# Patient Record
Sex: FEMALE | Race: WHITE | NOT HISPANIC OR LATINO | Employment: UNEMPLOYED | URBAN - METROPOLITAN AREA
[De-identification: names, ages, dates, MRNs, and addresses within clinical notes are randomized per-mention and may not be internally consistent; named-entity substitution may affect disease eponyms.]

---

## 2017-05-16 ENCOUNTER — HOSPITAL ENCOUNTER (EMERGENCY)
Facility: HOSPITAL | Age: 27
Discharge: HOME | End: 2017-05-16
Attending: EMERGENCY MEDICINE
Payer: MEDICAID

## 2017-05-16 ENCOUNTER — TELEPHONE (OUTPATIENT)
Dept: ORTHOPEDICS | Facility: CLINIC | Age: 27
End: 2017-05-16

## 2017-05-16 VITALS
HEIGHT: 67 IN | HEART RATE: 102 BPM | OXYGEN SATURATION: 98 % | DIASTOLIC BLOOD PRESSURE: 89 MMHG | WEIGHT: 282.19 LBS | SYSTOLIC BLOOD PRESSURE: 141 MMHG | RESPIRATION RATE: 20 BRPM | TEMPERATURE: 99 F | BODY MASS INDEX: 44.29 KG/M2

## 2017-05-16 DIAGNOSIS — S43.402A SPRAIN OF LEFT SHOULDER, UNSPECIFIED SHOULDER SPRAIN TYPE, INITIAL ENCOUNTER: Primary | ICD-10-CM

## 2017-05-16 PROCEDURE — 99284 EMERGENCY DEPT VISIT MOD MDM: CPT

## 2017-05-16 PROCEDURE — 25000003 PHARM REV CODE 250: Performed by: EMERGENCY MEDICINE

## 2017-05-16 RX ORDER — HYDROCODONE BITARTRATE AND ACETAMINOPHEN 5; 325 MG/1; MG/1
1 TABLET ORAL EVERY 6 HOURS PRN
COMMUNITY
End: 2020-09-25

## 2017-05-16 RX ORDER — DICLOFENAC SODIUM 50 MG/1
50 TABLET, DELAYED RELEASE ORAL 3 TIMES DAILY
Qty: 15 TABLET | Refills: 0 | Status: SHIPPED | OUTPATIENT
Start: 2017-05-16 | End: 2018-05-16

## 2017-05-16 RX ORDER — OXYCODONE HYDROCHLORIDE 5 MG/1
10 TABLET ORAL
Status: COMPLETED | OUTPATIENT
Start: 2017-05-16 | End: 2017-05-16

## 2017-05-16 RX ADMIN — OXYCODONE HYDROCHLORIDE 10 MG: 5 TABLET ORAL at 09:05

## 2017-05-16 NOTE — TELEPHONE ENCOUNTER
----- Message from Swati Shaw sent at 5/16/2017 10:30 AM CDT -----  Contact: Patient  The patient was discharged from the ER yesterday May 15, 2017.  And she needed to see either Dr. Oneal or Dr. Linn as soon as possible.  The patient can be contacted at [phone].

## 2017-05-16 NOTE — TELEPHONE ENCOUNTER
----- Message from Becki Shaw sent at 5/16/2017  3:05 PM CDT -----  Patient requesting new patient appointment for left shoulder injury before Memorial Day.  Best contact phone # [phone].

## 2017-05-16 NOTE — ED AVS SNAPSHOT
[facility] [address] [place] [address]               Loretta Lea   2017  8:48 PM   ED    Description:  Female : 1990   Department:  [facility]           Your Care was Coordinated By:     Provider Role From To    Diony Pryor III, MD Attending Provider 17 --      Reason for Visit     Shoulder Pain           Diagnoses this Visit        Comments    Sprain of left shoulder, unspecified shoulder sprain type, initial encounter    -  Primary       ED Disposition     None           To Do List           Follow-up Information     Follow up with López Ortega MD In 3 days.    Specialty:  Orthopedic Surgery    Contact information:    [address]  [place] [address]  [phone]         These Medications        Disp Refills Start End    diclofenac (VOLTAREN) 50 MG EC tablet 15 tablet 0 2017    Take 1 tablet (50 mg total) by mouth 3 (three) times daily. - Oral      [facility]     [facility] Care Line -  Assistance  Unless otherwise directed by your provider, please contact [facility], our nurse care line that is available for  assistance.     Registered nurses in the [facility] provide: appointment scheduling, clinical advisement, health education, and other advisory services.  Call: [phone] (toll free)               Medications           Message regarding Medications     Verify the changes and/or additions to your medication regime listed below are the same as discussed with your clinician today.  If any of these changes or additions are incorrect, please notify your healthcare provider.        START taking these NEW medications        Refills    diclofenac (VOLTAREN) 50 MG EC tablet 0    Sig: Take 1 tablet (50 mg total) by mouth 3 (three) times daily.    Class: Print    Route: Oral      These medications were administered today        Dose  "Freq    oxycodone immediate release tablet 10 mg 10 mg ED 1 Time    Sig: Take 2 tablets (10 mg total) by mouth ED 1 Time.    Class: Normal    Route: Oral      STOP taking these medications     naproxen (NAPROSYN) 500 MG tablet Take 500 mg by mouth 2 (two) times daily.    norgestimate-ethinyl estradiol (ORTHO-CYCLEN) 0.25-35 mg-mcg per tablet Take 1 tablet by mouth once daily.           Verify that the below list of medications is an accurate representation of the medications you are currently taking.  If none reported, the list may be blank. If incorrect, please contact your healthcare provider. Carry this list with you in case of emergency.           Current Medications     hydrocodone-acetaminophen 5-325mg (NORCO) 5-325 mg per tablet Take 1 tablet by mouth every 6 (six) hours as needed for Pain.    PREDNISONE ORAL Take by mouth.    diclofenac (VOLTAREN) 50 MG EC tablet Take 1 tablet (50 mg total) by mouth 3 (three) times daily.    guaifenesin (MUCINEX) 600 mg 12 hr tablet Take 1,200 mg by mouth 2 (two) times daily.           Clinical Reference Information           Your Vitals Were     BP Pulse Temp Resp Height Weight    141/89 (BP Location: Right arm, Patient Position: Sitting) 102 98.5 °F (36.9 °C) (Oral) 20 5' 7" (1.702 m) 128 kg (282 lb 3 oz)    Last Period SpO2 BMI          04/15/2017 (Approximate) 98% 44.2 kg/m2        Allergies as of 5/16/2017     No Known Allergies      Immunizations Administered on Date of Encounter - 5/16/2017     None      ED Micro, Lab, POCT     None      ED Imaging Orders     Start Ordered       Status Ordering Provider    05/16/17 2101 05/16/17 2100  US Upper Extremity Veins Left  1 time imaging      In process         Discharge Instructions         Shoulder Sprain  A sprain is a stretching or tearing of the ligaments that hold a joint together. A sprain may take up to 8 weeks to fully heal, depending on how severe it is. Moderate to severe shoulder sprains are treated with a sling " or shoulder immobilizer. Minor sprains can be treated without any special support.  Home care  The following guidelines will help you care for your injury at home:  · If a sling was given to you, leave it in place for the time advised by your healthcare provider. If you arent sure how long to wear it, ask for advice. If the sling becomes loose, adjust it so that your forearm is level with the ground. Your shoulder should feel well supported.  · Put an ice pack on the injured area for 20 minutes every 1 to 2 hours the first day. You can make your own ice pack by putting ice cubes in a plastic bag. A bag of frozen peas or something similar works well too. Wrap the bag in a thin towel. Continue with ice packs 3 to 4 times a day for the next 2 to 3 days. Then use the pack as needed to ease pain and swelling.  · You may use acetaminophen or ibuprofen to control pain, unless another pain medicine was prescribed. If you have chronic liver or kidney disease, talk with your healthcare provider before using these medicines. Also talk with your provider if youve had a stomach ulcer or gastrointestinal bleeding.  · Shoulder joints become stiff if left in a sling for too long. You should start range of motion exercises about 7 to 10 days after the injury. Talk with your provider to find out what type of exercises to do and how soon to start.  Follow-up care  Follow up with your healthcare provider, or as advised.  Any X-rays you had today dont show any broken bones, breaks, or fractures. Sometimes fractures dont show up on the first X-ray. Bruises and sprains can sometimes hurt as much as a fracture. These injuries can take time to heal completely. If your symptoms dont improve or they get worse, talk with your provider. You may need a repeat X-ray or other treatments.  When to seek medical advice  Call your healthcare provider right away if any of these occur:  · Shoulder pain or swelling in your arm that gets  worse  · Fingers become cold, blue, numb, or tingly  · Large amount of bruising of the shoulder or upper arm  · Fever or chills  Date Last Reviewed: 8/1/2016  © 9087-4156 [employer]. [address] [place] [address], [place] [address]. All rights reserved. This information is not intended as a substitute for professional medical care. Always follow your healthcare professional's instructions.          [facility] Sign-Up     Activating your [facility] account is as easy as 1-2-3!     1) Visit [email or web address], select Sign Up Now, enter this activation code and your date of birth, then select Next.  [email or web address]  Expires: 6/30/2017  8:57 PM      2) Create a username and password to use when you visit [facility] in the future and select a security question in case you lose your password and select Next.    3) Enter your e-mail address and click Sign Up!    Additional Information  If you have questions, please e-mail [email or web address] or call [phone] to talk to our [facility] staff. Remember, [facility] is NOT to be used for urgent needs. For medical emergencies, dial 911.         Smoking Cessation     If you would like to quit smoking:   You may be eligible for free services if you are a [place] resident and started smoking cigarettes before September 1, 1988.  Call the Smoking Cessation Trust (SCT) toll free at (017) 063-7220 or (905) 220-6229.   Call 0-996-QUIT-NOW if you do not meet the above criteria.   Contact us via email: [email or web address]   View our website for more information: [email or web address]         [facility] complies with applicable Federal civil rights laws and does not discriminate on the basis of race, color, national origin, age, disability, or sex.        Language Assistance Services     ATTENTION: Language assistance services are available, free of charge. Please call [phone].      ATENCIÓN: Si savanna espoh, tiene a baeza disposición  servicios gratuitos de asistencia lingüística. Nay al [phone].     JAMES Ý: N?u b?n nói Ti?ng Vi?t, có các d?ch v? h? tr? ngôn ng? mi?n phí dành cho b?n. G?i s? [phone].

## 2017-05-16 NOTE — TELEPHONE ENCOUNTER
----- Message from Becki Shaw sent at 5/16/2017  3:05 PM CDT -----  Patient requesting new patient appointment for left shoulder injury before Memorial Day.  Best contact phone # [phone].

## 2017-05-17 NOTE — ED NOTES
Sling applied by Loretta and teaching done Given written and verbal DC instructions questions answered per MD aware to follow up with PCP encouraged to return if needed. Given RX with teaching.

## 2017-05-17 NOTE — DISCHARGE INSTRUCTIONS
Shoulder Sprain  A sprain is a stretching or tearing of the ligaments that hold a joint together. A sprain may take up to 8 weeks to fully heal, depending on how severe it is. Moderate to severe shoulder sprains are treated with a sling or shoulder immobilizer. Minor sprains can be treated without any special support.  Home care  The following guidelines will help you care for your injury at home:  · If a sling was given to you, leave it in place for the time advised by your healthcare provider. If you arent sure how long to wear it, ask for advice. If the sling becomes loose, adjust it so that your forearm is level with the ground. Your shoulder should feel well supported.  · Put an ice pack on the injured area for 20 minutes every 1 to 2 hours the first day. You can make your own ice pack by putting ice cubes in a plastic bag. A bag of frozen peas or something similar works well too. Wrap the bag in a thin towel. Continue with ice packs 3 to 4 times a day for the next 2 to 3 days. Then use the pack as needed to ease pain and swelling.  · You may use acetaminophen or ibuprofen to control pain, unless another pain medicine was prescribed. If you have chronic liver or kidney disease, talk with your healthcare provider before using these medicines. Also talk with your provider if youve had a stomach ulcer or gastrointestinal bleeding.  · Shoulder joints become stiff if left in a sling for too long. You should start range of motion exercises about 7 to 10 days after the injury. Talk with your provider to find out what type of exercises to do and how soon to start.  Follow-up care  Follow up with your healthcare provider, or as advised.  Any X-rays you had today dont show any broken bones, breaks, or fractures. Sometimes fractures dont show up on the first X-ray. Bruises and sprains can sometimes hurt as much as a fracture. These injuries can take time to heal completely. If your symptoms dont improve or they get  worse, talk with your provider. You may need a repeat X-ray or other treatments.  When to seek medical advice  Call your healthcare provider right away if any of these occur:  · Shoulder pain or swelling in your arm that gets worse  · Fingers become cold, blue, numb, or tingly  · Large amount of bruising of the shoulder or upper arm  · Fever or chills  Date Last Reviewed: 8/1/2016  © 9526-8979 [employer]. [address] [place] [address], [place] [address]. All rights reserved. This information is not intended as a substitute for professional medical care. Always follow your healthcare professional's instructions.

## 2017-05-17 NOTE — ED PROVIDER NOTES
"Encounter Date: 5/16/2017    SCRIBE #1 NOTE: I, Thi Medeiros , am scribing for, and in the presence of,  Dr. Pryor. I have scribed the entire note.       History     Chief Complaint   Patient presents with    Shoulder Pain     Seen at [facility] yesterday. Left hand now swollen.      Review of patient's allergies indicates:  No Known Allergies  HPI Comments:     05/16/2017  9:01 PM     Chief Complaint:  L shoulder pain      The patient is a 26 y.o. female with no pertinent PMHx who is presenting with the acute onset of L shoulder pain that began x 1 day ago after "sleeping on it funny". The pt reports that the pain is constant, "cramping", and moderate. She notes that the pain is exacerbated with ROM and palpation and denies any relief with steroids, NORCO 5mg, or OTC NSAIDs. She also endorses mild ULE edema that has worsened since this morning. No weakness or numbness. Pt reports that she was seen last evening at [facility] and had a normal w/u. Pt was d/c on NORCO 5mgs. No known trauma or injury. No pertinent past surgical hx. Pt confirms tobacco abuse.             The history is provided by the patient and the spouse.     Past Medical History:   Diagnosis Date    Back pain     HPV (human papilloma virus) infection     Ovarian cyst      Past Surgical History:   Procedure Laterality Date    CHOLECYSTECTOMY      DILATION AND CURETTAGE OF UTERUS      OOPHORECTOMY       History reviewed. No pertinent family history.  Social History   Substance Use Topics    Smoking status: Current Every Day Smoker     Packs/day: 1.00     Types: Cigarettes    Smokeless tobacco: None    Alcohol use Yes     Review of Systems   Constitutional: Negative for fever.   HENT: Negative for sore throat.    Eyes: Negative for visual disturbance.   Respiratory: Negative for shortness of breath.    Cardiovascular: Negative for chest pain.   Gastrointestinal: Negative for nausea.   Genitourinary: Negative for dysuria.   Musculoskeletal: Positive for " arthralgias and joint swelling. Negative for back pain.   Skin: Negative for rash.   Neurological: Negative for weakness.   Hematological: Does not bruise/bleed easily.   Psychiatric/Behavioral: Negative for confusion.       Physical Exam   Initial Vitals   BP Pulse Resp Temp SpO2   05/16/17 2030 05/16/17 2030 05/16/17 2030 05/16/17 2030 05/16/17 2030   141/89 102 20 98.5 °F (36.9 °C) 98 %     Physical Exam    Nursing note and vitals reviewed.  Constitutional: She appears well-developed and well-nourished.   Cardiovascular: Intact distal pulses.   2+ radial pulse on L   Musculoskeletal: Normal range of motion. She exhibits edema and tenderness.   Mild swelling noted to the LUE. There is decreased ROM and TTP of the L shoulder.    Skin: Skin is warm and dry. No erythema.         ED Course   Procedures  Labs Reviewed - No data to display          Medical Decision Making:   ED Management:  Loretta Lea is a 26 y.o. female who presents with  left shoulder pain with associated left upper extremity swelling.  Venous ultrasound is obtained to exclude DVT and is normal.  Symptoms appear to be a primary shoulder problem.  She has had no fever night sweats or chills to raise concern for septic arthritis.  She denies any trauma making rotator cuff injury less likely although possible.  She is placed in a sling and referred to orthopedic surgery.            Scribe Attestation:   Scribe #1: I performed the above scribed service and the documentation accurately describes the services I performed. I attest to the accuracy of the note.    Attending Attestation:           Physician Attestation for Scribe:  Physician Attestation Statement for Scribe #1: I, Dr. Pryor, reviewed documentation, as scribed by Thi Medeiros in my presence, and it is both accurate and complete.                 ED Course     Clinical Impression:   There were no encounter diagnoses.          Diony Pryor III, MD  05/17/17 0001

## 2017-05-17 NOTE — ED NOTES
"C/o let arm and shoulder pain that started 2 days ago after she "slept on it funny:" was seen at [facility] last night and given norco 5mg, states that pain has gotten worse even with use of prescribed pain medication, steroids, and OTC NSAIDS also state that she feels the left arm started to swell today. ROM and touch increase pain pulses intact. Alert calm spouse remains with pt aware to notify nurse of needs or concerns.   "

## 2017-07-13 ENCOUNTER — TELEPHONE (OUTPATIENT)
Dept: ORTHOPEDICS | Facility: CLINIC | Age: 27
End: 2017-07-13

## 2017-07-13 NOTE — TELEPHONE ENCOUNTER
Called patient to see about scheduling an appointment, she stated she injured her ankle a month ago, and it was not getting any better so she went to [facility] ER 7/12/17.  Patient then stated she called all over [place] and no one would take her insurance so she scheduled with a doctor in [facility] today.  Advised patient I was calling to schedule, however, patient is aware, if she needs any treatment or assistance in the future to please call our office.

## 2017-07-13 NOTE — TELEPHONE ENCOUNTER
----- Message from Marybeth Angulo sent at 7/13/2017  8:53 AM CDT -----  Contact: patient  New Patient appointment:  Fracture in rt ankle/ [employer] (medicaid)  Was seen in ER 7/12. ER ref to Dr Oneal....  Patient Ph# [phone]    Let me know if you would like me to make her appointment. Thanks

## 2019-08-28 ENCOUNTER — HOSPITAL ENCOUNTER (EMERGENCY)
Facility: HOSPITAL | Age: 29
Discharge: HOME | End: 2019-08-28
Attending: EMERGENCY MEDICINE
Payer: MEDICAID

## 2019-08-28 VITALS
TEMPERATURE: 98 F | HEART RATE: 84 BPM | BODY MASS INDEX: 43.16 KG/M2 | HEIGHT: 67 IN | WEIGHT: 275 LBS | RESPIRATION RATE: 16 BRPM | SYSTOLIC BLOOD PRESSURE: 120 MMHG | OXYGEN SATURATION: 98 % | DIASTOLIC BLOOD PRESSURE: 78 MMHG

## 2019-08-28 DIAGNOSIS — M94.0 ACUTE COSTOCHONDRITIS: Primary | ICD-10-CM

## 2019-08-28 DIAGNOSIS — R07.9 CHEST PAIN: ICD-10-CM

## 2019-08-28 PROCEDURE — 99284 EMERGENCY DEPT VISIT MOD MDM: CPT | Mod: 25

## 2019-08-28 PROCEDURE — 93005 ELECTROCARDIOGRAM TRACING: CPT

## 2019-08-28 PROCEDURE — 63600175 PHARM REV CODE 636 W HCPCS: Performed by: EMERGENCY MEDICINE

## 2019-08-28 PROCEDURE — 96374 THER/PROPH/DIAG INJ IV PUSH: CPT

## 2019-08-28 RX ORDER — SERTRALINE HYDROCHLORIDE 100 MG/1
100 TABLET, FILM COATED ORAL DAILY
COMMUNITY
End: 2020-10-20 | Stop reason: SDUPTHER

## 2019-08-28 RX ORDER — KETOROLAC TROMETHAMINE 30 MG/ML
30 INJECTION, SOLUTION INTRAMUSCULAR; INTRAVENOUS
Status: DISCONTINUED | OUTPATIENT
Start: 2019-08-28 | End: 2019-08-28

## 2019-08-28 RX ORDER — KETOROLAC TROMETHAMINE 30 MG/ML
30 INJECTION, SOLUTION INTRAMUSCULAR; INTRAVENOUS
Status: COMPLETED | OUTPATIENT
Start: 2019-08-28 | End: 2019-08-28

## 2019-08-28 RX ORDER — TRAZODONE HYDROCHLORIDE 150 MG/1
150 TABLET ORAL NIGHTLY
COMMUNITY
End: 2021-06-22 | Stop reason: CLARIF

## 2019-08-28 RX ADMIN — KETOROLAC TROMETHAMINE 30 MG: 30 INJECTION, SOLUTION INTRAMUSCULAR at 02:08

## 2019-08-28 NOTE — ED PROVIDER NOTES
Encounter Date: 8/28/2019       History   No chief complaint on file.    Patient reports sternal chest pain.  Patient reports improvement of pain when she pushes in our sternum.  There is no trauma. No pleurisy hemoptysis.  No recent illness.  No similar symptoms in the past.  No treatment prior to arrival.  Pain is described as a sharp sensation.        Review of patient's allergies indicates:  No Known Allergies  Past Medical History:   Diagnosis Date    Back pain     HPV (human papilloma virus) infection     Ovarian cyst      Past Surgical History:   Procedure Laterality Date    CHOLECYSTECTOMY      DILATION AND CURETTAGE OF UTERUS      OOPHORECTOMY       No family history on file.  Social History     Tobacco Use    Smoking status: Current Every Day Smoker     Packs/day: 1.00     Types: Cigarettes   Substance Use Topics    Alcohol use: Yes    Drug use: Not on file     Review of Systems   Constitutional: Negative for chills and fever.   HENT: Negative for congestion.    Eyes: Negative for visual disturbance.   Respiratory: Negative for shortness of breath.    Cardiovascular: Positive for chest pain. Negative for palpitations.   Gastrointestinal: Negative for abdominal pain and vomiting.   Genitourinary: Negative for dysuria.   Musculoskeletal: Negative for joint swelling.   Neurological: Negative for headaches.   Psychiatric/Behavioral: Negative for confusion.       Physical Exam     Initial Vitals   BP Pulse Resp Temp SpO2   -- -- -- -- --      MAP       --         Physical Exam    Nursing note and vitals reviewed.  Constitutional: She is not diaphoretic. No distress.   HENT:   Head: Normocephalic and atraumatic.   Eyes: Conjunctivae are normal.   Neck: Normal range of motion.   Cardiovascular: Normal rate.   Pulmonary/Chest: Breath sounds normal.   Patient with very reproducible sternal tenderness.  Palpation reproduces tenderness exactly.   Abdominal: Soft. There is no tenderness.   Musculoskeletal:  Normal range of motion.   Neurological: She is alert.   No gross deficits   Skin: No rash noted.   Psychiatric: She has a normal mood and affect.         ED Course   Procedures  Labs Reviewed - No data to display       Imaging Results    None          Medical Decision Making:   History:   Old Medical Records: I decided to obtain old medical records.  Clinical Tests:   Radiological Study: Reviewed  Medical Tests: Reviewed  ED Management:  Patient presents with history and physical exam consistent with costochondritis.  Patient is completely symptom free after Toradol.  EKG normal sinus rhythm with no acute ST segment changes.  Chest x-ray unremarkable.                      Clinical Impression:       ICD-10-CM ICD-9-CM   1. Acute costochondritis M94.0 733.6   2. Chest pain R07.9 786.50                                Kalyan Peterson MD  09/04/19 0944

## 2019-08-28 NOTE — ED NOTES
"Pt states started with chest pain while driving in car, went home because thought it could be her anxiety, when started with "tingling " to left arm, it scared her, so came here. States hurt to move or take a deep breath.  "

## 2019-10-16 ENCOUNTER — HOSPITAL ENCOUNTER (EMERGENCY)
Facility: HOSPITAL | Age: 29
Discharge: HOME | End: 2019-10-16
Attending: EMERGENCY MEDICINE
Payer: MEDICAID

## 2019-10-16 VITALS
WEIGHT: 285 LBS | SYSTOLIC BLOOD PRESSURE: 148 MMHG | RESPIRATION RATE: 16 BRPM | TEMPERATURE: 98 F | HEIGHT: 68 IN | DIASTOLIC BLOOD PRESSURE: 93 MMHG | HEART RATE: 90 BPM | BODY MASS INDEX: 43.19 KG/M2 | OXYGEN SATURATION: 98 %

## 2019-10-16 DIAGNOSIS — L25.9 CONTACT DERMATITIS, UNSPECIFIED CONTACT DERMATITIS TYPE, UNSPECIFIED TRIGGER: Primary | ICD-10-CM

## 2019-10-16 PROCEDURE — 99284 EMERGENCY DEPT VISIT MOD MDM: CPT | Mod: 25

## 2019-10-16 PROCEDURE — 96372 THER/PROPH/DIAG INJ SC/IM: CPT | Mod: 59

## 2019-10-16 PROCEDURE — 63600175 PHARM REV CODE 636 W HCPCS: Performed by: NURSE PRACTITIONER

## 2019-10-16 RX ORDER — HYDROXYZINE HYDROCHLORIDE 25 MG/1
25 TABLET, FILM COATED ORAL EVERY 6 HOURS
Qty: 20 TABLET | Refills: 0 | Status: SHIPPED | OUTPATIENT
Start: 2019-10-16 | End: 2021-06-22 | Stop reason: CLARIF

## 2019-10-16 RX ORDER — PREDNISONE 20 MG/1
40 TABLET ORAL DAILY
Qty: 10 TABLET | Refills: 0 | Status: SHIPPED | OUTPATIENT
Start: 2019-10-16 | End: 2019-10-21

## 2019-10-16 RX ORDER — DEXAMETHASONE SODIUM PHOSPHATE 4 MG/ML
8 INJECTION, SOLUTION INTRA-ARTICULAR; INTRALESIONAL; INTRAMUSCULAR; INTRAVENOUS; SOFT TISSUE
Status: COMPLETED | OUTPATIENT
Start: 2019-10-16 | End: 2019-10-16

## 2019-10-16 RX ADMIN — DEXAMETHASONE SODIUM PHOSPHATE 8 MG: 4 INJECTION, SOLUTION INTRAMUSCULAR; INTRAVENOUS at 10:10

## 2019-10-17 NOTE — ED PROVIDER NOTES
Encounter Date: 10/16/2019       History     Chief Complaint   Patient presents with    Rash     FOR PAST WEEK     The history is provided by the patient. No  was used.   Rash    This is a new problem. Illness onset: 1 week ago. The problem has been unchanged. 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 The rash is present on the right arm and left arm. The pain is at a severity of 0/10. Associated symptoms include itching. Pertinent negatives include no blisters, no pain and no weeping. Treatments tried: Benadryl. The treatment provided no relief.     Review of patient's allergies indicates:  No Known Allergies  Past Medical History:   Diagnosis Date    Back pain     HPV (human papilloma virus) infection     Ovarian cyst      Past Surgical History:   Procedure Laterality Date    CHOLECYSTECTOMY      DILATION AND CURETTAGE OF UTERUS      OOPHORECTOMY       No family history on file.  Social History     Tobacco Use    Smoking status: Current Every Day Smoker     Packs/day: 1.00     Types: Cigarettes   Substance Use Topics    Alcohol use: Yes    Drug use: Not on file     Review of Systems   Constitutional: Negative.    HENT: Negative.    Eyes: Negative.    Respiratory: Negative.    Cardiovascular: Negative.    Gastrointestinal: Negative.    Genitourinary: Negative.    Musculoskeletal: Negative.    Skin: Positive for itching and rash.   Neurological: Negative.    Psychiatric/Behavioral: Negative.        Physical Exam     Initial Vitals [10/16/19 1945]   BP Pulse Resp Temp SpO2   134/82 99 16 98.4 °F (36.9 °C) 98 %      MAP       --         Physical Exam    Nursing note and vitals reviewed.  Constitutional: She appears well-developed and well-nourished. She is not diaphoretic. No distress.   HENT:   Head: Normocephalic.   Right Ear: External ear normal.   Left Ear: External ear normal.   Mouth/Throat: Oropharynx is clear and moist.   Eyes: Conjunctivae and EOM are  normal. Pupils are equal, round, and reactive to light.   Neck: Normal range of motion. Neck supple.   Cardiovascular: Normal rate, regular rhythm, normal heart sounds and intact distal pulses. Exam reveals no gallop and no friction rub.    No murmur heard.  Pulmonary/Chest: Breath sounds normal. No respiratory distress. She has no wheezes. She has no rhonchi. She has no rales. She exhibits no tenderness.   Abdominal: Soft. Bowel sounds are normal. She exhibits no distension and no mass. There is no tenderness. There is no rebound and no guarding.   Musculoskeletal: Normal range of motion.   Lymphadenopathy:     She has no cervical adenopathy.   Neurological: She is alert and oriented to person, place, and time. She has normal strength. GCS score is 15. GCS eye subscore is 4. GCS verbal subscore is 5. GCS motor subscore is 6.   Skin: Skin is warm and dry. Capillary refill takes less than 2 seconds. Rash noted. Rash is maculopapular. There is erythema.        Psychiatric: She has a normal mood and affect. Her behavior is normal. Judgment and thought content normal.         ED Course   Procedures  Labs Reviewed - No data to display       Imaging Results    None          Medical Decision Making:   Differential Diagnosis:   Contact dermatitis, allergic reaction, parasitic rash  ED Management:  Vital signs are stable. Patient has erythematous macular papular rash to both upper extremities. She states that she was possibly exposed to chemicals while washing dishes at work last week.  The the rash appears consistent with contact dermatitis.  The rash is not painful.  She does have some itching.  She has attempted using Benadryl without significant improvement.  Decadron injection given.  Will prescribe prednisone.  Other:   I have discussed this case with another health care provider.       <> Summary of the Discussion: Dr. Harper              Attending Attestation:     Physician Attestation Statement for NP/PA:        Other NP/PA Attestation Additions:    History of Present Illness: I agree with the assessment, treatment plan, and disposition of the patient as recorded by the nurse practitioner/physician assistant.            Attending ED Notes:   I agree with the assessment, treatment plan, and disposition of the patient as recorded by the nurse practitioner/physician assistant.              Clinical Impression:   Acute Contact Dermatitis                             Sanjeev Fairbanks NP  10/16/19 2247       Jesús Harper MD  10/17/19 0724       Jesús Harper MD  10/17/19 0826       Jesús Harper MD  10/17/19 0829

## 2019-10-17 NOTE — ED PROVIDER NOTES
Encounter Date: 10/16/2019       History     Chief Complaint   Patient presents with    Rash     FOR PAST WEEK     HPI  Review of patient's allergies indicates:  No Known Allergies  Past Medical History:   Diagnosis Date    Back pain     HPV (human papilloma virus) infection     Ovarian cyst      Past Surgical History:   Procedure Laterality Date    CHOLECYSTECTOMY      DILATION AND CURETTAGE OF UTERUS      OOPHORECTOMY       No family history on file.  Social History     Tobacco Use    Smoking status: Current Every Day Smoker     Packs/day: 1.00     Types: Cigarettes   Substance Use Topics    Alcohol use: Yes    Drug use: Not on file     Review of Systems    Physical Exam     Initial Vitals [10/16/19 1945]   BP Pulse Resp Temp SpO2   134/82 99 16 98.4 °F (36.9 °C) 98 %      MAP       --         Physical Exam    ED Course   Procedures  Labs Reviewed - No data to display       Imaging Results    None                               Clinical Impression:   {Add your Clinical Impression here. If you haven't documented one yet, please pend the note, finalize a Clinical Impression, and refresh your note before signing.:[id number]}

## 2019-12-04 ENCOUNTER — HOSPITAL ENCOUNTER (EMERGENCY)
Facility: HOSPITAL | Age: 29
Discharge: HOME | End: 2019-12-04
Attending: EMERGENCY MEDICINE
Payer: MEDICAID

## 2019-12-04 VITALS
HEART RATE: 90 BPM | OXYGEN SATURATION: 98 % | TEMPERATURE: 98 F | SYSTOLIC BLOOD PRESSURE: 145 MMHG | DIASTOLIC BLOOD PRESSURE: 95 MMHG | BODY MASS INDEX: 43.95 KG/M2 | WEIGHT: 280 LBS | HEIGHT: 67 IN | RESPIRATION RATE: 16 BRPM

## 2019-12-04 DIAGNOSIS — R10.9 LEFT FLANK PAIN: Primary | ICD-10-CM

## 2019-12-04 LAB
ALBUMIN SERPL BCP-MCNC: 3.8 G/DL (ref 3.5–5.2)
ALP SERPL-CCNC: 101 U/L (ref 55–135)
ALT SERPL W/O P-5'-P-CCNC: 53 U/L (ref 10–44)
ANION GAP SERPL CALC-SCNC: 7 MMOL/L (ref 8–16)
AST SERPL-CCNC: 27 U/L (ref 10–40)
BACTERIA #/AREA URNS HPF: ABNORMAL /HPF
BASOPHILS # BLD AUTO: 0.07 K/UL (ref 0–0.2)
BASOPHILS NFR BLD: 0.5 % (ref 0–1.9)
BILIRUB SERPL-MCNC: 0.5 MG/DL (ref 0.1–1)
BILIRUB UR QL STRIP: NEGATIVE
BILIRUB UR QL STRIP: NEGATIVE
BUN SERPL-MCNC: 9 MG/DL (ref 6–20)
CALCIUM SERPL-MCNC: 8.7 MG/DL (ref 8.7–10.5)
CHLORIDE SERPL-SCNC: 105 MMOL/L (ref 95–110)
CLARITY UR: CLEAR
CLARITY UR: CLEAR
CO2 SERPL-SCNC: 26 MMOL/L (ref 23–29)
COLOR UR: YELLOW
COLOR UR: YELLOW
CREAT SERPL-MCNC: 0.6 MG/DL (ref 0.5–1.4)
DIFFERENTIAL METHOD: ABNORMAL
EOSINOPHIL # BLD AUTO: 0.1 K/UL (ref 0–0.5)
EOSINOPHIL NFR BLD: 0.6 % (ref 0–8)
ERYTHROCYTE [DISTWIDTH] IN BLOOD BY AUTOMATED COUNT: 14.9 % (ref 11.5–14.5)
EST. GFR  (AFRICAN AMERICAN): >60 ML/MIN/1.73 M^2
EST. GFR  (NON AFRICAN AMERICAN): >60 ML/MIN/1.73 M^2
GLUCOSE SERPL-MCNC: 168 MG/DL (ref 70–110)
GLUCOSE UR QL STRIP: NEGATIVE
GLUCOSE UR QL STRIP: NEGATIVE
HCT VFR BLD AUTO: 42.4 % (ref 37–48.5)
HGB BLD-MCNC: 13.5 G/DL (ref 12–16)
HGB UR QL STRIP: NEGATIVE
HGB UR QL STRIP: NEGATIVE
HYALINE CASTS #/AREA URNS LPF: 6 /LPF
IMM GRANULOCYTES # BLD AUTO: 0.04 K/UL (ref 0–0.04)
IMM GRANULOCYTES NFR BLD AUTO: 0.3 % (ref 0–0.5)
KETONES UR QL STRIP: NEGATIVE
KETONES UR QL STRIP: NEGATIVE
LEUKOCYTE ESTERASE UR QL STRIP: ABNORMAL
LEUKOCYTE ESTERASE UR QL STRIP: ABNORMAL
LIPASE SERPL-CCNC: 54 U/L (ref 4–60)
LYMPHOCYTES # BLD AUTO: 3.8 K/UL (ref 1–4.8)
LYMPHOCYTES NFR BLD: 27.4 % (ref 18–48)
MCH RBC QN AUTO: 26.2 PG (ref 27–31)
MCHC RBC AUTO-ENTMCNC: 31.8 G/DL (ref 32–36)
MCV RBC AUTO: 82 FL (ref 82–98)
MICROSCOPIC COMMENT: ABNORMAL
MONOCYTES # BLD AUTO: 0.9 K/UL (ref 0.3–1)
MONOCYTES NFR BLD: 6.3 % (ref 4–15)
NEUTROPHILS # BLD AUTO: 9 K/UL (ref 1.8–7.7)
NEUTROPHILS NFR BLD: 64.9 % (ref 38–73)
NITRITE UR QL STRIP: NEGATIVE
NITRITE UR QL STRIP: NEGATIVE
NRBC BLD-RTO: 0 /100 WBC
PH UR STRIP: 7 [PH] (ref 5–8)
PH UR STRIP: 7 [PH] (ref 5–8)
PLATELET # BLD AUTO: 415 K/UL (ref 150–350)
PMV BLD AUTO: 9.1 FL (ref 9.2–12.9)
POTASSIUM SERPL-SCNC: 3.5 MMOL/L (ref 3.5–5.1)
PROT SERPL-MCNC: 7.6 G/DL (ref 6–8.4)
PROT UR QL STRIP: NEGATIVE
PROT UR QL STRIP: NEGATIVE
RBC # BLD AUTO: 5.16 M/UL (ref 4–5.4)
RBC #/AREA URNS HPF: 2 /HPF (ref 0–4)
SODIUM SERPL-SCNC: 138 MMOL/L (ref 136–145)
SP GR UR STRIP: 1.01 (ref 1–1.03)
SP GR UR STRIP: 1.01 (ref 1–1.03)
SQUAMOUS #/AREA URNS HPF: 2 /HPF
URN SPEC COLLECT METH UR: ABNORMAL
URN SPEC COLLECT METH UR: ABNORMAL
UROBILINOGEN UR STRIP-ACNC: NEGATIVE EU/DL
UROBILINOGEN UR STRIP-ACNC: NEGATIVE EU/DL
WBC # BLD AUTO: 13.92 K/UL (ref 3.9–12.7)
WBC #/AREA URNS HPF: 6 /HPF (ref 0–5)

## 2019-12-04 PROCEDURE — 81001 URINALYSIS AUTO W/SCOPE: CPT

## 2019-12-04 PROCEDURE — 96375 TX/PRO/DX INJ NEW DRUG ADDON: CPT

## 2019-12-04 PROCEDURE — 25000003 PHARM REV CODE 250: Performed by: EMERGENCY MEDICINE

## 2019-12-04 PROCEDURE — 96361 HYDRATE IV INFUSION ADD-ON: CPT

## 2019-12-04 PROCEDURE — 83690 ASSAY OF LIPASE: CPT

## 2019-12-04 PROCEDURE — 99285 EMERGENCY DEPT VISIT HI MDM: CPT | Mod: 25

## 2019-12-04 PROCEDURE — 80053 COMPREHEN METABOLIC PANEL: CPT

## 2019-12-04 PROCEDURE — 87086 URINE CULTURE/COLONY COUNT: CPT

## 2019-12-04 PROCEDURE — 85025 COMPLETE CBC W/AUTO DIFF WBC: CPT

## 2019-12-04 PROCEDURE — 96374 THER/PROPH/DIAG INJ IV PUSH: CPT

## 2019-12-04 PROCEDURE — 63600175 PHARM REV CODE 636 W HCPCS: Performed by: EMERGENCY MEDICINE

## 2019-12-04 RX ORDER — METHOCARBAMOL 500 MG/1
1000 TABLET, FILM COATED ORAL
Status: COMPLETED | OUTPATIENT
Start: 2019-12-04 | End: 2019-12-04

## 2019-12-04 RX ORDER — METHOCARBAMOL 500 MG/1
1000 TABLET, FILM COATED ORAL 3 TIMES DAILY
Qty: 30 TABLET | Refills: 0 | Status: SHIPPED | OUTPATIENT
Start: 2019-12-04 | End: 2019-12-09

## 2019-12-04 RX ORDER — KETOROLAC TROMETHAMINE 30 MG/ML
10 INJECTION, SOLUTION INTRAMUSCULAR; INTRAVENOUS
Status: COMPLETED | OUTPATIENT
Start: 2019-12-04 | End: 2019-12-04

## 2019-12-04 RX ORDER — ONDANSETRON 2 MG/ML
4 INJECTION INTRAMUSCULAR; INTRAVENOUS
Status: COMPLETED | OUTPATIENT
Start: 2019-12-04 | End: 2019-12-04

## 2019-12-04 RX ADMIN — ONDANSETRON 4 MG: 2 INJECTION INTRAMUSCULAR; INTRAVENOUS at 03:12

## 2019-12-04 RX ADMIN — SODIUM CHLORIDE 1000 ML: 0.9 INJECTION, SOLUTION INTRAVENOUS at 03:12

## 2019-12-04 RX ADMIN — KETOROLAC TROMETHAMINE 10 MG: 30 INJECTION, SOLUTION INTRAMUSCULAR at 04:12

## 2019-12-04 RX ADMIN — METHOCARBAMOL 1000 MG: 500 TABLET, FILM COATED ORAL at 05:12

## 2019-12-04 NOTE — ED NOTES
Patient identifiers for Loretta Lea checked and correct.  LOC:  Loretta Lea is awake, alert, and aware of environment with an appropriate affect. She is oriented x 3 and speaking appropriately.  APPEARANCE:  She is resting comfortably and in no acute distress. She is clean and well groomed, patient's clothing is properly fastened.  SKIN:  The skin is warm and dry. She has normal skin turgor and moist mucus membranes. Skin is intact; no bruising or breakdown noted.  MUSCULOSKELETAL:  She is moving all extremities well, no obvious deformities noted. Pulses intact.   RESPIRATORY:  Airway is open and patent. Respirations are spontaneous and non-labored with normal effort and rate.  CARDIAC:  She has a normal rate and rhythm. No peripheral edema noted. Capillary refill < 3 seconds.  ABDOMEN:  No distention noted.  Soft and non-tender upon palpation. Normal bowel sounds in all 4 quads.  NEUROLOGICAL:  PERRL. Facial expression is symmetrical. Hand grasps are equal bilaterally. Normal sensation in all extremities when touched with finger.  Allergies reported:  Review of patient's allergies indicates:  No Known Allergies  OTHER NOTES:  Patient presents with left sided flank pain that radiates to her groin. The pain is constant.

## 2019-12-04 NOTE — ED PROVIDER NOTES
Encounter Date: 12/4/2019       History     Chief Complaint   Patient presents with    Flank Pain     left (no injury noted)     29-year-old female with a history total hysterectomy secondary to ovarian cysts, cholecystectomy presents to the ER with flank pain.  Patient states that for the past couple of days, she has had pain in her left back.  It has been intermittent until tonight when it became constant.  Pain radiates around to her abdomen and groin.  Describes it as sharp and shooting.  Denies trauma or inciting event.  Associated nausea but no vomiting.  Denies fever, chest pain, shortness of breath, or changes in bowel or bladder function.  Denies pain or bleeding with urination.  Denies vaginal pain, bleeding, discharge. Denies sick contacts or suspicious foods.  Denies history of kidney stones or infection.        Review of patient's allergies indicates:  No Known Allergies  Past Medical History:   Diagnosis Date    Back pain     HPV (human papilloma virus) infection     Ovarian cyst      Past Surgical History:   Procedure Laterality Date    CHOLECYSTECTOMY      DILATION AND CURETTAGE OF UTERUS      OOPHORECTOMY       No family history on file.  Social History     Tobacco Use    Smoking status: Current Every Day Smoker     Packs/day: 1.00     Types: Cigarettes   Substance Use Topics    Alcohol use: Yes    Drug use: Not on file     Review of Systems   Constitutional: Negative for fever.   HENT: Negative for sore throat.    Respiratory: Negative for shortness of breath.    Cardiovascular: Negative for chest pain.   Gastrointestinal: Positive for nausea. Negative for abdominal pain and vomiting.   Genitourinary: Positive for flank pain. Negative for dysuria, hematuria, vaginal bleeding, vaginal discharge and vaginal pain.   Musculoskeletal: Negative for back pain.   Skin: Negative for rash.   Neurological: Negative for weakness.   Hematological: Does not bruise/bleed easily.   All other systems  reviewed and are negative.      Physical Exam     Initial Vitals [12/04/19 0230]   BP Pulse Resp Temp SpO2   (!) 148/84 (!) 113 (!) 22 98.2 °F (36.8 °C) 97 %      MAP       --         Physical Exam    Constitutional: She appears well-developed and well-nourished. No distress.   HENT:   Head: Normocephalic and atraumatic.   Mouth/Throat: Oropharynx is clear and moist.   Eyes: Conjunctivae and EOM are normal. Pupils are equal, round, and reactive to light.   Neck: Normal range of motion.   Cardiovascular: Normal rate, regular rhythm, normal heart sounds and intact distal pulses.   No murmur heard.  Pulmonary/Chest: Breath sounds normal. No respiratory distress. She has no wheezes. She has no rhonchi. She has no rales.   Abdominal: Soft. Bowel sounds are normal. She exhibits no distension. There is no hepatosplenomegaly. There is tenderness in the left lower quadrant. There is CVA tenderness (Left). There is no rigidity, no rebound, no guarding, no tenderness at McBurney's point and negative Ambriz's sign.   Musculoskeletal: Normal range of motion. She exhibits no edema or tenderness.   No midline spinal tenderness.   Neurological: She is alert.   Skin: Skin is warm and dry.   Psychiatric: She has a normal mood and affect. Thought content normal.         ED Course   Procedures  Labs Reviewed   URINALYSIS   CBC W/ AUTO DIFFERENTIAL   COMPREHENSIVE METABOLIC PANEL   URINALYSIS, REFLEX TO URINE CULTURE   LIPASE          Imaging Results    None          Medical Decision Making:   Initial Assessment:   29-year-old female with a history total hysterectomy secondary to ovarian cysts, cholecystectomy presents to the ER with flank pain.   ED Management:  Plan:  Afebrile, , vital signs otherwise stable. Labs, UA.  CT AP for possible stone versus pyelo.    Reassessed.  Patient lying in bed in no acute distress.  States pain is improved with Toradol.  Lab showed WBC 13.9.  BUN 9, creatinine 0.6.  Lipase 54.  UA shows RBC 2,  WBC 6, bacteria rare.  CT AP without contrast shows diverticulosis without diverticulitis.  No evidence of kidney stone or hydronephrosis.  No evidence of pyelo.  Suspect patient's pain is musculoskeletal at this time.  Low suspicion for pyelo with patient having very few WBCs or bacteria in her urine.  Recommend NSAIDs and Robaxin.  Follow up with PCP for repeat evaluation.  Given strict return precautions.  Patient understands the plan.                                 Clinical Impression:       ICD-10-CM ICD-9-CM   1. Left flank pain R10.9 789.09                             Osiel Zapata MD  12/04/19 0528

## 2019-12-06 LAB
BACTERIA UR CULT: NORMAL
BACTERIA UR CULT: NORMAL

## 2019-12-20 ENCOUNTER — HOSPITAL ENCOUNTER (EMERGENCY)
Facility: HOSPITAL | Age: 29
Discharge: HOME | End: 2019-12-21
Attending: EMERGENCY MEDICINE
Payer: MEDICAID

## 2019-12-20 VITALS
BODY MASS INDEX: 43.95 KG/M2 | OXYGEN SATURATION: 98 % | HEIGHT: 67 IN | DIASTOLIC BLOOD PRESSURE: 104 MMHG | SYSTOLIC BLOOD PRESSURE: 150 MMHG | HEART RATE: 94 BPM | TEMPERATURE: 99 F | RESPIRATION RATE: 16 BRPM | WEIGHT: 280 LBS

## 2019-12-20 DIAGNOSIS — L03.116 CELLULITIS OF LEFT LOWER EXTREMITY: ICD-10-CM

## 2019-12-20 DIAGNOSIS — L02.416 ABSCESS OF LEFT THIGH: Primary | ICD-10-CM

## 2019-12-20 PROCEDURE — 10060 I&D ABSCESS SIMPLE/SINGLE: CPT | Mod: LT

## 2019-12-20 PROCEDURE — 99283 EMERGENCY DEPT VISIT LOW MDM: CPT

## 2019-12-20 RX ORDER — LIDOCAINE HYDROCHLORIDE AND EPINEPHRINE 10; 10 MG/ML; UG/ML
5 INJECTION, SOLUTION INFILTRATION; PERINEURAL ONCE
Status: DISCONTINUED | OUTPATIENT
Start: 2019-12-21 | End: 2019-12-21 | Stop reason: HOSPADM

## 2019-12-21 RX ORDER — SULFAMETHOXAZOLE AND TRIMETHOPRIM 800; 160 MG/1; MG/1
1 TABLET ORAL 2 TIMES DAILY
Qty: 14 TABLET | Refills: 0 | Status: SHIPPED | OUTPATIENT
Start: 2019-12-21 | End: 2019-12-28

## 2019-12-21 NOTE — ED PROVIDER NOTES
Encounter Date: 12/20/2019       History     Chief Complaint   Patient presents with    Abscess     HPI   29-year-old female with history of HPV, eczema or a abscesses, who presents with left genital abscess.  Patient has not had an abscess in this region before.  States that this current abscess started about 2 days ago.  It is tender to touch.  Just prior to arrival here, she noted that the abscess opened up and had pus drainage. There is surrounding erythema and indurance. No n/v/d, no fevers.    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     Past Medical History:   Diagnosis Date    Back pain     HPV (human papilloma virus) infection     Ovarian cyst      Past Surgical History:   Procedure Laterality Date    CHOLECYSTECTOMY      DILATION AND CURETTAGE OF UTERUS      OOPHORECTOMY       No family history on file.  Social History     Tobacco Use    Smoking status: Current Every Day Smoker     Packs/day: 1.00     Types: Cigarettes   Substance Use Topics    Alcohol use: Yes    Drug use: Not on file     Review of Systems   Constitutional: Negative for fever.   HENT: Negative for sore throat.    Respiratory: Negative for shortness of breath.    Cardiovascular: Negative for chest pain.   Gastrointestinal: Negative for nausea.   Genitourinary: Negative for dysuria.   Musculoskeletal: Negative for back pain.   Skin: Negative for rash.        + abscess   Neurological: Negative for weakness.   Hematological: Does not bruise/bleed easily.       Physical Exam     Initial Vitals [12/20/19 2301]   BP Pulse Resp Temp SpO2   (!) 150/104 94 16 98.6 °F (37 °C) 98 %      MAP       --         Physical Exam    Constitutional: She appears well-developed and well-nourished. She is not diaphoretic. No distress.   HENT:   Head: Normocephalic and atraumatic.   Eyes: EOM are normal. Pupils are equal, round, and reactive to light. Right eye  exhibits no discharge. Left eye exhibits no discharge.   Neck: Normal range of motion. Neck supple.   Cardiovascular: Normal rate, regular rhythm and normal heart sounds. Exam reveals no gallop and no friction rub.    No murmur heard.  Pulmonary/Chest: Breath sounds normal. No respiratory distress. She has no wheezes. She has no rhonchi. She has no rales.   Abdominal: Soft. She exhibits no distension. There is no tenderness. There is no rebound and no guarding.   Genitourinary:   Genitourinary Comments: There is approximately 1x1cm fluctuant region 3 cm left lateral to the labia. Indurance surrounding the abscess with erythema. The erythema does not extend towards the vagina.   Musculoskeletal: She exhibits no edema or tenderness.   Neurological: She is alert and oriented to person, place, and time.   Skin: Skin is warm and dry.   Psychiatric: She has a normal mood and affect. Thought content normal.         ED Course   Procedures  Labs Reviewed - No data to display       Imaging Results    None          Medical Decision Making:   Initial Assessment:   29-year-old female with abscess  VS notable for HTN, afebrile. Bedside US shows superficial fluid present 0.5cm deep with some cobblestoning surrounding to suggest cellulitis. There is no extension toward the vagina, no suspicion for fistula clinically. I+D performed with chaperone using stab incision. There was minimal output. Packing not inserted given the small size of the pocket. Discharged with Bactrim 7d BID. She will f/u with her PCP this week for wound check. Will return to the ER with worsening erythema, pain, fevers.    Morris Alejandre MD  Resident, PGY-3  12/21/2019 1:52 AM                                   Clinical Impression:       ICD-10-CM ICD-9-CM   1. Abscess of left thigh L02.416 682.6   2. Cellulitis of left lower extremity L03.116 682.6                             Morris Alejandre MD  Resident  12/21/19 0153

## 2020-02-25 ENCOUNTER — HOSPITAL ENCOUNTER (EMERGENCY)
Facility: HOSPITAL | Age: 30
Discharge: HOME | End: 2020-02-25
Attending: EMERGENCY MEDICINE
Payer: MEDICAID

## 2020-02-25 VITALS
SYSTOLIC BLOOD PRESSURE: 132 MMHG | RESPIRATION RATE: 18 BRPM | HEART RATE: 72 BPM | OXYGEN SATURATION: 99 % | TEMPERATURE: 99 F | DIASTOLIC BLOOD PRESSURE: 80 MMHG

## 2020-02-25 DIAGNOSIS — S99.911A INJURY OF RIGHT ANKLE, INITIAL ENCOUNTER: Primary | ICD-10-CM

## 2020-02-25 DIAGNOSIS — R52 PAIN: ICD-10-CM

## 2020-02-25 DIAGNOSIS — M25.571 ANKLE PAIN, RIGHT: ICD-10-CM

## 2020-02-25 PROCEDURE — 99284 EMERGENCY DEPT VISIT MOD MDM: CPT | Mod: 25

## 2020-02-25 PROCEDURE — 25000003 PHARM REV CODE 250: Performed by: EMERGENCY MEDICINE

## 2020-02-25 RX ORDER — TRAMADOL HYDROCHLORIDE 50 MG/1
50 TABLET ORAL
Status: COMPLETED | OUTPATIENT
Start: 2020-02-25 | End: 2020-02-25

## 2020-02-25 RX ORDER — ONDANSETRON 4 MG/1
4 TABLET, ORALLY DISINTEGRATING ORAL
Status: COMPLETED | OUTPATIENT
Start: 2020-02-25 | End: 2020-02-25

## 2020-02-25 RX ADMIN — ONDANSETRON 4 MG: 4 TABLET, ORALLY DISINTEGRATING ORAL at 03:02

## 2020-02-25 RX ADMIN — TRAMADOL HYDROCHLORIDE 50 MG: 50 TABLET, FILM COATED ORAL at 03:02

## 2020-02-25 NOTE — DISCHARGE INSTRUCTIONS
Please read and follow discharge instructions and return precautions.  Elevate your ankle as much as possible.  Tylenol or ibuprofen over-the-counter as directed if needed for pain. Ace wrap as directed if needed for pain. Please follow up with your primary care physician in the next 1-2 days and have your blood pressure reassessed.  Please follow up with your primary care physician regarding this or with Dr. Cassidy or an orthopedic physician of your choice.

## 2020-02-25 NOTE — ED PROVIDER NOTES
Encounter Date: 2/25/2020       History     Chief Complaint   Patient presents with    Ankle Pain     R ankle pain and swelling     This is a 29-year-old female who presents complaining of right lateral ankle pain and swelling over the past 2-3 days.  The patient states she feels that she slightly tweaked the ankle when walking and has been having to walk excessively on it since she is a  she states she has had multiple sprains to this ankle in the past.  She denies any lower extremity weakness numbness tingling or paresthesias.  She denies any other myalgias or arthralgias.  Pain is worse with ambulation and better with elevation.  Pain has been mild to moderate in intensity when it occurs with ambulation.  She denies any other problems or complaints.        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     Past Medical History:   Diagnosis Date    Back pain     HPV (human papilloma virus) infection     Ovarian cyst      Past Surgical History:   Procedure Laterality Date    CHOLECYSTECTOMY      DILATION AND CURETTAGE OF UTERUS      OOPHORECTOMY       No family history on file.  Social History     Tobacco Use    Smoking status: Current Every Day Smoker     Packs/day: 1.00     Types: Cigarettes   Substance Use Topics    Alcohol use: Yes    Drug use: Not on file     Review of Systems   Constitutional: Negative.  Negative for activity change, appetite change, fatigue and fever.   HENT: Negative.    Eyes: Negative.    Respiratory: Negative.  Negative for cough and shortness of breath.    Cardiovascular: Negative.  Negative for chest pain and palpitations.   Gastrointestinal: Negative.    Genitourinary: Negative.    Musculoskeletal: Positive for arthralgias, joint swelling and myalgias.        Pain to right lateral ankle only.   Skin: Negative.  Negative for color change, pallor and rash.   Neurological: Negative for  weakness, light-headedness and numbness.   Hematological: Negative.    Psychiatric/Behavioral: Negative.    All other systems reviewed and are negative.      Physical Exam     Initial Vitals [02/25/20 0013]   BP Pulse Resp Temp SpO2   (!) 161/87 90 16 98.6 °F (37 °C) 99 %      MAP       --         Physical Exam    Nursing note and vitals reviewed.  Constitutional: She is active and cooperative.  Non-toxic appearance. She does not have a sickly appearance. She does not appear ill. No distress.   HENT:   Head: Normocephalic and atraumatic.   Right Ear: Tympanic membrane normal.   Left Ear: Tympanic membrane normal.   Nose: Nose normal.   Mouth/Throat: Uvula is midline, oropharynx is clear and moist and mucous membranes are normal. No oral lesions. No uvula swelling. No oropharyngeal exudate, posterior oropharyngeal edema or posterior oropharyngeal erythema.   Eyes: Conjunctivae, EOM and lids are normal. Pupils are equal, round, and reactive to light. No scleral icterus.   Neck: Trachea normal, normal range of motion, full passive range of motion without pain and phonation normal. Neck supple. No thyroid mass present. No stridor present. No spinous process tenderness and no muscular tenderness present. No edema, no erythema and normal range of motion present. No neck rigidity. No JVD present.   Cardiovascular: Normal rate, regular rhythm, normal heart sounds, intact distal pulses and normal pulses. Exam reveals no gallop and no friction rub.    No murmur heard.  Pulmonary/Chest: Effort normal and breath sounds normal. No accessory muscle usage. No tachypnea. No respiratory distress. She has no wheezes. She has no rhonchi. She has no rales.   Abdominal: Soft. Normal appearance and bowel sounds are normal. She exhibits no distension, no pulsatile midline mass and no mass. There is no tenderness. There is no rigidity, no guarding and no CVA tenderness.   Musculoskeletal: Normal range of motion. She exhibits edema and  tenderness.        Lumbar back: Normal. She exhibits normal range of motion, no tenderness and no bony tenderness.   Pulses are 2+ throughout, cap refill is less than 2 sec throughout, there is no spinal tenderness throughout.  There is mild right lateral malleolar tenderness to palpation and mild edema.  There is no erythema or increased warmth.  She also has mild right lower calf tenderness to palpation with no edema, erythema or increased warmth, there are no palpable cords.  Right lower extremity is neurovascularly intact throughout--motor and sensation are normal, cap refills less than 2 sec throughout.  Dorsalis pedis pulses 2+.  All extremities are neurovascularly intact throughout   Neurological: She is alert and oriented to person, place, and time. She has normal strength. She displays normal reflexes. No cranial nerve deficit or sensory deficit.   No focal deficits.   Skin: Skin is warm, dry and intact. Capillary refill takes less than 2 seconds. No ecchymosis, no petechiae and no rash noted. No erythema. No pallor.   Psychiatric: She has a normal mood and affect. Her speech is normal and behavior is normal. Judgment and thought content normal. Cognition and memory are normal.         ED Course   Procedures  Labs Reviewed - No data to display       Imaging Results          US Lower Extremity Veins Right (In process)                X-Ray Ankle Complete Right (In process)                  Medical Decision Making:   Clinical Tests:   Radiological Study: Reviewed  ED Management:  The patient has evidence of a right ankle sprain.  There is no ligamentous laxity.  Right lower extremity is neurovascularly intact. Ultrasound was performed secondary to mild calf pain on palpation although this could be secondary to have the patient is ambulating.  There is no DVT.  Patient will be discharged with supportive care, ice elevation over-the-counter medication if needed for pain or discomfort as well as a supportive Ace  wrap and have recommended close follow-up outpatient with her primary care physician or Orthopedics.  Blood pressure has been noted to be slightly elevated in the emergency room.  I have explained the need for reassessment of her blood pressure with her primary care physician in the next 1-2 days.  Patient denies headache chest pain or shortness of breath.  She denies feeling dizzy or lightheaded.                                 Clinical Impression:   {Add your Clinical Impression here. If you haven't documented one yet, please pend the note, finalize a Clinical Impression, and refresh your note before signing.:[id number]}

## 2020-02-27 NOTE — ED PROVIDER NOTES
Encounter Date: 2/25/2020       History     Chief Complaint   Patient presents with    Ankle Pain     R ankle pain and swelling     HPI  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     Past Medical History:   Diagnosis Date    Back pain     HPV (human papilloma virus) infection     Ovarian cyst      Past Surgical History:   Procedure Laterality Date    CHOLECYSTECTOMY      DILATION AND CURETTAGE OF UTERUS      OOPHORECTOMY       No family history on file.  Social History     Tobacco Use    Smoking status: Current Every Day Smoker     Packs/day: 1.00     Types: Cigarettes   Substance Use Topics    Alcohol use: Yes    Drug use: Not on file     Review of Systems    Physical Exam     Initial Vitals [02/25/20 0013]   BP Pulse Resp Temp SpO2   (!) 161/87 90 16 98.6 °F (37 °C) 99 %      MAP       --         Physical Exam    ED Course   Procedures  Labs Reviewed - No data to display       Imaging Results          US Lower Extremity Veins Right (Final result)  Result time 02/25/20 03:33:39    Final result by Maddie Rivera MD (02/25/20 03:33:39)                 Narrative:        Exam: UNILATERAL RIGHT LOWER EXTREMITY VENOUS DUPLEX DOPPLER    Clinical data: Right ankle swelling.    Technique: Real time ultrasound and color Doppler imaging of the veins of the  right lower extremity were performed using grayscale, color flow and duplex  Doppler. Vessels imaged: common femoral vein, superficial femoral vein,  popliteal vein.    Prior studies: No prior studies submitted.    Findings: The right lower extremity veins demonstrate no evidence of  intraluminal echogenicity. There is normal compressibility, spontaneous blood  flow, augmentation and respiratory phasicity. No dilated veins or wall  thickening seen. The superficial veins visualized and soft tissues are  unremarkable.      IMPRESSION: No evidence for  DVT.    Recommendation:  Follow up as clinically indicated.      Electronically Signed by EPIFANIO BERGER MD at 2/25/2020 4:41:12 AM EST                             X-Ray Ankle Complete Right (Final result)  Result time 02/26/20 08:26:53    Final result by Valeria Yeboah IV, MD (02/26/20 08:26:53)                 Impression:      No definite acute osseous abnormality.    Probable small ununited ossicles or remote fracture fragments adjacent to the tip of the lateral malleolus      Electronically signed by: Valeria Yeboah IV., MD  Date:    02/26/2020  Time:    08:26             Narrative:    EXAMINATION:  XR ANKLE COMPLETE 3 VIEW RIGHT    CLINICAL HISTORY:  Pain in right ankle and joints of right foot    COMPARISON:  07/13/2019.    FINDINGS:  The bones are appropriately mineralized.  No acute fracture, dislocation or osseous destruction is observed.  Corticated ossific densities adjacent to the tip of the lateral malleolus likely reflect small ossicles are ununited fracture fragments.  The joint spaces are well maintained.  No focal soft tissue abnormality is demonstrated.                                                            Chief Complaint   Patient presents with    Ankle Pain     R ankle pain and swelling     This is a 29-year-old female who presents complaining of right lateral ankle pain and swelling over the past 2-3 days. The patient states she feels that she slightly tweaked the ankle when walking and has been having to walk excessively on it since she is a  she states she has had multiple sprains to this ankle in the past. She denies any lower extremity weakness numbness tingling or paresthesias. She denies any other myalgias or arthralgias. Pain is worse with ambulation and better with elevation. Pain has been mild to moderate in intensity when it occurs with ambulation. She denies any other problems or complaints.         Review of patient's allergies indicates:   Allergen Reactions     Vancomycin analogues Other (See Comments)     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          Past Medical History:   Diagnosis Date    Back pain     HPV (human papilloma virus) infection     Ovarian cyst            Past Surgical History:   Procedure Laterality Date    CHOLECYSTECTOMY      DILATION AND CURETTAGE OF UTERUS      OOPHORECTOMY       No family history on file.   Social History           Tobacco Use    Smoking status: Current Every Day Smoker     Packs/day: 1.00     Types: Cigarettes   Substance Use Topics    Alcohol use: Yes    Drug use: Not on file     Review of Systems   Constitutional: Negative. Negative for activity change, appetite change, fatigue and fever.   HENT: Negative.   Eyes: Negative.   Respiratory: Negative. Negative for cough and shortness of breath.   Cardiovascular: Negative. Negative for chest pain and palpitations.   Gastrointestinal: Negative.   Genitourinary: Negative.   Musculoskeletal: Positive for arthralgias, joint swelling and myalgias.   Pain to right lateral ankle only.   Skin: Negative. Negative for color change, pallor and rash.   Neurological: Negative for weakness, light-headedness and numbness.   Hematological: Negative.   Psychiatric/Behavioral: Negative.   All other systems reviewed and are negative.   Physical Exam            Initial Vitals [02/25/20 0013]   BP Pulse Resp Temp SpO2   (!) 161/87 90 16 98.6 °F (37 °C) 99 %      MAP       --         Physical Exam   Nursing note and vitals reviewed.   Constitutional: She is active and cooperative. Non-toxic appearance. She does not have a sickly appearance. She does not appear ill. No distress.   HENT:   Head: Normocephalic and atraumatic.   Right Ear: Tympanic membrane normal.   Left Ear: Tympanic membrane normal.   Nose: Nose normal.   Mouth/Throat: Uvula is midline, oropharynx is clear and moist and mucous membranes are normal. No oral lesions. No uvula swelling. No oropharyngeal exudate, posterior oropharyngeal edema or  posterior oropharyngeal erythema.   Eyes: Conjunctivae, EOM and lids are normal. Pupils are equal, round, and reactive to light. No scleral icterus.   Neck: Trachea normal, normal range of motion, full passive range of motion without pain and phonation normal. Neck supple. No thyroid mass present. No stridor present. No spinous process tenderness and no muscular tenderness present. No edema, no erythema and normal range of motion present. No neck rigidity. No JVD present.   Cardiovascular: Normal rate, regular rhythm, normal heart sounds, intact distal pulses and normal pulses. Exam reveals no gallop and no friction rub.   No murmur heard.   Pulmonary/Chest: Effort normal and breath sounds normal. No accessory muscle usage. No tachypnea. No respiratory distress. She has no wheezes. She has no rhonchi. She has no rales.   Abdominal: Soft. Normal appearance and bowel sounds are normal. She exhibits no distension, no pulsatile midline mass and no mass. There is no tenderness. There is no rigidity, no guarding and no CVA tenderness.   Musculoskeletal: Normal range of motion. She exhibits edema and tenderness.   Lumbar back: Normal. She exhibits normal range of motion, no tenderness and no bony tenderness.   Pulses are 2+ throughout, cap refill is less than 2 sec throughout, there is no spinal tenderness throughout.  There is mild right lateral malleolar tenderness to palpation and mild edema. There is no erythema or increased warmth. She also has mild right lower calf tenderness to palpation with no edema, erythema or increased warmth, there are no palpable cords. Right lower extremity is neurovascularly intact throughout--motor and sensation are normal, cap refills less than 2 sec throughout. Dorsalis pedis pulses 2+. All extremities are neurovascularly intact throughout   Neurological: She is alert and oriented to person, place, and time. She has normal strength. She displays normal reflexes. No cranial nerve deficit  or sensory deficit.   No focal deficits.   Skin: Skin is warm, dry and intact. Capillary refill takes less than 2 seconds. No ecchymosis, no petechiae and no rash noted. No erythema. No pallor.   Psychiatric: She has a normal mood and affect. Her speech is normal and behavior is normal. Judgment and thought content normal. Cognition and memory are normal.   ED Course   Procedures   Labs Reviewed - No data to display         Imaging Results             US Lower Extremity Veins Right (In process)                 X-Ray Ankle Complete Right (In process)               Medical Decision Making:   Clinical Tests:   Radiological Study: Reviewed   ED Management:   The patient has evidence of a right ankle sprain. There is no ligamentous laxity. Right lower extremity is neurovascularly intact. Ultrasound was performed secondary to mild calf pain on palpation although this could be secondary to have the patient is ambulating. There is no DVT. Patient will be discharged with supportive care, ice elevation over-the-counter medication if needed for pain or discomfort as well as a supportive Ace wrap and have recommended close follow-up outpatient with her primary care physician or Orthopedics. Blood pressure has been noted to be slightly elevated in the emergency room. I have explained the need for reassessment of her blood pressure with her primary care physician in the next 1-2 days. Patient denies headache chest pain or shortness of breath. She denies feeling dizzy or lightheaded.             Clinical Impression:     Chief Complaint   Patient presents with    Ankle Pain     R ankle pain and swelling     This is a 29-year-old female who presents complaining of right lateral ankle pain and swelling over the past 2-3 days. The patient states she feels that she slightly tweaked the ankle when walking and has been having to walk excessively on it since she is a  she states she has had multiple sprains to this ankle in the  past. She denies any lower extremity weakness numbness tingling or paresthesias. She denies any other myalgias or arthralgias. Pain is worse with ambulation and better with elevation. Pain has been mild to moderate in intensity when it occurs with ambulation. She denies any other problems or complaints.         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          Past Medical History:   Diagnosis Date    Back pain     HPV (human papilloma virus) infection     Ovarian cyst            Past Surgical History:   Procedure Laterality Date    CHOLECYSTECTOMY      DILATION AND CURETTAGE OF UTERUS      OOPHORECTOMY       No family history on file.   Social History           Tobacco Use    Smoking status: Current Every Day Smoker     Packs/day: 1.00     Types: Cigarettes   Substance Use Topics    Alcohol use: Yes    Drug use: Not on file     Review of Systems   Constitutional: Negative. Negative for activity change, appetite change, fatigue and fever.   HENT: Negative.   Eyes: Negative.   Respiratory: Negative. Negative for cough and shortness of breath.   Cardiovascular: Negative. Negative for chest pain and palpitations.   Gastrointestinal: Negative.   Genitourinary: Negative.   Musculoskeletal: Positive for arthralgias, joint swelling and myalgias.   Pain to right lateral ankle only.   Skin: Negative. Negative for color change, pallor and rash.   Neurological: Negative for weakness, light-headedness and numbness.   Hematological: Negative.   Psychiatric/Behavioral: Negative.   All other systems reviewed and are negative.   Physical Exam            Initial Vitals [02/25/20 0013]   BP Pulse Resp Temp SpO2   (!) 161/87 90 16 98.6 °F (37 °C) 99 %      MAP       --         Physical Exam   Nursing note and vitals reviewed.   Constitutional: She is active and cooperative. Non-toxic appearance. She does not have a sickly appearance. She  does not appear ill. No distress.   HENT:   Head: Normocephalic and atraumatic.   Right Ear: Tympanic membrane normal.   Left Ear: Tympanic membrane normal.   Nose: Nose normal.   Mouth/Throat: Uvula is midline, oropharynx is clear and moist and mucous membranes are normal. No oral lesions. No uvula swelling. No oropharyngeal exudate, posterior oropharyngeal edema or posterior oropharyngeal erythema.   Eyes: Conjunctivae, EOM and lids are normal. Pupils are equal, round, and reactive to light. No scleral icterus.   Neck: Trachea normal, normal range of motion, full passive range of motion without pain and phonation normal. Neck supple. No thyroid mass present. No stridor present. No spinous process tenderness and no muscular tenderness present. No edema, no erythema and normal range of motion present. No neck rigidity. No JVD present.   Cardiovascular: Normal rate, regular rhythm, normal heart sounds, intact distal pulses and normal pulses. Exam reveals no gallop and no friction rub.   No murmur heard.   Pulmonary/Chest: Effort normal and breath sounds normal. No accessory muscle usage. No tachypnea. No respiratory distress. She has no wheezes. She has no rhonchi. She has no rales.   Abdominal: Soft. Normal appearance and bowel sounds are normal. She exhibits no distension, no pulsatile midline mass and no mass. There is no tenderness. There is no rigidity, no guarding and no CVA tenderness.   Musculoskeletal: Normal range of motion. She exhibits edema and tenderness.   Lumbar back: Normal. She exhibits normal range of motion, no tenderness and no bony tenderness.   Pulses are 2+ throughout, cap refill is less than 2 sec throughout, there is no spinal tenderness throughout.  There is mild right lateral malleolar tenderness to palpation and mild edema. There is no erythema or increased warmth. She also has mild right lower calf tenderness to palpation with no edema, erythema or increased warmth, there are no palpable  cords. Right lower extremity is neurovascularly intact throughout--motor and sensation are normal, cap refills less than 2 sec throughout. Dorsalis pedis pulses 2+. All extremities are neurovascularly intact throughout   Neurological: She is alert and oriented to person, place, and time. She has normal strength. She displays normal reflexes. No cranial nerve deficit or sensory deficit.   No focal deficits.   Skin: Skin is warm, dry and intact. Capillary refill takes less than 2 seconds. No ecchymosis, no petechiae and no rash noted. No erythema. No pallor.   Psychiatric: She has a normal mood and affect. Her speech is normal and behavior is normal. Judgment and thought content normal. Cognition and memory are normal.   ED Course   Procedures   Labs Reviewed - No data to display         Imaging Results             US Lower Extremity Veins Right (In process)                 X-Ray Ankle Complete Right (In process)               Medical Decision Making:   Clinical Tests:   Radiological Study: Reviewed   ED Management:   The patient has evidence of a right ankle sprain. There is no ligamentous laxity. Right lower extremity is neurovascularly intact. Ultrasound was performed secondary to mild calf pain on palpation although this could be secondary to have the patient is ambulating. There is no DVT. Patient will be discharged with supportive care, ice elevation over-the-counter medication if needed for pain or discomfort as well as a supportive Ace wrap and have recommended close follow-up outpatient with her primary care physician or Orthopedics. Blood pressure has been noted to be slightly elevated in the emergency room. I have explained the need for reassessment of her blood pressure with her primary care physician in the next 1-2 days. Patient denies headache chest pain or shortness of breath. She denies feeling dizzy or lightheaded.                    Chief Complaint   Patient presents with    Ankle Pain     R ankle  pain and swelling     This is a 29-year-old female who presents complaining of right lateral ankle pain and swelling over the past 2-3 days. The patient states she feels that she slightly tweaked the ankle when walking and has been having to walk excessively on it since she is a  she states she has had multiple sprains to this ankle in the past. She denies any lower extremity weakness numbness tingling or paresthesias. She denies any other myalgias or arthralgias. Pain is worse with ambulation and better with elevation. Pain has been mild to moderate in intensity when it occurs with ambulation. She denies any other problems or complaints.         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          Past Medical History:   Diagnosis Date    Back pain     HPV (human papilloma virus) infection     Ovarian cyst            Past Surgical History:   Procedure Laterality Date    CHOLECYSTECTOMY      DILATION AND CURETTAGE OF UTERUS      OOPHORECTOMY       No family history on file.   Social History           Tobacco Use    Smoking status: Current Every Day Smoker     Packs/day: 1.00     Types: Cigarettes   Substance Use Topics    Alcohol use: Yes    Drug use: Not on file     Review of Systems   Constitutional: Negative. Negative for activity change, appetite change, fatigue and fever.   HENT: Negative.   Eyes: Negative.   Respiratory: Negative. Negative for cough and shortness of breath.   Cardiovascular: Negative. Negative for chest pain and palpitations.   Gastrointestinal: Negative.   Genitourinary: Negative.   Musculoskeletal: Positive for arthralgias, joint swelling and myalgias.   Pain to right lateral ankle only.   Skin: Negative. Negative for color change, pallor and rash.   Neurological: Negative for weakness, light-headedness and numbness.   Hematological: Negative.   Psychiatric/Behavioral: Negative.   All  other systems reviewed and are negative.   Physical Exam            Initial Vitals [02/25/20 0013]   BP Pulse Resp Temp SpO2   (!) 161/87 90 16 98.6 °F (37 °C) 99 %      MAP       --         Physical Exam   Nursing note and vitals reviewed.   Constitutional: She is active and cooperative. Non-toxic appearance. She does not have a sickly appearance. She does not appear ill. No distress.   HENT:   Head: Normocephalic and atraumatic.   Right Ear: Tympanic membrane normal.   Left Ear: Tympanic membrane normal.   Nose: Nose normal.   Mouth/Throat: Uvula is midline, oropharynx is clear and moist and mucous membranes are normal. No oral lesions. No uvula swelling. No oropharyngeal exudate, posterior oropharyngeal edema or posterior oropharyngeal erythema.   Eyes: Conjunctivae, EOM and lids are normal. Pupils are equal, round, and reactive to light. No scleral icterus.   Neck: Trachea normal, normal range of motion, full passive range of motion without pain and phonation normal. Neck supple. No thyroid mass present. No stridor present. No spinous process tenderness and no muscular tenderness present. No edema, no erythema and normal range of motion present. No neck rigidity. No JVD present.   Cardiovascular: Normal rate, regular rhythm, normal heart sounds, intact distal pulses and normal pulses. Exam reveals no gallop and no friction rub.   No murmur heard.   Pulmonary/Chest: Effort normal and breath sounds normal. No accessory muscle usage. No tachypnea. No respiratory distress. She has no wheezes. She has no rhonchi. She has no rales.   Abdominal: Soft. Normal appearance and bowel sounds are normal. She exhibits no distension, no pulsatile midline mass and no mass. There is no tenderness. There is no rigidity, no guarding and no CVA tenderness.   Musculoskeletal: Normal range of motion. She exhibits edema and tenderness.   Lumbar back: Normal. She exhibits normal range of motion, no tenderness and no bony tenderness.    Pulses are 2+ throughout, cap refill is less than 2 sec throughout, there is no spinal tenderness throughout.  There is mild right lateral malleolar tenderness to palpation and mild edema. There is no erythema or increased warmth. She also has mild right lower calf tenderness to palpation with no edema, erythema or increased warmth, there are no palpable cords. Right lower extremity is neurovascularly intact throughout--motor and sensation are normal, cap refills less than 2 sec throughout. Dorsalis pedis pulses 2+. All extremities are neurovascularly intact throughout   Neurological: She is alert and oriented to person, place, and time. She has normal strength. She displays normal reflexes. No cranial nerve deficit or sensory deficit.   No focal deficits.   Skin: Skin is warm, dry and intact. Capillary refill takes less than 2 seconds. No ecchymosis, no petechiae and no rash noted. No erythema. No pallor.   Psychiatric: She has a normal mood and affect. Her speech is normal and behavior is normal. Judgment and thought content normal. Cognition and memory are normal.   ED Course   Procedures   Labs Reviewed - No data to display         Imaging Results             US Lower Extremity Veins Right (In process)                 X-Ray Ankle Complete Right (In process)               Medical Decision Making:   Clinical Tests:   Radiological Study: Reviewed   ED Management:   The patient has evidence of a right ankle sprain. There is no ligamentous laxity. Right lower extremity is neurovascularly intact. Ultrasound was performed secondary to mild calf pain on palpation although this could be secondary to have the patient is ambulating. There is no DVT. Patient will be discharged with supportive care, ice elevation over-the-counter medication if needed for pain or discomfort as well as a supportive Ace wrap and have recommended close follow-up outpatient with her primary care physician or Orthopedics. Blood pressure has been  noted to be slightly elevated in the emergency room. I have explained the need for reassessment of her blood pressure with her primary care physician in the next 1-2 days. Patient denies headache chest pain or shortness of breath. She denies feeling dizzy or lightheaded.                      Clinical Impression:       ICD-10-CM ICD-9-CM   1. Injury of right ankle, initial encounter S99.911A 959.7   2. Ankle pain, right M25.571 719.47   3. Pain R52 780.96             ED Disposition Condition    Discharge Stable        ED Prescriptions     None        Follow-up Information     Follow up With Specialties Details Why Contact Info    Jones Cortez MD Family Medicine In 2 days  [address] [facility]  [address]  [facility]  [place] [id number]  [phone]      Kaden Cassidy II, MD Orthopedic Surgery In 3 days  [address] [place] [address] [address] [place] [id number]  [phone]                                       Jennifer Coles MD  02/27/20 0412

## 2020-07-01 ENCOUNTER — HOSPITAL ENCOUNTER (EMERGENCY)
Facility: HOSPITAL | Age: 30
Discharge: HOME | End: 2020-07-01
Attending: EMERGENCY MEDICINE
Payer: MEDICAID

## 2020-07-01 VITALS
WEIGHT: 280 LBS | RESPIRATION RATE: 19 BRPM | HEART RATE: 86 BPM | OXYGEN SATURATION: 99 % | SYSTOLIC BLOOD PRESSURE: 125 MMHG | BODY MASS INDEX: 42.44 KG/M2 | DIASTOLIC BLOOD PRESSURE: 74 MMHG | TEMPERATURE: 99 F | HEIGHT: 68 IN

## 2020-07-01 DIAGNOSIS — L25.9 CONTACT DERMATITIS, UNSPECIFIED CONTACT DERMATITIS TYPE, UNSPECIFIED TRIGGER: Primary | ICD-10-CM

## 2020-07-01 PROCEDURE — 63600175 PHARM REV CODE 636 W HCPCS: Performed by: EMERGENCY MEDICINE

## 2020-07-01 PROCEDURE — 96372 THER/PROPH/DIAG INJ SC/IM: CPT | Mod: 59

## 2020-07-01 PROCEDURE — 99284 EMERGENCY DEPT VISIT MOD MDM: CPT | Mod: 25

## 2020-07-01 PROCEDURE — 25000003 PHARM REV CODE 250: Performed by: EMERGENCY MEDICINE

## 2020-07-01 RX ORDER — FAMOTIDINE 20 MG/1
20 TABLET, FILM COATED ORAL 2 TIMES DAILY
Qty: 10 TABLET | Refills: 0 | Status: SHIPPED | OUTPATIENT
Start: 2020-07-01 | End: 2021-06-22 | Stop reason: CLARIF

## 2020-07-01 RX ORDER — FAMOTIDINE 20 MG/1
20 TABLET, FILM COATED ORAL
Status: COMPLETED | OUTPATIENT
Start: 2020-07-01 | End: 2020-07-01

## 2020-07-01 RX ORDER — DEXAMETHASONE SODIUM PHOSPHATE 4 MG/ML
8 INJECTION, SOLUTION INTRA-ARTICULAR; INTRALESIONAL; INTRAMUSCULAR; INTRAVENOUS; SOFT TISSUE
Status: COMPLETED | OUTPATIENT
Start: 2020-07-01 | End: 2020-07-01

## 2020-07-01 RX ORDER — PREDNISONE 20 MG/1
40 TABLET ORAL DAILY
Qty: 10 TABLET | Refills: 0 | Status: SHIPPED | OUTPATIENT
Start: 2020-07-01 | End: 2020-07-06

## 2020-07-01 RX ORDER — DIPHENHYDRAMINE HCL 25 MG
25 CAPSULE ORAL
Status: COMPLETED | OUTPATIENT
Start: 2020-07-01 | End: 2020-07-01

## 2020-07-01 RX ADMIN — DIPHENHYDRAMINE HYDROCHLORIDE 25 MG: 25 CAPSULE ORAL at 03:07

## 2020-07-01 RX ADMIN — DEXAMETHASONE SODIUM PHOSPHATE 8 MG: 4 INJECTION, SOLUTION INTRA-ARTICULAR; INTRALESIONAL; INTRAMUSCULAR; INTRAVENOUS; SOFT TISSUE at 03:07

## 2020-07-01 RX ADMIN — FAMOTIDINE 20 MG: 20 TABLET, FILM COATED ORAL at 03:07

## 2020-07-01 NOTE — ED PROVIDER NOTES
Encounter Date: 7/1/2020       History   No chief complaint on file.    29-year-old female with a past medical history presents emergency patient is that she developed a rash to her bilateral arms and legs 3 days ago.  She has been taking over-the-counter Benadryl over-the-counter presents cream with no relief.  She states that the rash is intensely pruritic.  It initially began as 2 small lines on her left wrist spread since that time.  She denies food, medication or environmental exposures.  She denies any shortness of breath, sensation that her throat is closing, nausea, vomiting or diarrhea.        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     Past Medical History:   Diagnosis Date    Back pain     HPV (human papilloma virus) infection     Ovarian cyst      Past Surgical History:   Procedure Laterality Date    CHOLECYSTECTOMY      DILATION AND CURETTAGE OF UTERUS      OOPHORECTOMY       No family history on file.  Social History     Tobacco Use    Smoking status: Current Every Day Smoker     Packs/day: 1.00     Types: Cigarettes   Substance Use Topics    Alcohol use: Yes    Drug use: Not on file     Review of Systems   HENT: Negative for trouble swallowing and voice change.    Respiratory: Negative for shortness of breath.    Gastrointestinal: Negative for diarrhea, nausea and vomiting.   Skin: Positive for rash.   All other systems reviewed and are negative.      Physical Exam     Initial Vitals   BP Pulse Resp Temp SpO2   -- -- -- -- --      MAP       --         Physical Exam    Nursing note and vitals reviewed.  Constitutional: She appears well-developed and well-nourished. No distress.   HENT:   Head: Normocephalic and atraumatic.   Mouth/Throat: Oropharynx is clear and moist.   Eyes: EOM are normal.   Neck: Normal range of motion. Neck supple.   Cardiovascular: Normal rate, regular rhythm, normal heart sounds  and intact distal pulses.   No murmur heard.  Pulmonary/Chest: Breath sounds normal. No respiratory distress. She has no wheezes.   Abdominal: Soft. There is no abdominal tenderness.   Musculoskeletal: Normal range of motion. No edema.   Neurological: She is alert and oriented to person, place, and time. GCS score is 15. GCS eye subscore is 4. GCS verbal subscore is 5. GCS motor subscore is 6.   Skin: Skin is warm and dry. Capillary refill takes less than 2 seconds. Rash (Raised, erythematous, 0.5-1 cm circular lesions diffusely to posterior bilateral forearms and anterior bilateral thighs) noted.   Psychiatric: She has a normal mood and affect.         ED Course   Procedures  Labs Reviewed - No data to display       Imaging Results    None          Medical Decision Making:   ED Management:  29-year-old female presents with a rash to her arms and legs that seems more consistent with contact dermatitis and not true urticaria.  It does appear to be allergic in nature.  No other systems are involved.  She does have a history of similar rash in 2019.  Unknown etiology of either rash.  She will be treated with Benadryl, antihistamines and H2 blocker.  Given the recurrence of similar reaction she has been encouraged to follow up with Allergy and immunology or the [facility] clinic for possible further outpatient evaluation.  Detailed return precautions were discussed.    Ladonna Reinoso MD  Emergency Medicine  07/01/2020 3:12 PM                                   Clinical Impression:       ICD-10-CM ICD-9-CM   1. Contact dermatitis, unspecified contact dermatitis type, unspecified trigger  L25.9 692.9                                Ladonna Reinoso MD  07/01/20 1512

## 2020-07-01 NOTE — DISCHARGE INSTRUCTIONS
Take prednisone and pepcid as prescribed. Take benadryl every 6 hours per package instructions. Continue using OTC hydrocortisone cream three times daily on arms. Follow up with Dr Marquez or [facility]. Return for worsening conditions as discussed.

## 2020-08-08 ENCOUNTER — HOSPITAL ENCOUNTER (EMERGENCY)
Facility: HOSPITAL | Age: 30
Discharge: HOME | End: 2020-08-08
Attending: EMERGENCY MEDICINE
Payer: MEDICAID

## 2020-08-08 VITALS
RESPIRATION RATE: 20 BRPM | BODY MASS INDEX: 43.95 KG/M2 | HEART RATE: 95 BPM | DIASTOLIC BLOOD PRESSURE: 85 MMHG | HEIGHT: 67 IN | TEMPERATURE: 99 F | SYSTOLIC BLOOD PRESSURE: 138 MMHG | WEIGHT: 280 LBS | OXYGEN SATURATION: 100 %

## 2020-08-08 DIAGNOSIS — R21 RASH AND NONSPECIFIC SKIN ERUPTION: Primary | ICD-10-CM

## 2020-08-08 PROCEDURE — 99284 EMERGENCY DEPT VISIT MOD MDM: CPT | Mod: 25

## 2020-08-08 PROCEDURE — 96372 THER/PROPH/DIAG INJ SC/IM: CPT | Mod: 59

## 2020-08-08 PROCEDURE — 63600175 PHARM REV CODE 636 W HCPCS: Performed by: EMERGENCY MEDICINE

## 2020-08-08 RX ORDER — DEXAMETHASONE SODIUM PHOSPHATE 4 MG/ML
8 INJECTION, SOLUTION INTRA-ARTICULAR; INTRALESIONAL; INTRAMUSCULAR; INTRAVENOUS; SOFT TISSUE
Status: COMPLETED | OUTPATIENT
Start: 2020-08-08 | End: 2020-08-08

## 2020-08-08 RX ORDER — PERMETHRIN 50 MG/G
CREAM TOPICAL
Qty: 60 G | Refills: 0 | Status: SHIPPED | OUTPATIENT
Start: 2020-08-08 | End: 2021-06-22 | Stop reason: CLARIF

## 2020-08-08 RX ADMIN — DEXAMETHASONE SODIUM PHOSPHATE 8 MG: 4 INJECTION, SOLUTION INTRA-ARTICULAR; INTRALESIONAL; INTRAMUSCULAR; INTRAVENOUS; SOFT TISSUE at 07:08

## 2020-08-08 NOTE — ED PROVIDER NOTES
Encounter Date: 8/8/2020       History     Chief Complaint   Patient presents with    Rash     X 2 WEEKS     Patient with approximately 5 week history of itchy red bumps to arms now involving legs.  No fever or chills.  Lesions are itchy.  Patient taking Benadryl over-the-counter with little relief.  No household contacts with similar symptoms.  Patient does sleep in the bed with her dog.        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     Past Medical History:   Diagnosis Date    Back pain     HPV (human papilloma virus) infection     Ovarian cyst      Past Surgical History:   Procedure Laterality Date    CHOLECYSTECTOMY      DILATION AND CURETTAGE OF UTERUS      OOPHORECTOMY       No family history on file.  Social History     Tobacco Use    Smoking status: Current Every Day Smoker     Packs/day: 1.00     Types: Cigarettes   Substance Use Topics    Alcohol use: Yes    Drug use: Not on file     Review of Systems   Constitutional: Negative for chills and fever.   HENT: Negative for congestion.    Eyes: Negative for visual disturbance.   Respiratory: Negative for shortness of breath.    Cardiovascular: Negative for chest pain and palpitations.   Gastrointestinal: Negative for abdominal pain and vomiting.   Genitourinary: Negative for dysuria.   Musculoskeletal: Negative for joint swelling.   Skin: Positive for rash.   Neurological: Negative for headaches.   Psychiatric/Behavioral: Negative for confusion.       Physical Exam     Initial Vitals   BP Pulse Resp Temp SpO2   -- -- -- -- --      MAP       --         Physical Exam    Nursing note and vitals reviewed.  Constitutional: She is not diaphoretic. No distress.   HENT:   Head: Normocephalic and atraumatic.   Eyes: Conjunctivae are normal.   Neck: Normal range of motion.   Cardiovascular: Normal rate.   Pulmonary/Chest: Breath sounds normal.   Abdominal: Soft. There  is no abdominal tenderness.   Musculoskeletal: Normal range of motion.   Neurological: She is alert.   No gross deficits   Skin:   Macular palpable discrete lesions to bilateral arms and ankle area.  More lesions to right arm.  There is no petechiae or purpura.  There are few lesions to right inter digit is area.   Psychiatric: She has a normal mood and affect.         ED Course   Procedures  Labs Reviewed - No data to display       Imaging Results    None          Medical Decision Making:   History:   Old Medical Records: I decided to obtain old medical records.  ED Management:  Patient presents with macular palpable rash.  Possible insect bite.  Given there lesions between fingers on the right will treat for scabies.  Given severe symptoms will given Decadron shot and refer to Dermatology.                                 Clinical Impression:       ICD-10-CM ICD-9-CM   1. Rash and nonspecific skin eruption  R21 782.1                                Kalyan Peterson MD  08/08/20 0149

## 2020-09-25 ENCOUNTER — HOSPITAL ENCOUNTER (EMERGENCY)
Facility: HOSPITAL | Age: 30
Discharge: HOME | End: 2020-09-25
Attending: EMERGENCY MEDICINE
Payer: MEDICAID

## 2020-09-25 VITALS
HEIGHT: 67 IN | OXYGEN SATURATION: 98 % | HEART RATE: 77 BPM | TEMPERATURE: 99 F | DIASTOLIC BLOOD PRESSURE: 75 MMHG | WEIGHT: 280 LBS | RESPIRATION RATE: 15 BRPM | SYSTOLIC BLOOD PRESSURE: 138 MMHG | BODY MASS INDEX: 43.95 KG/M2

## 2020-09-25 DIAGNOSIS — M54.12 CERVICAL RADICULOPATHY: Primary | ICD-10-CM

## 2020-09-25 PROCEDURE — 96375 TX/PRO/DX INJ NEW DRUG ADDON: CPT

## 2020-09-25 PROCEDURE — 96374 THER/PROPH/DIAG INJ IV PUSH: CPT

## 2020-09-25 PROCEDURE — 63600175 PHARM REV CODE 636 W HCPCS: Performed by: EMERGENCY MEDICINE

## 2020-09-25 PROCEDURE — 99284 EMERGENCY DEPT VISIT MOD MDM: CPT | Mod: 25

## 2020-09-25 RX ORDER — HYDROCODONE BITARTRATE AND ACETAMINOPHEN 5; 325 MG/1; MG/1
1 TABLET ORAL EVERY 6 HOURS PRN
Qty: 12 TABLET | Refills: 0 | Status: SHIPPED | OUTPATIENT
Start: 2020-09-25 | End: 2021-06-22 | Stop reason: CLARIF

## 2020-09-25 RX ORDER — KETOROLAC TROMETHAMINE 30 MG/ML
15 INJECTION, SOLUTION INTRAMUSCULAR; INTRAVENOUS
Status: COMPLETED | OUTPATIENT
Start: 2020-09-25 | End: 2020-09-25

## 2020-09-25 RX ORDER — PREDNISONE 20 MG/1
40 TABLET ORAL DAILY
Qty: 10 TABLET | Refills: 0 | Status: SHIPPED | OUTPATIENT
Start: 2020-09-25 | End: 2020-09-30

## 2020-09-25 RX ORDER — MORPHINE SULFATE 4 MG/ML
4 INJECTION, SOLUTION INTRAMUSCULAR; INTRAVENOUS
Status: COMPLETED | OUTPATIENT
Start: 2020-09-25 | End: 2020-09-25

## 2020-09-25 RX ORDER — ONDANSETRON 2 MG/ML
4 INJECTION INTRAMUSCULAR; INTRAVENOUS
Status: COMPLETED | OUTPATIENT
Start: 2020-09-25 | End: 2020-09-25

## 2020-09-25 RX ORDER — METHYLPREDNISOLONE SOD SUCC 125 MG
125 VIAL (EA) INJECTION
Status: COMPLETED | OUTPATIENT
Start: 2020-09-25 | End: 2020-09-25

## 2020-09-25 RX ORDER — DIAZEPAM 10 MG/2ML
5 INJECTION INTRAMUSCULAR
Status: COMPLETED | OUTPATIENT
Start: 2020-09-25 | End: 2020-09-25

## 2020-09-25 RX ORDER — METHOCARBAMOL 500 MG/1
1000 TABLET, FILM COATED ORAL 3 TIMES DAILY
Qty: 30 TABLET | Refills: 0 | Status: SHIPPED | OUTPATIENT
Start: 2020-09-25 | End: 2020-09-30

## 2020-09-25 RX ADMIN — METHYLPREDNISOLONE SODIUM SUCCINATE 125 MG: 125 INJECTION, POWDER, FOR SOLUTION INTRAMUSCULAR; INTRAVENOUS at 08:09

## 2020-09-25 RX ADMIN — DIAZEPAM 5 MG: 5 INJECTION, SOLUTION INTRAMUSCULAR; INTRAVENOUS at 08:09

## 2020-09-25 RX ADMIN — ONDANSETRON HYDROCHLORIDE 4 MG: 2 SOLUTION INTRAMUSCULAR; INTRAVENOUS at 08:09

## 2020-09-25 RX ADMIN — MORPHINE SULFATE 4 MG: 4 INJECTION INTRAVENOUS at 08:09

## 2020-09-25 RX ADMIN — KETOROLAC TROMETHAMINE 15 MG: 30 INJECTION, SOLUTION INTRAMUSCULAR at 09:09

## 2020-09-25 NOTE — FIRST PROVIDER EVALUATION
"Medical screening exam completed.  I have conducted a focused provider triage encounter, findings are as follows:    Brief history of present illness: presents with left neck pain  Onset 2 days  Pt does not know if she woke with this but does deny injury      Vitals:    09/25/20 1700   BP: (!) 143/89   BP Location: Right arm   Patient Position: Sitting   Pulse: 98   Resp: 18   Temp: 99 °F (37.2 °C)   TempSrc: Oral   SpO2: 99%   Weight: 127 kg (280 lb)   Height: 5' 7" (1.702 m)       Pertinent physical exam:  Pain to left side posterior neck.  Pt cannot raise left arm due to pain     Brief workup plan:  CT C spine     Preliminary workup initiated; this workup will be continued and followed by the physician or advanced practice provider that is assigned to the patient when roomed.  "

## 2020-09-25 NOTE — ED TRIAGE NOTES
Pt began with neck pain last night. Reports that today neck pain became severe with radiation into left arm

## 2020-09-26 NOTE — ED PROVIDER NOTES
Encounter Date: 9/25/2020       History     Chief Complaint   Patient presents with    Neck Pain    Arm Pain     left arm     Patient presents with neck and left arm pain that started this morning.  Patient has no history of trauma to the neck or injury.  Patient complained of radiating pain from the neck into the shoulder.  No weakness.        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     Past Medical History:   Diagnosis Date    Back pain     HPV (human papilloma virus) infection     Ovarian cyst      Past Surgical History:   Procedure Laterality Date    CHOLECYSTECTOMY      DILATION AND CURETTAGE OF UTERUS      OOPHORECTOMY       No family history on file.  Social History     Tobacco Use    Smoking status: Current Every Day Smoker     Packs/day: 1.00     Types: Cigarettes   Substance Use Topics    Alcohol use: Yes    Drug use: Not on file     Review of Systems   Musculoskeletal: Positive for neck pain.   All other systems reviewed and are negative.      Physical Exam     Initial Vitals [09/25/20 1700]   BP Pulse Resp Temp SpO2   (!) 143/89 98 18 99 °F (37.2 °C) 99 %      MAP       --         Physical Exam    Nursing note and vitals reviewed.  Constitutional: She appears well-developed and well-nourished.   Patient holds her head tilted to the left, in torticollis   HENT:   Head: Normocephalic and atraumatic.   Eyes: EOM are normal.   Neck: Neck supple.   Patient does not want to range her neck secondary to pain   Cardiovascular: Normal rate, regular rhythm, normal heart sounds and intact distal pulses.   Pulmonary/Chest: Breath sounds normal. No respiratory distress.   Musculoskeletal: Normal range of motion.   Neurological: She is alert and oriented to person, place, and time. She has normal strength.   Skin: Skin is warm and dry. Capillary refill takes less than 2 seconds.   Psychiatric: She has a normal mood and  affect. Her behavior is normal. Judgment and thought content normal.         ED Course   Procedures  Labs Reviewed - No data to display       Imaging Results    None          Medical Decision Making:   Initial Assessment:   Patient is obviously in pain  Differential Diagnosis:   Considerations included muscle spasm, cervical radiculopathy  ED Management:  After IV analgesics patient is now ranging her neck and her pain is markedly improved.  She is now able to move her left arm up over her head.  I advised patient she will need outpatient follow-up she may benefit from outpatient MRI.  Will discharge patient with prednisone and muscle relaxers.  Patient be discharged stable condition.  Detailed return precautions discussed.                             Clinical Impression:     ICD-10-CM ICD-9-CM   1. Cervical radiculopathy  M54.12 723.4                          ED Disposition Condition    Discharge Stable        ED Prescriptions     Medication Sig Dispense Start Date End Date Auth. Provider    predniSONE (DELTASONE) 20 MG tablet Take 2 tablets (40 mg total) by mouth once daily. for 5 days 10 tablet 9/25/2020 9/30/2020 Selvin Downs MD    methocarbamoL (ROBAXIN) 500 MG Tab Take 2 tablets (1,000 mg total) by mouth 3 (three) times daily. for 5 days 30 tablet 9/25/2020 9/30/2020 Selvin Downs MD    HYDROcodone-acetaminophen (NORCO) 5-325 mg per tablet Take 1 tablet by mouth every 6 (six) hours as needed. 12 tablet 9/25/2020  Selvin Downs MD        Follow-up Information     Follow up With Specialties Details Why Contact Info    [facility]  In 1 week  [address] [address]  [phone]                                         Selvin Downs MD  09/25/20 5185

## 2020-10-01 ENCOUNTER — PATIENT OUTREACH (OUTPATIENT)
Dept: EMERGENCY MEDICINE | Facility: HOSPITAL | Age: 30
End: 2020-10-01

## 2020-10-02 NOTE — PROGRESS NOTES
[facility] [place]  ED Navigator  Emergency Department    Project: [facility] ED Navigator  Role: Community Health Worker    Date: 10/02/2020  Patient Name: Loretta Lea  MRN: [id number]  PCP: Primary Doctor No    Assessment:     Loretta Lea is a 30 y.o. female who has presented to ED for neck/arm pain. Patient has visited the ED 2 times in the past 3 months. Patient did not contact PCP.     ED Navigator Patient Assessment    Consent to Services  Does patient consent to completing the assessment?: Yes  Transportation  Does the patient have issues with Transportation?: No  Insurance Coverage  Do you have coverage/adequate coverage?: Yes  Type/kind of coverage: [place] [employer]  Is patient able to afford co-pays/deductibles?: Yes  Is patient able to afford HME or supplies?: Yes  Does the patient have a lack of adequate coverage?: No  Specialist Appointment  Does the patient have a pending specialist referral?: No (Comment: Pt states she was told she should see a PCP, then may need to see ortho)  PCP Follow Up Appointment  Does the patient have a follow up appontment with their PCP?: No  Why does the patient not have a follow up scheduled?: No established [facility]/outside PCP  Medications  Is patient able to afford medication?: Yes  Is patient unable to get medication due to lack of transportation?: No  Psychological  Does the patient have psycho-social concerns?: Yes  What concerns does the patient have?: Anxiety and/or Depression  Food  Does the patient have concerns about food?: No  Communication/Education  Does the patient have limited English proficiency/English not primary language?: No  Does patient have concerns with care?: Yes  What concerns with care does the patient have?: Other (see comments) (Comment: Pt does not wish to see [facility] providers)  Does patient have dissatisfaction with care?: No  Other Financial Concers  Does the patient have immediate financial distress?: No  Other  Social Barriers/Concerns  Does the patient have any additional barriers or concerns?: None         Social History     Socioeconomic History    Marital status: Single     Spouse name: Not on file    Number of children: Not on file    Years of education: Not on file    Highest education level: Not on file   Occupational History    Not on file   Social Needs    Financial resource strain: Not on file    Food insecurity     Worry: Not on file     Inability: Not on file    Transportation needs     Medical: Not on file     Non-medical: Not on file   Tobacco Use    Smoking status: Current Every Day Smoker     Packs/day: 1.00     Types: Cigarettes   Substance and Sexual Activity    Alcohol use: Yes    Drug use: Not on file    Sexual activity: Yes     Birth control/protection: OCP   Lifestyle    Physical activity     Days per week: Not on file     Minutes per session: Not on file    Stress: Not on file   Relationships    Social connections     Talks on phone: Not on file     Gets together: Not on file     Attends [employer] service: Not on file     Active member of club or organization: Not on file     Attends meetings of clubs or organizations: Not on file     Relationship status: Not on file   Other Topics Concern    Not on file   Social History Narrative    Not on file     Pt is a 31 y/o female who reports a hx of bipolar depression and anxiety. Pt works for [employer] and lives with her SO Isauro Funk, who she states is out of work d/t his health and COVID-19, and has been receiving unemployment. Pt states SO's unemployment will be ending soon, and they will try to request an extension. Pt reports they are able to afford bills OK, but SO could benefit from SNAP which would help their household finances. Pt states she herself does not qualify and is already aware of food [place]. Pt states she and her SO would both like to get established with a PCP. However, pt does not wish to go to [facility] and is requesting a  [facility] physician. Pt states she has been managing her mental health since age 12 and has tried but is not interested in counseling at this time. However, pt is open to medication management through PCP. Pt denies transportation concerns and states she has a vehicle.     Plan:     At pt's request, SW to mail food stamps application for her SO. DAKOTA will assist pt and SO in scheduling PCP appointments next week, as [facility] practices are closed at this time. DAKOTA will continue to follow once appt is scheduled.    DAKOTA also provided pt with information on how to address an outstanding medical bill.

## 2020-10-09 ENCOUNTER — PATIENT OUTREACH (OUTPATIENT)
Dept: EMERGENCY MEDICINE | Facility: HOSPITAL | Age: 30
End: 2020-10-09

## 2020-10-20 ENCOUNTER — OFFICE VISIT (OUTPATIENT)
Dept: FAMILY MEDICINE | Facility: CLINIC | Age: 30
End: 2020-10-20
Payer: MEDICAID

## 2020-10-20 VITALS
TEMPERATURE: 97 F | BODY MASS INDEX: 45.99 KG/M2 | HEIGHT: 67 IN | DIASTOLIC BLOOD PRESSURE: 90 MMHG | WEIGHT: 293 LBS | SYSTOLIC BLOOD PRESSURE: 122 MMHG | OXYGEN SATURATION: 96 % | HEART RATE: 89 BPM

## 2020-10-20 DIAGNOSIS — E66.01 CLASS 3 SEVERE OBESITY WITH BODY MASS INDEX (BMI) OF 45.0 TO 49.9 IN ADULT, UNSPECIFIED OBESITY TYPE, UNSPECIFIED WHETHER SERIOUS COMORBIDITY PRESENT: ICD-10-CM

## 2020-10-20 DIAGNOSIS — Z11.59 ENCOUNTER FOR HEPATITIS C SCREENING TEST FOR LOW RISK PATIENT: ICD-10-CM

## 2020-10-20 DIAGNOSIS — R53.83 FATIGUE, UNSPECIFIED TYPE: ICD-10-CM

## 2020-10-20 DIAGNOSIS — M54.2 NECK PAIN: ICD-10-CM

## 2020-10-20 DIAGNOSIS — R73.9 HYPERGLYCEMIA: ICD-10-CM

## 2020-10-20 DIAGNOSIS — Z11.4 SCREENING FOR HIV (HUMAN IMMUNODEFICIENCY VIRUS): ICD-10-CM

## 2020-10-20 DIAGNOSIS — F31.60 BIPOLAR AFFECTIVE DISORDER, CURRENT EPISODE MIXED, CURRENT EPISODE SEVERITY UNSPECIFIED: Primary | ICD-10-CM

## 2020-10-20 DIAGNOSIS — Z83.438 FAMILY HISTORY OF ELEVATED BLOOD LIPIDS: ICD-10-CM

## 2020-10-20 PROCEDURE — 99204 PR OFFICE/OUTPT VISIT, NEW, LEVL IV, 45-59 MIN: ICD-10-PCS | Mod: S$PBB,,, | Performed by: NURSE PRACTITIONER

## 2020-10-20 PROCEDURE — 99204 OFFICE O/P NEW MOD 45 MIN: CPT | Mod: S$PBB,,, | Performed by: NURSE PRACTITIONER

## 2020-10-20 PROCEDURE — 99215 OFFICE O/P EST HI 40 MIN: CPT | Performed by: NURSE PRACTITIONER

## 2020-10-20 RX ORDER — SERTRALINE HYDROCHLORIDE 100 MG/1
100 TABLET, FILM COATED ORAL DAILY
Qty: 30 TABLET | Refills: 3 | Status: SHIPPED | OUTPATIENT
Start: 2020-10-20 | End: 2021-03-23 | Stop reason: SDUPTHER

## 2020-10-20 RX ORDER — MELOXICAM 15 MG/1
15 TABLET ORAL DAILY
Qty: 30 TABLET | Refills: 1 | Status: SHIPPED | OUTPATIENT
Start: 2020-10-20 | End: 2020-11-19

## 2020-10-20 RX ORDER — QUETIAPINE FUMARATE 50 MG/1
50 TABLET, FILM COATED ORAL NIGHTLY
Qty: 30 TABLET | Refills: 3 | Status: SHIPPED | OUTPATIENT
Start: 2020-10-20 | End: 2021-04-09 | Stop reason: SDUPTHER

## 2020-10-20 NOTE — PATIENT INSTRUCTIONS
Potassium-Rich Foods  The normal adult diet usually contains 2,000 mg to 4,000 mg of potassium per day. More potassium is needed when you lose too much potassium from your body. This can happen if you have diarrhea or vomiting. It can also happen if you take a medicine to make you urinate more (diuretic). To increase the amount of potassium in your diet, include these high-potassium foods.     [The (*) indicates foods highest in potassium.]  Vegetables  Artichokes. Cooked 1/2 cup, 200 mg to 300 mg*  Asparagus. Cooked 1/2 cup, 200 mg to 300 mg  Beans. White, red, norwood cooked 1/2 cup, 300 mg to 500 mg*  Beets. Cooked 1/2 cup, 200 mg to 300 mg  Broccoli. Cooked or raw 1 cup, 200 mg to 500 mg*  [place] sprouts. Cooked 1/2 cup, 200 mg to 300 mg  Cabbage. Raw 1 cup, 100 mg to 200 mg  Carrots. Raw or cooked 1/2 cup, 100 mg to 200 mg  Celery. Raw 1 cup, 200 mg to 300 mg  Lima beans. Fresh or frozen 1/2 cup, 300 mg to 500 mg*   Mushrooms. Raw or cooked 1/2 cup, 100 mg to 300 mg  Peas. Cooked 1/2 cup, 150 mg to 250 mg   Potatoes. Baked 1 medium, 500 mg to 900 mg*   Spinach. Cooked 1 cup, 800 mg to 900 mg*   Spinach. Raw 2 cups, 300 mg to 400 mg *  Squash, winter. Fresh, frozen, or cooked 1/2 cup, 200 mg to 400 mg   Tomato. Fresh 1 medium, 200 mg to 300 mg   Tomato juice. Canned 1/2 cup, 200 mg to 300 mg   Fruits  Apple juice. Unsweetened 1 cup, 200 mg to 300 mg   Apricots. Canned 1/2 cup, 200 mg to 300 mg   Apricots. Dried 4 pieces, 100 mg to 200 mg   Avocado. Raw 1/2 cup, 300 mg to 400 mg*  Banana. Fresh 1 small, 300 mg to 400 mg*   Cantaloupe. Fresh 1 cup diced, 300 mg to 400 mg*   Grape juice. Unsweetened 1 cup, 200 mg to 300 mg   Honeydew melon. Fresh 1 cup diced, 300 mg to 400 mg*   Orange. Fresh 1 medium, 200 mg to 300 mg    Orange juice. Unsweetened, fresh or frozen 1/2 cup, 200 mg to 300 mg  Pineapple juice. Unsweetened 1 cup, 300 mg to 400 mg   Prune juice. Unsweetened 1/2 cup, 300 mg to 400 mg*   Prunes. Dried 5  pieces, 300 mg to 400 mg*   Strawberries. Fresh or frozen 1 cup, 200 mg to 300 mg  Meat  Red meat. Cooked 3 ounces, 100 mg to 300 mg   Seafood  Cod, flounder, halibut. Cooked 3 ounces, 100 mg to 300 mg*  [place]. Cooked, 3 ounces 300 mg to 400 mg*   Scallops. Cooked 3 ounces, 200 mg to 300 mg*  Shrimp. Cooked 3/4 cup, 100 mg to 200 mg   Tuna. Fresh or canned 3/4 cup, 200 mg to 500 mg   Date Last Reviewed: 10/1/2016  © 6928-7460 [employer]. [address] [place] [address], [place] [address]. All rights reserved. This information is not intended as a substitute for professional medical care. Always follow your healthcare professional's instructions.        Low-Salt Diet  This diet removes foods that are high in salt. It also limits the amount of salt you use when cooking. It is most often used for people with high blood pressure, edema (fluid retention), and kidney, liver, or heart disease.  Table salt contains the mineral sodium. Your body needs sodium to work normally. But too much sodium can make your health problems worse. Your healthcare provider is recommending a low-salt (also called low-sodium) diet for you. Your total daily allowance of salt is 1,500 to 2,300 milligrams (mg). It is less than 1 teaspoon of table salt. This means you can have only about 500 to 700 mg of sodium at each meal. People with certain health problems should limit salt intake to the lower end of the recommended range.    When you cook, dont add much salt. If you can cook without using salt, even better. Dont add salt to your food at the table.  When shopping, read food labels. Salt is often called sodium on the label. Choose foods that are salt-free, low salt, or very low salt. Note that foods with reduced salt may not lower your salt intake enough.    Beans, potatoes, and pasta  Ok: Dry beans, split peas, lentils, potatoes, rice, macaroni, pasta, spaghetti without added salt  Avoid: Potato chips, tortilla chips, and similar  products  Breads and cereals  Ok: Low-sodium breads, rolls, cereals, and cakes; low-salt crackers, matzo crackers  Avoid: Salted crackers, pretzels, popcorn, Lithuanian toast, pancakes, muffins  Dairy  Ok: Milk, chocolate milk, hot chocolate mix, low-salt cheeses, and yogurt  Avoid: Processed cheese and cheese spreads; Roquefort, Camembert, and cottage cheese; buttermilk, instant breakfast drink  Desserts  Ok: Ice cream, frozen yogurt, juice bars, gelatin, cookies and pies, sugar, honey, jelly, hard candy  Avoid: Most pies, cakes and cookies prepared or processed with salt; instant pudding  Drinks  Ok: Tea, coffee, fizzy (carbonated) drinks, juices  Avoid: Flavored coffees, electrolyte replacement drinks, sports drinks  Meats  Ok: All fresh meat, fish, poultry, low-salt tuna, eggs, egg substitute  Avoid: Smoked, pickled, brine-cured, or salted meats and fish. This includes hewitt, chipped beef, corned beef, hot dogs, deli meats, ham, kosher meats, salt pork, sausage, canned tuna, salted codfish, smoked salmon, herring, sardines, or anchovies.  Seasonings and spices  Ok: Most seasonings are okay. Good substitutes for salt include: fresh herb blends, hot sauce, lemon, garlic, zafar, vinegar, dry mustard, parsley, cilantro, horseradish, tomato paste, regular margarine, mayonnaise, unsalted butter, cream cheese, vegetable oil, cream, low-salt salad dressing and gravy.  Avoid: Regular ketchup, relishes, pickles, soy sauce, teriyaki sauce, Worcestershire sauce, BBQ sauce, tartar sauce, meat tenderizer, chili sauce, regular gravy, regular salad dressing, salted butter  Soups  Ok: Low-salt soups and broths made with allowed foods  Avoid: Bouillon cubes, soups with smoked or salted meats, regular soup and broth  Vegetables  Ok: Most vegetables are okay; also low-salt tomato and vegetable juices  Avoid: Sauerkraut and other brine-soaked vegetables; pickles and other pickled vegetables; tomato juice, olives  Date Last Reviewed:  8/1/2016  © 8562-8056 [employer]. [address] [place] [address], [place] [address]. All rights reserved. This information is not intended as a substitute for professional medical care. Always follow your healthcare professional's instructions.        Eating Heart-Healthy Foods  Eating has a big impact on your heart health. In fact, eating healthier can improve several of your heart risks at once. For instance, it helps you manage weight, cholesterol, and blood pressure. Here are ideas to help you make heart-healthy changes without giving up all the foods and flavors you love.  Getting started  · Talk with your health care provider about eating plans, such as the DASH or Mediterranean diet. You may also be referred to a dietitian.  · Change a few things at a time. Give yourself time to get used to a few eating changes before adding more.  · Work to create a tasty, healthy eating plan that you can stick to for the rest of your life.    Goals for healthy eating  Below are some tips to improve your eating habits:  · Limit saturated fats and trans fats. Saturated fats raise your levels of cholesterol, so keep these fats to a minimum. They are found in foods such as fatty meats, whole milk, cheese, and palm and coconut oils. Avoid trans fats because they lower good cholesterol as well as raise bad cholesterol. Trans fats are most often found in processed foods.  · Reduce sodium (salt) intake. Eating too much salt may increase your blood pressure. Limit your sodium intake to 2,300 milligrams (mg) per day, or less if your health care provider recommends it. Dining out less often and eating fewer processed foods are two great ways to decrease the amount of salt you consume.  · Managing calories. A calorie is a unit of energy. Your body burns calories for fuel, but if you eat more calories than your body burns, the extras are stored as fat. Your health care provider can help you create a diet plan to manage your  calories. This will likely include eating healthier foods as well as exercising regularly. To help you track your progress, keep a diary to record what you eat and how often you exercise.  Choose the right foods  Aim to make these foods staples of your diet. If you have diabetes, you may have different recommendations than what is listed here:  · Fruits and vegetable provide plenty of nutrients without a lot of calories. At meals, fill half your plate with these foods. Split the other half of your plate between whole grains and lean protein.  · Whole grains are high in fiber and rich in vitamins and nutrients. Good choices include whole-wheat bread, pasta, and brown rice.  · Lean proteins give you nutrition with less fat. Good choices include fish, skinless chicken, and beans.  · Low-fat or nonfat dairy provides nutrients without a lot of fat. Try low-fat or nonfat milk, cheese, or yogurt.  · Healthy fats can be good for you in small amounts. These are unsaturated fats, such as olive oil, nuts, and fish. Try to have at least 2 servings per week of fatty fish such as salmon, sardines, mackerel, rainbow trout, and albacore tuna. These contain omega-3 fatty acids, which are good for your heart. Flaxseed is another source of a heart-healthy fat.  More on heart healthy eating    Read food labels  Healthy eating starts at the grocery store. Be sure to pay attention to food labels on packaged foods. Look for products that are high in fiber and protein, and low in saturated fat, cholesterol, and sodium. Avoid products that contain trans fat. And pay close attention to serving size. For instance, if you plan to eat two servings, double all the numbers on the label.  Prepare food right  A key part of healthy cooking is cutting down on added fat and salt. Look on the internet for lower-fat, lower-sodium recipes. Also, try these tips:  · Remove fat from meat and skin from poultry before cooking.  · Skim fat from the surface of  soups and sauces.  · Broil, boil, bake, steam, grill, and microwave food without added fats.  · Choose ingredients that spice up your food without adding calories, fat, or sodium. Try these items: horseradish, hot sauce, lemon, mustard, nonfat salad dressings, and vinegar. For salt-free herbs and spices, try basil, cilantro, cinnamon, pepper, and rosemary.  Date Last Reviewed: 6/25/2015  © 9760-2058 [employer]. [address] [place] [address]. All rights reserved. This information is not intended as a substitute for professional medical care. Always follow your healthcare professional's instructions.

## 2020-10-20 NOTE — PROGRESS NOTES
SUBJECTIVE:    Patient ID: Loretta Lea is a 30 y.o. female.    Chief Complaint: Establish Care, Pain (left arm), Neck Pain, Anxiety, and Depression    Ms Lea is a 31yo lady here today to establish as a NP. History signficant for depression and anxiety in the setting of reported prior Bipolar 2 diagnosis. History also of GERD and recent episode of torticollis for which she was seen in the ED. Neck improved significantly.     Has been off of previous regimen of zoloft 100mg q day and trazodone 100mg q hs. States she is 'tired of seeing psychiatrists' and rehashing her childhood.  States a lot of this was related to her being adopted.  Reports depression and anxiety stem from the drowning death of her younger brother when she was 3yo, and the recent suicide of her biological brother only 2 months after they met.     Neck Pain   This is a new problem. The current episode started 1 to 4 weeks ago. The problem occurs rarely. The problem has been rapidly improving. The pain is associated with nothing. The pain is present in the midline and left side. The quality of the pain is described as cramping. The pain is mild. The symptoms are aggravated by twisting. Associated symptoms include tingling (the tips of left fingers). She has tried muscle relaxants (prednisone) for the symptoms. The treatment provided significant relief.   Anxiety  Presents for initial visit. Onset was more than 5 years ago. The problem has been waxing and waning. Symptoms include depressed mood, excessive worry, insomnia, irritability and nervous/anxious behavior. Patient reports no dizziness, shortness of breath or suicidal ideas. The severity of symptoms is moderate. The symptoms are aggravated by family issues. The quality of sleep is fair.     Risk factors include change in medication and family history (stopped her medications about one year ago). Her past medical history is significant for bipolar disorder and depression. Past  "treatments include counseling (CBT), SSRIs and non-SSRI antidepressants. The treatment provided mild relief. Compliance with prior treatments has been poor. Prior compliance problems include medication issues.   Depression  Visit Type: initial  Onset of symptoms: more than 5 years ago  Progression since onset: gradually worsening  Patient presents with the following symptoms: depressed mood, excessive worry, insomnia, irritability and nervousness/anxiety.  Patient is not experiencing: shortness of breath and suicidal ideas.  Frequency of symptoms: most days   Aggravated by: family issues  Risk factors: family history, major life event and previous episode of depression  Treatment tried: SSRI and non-SSRI antidepressants  Past compliance problems: medication issues          Past Medical History:   Diagnosis Date    Anxiety     Back pain     Bipolar disorder 2004    bipolar 2    Depression     GERD (gastroesophageal reflux disease)     HPV (human papilloma virus) infection     Insomnia     Ovarian cyst      Past Surgical History:   Procedure Laterality Date    CHOLECYSTECTOMY      DILATION AND CURETTAGE OF UTERUS      HYSTERECTOMY  2017    total, ovarian cysts, torsion, [place]; hyst per Dr JESUS Manriquez    OOPHORECTOMY      opherectom Left 2015    salpingo-opherectomy     Family History   Adopted: Yes   Problem Relation Age of Onset    No Known Problems Sister     No Known Problems Sister     No Known Problems Sister        Marital Status: Single  Alcohol History:  reports current alcohol use.  Tobacco History:  reports that she has been smoking cigarettes. She has a 15.00 pack-year smoking history. She has never used smokeless tobacco.  Drug History:  reports previous drug use. Drug: "Crack" cocaine.    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 for 5 days, Disp: 10 tablet, Rfl: 0    guaifenesin (MUCINEX) 600 mg 12 hr tablet, Take 1,200 mg by mouth 2 (two) times daily., Disp: , Rfl:     HYDROcodone-acetaminophen (NORCO) 5-325 mg per tablet, Take 1 tablet by mouth every 6 (six) hours as needed. (Patient not taking: Reported on 10/20/2020), Disp: 12 tablet, Rfl: 0    hydrOXYzine HCl (ATARAX) 25 MG tablet, Take 1 tablet (25 mg total) by mouth every 6 (six) hours. (Patient not taking: Reported on 10/20/2020), Disp: 20 tablet, Rfl: 0    meloxicam (MOBIC) 15 MG tablet, Take 1 tablet (15 mg total) by mouth once daily., Disp: 30 tablet, Rfl: 1    permethrin (ELIMITE) 5 % cream, Apply to affected area once (Patient not taking: Reported on 10/20/2020), Disp: 60 g, Rfl: 0    QUEtiapine (SEROQUEL) 50 MG tablet, Take 1 tablet (50 mg total) by mouth every evening., Disp: 30 tablet, Rfl: 3    sertraline (ZOLOFT) 100 MG tablet, Take 1 tablet (100 mg total) by mouth once daily., Disp: 30 tablet, Rfl: 3    traZODone (DESYREL) 150 MG tablet, Take 150 mg by mouth every evening., Disp: , Rfl:     Review of Systems   Constitutional: Positive for irritability. Negative for activity change and appetite change.   HENT: Negative for ear pain and rhinorrhea.    Eyes: Negative for redness and itching.   Respiratory: Negative for shortness of breath.    Gastrointestinal: Negative for constipation and diarrhea.   Genitourinary: Negative for difficulty urinating and frequency.   Musculoskeletal: Positive for neck pain.   Neurological: Positive for tingling (the tips of left fingers). Negative for dizziness.   Psychiatric/Behavioral: Positive for depression and dysphoric mood. Negative for agitation, behavioral problems, sleep disturbance and suicidal ideas. The patient is nervous/anxious and has insomnia.    All other systems reviewed and are negative.         Objective:      Vitals:    10/20/20 1308   BP: (!)  "122/90   Pulse: 89   Temp: 97 °F (36.1 °C)   SpO2: 96%   Weight: (!) 137.9 kg (304 lb)   Height: 5' 7" (1.702 m)     Body mass index is 47.61 kg/m².  Physical Exam  Vitals signs and nursing note reviewed.   Constitutional:       Appearance: Normal appearance. She is well-developed. She is morbidly obese.   HENT:      Head: Normocephalic.   Eyes:      Extraocular Movements: Extraocular movements intact.      Conjunctiva/sclera: Conjunctivae normal.      Pupils: Pupils are equal, round, and reactive to light.   Neck:      Musculoskeletal: Normal range of motion and neck supple.      Thyroid: No thyromegaly.      Vascular: No carotid bruit.   Cardiovascular:      Rate and Rhythm: Normal rate and regular rhythm.      Pulses: Normal pulses.      Heart sounds: Normal heart sounds.   Pulmonary:      Effort: Pulmonary effort is normal.      Breath sounds: Normal breath sounds.   Abdominal:      General: Bowel sounds are normal.      Palpations: Abdomen is soft.      Tenderness: There is no abdominal tenderness.      Comments: Protuberant   Musculoskeletal: Normal range of motion.      Right lower leg: No edema.      Left lower leg: No edema.   Skin:     General: Skin is warm and dry.      Capillary Refill: Capillary refill takes less than 2 seconds.   Neurological:      Mental Status: She is alert and oriented to person, place, and time.   Psychiatric:         Mood and Affect: Affect is labile and tearful.         Speech: Speech is tangential.         Thought Content: Thought content normal.           Assessment:       1. Bipolar affective disorder, current episode mixed, current episode severity unspecified    2. Neck pain    3. Hyperglycemia    4. Fatigue, unspecified type    5. Class 3 severe obesity with body mass index (BMI) of 45.0 to 49.9 in adult, unspecified obesity type, unspecified whether serious comorbidity present    6. Screening for HIV (human immunodeficiency virus)    7. Encounter for hepatitis C screening " test for low risk patient    8. Family history of elevated blood lipids         Plan:       Bipolar affective disorder, current episode mixed, current episode severity unspecified  -     Ambulatory referral/consult to Psychiatry; Future; Expected date: 10/27/2020  -     sertraline (ZOLOFT) 100 MG tablet; Take 1 tablet (100 mg total) by mouth once daily.  Dispense: 30 tablet; Refill: 3  -     QUEtiapine (SEROQUEL) 50 MG tablet; Take 1 tablet (50 mg total) by mouth every evening.  Dispense: 30 tablet; Refill: 3    Neck pain  -     meloxicam (MOBIC) 15 MG tablet; Take 1 tablet (15 mg total) by mouth once daily.  Dispense: 30 tablet; Refill: 1    Hyperglycemia  -     Comprehensive Metabolic Panel; Future; Expected date: 10/20/2020  -     Hemoglobin A1C; Future; Expected date: 10/20/2020    Fatigue, unspecified type  -     CBC auto differential; Future; Expected date: 10/20/2020  -     TSH; Future; Expected date: 10/20/2020  -     T4, Free; Future; Expected date: 10/20/2020    Class 3 severe obesity with body mass index (BMI) of 45.0 to 49.9 in adult, unspecified obesity type, unspecified whether serious comorbidity present  Discussed diet and exercise modification.    Screening for HIV (human immunodeficiency virus)  -     HIV 1/2 Ag/Ab (4th Gen); Future; Expected date: 10/20/2020    Encounter for hepatitis C screening test for low risk patient  -     Hepatitis C Antibody; Future; Expected date: 10/20/2020    Family history of elevated blood lipids  -     Lipid Panel; Future; Expected date: 10/20/2020      Follow up in about 6 weeks (around 12/1/2020), or if symptoms worsen or fail to improve.

## 2020-10-22 ENCOUNTER — PATIENT OUTREACH (OUTPATIENT)
Dept: EMERGENCY MEDICINE | Facility: HOSPITAL | Age: 30
End: 2020-10-22

## 2020-10-22 NOTE — PROGRESS NOTES
"ED Navigator / SW following up with patient to provide ongoing support and services. Pt and her SO recently attended appt to establish PCP with [facility] on 10/20/20. Pt reports appt went "OK," and states she is not accustomed to going to the doctor and "needs to get used to it."  Pt states all of her medical needs were addressed. Pt confirmed she received SNAP application sent by this SW but states she has not yet had time to complete it. Pt denies any additional needs at this time but appreciates continued follow up, as she states "sometimes things come up and I need help." Pt encouraged to call back if any further needs arise.    "

## 2020-11-19 ENCOUNTER — HOSPITAL ENCOUNTER (EMERGENCY)
Facility: HOSPITAL | Age: 30
Discharge: HOME | End: 2020-11-19
Attending: EMERGENCY MEDICINE
Payer: MEDICAID

## 2020-11-19 VITALS
TEMPERATURE: 98 F | WEIGHT: 280 LBS | SYSTOLIC BLOOD PRESSURE: 140 MMHG | HEIGHT: 67 IN | HEART RATE: 86 BPM | BODY MASS INDEX: 43.95 KG/M2 | OXYGEN SATURATION: 98 % | DIASTOLIC BLOOD PRESSURE: 84 MMHG | RESPIRATION RATE: 18 BRPM

## 2020-11-19 DIAGNOSIS — M25.571 ACUTE RIGHT ANKLE PAIN: Primary | ICD-10-CM

## 2020-11-19 DIAGNOSIS — R52 PAIN: ICD-10-CM

## 2020-11-19 PROCEDURE — 99284 EMERGENCY DEPT VISIT MOD MDM: CPT | Mod: 25

## 2020-11-19 PROCEDURE — 96372 THER/PROPH/DIAG INJ SC/IM: CPT | Mod: 59

## 2020-11-19 PROCEDURE — 63600175 PHARM REV CODE 636 W HCPCS: Performed by: NURSE PRACTITIONER

## 2020-11-19 RX ORDER — KETOROLAC TROMETHAMINE 30 MG/ML
15 INJECTION, SOLUTION INTRAMUSCULAR; INTRAVENOUS
Status: COMPLETED | OUTPATIENT
Start: 2020-11-19 | End: 2020-11-19

## 2020-11-19 RX ORDER — METHOCARBAMOL 500 MG/1
1000 TABLET, FILM COATED ORAL 3 TIMES DAILY
Qty: 30 TABLET | Refills: 0 | Status: SHIPPED | OUTPATIENT
Start: 2020-11-19 | End: 2020-11-24

## 2020-11-19 RX ORDER — IBUPROFEN 600 MG/1
600 TABLET ORAL EVERY 6 HOURS PRN
Qty: 20 TABLET | Refills: 0 | Status: SHIPPED | OUTPATIENT
Start: 2020-11-19 | End: 2021-06-22 | Stop reason: CLARIF

## 2020-11-19 RX ADMIN — KETOROLAC TROMETHAMINE 15 MG: 30 INJECTION, SOLUTION INTRAMUSCULAR at 07:11

## 2020-11-20 NOTE — ED PROVIDER NOTES
"Encounter Date: 11/19/2020       History     Chief Complaint   Patient presents with    Ankle Injury     R      30-year-old female presents to the ER with complaints of right lateral ankle pain that began yesterday afternoon after going down stairs.  She states since going down this flight of stairs she has been having ongoing right lateral ankle pain.  She reports no significant fall or traumatic injury however.  She denies any significant swelling.  She denies posterior calf pain bruising redness or other symptoms.  She states no history of DVTs.  She states pain worsens when she bears weight.  She has an antalgic gait.        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     Past Medical History:   Diagnosis Date    Anxiety     Back pain     Bipolar disorder 2004    bipolar 2    Depression     GERD (gastroesophageal reflux disease)     HPV (human papilloma virus) infection     Insomnia     Ovarian cyst      Past Surgical History:   Procedure Laterality Date    CHOLECYSTECTOMY      DILATION AND CURETTAGE OF UTERUS      HYSTERECTOMY  2017    total, ovarian cysts, torsion, [place]; hyst per Dr JESUS Manriquez    OOPHORECTOMY      opherectom Left 2015    salpingo-opherectomy     Family History   Adopted: Yes   Problem Relation Age of Onset    No Known Problems Sister     No Known Problems Sister     No Known Problems Sister      Social History     Tobacco Use    Smoking status: Current Every Day Smoker     Packs/day: 1.00     Years: 15.00     Pack years: 15.00     Types: Cigarettes    Smokeless tobacco: Never Used   Substance Use Topics    Alcohol use: Yes     Alcohol/week: 0.0 - 2.0 standard drinks     Comment: once a month    Drug use: Not Currently     Types: "Crack" cocaine     Comment: twice     Review of Systems   Constitutional: Negative for chills, diaphoresis, fatigue and fever.   HENT: Negative for congestion, " postnasal drip, rhinorrhea, sinus pressure, sinus pain, sneezing, sore throat and trouble swallowing.    Eyes: Negative for photophobia and visual disturbance.   Respiratory: Negative for cough, choking, chest tightness, shortness of breath and wheezing.    Cardiovascular: Negative for chest pain, palpitations and leg swelling.   Gastrointestinal: Negative for abdominal pain, constipation, diarrhea, nausea and vomiting.   Endocrine: Negative for polydipsia, polyphagia and polyuria.   Genitourinary: Negative for difficulty urinating, dysuria, flank pain and frequency.   Musculoskeletal: Positive for arthralgias, gait problem and myalgias. Negative for back pain, joint swelling, neck pain and neck stiffness.   Skin: Negative for color change, pallor, rash and wound.   Allergic/Immunologic: Negative for immunocompromised state.   Neurological: Negative for dizziness, weakness, light-headedness, numbness and headaches.   Hematological: Does not bruise/bleed easily.   Psychiatric/Behavioral: Negative for confusion.   All other systems reviewed and are negative.      Physical Exam     Initial Vitals [11/19/20 1742]   BP Pulse Resp Temp SpO2   (!) 149/99 84 20 98.1 °F (36.7 °C) 97 %      MAP       --         Physical Exam    Nursing note and vitals reviewed.  Constitutional: She appears well-developed and well-nourished. She is not diaphoretic. No distress.   HENT:   Head: Normocephalic and atraumatic.   Right Ear: External ear normal.   Left Ear: External ear normal.   Nose: Nose normal.   Mouth/Throat: Oropharynx is clear and moist. No oropharyngeal exudate.   Eyes: Conjunctivae are normal. Pupils are equal, round, and reactive to light.   Neck: Normal range of motion. Neck supple.   Cardiovascular: Normal rate.   No murmur heard.  Pulmonary/Chest: Breath sounds normal. She has no wheezes. She has no rhonchi. She has no rales.   Abdominal: Soft. Bowel sounds are normal. There is no abdominal tenderness.    Musculoskeletal: Normal range of motion. No edema.      Right knee: Normal.        Right ankle: She exhibits normal range of motion, no swelling, no ecchymosis, no deformity, no laceration and normal pulse. Tenderness. Lateral malleolus tenderness found. No medial malleolus, no AITFL, no CF ligament, no posterior TFL, no head of 5th metatarsal and no proximal fibula tenderness found. Achilles tendon normal.      Right upper leg: Normal.      Right lower leg: Normal.        Right foot: No tenderness, bony tenderness, swelling, crepitus, deformity or laceration.   Neurological: She is alert and oriented to person, place, and time. She has normal strength. GCS score is 15. GCS eye subscore is 4. GCS verbal subscore is 5. GCS motor subscore is 6.   Skin: Skin is warm and dry. Capillary refill takes less than 2 seconds. No rash noted. No erythema.   Psychiatric: Thought content normal. Her mood appears anxious.         ED Course   Procedures  Labs Reviewed - No data to display       Imaging Results          X-Ray Foot Complete Right (Final result)  Result time 11/19/20 18:16:05    Final result by Minerva Lott MD (11/19/20 18:16:05)                 Narrative:    PROCEDURE:    XR ANKLE COMPLETE 3 VIEW RIGHT, XR FOOT COMPLETE 3 VIEW  RIGHT  dated  11/19/2020 5:53 PM    CLINICAL HISTORY:   Female 30 years of age.   Ankle and foot pain  since yesterday    TECHNIQUE:  3 views right ankle  3 views right foot    PREVIOUS STUDIES:  February 25, 2020    FINDINGS:    Corticated ossific densities adjacent to the tip of the lateral  malleolus are unchanged. There is no acute fracture. Alignment is  normal.    The bones of the foot are intact. Joint spaces are maintained.  Alignment is normal. There is no focal soft tissue abnormality.    IMPRESSION:    No acute findings of the right ankle or foot.  Corticated ossific densities adjacent to the tip of the lateral  malleolus, possibly from remote trauma or due to ununited  ossicles.  Unchanged compared with the prior study.    Electronically Signed by Minerva Lott on 11/19/2020 7:12 PM                             X-Ray Ankle Complete Right (Final result)  Result time 11/19/20 18:15:05    Final result by Minerva Lott MD (11/19/20 18:15:05)                 Narrative:    PROCEDURE:    XR ANKLE COMPLETE 3 VIEW RIGHT, XR FOOT COMPLETE 3 VIEW  RIGHT  dated  11/19/2020 5:53 PM    CLINICAL HISTORY:   Female 30 years of age.   Ankle and foot pain  since yesterday    TECHNIQUE:  3 views right ankle  3 views right foot    PREVIOUS STUDIES:  February 25, 2020    FINDINGS:    Corticated ossific densities adjacent to the tip of the lateral  malleolus are unchanged. There is no acute fracture. Alignment is  normal.    The bones of the foot are intact. Joint spaces are maintained.  Alignment is normal. There is no focal soft tissue abnormality.    IMPRESSION:    No acute findings of the right ankle or foot.  Corticated ossific densities adjacent to the tip of the lateral  malleolus, possibly from remote trauma or due to ununited ossicles.  Unchanged compared with the prior study.    Electronically Signed by Minerva Lott on 11/19/2020 7:12 PM                              Imaging Results          X-Ray Foot Complete Right (Final result)  Result time 11/19/20 18:16:05    Final result by Minerav Lott MD (11/19/20 18:16:05)                 Narrative:    PROCEDURE:    XR ANKLE COMPLETE 3 VIEW RIGHT, XR FOOT COMPLETE 3 VIEW  RIGHT  dated  11/19/2020 5:53 PM    CLINICAL HISTORY:   Female 30 years of age.   Ankle and foot pain  since yesterday    TECHNIQUE:  3 views right ankle  3 views right foot    PREVIOUS STUDIES:  February 25, 2020    FINDINGS:    Corticated ossific densities adjacent to the tip of the lateral  malleolus are unchanged. There is no acute fracture. Alignment is  normal.    The bones of the foot are intact. Joint spaces are maintained.  Alignment is normal. There is no focal  soft tissue abnormality.    IMPRESSION:    No acute findings of the right ankle or foot.  Corticated ossific densities adjacent to the tip of the lateral  malleolus, possibly from remote trauma or due to ununited ossicles.  Unchanged compared with the prior study.    Electronically Signed by Minerva Lott on 11/19/2020 7:12 PM                             X-Ray Ankle Complete Right (Final result)  Result time 11/19/20 18:15:05    Final result by Minerva Lott MD (11/19/20 18:15:05)                 Narrative:    PROCEDURE:    XR ANKLE COMPLETE 3 VIEW RIGHT, XR FOOT COMPLETE 3 VIEW  RIGHT  dated  11/19/2020 5:53 PM    CLINICAL HISTORY:   Female 30 years of age.   Ankle and foot pain  since yesterday    TECHNIQUE:  3 views right ankle  3 views right foot    PREVIOUS STUDIES:  February 25, 2020    FINDINGS:    Corticated ossific densities adjacent to the tip of the lateral  malleolus are unchanged. There is no acute fracture. Alignment is  normal.    The bones of the foot are intact. Joint spaces are maintained.  Alignment is normal. There is no focal soft tissue abnormality.    IMPRESSION:    No acute findings of the right ankle or foot.  Corticated ossific densities adjacent to the tip of the lateral  malleolus, possibly from remote trauma or due to ununited ossicles.  Unchanged compared with the prior study.    Electronically Signed by Minerva Lott on 11/19/2020 7:12 PM                                 Medical Decision Making:   X-ray of the right ankle and right foot both reveal no acute fractures or dislocations.  There is corticated ossific densities adjacent to the tip of the lateral malleolus noted. Likely from ununited ossicles or remote trauma.  No changes when compared to prior study on file.  X-ray results of test with patient at the bedside.  We will place an Ace wrap and provide crutches and referred orthopedist for ER visit follow-up re-evaluation.  Prescribed Robaxin and Motrin for pain management at  home.  Rice instructions discussed with patient.              Attending Attestation:     Physician Attestation Statement for NP/PA:   I discussed this assessment and plan of this patient with the NP/PA, but I did not personally examine the patient. The face to face encounter was performed by the NP/PA.                            Clinical Impression:       ICD-10-CM ICD-9-CM   1. Acute right ankle pain  M25.571 719.47     338.19   2. Pain  R52 780.96                              ED Disposition Condition    Discharge Stable        ED Prescriptions     Medication Sig Dispense Start Date End Date Auth. Provider    ibuprofen (ADVIL,MOTRIN) 600 MG tablet Take 1 tablet (600 mg total) by mouth every 6 (six) hours as needed for Pain. 20 tablet 11/19/2020  Denise Vu NP    methocarbamoL (ROBAXIN) 500 MG Tab Take 2 tablets (1,000 mg total) by mouth 3 (three) times daily. for 5 days 30 tablet 11/19/2020 11/24/2020 Denise Vu NP        Follow-up Information     Follow up With Specialties Details Why Contact Info Additional Information    Patricia Vallejo NP Family Medicine Schedule an appointment as soon as possible for a visit in 3 days for ER visit follow up and re-evaluation [address] [facility]  [address]  [facility] [address]  [phone]       Rich Cash MD Sports Medicine, Orthopedic Surgery Schedule an appointment as soon as possible for a visit in 3 days for ER visit follow up and re-evaluation, orthopedic follow up [address] [place] [address]  [address]  [facility] [address]  [phone]       [facility] Emergency Medicine Go to  As needed, If symptoms worsen [address] [facility] [address]  [phone] 1st floor                                       Denise Vu NP  11/19/20 1924       Juarez Sigala MD  11/19/20 2228

## 2021-03-23 DIAGNOSIS — F31.60 BIPOLAR AFFECTIVE DISORDER, CURRENT EPISODE MIXED, CURRENT EPISODE SEVERITY UNSPECIFIED: ICD-10-CM

## 2021-03-23 RX ORDER — SERTRALINE HYDROCHLORIDE 100 MG/1
100 TABLET, FILM COATED ORAL DAILY
Qty: 30 TABLET | Refills: 1 | Status: SHIPPED | OUTPATIENT
Start: 2021-03-23 | End: 2021-04-09 | Stop reason: SDUPTHER

## 2021-04-09 ENCOUNTER — OFFICE VISIT (OUTPATIENT)
Dept: FAMILY MEDICINE | Facility: CLINIC | Age: 31
End: 2021-04-09
Payer: MEDICAID

## 2021-04-09 VITALS
OXYGEN SATURATION: 97 % | DIASTOLIC BLOOD PRESSURE: 82 MMHG | HEIGHT: 67 IN | HEART RATE: 97 BPM | BODY MASS INDEX: 45.99 KG/M2 | WEIGHT: 293 LBS | SYSTOLIC BLOOD PRESSURE: 118 MMHG

## 2021-04-09 DIAGNOSIS — M25.571 CHRONIC PAIN OF RIGHT ANKLE: ICD-10-CM

## 2021-04-09 DIAGNOSIS — Z23 NEED FOR PNEUMOCOCCAL VACCINE: ICD-10-CM

## 2021-04-09 DIAGNOSIS — F17.210 CIGARETTE SMOKER: ICD-10-CM

## 2021-04-09 DIAGNOSIS — F31.60 BIPOLAR AFFECTIVE DISORDER, CURRENT EPISODE MIXED, CURRENT EPISODE SEVERITY UNSPECIFIED: ICD-10-CM

## 2021-04-09 DIAGNOSIS — Z23 NEED FOR DIPHTHERIA-TETANUS-PERTUSSIS (TDAP) VACCINE: ICD-10-CM

## 2021-04-09 DIAGNOSIS — G89.29 CHRONIC PAIN OF RIGHT ANKLE: ICD-10-CM

## 2021-04-09 DIAGNOSIS — R53.83 FATIGUE, UNSPECIFIED TYPE: Primary | ICD-10-CM

## 2021-04-09 PROCEDURE — 99214 OFFICE O/P EST MOD 30 MIN: CPT | Mod: S$PBB,,, | Performed by: NURSE PRACTITIONER

## 2021-04-09 PROCEDURE — 99215 OFFICE O/P EST HI 40 MIN: CPT | Performed by: NURSE PRACTITIONER

## 2021-04-09 PROCEDURE — 90472 IMMUNIZATION ADMIN EACH ADD: CPT | Mod: PBBFAC | Performed by: NURSE PRACTITIONER

## 2021-04-09 PROCEDURE — 90715 TDAP VACCINE 7 YRS/> IM: CPT | Mod: PBBFAC | Performed by: NURSE PRACTITIONER

## 2021-04-09 PROCEDURE — 90471 IMMUNIZATION ADMIN: CPT | Mod: PBBFAC | Performed by: NURSE PRACTITIONER

## 2021-04-09 PROCEDURE — 99214 PR OFFICE/OUTPT VISIT, EST, LEVL IV, 30-39 MIN: ICD-10-PCS | Mod: S$PBB,,, | Performed by: NURSE PRACTITIONER

## 2021-04-09 RX ORDER — SERTRALINE HYDROCHLORIDE 100 MG/1
100 TABLET, FILM COATED ORAL DAILY
Qty: 30 TABLET | Refills: 3 | Status: SHIPPED | OUTPATIENT
Start: 2021-04-09 | End: 2021-07-12 | Stop reason: SDUPTHER

## 2021-04-09 RX ORDER — QUETIAPINE FUMARATE 100 MG/1
100 TABLET, FILM COATED ORAL NIGHTLY
Qty: 30 TABLET | Refills: 3 | Status: SHIPPED | OUTPATIENT
Start: 2021-04-09 | End: 2021-07-12 | Stop reason: SDUPTHER

## 2021-04-09 RX ORDER — TRAZODONE HYDROCHLORIDE 150 MG/1
150 TABLET ORAL NIGHTLY
Status: CANCELLED | OUTPATIENT
Start: 2021-04-09

## 2021-05-04 ENCOUNTER — OFFICE VISIT (OUTPATIENT)
Dept: ORTHOPEDICS | Facility: CLINIC | Age: 31
End: 2021-05-04
Payer: MEDICAID

## 2021-05-04 VITALS
WEIGHT: 293 LBS | OXYGEN SATURATION: 97 % | DIASTOLIC BLOOD PRESSURE: 86 MMHG | BODY MASS INDEX: 45.99 KG/M2 | HEIGHT: 67 IN | SYSTOLIC BLOOD PRESSURE: 126 MMHG | HEART RATE: 80 BPM

## 2021-05-04 DIAGNOSIS — M25.571 CHRONIC PAIN OF RIGHT ANKLE: ICD-10-CM

## 2021-05-04 DIAGNOSIS — G89.29 CHRONIC PAIN OF RIGHT ANKLE: ICD-10-CM

## 2021-05-04 DIAGNOSIS — M25.371 ANKLE INSTABILITY, RIGHT: Primary | ICD-10-CM

## 2021-05-04 PROCEDURE — 99204 PR OFFICE/OUTPT VISIT, NEW, LEVL IV, 45-59 MIN: ICD-10-PCS | Mod: S$GLB,,, | Performed by: ORTHOPAEDIC SURGERY

## 2021-05-04 PROCEDURE — 99204 OFFICE O/P NEW MOD 45 MIN: CPT | Mod: S$GLB,,, | Performed by: ORTHOPAEDIC SURGERY

## 2021-05-14 ENCOUNTER — HOSPITAL ENCOUNTER (EMERGENCY)
Facility: HOSPITAL | Age: 31
Discharge: HOME | End: 2021-05-14
Attending: EMERGENCY MEDICINE
Payer: MEDICAID

## 2021-05-14 VITALS
DIASTOLIC BLOOD PRESSURE: 84 MMHG | SYSTOLIC BLOOD PRESSURE: 128 MMHG | RESPIRATION RATE: 16 BRPM | BODY MASS INDEX: 43.95 KG/M2 | HEART RATE: 74 BPM | WEIGHT: 290 LBS | HEIGHT: 68 IN | TEMPERATURE: 98 F | OXYGEN SATURATION: 98 %

## 2021-05-14 DIAGNOSIS — M54.12 CERVICAL RADICULOPATHY: Primary | ICD-10-CM

## 2021-05-14 PROCEDURE — 25000003 PHARM REV CODE 250: Performed by: EMERGENCY MEDICINE

## 2021-05-14 PROCEDURE — 96372 THER/PROPH/DIAG INJ SC/IM: CPT

## 2021-05-14 PROCEDURE — 63600175 PHARM REV CODE 636 W HCPCS: Performed by: EMERGENCY MEDICINE

## 2021-05-14 PROCEDURE — 99284 EMERGENCY DEPT VISIT MOD MDM: CPT | Mod: 25

## 2021-05-14 RX ORDER — METHOCARBAMOL 500 MG/1
1000 TABLET, FILM COATED ORAL 3 TIMES DAILY PRN
Qty: 30 TABLET | Refills: 0 | Status: SHIPPED | OUTPATIENT
Start: 2021-05-14 | End: 2021-05-19

## 2021-05-14 RX ORDER — NAPROXEN 500 MG/1
500 TABLET ORAL 2 TIMES DAILY PRN
Qty: 60 TABLET | Refills: 0 | Status: SHIPPED | OUTPATIENT
Start: 2021-05-14 | End: 2021-07-19

## 2021-05-14 RX ORDER — DIAZEPAM 5 MG/1
5 TABLET ORAL
Status: COMPLETED | OUTPATIENT
Start: 2021-05-14 | End: 2021-05-14

## 2021-05-14 RX ORDER — KETOROLAC TROMETHAMINE 30 MG/ML
30 INJECTION, SOLUTION INTRAMUSCULAR; INTRAVENOUS
Status: COMPLETED | OUTPATIENT
Start: 2021-05-14 | End: 2021-05-14

## 2021-05-14 RX ADMIN — KETOROLAC TROMETHAMINE 30 MG: 30 INJECTION, SOLUTION INTRAMUSCULAR; INTRAVENOUS at 04:05

## 2021-05-14 RX ADMIN — DIAZEPAM 5 MG: 5 TABLET ORAL at 04:05

## 2021-05-17 ENCOUNTER — CLINICAL SUPPORT (OUTPATIENT)
Dept: REHABILITATION | Facility: HOSPITAL | Age: 31
End: 2021-05-17
Attending: PHYSICIAN ASSISTANT
Payer: MEDICAID

## 2021-05-17 DIAGNOSIS — M25.371 ANKLE INSTABILITY, RIGHT: ICD-10-CM

## 2021-05-17 DIAGNOSIS — M25.571 CHRONIC PAIN OF RIGHT ANKLE: ICD-10-CM

## 2021-05-17 DIAGNOSIS — G89.29 CHRONIC PAIN OF RIGHT ANKLE: ICD-10-CM

## 2021-05-17 PROCEDURE — 97161 PT EVAL LOW COMPLEX 20 MIN: CPT

## 2021-05-17 PROCEDURE — 97110 THERAPEUTIC EXERCISES: CPT

## 2021-05-26 ENCOUNTER — CLINICAL SUPPORT (OUTPATIENT)
Dept: REHABILITATION | Facility: HOSPITAL | Age: 31
End: 2021-05-26
Attending: PHYSICIAN ASSISTANT
Payer: MEDICAID

## 2021-05-26 DIAGNOSIS — G89.29 CHRONIC PAIN OF RIGHT ANKLE: ICD-10-CM

## 2021-05-26 DIAGNOSIS — M25.571 CHRONIC PAIN OF RIGHT ANKLE: ICD-10-CM

## 2021-05-26 PROCEDURE — 97110 THERAPEUTIC EXERCISES: CPT

## 2021-05-28 ENCOUNTER — CLINICAL SUPPORT (OUTPATIENT)
Dept: REHABILITATION | Facility: HOSPITAL | Age: 31
End: 2021-05-28
Attending: PHYSICIAN ASSISTANT
Payer: MEDICAID

## 2021-05-28 DIAGNOSIS — G89.29 CHRONIC PAIN OF RIGHT ANKLE: ICD-10-CM

## 2021-05-28 DIAGNOSIS — M25.571 CHRONIC PAIN OF RIGHT ANKLE: ICD-10-CM

## 2021-05-28 PROCEDURE — 97110 THERAPEUTIC EXERCISES: CPT | Mod: CQ

## 2021-05-31 ENCOUNTER — CLINICAL SUPPORT (OUTPATIENT)
Dept: REHABILITATION | Facility: HOSPITAL | Age: 31
End: 2021-05-31
Attending: PHYSICIAN ASSISTANT
Payer: MEDICAID

## 2021-05-31 DIAGNOSIS — M25.571 CHRONIC PAIN OF RIGHT ANKLE: ICD-10-CM

## 2021-05-31 DIAGNOSIS — G89.29 CHRONIC PAIN OF RIGHT ANKLE: ICD-10-CM

## 2021-05-31 PROCEDURE — 97110 THERAPEUTIC EXERCISES: CPT | Mod: CQ

## 2021-06-01 ENCOUNTER — DOCUMENTATION ONLY (OUTPATIENT)
Dept: REHABILITATION | Facility: HOSPITAL | Age: 31
End: 2021-06-01

## 2021-06-02 ENCOUNTER — CLINICAL SUPPORT (OUTPATIENT)
Dept: REHABILITATION | Facility: HOSPITAL | Age: 31
End: 2021-06-02
Attending: PHYSICIAN ASSISTANT
Payer: MEDICAID

## 2021-06-02 DIAGNOSIS — M25.571 CHRONIC PAIN OF RIGHT ANKLE: ICD-10-CM

## 2021-06-02 DIAGNOSIS — G89.29 CHRONIC PAIN OF RIGHT ANKLE: ICD-10-CM

## 2021-06-02 PROCEDURE — 97110 THERAPEUTIC EXERCISES: CPT | Mod: CQ

## 2021-06-07 ENCOUNTER — CLINICAL SUPPORT (OUTPATIENT)
Dept: REHABILITATION | Facility: HOSPITAL | Age: 31
End: 2021-06-07
Attending: PHYSICIAN ASSISTANT
Payer: MEDICAID

## 2021-06-07 DIAGNOSIS — M25.571 CHRONIC PAIN OF RIGHT ANKLE: ICD-10-CM

## 2021-06-07 DIAGNOSIS — G89.29 CHRONIC PAIN OF RIGHT ANKLE: ICD-10-CM

## 2021-06-07 PROCEDURE — 97110 THERAPEUTIC EXERCISES: CPT

## 2021-06-10 ENCOUNTER — OFFICE VISIT (OUTPATIENT)
Dept: PODIATRY | Facility: CLINIC | Age: 31
End: 2021-06-10
Payer: MEDICAID

## 2021-06-10 VITALS — BODY MASS INDEX: 43.95 KG/M2 | HEIGHT: 68 IN | WEIGHT: 290 LBS

## 2021-06-10 DIAGNOSIS — G89.29 CHRONIC PAIN OF RIGHT ANKLE: ICD-10-CM

## 2021-06-10 DIAGNOSIS — M25.571 CHRONIC PAIN OF RIGHT ANKLE: ICD-10-CM

## 2021-06-10 DIAGNOSIS — M25.371 ANKLE INSTABILITY, RIGHT: Primary | ICD-10-CM

## 2021-06-10 PROCEDURE — 99204 PR OFFICE/OUTPT VISIT, NEW, LEVL IV, 45-59 MIN: ICD-10-PCS | Mod: S$GLB,,, | Performed by: PODIATRIST

## 2021-06-10 PROCEDURE — 99204 OFFICE O/P NEW MOD 45 MIN: CPT | Mod: S$GLB,,, | Performed by: PODIATRIST

## 2021-06-16 ENCOUNTER — TELEPHONE (OUTPATIENT)
Dept: PODIATRY | Facility: CLINIC | Age: 31
End: 2021-06-16

## 2021-06-16 ENCOUNTER — HOSPITAL ENCOUNTER (OUTPATIENT)
Dept: RADIOLOGY | Facility: HOSPITAL | Age: 31
Discharge: HOME | End: 2021-06-16
Attending: PODIATRIST
Payer: MEDICAID

## 2021-06-16 DIAGNOSIS — M25.371 ANKLE INSTABILITY, RIGHT: ICD-10-CM

## 2021-06-16 DIAGNOSIS — G89.29 CHRONIC PAIN OF RIGHT ANKLE: ICD-10-CM

## 2021-06-16 DIAGNOSIS — M25.571 CHRONIC PAIN OF RIGHT ANKLE: ICD-10-CM

## 2021-06-16 PROCEDURE — 73721 MRI JNT OF LWR EXTRE W/O DYE: CPT | Mod: TC,PO,RT

## 2021-06-18 ENCOUNTER — OFFICE VISIT (OUTPATIENT)
Dept: PODIATRY | Facility: CLINIC | Age: 31
End: 2021-06-18
Payer: MEDICAID

## 2021-06-18 VITALS — HEART RATE: 85 BPM | HEIGHT: 68 IN | BODY MASS INDEX: 43.95 KG/M2 | WEIGHT: 290 LBS | OXYGEN SATURATION: 98 %

## 2021-06-18 DIAGNOSIS — M94.9 OSTEOCHONDRAL LESION: ICD-10-CM

## 2021-06-18 DIAGNOSIS — M89.9 OSTEOCHONDRAL LESION: ICD-10-CM

## 2021-06-18 DIAGNOSIS — M25.371 ANKLE INSTABILITY, RIGHT: Primary | ICD-10-CM

## 2021-06-18 DIAGNOSIS — S82.831K OTHER CLOSED FRACTURE OF DISTAL END OF RIGHT FIBULA WITH NONUNION, SUBSEQUENT ENCOUNTER: ICD-10-CM

## 2021-06-18 DIAGNOSIS — M25.571 CHRONIC PAIN OF RIGHT ANKLE: ICD-10-CM

## 2021-06-18 DIAGNOSIS — G89.29 CHRONIC PAIN OF RIGHT ANKLE: ICD-10-CM

## 2021-06-18 DIAGNOSIS — S92.134K: ICD-10-CM

## 2021-06-18 PROCEDURE — 99214 OFFICE O/P EST MOD 30 MIN: CPT | Mod: S$GLB,,, | Performed by: PODIATRIST

## 2021-06-18 PROCEDURE — 99214 PR OFFICE/OUTPT VISIT, EST, LEVL IV, 30-39 MIN: ICD-10-PCS | Mod: S$GLB,,, | Performed by: PODIATRIST

## 2021-06-21 ENCOUNTER — TELEPHONE (OUTPATIENT)
Dept: PODIATRY | Facility: CLINIC | Age: 31
End: 2021-06-21

## 2021-06-21 ENCOUNTER — PATIENT MESSAGE (OUTPATIENT)
Dept: ADMINISTRATIVE | Facility: OTHER | Age: 31
End: 2021-06-21

## 2021-06-21 DIAGNOSIS — G89.29 CHRONIC PAIN OF RIGHT ANKLE: ICD-10-CM

## 2021-06-21 DIAGNOSIS — M25.571 CHRONIC PAIN OF RIGHT ANKLE: ICD-10-CM

## 2021-06-21 DIAGNOSIS — S92.134K: ICD-10-CM

## 2021-06-21 DIAGNOSIS — M94.9 OSTEOCHONDRAL LESION: ICD-10-CM

## 2021-06-21 DIAGNOSIS — M89.9 OSTEOCHONDRAL LESION: ICD-10-CM

## 2021-06-21 DIAGNOSIS — M25.371 ANKLE INSTABILITY, RIGHT: Primary | ICD-10-CM

## 2021-06-22 ENCOUNTER — HOSPITAL ENCOUNTER (OUTPATIENT)
Dept: PREADMISSION TESTING | Facility: HOSPITAL | Age: 31
Discharge: HOME | End: 2021-06-22
Attending: PODIATRIST
Payer: MEDICAID

## 2021-06-22 VITALS
HEIGHT: 68 IN | SYSTOLIC BLOOD PRESSURE: 142 MMHG | WEIGHT: 293 LBS | TEMPERATURE: 98 F | RESPIRATION RATE: 17 BRPM | OXYGEN SATURATION: 96 % | BODY MASS INDEX: 44.41 KG/M2 | HEART RATE: 77 BPM | DIASTOLIC BLOOD PRESSURE: 96 MMHG

## 2021-06-22 DIAGNOSIS — M94.9 OSTEOCHONDRAL LESION: ICD-10-CM

## 2021-06-22 DIAGNOSIS — M25.571 CHRONIC PAIN OF RIGHT ANKLE: ICD-10-CM

## 2021-06-22 DIAGNOSIS — S92.134K: ICD-10-CM

## 2021-06-22 DIAGNOSIS — G89.29 CHRONIC PAIN OF RIGHT ANKLE: ICD-10-CM

## 2021-06-22 DIAGNOSIS — M25.371 ANKLE INSTABILITY, RIGHT: ICD-10-CM

## 2021-06-22 DIAGNOSIS — M89.9 OSTEOCHONDRAL LESION: ICD-10-CM

## 2021-06-22 LAB
ALBUMIN SERPL BCP-MCNC: 4 G/DL (ref 3.5–5.2)
ALP SERPL-CCNC: 131 U/L (ref 55–135)
ALT SERPL W/O P-5'-P-CCNC: 51 U/L (ref 10–44)
ANION GAP SERPL CALC-SCNC: 12 MMOL/L (ref 8–16)
AST SERPL-CCNC: 29 U/L (ref 10–40)
BASOPHILS # BLD AUTO: 0.09 K/UL (ref 0–0.2)
BASOPHILS NFR BLD: 0.6 % (ref 0–1.9)
BILIRUB SERPL-MCNC: 0.7 MG/DL (ref 0.1–1)
BUN SERPL-MCNC: 8 MG/DL (ref 6–20)
CALCIUM SERPL-MCNC: 9.3 MG/DL (ref 8.7–10.5)
CHLORIDE SERPL-SCNC: 99 MMOL/L (ref 95–110)
CO2 SERPL-SCNC: 25 MMOL/L (ref 23–29)
CREAT SERPL-MCNC: 0.5 MG/DL (ref 0.5–1.4)
DIFFERENTIAL METHOD: ABNORMAL
EOSINOPHIL # BLD AUTO: 0.1 K/UL (ref 0–0.5)
EOSINOPHIL NFR BLD: 0.7 % (ref 0–8)
ERYTHROCYTE [DISTWIDTH] IN BLOOD BY AUTOMATED COUNT: 15 % (ref 11.5–14.5)
EST. GFR  (AFRICAN AMERICAN): >60 ML/MIN/1.73 M^2
EST. GFR  (NON AFRICAN AMERICAN): >60 ML/MIN/1.73 M^2
GLUCOSE SERPL-MCNC: 113 MG/DL (ref 70–110)
HCT VFR BLD AUTO: 47.9 % (ref 37–48.5)
HGB BLD-MCNC: 14.9 G/DL (ref 12–16)
IMM GRANULOCYTES # BLD AUTO: 0.06 K/UL (ref 0–0.04)
IMM GRANULOCYTES NFR BLD AUTO: 0.4 % (ref 0–0.5)
LYMPHOCYTES # BLD AUTO: 4.2 K/UL (ref 1–4.8)
LYMPHOCYTES NFR BLD: 25.9 % (ref 18–48)
MCH RBC QN AUTO: 25.6 PG (ref 27–31)
MCHC RBC AUTO-ENTMCNC: 31.1 G/DL (ref 32–36)
MCV RBC AUTO: 82 FL (ref 82–98)
MONOCYTES # BLD AUTO: 0.9 K/UL (ref 0.3–1)
MONOCYTES NFR BLD: 5.7 % (ref 4–15)
NEUTROPHILS # BLD AUTO: 10.7 K/UL (ref 1.8–7.7)
NEUTROPHILS NFR BLD: 66.7 % (ref 38–73)
NRBC BLD-RTO: 0 /100 WBC
PLATELET # BLD AUTO: 480 K/UL (ref 150–450)
PMV BLD AUTO: 8.8 FL (ref 9.2–12.9)
POTASSIUM SERPL-SCNC: 4.3 MMOL/L (ref 3.5–5.1)
PROT SERPL-MCNC: 8.5 G/DL (ref 6–8.4)
RBC # BLD AUTO: 5.83 M/UL (ref 4–5.4)
SODIUM SERPL-SCNC: 136 MMOL/L (ref 136–145)
WBC # BLD AUTO: 16.02 K/UL (ref 3.9–12.7)

## 2021-06-22 PROCEDURE — 85025 COMPLETE CBC W/AUTO DIFF WBC: CPT | Performed by: PODIATRIST

## 2021-06-22 PROCEDURE — 36415 COLL VENOUS BLD VENIPUNCTURE: CPT | Performed by: PODIATRIST

## 2021-06-22 PROCEDURE — 80053 COMPREHEN METABOLIC PANEL: CPT | Performed by: PODIATRIST

## 2021-06-23 ENCOUNTER — ANESTHESIA EVENT (OUTPATIENT)
Dept: SURGERY | Facility: HOSPITAL | Age: 31
End: 2021-06-23
Payer: MEDICAID

## 2021-06-23 ENCOUNTER — HOSPITAL ENCOUNTER (OUTPATIENT)
Facility: HOSPITAL | Age: 31
Discharge: HOME | End: 2021-06-23
Attending: PODIATRIST | Admitting: PODIATRIST
Payer: MEDICAID

## 2021-06-23 ENCOUNTER — ANESTHESIA (OUTPATIENT)
Dept: SURGERY | Facility: HOSPITAL | Age: 31
End: 2021-06-23
Payer: MEDICAID

## 2021-06-23 VITALS
BODY MASS INDEX: 45.76 KG/M2 | RESPIRATION RATE: 16 BRPM | DIASTOLIC BLOOD PRESSURE: 91 MMHG | SYSTOLIC BLOOD PRESSURE: 128 MMHG | OXYGEN SATURATION: 97 % | WEIGHT: 293 LBS | HEART RATE: 92 BPM | TEMPERATURE: 98 F

## 2021-06-23 DIAGNOSIS — M25.571 CHRONIC PAIN OF RIGHT ANKLE: ICD-10-CM

## 2021-06-23 DIAGNOSIS — M79.671 RIGHT FOOT PAIN: ICD-10-CM

## 2021-06-23 DIAGNOSIS — G89.29 CHRONIC PAIN OF RIGHT ANKLE: ICD-10-CM

## 2021-06-23 DIAGNOSIS — M25.371 ANKLE INSTABILITY, RIGHT: Primary | ICD-10-CM

## 2021-06-23 DIAGNOSIS — M89.9 OSTEOCHONDRAL LESION: ICD-10-CM

## 2021-06-23 DIAGNOSIS — S92.134K: ICD-10-CM

## 2021-06-23 DIAGNOSIS — M94.9 OSTEOCHONDRAL LESION: ICD-10-CM

## 2021-06-23 PROCEDURE — 25000003 PHARM REV CODE 250: Performed by: ANESTHESIOLOGY

## 2021-06-23 PROCEDURE — 27201107 HC STYLET, STANDARD: Performed by: ANESTHESIOLOGY

## 2021-06-23 PROCEDURE — 27201423 OPTIME MED/SURG SUP & DEVICES STERILE SUPPLY: Performed by: PODIATRIST

## 2021-06-23 PROCEDURE — 71000015 HC POSTOP RECOV 1ST HR: Performed by: PODIATRIST

## 2021-06-23 PROCEDURE — 36000706: Performed by: PODIATRIST

## 2021-06-23 PROCEDURE — 20605 PR DRAIN/INJECT INTERMEDIATE JOINT/BURSA: ICD-10-PCS | Mod: 51,RT,, | Performed by: PODIATRIST

## 2021-06-23 PROCEDURE — C9290 INJ, BUPIVACAINE LIPOSOME: HCPCS | Performed by: PODIATRIST

## 2021-06-23 PROCEDURE — C1713 ANCHOR/SCREW BN/BN,TIS/BN: HCPCS | Performed by: PODIATRIST

## 2021-06-23 PROCEDURE — 27000080 OPTIME MED/SURG SUP & DEVICES GENERAL CLASSIFICATION: Performed by: PODIATRIST

## 2021-06-23 PROCEDURE — 99900035 HC TECH TIME PER 15 MIN (STAT)

## 2021-06-23 PROCEDURE — 94799 UNLISTED PULMONARY SVC/PX: CPT

## 2021-06-23 PROCEDURE — 63600175 PHARM REV CODE 636 W HCPCS: Performed by: STUDENT IN AN ORGANIZED HEALTH CARE EDUCATION/TRAINING PROGRAM

## 2021-06-23 PROCEDURE — 27000671 HC TUBING MICROBORE EXT: Performed by: ANESTHESIOLOGY

## 2021-06-23 PROCEDURE — 01480 ANES OPEN PX LOWER L/A/F NOS: CPT | Performed by: PODIATRIST

## 2021-06-23 PROCEDURE — 27202107 HC XP QUATRO SENSOR: Performed by: ANESTHESIOLOGY

## 2021-06-23 PROCEDURE — 20605 DRAIN/INJ JOINT/BURSA W/O US: CPT | Mod: 51,RT,, | Performed by: PODIATRIST

## 2021-06-23 PROCEDURE — 27698 PR REPAIR COLLAT ANKLE LIGMNT,SECONDARY: ICD-10-PCS | Mod: 51,RT,, | Performed by: PODIATRIST

## 2021-06-23 PROCEDURE — 27641 PR PARTIAL REMOVAL OF FIBULA: ICD-10-PCS | Mod: RT,,, | Performed by: PODIATRIST

## 2021-06-23 PROCEDURE — 63600175 PHARM REV CODE 636 W HCPCS: Performed by: ANESTHESIOLOGY

## 2021-06-23 PROCEDURE — 28120 PR PART REMV TALUS OR CALCANEUS: ICD-10-PCS | Mod: 51,RT,, | Performed by: PODIATRIST

## 2021-06-23 PROCEDURE — 28120 PART REMOVAL OF ANKLE/HEEL: CPT | Mod: 51,RT,, | Performed by: PODIATRIST

## 2021-06-23 PROCEDURE — 27698 REPAIR OF ANKLE LIGAMENT: CPT | Mod: 51,RT,, | Performed by: PODIATRIST

## 2021-06-23 PROCEDURE — 37000009 HC ANESTHESIA EA ADD 15 MINS: Performed by: PODIATRIST

## 2021-06-23 PROCEDURE — 36000707: Performed by: PODIATRIST

## 2021-06-23 PROCEDURE — 63600175 PHARM REV CODE 636 W HCPCS: Performed by: PODIATRIST

## 2021-06-23 PROCEDURE — 25000003 PHARM REV CODE 250: Performed by: PODIATRIST

## 2021-06-23 PROCEDURE — 27641 PARTIAL REMOVAL OF FIBULA: CPT | Mod: RT,,, | Performed by: PODIATRIST

## 2021-06-23 PROCEDURE — 63600175 PHARM REV CODE 636 W HCPCS: Performed by: NURSE ANESTHETIST, CERTIFIED REGISTERED

## 2021-06-23 PROCEDURE — 71000039 HC RECOVERY, EACH ADD'L HOUR: Performed by: PODIATRIST

## 2021-06-23 PROCEDURE — 27000673 HC TUBING BLOOD Y: Performed by: ANESTHESIOLOGY

## 2021-06-23 PROCEDURE — 25000003 PHARM REV CODE 250: Performed by: NURSE ANESTHETIST, CERTIFIED REGISTERED

## 2021-06-23 PROCEDURE — 37000008 HC ANESTHESIA 1ST 15 MINUTES: Performed by: PODIATRIST

## 2021-06-23 PROCEDURE — 71000033 HC RECOVERY, INTIAL HOUR: Performed by: PODIATRIST

## 2021-06-23 PROCEDURE — 25000003 PHARM REV CODE 250: Performed by: STUDENT IN AN ORGANIZED HEALTH CARE EDUCATION/TRAINING PROGRAM

## 2021-06-23 PROCEDURE — 27000221 HC OXYGEN, UP TO 24 HOURS

## 2021-06-23 DEVICE — IMPLANTABLE DEVICE: Type: IMPLANTABLE DEVICE | Site: FOOT | Status: FUNCTIONAL

## 2021-06-23 RX ORDER — FAMOTIDINE 20 MG/50ML
20 INJECTION, SOLUTION INTRAVENOUS ONCE
Status: DISCONTINUED | OUTPATIENT
Start: 2021-06-23 | End: 2021-06-23

## 2021-06-23 RX ORDER — ACETAMINOPHEN 10 MG/ML
INJECTION, SOLUTION INTRAVENOUS
Status: DISCONTINUED | OUTPATIENT
Start: 2021-06-23 | End: 2021-06-23

## 2021-06-23 RX ORDER — SODIUM CHLORIDE 0.9 % (FLUSH) 0.9 %
10 SYRINGE (ML) INJECTION
Status: DISCONTINUED | OUTPATIENT
Start: 2021-06-23 | End: 2021-06-23 | Stop reason: HOSPADM

## 2021-06-23 RX ORDER — FAMOTIDINE 10 MG/ML
20 INJECTION INTRAVENOUS ONCE
Status: COMPLETED | OUTPATIENT
Start: 2021-06-23 | End: 2021-06-23

## 2021-06-23 RX ORDER — DIPHENHYDRAMINE HYDROCHLORIDE 50 MG/ML
12.5 INJECTION INTRAMUSCULAR; INTRAVENOUS
Status: DISCONTINUED | OUTPATIENT
Start: 2021-06-23 | End: 2021-06-23 | Stop reason: HOSPADM

## 2021-06-23 RX ORDER — CARISOPRODOL 350 MG/1
350 TABLET ORAL 3 TIMES DAILY PRN
Qty: 12 TABLET | Refills: 0
Start: 2021-06-23 | End: 2021-06-28 | Stop reason: SDUPTHER

## 2021-06-23 RX ORDER — KETOROLAC TROMETHAMINE 30 MG/ML
30 INJECTION, SOLUTION INTRAMUSCULAR; INTRAVENOUS ONCE
Status: COMPLETED | OUTPATIENT
Start: 2021-06-23 | End: 2021-06-23

## 2021-06-23 RX ORDER — FAMOTIDINE 10 MG/ML
20 INJECTION INTRAVENOUS ONCE
Status: CANCELLED | OUTPATIENT
Start: 2021-06-23

## 2021-06-23 RX ORDER — LIDOCAINE HYDROCHLORIDE 20 MG/ML
JELLY TOPICAL
Status: DISCONTINUED | OUTPATIENT
Start: 2021-06-23 | End: 2021-06-23

## 2021-06-23 RX ORDER — ONDANSETRON 2 MG/ML
4 INJECTION INTRAMUSCULAR; INTRAVENOUS DAILY PRN
Status: DISCONTINUED | OUTPATIENT
Start: 2021-06-23 | End: 2021-06-23 | Stop reason: HOSPADM

## 2021-06-23 RX ORDER — CEFAZOLIN SODIUM 1 G/3ML
INJECTION, POWDER, FOR SOLUTION INTRAMUSCULAR; INTRAVENOUS
Status: DISCONTINUED | OUTPATIENT
Start: 2021-06-23 | End: 2021-06-23

## 2021-06-23 RX ORDER — CEPHALEXIN 500 MG/1
500 CAPSULE ORAL 4 TIMES DAILY
Qty: 28 CAPSULE | Refills: 0
Start: 2021-06-23 | End: 2021-08-11

## 2021-06-23 RX ORDER — BUPIVACAINE HYDROCHLORIDE 5 MG/ML
INJECTION, SOLUTION EPIDURAL; INTRACAUDAL
Status: DISCONTINUED | OUTPATIENT
Start: 2021-06-23 | End: 2021-06-23 | Stop reason: HOSPADM

## 2021-06-23 RX ORDER — KETAMINE HYDROCHLORIDE 50 MG/ML
INJECTION, SOLUTION INTRAMUSCULAR; INTRAVENOUS
Status: DISCONTINUED | OUTPATIENT
Start: 2021-06-23 | End: 2021-06-23

## 2021-06-23 RX ORDER — DEXAMETHASONE SODIUM PHOSPHATE 4 MG/ML
INJECTION, SOLUTION INTRA-ARTICULAR; INTRALESIONAL; INTRAMUSCULAR; INTRAVENOUS; SOFT TISSUE
Status: DISCONTINUED | OUTPATIENT
Start: 2021-06-23 | End: 2021-06-23

## 2021-06-23 RX ORDER — ROCURONIUM BROMIDE 10 MG/ML
INJECTION, SOLUTION INTRAVENOUS
Status: DISCONTINUED | OUTPATIENT
Start: 2021-06-23 | End: 2021-06-23

## 2021-06-23 RX ORDER — PROMETHAZINE HYDROCHLORIDE 25 MG/1
25 TABLET ORAL EVERY 8 HOURS PRN
Qty: 15 TABLET | Refills: 0
Start: 2021-06-23 | End: 2021-06-28 | Stop reason: SDUPTHER

## 2021-06-23 RX ORDER — MIDAZOLAM HYDROCHLORIDE 1 MG/ML
INJECTION INTRAMUSCULAR; INTRAVENOUS
Status: DISCONTINUED | OUTPATIENT
Start: 2021-06-23 | End: 2021-06-23

## 2021-06-23 RX ORDER — HYDROMORPHONE HYDROCHLORIDE 1 MG/ML
0.2 INJECTION, SOLUTION INTRAMUSCULAR; INTRAVENOUS; SUBCUTANEOUS
Status: DISCONTINUED | OUTPATIENT
Start: 2021-06-23 | End: 2021-06-23 | Stop reason: HOSPADM

## 2021-06-23 RX ORDER — HYDROCODONE BITARTRATE AND ACETAMINOPHEN 10; 325 MG/1; MG/1
1 TABLET ORAL EVERY 4 HOURS PRN
Qty: 28 TABLET | Refills: 0
Start: 2021-06-23 | End: 2021-06-28 | Stop reason: SDUPTHER

## 2021-06-23 RX ORDER — LIDOCAINE HCL/PF 100 MG/5ML
SYRINGE (ML) INTRAVENOUS
Status: DISCONTINUED | OUTPATIENT
Start: 2021-06-23 | End: 2021-06-23

## 2021-06-23 RX ORDER — PROPOFOL 10 MG/ML
VIAL (ML) INTRAVENOUS
Status: DISCONTINUED | OUTPATIENT
Start: 2021-06-23 | End: 2021-06-23

## 2021-06-23 RX ORDER — DEXAMETHASONE SODIUM PHOSPHATE 4 MG/ML
INJECTION, SOLUTION INTRA-ARTICULAR; INTRALESIONAL; INTRAMUSCULAR; INTRAVENOUS; SOFT TISSUE
Status: DISCONTINUED | OUTPATIENT
Start: 2021-06-23 | End: 2021-06-23 | Stop reason: HOSPADM

## 2021-06-23 RX ORDER — SUCCINYLCHOLINE CHLORIDE 20 MG/ML
INJECTION INTRAMUSCULAR; INTRAVENOUS
Status: DISCONTINUED | OUTPATIENT
Start: 2021-06-23 | End: 2021-06-23

## 2021-06-23 RX ORDER — OXYCODONE HYDROCHLORIDE 5 MG/1
5 TABLET ORAL
Status: DISCONTINUED | OUTPATIENT
Start: 2021-06-23 | End: 2021-06-23 | Stop reason: HOSPADM

## 2021-06-23 RX ORDER — MEPERIDINE HYDROCHLORIDE 50 MG/ML
12.5 INJECTION INTRAMUSCULAR; INTRAVENOUS; SUBCUTANEOUS EVERY 10 MIN PRN
Status: DISCONTINUED | OUTPATIENT
Start: 2021-06-23 | End: 2021-06-23 | Stop reason: HOSPADM

## 2021-06-23 RX ORDER — ONDANSETRON 2 MG/ML
INJECTION INTRAMUSCULAR; INTRAVENOUS
Status: DISCONTINUED | OUTPATIENT
Start: 2021-06-23 | End: 2021-06-23

## 2021-06-23 RX ORDER — FENTANYL CITRATE 50 UG/ML
INJECTION, SOLUTION INTRAMUSCULAR; INTRAVENOUS
Status: DISCONTINUED | OUTPATIENT
Start: 2021-06-23 | End: 2021-06-23

## 2021-06-23 RX ORDER — FAMOTIDINE 10 MG/ML
20 INJECTION INTRAVENOUS ONCE
Status: DISCONTINUED | OUTPATIENT
Start: 2021-06-23 | End: 2021-06-23

## 2021-06-23 RX ORDER — BUPIVACAINE HYDROCHLORIDE AND EPINEPHRINE 5; 5 MG/ML; UG/ML
INJECTION, SOLUTION EPIDURAL; INTRACAUDAL; PERINEURAL
Status: DISCONTINUED | OUTPATIENT
Start: 2021-06-23 | End: 2021-06-23 | Stop reason: HOSPADM

## 2021-06-23 RX ADMIN — ONDANSETRON 4 MG: 2 INJECTION INTRAMUSCULAR; INTRAVENOUS at 03:06

## 2021-06-23 RX ADMIN — ACETAMINOPHEN 1000 MG: 10 INJECTION, SOLUTION INTRAVENOUS at 11:06

## 2021-06-23 RX ADMIN — HYDROMORPHONE HYDROCHLORIDE 0.2 MG: 1 INJECTION, SOLUTION INTRAMUSCULAR; INTRAVENOUS; SUBCUTANEOUS at 02:06

## 2021-06-23 RX ADMIN — HYDROMORPHONE HYDROCHLORIDE 0.2 MG: 1 INJECTION, SOLUTION INTRAMUSCULAR; INTRAVENOUS; SUBCUTANEOUS at 03:06

## 2021-06-23 RX ADMIN — SUCCINYLCHOLINE CHLORIDE 140 MG: 20 INJECTION, SOLUTION INTRAMUSCULAR; INTRAVENOUS at 11:06

## 2021-06-23 RX ADMIN — HYDROMORPHONE HYDROCHLORIDE 0.2 MG: 1 INJECTION, SOLUTION INTRAMUSCULAR; INTRAVENOUS; SUBCUTANEOUS at 04:06

## 2021-06-23 RX ADMIN — LIDOCAINE HYDROCHLORIDE 75 MG: 20 INJECTION, SOLUTION INTRAVENOUS at 11:06

## 2021-06-23 RX ADMIN — SODIUM CHLORIDE, SODIUM LACTATE, POTASSIUM CHLORIDE, AND CALCIUM CHLORIDE: .6; .31; .03; .02 INJECTION, SOLUTION INTRAVENOUS at 11:06

## 2021-06-23 RX ADMIN — KETAMINE HYDROCHLORIDE 40 MG: 50 INJECTION INTRAMUSCULAR; INTRAVENOUS at 11:06

## 2021-06-23 RX ADMIN — ONDANSETRON 4 MG: 2 INJECTION INTRAMUSCULAR; INTRAVENOUS at 11:06

## 2021-06-23 RX ADMIN — FENTANYL CITRATE 100 MCG: 50 INJECTION INTRAMUSCULAR; INTRAVENOUS at 01:06

## 2021-06-23 RX ADMIN — CEFAZOLIN 3 G: 330 INJECTION, POWDER, FOR SOLUTION INTRAMUSCULAR; INTRAVENOUS at 11:06

## 2021-06-23 RX ADMIN — OXYCODONE HYDROCHLORIDE 5 MG: 5 TABLET ORAL at 03:06

## 2021-06-23 RX ADMIN — SUGAMMADEX 200 MG: 100 INJECTION, SOLUTION INTRAVENOUS at 01:06

## 2021-06-23 RX ADMIN — ROCURONIUM BROMIDE 30 MG: 10 INJECTION, SOLUTION INTRAVENOUS at 11:06

## 2021-06-23 RX ADMIN — PROPOFOL 150 MG: 10 INJECTION, EMULSION INTRAVENOUS at 11:06

## 2021-06-23 RX ADMIN — KETAMINE HYDROCHLORIDE 10 MG: 50 INJECTION INTRAMUSCULAR; INTRAVENOUS at 12:06

## 2021-06-23 RX ADMIN — DEXAMETHASONE SODIUM PHOSPHATE 8 MG: 4 INJECTION, SOLUTION INTRAMUSCULAR; INTRAVENOUS at 11:06

## 2021-06-23 RX ADMIN — SODIUM CHLORIDE, SODIUM LACTATE, POTASSIUM CHLORIDE, AND CALCIUM CHLORIDE: .6; .31; .03; .02 INJECTION, SOLUTION INTRAVENOUS at 02:06

## 2021-06-23 RX ADMIN — FAMOTIDINE 20 MG: 10 INJECTION INTRAVENOUS at 04:06

## 2021-06-23 RX ADMIN — LIDOCAINE HYDROCHLORIDE 3 ML: 20 JELLY TOPICAL at 11:06

## 2021-06-23 RX ADMIN — FENTANYL CITRATE 100 MCG: 50 INJECTION INTRAMUSCULAR; INTRAVENOUS at 11:06

## 2021-06-23 RX ADMIN — KETOROLAC TROMETHAMINE 30 MG: 30 INJECTION, SOLUTION INTRAMUSCULAR at 03:06

## 2021-06-23 RX ADMIN — ROCURONIUM BROMIDE 10 MG: 10 INJECTION, SOLUTION INTRAVENOUS at 11:06

## 2021-06-23 RX ADMIN — MIDAZOLAM HYDROCHLORIDE 2 MG: 1 INJECTION, SOLUTION INTRAMUSCULAR; INTRAVENOUS at 11:06

## 2021-06-28 ENCOUNTER — OFFICE VISIT (OUTPATIENT)
Dept: PODIATRY | Facility: CLINIC | Age: 31
End: 2021-06-28
Payer: MEDICAID

## 2021-06-28 VITALS — HEIGHT: 68 IN | OXYGEN SATURATION: 96 % | WEIGHT: 293 LBS | BODY MASS INDEX: 44.41 KG/M2 | HEART RATE: 92 BPM

## 2021-06-28 DIAGNOSIS — M89.9 OSTEOCHONDRAL LESION: Primary | ICD-10-CM

## 2021-06-28 DIAGNOSIS — M25.371 ANKLE INSTABILITY, RIGHT: ICD-10-CM

## 2021-06-28 DIAGNOSIS — S92.134K: ICD-10-CM

## 2021-06-28 DIAGNOSIS — M94.9 OSTEOCHONDRAL LESION: Primary | ICD-10-CM

## 2021-06-28 DIAGNOSIS — G89.29 CHRONIC PAIN OF RIGHT ANKLE: ICD-10-CM

## 2021-06-28 DIAGNOSIS — M25.571 CHRONIC PAIN OF RIGHT ANKLE: ICD-10-CM

## 2021-06-28 PROCEDURE — 99024 POSTOP FOLLOW-UP VISIT: CPT | Mod: S$GLB,,, | Performed by: PODIATRIST

## 2021-06-28 PROCEDURE — 99024 PR POST-OP FOLLOW-UP VISIT: ICD-10-PCS | Mod: S$GLB,,, | Performed by: PODIATRIST

## 2021-06-28 RX ORDER — CARISOPRODOL 350 MG/1
350 TABLET ORAL 3 TIMES DAILY PRN
Qty: 12 TABLET | Refills: 0 | Status: SHIPPED | OUTPATIENT
Start: 2021-06-28 | End: 2021-07-06 | Stop reason: SDUPTHER

## 2021-06-28 RX ORDER — PROMETHAZINE HYDROCHLORIDE 25 MG/1
25 TABLET ORAL EVERY 6 HOURS PRN
Qty: 15 TABLET | Refills: 0 | Status: SHIPPED | OUTPATIENT
Start: 2021-06-28 | End: 2021-07-08

## 2021-06-28 RX ORDER — HYDROCODONE BITARTRATE AND ACETAMINOPHEN 10; 325 MG/1; MG/1
1 TABLET ORAL EVERY 4 HOURS PRN
Qty: 28 TABLET | Refills: 0 | Status: SHIPPED | OUTPATIENT
Start: 2021-06-28 | End: 2021-07-19

## 2021-07-06 ENCOUNTER — OFFICE VISIT (OUTPATIENT)
Dept: PODIATRY | Facility: CLINIC | Age: 31
End: 2021-07-06
Payer: MEDICAID

## 2021-07-06 VITALS — OXYGEN SATURATION: 98 % | RESPIRATION RATE: 18 BRPM | WEIGHT: 293 LBS | BODY MASS INDEX: 44.41 KG/M2 | HEIGHT: 68 IN

## 2021-07-06 DIAGNOSIS — M89.9 OSTEOCHONDRAL LESION: ICD-10-CM

## 2021-07-06 DIAGNOSIS — M25.571 CHRONIC PAIN OF RIGHT ANKLE: ICD-10-CM

## 2021-07-06 DIAGNOSIS — G89.29 CHRONIC PAIN OF RIGHT ANKLE: ICD-10-CM

## 2021-07-06 DIAGNOSIS — M94.9 OSTEOCHONDRAL LESION: ICD-10-CM

## 2021-07-06 DIAGNOSIS — M25.371 ANKLE INSTABILITY, RIGHT: Primary | ICD-10-CM

## 2021-07-06 DIAGNOSIS — S92.134K: ICD-10-CM

## 2021-07-06 PROCEDURE — 99024 POSTOP FOLLOW-UP VISIT: CPT | Mod: S$GLB,,, | Performed by: PODIATRIST

## 2021-07-06 PROCEDURE — 99024 PR POST-OP FOLLOW-UP VISIT: ICD-10-PCS | Mod: S$GLB,,, | Performed by: PODIATRIST

## 2021-07-06 RX ORDER — METHYLPREDNISOLONE 4 MG/1
TABLET ORAL
Qty: 1 PACKAGE | Refills: 0 | Status: SHIPPED | OUTPATIENT
Start: 2021-07-06 | End: 2021-08-11

## 2021-07-06 RX ORDER — HYDROCORTISONE 25 MG/G
CREAM TOPICAL 2 TIMES DAILY
Qty: 20 G | Refills: 1 | Status: SHIPPED | OUTPATIENT
Start: 2021-07-06 | End: 2022-03-25

## 2021-07-06 RX ORDER — CARISOPRODOL 350 MG/1
350 TABLET ORAL 3 TIMES DAILY PRN
Qty: 20 TABLET | Refills: 0 | Status: SHIPPED | OUTPATIENT
Start: 2021-07-06 | End: 2021-07-12 | Stop reason: SDUPTHER

## 2021-07-06 RX ORDER — HYDROCODONE BITARTRATE AND ACETAMINOPHEN 7.5; 325 MG/1; MG/1
1 TABLET ORAL EVERY 6 HOURS PRN
Qty: 25 TABLET | Refills: 0 | Status: SHIPPED | OUTPATIENT
Start: 2021-07-06 | End: 2021-07-12 | Stop reason: SDUPTHER

## 2021-07-12 ENCOUNTER — PATIENT MESSAGE (OUTPATIENT)
Dept: ADMINISTRATIVE | Facility: OTHER | Age: 31
End: 2021-07-12

## 2021-07-12 ENCOUNTER — PATIENT MESSAGE (OUTPATIENT)
Dept: PODIATRY | Facility: CLINIC | Age: 31
End: 2021-07-12

## 2021-07-12 DIAGNOSIS — R53.83 FATIGUE, UNSPECIFIED TYPE: ICD-10-CM

## 2021-07-12 DIAGNOSIS — F31.60 BIPOLAR AFFECTIVE DISORDER, CURRENT EPISODE MIXED, CURRENT EPISODE SEVERITY UNSPECIFIED: ICD-10-CM

## 2021-07-12 RX ORDER — HYDROCODONE BITARTRATE AND ACETAMINOPHEN 7.5; 325 MG/1; MG/1
1 TABLET ORAL EVERY 6 HOURS PRN
Qty: 25 TABLET | Refills: 0 | Status: SHIPPED | OUTPATIENT
Start: 2021-07-12 | End: 2021-07-19 | Stop reason: SDUPTHER

## 2021-07-12 RX ORDER — CARISOPRODOL 350 MG/1
350 TABLET ORAL 3 TIMES DAILY PRN
Qty: 20 TABLET | Refills: 0 | Status: SHIPPED | OUTPATIENT
Start: 2021-07-12 | End: 2021-07-22

## 2021-07-13 ENCOUNTER — PATIENT MESSAGE (OUTPATIENT)
Dept: FAMILY MEDICINE | Facility: CLINIC | Age: 31
End: 2021-07-13

## 2021-07-13 RX ORDER — SERTRALINE HYDROCHLORIDE 100 MG/1
100 TABLET, FILM COATED ORAL DAILY
Qty: 30 TABLET | Refills: 0 | Status: SHIPPED | OUTPATIENT
Start: 2021-07-13 | End: 2021-08-23 | Stop reason: SDUPTHER

## 2021-07-13 RX ORDER — QUETIAPINE FUMARATE 100 MG/1
100 TABLET, FILM COATED ORAL NIGHTLY
Qty: 30 TABLET | Refills: 0 | Status: SHIPPED | OUTPATIENT
Start: 2021-07-13 | End: 2021-08-23 | Stop reason: SDUPTHER

## 2021-07-19 ENCOUNTER — PATIENT MESSAGE (OUTPATIENT)
Dept: PODIATRY | Facility: CLINIC | Age: 31
End: 2021-07-19

## 2021-07-19 ENCOUNTER — OFFICE VISIT (OUTPATIENT)
Dept: PODIATRY | Facility: CLINIC | Age: 31
End: 2021-07-19
Payer: MEDICAID

## 2021-07-19 VITALS — BODY MASS INDEX: 44.41 KG/M2 | WEIGHT: 293 LBS | HEIGHT: 68 IN

## 2021-07-19 DIAGNOSIS — S92.134K: Primary | ICD-10-CM

## 2021-07-19 DIAGNOSIS — M25.371 ANKLE INSTABILITY, RIGHT: ICD-10-CM

## 2021-07-19 DIAGNOSIS — M25.571 CHRONIC PAIN OF RIGHT ANKLE: ICD-10-CM

## 2021-07-19 DIAGNOSIS — G89.29 CHRONIC PAIN OF RIGHT ANKLE: ICD-10-CM

## 2021-07-19 DIAGNOSIS — M89.9 OSTEOCHONDRAL LESION: ICD-10-CM

## 2021-07-19 DIAGNOSIS — M94.9 OSTEOCHONDRAL LESION: ICD-10-CM

## 2021-07-19 PROCEDURE — 99024 PR POST-OP FOLLOW-UP VISIT: ICD-10-PCS | Mod: S$GLB,,, | Performed by: PODIATRIST

## 2021-07-19 PROCEDURE — 99024 POSTOP FOLLOW-UP VISIT: CPT | Mod: S$GLB,,, | Performed by: PODIATRIST

## 2021-07-19 RX ORDER — PROMETHAZINE HYDROCHLORIDE 25 MG/1
25 TABLET ORAL EVERY 4 HOURS
Qty: 28 TABLET | Refills: 0 | Status: SHIPPED | OUTPATIENT
Start: 2021-07-19 | End: 2022-03-25

## 2021-07-19 RX ORDER — HYDROCODONE BITARTRATE AND ACETAMINOPHEN 7.5; 325 MG/1; MG/1
1 TABLET ORAL EVERY 6 HOURS PRN
Qty: 25 TABLET | Refills: 0 | Status: SHIPPED | OUTPATIENT
Start: 2021-07-19 | End: 2021-07-26

## 2021-07-22 ENCOUNTER — PATIENT MESSAGE (OUTPATIENT)
Dept: PODIATRY | Facility: CLINIC | Age: 31
End: 2021-07-22

## 2021-07-22 RX ORDER — CARISOPRODOL 350 MG/1
350 TABLET ORAL 4 TIMES DAILY PRN
Qty: 28 TABLET | Refills: 0 | Status: SHIPPED | OUTPATIENT
Start: 2021-07-22 | End: 2021-07-29

## 2021-07-26 ENCOUNTER — PATIENT MESSAGE (OUTPATIENT)
Dept: PODIATRY | Facility: CLINIC | Age: 31
End: 2021-07-26

## 2021-07-26 DIAGNOSIS — M25.571 CHRONIC PAIN OF RIGHT ANKLE: ICD-10-CM

## 2021-07-26 DIAGNOSIS — G89.29 CHRONIC PAIN OF RIGHT ANKLE: ICD-10-CM

## 2021-07-26 DIAGNOSIS — S92.134K: Primary | ICD-10-CM

## 2021-07-26 DIAGNOSIS — M25.371 ANKLE INSTABILITY, RIGHT: ICD-10-CM

## 2021-07-27 RX ORDER — HYDROCODONE BITARTRATE AND ACETAMINOPHEN 7.5; 325 MG/1; MG/1
1 TABLET ORAL EVERY 6 HOURS PRN
Qty: 28 TABLET | Refills: 0 | Status: SHIPPED | OUTPATIENT
Start: 2021-07-27 | End: 2021-08-03

## 2021-08-06 ENCOUNTER — HOSPITAL ENCOUNTER (EMERGENCY)
Facility: HOSPITAL | Age: 31
Discharge: HOME | End: 2021-08-07
Attending: EMERGENCY MEDICINE
Payer: MEDICAID

## 2021-08-06 ENCOUNTER — HOSPITAL ENCOUNTER (EMERGENCY)
Facility: HOSPITAL | Age: 31
Discharge: HOME | End: 2021-08-06
Attending: EMERGENCY MEDICINE
Payer: MEDICAID

## 2021-08-06 VITALS
HEIGHT: 68 IN | WEIGHT: 293 LBS | BODY MASS INDEX: 44.41 KG/M2 | TEMPERATURE: 98 F | SYSTOLIC BLOOD PRESSURE: 134 MMHG | RESPIRATION RATE: 20 BRPM | HEART RATE: 95 BPM | DIASTOLIC BLOOD PRESSURE: 92 MMHG | OXYGEN SATURATION: 96 %

## 2021-08-06 DIAGNOSIS — R51.9 NONINTRACTABLE HEADACHE, UNSPECIFIED CHRONICITY PATTERN, UNSPECIFIED HEADACHE TYPE: Primary | ICD-10-CM

## 2021-08-06 DIAGNOSIS — M62.838 NECK MUSCLE SPASM: Primary | ICD-10-CM

## 2021-08-06 LAB
ALBUMIN SERPL BCP-MCNC: 4 G/DL (ref 3.5–5.2)
ALP SERPL-CCNC: 124 U/L (ref 55–135)
ALT SERPL W/O P-5'-P-CCNC: 58 U/L (ref 10–44)
ANION GAP SERPL CALC-SCNC: 12 MMOL/L (ref 8–16)
AST SERPL-CCNC: 35 U/L (ref 10–40)
B-HCG UR QL: NEGATIVE
BASOPHILS # BLD AUTO: 0.08 K/UL (ref 0–0.2)
BASOPHILS NFR BLD: 0.5 % (ref 0–1.9)
BILIRUB SERPL-MCNC: 0.6 MG/DL (ref 0.1–1)
BUN SERPL-MCNC: 10 MG/DL (ref 6–20)
CALCIUM SERPL-MCNC: 8.9 MG/DL (ref 8.7–10.5)
CHLORIDE SERPL-SCNC: 101 MMOL/L (ref 95–110)
CO2 SERPL-SCNC: 24 MMOL/L (ref 23–29)
CREAT SERPL-MCNC: 0.7 MG/DL (ref 0.5–1.4)
CTP QC/QA: YES
DIFFERENTIAL METHOD: ABNORMAL
EOSINOPHIL # BLD AUTO: 0.1 K/UL (ref 0–0.5)
EOSINOPHIL NFR BLD: 0.4 % (ref 0–8)
ERYTHROCYTE [DISTWIDTH] IN BLOOD BY AUTOMATED COUNT: 14.7 % (ref 11.5–14.5)
EST. GFR  (AFRICAN AMERICAN): >60 ML/MIN/1.73 M^2
EST. GFR  (NON AFRICAN AMERICAN): >60 ML/MIN/1.73 M^2
GLUCOSE SERPL-MCNC: 126 MG/DL (ref 70–110)
HCT VFR BLD AUTO: 45 % (ref 37–48.5)
HGB BLD-MCNC: 14.4 G/DL (ref 12–16)
IMM GRANULOCYTES # BLD AUTO: 0.07 K/UL (ref 0–0.04)
IMM GRANULOCYTES NFR BLD AUTO: 0.4 % (ref 0–0.5)
LYMPHOCYTES # BLD AUTO: 3.4 K/UL (ref 1–4.8)
LYMPHOCYTES NFR BLD: 19.4 % (ref 18–48)
MCH RBC QN AUTO: 26.7 PG (ref 27–31)
MCHC RBC AUTO-ENTMCNC: 32 G/DL (ref 32–36)
MCV RBC AUTO: 84 FL (ref 82–98)
MONOCYTES # BLD AUTO: 0.9 K/UL (ref 0.3–1)
MONOCYTES NFR BLD: 4.9 % (ref 4–15)
NEUTROPHILS # BLD AUTO: 13 K/UL (ref 1.8–7.7)
NEUTROPHILS NFR BLD: 74.4 % (ref 38–73)
NRBC BLD-RTO: 0 /100 WBC
PLATELET # BLD AUTO: 472 K/UL (ref 150–450)
PMV BLD AUTO: 8.7 FL (ref 9.2–12.9)
POTASSIUM SERPL-SCNC: 4.2 MMOL/L (ref 3.5–5.1)
PROT SERPL-MCNC: 8.4 G/DL (ref 6–8.4)
RBC # BLD AUTO: 5.39 M/UL (ref 4–5.4)
SODIUM SERPL-SCNC: 137 MMOL/L (ref 136–145)
WBC # BLD AUTO: 17.48 K/UL (ref 3.9–12.7)

## 2021-08-06 PROCEDURE — 99284 EMERGENCY DEPT VISIT MOD MDM: CPT | Mod: 25

## 2021-08-06 PROCEDURE — 63600175 PHARM REV CODE 636 W HCPCS: Performed by: EMERGENCY MEDICINE

## 2021-08-06 PROCEDURE — 80053 COMPREHEN METABOLIC PANEL: CPT | Performed by: EMERGENCY MEDICINE

## 2021-08-06 PROCEDURE — 96372 THER/PROPH/DIAG INJ SC/IM: CPT

## 2021-08-06 PROCEDURE — 96375 TX/PRO/DX INJ NEW DRUG ADDON: CPT

## 2021-08-06 PROCEDURE — 81025 URINE PREGNANCY TEST: CPT | Performed by: EMERGENCY MEDICINE

## 2021-08-06 PROCEDURE — 25000003 PHARM REV CODE 250: Performed by: EMERGENCY MEDICINE

## 2021-08-06 PROCEDURE — 85025 COMPLETE CBC W/AUTO DIFF WBC: CPT | Performed by: EMERGENCY MEDICINE

## 2021-08-06 PROCEDURE — 96374 THER/PROPH/DIAG INJ IV PUSH: CPT

## 2021-08-06 RX ORDER — DIAZEPAM 5 MG/1
5 TABLET ORAL
Status: COMPLETED | OUTPATIENT
Start: 2021-08-06 | End: 2021-08-06

## 2021-08-06 RX ORDER — DIPHENHYDRAMINE HYDROCHLORIDE 50 MG/ML
12.5 INJECTION INTRAMUSCULAR; INTRAVENOUS
Status: COMPLETED | OUTPATIENT
Start: 2021-08-06 | End: 2021-08-06

## 2021-08-06 RX ORDER — METHOCARBAMOL 500 MG/1
1000 TABLET, FILM COATED ORAL 3 TIMES DAILY PRN
Qty: 30 TABLET | Refills: 0 | Status: SHIPPED | OUTPATIENT
Start: 2021-08-06 | End: 2021-08-11

## 2021-08-06 RX ORDER — PROCHLORPERAZINE EDISYLATE 5 MG/ML
10 INJECTION INTRAMUSCULAR; INTRAVENOUS
Status: COMPLETED | OUTPATIENT
Start: 2021-08-06 | End: 2021-08-06

## 2021-08-06 RX ORDER — HYDRALAZINE HYDROCHLORIDE 20 MG/ML
10 INJECTION INTRAMUSCULAR; INTRAVENOUS
Status: COMPLETED | OUTPATIENT
Start: 2021-08-06 | End: 2021-08-06

## 2021-08-06 RX ORDER — IBUPROFEN 600 MG/1
600 TABLET ORAL EVERY 6 HOURS PRN
Qty: 20 TABLET | Refills: 0 | Status: SHIPPED | OUTPATIENT
Start: 2021-08-06 | End: 2022-03-25

## 2021-08-06 RX ORDER — KETOROLAC TROMETHAMINE 30 MG/ML
30 INJECTION, SOLUTION INTRAMUSCULAR; INTRAVENOUS
Status: COMPLETED | OUTPATIENT
Start: 2021-08-06 | End: 2021-08-06

## 2021-08-06 RX ADMIN — KETOROLAC TROMETHAMINE 30 MG: 30 INJECTION, SOLUTION INTRAMUSCULAR; INTRAVENOUS at 03:08

## 2021-08-06 RX ADMIN — DIAZEPAM 5 MG: 5 TABLET ORAL at 03:08

## 2021-08-06 RX ADMIN — HYDRALAZINE HYDROCHLORIDE 10 MG: 20 INJECTION, SOLUTION INTRAMUSCULAR; INTRAVENOUS at 11:08

## 2021-08-06 RX ADMIN — PROCHLORPERAZINE EDISYLATE 10 MG: 5 INJECTION INTRAMUSCULAR; INTRAVENOUS at 11:08

## 2021-08-06 RX ADMIN — DIPHENHYDRAMINE HYDROCHLORIDE 12.5 MG: 50 INJECTION INTRAMUSCULAR; INTRAVENOUS at 11:08

## 2021-08-07 VITALS
HEART RATE: 72 BPM | HEIGHT: 67 IN | BODY MASS INDEX: 45.99 KG/M2 | WEIGHT: 293 LBS | TEMPERATURE: 98 F | DIASTOLIC BLOOD PRESSURE: 87 MMHG | SYSTOLIC BLOOD PRESSURE: 147 MMHG | OXYGEN SATURATION: 97 % | RESPIRATION RATE: 16 BRPM

## 2021-08-07 PROCEDURE — 63600175 PHARM REV CODE 636 W HCPCS: Performed by: EMERGENCY MEDICINE

## 2021-08-07 PROCEDURE — 96375 TX/PRO/DX INJ NEW DRUG ADDON: CPT

## 2021-08-07 RX ORDER — KETOROLAC TROMETHAMINE 30 MG/ML
15 INJECTION, SOLUTION INTRAMUSCULAR; INTRAVENOUS
Status: COMPLETED | OUTPATIENT
Start: 2021-08-07 | End: 2021-08-07

## 2021-08-07 RX ORDER — BUTALBITAL, ACETAMINOPHEN AND CAFFEINE 50; 325; 40 MG/1; MG/1; MG/1
1 TABLET ORAL EVERY 4 HOURS PRN
Qty: 12 TABLET | Refills: 0 | Status: SHIPPED | OUTPATIENT
Start: 2021-08-07 | End: 2021-09-06

## 2021-08-07 RX ORDER — LORAZEPAM 2 MG/ML
0.5 INJECTION INTRAMUSCULAR
Status: COMPLETED | OUTPATIENT
Start: 2021-08-07 | End: 2021-08-07

## 2021-08-07 RX ADMIN — KETOROLAC TROMETHAMINE 15 MG: 30 INJECTION, SOLUTION INTRAMUSCULAR; INTRAVENOUS at 12:08

## 2021-08-07 RX ADMIN — LORAZEPAM 0.5 MG: 2 INJECTION INTRAMUSCULAR; INTRAVENOUS at 01:08

## 2021-08-11 ENCOUNTER — OFFICE VISIT (OUTPATIENT)
Dept: FAMILY MEDICINE | Facility: CLINIC | Age: 31
End: 2021-08-11
Payer: MEDICAID

## 2021-08-11 ENCOUNTER — PATIENT MESSAGE (OUTPATIENT)
Dept: FAMILY MEDICINE | Facility: CLINIC | Age: 31
End: 2021-08-11

## 2021-08-11 VITALS
BODY MASS INDEX: 45.99 KG/M2 | HEART RATE: 84 BPM | DIASTOLIC BLOOD PRESSURE: 100 MMHG | WEIGHT: 293 LBS | SYSTOLIC BLOOD PRESSURE: 172 MMHG | RESPIRATION RATE: 17 BRPM | HEIGHT: 67 IN | OXYGEN SATURATION: 98 %

## 2021-08-11 DIAGNOSIS — I10 ESSENTIAL HYPERTENSION: Primary | ICD-10-CM

## 2021-08-11 PROCEDURE — 99214 PR OFFICE/OUTPT VISIT, EST, LEVL IV, 30-39 MIN: ICD-10-PCS | Mod: S$PBB,,, | Performed by: FAMILY MEDICINE

## 2021-08-11 PROCEDURE — 99214 OFFICE O/P EST MOD 30 MIN: CPT | Mod: S$PBB,,, | Performed by: FAMILY MEDICINE

## 2021-08-11 PROCEDURE — 99215 OFFICE O/P EST HI 40 MIN: CPT | Performed by: FAMILY MEDICINE

## 2021-08-11 RX ORDER — METOPROLOL SUCCINATE 50 MG/1
50 TABLET, EXTENDED RELEASE ORAL DAILY
Qty: 30 TABLET | Refills: 11 | Status: SHIPPED | OUTPATIENT
Start: 2021-08-11 | End: 2022-03-25

## 2021-08-16 ENCOUNTER — OFFICE VISIT (OUTPATIENT)
Dept: PODIATRY | Facility: CLINIC | Age: 31
End: 2021-08-16
Payer: MEDICAID

## 2021-08-16 VITALS
DIASTOLIC BLOOD PRESSURE: 89 MMHG | HEIGHT: 67 IN | OXYGEN SATURATION: 98 % | SYSTOLIC BLOOD PRESSURE: 126 MMHG | HEART RATE: 96 BPM | BODY MASS INDEX: 45.99 KG/M2 | RESPIRATION RATE: 20 BRPM | WEIGHT: 293 LBS

## 2021-08-16 DIAGNOSIS — S92.134K: Primary | ICD-10-CM

## 2021-08-16 DIAGNOSIS — M25.571 CHRONIC PAIN OF RIGHT ANKLE: ICD-10-CM

## 2021-08-16 DIAGNOSIS — M94.9 OSTEOCHONDRAL LESION: ICD-10-CM

## 2021-08-16 DIAGNOSIS — M89.9 OSTEOCHONDRAL LESION: ICD-10-CM

## 2021-08-16 DIAGNOSIS — M25.371 ANKLE INSTABILITY, RIGHT: ICD-10-CM

## 2021-08-16 DIAGNOSIS — G89.29 CHRONIC PAIN OF RIGHT ANKLE: ICD-10-CM

## 2021-08-16 PROCEDURE — 99024 PR POST-OP FOLLOW-UP VISIT: ICD-10-PCS | Mod: S$GLB,,, | Performed by: PODIATRIST

## 2021-08-16 PROCEDURE — 99024 POSTOP FOLLOW-UP VISIT: CPT | Mod: S$GLB,,, | Performed by: PODIATRIST

## 2021-08-18 ENCOUNTER — ANESTHESIA (OUTPATIENT)
Dept: SURGERY | Facility: HOSPITAL | Age: 31
End: 2021-08-18
Payer: MEDICAID

## 2021-08-18 ENCOUNTER — ANESTHESIA EVENT (OUTPATIENT)
Dept: SURGERY | Facility: HOSPITAL | Age: 31
End: 2021-08-18
Payer: MEDICAID

## 2021-08-18 ENCOUNTER — HOSPITAL ENCOUNTER (OUTPATIENT)
Facility: HOSPITAL | Age: 31
Discharge: HOME | End: 2021-08-19
Attending: EMERGENCY MEDICINE | Admitting: INTERNAL MEDICINE
Payer: MEDICAID

## 2021-08-18 DIAGNOSIS — R10.31 RIGHT LOWER QUADRANT ABDOMINAL PAIN: ICD-10-CM

## 2021-08-18 DIAGNOSIS — Z01.818 PREOP EXAMINATION: ICD-10-CM

## 2021-08-18 DIAGNOSIS — K37 APPENDICITIS: ICD-10-CM

## 2021-08-18 DIAGNOSIS — K35.80 ACUTE APPENDICITIS, UNSPECIFIED ACUTE APPENDICITIS TYPE: Primary | ICD-10-CM

## 2021-08-18 DIAGNOSIS — Z01.818 PRE-OP EXAM: ICD-10-CM

## 2021-08-18 PROBLEM — E66.01 SEVERE OBESITY (BMI >= 40): Status: ACTIVE | Noted: 2021-08-18

## 2021-08-18 LAB
ALBUMIN SERPL BCP-MCNC: 4 G/DL (ref 3.5–5.2)
ALP SERPL-CCNC: 132 U/L (ref 55–135)
ALT SERPL W/O P-5'-P-CCNC: 64 U/L (ref 10–44)
ANION GAP SERPL CALC-SCNC: 15 MMOL/L (ref 8–16)
AST SERPL-CCNC: 31 U/L (ref 10–40)
BACTERIA #/AREA URNS HPF: ABNORMAL /HPF
BASOPHILS # BLD AUTO: 0.09 K/UL (ref 0–0.2)
BASOPHILS NFR BLD: 0.5 % (ref 0–1.9)
BILIRUB SERPL-MCNC: 0.8 MG/DL (ref 0.1–1)
BILIRUB UR QL STRIP: NEGATIVE
BUN SERPL-MCNC: 10 MG/DL (ref 6–20)
CALCIUM SERPL-MCNC: 9.2 MG/DL (ref 8.7–10.5)
CHLORIDE SERPL-SCNC: 99 MMOL/L (ref 95–110)
CLARITY UR: ABNORMAL
CO2 SERPL-SCNC: 22 MMOL/L (ref 23–29)
COLOR UR: YELLOW
CREAT SERPL-MCNC: 0.7 MG/DL (ref 0.5–1.4)
CREAT SERPL-MCNC: 0.8 MG/DL (ref 0.5–1.4)
DIFFERENTIAL METHOD: ABNORMAL
EOSINOPHIL # BLD AUTO: 0.1 K/UL (ref 0–0.5)
EOSINOPHIL NFR BLD: 0.5 % (ref 0–8)
ERYTHROCYTE [DISTWIDTH] IN BLOOD BY AUTOMATED COUNT: 14.8 % (ref 11.5–14.5)
EST. GFR  (AFRICAN AMERICAN): >60 ML/MIN/1.73 M^2
EST. GFR  (NON AFRICAN AMERICAN): >60 ML/MIN/1.73 M^2
GLUCOSE SERPL-MCNC: 155 MG/DL (ref 70–110)
GLUCOSE UR QL STRIP: NEGATIVE
HCT VFR BLD AUTO: 46.8 % (ref 37–48.5)
HGB BLD-MCNC: 15 G/DL (ref 12–16)
HGB UR QL STRIP: NEGATIVE
HYALINE CASTS #/AREA URNS LPF: 29 /LPF
IMM GRANULOCYTES # BLD AUTO: 0.06 K/UL (ref 0–0.04)
IMM GRANULOCYTES NFR BLD AUTO: 0.3 % (ref 0–0.5)
KETONES UR QL STRIP: ABNORMAL
LEUKOCYTE ESTERASE UR QL STRIP: ABNORMAL
LIPASE SERPL-CCNC: 35 U/L (ref 4–60)
LYMPHOCYTES # BLD AUTO: 4.2 K/UL (ref 1–4.8)
LYMPHOCYTES NFR BLD: 23.5 % (ref 18–48)
MCH RBC QN AUTO: 26.1 PG (ref 27–31)
MCHC RBC AUTO-ENTMCNC: 32.1 G/DL (ref 32–36)
MCV RBC AUTO: 82 FL (ref 82–98)
MICROSCOPIC COMMENT: ABNORMAL
MONOCYTES # BLD AUTO: 0.8 K/UL (ref 0.3–1)
MONOCYTES NFR BLD: 4.6 % (ref 4–15)
NEUTROPHILS # BLD AUTO: 12.5 K/UL (ref 1.8–7.7)
NEUTROPHILS NFR BLD: 70.6 % (ref 38–73)
NITRITE UR QL STRIP: NEGATIVE
NRBC BLD-RTO: 0 /100 WBC
PH UR STRIP: 6 [PH] (ref 5–8)
PLATELET # BLD AUTO: 480 K/UL (ref 150–450)
PMV BLD AUTO: 8.8 FL (ref 9.2–12.9)
POTASSIUM SERPL-SCNC: 4.4 MMOL/L (ref 3.5–5.1)
PROT SERPL-MCNC: 8.4 G/DL (ref 6–8.4)
PROT UR QL STRIP: ABNORMAL
RBC # BLD AUTO: 5.74 M/UL (ref 4–5.4)
RBC #/AREA URNS HPF: 12 /HPF (ref 0–4)
SAMPLE: NORMAL
SARS-COV-2 RDRP RESP QL NAA+PROBE: NEGATIVE
SODIUM SERPL-SCNC: 136 MMOL/L (ref 136–145)
SP GR UR STRIP: 1.03 (ref 1–1.03)
SQUAMOUS #/AREA URNS HPF: 8 /HPF
URN SPEC COLLECT METH UR: ABNORMAL
UROBILINOGEN UR STRIP-ACNC: ABNORMAL EU/DL
WBC # BLD AUTO: 17.73 K/UL (ref 3.9–12.7)
WBC #/AREA URNS HPF: 13 /HPF (ref 0–5)

## 2021-08-18 PROCEDURE — 44970 PR LAP,APPENDECTOMY: ICD-10-PCS | Mod: ,,, | Performed by: SURGERY

## 2021-08-18 PROCEDURE — 63600175 PHARM REV CODE 636 W HCPCS: Performed by: NURSE PRACTITIONER

## 2021-08-18 PROCEDURE — 96376 TX/PRO/DX INJ SAME DRUG ADON: CPT | Mod: 59

## 2021-08-18 PROCEDURE — 96375 TX/PRO/DX INJ NEW DRUG ADDON: CPT

## 2021-08-18 PROCEDURE — 99285 EMERGENCY DEPT VISIT HI MDM: CPT | Mod: 25

## 2021-08-18 PROCEDURE — G0378 HOSPITAL OBSERVATION PER HR: HCPCS

## 2021-08-18 PROCEDURE — 93010 ELECTROCARDIOGRAM REPORT: CPT | Mod: ,,, | Performed by: SPECIALIST

## 2021-08-18 PROCEDURE — 93005 ELECTROCARDIOGRAM TRACING: CPT | Performed by: SPECIALIST

## 2021-08-18 PROCEDURE — 44970 LAPAROSCOPY APPENDECTOMY: CPT | Mod: ,,, | Performed by: SURGERY

## 2021-08-18 PROCEDURE — 96365 THER/PROPH/DIAG IV INF INIT: CPT

## 2021-08-18 PROCEDURE — 93010 EKG 12-LEAD: ICD-10-PCS | Mod: ,,, | Performed by: SPECIALIST

## 2021-08-18 PROCEDURE — 87086 URINE CULTURE/COLONY COUNT: CPT | Performed by: EMERGENCY MEDICINE

## 2021-08-18 PROCEDURE — 81001 URINALYSIS AUTO W/SCOPE: CPT | Performed by: EMERGENCY MEDICINE

## 2021-08-18 PROCEDURE — 80053 COMPREHEN METABOLIC PANEL: CPT | Performed by: EMERGENCY MEDICINE

## 2021-08-18 PROCEDURE — 85025 COMPLETE CBC W/AUTO DIFF WBC: CPT | Performed by: EMERGENCY MEDICINE

## 2021-08-18 PROCEDURE — 25000003 PHARM REV CODE 250: Performed by: NURSE PRACTITIONER

## 2021-08-18 PROCEDURE — 83690 ASSAY OF LIPASE: CPT | Performed by: EMERGENCY MEDICINE

## 2021-08-18 PROCEDURE — U0002 COVID-19 LAB TEST NON-CDC: HCPCS | Performed by: EMERGENCY MEDICINE

## 2021-08-18 RX ORDER — FAMOTIDINE 10 MG/ML
20 INJECTION INTRAVENOUS EVERY 12 HOURS
Status: DISCONTINUED | OUTPATIENT
Start: 2021-08-18 | End: 2021-08-19

## 2021-08-18 RX ORDER — SERTRALINE HYDROCHLORIDE 50 MG/1
100 TABLET, FILM COATED ORAL DAILY
Status: DISCONTINUED | OUTPATIENT
Start: 2021-08-19 | End: 2021-08-19 | Stop reason: HOSPADM

## 2021-08-18 RX ORDER — ONDANSETRON 2 MG/ML
4 INJECTION INTRAMUSCULAR; INTRAVENOUS EVERY 8 HOURS PRN
Status: DISCONTINUED | OUTPATIENT
Start: 2021-08-18 | End: 2021-08-19

## 2021-08-18 RX ORDER — SODIUM CHLORIDE 0.9 % (FLUSH) 0.9 %
10 SYRINGE (ML) INJECTION
Status: DISCONTINUED | OUTPATIENT
Start: 2021-08-18 | End: 2021-08-19 | Stop reason: HOSPADM

## 2021-08-18 RX ORDER — SODIUM CHLORIDE, SODIUM LACTATE, POTASSIUM CHLORIDE, CALCIUM CHLORIDE 600; 310; 30; 20 MG/100ML; MG/100ML; MG/100ML; MG/100ML
INJECTION, SOLUTION INTRAVENOUS CONTINUOUS
Status: DISCONTINUED | OUTPATIENT
Start: 2021-08-18 | End: 2021-08-19

## 2021-08-18 RX ORDER — MORPHINE SULFATE 4 MG/ML
8 INJECTION, SOLUTION INTRAMUSCULAR; INTRAVENOUS
Status: COMPLETED | OUTPATIENT
Start: 2021-08-18 | End: 2021-08-18

## 2021-08-18 RX ORDER — QUETIAPINE FUMARATE 100 MG/1
100 TABLET, FILM COATED ORAL NIGHTLY
Status: DISCONTINUED | OUTPATIENT
Start: 2021-08-19 | End: 2021-08-19 | Stop reason: HOSPADM

## 2021-08-18 RX ORDER — ACETAMINOPHEN 325 MG/1
650 TABLET ORAL EVERY 8 HOURS PRN
Status: DISCONTINUED | OUTPATIENT
Start: 2021-08-18 | End: 2021-08-19 | Stop reason: HOSPADM

## 2021-08-18 RX ORDER — HYDROMORPHONE HYDROCHLORIDE 1 MG/ML
1 INJECTION, SOLUTION INTRAMUSCULAR; INTRAVENOUS; SUBCUTANEOUS
Status: COMPLETED | OUTPATIENT
Start: 2021-08-18 | End: 2021-08-18

## 2021-08-18 RX ORDER — HYDROMORPHONE HYDROCHLORIDE 1 MG/ML
1 INJECTION, SOLUTION INTRAMUSCULAR; INTRAVENOUS; SUBCUTANEOUS EVERY 6 HOURS PRN
Status: DISCONTINUED | OUTPATIENT
Start: 2021-08-18 | End: 2021-08-19

## 2021-08-18 RX ORDER — TALC
6 POWDER (GRAM) TOPICAL NIGHTLY PRN
Status: DISCONTINUED | OUTPATIENT
Start: 2021-08-18 | End: 2021-08-19 | Stop reason: HOSPADM

## 2021-08-18 RX ORDER — ONDANSETRON 2 MG/ML
4 INJECTION INTRAMUSCULAR; INTRAVENOUS
Status: COMPLETED | OUTPATIENT
Start: 2021-08-18 | End: 2021-08-18

## 2021-08-18 RX ORDER — METOPROLOL SUCCINATE 25 MG/1
50 TABLET, EXTENDED RELEASE ORAL DAILY
Status: DISCONTINUED | OUTPATIENT
Start: 2021-08-19 | End: 2021-08-19 | Stop reason: HOSPADM

## 2021-08-18 RX ADMIN — HYDROMORPHONE HYDROCHLORIDE 1 MG: 1 INJECTION, SOLUTION INTRAMUSCULAR; INTRAVENOUS; SUBCUTANEOUS at 10:08

## 2021-08-18 RX ADMIN — ONDANSETRON 4 MG: 2 INJECTION INTRAMUSCULAR; INTRAVENOUS at 08:08

## 2021-08-18 RX ADMIN — PIPERACILLIN AND TAZOBACTAM 4.5 G: 4; .5 INJECTION, POWDER, LYOPHILIZED, FOR SOLUTION INTRAVENOUS; PARENTERAL at 10:08

## 2021-08-18 RX ADMIN — MORPHINE SULFATE 8 MG: 4 INJECTION INTRAVENOUS at 08:08

## 2021-08-18 RX ADMIN — SODIUM CHLORIDE, SODIUM LACTATE, POTASSIUM CHLORIDE, AND CALCIUM CHLORIDE: .6; .31; .03; .02 INJECTION, SOLUTION INTRAVENOUS at 11:08

## 2021-08-18 RX ADMIN — ONDANSETRON 4 MG: 2 INJECTION INTRAMUSCULAR; INTRAVENOUS at 11:08

## 2021-08-18 RX ADMIN — SODIUM CHLORIDE 1000 ML: 0.9 INJECTION, SOLUTION INTRAVENOUS at 10:08

## 2021-08-19 VITALS
DIASTOLIC BLOOD PRESSURE: 71 MMHG | BODY MASS INDEX: 45.99 KG/M2 | HEART RATE: 79 BPM | WEIGHT: 293 LBS | RESPIRATION RATE: 17 BRPM | HEIGHT: 67 IN | TEMPERATURE: 98 F | OXYGEN SATURATION: 97 % | SYSTOLIC BLOOD PRESSURE: 120 MMHG

## 2021-08-19 PROBLEM — K35.80 ACUTE APPENDICITIS: Status: RESOLVED | Noted: 2021-08-18 | Resolved: 2021-08-19

## 2021-08-19 LAB
ALBUMIN SERPL BCP-MCNC: 3.5 G/DL (ref 3.5–5.2)
ALP SERPL-CCNC: 108 U/L (ref 55–135)
ALT SERPL W/O P-5'-P-CCNC: 70 U/L (ref 10–44)
ANION GAP SERPL CALC-SCNC: 11 MMOL/L (ref 8–16)
AST SERPL-CCNC: 45 U/L (ref 10–40)
BASOPHILS # BLD AUTO: 0.09 K/UL (ref 0–0.2)
BASOPHILS NFR BLD: 0.5 % (ref 0–1.9)
BILIRUB SERPL-MCNC: 0.6 MG/DL (ref 0.1–1)
BUN SERPL-MCNC: 9 MG/DL (ref 6–20)
CALCIUM SERPL-MCNC: 8.4 MG/DL (ref 8.7–10.5)
CHLORIDE SERPL-SCNC: 102 MMOL/L (ref 95–110)
CO2 SERPL-SCNC: 27 MMOL/L (ref 23–29)
CREAT SERPL-MCNC: 0.6 MG/DL (ref 0.5–1.4)
DIFFERENTIAL METHOD: ABNORMAL
EOSINOPHIL # BLD AUTO: 0.1 K/UL (ref 0–0.5)
EOSINOPHIL NFR BLD: 0.4 % (ref 0–8)
ERYTHROCYTE [DISTWIDTH] IN BLOOD BY AUTOMATED COUNT: 14.8 % (ref 11.5–14.5)
EST. GFR  (AFRICAN AMERICAN): >60 ML/MIN/1.73 M^2
EST. GFR  (NON AFRICAN AMERICAN): >60 ML/MIN/1.73 M^2
GLUCOSE SERPL-MCNC: 108 MG/DL (ref 70–110)
HCT VFR BLD AUTO: 42.7 % (ref 37–48.5)
HGB BLD-MCNC: 13.4 G/DL (ref 12–16)
IMM GRANULOCYTES # BLD AUTO: 0.08 K/UL (ref 0–0.04)
IMM GRANULOCYTES NFR BLD AUTO: 0.4 % (ref 0–0.5)
LYMPHOCYTES # BLD AUTO: 4.1 K/UL (ref 1–4.8)
LYMPHOCYTES NFR BLD: 21.8 % (ref 18–48)
MCH RBC QN AUTO: 26.8 PG (ref 27–31)
MCHC RBC AUTO-ENTMCNC: 31.4 G/DL (ref 32–36)
MCV RBC AUTO: 85 FL (ref 82–98)
MONOCYTES # BLD AUTO: 0.9 K/UL (ref 0.3–1)
MONOCYTES NFR BLD: 4.8 % (ref 4–15)
NEUTROPHILS # BLD AUTO: 13.4 K/UL (ref 1.8–7.7)
NEUTROPHILS NFR BLD: 72.1 % (ref 38–73)
NRBC BLD-RTO: 0 /100 WBC
PLATELET # BLD AUTO: 376 K/UL (ref 150–450)
PMV BLD AUTO: 9 FL (ref 9.2–12.9)
POTASSIUM SERPL-SCNC: 4.1 MMOL/L (ref 3.5–5.1)
PROT SERPL-MCNC: 7.2 G/DL (ref 6–8.4)
RBC # BLD AUTO: 5 M/UL (ref 4–5.4)
SODIUM SERPL-SCNC: 140 MMOL/L (ref 136–145)
WBC # BLD AUTO: 18.57 K/UL (ref 3.9–12.7)

## 2021-08-19 PROCEDURE — 63600175 PHARM REV CODE 636 W HCPCS: Performed by: NURSE ANESTHETIST, CERTIFIED REGISTERED

## 2021-08-19 PROCEDURE — 25000003 PHARM REV CODE 250: Performed by: NURSE ANESTHETIST, CERTIFIED REGISTERED

## 2021-08-19 PROCEDURE — 25000003 PHARM REV CODE 250: Performed by: SURGERY

## 2021-08-19 PROCEDURE — 00840 ANES IPER PX LOWER ABD NOS: CPT | Performed by: SURGERY

## 2021-08-19 PROCEDURE — 36415 COLL VENOUS BLD VENIPUNCTURE: CPT | Performed by: NURSE PRACTITIONER

## 2021-08-19 PROCEDURE — 27000673 HC TUBING BLOOD Y: Performed by: ANESTHESIOLOGY

## 2021-08-19 PROCEDURE — 27000284 HC CANNULA NASAL: Performed by: ANESTHESIOLOGY

## 2021-08-19 PROCEDURE — 25000003 PHARM REV CODE 250: Performed by: NURSE PRACTITIONER

## 2021-08-19 PROCEDURE — 36000709 HC OR TIME LEV III EA ADD 15 MIN: Performed by: SURGERY

## 2021-08-19 PROCEDURE — 63600175 PHARM REV CODE 636 W HCPCS: Performed by: INTERNAL MEDICINE

## 2021-08-19 PROCEDURE — 99220 PR INITIAL OBSERVATION CARE,LEVL III: ICD-10-PCS | Mod: ,,, | Performed by: SURGERY

## 2021-08-19 PROCEDURE — 94761 N-INVAS EAR/PLS OXIMETRY MLT: CPT | Mod: 59

## 2021-08-19 PROCEDURE — 63600175 PHARM REV CODE 636 W HCPCS: Performed by: NURSE PRACTITIONER

## 2021-08-19 PROCEDURE — 27202107 HC XP QUATRO SENSOR: Performed by: ANESTHESIOLOGY

## 2021-08-19 PROCEDURE — 27000671 HC TUBING MICROBORE EXT: Performed by: ANESTHESIOLOGY

## 2021-08-19 PROCEDURE — G0378 HOSPITAL OBSERVATION PER HR: HCPCS

## 2021-08-19 PROCEDURE — 71000033 HC RECOVERY, INTIAL HOUR: Performed by: SURGERY

## 2021-08-19 PROCEDURE — 99220 PR INITIAL OBSERVATION CARE,LEVL III: CPT | Mod: ,,, | Performed by: SURGERY

## 2021-08-19 PROCEDURE — 99900035 HC TECH TIME PER 15 MIN (STAT)

## 2021-08-19 PROCEDURE — 63600175 PHARM REV CODE 636 W HCPCS: Performed by: ANESTHESIOLOGY

## 2021-08-19 PROCEDURE — 25000003 PHARM REV CODE 250: Performed by: INTERNAL MEDICINE

## 2021-08-19 PROCEDURE — 27201107 HC STYLET, STANDARD: Performed by: ANESTHESIOLOGY

## 2021-08-19 PROCEDURE — 37000008 HC ANESTHESIA 1ST 15 MINUTES: Performed by: SURGERY

## 2021-08-19 PROCEDURE — 37000009 HC ANESTHESIA EA ADD 15 MINS: Performed by: SURGERY

## 2021-08-19 PROCEDURE — 96376 TX/PRO/DX INJ SAME DRUG ADON: CPT

## 2021-08-19 PROCEDURE — 80053 COMPREHEN METABOLIC PANEL: CPT | Performed by: NURSE PRACTITIONER

## 2021-08-19 PROCEDURE — 27201423 OPTIME MED/SURG SUP & DEVICES STERILE SUPPLY: Performed by: SURGERY

## 2021-08-19 PROCEDURE — 36000708 HC OR TIME LEV III 1ST 15 MIN: Performed by: SURGERY

## 2021-08-19 PROCEDURE — 63600175 PHARM REV CODE 636 W HCPCS: Performed by: SURGERY

## 2021-08-19 PROCEDURE — 85025 COMPLETE CBC W/AUTO DIFF WBC: CPT | Performed by: NURSE PRACTITIONER

## 2021-08-19 RX ORDER — IBUPROFEN 200 MG
600 TABLET ORAL EVERY 6 HOURS PRN
Status: CANCELLED | OUTPATIENT
Start: 2021-08-19

## 2021-08-19 RX ORDER — MEPERIDINE HYDROCHLORIDE 50 MG/ML
12.5 INJECTION INTRAMUSCULAR; INTRAVENOUS; SUBCUTANEOUS EVERY 10 MIN PRN
Status: DISCONTINUED | OUTPATIENT
Start: 2021-08-19 | End: 2021-08-19

## 2021-08-19 RX ORDER — HYDROMORPHONE HYDROCHLORIDE 1 MG/ML
1 INJECTION, SOLUTION INTRAMUSCULAR; INTRAVENOUS; SUBCUTANEOUS EVERY 4 HOURS PRN
Status: CANCELLED | OUTPATIENT
Start: 2021-08-19

## 2021-08-19 RX ORDER — HYDROMORPHONE HYDROCHLORIDE 1 MG/ML
0.5 INJECTION, SOLUTION INTRAMUSCULAR; INTRAVENOUS; SUBCUTANEOUS EVERY 6 HOURS PRN
Status: DISCONTINUED | OUTPATIENT
Start: 2021-08-19 | End: 2021-08-19

## 2021-08-19 RX ORDER — DIPHENHYDRAMINE HYDROCHLORIDE 50 MG/ML
INJECTION INTRAMUSCULAR; INTRAVENOUS
Status: DISCONTINUED | OUTPATIENT
Start: 2021-08-19 | End: 2021-08-19

## 2021-08-19 RX ORDER — ONDANSETRON 2 MG/ML
4 INJECTION INTRAMUSCULAR; INTRAVENOUS EVERY 12 HOURS PRN
Status: CANCELLED | OUTPATIENT
Start: 2021-08-19

## 2021-08-19 RX ORDER — FENTANYL CITRATE 50 UG/ML
INJECTION, SOLUTION INTRAMUSCULAR; INTRAVENOUS
Status: DISCONTINUED | OUTPATIENT
Start: 2021-08-19 | End: 2021-08-19

## 2021-08-19 RX ORDER — HYDROCODONE BITARTRATE AND ACETAMINOPHEN 5; 325 MG/1; MG/1
1 TABLET ORAL EVERY 4 HOURS PRN
Status: CANCELLED | OUTPATIENT
Start: 2021-08-19

## 2021-08-19 RX ORDER — MIDAZOLAM HYDROCHLORIDE 1 MG/ML
INJECTION INTRAMUSCULAR; INTRAVENOUS
Status: DISCONTINUED | OUTPATIENT
Start: 2021-08-19 | End: 2021-08-19

## 2021-08-19 RX ORDER — OXYCODONE AND ACETAMINOPHEN 7.5; 325 MG/1; MG/1
1 TABLET ORAL EVERY 6 HOURS PRN
Qty: 28 TABLET | Refills: 0 | Status: SHIPPED | OUTPATIENT
Start: 2021-08-19 | End: 2022-03-25

## 2021-08-19 RX ORDER — ONDANSETRON 2 MG/ML
INJECTION INTRAMUSCULAR; INTRAVENOUS
Status: DISCONTINUED | OUTPATIENT
Start: 2021-08-19 | End: 2021-08-19

## 2021-08-19 RX ORDER — LIDOCAINE HYDROCHLORIDE 20 MG/ML
JELLY TOPICAL
Status: DISCONTINUED | OUTPATIENT
Start: 2021-08-19 | End: 2021-08-19

## 2021-08-19 RX ORDER — HYDROMORPHONE HYDROCHLORIDE 1 MG/ML
1 INJECTION, SOLUTION INTRAMUSCULAR; INTRAVENOUS; SUBCUTANEOUS EVERY 6 HOURS PRN
Status: DISCONTINUED | OUTPATIENT
Start: 2021-08-19 | End: 2021-08-19

## 2021-08-19 RX ORDER — SODIUM CHLORIDE 9 MG/ML
INJECTION, SOLUTION INTRAVENOUS CONTINUOUS
Status: CANCELLED | OUTPATIENT
Start: 2021-08-19

## 2021-08-19 RX ORDER — ROCURONIUM BROMIDE 10 MG/ML
INJECTION, SOLUTION INTRAVENOUS
Status: DISCONTINUED | OUTPATIENT
Start: 2021-08-19 | End: 2021-08-19

## 2021-08-19 RX ORDER — PROPOFOL 10 MG/ML
VIAL (ML) INTRAVENOUS
Status: DISCONTINUED | OUTPATIENT
Start: 2021-08-19 | End: 2021-08-19

## 2021-08-19 RX ORDER — ONDANSETRON 2 MG/ML
4 INJECTION INTRAMUSCULAR; INTRAVENOUS DAILY PRN
Status: DISCONTINUED | OUTPATIENT
Start: 2021-08-19 | End: 2021-08-19

## 2021-08-19 RX ORDER — OXYCODONE AND ACETAMINOPHEN 5; 325 MG/1; MG/1
1 TABLET ORAL EVERY 4 HOURS PRN
Status: DISCONTINUED | OUTPATIENT
Start: 2021-08-19 | End: 2021-08-19 | Stop reason: HOSPADM

## 2021-08-19 RX ORDER — MUPIROCIN 20 MG/G
1 OINTMENT TOPICAL 2 TIMES DAILY
Status: CANCELLED | OUTPATIENT
Start: 2021-08-19 | End: 2021-08-24

## 2021-08-19 RX ORDER — DIPHENHYDRAMINE HYDROCHLORIDE 50 MG/ML
12.5 INJECTION INTRAMUSCULAR; INTRAVENOUS
Status: DISCONTINUED | OUTPATIENT
Start: 2021-08-19 | End: 2021-08-19

## 2021-08-19 RX ORDER — SODIUM CHLORIDE, SODIUM LACTATE, POTASSIUM CHLORIDE, CALCIUM CHLORIDE 600; 310; 30; 20 MG/100ML; MG/100ML; MG/100ML; MG/100ML
INJECTION, SOLUTION INTRAVENOUS CONTINUOUS PRN
Status: DISCONTINUED | OUTPATIENT
Start: 2021-08-19 | End: 2021-08-19

## 2021-08-19 RX ORDER — ONDANSETRON 4 MG/1
4 TABLET, FILM COATED ORAL EVERY 8 HOURS PRN
Qty: 30 TABLET | Refills: 1 | Status: SHIPPED | OUTPATIENT
Start: 2021-08-19 | End: 2022-03-25

## 2021-08-19 RX ORDER — ONDANSETRON 2 MG/ML
4 INJECTION INTRAMUSCULAR; INTRAVENOUS EVERY 6 HOURS PRN
Status: DISCONTINUED | OUTPATIENT
Start: 2021-08-19 | End: 2021-08-19 | Stop reason: HOSPADM

## 2021-08-19 RX ORDER — HYDROMORPHONE HYDROCHLORIDE 1 MG/ML
0.2 INJECTION, SOLUTION INTRAMUSCULAR; INTRAVENOUS; SUBCUTANEOUS
Status: COMPLETED | OUTPATIENT
Start: 2021-08-19 | End: 2021-08-19

## 2021-08-19 RX ORDER — SODIUM CHLORIDE 0.9 % (FLUSH) 0.9 %
10 SYRINGE (ML) INJECTION
Status: DISCONTINUED | OUTPATIENT
Start: 2021-08-19 | End: 2021-08-19 | Stop reason: HOSPADM

## 2021-08-19 RX ORDER — SUCCINYLCHOLINE CHLORIDE 20 MG/ML
INJECTION INTRAMUSCULAR; INTRAVENOUS
Status: DISCONTINUED | OUTPATIENT
Start: 2021-08-19 | End: 2021-08-19

## 2021-08-19 RX ORDER — LIDOCAINE HYDROCHLORIDE 20 MG/ML
INJECTION, SOLUTION EPIDURAL; INFILTRATION; INTRACAUDAL; PERINEURAL
Status: DISCONTINUED | OUTPATIENT
Start: 2021-08-19 | End: 2021-08-19

## 2021-08-19 RX ORDER — ACETAMINOPHEN 10 MG/ML
INJECTION, SOLUTION INTRAVENOUS
Status: DISCONTINUED | OUTPATIENT
Start: 2021-08-19 | End: 2021-08-19

## 2021-08-19 RX ORDER — OXYCODONE HYDROCHLORIDE 5 MG/1
5 TABLET ORAL
Status: DISCONTINUED | OUTPATIENT
Start: 2021-08-19 | End: 2021-08-19

## 2021-08-19 RX ADMIN — SERTRALINE HYDROCHLORIDE 100 MG: 50 TABLET ORAL at 08:08

## 2021-08-19 RX ADMIN — SODIUM CHLORIDE, SODIUM LACTATE, POTASSIUM CHLORIDE, AND CALCIUM CHLORIDE: .6; .31; .03; .02 INJECTION, SOLUTION INTRAVENOUS at 12:08

## 2021-08-19 RX ADMIN — HYDROMORPHONE HYDROCHLORIDE 0.2 MG: 1 INJECTION, SOLUTION INTRAMUSCULAR; INTRAVENOUS; SUBCUTANEOUS at 01:08

## 2021-08-19 RX ADMIN — ONDANSETRON 4 MG: 2 INJECTION INTRAMUSCULAR; INTRAVENOUS at 12:08

## 2021-08-19 RX ADMIN — HYDROMORPHONE HYDROCHLORIDE 0.5 MG: 1 INJECTION, SOLUTION INTRAMUSCULAR; INTRAVENOUS; SUBCUTANEOUS at 06:08

## 2021-08-19 RX ADMIN — ONDANSETRON 4 MG: 2 INJECTION INTRAMUSCULAR; INTRAVENOUS at 05:08

## 2021-08-19 RX ADMIN — PROPOFOL 150 MG: 10 INJECTION, EMULSION INTRAVENOUS at 12:08

## 2021-08-19 RX ADMIN — FENTANYL CITRATE 100 MCG: 50 INJECTION INTRAMUSCULAR; INTRAVENOUS at 12:08

## 2021-08-19 RX ADMIN — SUGAMMADEX 200 MG: 100 INJECTION, SOLUTION INTRAVENOUS at 12:08

## 2021-08-19 RX ADMIN — ROCURONIUM BROMIDE 20 MG: 10 INJECTION, SOLUTION INTRAVENOUS at 12:08

## 2021-08-19 RX ADMIN — LIDOCAINE HYDROCHLORIDE 4 ML: 20 JELLY TOPICAL at 12:08

## 2021-08-19 RX ADMIN — MIDAZOLAM HYDROCHLORIDE 2 MG: 1 INJECTION, SOLUTION INTRAMUSCULAR; INTRAVENOUS at 12:08

## 2021-08-19 RX ADMIN — OXYCODONE AND ACETAMINOPHEN 1 TABLET: 5; 325 TABLET ORAL at 08:08

## 2021-08-19 RX ADMIN — LIDOCAINE HYDROCHLORIDE 60 MG: 20 INJECTION, SOLUTION EPIDURAL; INFILTRATION; INTRACAUDAL; PERINEURAL at 12:08

## 2021-08-19 RX ADMIN — PIPERACILLIN AND TAZOBACTAM 3.38 G: 3; .375 INJECTION, POWDER, LYOPHILIZED, FOR SOLUTION INTRAVENOUS; PARENTERAL at 08:08

## 2021-08-19 RX ADMIN — SUCCINYLCHOLINE CHLORIDE 120 MG: 20 INJECTION, SOLUTION INTRAMUSCULAR; INTRAVENOUS at 12:08

## 2021-08-19 RX ADMIN — METOPROLOL SUCCINATE 50 MG: 25 TABLET, EXTENDED RELEASE ORAL at 09:08

## 2021-08-19 RX ADMIN — ACETAMINOPHEN 1000 MG: 10 INJECTION, SOLUTION INTRAVENOUS at 12:08

## 2021-08-19 RX ADMIN — HYDROMORPHONE HYDROCHLORIDE 1 MG: 1 INJECTION, SOLUTION INTRAMUSCULAR; INTRAVENOUS; SUBCUTANEOUS at 04:08

## 2021-08-19 RX ADMIN — DIPHENHYDRAMINE HYDROCHLORIDE 12.5 MG: 50 INJECTION, SOLUTION INTRAMUSCULAR; INTRAVENOUS at 12:08

## 2021-08-20 ENCOUNTER — TELEPHONE (OUTPATIENT)
Dept: SURGERY | Facility: CLINIC | Age: 31
End: 2021-08-20

## 2021-08-20 LAB
BACTERIA UR CULT: NORMAL
BACTERIA UR CULT: NORMAL

## 2021-08-26 ENCOUNTER — PATIENT MESSAGE (OUTPATIENT)
Dept: SURGERY | Facility: CLINIC | Age: 31
End: 2021-08-26

## 2021-09-20 ENCOUNTER — OFFICE VISIT (OUTPATIENT)
Dept: PODIATRY | Facility: CLINIC | Age: 31
End: 2021-09-20
Payer: MEDICAID

## 2021-09-20 VITALS — BODY MASS INDEX: 45.99 KG/M2 | HEIGHT: 67 IN | WEIGHT: 293 LBS

## 2021-09-20 DIAGNOSIS — S92.134K: Primary | ICD-10-CM

## 2021-09-20 DIAGNOSIS — M89.9 OSTEOCHONDRAL LESION: ICD-10-CM

## 2021-09-20 DIAGNOSIS — M94.9 OSTEOCHONDRAL LESION: ICD-10-CM

## 2021-09-20 DIAGNOSIS — G89.29 CHRONIC PAIN OF RIGHT ANKLE: ICD-10-CM

## 2021-09-20 DIAGNOSIS — M25.371 ANKLE INSTABILITY, RIGHT: ICD-10-CM

## 2021-09-20 DIAGNOSIS — M25.571 CHRONIC PAIN OF RIGHT ANKLE: ICD-10-CM

## 2021-09-20 PROCEDURE — 99213 PR OFFICE/OUTPT VISIT, EST, LEVL III, 20-29 MIN: ICD-10-PCS | Mod: 24,S$GLB,, | Performed by: PODIATRIST

## 2021-09-20 PROCEDURE — 99213 OFFICE O/P EST LOW 20 MIN: CPT | Mod: 24,S$GLB,, | Performed by: PODIATRIST

## 2021-09-20 RX ORDER — ALBUTEROL SULFATE 90 UG/1
1 AEROSOL, METERED RESPIRATORY (INHALATION) EVERY 6 HOURS PRN
COMMUNITY
Start: 2016-02-13 | End: 2022-03-25

## 2021-09-27 ENCOUNTER — CLINICAL SUPPORT (OUTPATIENT)
Dept: REHABILITATION | Facility: HOSPITAL | Age: 31
End: 2021-09-27
Attending: PODIATRIST
Payer: MEDICAID

## 2021-09-27 DIAGNOSIS — R29.898 IMPAIRED FLEXIBILITY OF LOWER EXTREMITY: ICD-10-CM

## 2021-09-27 DIAGNOSIS — S92.134K: ICD-10-CM

## 2021-09-27 DIAGNOSIS — M25.371 ANKLE INSTABILITY, RIGHT: Primary | ICD-10-CM

## 2021-09-27 DIAGNOSIS — R29.898 WEAKNESS OF FOOT, RIGHT: ICD-10-CM

## 2021-09-27 DIAGNOSIS — R26.9 GAIT ABNORMALITY: ICD-10-CM

## 2021-09-27 PROCEDURE — 97161 PT EVAL LOW COMPLEX 20 MIN: CPT | Performed by: PHYSICAL THERAPIST

## 2021-09-27 PROCEDURE — 97110 THERAPEUTIC EXERCISES: CPT | Performed by: PHYSICAL THERAPIST

## 2021-10-01 ENCOUNTER — HOSPITAL ENCOUNTER (EMERGENCY)
Facility: HOSPITAL | Age: 31
Discharge: HOME | End: 2021-10-01
Attending: EMERGENCY MEDICINE
Payer: MEDICAID

## 2021-10-01 VITALS
RESPIRATION RATE: 20 BRPM | HEART RATE: 94 BPM | DIASTOLIC BLOOD PRESSURE: 103 MMHG | TEMPERATURE: 98 F | WEIGHT: 293 LBS | SYSTOLIC BLOOD PRESSURE: 136 MMHG | HEIGHT: 68 IN | OXYGEN SATURATION: 96 % | BODY MASS INDEX: 44.41 KG/M2

## 2021-10-01 DIAGNOSIS — M25.519 SHOULDER PAIN: ICD-10-CM

## 2021-10-01 DIAGNOSIS — S46.911A STRAIN OF RIGHT SHOULDER, INITIAL ENCOUNTER: Primary | ICD-10-CM

## 2021-10-01 PROCEDURE — 25000003 PHARM REV CODE 250: Performed by: EMERGENCY MEDICINE

## 2021-10-01 PROCEDURE — 99284 EMERGENCY DEPT VISIT MOD MDM: CPT

## 2021-10-01 PROCEDURE — 63600175 PHARM REV CODE 636 W HCPCS: Performed by: EMERGENCY MEDICINE

## 2021-10-01 PROCEDURE — 96372 THER/PROPH/DIAG INJ SC/IM: CPT

## 2021-10-01 RX ORDER — HYDROCODONE BITARTRATE AND ACETAMINOPHEN 5; 325 MG/1; MG/1
1 TABLET ORAL
Status: COMPLETED | OUTPATIENT
Start: 2021-10-01 | End: 2021-10-01

## 2021-10-01 RX ORDER — NAPROXEN 500 MG/1
500 TABLET ORAL 2 TIMES DAILY WITH MEALS
Qty: 30 TABLET | Refills: 0 | Status: SHIPPED | OUTPATIENT
Start: 2021-10-01 | End: 2022-03-25

## 2021-10-01 RX ORDER — DEXAMETHASONE SODIUM PHOSPHATE 4 MG/ML
8 INJECTION, SOLUTION INTRA-ARTICULAR; INTRALESIONAL; INTRAMUSCULAR; INTRAVENOUS; SOFT TISSUE
Status: COMPLETED | OUTPATIENT
Start: 2021-10-01 | End: 2021-10-01

## 2021-10-01 RX ADMIN — HYDROCODONE BITARTRATE AND ACETAMINOPHEN 1 TABLET: 5; 325 TABLET ORAL at 06:10

## 2021-10-01 RX ADMIN — DEXAMETHASONE SODIUM PHOSPHATE 8 MG: 4 INJECTION, SOLUTION INTRA-ARTICULAR; INTRALESIONAL; INTRAMUSCULAR; INTRAVENOUS; SOFT TISSUE at 06:10

## 2021-10-04 ENCOUNTER — CLINICAL SUPPORT (OUTPATIENT)
Dept: REHABILITATION | Facility: HOSPITAL | Age: 31
End: 2021-10-04
Payer: MEDICAID

## 2021-10-04 ENCOUNTER — DOCUMENTATION ONLY (OUTPATIENT)
Dept: REHABILITATION | Facility: HOSPITAL | Age: 31
End: 2021-10-04

## 2021-10-04 DIAGNOSIS — S92.134K: ICD-10-CM

## 2021-10-04 DIAGNOSIS — R29.898 WEAKNESS OF FOOT, RIGHT: ICD-10-CM

## 2021-10-04 DIAGNOSIS — R26.9 GAIT ABNORMALITY: ICD-10-CM

## 2021-10-04 DIAGNOSIS — R29.898 IMPAIRED FLEXIBILITY OF LOWER EXTREMITY: ICD-10-CM

## 2021-10-04 DIAGNOSIS — M25.371 ANKLE INSTABILITY, RIGHT: ICD-10-CM

## 2021-10-04 PROCEDURE — 97110 THERAPEUTIC EXERCISES: CPT | Mod: CQ

## 2021-10-14 ENCOUNTER — PATIENT MESSAGE (OUTPATIENT)
Dept: PODIATRY | Facility: CLINIC | Age: 31
End: 2021-10-14

## 2021-10-14 ENCOUNTER — TELEPHONE (OUTPATIENT)
Dept: PODIATRY | Facility: CLINIC | Age: 31
End: 2021-10-14

## 2021-10-26 ENCOUNTER — PATIENT MESSAGE (OUTPATIENT)
Dept: PODIATRY | Facility: CLINIC | Age: 31
End: 2021-10-26
Payer: MEDICAID

## 2021-10-28 ENCOUNTER — PATIENT MESSAGE (OUTPATIENT)
Dept: PODIATRY | Facility: CLINIC | Age: 31
End: 2021-10-28
Payer: MEDICAID

## 2021-11-22 ENCOUNTER — PATIENT MESSAGE (OUTPATIENT)
Dept: PODIATRY | Facility: CLINIC | Age: 31
End: 2021-11-22
Payer: MEDICAID

## 2021-11-22 ENCOUNTER — TELEPHONE (OUTPATIENT)
Dept: PODIATRY | Facility: CLINIC | Age: 31
End: 2021-11-22
Payer: MEDICAID

## 2021-11-26 ENCOUNTER — HOSPITAL ENCOUNTER (EMERGENCY)
Facility: HOSPITAL | Age: 31
Discharge: HOME | End: 2021-11-26
Attending: EMERGENCY MEDICINE
Payer: MEDICAID

## 2021-11-26 ENCOUNTER — TELEPHONE (OUTPATIENT)
Dept: PODIATRY | Facility: CLINIC | Age: 31
End: 2021-11-26
Payer: MEDICAID

## 2021-11-26 ENCOUNTER — PATIENT OUTREACH (OUTPATIENT)
Dept: EMERGENCY MEDICINE | Facility: HOSPITAL | Age: 31
End: 2021-11-26

## 2021-11-26 VITALS
OXYGEN SATURATION: 94 % | TEMPERATURE: 99 F | RESPIRATION RATE: 17 BRPM | WEIGHT: 293 LBS | DIASTOLIC BLOOD PRESSURE: 80 MMHG | SYSTOLIC BLOOD PRESSURE: 131 MMHG | HEART RATE: 71 BPM | BODY MASS INDEX: 44.41 KG/M2 | HEIGHT: 68 IN

## 2021-11-26 DIAGNOSIS — M79.89 PAIN AND SWELLING OF LOWER EXTREMITY: ICD-10-CM

## 2021-11-26 DIAGNOSIS — M25.571 ACUTE RIGHT ANKLE PAIN: Primary | ICD-10-CM

## 2021-11-26 DIAGNOSIS — M79.606 PAIN AND SWELLING OF LOWER EXTREMITY: ICD-10-CM

## 2021-11-26 LAB
ALBUMIN SERPL BCP-MCNC: 3.4 G/DL (ref 3.5–5.2)
ALP SERPL-CCNC: 108 U/L (ref 55–135)
ALT SERPL W/O P-5'-P-CCNC: 54 U/L (ref 10–44)
ANION GAP SERPL CALC-SCNC: 9 MMOL/L (ref 8–16)
AST SERPL-CCNC: 32 U/L (ref 10–40)
BASOPHILS # BLD AUTO: 0.06 K/UL (ref 0–0.2)
BASOPHILS NFR BLD: 0.4 % (ref 0–1.9)
BILIRUB SERPL-MCNC: 0.7 MG/DL (ref 0.1–1)
BUN SERPL-MCNC: 9 MG/DL (ref 6–20)
CALCIUM SERPL-MCNC: 8.7 MG/DL (ref 8.7–10.5)
CHLORIDE SERPL-SCNC: 105 MMOL/L (ref 95–110)
CO2 SERPL-SCNC: 22 MMOL/L (ref 23–29)
CREAT SERPL-MCNC: 0.7 MG/DL (ref 0.5–1.4)
DIFFERENTIAL METHOD: ABNORMAL
EOSINOPHIL # BLD AUTO: 0.2 K/UL (ref 0–0.5)
EOSINOPHIL NFR BLD: 1.1 % (ref 0–8)
ERYTHROCYTE [DISTWIDTH] IN BLOOD BY AUTOMATED COUNT: 15.1 % (ref 11.5–14.5)
ERYTHROCYTE [SEDIMENTATION RATE] IN BLOOD BY WESTERGREN METHOD: 48 MM/HR (ref 0–20)
EST. GFR  (AFRICAN AMERICAN): >60 ML/MIN/1.73 M^2
EST. GFR  (NON AFRICAN AMERICAN): >60 ML/MIN/1.73 M^2
GLUCOSE SERPL-MCNC: 124 MG/DL (ref 70–110)
HCT VFR BLD AUTO: 43.2 % (ref 37–48.5)
HGB BLD-MCNC: 13.9 G/DL (ref 12–16)
IMM GRANULOCYTES # BLD AUTO: 0.04 K/UL (ref 0–0.04)
IMM GRANULOCYTES NFR BLD AUTO: 0.3 % (ref 0–0.5)
LYMPHOCYTES # BLD AUTO: 4.6 K/UL (ref 1–4.8)
LYMPHOCYTES NFR BLD: 33.5 % (ref 18–48)
MCH RBC QN AUTO: 26 PG (ref 27–31)
MCHC RBC AUTO-ENTMCNC: 32.2 G/DL (ref 32–36)
MCV RBC AUTO: 81 FL (ref 82–98)
MONOCYTES # BLD AUTO: 0.9 K/UL (ref 0.3–1)
MONOCYTES NFR BLD: 6.7 % (ref 4–15)
NEUTROPHILS # BLD AUTO: 7.9 K/UL (ref 1.8–7.7)
NEUTROPHILS NFR BLD: 58 % (ref 38–73)
NRBC BLD-RTO: 0 /100 WBC
PLATELET # BLD AUTO: 389 K/UL (ref 150–450)
PMV BLD AUTO: 9.3 FL (ref 9.2–12.9)
POTASSIUM SERPL-SCNC: 4.2 MMOL/L (ref 3.5–5.1)
PROT SERPL-MCNC: 7.4 G/DL (ref 6–8.4)
RBC # BLD AUTO: 5.34 M/UL (ref 4–5.4)
SODIUM SERPL-SCNC: 136 MMOL/L (ref 136–145)
URATE SERPL-MCNC: 5.5 MG/DL (ref 2.4–5.7)
WBC # BLD AUTO: 13.63 K/UL (ref 3.9–12.7)

## 2021-11-26 PROCEDURE — 80053 COMPREHEN METABOLIC PANEL: CPT | Performed by: EMERGENCY MEDICINE

## 2021-11-26 PROCEDURE — 84550 ASSAY OF BLOOD/URIC ACID: CPT | Performed by: EMERGENCY MEDICINE

## 2021-11-26 PROCEDURE — 25000003 PHARM REV CODE 250: Performed by: EMERGENCY MEDICINE

## 2021-11-26 PROCEDURE — 85025 COMPLETE CBC W/AUTO DIFF WBC: CPT | Performed by: EMERGENCY MEDICINE

## 2021-11-26 PROCEDURE — 99285 EMERGENCY DEPT VISIT HI MDM: CPT | Mod: 25

## 2021-11-26 PROCEDURE — 85651 RBC SED RATE NONAUTOMATED: CPT | Performed by: EMERGENCY MEDICINE

## 2021-11-26 PROCEDURE — 25500020 PHARM REV CODE 255: Performed by: EMERGENCY MEDICINE

## 2021-11-26 RX ORDER — OXYCODONE AND ACETAMINOPHEN 5; 325 MG/1; MG/1
1 TABLET ORAL
Status: COMPLETED | OUTPATIENT
Start: 2021-11-26 | End: 2021-11-26

## 2021-11-26 RX ORDER — ACETAMINOPHEN 500 MG
1000 TABLET ORAL
COMMUNITY
End: 2022-04-26

## 2021-11-26 RX ORDER — DOXYCYCLINE 100 MG/1
100 CAPSULE ORAL 2 TIMES DAILY
Qty: 20 CAPSULE | Refills: 0 | Status: SHIPPED | OUTPATIENT
Start: 2021-11-26 | End: 2021-12-06

## 2021-11-26 RX ORDER — ONDANSETRON 4 MG/1
4 TABLET, ORALLY DISINTEGRATING ORAL
Status: COMPLETED | OUTPATIENT
Start: 2021-11-26 | End: 2021-11-26

## 2021-11-26 RX ORDER — DIPHENHYDRAMINE HCL 25 MG
25 CAPSULE ORAL
Status: COMPLETED | OUTPATIENT
Start: 2021-11-26 | End: 2021-11-26

## 2021-11-26 RX ORDER — DIPHENHYDRAMINE HCL 25 MG
50 CAPSULE ORAL ONCE AS NEEDED
COMMUNITY
End: 2022-03-25

## 2021-11-26 RX ORDER — HYDROCODONE BITARTRATE AND ACETAMINOPHEN 5; 325 MG/1; MG/1
1 TABLET ORAL EVERY 4 HOURS PRN
Qty: 10 TABLET | Refills: 0 | Status: SHIPPED | OUTPATIENT
Start: 2021-11-26 | End: 2022-03-25

## 2021-11-26 RX ADMIN — ONDANSETRON 4 MG: 4 TABLET, ORALLY DISINTEGRATING ORAL at 12:11

## 2021-11-26 RX ADMIN — DIPHENHYDRAMINE HYDROCHLORIDE 25 MG: 25 CAPSULE ORAL at 12:11

## 2021-11-26 RX ADMIN — OXYCODONE AND ACETAMINOPHEN 1 TABLET: 5; 325 TABLET ORAL at 10:11

## 2021-11-26 RX ADMIN — IOHEXOL 100 ML: 350 INJECTION, SOLUTION INTRAVENOUS at 01:11

## 2021-11-30 ENCOUNTER — OFFICE VISIT (OUTPATIENT)
Dept: PODIATRY | Facility: CLINIC | Age: 31
End: 2021-11-30
Payer: MEDICAID

## 2021-11-30 VITALS — WEIGHT: 293 LBS | HEIGHT: 68 IN | BODY MASS INDEX: 44.41 KG/M2

## 2021-11-30 DIAGNOSIS — R22.41 MASS OF RIGHT ANKLE: Primary | ICD-10-CM

## 2021-11-30 DIAGNOSIS — G89.29 CHRONIC PAIN OF RIGHT ANKLE: ICD-10-CM

## 2021-11-30 DIAGNOSIS — M25.571 CHRONIC PAIN OF RIGHT ANKLE: ICD-10-CM

## 2021-11-30 DIAGNOSIS — M25.371 ANKLE INSTABILITY, RIGHT: ICD-10-CM

## 2021-11-30 PROCEDURE — 99214 OFFICE O/P EST MOD 30 MIN: CPT | Mod: S$GLB,,, | Performed by: PODIATRIST

## 2021-11-30 PROCEDURE — 99214 PR OFFICE/OUTPT VISIT, EST, LEVL IV, 30-39 MIN: ICD-10-PCS | Mod: S$GLB,,, | Performed by: PODIATRIST

## 2021-11-30 RX ORDER — HYDROCODONE BITARTRATE AND ACETAMINOPHEN 5; 325 MG/1; MG/1
1 TABLET ORAL EVERY 4 HOURS PRN
Qty: 20 TABLET | Refills: 0 | Status: SHIPPED | OUTPATIENT
Start: 2021-11-30 | End: 2021-12-07

## 2021-12-11 ENCOUNTER — HOSPITAL ENCOUNTER (OUTPATIENT)
Dept: RADIOLOGY | Facility: HOSPITAL | Age: 31
Discharge: HOME | End: 2021-12-11
Attending: PODIATRIST
Payer: MEDICAID

## 2021-12-11 DIAGNOSIS — R22.41 MASS OF RIGHT ANKLE: ICD-10-CM

## 2021-12-11 PROCEDURE — 25500020 PHARM REV CODE 255: Performed by: PODIATRIST

## 2021-12-11 PROCEDURE — 73720 MRI LWR EXTREMITY W/O&W/DYE: CPT | Mod: TC,RT

## 2021-12-11 PROCEDURE — A9585 GADOBUTROL INJECTION: HCPCS | Performed by: PODIATRIST

## 2021-12-11 RX ORDER — GADOBUTROL 604.72 MG/ML
8 INJECTION INTRAVENOUS
Status: COMPLETED | OUTPATIENT
Start: 2021-12-11 | End: 2021-12-11

## 2021-12-11 RX ADMIN — GADOBUTROL 8 ML: 604.72 INJECTION INTRAVENOUS at 11:12

## 2021-12-13 ENCOUNTER — PATIENT MESSAGE (OUTPATIENT)
Dept: PODIATRY | Facility: CLINIC | Age: 31
End: 2021-12-13
Payer: MEDICAID

## 2021-12-14 ENCOUNTER — OFFICE VISIT (OUTPATIENT)
Dept: PODIATRY | Facility: CLINIC | Age: 31
End: 2021-12-14
Payer: MEDICAID

## 2021-12-14 VITALS — HEIGHT: 68 IN | WEIGHT: 293 LBS | BODY MASS INDEX: 44.41 KG/M2

## 2021-12-14 DIAGNOSIS — G89.29 CHRONIC PAIN OF RIGHT ANKLE: ICD-10-CM

## 2021-12-14 DIAGNOSIS — M94.9 OSTEOCHONDRAL LESION: Primary | ICD-10-CM

## 2021-12-14 DIAGNOSIS — M89.9 OSTEOCHONDRAL LESION: Primary | ICD-10-CM

## 2021-12-14 DIAGNOSIS — M25.371 ANKLE INSTABILITY, RIGHT: ICD-10-CM

## 2021-12-14 DIAGNOSIS — M19.071 ARTHRITIS OF RIGHT ANKLE: ICD-10-CM

## 2021-12-14 DIAGNOSIS — M25.571 CHRONIC PAIN OF RIGHT ANKLE: ICD-10-CM

## 2021-12-14 PROCEDURE — 20605 DRAIN/INJ JOINT/BURSA W/O US: CPT | Mod: RT,S$GLB,, | Performed by: PODIATRIST

## 2021-12-14 PROCEDURE — 99213 PR OFFICE/OUTPT VISIT, EST, LEVL III, 20-29 MIN: ICD-10-PCS | Mod: 25,S$GLB,, | Performed by: PODIATRIST

## 2021-12-14 PROCEDURE — 99213 OFFICE O/P EST LOW 20 MIN: CPT | Mod: 25,S$GLB,, | Performed by: PODIATRIST

## 2021-12-14 PROCEDURE — 20605 PR DRAIN/INJECT INTERMEDIATE JOINT/BURSA: ICD-10-PCS | Mod: RT,S$GLB,, | Performed by: PODIATRIST

## 2021-12-14 RX ORDER — BUPIVACAINE HYDROCHLORIDE 5 MG/ML
1 INJECTION, SOLUTION PERINEURAL
Status: COMPLETED | OUTPATIENT
Start: 2021-12-14 | End: 2021-12-14

## 2021-12-14 RX ORDER — DEXAMETHASONE SODIUM PHOSPHATE 4 MG/ML
4 INJECTION, SOLUTION INTRA-ARTICULAR; INTRALESIONAL; INTRAMUSCULAR; INTRAVENOUS; SOFT TISSUE
Status: COMPLETED | OUTPATIENT
Start: 2021-12-14 | End: 2021-12-14

## 2021-12-14 RX ORDER — MELOXICAM 15 MG/1
15 TABLET ORAL DAILY
Qty: 30 TABLET | Refills: 1 | Status: SHIPPED | OUTPATIENT
Start: 2021-12-14 | End: 2022-04-26

## 2021-12-14 RX ADMIN — DEXAMETHASONE SODIUM PHOSPHATE 4 MG: 4 INJECTION, SOLUTION INTRA-ARTICULAR; INTRALESIONAL; INTRAMUSCULAR; INTRAVENOUS; SOFT TISSUE at 04:12

## 2021-12-14 RX ADMIN — BUPIVACAINE HYDROCHLORIDE 5 MG: 5 INJECTION, SOLUTION PERINEURAL at 04:12

## 2022-02-12 ENCOUNTER — HOSPITAL ENCOUNTER (EMERGENCY)
Facility: HOSPITAL | Age: 32
Discharge: HOME | End: 2022-02-12
Attending: EMERGENCY MEDICINE
Payer: MEDICAID

## 2022-02-12 VITALS
DIASTOLIC BLOOD PRESSURE: 76 MMHG | HEART RATE: 86 BPM | TEMPERATURE: 98 F | RESPIRATION RATE: 19 BRPM | BODY MASS INDEX: 44.41 KG/M2 | HEIGHT: 68 IN | SYSTOLIC BLOOD PRESSURE: 137 MMHG | OXYGEN SATURATION: 97 % | WEIGHT: 293 LBS

## 2022-02-12 DIAGNOSIS — R52 PAIN: ICD-10-CM

## 2022-02-12 DIAGNOSIS — M25.511 RIGHT SHOULDER PAIN, UNSPECIFIED CHRONICITY: Primary | ICD-10-CM

## 2022-02-12 DIAGNOSIS — M25.519 SHOULDER PAIN: ICD-10-CM

## 2022-02-12 PROCEDURE — 93005 ELECTROCARDIOGRAM TRACING: CPT | Performed by: SPECIALIST

## 2022-02-12 PROCEDURE — 93010 ELECTROCARDIOGRAM REPORT: CPT | Mod: ,,, | Performed by: SPECIALIST

## 2022-02-12 PROCEDURE — 63600175 PHARM REV CODE 636 W HCPCS: Performed by: NURSE PRACTITIONER

## 2022-02-12 PROCEDURE — 93010 EKG 12-LEAD: ICD-10-PCS | Mod: ,,, | Performed by: SPECIALIST

## 2022-02-12 PROCEDURE — 96372 THER/PROPH/DIAG INJ SC/IM: CPT | Mod: 59

## 2022-02-12 PROCEDURE — 99284 EMERGENCY DEPT VISIT MOD MDM: CPT

## 2022-02-12 RX ORDER — KETOROLAC TROMETHAMINE 30 MG/ML
30 INJECTION, SOLUTION INTRAMUSCULAR; INTRAVENOUS
Status: COMPLETED | OUTPATIENT
Start: 2022-02-12 | End: 2022-02-12

## 2022-02-12 RX ORDER — LIDOCAINE 50 MG/G
1 PATCH TOPICAL DAILY
Qty: 15 PATCH | Refills: 0 | Status: SHIPPED | OUTPATIENT
Start: 2022-02-12 | End: 2022-03-25

## 2022-02-12 RX ORDER — METHOCARBAMOL 500 MG/1
1000 TABLET, FILM COATED ORAL 3 TIMES DAILY
Qty: 30 TABLET | Refills: 0 | Status: SHIPPED | OUTPATIENT
Start: 2022-02-12 | End: 2022-02-17

## 2022-02-12 RX ORDER — ORPHENADRINE CITRATE 30 MG/ML
60 INJECTION INTRAMUSCULAR; INTRAVENOUS
Status: COMPLETED | OUTPATIENT
Start: 2022-02-12 | End: 2022-02-12

## 2022-02-12 RX ADMIN — KETOROLAC TROMETHAMINE 30 MG: 30 INJECTION, SOLUTION INTRAMUSCULAR at 06:02

## 2022-02-12 RX ADMIN — ORPHENADRINE CITRATE 60 MG: 30 INJECTION INTRAMUSCULAR; INTRAVENOUS at 06:02

## 2022-02-12 NOTE — Clinical Note
"Loretta Vasquez" Venu was seen and treated in our emergency department on 2/12/2022.  She may return to work on 02/15/2022.       If you have any questions or concerns, please don't hesitate to call.      Denise Vu NP"

## 2022-02-13 NOTE — ED PROVIDER NOTES
Encounter Date: 2/12/2022       History     Chief Complaint   Patient presents with    Shoulder Pain     R shoulder pain x 1 month, getting worse      31-year-old female presents to the ER today with complaints of intermittent right shoulder pain for the past 1 month.  She states about 1 month ago she noticed symptoms to begin.  She reports pain to her right lateral shoulder.  She states her pain worsens when she tries to ride her arm above her head when lifting certain objects.  She states her pain improves when she rest her arm above the level of her chest.  She reports no precipitating injury prior to onset of her symptoms.  She states she takes Mobic often for ongoing pain to her right ankle and has been taking medicine and reports the Mobic initially was improving her pain and actually completely resolved her pain for several weeks but her pain returned last week it has been ongoing ever since.  She reports same alleviating and exacerbating factors.  Again no injury.  No swelling no redness no fever no pain that radiates into her chest and she also denies shortness of breath or pain in her neck.  She also denies any weakness numbness or tingling to her affected right upper extremity.  No history of prior neck or shoulder surgeries or prior injuries.  She states she is here for further pain management as the Mobic is no longer effective at alleviating her symptoms at home.  She states she has seen Dr. Farias in the past regarding injuries to her right ankle.        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     Past Medical History:   Diagnosis Date    Anxiety     Back pain     Bipolar disorder 2004    bipolar 2    Depression     GERD (gastroesophageal reflux disease)     HPV (human papilloma virus) infection     Insomnia     Ovarian cyst      Past Surgical History:   Procedure Laterality Date    CHOLECYSTECTOMY    "   DILATION AND CURETTAGE OF UTERUS      HYSTERECTOMY  2017    total, ovarian cysts, torsion, [place]; hyst per Dr JESUS Manriquez    LAPAROSCOPIC APPENDECTOMY N/A 8/18/2021    Procedure: APPENDECTOMY, LAPAROSCOPIC;  Surgeon: Osiel Ramirez MD;  Location: [facility];  Service: General;  Laterality: N/A;    OOPHORECTOMY      opherectom Left 2015    salpingo-opherectomy    REPAIR OF LIGAMENT OF ANKLE Right 6/23/2021    Procedure: REPAIR OF ANTERIOR TALOFIBULAR LIGAMENT CALCANEOFIBULAR LIGAMENT;  Surgeon: Bimal Mccormick DPM;  Location: [facility] OR;  Service: Podiatry;  Laterality: Right;  WITH ARTHREX INTERNAL BRACE    SURGICAL REMOVAL OF BONE SPUR Right 6/23/2021    Procedure: EXCISION OS TRIGONUM;  Surgeon: Bimal Mccormick DPM;  Location: [facility];  Service: Podiatry;  Laterality: Right;     Family History   Adopted: Yes   Problem Relation Age of Onset    No Known Problems Sister     No Known Problems Sister     No Known Problems Sister      Social History     Tobacco Use    Smoking status: Current Every Day Smoker     Packs/day: 1.00     Years: 15.00     Pack years: 15.00     Types: Cigarettes    Smokeless tobacco: Never Used   Substance Use Topics    Alcohol use: Yes     Alcohol/week: 0.0 - 2.0 standard drinks     Comment: once a month    Drug use: Not Currently     Types: "Crack" cocaine     Comment: twice     Review of Systems   Constitutional: Negative for chills, diaphoresis, fatigue and fever.   HENT: Negative for congestion, postnasal drip, rhinorrhea, sinus pressure, sinus pain, sneezing, sore throat and trouble swallowing.    Eyes: Negative for photophobia and visual disturbance.   Respiratory: Negative for cough, choking, chest tightness, shortness of breath and wheezing.    Cardiovascular: Negative for chest pain, palpitations and leg swelling.   Gastrointestinal: Negative for abdominal pain, constipation, diarrhea, nausea and vomiting.   Endocrine: Negative for polydipsia, polyphagia and " polyuria.   Genitourinary: Negative for difficulty urinating, dysuria, flank pain, frequency, hematuria, pelvic pain and urgency.   Musculoskeletal: Positive for arthralgias and myalgias. Negative for back pain, gait problem, joint swelling, neck pain and neck stiffness.   Skin: Negative for color change, rash and wound.   Allergic/Immunologic: Negative for immunocompromised state.   Neurological: Negative for dizziness, tremors, seizures, syncope, weakness, light-headedness, numbness and headaches.   Hematological: Does not bruise/bleed easily.   Psychiatric/Behavioral: Negative for confusion.   All other systems reviewed and are negative.      Physical Exam     Initial Vitals [02/12/22 1659]   BP Pulse Resp Temp SpO2   (!) 180/111 99 18 98 °F (36.7 °C) 98 %      MAP       --         Physical Exam    Nursing note and vitals reviewed.  Constitutional: She appears well-developed and well-nourished. She is not diaphoretic. She is Obese . No distress.   HENT:   Head: Normocephalic and atraumatic.   Eyes: Conjunctivae are normal.   Neck: Neck supple.   Normal range of motion.  Cardiovascular: Normal rate.   Pulmonary/Chest: Breath sounds normal. No respiratory distress. She has no wheezes. She has no rhonchi. She has no rales.   Abdominal: She exhibits no distension.   Musculoskeletal:         General: Tenderness present.      Right shoulder: Tenderness and bony tenderness present. No swelling, effusion, laceration or crepitus. Decreased range of motion. Normal strength.      Left shoulder: Normal.      Right upper arm: Tenderness present.      Left upper arm: Normal.      Right elbow: Normal.      Left elbow: Normal.      Right forearm: Normal.      Left forearm: Normal.      Right wrist: Normal.      Left wrist: Normal.      Cervical back: Normal range of motion and neck supple. No rigidity. No spinous process tenderness or muscular tenderness. Normal range of motion.      Thoracic back: Normal.      Lumbar back:  Normal.      Right hip: Normal.      Left hip: Normal.      Right upper leg: Normal.      Left upper leg: Normal.      Right knee: Normal.      Left knee: Normal.      Right lower leg: Normal.      Left lower leg: Normal.      Right ankle: Normal.      Left ankle: Normal.      Right foot: Normal.      Left foot: Normal.      Comments: Pain limits ability to fully abduct arm and raise hand above head.     Reproducible pointing at the labrum insertion point     Lymphadenopathy:     She has no cervical adenopathy.   Neurological: She is alert and oriented to person, place, and time. She has normal strength.   Skin: Skin is warm and dry. Capillary refill takes less than 2 seconds. No rash noted. No erythema.   Psychiatric: Her mood appears anxious.         ED Course   Procedures  Labs Reviewed - No data to display       Imaging Results          X-Ray Shoulder Complete 2 View Right (Final result)  Result time 02/12/22 17:27:48    Final result by Kosta Damian MD (02/12/22 17:27:48)                 Narrative:    HISTORY: Right shoulder pain.    FINDINGS: 3 views of the right shoulder show no acute fracture, dislocation or osseous destructive lesion. The acromioclavicular and coracoclavicular relationships are normal, with normal bony mineralization.    IMPRESSION: Negative right shoulder radiographs.    Electronically signed by:  Kosta Damian MD  2/12/2022 5:27 PM CST Workstation: [id number]                               Medications   orphenadrine injection 60 mg (60 mg Intramuscular Given 2/12/22 1843)   ketorolac injection 30 mg (30 mg Intramuscular Given 2/12/22 1843)     Medical Decision Making:   Exam findings are consistent with a labrum or rotator cuff injury.  We will have patient continue taking her previously prescribed Mobic, also Lidoderm patches and Robaxin for pain management, also will refer patient to orthopedist Dr. Farias who she is a patient of already for further management of her right shoulder pain  possible need for outpatient MRI.  Instructed to use ice rotated with warm heating pad for pain management as well.  Rest and return to the ER for any new worsening symptoms.  She does not have any chest pain shortness of breath associated no symptoms or exam findings consistent with a cervical radiculopathy.  ER return precautions discussed she understands when return back to ER.                      Clinical Impression:   Final diagnoses:  [R52] Pain  [M25.519] Shoulder pain  [M25.511] Right shoulder pain, unspecified chronicity (Primary)          ED Disposition Condition    Discharge Stable        ED Prescriptions     Medication Sig Dispense Start Date End Date Auth. Provider    methocarbamoL (ROBAXIN) 500 MG Tab Take 2 tablets (1,000 mg total) by mouth 3 (three) times daily. for 5 days 30 tablet 2/12/2022 2/17/2022 Denise Vu NP    LIDOcaine (LIDODERM) 5 % Place 1 patch onto the skin once daily. Remove & Discard patch within 12 hours or as directed by MD 15 patch 2/12/2022  Denise Vu NP        Follow-up Information     Follow up With Specialties Details Why Contact Info Additional Information    Karthik Farias MD Orthopedic Surgery, Surgery, Sports Medicine Schedule an appointment as soon as possible for a visit in 3 days for ER visit follow up and re-evaluation [address] [facility]  [address]  [facility] [address]  [phone]       [facility] Emergency Medicine Go to  As needed, If symptoms worsen [address] [facility] [address]  [phone] 1st floor    Patricia Vallejo NP Family Medicine Schedule an appointment as soon as possible for a visit in 3 days for ER visit follow up and re-evaluation [address] [facility]  [address]  [facility] [address]  [phone]              Denise Vu NP  02/12/22 5412

## 2022-02-17 ENCOUNTER — OFFICE VISIT (OUTPATIENT)
Dept: ORTHOPEDICS | Facility: CLINIC | Age: 32
End: 2022-02-17
Payer: MEDICAID

## 2022-02-17 VITALS — BODY MASS INDEX: 44.41 KG/M2 | HEIGHT: 68 IN | WEIGHT: 293 LBS

## 2022-02-17 DIAGNOSIS — M75.41 SUBACROMIAL IMPINGEMENT, RIGHT: Primary | ICD-10-CM

## 2022-02-17 PROCEDURE — 20610 DRAIN/INJ JOINT/BURSA W/O US: CPT | Mod: RT,S$GLB,, | Performed by: ORTHOPAEDIC SURGERY

## 2022-02-17 PROCEDURE — 99213 OFFICE O/P EST LOW 20 MIN: CPT | Mod: 25,S$GLB,, | Performed by: ORTHOPAEDIC SURGERY

## 2022-02-17 PROCEDURE — 99213 PR OFFICE/OUTPT VISIT, EST, LEVL III, 20-29 MIN: ICD-10-PCS | Mod: 25,S$GLB,, | Performed by: ORTHOPAEDIC SURGERY

## 2022-02-17 PROCEDURE — 3008F BODY MASS INDEX DOCD: CPT | Mod: S$GLB,,, | Performed by: ORTHOPAEDIC SURGERY

## 2022-02-17 PROCEDURE — 1160F PR REVIEW ALL MEDS BY PRESCRIBER/CLIN PHARMACIST DOCUMENTED: ICD-10-PCS | Mod: S$GLB,,, | Performed by: ORTHOPAEDIC SURGERY

## 2022-02-17 PROCEDURE — 1160F RVW MEDS BY RX/DR IN RCRD: CPT | Mod: S$GLB,,, | Performed by: ORTHOPAEDIC SURGERY

## 2022-02-17 PROCEDURE — 3008F PR BODY MASS INDEX (BMI) DOCUMENTED: ICD-10-PCS | Mod: S$GLB,,, | Performed by: ORTHOPAEDIC SURGERY

## 2022-02-17 PROCEDURE — 20610 LARGE JOINT ASPIRATION/INJECTION: R SUBACROMIAL BURSA: ICD-10-PCS | Mod: RT,S$GLB,, | Performed by: ORTHOPAEDIC SURGERY

## 2022-02-17 RX ORDER — TRAMADOL HYDROCHLORIDE 50 MG/1
50 TABLET ORAL EVERY 6 HOURS PRN
Qty: 28 TABLET | Refills: 0 | Status: SHIPPED | OUTPATIENT
Start: 2022-02-17 | End: 2022-02-24

## 2022-02-17 RX ORDER — TRIAMCINOLONE ACETONIDE 40 MG/ML
40 INJECTION, SUSPENSION INTRA-ARTICULAR; INTRAMUSCULAR
Status: DISCONTINUED | OUTPATIENT
Start: 2022-02-17 | End: 2022-02-17 | Stop reason: HOSPADM

## 2022-02-17 RX ADMIN — TRIAMCINOLONE ACETONIDE 40 MG: 40 INJECTION, SUSPENSION INTRA-ARTICULAR; INTRAMUSCULAR at 07:02

## 2022-02-17 NOTE — PROGRESS NOTES
[facility]    Subjective:     Chief Complaint:   Chief Complaint   Patient presents with    Right Shoulder - Pain     Pt is here with complaints of Rt.Shoulder pain X 3 months. Has slowly gotten worse over the past 6 months, unable to sleep, ROM is limited.        Past Medical History:   Diagnosis Date    Anxiety     Back pain     Bipolar disorder 2004    bipolar 2    Depression     GERD (gastroesophageal reflux disease)     HPV (human papilloma virus) infection     Insomnia     Ovarian cyst        Past Surgical History:   Procedure Laterality Date    CHOLECYSTECTOMY      DILATION AND CURETTAGE OF UTERUS      HYSTERECTOMY  2017    total, ovarian cysts, torsion, [place]; hyst per Dr JESUS Manriquez    LAPAROSCOPIC APPENDECTOMY N/A 8/18/2021    Procedure: APPENDECTOMY, LAPAROSCOPIC;  Surgeon: Osiel Ramirez MD;  Location: [facility] OR;  Service: General;  Laterality: N/A;    OOPHORECTOMY      opherectom Left 2015    salpingo-opherectomy    REPAIR OF LIGAMENT OF ANKLE Right 6/23/2021    Procedure: REPAIR OF ANTERIOR TALOFIBULAR LIGAMENT CALCANEOFIBULAR LIGAMENT;  Surgeon: Bimal Mccormick DPM;  Location: [facility] OR;  Service: Podiatry;  Laterality: Right;  WITH ARTHREX INTERNAL BRACE    SURGICAL REMOVAL OF BONE SPUR Right 6/23/2021    Procedure: EXCISION OS TRIGONUM;  Surgeon: Bimal Mccormick DPM;  Location: [facility] OR;  Service: Podiatry;  Laterality: Right;       Current Outpatient Medications   Medication Sig    acetaminophen (TYLENOL) 500 MG tablet Take 1,000 mg by mouth as needed for Pain.    diphenhydrAMINE (BENADRYL) 25 mg capsule Take 50 mg by mouth once as needed for Itching.    LIDOcaine (LIDODERM) 5 % Place 1 patch onto the skin once daily. Remove & Discard patch within 12 hours or as directed by MD    meloxicam (MOBIC) 15 MG tablet Take 1 tablet (15 mg total) by mouth once daily.    methocarbamoL (ROBAXIN) 500 MG Tab Take 2 tablets (1,000 mg total) by mouth 3 (three) times daily.  for 5 days    QUEtiapine (SEROQUEL) 100 MG Tab Take 1 tablet (100 mg total) by mouth nightly.    sertraline (ZOLOFT) 100 MG tablet Take 1 tablet (100 mg total) by mouth once daily.    albuterol (PROVENTIL/VENTOLIN HFA) 90 mcg/actuation inhaler Inhale 1 puff into the lungs every 6 (six) hours as needed.    COVID-19 vacc,mRNA,Moderna,/PF (MODERNA COVID-19 VACCINE, EUA, IM)     HYDROcodone-acetaminophen (NORCO) 5-325 mg per tablet Take 1 tablet by mouth every 4 (four) hours as needed for Pain.    hydrocortisone 2.5 % cream Apply topically 2 (two) times daily.    ibuprofen (ADVIL,MOTRIN) 600 MG tablet Take 1 tablet (600 mg total) by mouth every 6 (six) hours as needed for Pain. (Patient not taking: Reported on 2/17/2022)    metoprolol succinate (TOPROL-XL) 50 MG 24 hr tablet Take 1 tablet (50 mg total) by mouth once daily. (Patient not taking: Reported on 2/17/2022)    naproxen (NAPROSYN) 500 MG tablet Take 1 tablet (500 mg total) by mouth 2 (two) times daily with meals.    ondansetron (ZOFRAN) 4 MG tablet Take 1 tablet (4 mg total) by mouth every 8 (eight) hours as needed for Nausea. (Patient not taking: Reported on 2/17/2022)    oxyCODONE-acetaminophen (PERCOCET) 7.5-325 mg per tablet Take 1 tablet by mouth every 6 (six) hours as needed for Pain.    promethazine (PHENERGAN) 25 MG tablet Take 1 tablet (25 mg total) by mouth every 4 (four) hours.     No current facility-administered medications for this visit.       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       Family History   Adopted: Yes   Problem Relation Age of Onset    No Known Problems Sister     No Known Problems Sister     No Known Problems Sister        Social History     Socioeconomic History    Marital status:     Number of children: 0   Tobacco Use    Smoking status: Current Every Day Smoker     Packs/day: 1.00     Years: 15.00     Pack years:  "15.00     Types: Cigarettes    Smokeless tobacco: Never Used   Substance and Sexual Activity    Alcohol use: Yes     Alcohol/week: 0.0 - 2.0 standard drinks     Comment: once a month    Drug use: Not Currently     Types: "Crack" cocaine     Comment: twice    Sexual activity: Yes     Partners: Male     Birth control/protection: OCP, None       History of present illness:  Patient comes in today for the right shoulder.  She is having significant right shoulder pain.  This been going on for at least 6 weeks.  She denies any trauma.  She denies any recent illnesses.  She has recently had foot surgery.      Review of Systems:    Constitution: Negative for chills, fever, and sweats.  Negative for unexplained weight loss.    HENT:  Negative for headaches and blurry vision.    Cardiovascular:Negative for chest pain or irregular heart beat. Negative for hypertension.    Respiratory:  Negative for cough and shortness of breath.    Gastrointestinal: Negative for abdominal pain, heartburn, melena, nausea, and vomitting.    Genitourinary:  Negative bladder incontinence and dysuria.    Musculoskeletal:  See HPI for details.     Neurological: Negative for numbness.    Psychiatric/Behavioral: Negative for depression.  The patient is not nervous/anxious.      Endocrine: Negative for polyuria    Hematologic/Lymphatic: Negative for bleeding problem.  Does not bruise/bleed easily.    Skin: Negative for poor would healing and rash    Objective:      Physical Examination:    Vital Signs:  There were no vitals filed for this visit.    Body mass index is 46.07 kg/m².    This a well-developed, well nourished patient in no acute distress.  They are alert and oriented and cooperative to examination.        Patient appears to be her stated age.  She has full range of motion right shoulder.  Very positive impingement.  The rotator cuff is profoundly tender and moderately weak.  She has intact against gravity and some downward force.  " Positive crossover.  Full range of motion of the left shoulder without difficulty.  Spurling sign is negative.  Neurovascular intact to sensory testing.  Pertinent New Results:    XRAY Report / Interpretation:   Outside films of the right shoulder demonstrate normal bony anatomy    Assessment/Plan:       acute subacromial bursitis.  I injected the subacromial space with Kenalog and lidocaine of started on physical therapy.  She is on Mobic.  I did provide her with some tramadol.  She will follow up in 6 weeks      This note was created using Dragon voice recognition software that occasionally misinterpreted phrases or words.

## 2022-02-17 NOTE — PROCEDURES
Large Joint Aspiration/Injection: R subacromial bursa    Date/Time: 2/17/2022 7:45 AM  Performed by: Karthik Farias MD  Authorized by: Karthik Farias MD     Consent Done?:  Yes (Verbal)  Indications:  Pain  Site marked: the procedure site was marked    Timeout: prior to procedure the correct patient, procedure, and site was verified    Prep: patient was prepped and draped in usual sterile fashion      Local anesthesia used?: Yes    Local anesthetic:  Lidocaine 1% without epinephrine  Ultrasonic Guidance for needle placement?: No    Location:  Shoulder  Site:  R subacromial bursa  Medications:  40 mg triamcinolone acetonide 40 mg/mL  Patient tolerance:  Patient tolerated the procedure well with no immediate complications

## 2022-02-18 ENCOUNTER — PATIENT MESSAGE (OUTPATIENT)
Dept: FAMILY MEDICINE | Facility: CLINIC | Age: 32
End: 2022-02-18
Payer: MEDICAID

## 2022-02-18 DIAGNOSIS — R53.83 FATIGUE, UNSPECIFIED TYPE: ICD-10-CM

## 2022-02-18 DIAGNOSIS — F31.60 BIPOLAR AFFECTIVE DISORDER, CURRENT EPISODE MIXED, CURRENT EPISODE SEVERITY UNSPECIFIED: ICD-10-CM

## 2022-02-18 RX ORDER — QUETIAPINE FUMARATE 100 MG/1
100 TABLET, FILM COATED ORAL NIGHTLY
Qty: 30 TABLET | Refills: 0 | Status: SHIPPED | OUTPATIENT
Start: 2022-02-18 | End: 2022-03-25 | Stop reason: SDUPTHER

## 2022-02-21 ENCOUNTER — CLINICAL SUPPORT (OUTPATIENT)
Dept: REHABILITATION | Facility: HOSPITAL | Age: 32
End: 2022-02-21
Attending: ORTHOPAEDIC SURGERY
Payer: MEDICAID

## 2022-02-21 DIAGNOSIS — R29.3 ABNORMAL POSTURE: ICD-10-CM

## 2022-02-21 DIAGNOSIS — M54.2 NECK PAIN: Primary | ICD-10-CM

## 2022-02-21 DIAGNOSIS — M25.611 DECREASED RIGHT SHOULDER RANGE OF MOTION: ICD-10-CM

## 2022-02-21 DIAGNOSIS — M75.41 SUBACROMIAL IMPINGEMENT, RIGHT: ICD-10-CM

## 2022-02-21 DIAGNOSIS — M89.9 SCAPULAR DYSFUNCTION: ICD-10-CM

## 2022-02-21 PROCEDURE — 97162 PT EVAL MOD COMPLEX 30 MIN: CPT | Mod: PN

## 2022-02-21 NOTE — PLAN OF CARE
[facility]   Physical Therapy Initial Evaluation     Date: 2/21/2022   Name: Loretta Lea  Clinic Number: [id number]    Therapy Diagnosis:   Encounter Diagnoses   Name Primary?    Subacromial impingement, right     Neck pain Yes    Abnormal posture     Scapular dysfunction     Decreased right shoulder range of motion      Physician: Karthik Farias MD    Physician Orders: PT Eval and Treat   Medical Diagnosis from Referral: Subacromial impingement, right [M75.41]  Evaluation Date: 2/21/2022  Authorization Period Expiration: 12/31/2022  Plan of Care Expiration: 4/18/2022  Progress Note Due: 3/21/2022  Visit # / Visits authorized: 1/1   FOTO Follow Up:   FOTO Follow UP:     Precautions: Standard    Time In: 0702  Time Out: 0750  Total Appointment Time (timed & untimed codes): 44 minutes      SUBJECTIVE   Date of onset: 6 months ago    History of current condition - Loretta reports: developing R shoulder pain 6 months ago with no traumatic cause. She states that she had a surgery on her R foot so she changed her sleeping position to on her stomach with her R arm overhead which she thinks may have contributed to her symptoms. She notes anterior and posterior R shoulder pain which can extend to her elbow. She also notes some numbness and tingling down her R arm to her middle 3 fingers at times. No prior history of neck or shoulder pathology. She used to be a competitive swimmer.     Falls: none    Imaging, x-ray: FINDINGS: 3 views of the right shoulder show no acute fracture, dislocation or osseous destructive lesion. The acromioclavicular and coracoclavicular relationships are normal, with normal bony mineralization.     IMPRESSION: Negative right shoulder radiographs.    Prior Therapy: none for her shoulder or neck  Social History:  lives with their spouse  Occupation: not working currently, enjoys crocheting   Prior Level of Function: no limitations in her ability to complete  reaching and lifting Activities of Daily Living   Current Level of Function: severe difficulty in her ability to reach overhead and lift objects     Pain:  Current 3/10, worst 8/10, best 3/10   Location: right shoulder  and arm    Description: Tingling and Sharp  Aggravating Factors: Reaching, lifting  Easing Factors: pain medication and rest    Patients goals: patient would like to get out of this pain so she can return to her normal daily activities.      Medical History:   Past Medical History:   Diagnosis Date    Anxiety     Back pain     Bipolar disorder 2004    bipolar 2    Depression     GERD (gastroesophageal reflux disease)     HPV (human papilloma virus) infection     Insomnia     Ovarian cyst        Surgical History:   Loretta Lea  has a past surgical history that includes Cholecystectomy; Dilation and curettage of uterus; Oophorectomy; opherectom (Left, 2015); Hysterectomy (2017); Repair of ligament of ankle (Right, 6/23/2021); Surgical removal of bone spur (Right, 6/23/2021); and Laparoscopic appendectomy (N/A, 8/18/2021).    Medications:   Loretta has a current medication list which includes the following prescription(s): acetaminophen, albuterol, covid-19 vacc,mrna(moderna)/pf, diphenhydramine, hydrocodone-acetaminophen, hydrocortisone, ibuprofen, lidocaine, meloxicam, metoprolol succinate, naproxen, ondansetron, oxycodone-acetaminophen, promethazine, quetiapine, sertraline, and tramadol.    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 Paired with extension   Left rotation 80      Scapula assistance test: correcting scapula position with slight upward rotation resolves symptoms with cervical mobility    Cervical Cluster:   R  L  Spurling's (+) (--)   Distraction (+) (--)   ULTT A (+) (--)   Ipsilateral Rotation <60 degrees (--) (--)       Dermatomes Right Left Comments   C1 Intact    Intact    C2 Intact Intact    C3 Intact Intact    C4 Intact Intact    C5 Intact Intact    C6 Impaired: light touch decreased  Intact    C7 Impaired: light touch decreased Intact    C8 Impaired: light touch decreased Intact    T1 Intact Intact        Myotomes RUE LUE Comments   C1 5/5    5/5       C2 5/5    5/5       C3 5/5    5/5       C4 5/5    5/5       C5 5/5    5/5       C6 5/5    5/5       C7 4/5    5/5       C8 5/5    5/5       T1 5/5 5/5      Reflexes Positive Right  Positive Left Comments   Triceps 1+ 2+    Biceps 2+ 2+    Brachioradialis 2+ 2+                Passive Range of Motion:   Shoulder Right Left   Flexion 130 180   Scaption 130 180   ER at 0 45 60   ER at 90 70 90   IR 60 80      Active Range of Motion:   Shoulder Right Left   Flexion 90 180   Scaption 95 180   ER at 90 60 90   IR 55 80     Pain noted with all shoulder range of motion assessments on R    Strength:  Shoulder Right Left   Flexion 3-/5 5/5   Scaption 3-/5 5/5   ER 3-/5 4+/5   IR 3-/5 5/5     Scapular muscle strength assessment not completed due to pain     Special Tests:   Right Left   Hawkin's Kenndy (+) (--)       Joint Mobility: Decreased thoracic and cervicothoracic junction mobility with posterior to anterior glides, decreased cervical closing at C4-C6 on R    Palpation: generalized tenderness throughout shoulder girdle and R sided cervical spine         Limitation/Restriction for FOTO Shoulder Survey    Therapist reviewed FOTO scores for Loretta Lea on 2/21/2022.   FOTO documents entered into [employer] - see Media section.    Limitation Score:  57%         TREATMENT     Total Treatment time (time-based codes) separate from Evaluation: 5 minutes      Loretta received the treatments listed below:      therapeutic exercises to develop strength, endurance, ROM and flexibility for 0 minutes including:      manual therapy techniques: Joint mobilizations were applied to the: Thoracic spine and R shoulder for 5 minutes, including:    Prone thoracic manipulation  Posterior Glenohumeral joint glides, grade III        PATIENT EDUCATION AND HOME EXERCISES     Education provided:   -educated on shoulder joint mechanics  -educated on current findings and limitations     Written Home Exercises Provided: yes. Exercises were reviewed and Loretta was able to demonstrate them prior to the end of the session.  Loretta demonstrated good  understanding of the education provided. See EMR under Patient Instructions for exercises provided during therapy sessions.    ASSESSMENT     Loretta is a 31 y.o. female referred to outpatient Physical Therapy with a medical diagnosis of Subacromial impingement, right [M75.41]. Patient presents with impaired posture, downwardly rotated scapula on R, decreased strength, decreased cervical and thoracic mobility, decreased strength and pain which limits her ability to complete her Activities of Daily Living involving reaching and lifting.     Patient prognosis is Good.   Patientt will benefit from skilled outpatient Physical Therapy to address the deficits stated above and in the chart below, provide patient /family education, and to maximize patientt's level of independence.     Plan of care discussed with patient: Yes  Patient's spiritual, cultural and educational needs considered and patient is agreeable to the plan of care and goals as stated below:     Anticipated Barriers for therapy: none     Medical Necessity is demonstrated by the following  History  Co-morbidities and personal factors that may impact the plan of care Co-morbidities:    anxiety and depression    Personal Factors:   no deficits     moderate   Examination  Body Structures and Functions, activity limitations and participation restrictions that may impact the plan of care Body Regions:   neck  upper extremities    Body Systems:    ROM  strength  motor control    Participation Restrictions:   Unable to complete reaching and lifting activities due to pain     Activity limitations:   Learning and applying knowledge  no deficits    General Tasks and Commands  no deficits    Communication  no deficits    Mobility  lifting and carrying objects    Self care  washing oneself (bathing, drying, washing hands)    Domestic Life  cooking  doing house work (cleaning house, washing dishes, laundry)    Interactions/Relationships  no deficits    Life Areas  no deficits    Community and Social Life  community life  recreation and leisure         high   Clinical Presentation evolving clinical presentation with changing clinical characteristics moderate   Decision Making/ Complexity Score: moderate     Goals:  Short term goals: 4 weeks  1. Patient will be independent and compliant with their HEP to improve their function  2. Patient will improve their ROM to at least 150 degrees flexion actively to improve her ability to reach into overhead cabinets.   3. Patient will improve their strength to at least a 3+/5 for all shoulder girdle musculature to improve her ability to carry objects.      Long term goals: 8 weeks  1. Patient will improve their FOTO limitation score to at least 38% as evidence of clinically significant improvements in their function.  2. Patient will improve their ROM to at least 170 degrees flexion actively to improve her ability to change light bulbs overhead with pain no greater than 1/10.  3. Patient will improve their strength to at least a  4-/5 for all shoulder girdle musculature to improve her ability to carry objects.         PLAN   Plan of care Certification: 2/21/2022 to  4/18/2022.    Outpatient Physical Therapy 2 times weekly for 4 weeks, then 1 time weekly for 4 weeks to include the following interventions: Manual Therapy, Neuromuscular Re-ed, Patient Education, Therapeutic Activities and Therapeutic Exercise.     Heraclio Mercado, PT  Board Certified in Orthopedic Physical Therapy  Fellow, [employer] Manual Physical Therapists      I CERTIFY THE NEED FOR THESE SERVICES FURNISHED UNDER THIS PLAN OF TREATMENT AND WHILE UNDER MY CARE   Physician's comments:     Physician's Signature: ___________________________________________________

## 2022-03-25 ENCOUNTER — OFFICE VISIT (OUTPATIENT)
Dept: FAMILY MEDICINE | Facility: CLINIC | Age: 32
End: 2022-03-25
Payer: MEDICAID

## 2022-03-25 DIAGNOSIS — R73.9 HYPERGLYCEMIA: ICD-10-CM

## 2022-03-25 DIAGNOSIS — R53.83 FATIGUE, UNSPECIFIED TYPE: ICD-10-CM

## 2022-03-25 DIAGNOSIS — F17.200 SMOKER: ICD-10-CM

## 2022-03-25 DIAGNOSIS — F31.60 BIPOLAR AFFECTIVE DISORDER, CURRENT EPISODE MIXED, CURRENT EPISODE SEVERITY UNSPECIFIED: Primary | ICD-10-CM

## 2022-03-25 DIAGNOSIS — Z11.4 SCREENING FOR HIV (HUMAN IMMUNODEFICIENCY VIRUS): ICD-10-CM

## 2022-03-25 DIAGNOSIS — R79.89 ABNORMAL CBC: ICD-10-CM

## 2022-03-25 DIAGNOSIS — Z11.59 NEED FOR HEPATITIS C SCREENING TEST: ICD-10-CM

## 2022-03-25 DIAGNOSIS — E66.01 CLASS 3 SEVERE OBESITY WITH BODY MASS INDEX (BMI) OF 45.0 TO 49.9 IN ADULT, UNSPECIFIED OBESITY TYPE, UNSPECIFIED WHETHER SERIOUS COMORBIDITY PRESENT: ICD-10-CM

## 2022-03-25 PROBLEM — R29.3 ABNORMAL POSTURE: Status: RESOLVED | Noted: 2022-02-21 | Resolved: 2022-03-25

## 2022-03-25 PROBLEM — R29.898 IMPAIRED FLEXIBILITY OF LOWER EXTREMITY: Status: RESOLVED | Noted: 2021-09-27 | Resolved: 2022-03-25

## 2022-03-25 PROBLEM — M54.2 NECK PAIN: Status: RESOLVED | Noted: 2020-10-20 | Resolved: 2022-03-25

## 2022-03-25 PROBLEM — M25.611 DECREASED RIGHT SHOULDER RANGE OF MOTION: Status: RESOLVED | Noted: 2022-02-21 | Resolved: 2022-03-25

## 2022-03-25 PROCEDURE — 3074F SYST BP LT 130 MM HG: CPT | Mod: ,,, | Performed by: FAMILY MEDICINE

## 2022-03-25 PROCEDURE — 99214 OFFICE O/P EST MOD 30 MIN: CPT | Mod: S$PBB,,, | Performed by: FAMILY MEDICINE

## 2022-03-25 PROCEDURE — 99214 PR OFFICE/OUTPT VISIT, EST, LEVL IV, 30-39 MIN: ICD-10-PCS | Mod: S$PBB,,, | Performed by: FAMILY MEDICINE

## 2022-03-25 PROCEDURE — 3008F BODY MASS INDEX DOCD: CPT | Mod: ,,, | Performed by: FAMILY MEDICINE

## 2022-03-25 PROCEDURE — 3008F PR BODY MASS INDEX (BMI) DOCUMENTED: ICD-10-PCS | Mod: ,,, | Performed by: FAMILY MEDICINE

## 2022-03-25 PROCEDURE — 3078F DIAST BP <80 MM HG: CPT | Mod: ,,, | Performed by: FAMILY MEDICINE

## 2022-03-25 PROCEDURE — 99214 OFFICE O/P EST MOD 30 MIN: CPT | Performed by: FAMILY MEDICINE

## 2022-03-25 PROCEDURE — 3078F PR MOST RECENT DIASTOLIC BLOOD PRESSURE < 80 MM HG: ICD-10-PCS | Mod: ,,, | Performed by: FAMILY MEDICINE

## 2022-03-25 PROCEDURE — 3074F PR MOST RECENT SYSTOLIC BLOOD PRESSURE < 130 MM HG: ICD-10-PCS | Mod: ,,, | Performed by: FAMILY MEDICINE

## 2022-03-25 RX ORDER — IBUPROFEN 200 MG
1 TABLET ORAL DAILY
Qty: 28 PATCH | Refills: 1 | Status: ON HOLD | OUTPATIENT
Start: 2022-03-25 | End: 2022-08-09 | Stop reason: HOSPADM

## 2022-03-25 RX ORDER — QUETIAPINE FUMARATE 100 MG/1
200 TABLET, FILM COATED ORAL NIGHTLY
Qty: 60 TABLET | Refills: 2 | Status: SHIPPED | OUTPATIENT
Start: 2022-03-25 | End: 2022-05-17 | Stop reason: SDUPTHER

## 2022-03-25 NOTE — PROGRESS NOTES
SUBJECTIVE:   HPI:  Medication Refill    HPI  Loretta Lea is a 31 y.o. female former patient of Patricia [facility]le[facility] NP's who is transitioning care to this PCP. On chart review, she has a history of Bipolar Disorder, HTN, Depression and Anxiety. Here today for medication management.    On chart review, has had multiple CBCs w elevated WBC and Plt, norlmal H&H. Also with hyperglycemia on several CMPs. Has family history of diabetes.    For her bipolar disorder, she is on Zoloft and Seroquel. The Zoloft was started about a year and a half ago and though it worked well for awhile, patient feels that she is having breakthrough symptoms of depression and anxiety. These manifest as not getting out of bed, crying spells, anhedonia. Once depression starts to set in, she becomes overwhelmed with anxiety over it. Has never been suicidal. Last passive ideation was 7 years ago. Never an attempt. Her last manic episode was last month. The episode included staying up through two nights and three days without the need for sleep, racing thoughts, cleaning house, playing video games, computer design, feeling angry. Once the symptoms subsided, she crashed into a depression. The patient states that she has seen a lot of psychiatrists and counselors in her life since childhood and does not want to pursue those avenues any time soon. States most of her triggers are related to having been adopted and she does not like to rehash the circumstances. Seroquel is helping overall, but can't fall asleep until 2-3am. She is on only 100mg.     The patient also requests prescription for nicotine patches. Smokes up to 2 ppd. States she did not have the urge to smoke while she was admitted to the hospital in the past, when she had the patch on.    Of note, patient maintains that she has never had hypertension. States that during an acute visit to clinic, her BP was elevated in the setting of a headache and she was diagnosed with this. She  has not been taking the prescribed antihypertensive and is 122/74 here today.      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       Current Outpatient Medications on File Prior to Visit   Medication Sig Dispense Refill    meloxicam (MOBIC) 15 MG tablet Take 1 tablet (15 mg total) by mouth once daily. 30 tablet 1    sertraline (ZOLOFT) 100 MG tablet Take 1 tablet (100 mg total) by mouth once daily. 30 tablet 0    acetaminophen (TYLENOL) 500 MG tablet Take 1,000 mg by mouth as needed for Pain.       No current facility-administered medications on file prior to visit.       Past Medical History:   Diagnosis Date    Anxiety     Back pain     Bipolar disorder 2004    bipolar 2    Depression     GERD (gastroesophageal reflux disease)     HPV (human papilloma virus) infection     Insomnia     Ovarian cyst      Past Surgical History:   Procedure Laterality Date    CHOLECYSTECTOMY      DILATION AND CURETTAGE OF UTERUS      HYSTERECTOMY  2017    total, ovarian cysts, torsion, [place]rault; hyst per Dr JESUS Manriquez    LAPAROSCOPIC APPENDECTOMY N/A 8/18/2021    Procedure: APPENDECTOMY, LAPAROSCOPIC;  Surgeon: Osiel Ramirez MD;  Location[facility];  Service: General;  Laterality: N/A;    OOPHORECTOMY      opherectom Left 2015    salpingo-opherectomy    REPAIR OF LIGAMENT OF ANKLE Right 6/23/2021    Procedure: REPAIR OF ANTERIOR TALOFIBULAR LIGAMENT CALCANEOFIBULAR LIGAMENT;  Surgeon: Bimal Mccormick DPM;  Location: [facility] OR;  Service: Podiatry;  Laterality: Right;  WITH ARTHREX INTERNAL BRACE    SURGICAL REMOVAL OF BONE SPUR Right 6/23/2021    Procedure: EXCISION OS TRIGONUM;  Surgeon: Bimal Mccormick DPM;  Location: [facility] OR;  Service: Podiatry;  Laterality: Right;     Family History   Adopted: Yes   Problem Relation Age of Onset    No Known Problems Sister     No Known Problems Sister     No Known Problems Sister   "    Review of systems  As in HPI         OBJECTIVE:      Vitals:    03/25/22 1445 03/25/22 1458   BP: 122/74    BP Location: Left arm    Patient Position: Sitting    BP Method: X-Large (Manual)    Pulse: (!) 129 96   Resp: 18    SpO2: 96%    Weight: (!) 142.9 kg (315 lb)    Height: 5' 8" (1.727 m)      Physical Exam  Vitals reviewed.   Constitutional:       General: She is not in acute distress.     Appearance: She is obese.   Cardiovascular:      Rate and Rhythm: Regular rhythm.      Pulses: Normal pulses.      Heart sounds: Normal heart sounds.      Comments: Initially tachycardic on triage, later with high-normal HR on auscultation.  Pulmonary:      Effort: Pulmonary effort is normal.      Breath sounds: Normal breath sounds.   Musculoskeletal:      Right lower leg: No edema.      Left lower leg: No edema.   Skin:     General: Skin is warm and dry.   Neurological:      Mental Status: She is alert.   Psychiatric:      Comments: Fairly groomed and dressed. Mood is anxious with congruent affect. Good eye contact. Good insight and judgment. Normal thought pattern and content with mildly pressured speech. No AH, VH, SI, or HI.          Assessment:       ICD-10-CM ICD-9-CM    1. Bipolar affective disorder, current episode mixed, current episode severity unspecified  F31.60 296.60 QUEtiapine (SEROQUEL) 100 MG Tab   2. Smoker  F17.200 305.1 nicotine (NICODERM CQ) 21 mg/24 hr   3. Hyperglycemia  R73.9 790.29 Hemoglobin A1C   4. Fatigue, unspecified type  R53.83 780.79 QUEtiapine (SEROQUEL) 100 MG Tab   5. Abnormal CBC  R79.89 790.6 CBC Auto Differential   6. Class 3 severe obesity with body mass index (BMI) of 45.0 to 49.9 in adult, unspecified obesity type, unspecified whether serious comorbidity present  E66.01 278.01 Lipid Panel    Z68.42 V85.42 Hemoglobin A1C      TSH   7. Need for hepatitis C screening test  Z11.59 V73.89 Hepatitis C Antibody   8. Screening for HIV (human immunodeficiency virus)  Z11.4 V73.89 HIV 1/2 " Ag/Ab (4th Gen)        Plan:   Bipolar affective disorder, current episode mixed, current episode severity unspecified - will increase Seroquel to 200mg qHS with room for further uptitration. Patient will report to us in 2-3 weeks, at which time Sertraline may be increased to 150mg if breakthrough sxs of depression and anxiety continue. Emergency precautions reviewed.  -     QUEtiapine (SEROQUEL) 100 MG Tab; Take 2 tablets (200 mg total) by mouth nightly.  Dispense: 60 tablet; Refill: 2    Smoker - patient counseled on smoking cessation. Interested in nicotine patches, which are being prescribed. Common s/e profile was discussed including possibility of vivid dreams. If these should occur and they are not tolerable to the patient, she is instructed to remove patch at HS and place a new one immediately upon waking, at which time she may supplement with lozenge or gum for first morning craving. Patient verbalized understanding and agreement.  -     nicotine (NICODERM CQ) 21 mg/24 hr; Place 1 patch onto the skin once daily.  Dispense: 28 patch; Refill: 1    Hyperglycemia - will check a1c based on numerous CMPs with hyperglycemia, obesity, and family history.  -     Hemoglobin A1C; Future; Expected date: 03/25/2022    Fatigue, unspecified type  -     QUEtiapine (SEROQUEL) 100 MG Tab; Take 2 tablets (200 mg total) by mouth nightly.  Dispense: 60 tablet; Refill: 2    Abnormal CBC  -     CBC Auto Differential; Future; Expected date: 03/25/2022    Class 3 severe obesity with body mass index (BMI) of 45.0 to 49.9 in adult, unspecified obesity type, unspecified whether serious comorbidity present  -     Lipid Panel; Future; Expected date: 03/25/2022  -     Hemoglobin A1C; Future; Expected date: 03/25/2022  -     TSH; Future; Expected date: 03/25/2022    Need for hepatitis C screening test  -     Hepatitis C Antibody; Future; Expected date: 03/25/2022    Screening for HIV (human immunodeficiency virus)  -     HIV 1/2 Ag/Ab  (4th Gen); Future; Expected date: 03/25/2022        No follow-ups on file.      3/26/2022 Magalie Roldan M.D.

## 2022-03-26 VITALS
WEIGHT: 293 LBS | HEIGHT: 68 IN | RESPIRATION RATE: 18 BRPM | DIASTOLIC BLOOD PRESSURE: 74 MMHG | HEART RATE: 96 BPM | BODY MASS INDEX: 44.41 KG/M2 | SYSTOLIC BLOOD PRESSURE: 122 MMHG | OXYGEN SATURATION: 96 %

## 2022-03-26 PROBLEM — E66.813 CLASS 3 SEVERE OBESITY WITH BODY MASS INDEX (BMI) OF 45.0 TO 49.9 IN ADULT: Status: ACTIVE | Noted: 2021-08-18

## 2022-04-04 ENCOUNTER — PATIENT MESSAGE (OUTPATIENT)
Dept: PODIATRY | Facility: CLINIC | Age: 32
End: 2022-04-04
Payer: MEDICAID

## 2022-04-05 NOTE — TELEPHONE ENCOUNTER
Left message on voicemail returning patient's call. Asked patient to call back at earliest convenience.

## 2022-04-06 ENCOUNTER — PATIENT MESSAGE (OUTPATIENT)
Dept: PODIATRY | Facility: CLINIC | Age: 32
End: 2022-04-06
Payer: MEDICAID

## 2022-04-11 ENCOUNTER — OFFICE VISIT (OUTPATIENT)
Dept: PODIATRY | Facility: CLINIC | Age: 32
End: 2022-04-11
Payer: MEDICAID

## 2022-04-11 VITALS — OXYGEN SATURATION: 96 % | HEIGHT: 68 IN | WEIGHT: 293 LBS | BODY MASS INDEX: 44.41 KG/M2 | HEART RATE: 97 BPM

## 2022-04-11 DIAGNOSIS — M19.071 ARTHRITIS OF RIGHT ANKLE: ICD-10-CM

## 2022-04-11 DIAGNOSIS — G89.29 CHRONIC PAIN OF RIGHT ANKLE: Primary | ICD-10-CM

## 2022-04-11 DIAGNOSIS — M25.371 ANKLE INSTABILITY, RIGHT: ICD-10-CM

## 2022-04-11 DIAGNOSIS — M25.571 CHRONIC PAIN OF RIGHT ANKLE: Primary | ICD-10-CM

## 2022-04-11 PROCEDURE — 99214 PR OFFICE/OUTPT VISIT, EST, LEVL IV, 30-39 MIN: ICD-10-PCS | Mod: S$GLB,,, | Performed by: PODIATRIST

## 2022-04-11 PROCEDURE — 3008F BODY MASS INDEX DOCD: CPT | Mod: CPTII,S$GLB,, | Performed by: PODIATRIST

## 2022-04-11 PROCEDURE — 1160F RVW MEDS BY RX/DR IN RCRD: CPT | Mod: CPTII,S$GLB,, | Performed by: PODIATRIST

## 2022-04-11 PROCEDURE — 1159F PR MEDICATION LIST DOCUMENTED IN MEDICAL RECORD: ICD-10-PCS | Mod: CPTII,S$GLB,, | Performed by: PODIATRIST

## 2022-04-11 PROCEDURE — 3008F PR BODY MASS INDEX (BMI) DOCUMENTED: ICD-10-PCS | Mod: CPTII,S$GLB,, | Performed by: PODIATRIST

## 2022-04-11 PROCEDURE — 99214 OFFICE O/P EST MOD 30 MIN: CPT | Mod: S$GLB,,, | Performed by: PODIATRIST

## 2022-04-11 PROCEDURE — 1160F PR REVIEW ALL MEDS BY PRESCRIBER/CLIN PHARMACIST DOCUMENTED: ICD-10-PCS | Mod: CPTII,S$GLB,, | Performed by: PODIATRIST

## 2022-04-11 PROCEDURE — 1159F MED LIST DOCD IN RCRD: CPT | Mod: CPTII,S$GLB,, | Performed by: PODIATRIST

## 2022-04-11 NOTE — PROGRESS NOTES
"  [address] [facility] [facility] [place] [place] [address]  Phone: [phone]   [phone]    Patient's PCP:Magalie Roldan MD  Referring Provider: No ref. provider found    Subjective:      Chief Complaint:: Ankle Pain (Right )    RAPHAEL Lea is a 31 y.o. female who presents today with a complaint of right ankle pain lasting for a couple months. Onset of symptoms cleaning house and started having a little twinge here and there but now it constant and reports no trauma.  Current symptoms include pain, swelling, and some bruising.  Aggravating factors are walking. Symptoms have progressed . Treatment to date have included Mobic, ankle brace, elevation, Ice and appointment to see podiatry .      Vitals:    04/11/22 1441   Pulse: 97   SpO2: 96%   Weight: (!) 142.9 kg (315 lb)   Height: 5' 8" (1.727 m)   PainSc:   8      Shoe Size: 11    Past Surgical History:   Procedure Laterality Date    CHOLECYSTECTOMY      DILATION AND CURETTAGE OF UTERUS      HYSTERECTOMY  2017    total, ovarian cysts, torsion, [place]; hyst per Dr JESUS Manriquez    LAPAROSCOPIC APPENDECTOMY N/A 8/18/2021    Procedure: APPENDECTOMY, LAPAROSCOPIC;  Surgeon: Osiel Ramirez MD;  Location: [facility];  Service: General;  Laterality: N/A;    OOPHORECTOMY      opherectom Left 2015    salpingo-opherectomy    REPAIR OF LIGAMENT OF ANKLE Right 6/23/2021    Procedure: REPAIR OF ANTERIOR TALOFIBULAR LIGAMENT CALCANEOFIBULAR LIGAMENT;  Surgeon: Bimal Mccormick DPM;  Location: [facility] OR;  Service: Podiatry;  Laterality: Right;  WITH ARTHREX INTERNAL BRACE    SURGICAL REMOVAL OF BONE SPUR Right 6/23/2021    Procedure: EXCISION OS TRIGONUM;  Surgeon: Bimal Mccormick DPM;  Location: [facility] OR;  Service: Podiatry;  Laterality: Right;     Past Medical History:   Diagnosis Date    Anxiety     Back pain     Bipolar disorder 2004    bipolar 2    Depression     GERD (gastroesophageal reflux disease)     HPV (human papilloma virus) " "infection     Insomnia     Ovarian cyst      Family History   Adopted: Yes   Problem Relation Age of Onset    No Known Problems Sister     No Known Problems Sister     No Known Problems Sister         Social History:   Marital Status:   Alcohol History:  reports current alcohol use.  Tobacco History:  reports that she has been smoking cigarettes. She has a 15.00 pack-year smoking history. She has never used smokeless tobacco.  Drug History:  reports previous drug use. Drug: "Crack" cocaine.    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       Current Outpatient Medications   Medication Sig Dispense Refill    meloxicam (MOBIC) 15 MG tablet Take 1 tablet (15 mg total) by mouth once daily. 30 tablet 1    nicotine (NICODERM CQ) 21 mg/24 hr Place 1 patch onto the skin once daily. 28 patch 1    QUEtiapine (SEROQUEL) 100 MG Tab Take 2 tablets (200 mg total) by mouth nightly. 60 tablet 2    sertraline (ZOLOFT) 100 MG tablet Take 1 tablet (100 mg total) by mouth once daily. 30 tablet 0    acetaminophen (TYLENOL) 500 MG tablet Take 1,000 mg by mouth as needed for Pain.       No current facility-administered medications for this visit.       Review of Systems   Constitutional: Negative for chills, fatigue, fever and unexpected weight change.   HENT: Negative for hearing loss and trouble swallowing.    Eyes: Negative for photophobia and visual disturbance.   Respiratory: Negative for cough, shortness of breath and wheezing.    Cardiovascular: Negative for chest pain, palpitations and leg swelling.   Gastrointestinal: Negative for abdominal pain and nausea.   Genitourinary: Negative for dysuria and frequency.   Musculoskeletal: Positive for arthralgias and joint swelling. Negative for back pain, gait problem and myalgias.   Skin: Negative for rash and wound.   Neurological: Negative for tremors, seizures, weakness, numbness " and headaches.   Hematological: Does not bruise/bleed easily.         Objective:        Physical Exam:   Foot Exam    General  General Appearance: appears stated age and healthy   Orientation: alert and oriented to person, place, and time   Affect: appropriate   Gait: antalgic       Right Foot/Ankle     Inspection and Palpation  Ecchymosis: none  Tenderness: ankle (Pain anterior central and lateral ankle joint.  Minimal to no pain by previous ligament tendon repairs on lateral ankle)  Swelling: ankle (Swelling anterior ankle minimal swelling lateral and)  Skin Exam: skin intact;   Neurovascular  Dorsalis pedis: 2+  Posterior tibial: 2+  Capillary Refill: 2+  Saphenous nerve sensation: normal  Tibial nerve sensation: normal  Superficial peroneal nerve sensation: normal  Deep peroneal nerve sensation: normal  Sural nerve sensation: normal    Muscle Strength  Ankle dorsiflexion: 5  Ankle plantar flexion: 5  Ankle inversion: 5  Ankle eversion: 5  Great toe extension: 5  Great toe flexion: 5    Range of Motion    Normal right ankle ROM  Passive  Ankle dorsiflexion: pain  Ankle plantar flexion: pain    Active  Ankle dorsiflexion: pain  Ankle plantar flexion: pain          Physical Exam  Cardiovascular:      Pulses:           Dorsalis pedis pulses are 2+ on the right side.        Posterior tibial pulses are 2+ on the right side.   Musculoskeletal:      Right ankle: Swelling present. Tenderness present.        Feet:                Right Ankle/Foot Exam     Range of Motion   The patient has normal right ankle ROM.        Muscle Strength   Right Lower Extremity   Ankle Dorsiflexion:  5   Plantar flexion:  5/5    Vascular Exam     Right Pulses  Dorsalis Pedis:      2+  Posterior Tibial:      2+             Imaging:            Assessment:       1. Chronic pain of right ankle    2. Ankle instability, right    3. Arthritis of right ankle      Plan:   Chronic pain of right ankle    Ankle instability, right    Arthritis of right  ankle    Evaluated patient today she has continue have pain in the ankle appears to be doing well with the lateral ligament tendon repair and avulsion fractures.  She states that the shot I gave her last time gave her proximally 2 months relief when I injected her ankle joint.  I reviewed the MRI with her again and CT scan only thing we see is some small osteochondral lesions in the talus.  I am recommending an arthroscopy of the ankle for debridement of any scar tissue and synovial tissue and possible repair of any osteochondral lesions seen in the dome of the talus.  I told the patient I am not sure how much relief this will give her but it appears to be an ankle problem and the MRI CT scan show some osteochondral lesions that are not very large.  Patient will schedule have this done approximately 2 and half weeks when I return from being out of town.    Patient was educated about the entire pre, herve and post-operative time period.  They  were educated about the pro's and con's of surgery.  All conservative measures have been exhausted. Patient understands the particular risks involved which may occur in connection with the procedure proposed including pain, swelling, infection, stiffness, decreased ROM, recurrence, rejection, numbness, delayed healing, scar formation.    Patient was educated that their diagnosis may be surgically treated.  The patient's problem will probably advance and usually will not get better without surgery.  All of the pre-operative treatment plans have been exhausted or will no longer be successful at this point in time.  Patient was told of the possible outcomes and expectations of the surgical procedure.  They  will need to be followed-up post-operatively.  Today, pictures were drawn, questions answered.      We discussed the following surgical procedures:  Arthroscopy right ankle with repair of osteochondral lesion of present and debridement scar tissue.  Patient will schedule with my  nurse get appropriate lab tests done    Will see me postoperatively    Procedures          Counseling:     I provided patient education verbally regarding:   Patient diagnosis, treatment options, as well as alternatives, risks, and benefits.     osteochondritis of a joint was explained to the pt.  I discussed the stages of the lesions and conservative care with NSAIDs, cortisone shots, a series of  supartz injections, DME and surgical care if conservative care fails or the lesion is too advanced.  This note was created using Dragon voice recognition software that occasionally misinterpreted phrases or words.

## 2022-04-11 NOTE — H&P (VIEW-ONLY)
"  [address] [facility] [address] [place] [address]  Phone: [phone]   [phone]    Patient's PCP:Magalie Roldan MD  Referring Provider: No ref. provider found    Subjective:      Chief Complaint:: Ankle Pain (Right )    RAPHAEL Lea is a 31 y.o. female who presents today with a complaint of right ankle pain lasting for a couple months. Onset of symptoms cleaning house and started having a little twinge here and there but now it constant and reports no trauma.  Current symptoms include pain, swelling, and some bruising.  Aggravating factors are walking. Symptoms have progressed . Treatment to date have included Mobic, ankle brace, elevation, Ice and appointment to see podiatry .      Vitals:    04/11/22 1441   Pulse: 97   SpO2: 96%   Weight: (!) 142.9 kg (315 lb)   Height: 5' 8" (1.727 m)   PainSc:   8      Shoe Size: 11    Past Surgical History:   Procedure Laterality Date    CHOLECYSTECTOMY      DILATION AND CURETTAGE OF UTERUS      HYSTERECTOMY  2017    total, ovarian cysts, torsion, Berrault; hyst per Dr JESUS Manriquez    LAPAROSCOPIC APPENDECTOMY N/A 8/18/2021    Procedure: APPENDECTOMY, LAPAROSCOPIC;  Surgeon: Osiel Ramirez MD;  Location: [facility];  Service: General;  Laterality: N/A;    OOPHORECTOMY      opherectom Left 2015    salpingo-opherectomy    REPAIR OF LIGAMENT OF ANKLE Right 6/23/2021    Procedure: REPAIR OF ANTERIOR TALOFIBULAR LIGAMENT CALCANEOFIBULAR LIGAMENT;  Surgeon: Bimal Mccormick DPM;  Location: [facility] OR;  Service: Podiatry;  Laterality: Right;  WITH ARTHREX INTERNAL BRACE    SURGICAL REMOVAL OF BONE SPUR Right 6/23/2021    Procedure: EXCISION OS TRIGONUM;  Surgeon: Bimal Mccormick DPM;  Location: [facility] OR;  Service: Podiatry;  Laterality: Right;     Past Medical History:   Diagnosis Date    Anxiety     Back pain     Bipolar disorder 2004    bipolar 2    Depression     GERD (gastroesophageal reflux disease)     HPV (human papilloma virus) " "infection     Insomnia     Ovarian cyst      Family History   Adopted: Yes   Problem Relation Age of Onset    No Known Problems Sister     No Known Problems Sister     No Known Problems Sister         Social History:   Marital Status:   Alcohol History:  reports current alcohol use.  Tobacco History:  reports that she has been smoking cigarettes. She has a 15.00 pack-year smoking history. She has never used smokeless tobacco.  Drug History:  reports previous drug use. Drug: "Crack" cocaine.    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       Current Outpatient Medications   Medication Sig Dispense Refill    meloxicam (MOBIC) 15 MG tablet Take 1 tablet (15 mg total) by mouth once daily. 30 tablet 1    nicotine (NICODERM CQ) 21 mg/24 hr Place 1 patch onto the skin once daily. 28 patch 1    QUEtiapine (SEROQUEL) 100 MG Tab Take 2 tablets (200 mg total) by mouth nightly. 60 tablet 2    sertraline (ZOLOFT) 100 MG tablet Take 1 tablet (100 mg total) by mouth once daily. 30 tablet 0    acetaminophen (TYLENOL) 500 MG tablet Take 1,000 mg by mouth as needed for Pain.       No current facility-administered medications for this visit.       Review of Systems   Constitutional: Negative for chills, fatigue, fever and unexpected weight change.   HENT: Negative for hearing loss and trouble swallowing.    Eyes: Negative for photophobia and visual disturbance.   Respiratory: Negative for cough, shortness of breath and wheezing.    Cardiovascular: Negative for chest pain, palpitations and leg swelling.   Gastrointestinal: Negative for abdominal pain and nausea.   Genitourinary: Negative for dysuria and frequency.   Musculoskeletal: Positive for arthralgias and joint swelling. Negative for back pain, gait problem and myalgias.   Skin: Negative for rash and wound.   Neurological: Negative for tremors, seizures, weakness, numbness " and headaches.   Hematological: Does not bruise/bleed easily.         Objective:        Physical Exam:   Foot Exam    General  General Appearance: appears stated age and healthy   Orientation: alert and oriented to person, place, and time   Affect: appropriate   Gait: antalgic       Right Foot/Ankle     Inspection and Palpation  Ecchymosis: none  Tenderness: ankle (Pain anterior central and lateral ankle joint.  Minimal to no pain by previous ligament tendon repairs on lateral ankle)  Swelling: ankle (Swelling anterior ankle minimal swelling lateral and)  Skin Exam: skin intact;   Neurovascular  Dorsalis pedis: 2+  Posterior tibial: 2+  Capillary Refill: 2+  Saphenous nerve sensation: normal  Tibial nerve sensation: normal  Superficial peroneal nerve sensation: normal  Deep peroneal nerve sensation: normal  Sural nerve sensation: normal    Muscle Strength  Ankle dorsiflexion: 5  Ankle plantar flexion: 5  Ankle inversion: 5  Ankle eversion: 5  Great toe extension: 5  Great toe flexion: 5    Range of Motion    Normal right ankle ROM  Passive  Ankle dorsiflexion: pain  Ankle plantar flexion: pain    Active  Ankle dorsiflexion: pain  Ankle plantar flexion: pain          Physical Exam  Cardiovascular:      Pulses:           Dorsalis pedis pulses are 2+ on the right side.        Posterior tibial pulses are 2+ on the right side.   Musculoskeletal:      Right ankle: Swelling present. Tenderness present.        Feet:                Right Ankle/Foot Exam     Range of Motion   The patient has normal right ankle ROM.        Muscle Strength   Right Lower Extremity   Ankle Dorsiflexion:  5   Plantar flexion:  5/5    Vascular Exam     Right Pulses  Dorsalis Pedis:      2+  Posterior Tibial:      2+             Imaging:            Assessment:       1. Chronic pain of right ankle    2. Ankle instability, right    3. Arthritis of right ankle      Plan:   Chronic pain of right ankle    Ankle instability, right    Arthritis of right  ankle    Evaluated patient today she has continue have pain in the ankle appears to be doing well with the lateral ligament tendon repair and avulsion fractures.  She states that the shot I gave her last time gave her proximally 2 months relief when I injected her ankle joint.  I reviewed the MRI with her again and CT scan only thing we see is some small osteochondral lesions in the talus.  I am recommending an arthroscopy of the ankle for debridement of any scar tissue and synovial tissue and possible repair of any osteochondral lesions seen in the dome of the talus.  I told the patient I am not sure how much relief this will give her but it appears to be an ankle problem and the MRI CT scan show some osteochondral lesions that are not very large.  Patient will schedule have this done approximately 2 and half weeks when I return from being out of town.    Patient was educated about the entire pre, herve and post-operative time period.  They  were educated about the pro's and con's of surgery.  All conservative measures have been exhausted. Patient understands the particular risks involved which may occur in connection with the procedure proposed including pain, swelling, infection, stiffness, decreased ROM, recurrence, rejection, numbness, delayed healing, scar formation.    Patient was educated that their diagnosis may be surgically treated.  The patient's problem will probably advance and usually will not get better without surgery.  All of the pre-operative treatment plans have been exhausted or will no longer be successful at this point in time.  Patient was told of the possible outcomes and expectations of the surgical procedure.  They  will need to be followed-up post-operatively.  Today, pictures were drawn, questions answered.      We discussed the following surgical procedures:  Arthroscopy right ankle with repair of osteochondral lesion of present and debridement scar tissue.  Patient will schedule with my  nurse get appropriate lab tests done    Will see me postoperatively    Procedures          Counseling:     I provided patient education verbally regarding:   Patient diagnosis, treatment options, as well as alternatives, risks, and benefits.     osteochondritis of a joint was explained to the pt.  I discussed the stages of the lesions and conservative care with NSAIDs, cortisone shots, a series of  supartz injections, DME and surgical care if conservative care fails or the lesion is too advanced.  This note was created using Dragon voice recognition software that occasionally misinterpreted phrases or words.

## 2022-04-12 ENCOUNTER — PATIENT MESSAGE (OUTPATIENT)
Dept: PODIATRY | Facility: CLINIC | Age: 32
End: 2022-04-12
Payer: MEDICAID

## 2022-04-12 NOTE — TELEPHONE ENCOUNTER
Patient stated you were going to send in some pain medication. I have pended Hydrocodone 5/325 mg. If you want something else, just let me know.

## 2022-04-13 ENCOUNTER — PATIENT MESSAGE (OUTPATIENT)
Dept: PODIATRY | Facility: CLINIC | Age: 32
End: 2022-04-13
Payer: MEDICAID

## 2022-04-13 ENCOUNTER — PATIENT MESSAGE (OUTPATIENT)
Dept: ADMINISTRATIVE | Facility: OTHER | Age: 32
End: 2022-04-13
Payer: MEDICAID

## 2022-04-13 ENCOUNTER — TELEPHONE (OUTPATIENT)
Dept: PODIATRY | Facility: CLINIC | Age: 32
End: 2022-04-13
Payer: MEDICAID

## 2022-04-13 DIAGNOSIS — M94.9 OSTEOCHONDRAL LESION: ICD-10-CM

## 2022-04-13 DIAGNOSIS — Z01.818 PREOP TESTING: Primary | ICD-10-CM

## 2022-04-13 DIAGNOSIS — M89.9 OSTEOCHONDRAL LESION: ICD-10-CM

## 2022-04-13 DIAGNOSIS — G89.29 CHRONIC PAIN OF RIGHT ANKLE: Primary | ICD-10-CM

## 2022-04-13 DIAGNOSIS — M25.571 CHRONIC PAIN OF RIGHT ANKLE: Primary | ICD-10-CM

## 2022-04-13 DIAGNOSIS — M25.371 ANKLE INSTABILITY, RIGHT: ICD-10-CM

## 2022-04-14 RX ORDER — HYDROCODONE BITARTRATE AND ACETAMINOPHEN 5; 325 MG/1; MG/1
1 TABLET ORAL EVERY 4 HOURS PRN
Qty: 28 TABLET | Refills: 0 | Status: ON HOLD | OUTPATIENT
Start: 2022-04-14 | End: 2022-08-09 | Stop reason: HOSPADM

## 2022-04-26 ENCOUNTER — HOSPITAL ENCOUNTER (OUTPATIENT)
Dept: PREADMISSION TESTING | Facility: HOSPITAL | Age: 32
Discharge: HOME | End: 2022-04-26
Attending: PODIATRIST
Payer: MEDICAID

## 2022-04-26 VITALS
HEIGHT: 67 IN | BODY MASS INDEX: 45.99 KG/M2 | SYSTOLIC BLOOD PRESSURE: 113 MMHG | WEIGHT: 293 LBS | RESPIRATION RATE: 22 BRPM | OXYGEN SATURATION: 96 % | DIASTOLIC BLOOD PRESSURE: 78 MMHG | TEMPERATURE: 98 F | HEART RATE: 97 BPM

## 2022-04-26 NOTE — DISCHARGE INSTRUCTIONS
To confirm, Your doctor has instructed you that surgery is scheduled for: Wednesday May 4, 2022    Pre-Op will call the afternoon prior to surgery between 4:00 and 6:00 PM with the final arrival time.  5/3/22    Please report to Outpatient [place] via [facility] entrance, proceed to Registration.     Do not eat or drink anything after midnight the night before your surgery - THIS INCLUDES  WATER, GUM, MINTS AND CANDY.  YOU MAY BRUSH YOUR TEETH BUT DO NOT SWALLOW     TAKE ONLY THESE MEDICATIONS WITH A SMALL SIP OF WATER THE MORNING OF YOUR PROCEDURE: See Medication list    PLEASE NOTE:  The surgery schedule has many variables which may affect the time of your surgery case.  Family members should be available if your surgery time changes.  Plan to be here the day of your procedure between 4-6 hours.    DO NOT TAKE THESE MEDICATIONS 5-7 DAYS PRIOR to your procedure or per your surgeon's request: ASPIRIN, ALEVE, ADVIL, IBUPROFEN,  MOLLY SELTZER, BC , FISH OIL , VITAMIN E, HERBALS  (May take Tylenol)      IMPORTANT INSTRUCTIONS    Do not smoke, vape or drink alcoholic beverages 24 hours prior to your procedure.  Shower the night before AND the morning of your procedure with a Chlorhexidine wash such as Hibiclens or Dial antibacterial soap from the neck down.    Do not get it on your face or in your eyes.  You may use your own shampoo and face wash. This helps your skin to be as bacteria free as possible.    DO NOT remove hair from the surgery site.  Do not shave the incision site unless you are given specific instructions to do so.    Sleep in a bed with clean sheets.  Do not sleep with a pet in the bed.   If you wear contact lenses, dentures, hearing aids or glasses, bring a container to put them in during surgery and give to a family member for safe keeping.    Please leave all jewelry, piercing's and valuables at home.     Make arrangements in advance for transportation home by a responsible adult.    You must make  arrangements for transportation, TAXI'S, UBER'S OR LYFTS ARE NOT ALLOWED.      If you have any questions about these instructions, call Pre-Op Admit  Nursing at [phone] , Pre-Op Day Surgery Unit at [phone]

## 2022-04-28 ENCOUNTER — PATIENT MESSAGE (OUTPATIENT)
Dept: FAMILY MEDICINE | Facility: CLINIC | Age: 32
End: 2022-04-28

## 2022-04-28 DIAGNOSIS — F31.60 BIPOLAR AFFECTIVE DISORDER, CURRENT EPISODE MIXED, CURRENT EPISODE SEVERITY UNSPECIFIED: ICD-10-CM

## 2022-04-28 RX ORDER — SERTRALINE HYDROCHLORIDE 100 MG/1
100 TABLET, FILM COATED ORAL DAILY
Qty: 30 TABLET | Refills: 0 | Status: SHIPPED | OUTPATIENT
Start: 2022-04-28 | End: 2022-05-17 | Stop reason: SDUPTHER

## 2022-04-29 ENCOUNTER — TELEPHONE (OUTPATIENT)
Dept: PODIATRY | Facility: CLINIC | Age: 32
End: 2022-04-29
Payer: MEDICAID

## 2022-04-29 NOTE — TELEPHONE ENCOUNTER
Spoke with Christine from Pre-admit wanted to verify that no further labs are needed due to pattern of elevated WBC levels recently greater than 15, five months ago greater than 13, and eight months ago greater than 18. Please advise if further testing is required prior to surgery on 5/4/2022.

## 2022-05-02 ENCOUNTER — PATIENT MESSAGE (OUTPATIENT)
Dept: PODIATRY | Facility: CLINIC | Age: 32
End: 2022-05-02
Payer: MEDICAID

## 2022-05-02 ENCOUNTER — PATIENT MESSAGE (OUTPATIENT)
Dept: FAMILY MEDICINE | Facility: CLINIC | Age: 32
End: 2022-05-02

## 2022-05-02 ENCOUNTER — TELEPHONE (OUTPATIENT)
Dept: PODIATRY | Facility: CLINIC | Age: 32
End: 2022-05-02
Payer: MEDICAID

## 2022-05-02 NOTE — TELEPHONE ENCOUNTER
Silverio Benjamin,    The above patient is scheduled to have surgery on Wednesday, May 5, 2022. We did some pre-op blood work and her white count is still high. Anesthesia and Dr. Mccormick reviewed labs in the past and it looks like her white count has been elevated for some time. They just want to make sure she is able to have surgery. Can you please advise.    Thanks,    Loretta Nam RN

## 2022-05-03 ENCOUNTER — PATIENT MESSAGE (OUTPATIENT)
Dept: PODIATRY | Facility: CLINIC | Age: 32
End: 2022-05-03
Payer: MEDICAID

## 2022-05-03 NOTE — TELEPHONE ENCOUNTER
Hello, thanks for reaching out. I have only seen this patient once and on lab review, noticed her increased WBC and Plt so ordered another CBC with the intention of sending her to Hematology for further evaluation if the pattern persisted. She has not followed up with me yet, and I had not had another CBC to look at until the one that you ordered.   I do think that she needs to follow up to further address this issue, but don't think it should stop her from having a low or intermediate-risk surgery.

## 2022-05-03 NOTE — TELEPHONE ENCOUNTER
Thank you so much. I will make sure she follows up with you. I will contact her today. Thanks for the prompt response.

## 2022-05-04 ENCOUNTER — ANESTHESIA EVENT (OUTPATIENT)
Dept: SURGERY | Facility: HOSPITAL | Age: 32
End: 2022-05-04
Payer: MEDICAID

## 2022-05-04 ENCOUNTER — ANESTHESIA (OUTPATIENT)
Dept: SURGERY | Facility: HOSPITAL | Age: 32
End: 2022-05-04
Payer: MEDICAID

## 2022-05-04 ENCOUNTER — HOSPITAL ENCOUNTER (OUTPATIENT)
Facility: HOSPITAL | Age: 32
Discharge: HOME | End: 2022-05-04
Attending: PODIATRIST | Admitting: PODIATRIST
Payer: MEDICAID

## 2022-05-04 VITALS
SYSTOLIC BLOOD PRESSURE: 121 MMHG | BODY MASS INDEX: 45.99 KG/M2 | WEIGHT: 293 LBS | HEIGHT: 67 IN | TEMPERATURE: 98 F | DIASTOLIC BLOOD PRESSURE: 73 MMHG | HEART RATE: 87 BPM | OXYGEN SATURATION: 99 % | RESPIRATION RATE: 16 BRPM

## 2022-05-04 DIAGNOSIS — M25.571 CHRONIC PAIN OF RIGHT ANKLE: ICD-10-CM

## 2022-05-04 DIAGNOSIS — G89.29 CHRONIC PAIN OF RIGHT ANKLE: ICD-10-CM

## 2022-05-04 DIAGNOSIS — Z01.818 PREOP TESTING: ICD-10-CM

## 2022-05-04 DIAGNOSIS — M94.9 OSTEOCHONDRAL LESION: Primary | ICD-10-CM

## 2022-05-04 DIAGNOSIS — M89.9 OSTEOCHONDRAL LESION: Primary | ICD-10-CM

## 2022-05-04 DIAGNOSIS — M25.371 ANKLE INSTABILITY, RIGHT: ICD-10-CM

## 2022-05-04 LAB
ERYTHROCYTE [DISTWIDTH] IN BLOOD BY AUTOMATED COUNT: 15.9 % (ref 11.5–14.5)
HCT VFR BLD AUTO: 44.2 % (ref 37–48.5)
HGB BLD-MCNC: 13.7 G/DL (ref 12–16)
MCH RBC QN AUTO: 24.6 PG (ref 27–31)
MCHC RBC AUTO-ENTMCNC: 31 G/DL (ref 32–36)
MCV RBC AUTO: 79 FL (ref 82–98)
PLATELET # BLD AUTO: 410 K/UL (ref 150–450)
PMV BLD AUTO: 8.9 FL (ref 9.2–12.9)
RBC # BLD AUTO: 5.57 M/UL (ref 4–5.4)
WBC # BLD AUTO: 11.57 K/UL (ref 3.9–12.7)

## 2022-05-04 PROCEDURE — 25000003 PHARM REV CODE 250: Performed by: NURSE ANESTHETIST, CERTIFIED REGISTERED

## 2022-05-04 PROCEDURE — 63600175 PHARM REV CODE 636 W HCPCS: Performed by: NURSE ANESTHETIST, CERTIFIED REGISTERED

## 2022-05-04 PROCEDURE — 71000039 HC RECOVERY, EACH ADD'L HOUR: Performed by: PODIATRIST

## 2022-05-04 PROCEDURE — 01464 ANES ARTHRS PX ANKLE&/FOOT: CPT | Performed by: PODIATRIST

## 2022-05-04 PROCEDURE — 71000015 HC POSTOP RECOV 1ST HR: Performed by: PODIATRIST

## 2022-05-04 PROCEDURE — 27000673 HC TUBING BLOOD Y: Performed by: ANESTHESIOLOGY

## 2022-05-04 PROCEDURE — 29898 ANKLE ARTHROSCOPY/SURGERY: CPT | Mod: 51,RT,, | Performed by: PODIATRIST

## 2022-05-04 PROCEDURE — 29892 ARTHRS AID RPR OD LES/TIB FX: CPT | Mod: RT,,, | Performed by: PODIATRIST

## 2022-05-04 PROCEDURE — 27000080 OPTIME MED/SURG SUP & DEVICES GENERAL CLASSIFICATION: Performed by: PODIATRIST

## 2022-05-04 PROCEDURE — 71000016 HC POSTOP RECOV ADDL HR: Performed by: PODIATRIST

## 2022-05-04 PROCEDURE — 36000708 HC OR TIME LEV III 1ST 15 MIN: Performed by: PODIATRIST

## 2022-05-04 PROCEDURE — 36000709 HC OR TIME LEV III EA ADD 15 MIN: Performed by: PODIATRIST

## 2022-05-04 PROCEDURE — 37000008 HC ANESTHESIA 1ST 15 MINUTES: Performed by: PODIATRIST

## 2022-05-04 PROCEDURE — 27000671 HC TUBING MICROBORE EXT: Performed by: ANESTHESIOLOGY

## 2022-05-04 PROCEDURE — 37000009 HC ANESTHESIA EA ADD 15 MINS: Performed by: PODIATRIST

## 2022-05-04 PROCEDURE — 27202177 HC INTRODUCER, TRACHEAL TUBE: Performed by: ANESTHESIOLOGY

## 2022-05-04 PROCEDURE — 25000003 PHARM REV CODE 250: Performed by: ANESTHESIOLOGY

## 2022-05-04 PROCEDURE — 29892 PR ANKLE SCOPE,AID REPAIR FX,BONE DEFCT: ICD-10-PCS | Mod: RT,,, | Performed by: PODIATRIST

## 2022-05-04 PROCEDURE — 63600175 PHARM REV CODE 636 W HCPCS: Performed by: ANESTHESIOLOGY

## 2022-05-04 PROCEDURE — 27201423 OPTIME MED/SURG SUP & DEVICES STERILE SUPPLY: Performed by: PODIATRIST

## 2022-05-04 PROCEDURE — 85027 COMPLETE CBC AUTOMATED: CPT | Performed by: PODIATRIST

## 2022-05-04 PROCEDURE — 71000033 HC RECOVERY, INTIAL HOUR: Performed by: PODIATRIST

## 2022-05-04 PROCEDURE — 27202107 HC XP QUATRO SENSOR: Performed by: ANESTHESIOLOGY

## 2022-05-04 PROCEDURE — 29898 PR ANKLE SCOPE,EXTENS DEBRIDEMNT: ICD-10-PCS | Mod: 51,RT,, | Performed by: PODIATRIST

## 2022-05-04 PROCEDURE — 25000003 PHARM REV CODE 250: Performed by: PODIATRIST

## 2022-05-04 RX ORDER — ACETAMINOPHEN 10 MG/ML
INJECTION, SOLUTION INTRAVENOUS
Status: DISCONTINUED | OUTPATIENT
Start: 2022-05-04 | End: 2022-05-04

## 2022-05-04 RX ORDER — LIDOCAINE HYDROCHLORIDE 20 MG/ML
INJECTION, SOLUTION EPIDURAL; INFILTRATION; INTRACAUDAL; PERINEURAL
Status: DISCONTINUED | OUTPATIENT
Start: 2022-05-04 | End: 2022-05-04

## 2022-05-04 RX ORDER — SODIUM CHLORIDE, SODIUM LACTATE, POTASSIUM CHLORIDE, CALCIUM CHLORIDE 600; 310; 30; 20 MG/100ML; MG/100ML; MG/100ML; MG/100ML
INJECTION, SOLUTION INTRAVENOUS CONTINUOUS PRN
Status: DISCONTINUED | OUTPATIENT
Start: 2022-05-04 | End: 2022-05-04

## 2022-05-04 RX ORDER — MIDAZOLAM HYDROCHLORIDE 1 MG/ML
INJECTION INTRAMUSCULAR; INTRAVENOUS
Status: DISCONTINUED | OUTPATIENT
Start: 2022-05-04 | End: 2022-05-04

## 2022-05-04 RX ORDER — KETOROLAC TROMETHAMINE 30 MG/ML
INJECTION, SOLUTION INTRAMUSCULAR; INTRAVENOUS
Status: DISCONTINUED | OUTPATIENT
Start: 2022-05-04 | End: 2022-05-04

## 2022-05-04 RX ORDER — CEPHALEXIN 500 MG/1
500 CAPSULE ORAL EVERY 6 HOURS
Qty: 14 CAPSULE | Refills: 0
Start: 2022-05-04 | End: 2022-05-17

## 2022-05-04 RX ORDER — DIPHENHYDRAMINE HYDROCHLORIDE 50 MG/ML
12.5 INJECTION INTRAMUSCULAR; INTRAVENOUS
Status: DISCONTINUED | OUTPATIENT
Start: 2022-05-04 | End: 2022-05-04 | Stop reason: HOSPADM

## 2022-05-04 RX ORDER — OXYCODONE HYDROCHLORIDE 5 MG/1
5 TABLET ORAL EVERY 4 HOURS PRN
Status: DISCONTINUED | OUTPATIENT
Start: 2022-05-04 | End: 2022-05-04 | Stop reason: HOSPADM

## 2022-05-04 RX ORDER — LIDOCAINE HYDROCHLORIDE 20 MG/ML
JELLY TOPICAL
Status: DISCONTINUED | OUTPATIENT
Start: 2022-05-04 | End: 2022-05-04

## 2022-05-04 RX ORDER — BUPIVACAINE HYDROCHLORIDE AND EPINEPHRINE 5; 5 MG/ML; UG/ML
INJECTION, SOLUTION EPIDURAL; INTRACAUDAL; PERINEURAL
Status: DISCONTINUED | OUTPATIENT
Start: 2022-05-04 | End: 2022-05-04 | Stop reason: HOSPADM

## 2022-05-04 RX ORDER — PROMETHAZINE HYDROCHLORIDE 25 MG/1
25 TABLET ORAL EVERY 4 HOURS
Qty: 15 TABLET | Refills: 8
Start: 2022-05-04 | End: 2022-05-17

## 2022-05-04 RX ORDER — SUCCINYLCHOLINE CHLORIDE 20 MG/ML
INJECTION INTRAMUSCULAR; INTRAVENOUS
Status: DISCONTINUED | OUTPATIENT
Start: 2022-05-04 | End: 2022-05-04

## 2022-05-04 RX ORDER — ONDANSETRON 4 MG/1
4 TABLET, ORALLY DISINTEGRATING ORAL ONCE AS NEEDED
Status: DISCONTINUED | OUTPATIENT
Start: 2022-05-04 | End: 2022-05-04 | Stop reason: HOSPADM

## 2022-05-04 RX ORDER — FENTANYL CITRATE 50 UG/ML
INJECTION, SOLUTION INTRAMUSCULAR; INTRAVENOUS
Status: DISCONTINUED | OUTPATIENT
Start: 2022-05-04 | End: 2022-05-04

## 2022-05-04 RX ORDER — ONDANSETRON 2 MG/ML
4 INJECTION INTRAMUSCULAR; INTRAVENOUS DAILY PRN
Status: DISCONTINUED | OUTPATIENT
Start: 2022-05-04 | End: 2022-05-04 | Stop reason: HOSPADM

## 2022-05-04 RX ORDER — ONDANSETRON 2 MG/ML
INJECTION INTRAMUSCULAR; INTRAVENOUS
Status: DISCONTINUED | OUTPATIENT
Start: 2022-05-04 | End: 2022-05-04

## 2022-05-04 RX ORDER — OXYCODONE HYDROCHLORIDE 5 MG/1
5 TABLET ORAL
Status: DISCONTINUED | OUTPATIENT
Start: 2022-05-04 | End: 2022-05-04 | Stop reason: HOSPADM

## 2022-05-04 RX ORDER — DEXAMETHASONE SODIUM PHOSPHATE 4 MG/ML
INJECTION, SOLUTION INTRA-ARTICULAR; INTRALESIONAL; INTRAMUSCULAR; INTRAVENOUS; SOFT TISSUE
Status: DISCONTINUED | OUTPATIENT
Start: 2022-05-04 | End: 2022-05-04

## 2022-05-04 RX ORDER — PROPOFOL 10 MG/ML
VIAL (ML) INTRAVENOUS
Status: DISCONTINUED | OUTPATIENT
Start: 2022-05-04 | End: 2022-05-04

## 2022-05-04 RX ORDER — HYDROCODONE BITARTRATE AND ACETAMINOPHEN 7.5; 325 MG/1; MG/1
1 TABLET ORAL
Qty: 25 TABLET | Refills: 0
Start: 2022-05-04 | End: 2022-05-09 | Stop reason: SDUPTHER

## 2022-05-04 RX ORDER — HYDROMORPHONE HYDROCHLORIDE 1 MG/ML
0.2 INJECTION, SOLUTION INTRAMUSCULAR; INTRAVENOUS; SUBCUTANEOUS EVERY 5 MIN PRN
Status: DISCONTINUED | OUTPATIENT
Start: 2022-05-04 | End: 2022-05-04 | Stop reason: HOSPADM

## 2022-05-04 RX ORDER — DIPHENHYDRAMINE HYDROCHLORIDE 50 MG/ML
INJECTION INTRAMUSCULAR; INTRAVENOUS
Status: DISCONTINUED | OUTPATIENT
Start: 2022-05-04 | End: 2022-05-04

## 2022-05-04 RX ORDER — KETOROLAC TROMETHAMINE 30 MG/ML
15 INJECTION, SOLUTION INTRAMUSCULAR; INTRAVENOUS ONCE
Status: DISCONTINUED | OUTPATIENT
Start: 2022-05-04 | End: 2022-05-04 | Stop reason: HOSPADM

## 2022-05-04 RX ORDER — FAMOTIDINE 10 MG/ML
INJECTION INTRAVENOUS
Status: DISCONTINUED | OUTPATIENT
Start: 2022-05-04 | End: 2022-05-04

## 2022-05-04 RX ADMIN — FENTANYL CITRATE 50 MCG: 50 INJECTION INTRAMUSCULAR; INTRAVENOUS at 10:05

## 2022-05-04 RX ADMIN — DEXAMETHASONE SODIUM PHOSPHATE 8 MG: 4 INJECTION, SOLUTION INTRAMUSCULAR; INTRAVENOUS at 10:05

## 2022-05-04 RX ADMIN — KETOROLAC TROMETHAMINE 15 MG: 30 INJECTION, SOLUTION INTRAMUSCULAR at 10:05

## 2022-05-04 RX ADMIN — HYDROMORPHONE HYDROCHLORIDE 0.2 MG: 1 INJECTION, SOLUTION INTRAMUSCULAR; INTRAVENOUS; SUBCUTANEOUS at 01:05

## 2022-05-04 RX ADMIN — HYDROMORPHONE HYDROCHLORIDE 0.2 MG: 1 INJECTION, SOLUTION INTRAMUSCULAR; INTRAVENOUS; SUBCUTANEOUS at 02:05

## 2022-05-04 RX ADMIN — PROPOFOL 170 MG: 10 INJECTION, EMULSION INTRAVENOUS at 10:05

## 2022-05-04 RX ADMIN — Medication 140 MG: at 10:05

## 2022-05-04 RX ADMIN — SODIUM CHLORIDE, SODIUM LACTATE, POTASSIUM CHLORIDE, AND CALCIUM CHLORIDE: .6; .31; .03; .02 INJECTION, SOLUTION INTRAVENOUS at 11:05

## 2022-05-04 RX ADMIN — LIDOCAINE HYDROCHLORIDE 5 ML: 20 JELLY TOPICAL at 10:05

## 2022-05-04 RX ADMIN — OXYCODONE HYDROCHLORIDE 5 MG: 5 TABLET ORAL at 01:05

## 2022-05-04 RX ADMIN — FAMOTIDINE 20 MG: 10 INJECTION, SOLUTION INTRAVENOUS at 10:05

## 2022-05-04 RX ADMIN — SODIUM CHLORIDE, SODIUM LACTATE, POTASSIUM CHLORIDE, AND CALCIUM CHLORIDE: .6; .31; .03; .02 INJECTION, SOLUTION INTRAVENOUS at 10:05

## 2022-05-04 RX ADMIN — PROPOFOL 30 MG: 10 INJECTION, EMULSION INTRAVENOUS at 10:05

## 2022-05-04 RX ADMIN — ONDANSETRON 4 MG: 2 INJECTION INTRAMUSCULAR; INTRAVENOUS at 01:05

## 2022-05-04 RX ADMIN — LIDOCAINE HYDROCHLORIDE 80 MG: 20 INJECTION, SOLUTION EPIDURAL; INFILTRATION; INTRACAUDAL; PERINEURAL at 10:05

## 2022-05-04 RX ADMIN — ONDANSETRON 4 MG: 2 INJECTION INTRAMUSCULAR; INTRAVENOUS at 10:05

## 2022-05-04 RX ADMIN — MIDAZOLAM HYDROCHLORIDE 2 MG: 1 INJECTION, SOLUTION INTRAMUSCULAR; INTRAVENOUS at 10:05

## 2022-05-04 RX ADMIN — DEXTROSE 3 G: 50 INJECTION, SOLUTION INTRAVENOUS at 10:05

## 2022-05-04 RX ADMIN — ACETAMINOPHEN 1000 MG: 10 INJECTION, SOLUTION INTRAVENOUS at 10:05

## 2022-05-04 RX ADMIN — DIPHENHYDRAMINE HYDROCHLORIDE 6.25 MG: 50 INJECTION, SOLUTION INTRAMUSCULAR; INTRAVENOUS at 10:05

## 2022-05-04 NOTE — TRANSFER OF CARE
"Anesthesia Transfer of Care Note    Patient: Loretta Lea    Procedure(s) Performed: Procedure(s) (LRB):  ARTHROSCOPY, ANKLE (Right)    Patient location: PACU    Anesthesia Type: general    Transport from OR: Transported from OR on room air with adequate spontaneous ventilation    Post pain: adequate analgesia    Post assessment: no apparent anesthetic complications    Post vital signs: stable    Level of consciousness: awake, alert and oriented    Nausea/Vomiting: no nausea/vomiting    Complications: none    Transfer of care protocol was followed      Last vitals:   Visit Vitals  BP (!) 152/93 (Patient Position: Lying)   Temp 37.1 °C (98.7 °F) (Oral)   Resp 18   Ht 5' 7" (1.702 m)   Wt (!) 142.9 kg (315 lb)   LMP 04/15/2017 (Approximate)   SpO2 97%   Breastfeeding No   BMI 49.34 kg/m²     "

## 2022-05-04 NOTE — ANESTHESIA PROCEDURE NOTES
Intubation    Date/Time: 5/4/2022 10:33 AM  Performed by: Joey Wilburn CRNA  Authorized by: Herb Samson MD     Intubation:     Induction:  Intravenous    Intubated:  Postinduction    Mask Ventilation:  Easy with oral airway    Attempts:  1    Attempted By:  CRNA    Method of Intubation:  Video laryngoscopy    Blade:  Carson 3    Laryngeal View Grade: Grade I - full view of cords      Difficult Airway Encountered?: No      Complications:  None    Airway Device:  Oral endotracheal tube    Airway Device Size:  7.0    Style/Cuff Inflation:  Cuffed    Tube secured:  22    Secured at:  The lips    Placement Verified By:  Capnometry    Complicating Factors:  None    Findings Post-Intubation:  BS equal bilateral and atraumatic/condition of teeth unchanged

## 2022-05-04 NOTE — OP NOTE
Operative Report     Patient name: Loretta Lea   MRN: [id number]  Date of surgery: 5/4/2022    Surgeon: Bimal Mccormick DPM   Assistant:  Briana Alberts DPM, PGY 2    Preoperative diagnosis:  1. Osteochondral lesion dome of talus right ankle  2. Ankle joint synovitis right with scar tissue   Postoperative diagnosis:  Same as the above  Procedure:  Arthroscopic repair of osteochondral lesion dome of talus right foot and debridement is sent of itis right ankle joint  Anesthesia:  General anesthesia supplemented with a postoperative local anesthetic block of the ankle and use of methylprednisolone and Decadron with 0.5% plain Marcaine  Hemostasis:  Thigh tourniquet 3 mmHg  Estimated blood loss:  1 cc   Specimen:  None  Complications: None  Condition upon discharge: Stable    Procedure in detail:  Patient brought the operating room time-out was called she was placed on operating room table general anesthesia was obtained.  The right foot leg were prepped draped usual aseptic manner.  Anterior medial lateral anterior portals were identified verified with C-arm.  Foot leg exsanguinated tourniquet inflated 300 mmHg at the thigh.  The ankle was distended with 20 cc of saline.  Small 1 cm incision was made at the anterior medial portal was bone down to the capsule capsule was perforated with a trocar trocar and cannula were put in place scope was put in place after the trocar was removed is a lot of synovitic tissue med difficult to see into the joint some made the lateral portal it lateral to the extensor digitorum longus tendon trying to avoid the area where I did a previous ankle ligament repair.  Bone and down to capsule perforated capsule and placed the shaver into the capsule this will head in grass and egress suction was able to dilate the joint.  I exam the joint there was osteochondral lesion on the medial side of the talus and there was lot of synovitis I used the synovial shaver removed the synovitis  from the anterior ankle medial lateral gutter care being taken to avoid disturbing the lateral ankle ligament repair.  I used the shaver to remove the osteochondral lesion down to bone perforated.  I explored the area for further tissue abnormalities I did some more cleanup on the gutters.  Allowed the ankle to irrigate well and supplemented the area gauge shin with a [place] tip suction.  Once the ankle was clean I removed the scope.  Squeeze of fluid out of the ankle we closed the portal incision with 4-0 Prolene injected the portal incision with 0.5% Marcaine with epi the ankle joint was then injected with 1/2 cc of Marcaine 1 cc Decadron 1/2 cc of methylprednisolone.  Exparel Marcaine use around the ankle area was dressed with Adaptic 4x4s Kadi tourniquet deflated vascular status of digits intact no bleed through.  Ace wrap applied and Cam Walker boot cast.  Patient had anesthesia reversed left the operating room with stable vitals.    1. Keep dressings, clean, dry, and intact to surgical extremity.  2. Rest, ice, and elevate the surgical extremity.  3. Cam Walker boot cast to surgical extremity in operating room  4. Take all medication as discussed at discharge.  Keflex 500 mg, Norco 7.5, Phenergan 25  5. Contact the clinic with any postoperative concerns or complications.  6. Follow up in one week for 1st post op.

## 2022-05-04 NOTE — ANESTHESIA POSTPROCEDURE EVALUATION
"Anesthesia Post Evaluation    Patient: Loretta Lea    Procedure(s) Performed: Procedure(s) (LRB):  ARTHROSCOPY, ANKLE (Right)    Final Anesthesia Type: general      Patient location during evaluation: PACU  Patient participation: Yes- Able to Participate  Level of consciousness: awake and alert  Post-procedure vital signs: reviewed and stable  Pain management: adequate  Airway patency: patent    PONV status at discharge: No PONV  Anesthetic complications: no      Cardiovascular status: hemodynamically stable  Respiratory status: unassisted, spontaneous ventilation and room air  Hydration status: euvolemic  Follow-up not needed.          Vitals Value Taken Time   /73 05/04/22 1400   Temp 36.4 °C (97.5 °F) 05/04/22 1400   Pulse 100 05/04/22 1405   Resp 21 05/04/22 1405   SpO2 92 % 05/04/22 1405   Vitals shown include unvalidated device data.      No case tracking events are documented in the log.      Pain/Luz Maria Score: Pain Rating Prior to Med Admin: 5 (5/4/2022  1:54 PM)  Pain Rating Post Med Admin: -- (2-3 "tolerable") (5/4/2022  1:54 PM)  Luz Maria Score: 10 (5/4/2022  2:00 PM)        "

## 2022-05-04 NOTE — ANESTHESIA PREPROCEDURE EVALUATION
05/04/2022  Loretta Lea is a 31 y.o., female.      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 OR;  Service: General;  Laterality: N/A;    OOPHORECTOMY      opherectom Left 2015    salpingo-opherectomy    REPAIR OF LIGAMENT OF ANKLE Right 6/23/2021    Procedure: REPAIR OF ANTERIOR TALOFIBULAR LIGAMENT CALCANEOFIBULAR LIGAMENT;  Surgeon: Bimal Mccormick DPM;  Location: [facility] OR;  Service: Podiatry;  Laterality: Right;  WITH ARTHREX INTERNAL BRACE    SURGICAL REMOVAL OF BONE SPUR Right 6/23/2021    Procedure: EXCISION OS TRIGONUM;  Surgeon: Bimal Mccormick DPM;  Location: [facility] OR;  Service: Podiatry;  Laterality: Right;    WISDOM TOOTH EXTRACTION          Tobacco Use:  The patient  reports that she has been smoking cigarettes. She has a 15.00 pack-year smoking history. She has never used smokeless tobacco.     Results for orders placed or performed during the hospital encounter of 02/12/22   EKG 12-lead    Collection Time: 02/12/22  6:09 PM    Narrative    Test Reason : M25.519,    Vent. Rate : 087 BPM     Atrial Rate : 087 BPM     P-R Int : 148 ms          QRS Dur :  088 ms      QT Int : 384 ms       P-R-T Axes : 015 069 052 degrees     QTc Int : 462 ms    Normal sinus rhythm  Normal ECG  When compared with ECG of 18-AUG-2021 23:45,  No significant change was found  Confirmed by Kenji MARTELL, Krzysztof ARANA ([id number]) on 2/18/2022 8:45:06 PM    Referred By: [email or web address]   SELF           Confirmed By:Krzysztof Phillip MD             Lab Results   Component Value Date    WBC 11.57 05/04/2022    HGB 13.7 05/04/2022    HCT 44.2 05/04/2022    MCV 79 (L) 05/04/2022     05/04/2022     BMP  Lab Results   Component Value Date     (L) 04/26/2022    K 4.3 04/26/2022    CL 99 04/26/2022    CO2 24 04/26/2022    BUN 12 04/26/2022    CREATININE 0.7 04/26/2022    CALCIUM 8.3 (L) 04/26/2022    ANIONGAP 7 (L) 04/26/2022     (H) 04/26/2022     (H) 11/26/2021     08/19/2021       No results found for this or any previous visit.        Pre-op Assessment    I have reviewed the Patient Summary Reports.     I have reviewed the Nursing Notes. I have reviewed the NPO Status.   I have reviewed the Medications.     Review of Systems  Anesthesia Hx:  No problems with previous Anesthesia  Denies Family Hx of Anesthesia complications.   Denies Personal Hx of Anesthesia complications.   Social:  Smoker, No Alcohol Use    Hematology/Oncology:  Hematology Normal   Oncology Normal     EENT/Dental:EENT/Dental Normal   Cardiovascular:  Cardiovascular Normal  ECG has been reviewed.    Pulmonary:   Patient notes that she is a smoker and experiences occasional wheezing but is without a halo use  Chronic bronchitis   Renal/:  Renal/ Normal     Hepatic/GI:   GERD, poorly controlled    Musculoskeletal:   Gait abnormality  Weakness of foot, right  Scapular dysfunction   Spine Disorders: lumbar Chronic Pain    Neurological:   Headaches Migraines   Endocrine:  Morbid Obesity / BMI > 40  Dermatological:  Skin Normal    Psych:   Psychiatric History anxiety depression Sleep Disorder and Insomnia. Psychotic  Disorder and Bipolar disorder.  Sleep Disorder and Insomnia.        Physical Exam  General: Well nourished, Cooperative, Alert and Oriented    Airway:  Mallampati: I   Mouth Opening: Normal  TM Distance: Normal  Tongue: Normal  Neck ROM: Normal ROM    Dental:    Chest/Lungs:  Clear to auscultation, Normal Respiratory Rate    Heart:  Rate: Normal  Rhythm: Regular Rhythm  Sounds: Normal    Abdomen:  Normal, Soft, Nontender        Anesthesia Plan  Type of Anesthesia, risks & benefits discussed:    Anesthesia Type: Gen ETT  Intra-op Monitoring Plan: Standard ASA Monitors  Post Op Pain Control Plan: multimodal analgesia and IV/PO Opioids PRN  Induction:  IV  Airway Plan: Direct and Video, Post-Induction  Informed Consent: Informed consent signed with the Patient and all parties understand the risks and agree with anesthesia plan.  All questions answered.   ASA Score: 3  Anesthesia Plan Notes:     GETA  Benadryl 6.25 mg iv, Decadron 8 mg, iv Zofran 4 mg iv, Pepcid 20 mg iv   Ofirmev 1000 mg iv, Toradol 15 mg iv, Sugammadex   LORENA Precautions: Extubate patient awake with HOB elevated and Oral Airway in place     Ready For Surgery From Anesthesia Perspective.     .

## 2022-05-04 NOTE — PLAN OF CARE
15:20  Pt tolerating oral liquids and voiding without difficulty,  Vital signs stable, right foot dressing clean and dry, Cam boot on, pt ready for discharge

## 2022-05-05 ENCOUNTER — HOSPITAL ENCOUNTER (EMERGENCY)
Facility: HOSPITAL | Age: 32
Discharge: HOME | End: 2022-05-05
Attending: EMERGENCY MEDICINE
Payer: MEDICAID

## 2022-05-05 ENCOUNTER — TELEPHONE (OUTPATIENT)
Dept: PODIATRY | Facility: CLINIC | Age: 32
End: 2022-05-05
Payer: MEDICAID

## 2022-05-05 VITALS
HEART RATE: 86 BPM | HEIGHT: 67 IN | TEMPERATURE: 99 F | WEIGHT: 293 LBS | RESPIRATION RATE: 20 BRPM | DIASTOLIC BLOOD PRESSURE: 93 MMHG | OXYGEN SATURATION: 96 % | SYSTOLIC BLOOD PRESSURE: 173 MMHG | BODY MASS INDEX: 45.99 KG/M2

## 2022-05-05 DIAGNOSIS — M54.2 NECK PAIN: ICD-10-CM

## 2022-05-05 DIAGNOSIS — M62.838 NECK MUSCLE SPASM: Primary | ICD-10-CM

## 2022-05-05 LAB
B-HCG UR QL: NEGATIVE
CTP QC/QA: YES

## 2022-05-05 PROCEDURE — 99284 EMERGENCY DEPT VISIT MOD MDM: CPT

## 2022-05-05 PROCEDURE — 63600175 PHARM REV CODE 636 W HCPCS: Performed by: STUDENT IN AN ORGANIZED HEALTH CARE EDUCATION/TRAINING PROGRAM

## 2022-05-05 PROCEDURE — 25000003 PHARM REV CODE 250: Performed by: STUDENT IN AN ORGANIZED HEALTH CARE EDUCATION/TRAINING PROGRAM

## 2022-05-05 PROCEDURE — 81025 URINE PREGNANCY TEST: CPT | Performed by: STUDENT IN AN ORGANIZED HEALTH CARE EDUCATION/TRAINING PROGRAM

## 2022-05-05 PROCEDURE — 96372 THER/PROPH/DIAG INJ SC/IM: CPT | Performed by: STUDENT IN AN ORGANIZED HEALTH CARE EDUCATION/TRAINING PROGRAM

## 2022-05-05 RX ORDER — KETOROLAC TROMETHAMINE 30 MG/ML
15 INJECTION, SOLUTION INTRAMUSCULAR; INTRAVENOUS
Status: COMPLETED | OUTPATIENT
Start: 2022-05-05 | End: 2022-05-05

## 2022-05-05 RX ORDER — NAPROXEN 500 MG/1
500 TABLET ORAL 2 TIMES DAILY WITH MEALS
Qty: 10 TABLET | Refills: 0 | Status: SHIPPED | OUTPATIENT
Start: 2022-05-05 | End: 2022-05-10

## 2022-05-05 RX ORDER — METHOCARBAMOL 750 MG/1
750 TABLET, FILM COATED ORAL 3 TIMES DAILY
Qty: 15 TABLET | Refills: 0 | Status: SHIPPED | OUTPATIENT
Start: 2022-05-05 | End: 2022-05-10

## 2022-05-05 RX ORDER — METHOCARBAMOL 500 MG/1
1000 TABLET, FILM COATED ORAL
Status: COMPLETED | OUTPATIENT
Start: 2022-05-05 | End: 2022-05-05

## 2022-05-05 RX ADMIN — KETOROLAC TROMETHAMINE 15 MG: 30 INJECTION, SOLUTION INTRAMUSCULAR; INTRAVENOUS at 10:05

## 2022-05-05 RX ADMIN — METHOCARBAMOL TABLETS 1000 MG: 500 TABLET, COATED ORAL at 10:05

## 2022-05-05 NOTE — TELEPHONE ENCOUNTER
Patient called c/o of neck pain that is not getting any better, wanted to know if it was due to anesthesia.  States that she has had multiple surgeries and never had this problem.    Dr. Mccormick advised if pain continues and no relief of symptoms  go to the ED to be evaluated.

## 2022-05-06 ENCOUNTER — PATIENT MESSAGE (OUTPATIENT)
Dept: PODIATRY | Facility: CLINIC | Age: 32
End: 2022-05-06
Payer: MEDICAID

## 2022-05-06 NOTE — DISCHARGE INSTRUCTIONS
Try heat pack, Robaxin and Naprosyn for pain.  Follow-up with your PCP return for worsening symptoms.

## 2022-05-06 NOTE — ED PROVIDER NOTES
Encounter Date: 5/5/2022       History     Chief Complaint   Patient presents with    Neck Pain     Pt states she had foot surgery this morning and is feeling neck and throat pain     HPI   31-year-old female with a past medical history of anxiety, bipolar disorder.  Presents emergency department with neck pain x1 day.  Patient had surgery on her ankle yesterday, was still sleepy after anesthesia but woke up today with neck pain.  Denies fever, chills, hoarseness, coughing up blood, sore throat, trauma to the neck, difficulty swallowing, throat swelling, headache or dizziness, upper extremity weakness, SOB.   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     Past Medical History:   Diagnosis Date    Anxiety     Back pain     Bipolar disorder 2004    bipolar 2    Chronic bronchitis     Depression     GERD (gastroesophageal reflux disease)     HPV (human papilloma virus) infection     Insomnia     Migraines     Ovarian cyst     Pneumonia      Past Surgical History:   Procedure Laterality Date    ARTHROSCOPY OF ANKLE Right 5/4/2022    Procedure: ARTHROSCOPY, ANKLE;  Surgeon: Bimal Mccormick DPM;  [facility];  Service: Podiatry;  Laterality: Right;  [employer] EXTERNAL ANKLE DISTRACTOR    CHOLECYSTECTOMY      DILATION AND CURETTAGE OF UTERUS      HYSTERECTOMY  2017    total, ovarian cysts, torsion, Berrault; hyst per Dr JESUS Manriquez    LAPAROSCOPIC APPENDECTOMY N/A 8/18/2021    Procedure: APPENDECTOMY, LAPAROSCOPIC;  Surgeon: Osiel Ramirez MD;  Location: [facility] OR;  Service: General;  Laterality: N/A;    OOPHORECTOMY      opherectom Left 2015    salpingo-opherectomy    REPAIR OF LIGAMENT OF ANKLE Right 6/23/2021    Procedure: REPAIR OF ANTERIOR TALOFIBULAR LIGAMENT CALCANEOFIBULAR LIGAMENT;  Surgeon: Bimal Mccormick DPM;  Location: [facility] OR;  Service: Podiatry;  Laterality: Right;  WITH ARTHREX INTERNAL BRACE  "   SURGICAL REMOVAL OF BONE SPUR Right 6/23/2021    Procedure: EXCISION OS TRIGONUM;  Surgeon: Bimal Mccormick DPM;  Location: [facility] OR;  Service: Podiatry;  Laterality: Right;    WISDOM TOOTH EXTRACTION       Family History   Adopted: Yes   Problem Relation Age of Onset    No Known Problems Sister     No Known Problems Sister     No Known Problems Sister      Social History     Tobacco Use    Smoking status: Current Every Day Smoker     Packs/day: 1.00     Years: 15.00     Pack years: 15.00     Types: Cigarettes    Smokeless tobacco: Never Used   Substance Use Topics    Alcohol use: Not Currently     Comment: rare    Drug use: Not Currently     Types: "Crack" cocaine     Comment: twice     Review of Systems   Constitutional: Negative for chills and fever.   HENT: Negative for congestion, sore throat and trouble swallowing.    Respiratory: Negative for cough and shortness of breath.    Cardiovascular: Negative for chest pain and leg swelling.   Gastrointestinal: Negative for abdominal pain, nausea and vomiting.   Genitourinary: Negative for dysuria.   Musculoskeletal: Positive for neck pain. Negative for back pain.   Skin: Negative for rash.   Neurological: Negative for weakness.   Hematological: Does not bruise/bleed easily.       Physical Exam     Initial Vitals [05/05/22 1953]   BP Pulse Resp Temp SpO2   (!) 173/93 (!) 6 20 98.9 °F (37.2 °C) 96 %      MAP       --         Physical Exam    Constitutional: She appears well-developed and well-nourished.   HENT:   Head: Normocephalic and atraumatic.   Mouth/Throat: Oropharynx is clear and moist.   oralpharynx clear. No tonsillar swelling or exudate. No signs of trauma.    Eyes: EOM are normal. Pupils are equal, round, and reactive to light. No scleral icterus.   Neck:   Paraspinal c-spine ttp bilaterally, no bruit.    Normal range of motion.  Cardiovascular: Normal rate.   No murmur heard.  Pulmonary/Chest: Breath sounds normal. She has no wheezes. She " has no rhonchi.   Abdominal: Abdomen is soft. Bowel sounds are normal. She exhibits no distension.   Musculoskeletal:         General: No edema. Normal range of motion.      Cervical back: Normal range of motion.     Lymphadenopathy:     She has no cervical adenopathy.   Neurological: She is alert and oriented to person, place, and time. She has normal strength. GCS score is 15. GCS eye subscore is 4. GCS verbal subscore is 5. GCS motor subscore is 6.   Skin: Skin is warm. No rash noted.         ED Course   Procedures  Labs Reviewed   POCT URINE PREGNANCY          Imaging Results          X-Ray Neck Soft Tissue (In process)                  Medications   methocarbamoL tablet 1,000 mg (1,000 mg Oral Given 5/5/22 2200)   ketorolac injection 15 mg (15 mg Intramuscular Given 5/5/22 2200)                Attending Attestation:   Physician Attestation Statement for Resident:  As the supervising MD   Physician Attestation Statement: I have personally seen and examined this patient.   I agree with the above history. -:   As the supervising MD I agree with the above PE.    As the supervising MD I agree with the above treatment, course, plan, and disposition.                       MDM:  31-year-old female with a past medical history of anxiety, bipolar disorder.  Presents emergency department with neck pain x1 day.  Patient had surgery on her ankle yesterday, was still sleepy after anesthesia but woke up today with neck pain.  Denies fever, chills, hoarseness, coughing up blood, sore throat, trauma to the neck, difficulty swallowing, throat swelling, headache or dizziness, upper extremity weakness, SOB. VSS. oralpharynx clear. No tonsillar swelling or exudate. No signs of trauma. Paraspinal c-spine ttp bilaterally, no bruit. . Lungs CTAB. XR neg, no free air.  Symptoms improved but Robaxin and Toradol.  Will give prescription for Robaxin and naproxen.  Follow up with her surgeon or PCP for further evaluation return to the  emergency department for worsening symptoms.  Stable for discharge at this time.    Kristie Dean MD  [employer] EM PGY4  5/5/2022 10:46PM  Clinical Impression:   Final diagnoses:  [M54.2] Neck pain  [M62.838] Neck muscle spasm (Primary)          ED Disposition Condition    Discharge Stable        ED Prescriptions     Medication Sig Dispense Start Date End Date Auth. Provider    methocarbamoL (ROBAXIN) 750 MG Tab Take 1 tablet (750 mg total) by mouth 3 (three) times daily. for 15 doses 15 tablet 5/5/2022 5/10/2022 Kristie Dean MD    naproxen (NAPROSYN) 500 MG tablet Take 1 tablet (500 mg total) by mouth 2 (two) times daily with meals. for 10 doses 10 tablet 5/5/2022 5/10/2022 Kristie Dean MD        Follow-up Information     Follow up With Specialties Details Why Contact Info Additional Information    [facility] - Emergency Dept Emergency Medicine Go to  As needed, If symptoms worsen [address] [facility] [address]  [phone] 1st floor    Magalie Benjamin MD Family Medicine Schedule an appointment as soon as possible for a visit in 1 week For follow up of ED visit [address] [facility] [address] [facility]  [address]  [place] [address]  [phone]              Kristie Dean MD  Resident  05/05/22 [id number]       Selvin Pelaez MD  05/06/22 [id number]

## 2022-05-09 ENCOUNTER — OFFICE VISIT (OUTPATIENT)
Dept: PODIATRY | Facility: CLINIC | Age: 32
End: 2022-05-09
Payer: MEDICAID

## 2022-05-09 VITALS — BODY MASS INDEX: 45.99 KG/M2 | HEIGHT: 67 IN | WEIGHT: 293 LBS | RESPIRATION RATE: 18 BRPM

## 2022-05-09 DIAGNOSIS — M94.9 OSTEOCHONDRAL LESION: Primary | ICD-10-CM

## 2022-05-09 DIAGNOSIS — M19.071 ARTHRITIS OF RIGHT ANKLE: ICD-10-CM

## 2022-05-09 DIAGNOSIS — R22.41 MASS OF RIGHT ANKLE: ICD-10-CM

## 2022-05-09 DIAGNOSIS — M89.9 OSTEOCHONDRAL LESION: Primary | ICD-10-CM

## 2022-05-09 PROCEDURE — 99024 PR POST-OP FOLLOW-UP VISIT: ICD-10-PCS | Mod: S$GLB,,, | Performed by: PODIATRIST

## 2022-05-09 PROCEDURE — 1159F PR MEDICATION LIST DOCUMENTED IN MEDICAL RECORD: ICD-10-PCS | Mod: CPTII,S$GLB,, | Performed by: PODIATRIST

## 2022-05-09 PROCEDURE — 1160F PR REVIEW ALL MEDS BY PRESCRIBER/CLIN PHARMACIST DOCUMENTED: ICD-10-PCS | Mod: CPTII,S$GLB,, | Performed by: PODIATRIST

## 2022-05-09 PROCEDURE — 1160F RVW MEDS BY RX/DR IN RCRD: CPT | Mod: CPTII,S$GLB,, | Performed by: PODIATRIST

## 2022-05-09 PROCEDURE — 3008F BODY MASS INDEX DOCD: CPT | Mod: CPTII,S$GLB,, | Performed by: PODIATRIST

## 2022-05-09 PROCEDURE — 3008F PR BODY MASS INDEX (BMI) DOCUMENTED: ICD-10-PCS | Mod: CPTII,S$GLB,, | Performed by: PODIATRIST

## 2022-05-09 PROCEDURE — 99024 POSTOP FOLLOW-UP VISIT: CPT | Mod: S$GLB,,, | Performed by: PODIATRIST

## 2022-05-09 PROCEDURE — 1159F MED LIST DOCD IN RCRD: CPT | Mod: CPTII,S$GLB,, | Performed by: PODIATRIST

## 2022-05-09 RX ORDER — HYDROCODONE BITARTRATE AND ACETAMINOPHEN 7.5; 325 MG/1; MG/1
1 TABLET ORAL EVERY 6 HOURS PRN
Qty: 28 TABLET | Refills: 0 | Status: ON HOLD | OUTPATIENT
Start: 2022-05-09 | End: 2022-08-09 | Stop reason: HOSPADM

## 2022-05-09 NOTE — LETTER
May 9, 2022      [facility] - Podiatry  [address]  [place] [address]  Phone: [phone]  Fax: [phone]       Patient: Loretta Lea   YOB: 1990  Date of Visit: 05/09/2022    To Whom It May Concern:    Kirti Lea  was at [facility] on 05/09/2022. The patient recently had surgery on 05/04/2022 on her foot. Patient will not be able to return to gym for at least the next four months. If you have any questions, please don't hesitate to contact the office.    Sincerely,    Electronically Signed by: RUSTY Romo RN

## 2022-05-09 NOTE — PROGRESS NOTES
"  [address] [facility] [address] [place] [address]  Phone: [phone]   [phone]    Patient's PCP:Magalie Roldan MD  Referring Provider:No ref. provider found    Subjective:      Chief Complaint: Post-op Evaluation (Arthroscopic repair of osteochondral lesion dome of talus right foot and debridement is sent of itis right ankle joint)          Date of Surgery: 05/04/2022  Procedure: Arthroscopic repair of osteochondral lesion dome of talus right foot and debridement is sent of itis right ankle joint    HPI:   Loretta Lea is a 31 y.o. female who returns to the clinic today for her post-operative visit. Loretta Lea rates pain a 5/10 on a pain scale. Compliance of Care:  Nonweightbearing in a cam walker boot cast, bandage, intact    Do you have a cough? no  Have you had a cough since your surgical procedure ?no  Are you short of breath?no  Have you experienced shortness of breath since your surgical procedure?no  Do you have a fever? no  Did you have a fever since your surgical procedure? no    Any redness at the surgery site? no Warm to the touch? no  Have you been tested for COVID-19 since your surgical procedure? no     Vitals:    05/09/22 1054   Resp: 18   Weight: (!) 142.9 kg (315 lb)   Height: 5' 7" (1.702 m)   PainSc:   5        Past Surgical History:   Procedure Laterality Date    ARTHROSCOPY OF ANKLE Right 5/4/2022    Procedure: ARTHROSCOPY, ANKLE;  Surgeon: Bimal Mccormick DPM;  Location: [facility] OR;  Service: Podiatry;  Laterality: Right;  [employer] EXTERNAL ANKLE DISTRACTOR    CHOLECYSTECTOMY      DILATION AND CURETTAGE OF UTERUS      HYSTERECTOMY  2017    total, ovarian cysts, torsion, [place]; hyst per Dr JESUS Manriquez    LAPAROSCOPIC APPENDECTOMY N/A 8/18/2021    Procedure: APPENDECTOMY, LAPAROSCOPIC;  Surgeon: Osiel Ramirez MD;  Location: [facility] OR;  Service: General;  Laterality: N/A;    OOPHORECTOMY      opherectom Left 2015    salpingo-opherectomy    REPAIR " "OF LIGAMENT OF ANKLE Right 6/23/2021    Procedure: REPAIR OF ANTERIOR TALOFIBULAR LIGAMENT CALCANEOFIBULAR LIGAMENT;  Surgeon: Bimal Mccormick DPM;  Location: [facility] OR;  Service: Podiatry;  Laterality: Right;  WITH ARTHREX INTERNAL BRACE    SURGICAL REMOVAL OF BONE SPUR Right 6/23/2021    Procedure: EXCISION OS TRIGONUM;  Surgeon: Bimal Mccormick DPM;  Location: [facility] OR;  Service: Podiatry;  Laterality: Right;    WISDOM TOOTH EXTRACTION       Past Medical History:   Diagnosis Date    Anxiety     Back pain     Bipolar disorder 2004    bipolar 2    Chronic bronchitis     Depression     GERD (gastroesophageal reflux disease)     HPV (human papilloma virus) infection     Insomnia     Migraines     Ovarian cyst     Pneumonia      Family History   Adopted: Yes   Problem Relation Age of Onset    No Known Problems Sister     No Known Problems Sister     No Known Problems Sister         Social History:   Marital Status:   Alcohol History:  reports previous alcohol use.  Tobacco History:  reports that she has been smoking cigarettes. She has a 15.00 pack-year smoking history. She has never used smokeless tobacco.  Drug History:  reports previous drug use. Drug: "Crack" cocaine.    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       Current Outpatient Medications   Medication Sig Dispense Refill    HYDROcodone-acetaminophen (NORCO) 7.5-325 mg per tablet Take 1 tablet by mouth every 4 to 6 hours as needed for Pain. 25 tablet 0    methocarbamoL (ROBAXIN) 750 MG Tab Take 1 tablet (750 mg total) by mouth 3 (three) times daily. for 15 doses 15 tablet 0    naproxen (NAPROSYN) 500 MG tablet Take 1 tablet (500 mg total) by mouth 2 (two) times daily with meals. for 10 doses 10 tablet 0    nicotine (NICODERM CQ) 21 mg/24 hr Place 1 patch onto the skin once daily. 28 patch 1    promethazine (PHENERGAN) 25 MG tablet Take " 1 tablet (25 mg total) by mouth every 4 (four) hours. 15 tablet 8    QUEtiapine (SEROQUEL) 100 MG Tab Take 2 tablets (200 mg total) by mouth nightly. 60 tablet 2    sertraline (ZOLOFT) 100 MG tablet Take 1 tablet (100 mg total) by mouth once daily. 30 tablet 0    cephALEXin (KEFLEX) 500 MG capsule Take 1 capsule (500 mg total) by mouth every 6 (six) hours. (Patient not taking: Reported on 5/9/2022) 14 capsule 0    HYDROcodone-acetaminophen (NORCO) 5-325 mg per tablet Take 1 tablet by mouth every 4 (four) hours as needed for Pain. (Patient not taking: Reported on 5/9/2022) 28 tablet 0     No current facility-administered medications for this visit.       Review of Systems   Constitutional: Negative for chills, fatigue, fever and unexpected weight change.   HENT: Negative for hearing loss and trouble swallowing.    Eyes: Negative for photophobia and visual disturbance.   Respiratory: Negative for cough, shortness of breath and wheezing.    Cardiovascular: Negative for chest pain, palpitations and leg swelling.   Gastrointestinal: Negative for abdominal pain and nausea.   Genitourinary: Negative for dysuria and frequency.   Musculoskeletal: Positive for gait problem and myalgias. Negative for arthralgias, back pain and joint swelling.   Skin: Positive for wound. Negative for rash.   Neurological: Negative for tremors, seizures, weakness, numbness and headaches.   Hematological: Does not bruise/bleed easily.         Objective:        Post-op surgery of the arthroscopy right ankle with debridement of osteochondral lesion and synovectomy with normal healing, no signs of infection or dehiscence of wound. Hardware in place. Normal post op exam today. No redness, no drainage, no increase in local temperature, no significant swelling, sutures.steri-strips are intact.     Imaging:     Physical Exam:   Foot Exam    General  General Appearance: appears stated age and healthy   Orientation: alert and oriented to person,  place, and time   Affect: appropriate   Gait: antalgic   Assistance: (Cam Walker boot cast right lower extremity)          Physical Exam       Assessment:       1. Osteochondral lesion    2. Arthritis of right ankle    3. Mass of right ankle      Plan:   Osteochondral lesion    Arthritis of right ankle    Mass of right ankle    No signs of any infection or complications patient may begin range of motion may gradually increase weight-bearing with Cam Walker boot cast as tolerated.  Retur in 1 week for suture removal.  No follow-ups on file.    Procedures - None    The surgical dressing was removed showing no signs of infection, excess edema or malalignment. A new dry dressing was applied and patient was instructed to leave dressing intact until next visit or until instructed to remove.     This note was created using Dragon voice recognition software that occasionally misinterpreted phrases or words.

## 2022-05-13 ENCOUNTER — PATIENT MESSAGE (OUTPATIENT)
Dept: FAMILY MEDICINE | Facility: CLINIC | Age: 32
End: 2022-05-13

## 2022-05-17 ENCOUNTER — OFFICE VISIT (OUTPATIENT)
Dept: FAMILY MEDICINE | Facility: CLINIC | Age: 32
End: 2022-05-17
Payer: MEDICAID

## 2022-05-17 VITALS
OXYGEN SATURATION: 96 % | BODY MASS INDEX: 45.99 KG/M2 | HEIGHT: 67 IN | TEMPERATURE: 99 F | WEIGHT: 293 LBS | SYSTOLIC BLOOD PRESSURE: 124 MMHG | HEART RATE: 91 BPM | DIASTOLIC BLOOD PRESSURE: 82 MMHG

## 2022-05-17 DIAGNOSIS — F17.200 SMOKER: ICD-10-CM

## 2022-05-17 DIAGNOSIS — F31.75 BIPOLAR DISORDER, IN PARTIAL REMISSION, MOST RECENT EPISODE DEPRESSED: Primary | ICD-10-CM

## 2022-05-17 DIAGNOSIS — Z23 NEED FOR VACCINATION: ICD-10-CM

## 2022-05-17 DIAGNOSIS — D72.829 LEUKOCYTOSIS, UNSPECIFIED TYPE: ICD-10-CM

## 2022-05-17 PROBLEM — G89.29 CHRONIC PAIN OF RIGHT ANKLE: Status: RESOLVED | Noted: 2021-04-09 | Resolved: 2022-05-17

## 2022-05-17 PROBLEM — R29.898 WEAKNESS OF FOOT, RIGHT: Status: RESOLVED | Noted: 2021-09-27 | Resolved: 2022-05-17

## 2022-05-17 PROBLEM — M25.571 CHRONIC PAIN OF RIGHT ANKLE: Status: RESOLVED | Noted: 2021-04-09 | Resolved: 2022-05-17

## 2022-05-17 PROBLEM — R53.83 FATIGUE: Status: RESOLVED | Noted: 2020-10-20 | Resolved: 2022-05-17

## 2022-05-17 PROCEDURE — 3079F DIAST BP 80-89 MM HG: CPT | Mod: CPTII,,, | Performed by: FAMILY MEDICINE

## 2022-05-17 PROCEDURE — 3008F PR BODY MASS INDEX (BMI) DOCUMENTED: ICD-10-PCS | Mod: CPTII,,, | Performed by: FAMILY MEDICINE

## 2022-05-17 PROCEDURE — 90677 PCV20 VACCINE IM: CPT | Mod: PBBFAC | Performed by: FAMILY MEDICINE

## 2022-05-17 PROCEDURE — 99213 OFFICE O/P EST LOW 20 MIN: CPT | Mod: S$PBB,,, | Performed by: FAMILY MEDICINE

## 2022-05-17 PROCEDURE — 3008F BODY MASS INDEX DOCD: CPT | Mod: CPTII,,, | Performed by: FAMILY MEDICINE

## 2022-05-17 PROCEDURE — 99214 OFFICE O/P EST MOD 30 MIN: CPT | Performed by: FAMILY MEDICINE

## 2022-05-17 PROCEDURE — 3074F PR MOST RECENT SYSTOLIC BLOOD PRESSURE < 130 MM HG: ICD-10-PCS | Mod: CPTII,,, | Performed by: FAMILY MEDICINE

## 2022-05-17 PROCEDURE — 1159F MED LIST DOCD IN RCRD: CPT | Mod: CPTII,,, | Performed by: FAMILY MEDICINE

## 2022-05-17 PROCEDURE — 99213 PR OFFICE/OUTPT VISIT, EST, LEVL III, 20-29 MIN: ICD-10-PCS | Mod: S$PBB,,, | Performed by: FAMILY MEDICINE

## 2022-05-17 PROCEDURE — 1159F PR MEDICATION LIST DOCUMENTED IN MEDICAL RECORD: ICD-10-PCS | Mod: CPTII,,, | Performed by: FAMILY MEDICINE

## 2022-05-17 PROCEDURE — 3074F SYST BP LT 130 MM HG: CPT | Mod: CPTII,,, | Performed by: FAMILY MEDICINE

## 2022-05-17 PROCEDURE — 3079F PR MOST RECENT DIASTOLIC BLOOD PRESSURE 80-89 MM HG: ICD-10-PCS | Mod: CPTII,,, | Performed by: FAMILY MEDICINE

## 2022-05-17 RX ORDER — SERTRALINE HYDROCHLORIDE 100 MG/1
100 TABLET, FILM COATED ORAL DAILY
Qty: 30 TABLET | Refills: 0 | Status: SHIPPED | OUTPATIENT
Start: 2022-05-17 | End: 2022-07-07

## 2022-05-17 RX ORDER — QUETIAPINE FUMARATE 300 MG/1
300 TABLET, FILM COATED ORAL NIGHTLY
Qty: 90 TABLET | Refills: 1 | Status: SHIPPED | OUTPATIENT
Start: 2022-05-17 | End: 2023-01-11

## 2022-05-17 NOTE — PROGRESS NOTES
SUBJECTIVE:    Patient ID: Loretta Lea is a 31 y.o. female.    Chief Complaint: Follow-up (BPD and Smoking Cessation )    HPI  Loretta Lea is a 31 y.o. female who comes in for follow up on Bipolar disorder and Smoking cessation.     At last OV, her Seroquel was increased to 200mg qHS, with plan to increase Sertraline to 150mg if breakthrough sxs of Anxiety and/or Depression continued. Today, she reports that the Seroquel is working better, as she has not had further manic episodes. However, she is still having difficulty with insomnia (though also improved). Also with recently increased symptoms of depression, which she attributes to recent foot surgery and inability to be as independent as she is used to. Adam by playing video games and crocheting.    For her smoking cessation, she was rx'd nicotine patches at 21mg dose (smoked 2ppd). She reports that she has not yet tried the patches. She did manage to cut down to 1/2ppd for a few weeks, but now back to 2ppd. Contemplative.    The patient has been noted to have persistent leukocytosis and intermittent thrombocytosis. She has never had a hematologic workup.    Health maintenance: due for PCV20 and Covid 19 booster.      Admission on 05/05/2022, Discharged on 05/05/2022   Component Date Value Ref Range Status    POC Preg Test, Ur 05/05/2022 Negative  Negative Final     Acceptable 05/05/2022 Yes   Final   Admission on 05/04/2022, Discharged on 05/04/2022   Component Date Value Ref Range Status    WBC 05/04/2022 11.57  3.90 - 12.70 K/uL Final    RBC 05/04/2022 5.57 (A) 4.00 - 5.40 M/uL Final    Hemoglobin 05/04/2022 13.7  12.0 - 16.0 g/dL Final    Hematocrit 05/04/2022 44.2  37.0 - 48.5 % Final    MCV 05/04/2022 79 (A) 82 - 98 fL Final    MCH 05/04/2022 24.6 (A) 27.0 - 31.0 pg Final    MCHC 05/04/2022 31.0 (A) 32.0 - 36.0 g/dL Final    RDW 05/04/2022 15.9 (A) 11.5 - 14.5 % Final    Platelets 05/04/2022 410  122 - 482  K/uL Final    MPV 05/04/2022 8.9 (A) 9.2 - 12.9 fL Final   Lab Visit on 04/26/2022   Component Date Value Ref Range Status    Sodium 04/26/2022 130 (A) 136 - 145 mmol/L Final    Potassium 04/26/2022 4.3  3.5 - 5.1 mmol/L Final    Chloride 04/26/2022 99  95 - 110 mmol/L Final    CO2 04/26/2022 24  23 - 29 mmol/L Final    Glucose 04/26/2022 242 (A) 70 - 110 mg/dL Final    BUN 04/26/2022 12  6 - 20 mg/dL Final    Creatinine 04/26/2022 0.7  0.5 - 1.4 mg/dL Final    Calcium 04/26/2022 8.3 (A) 8.7 - 10.5 mg/dL Final    Total Protein 04/26/2022 7.9  6.0 - 8.4 g/dL Final    Albumin 04/26/2022 3.5  3.5 - 5.2 g/dL Final    Total Bilirubin 04/26/2022 0.4  0.1 - 1.0 mg/dL Final    Alkaline Phosphatase 04/26/2022 112  55 - 135 U/L Final    AST 04/26/2022 25  10 - 40 U/L Final    ALT 04/26/2022 46 (A) 10 - 44 U/L Final    Anion Gap 04/26/2022 7 (A) 8 - 16 mmol/L Final    eGFR if African American 04/26/2022 >60.0  >60 mL/min/1.73 m^2 Final    eGFR if non African American 04/26/2022 >60.0  >60 mL/min/1.73 m^2 Final    WBC 04/26/2022 15.05 (A) 3.90 - 12.70 K/uL Final    RBC 04/26/2022 5.65 (A) 4.00 - 5.40 M/uL Final    Hemoglobin 04/26/2022 14.0  12.0 - 16.0 g/dL Final    Hematocrit 04/26/2022 44.7  37.0 - 48.5 % Final    MCV 04/26/2022 79 (A) 82 - 98 fL Final    MCH 04/26/2022 24.8 (A) 27.0 - 31.0 pg Final    MCHC 04/26/2022 31.3 (A) 32.0 - 36.0 g/dL Final    RDW 04/26/2022 15.9 (A) 11.5 - 14.5 % Final    Platelets 04/26/2022 456 (A) 150 - 450 K/uL Final    MPV 04/26/2022 8.6 (A) 9.2 - 12.9 fL Final    Immature Granulocytes 04/26/2022 0.3  0.0 - 0.5 % Final    Gran # (ANC) 04/26/2022 10.1 (A) 1.8 - 7.7 K/uL Final    Immature Grans (Abs) 04/26/2022 0.05 (A) 0.00 - 0.04 K/uL Final    Lymph # 04/26/2022 4.0  1.0 - 4.8 K/uL Final    Mono # 04/26/2022 0.7  0.3 - 1.0 K/uL Final    Eos # 04/26/2022 0.1  0.0 - 0.5 K/uL Final    Baso # 04/26/2022 0.08  0.00 - 0.20 K/uL Final    nRBC 04/26/2022 0  0 /100  WBC Final    Gran % 04/26/2022 67.3  38.0 - 73.0 % Final    Lymph % 04/26/2022 26.5  18.0 - 48.0 % Final    Mono % 04/26/2022 4.7  4.0 - 15.0 % Final    Eosinophil % 04/26/2022 0.7  0.0 - 8.0 % Final    Basophil % 04/26/2022 0.5  0.0 - 1.9 % Final    Differential Method 04/26/2022 Automated   Final   Admission on 11/26/2021, Discharged on 11/26/2021   Component Date Value Ref Range Status    WBC 11/26/2021 13.63 (A) 3.90 - 12.70 K/uL Final    RBC 11/26/2021 5.34  4.00 - 5.40 M/uL Final    Hemoglobin 11/26/2021 13.9  12.0 - 16.0 g/dL Final    Hematocrit 11/26/2021 43.2  37.0 - 48.5 % Final    MCV 11/26/2021 81 (A) 82 - 98 fL Final    MCH 11/26/2021 26.0 (A) 27.0 - 31.0 pg Final    MCHC 11/26/2021 32.2  32.0 - 36.0 g/dL Final    RDW 11/26/2021 15.1 (A) 11.5 - 14.5 % Final    Platelets 11/26/2021 389  150 - 450 K/uL Final    MPV 11/26/2021 9.3  9.2 - 12.9 fL Final    Immature Granulocytes 11/26/2021 0.3  0.0 - 0.5 % Final    Gran # (ANC) 11/26/2021 7.9 (A) 1.8 - 7.7 K/uL Final    Immature Grans (Abs) 11/26/2021 0.04  0.00 - 0.04 K/uL Final    Lymph # 11/26/2021 4.6  1.0 - 4.8 K/uL Final    Mono # 11/26/2021 0.9  0.3 - 1.0 K/uL Final    Eos # 11/26/2021 0.2  0.0 - 0.5 K/uL Final    Baso # 11/26/2021 0.06  0.00 - 0.20 K/uL Final    nRBC 11/26/2021 0  0 /100 WBC Final    Gran % 11/26/2021 58.0  38.0 - 73.0 % Final    Lymph % 11/26/2021 33.5  18.0 - 48.0 % Final    Mono % 11/26/2021 6.7  4.0 - 15.0 % Final    Eosinophil % 11/26/2021 1.1  0.0 - 8.0 % Final    Basophil % 11/26/2021 0.4  0.0 - 1.9 % Final    Differential Method 11/26/2021 Automated   Final    Sodium 11/26/2021 136  136 - 145 mmol/L Final    Potassium 11/26/2021 4.2  3.5 - 5.1 mmol/L Final    Chloride 11/26/2021 105  95 - 110 mmol/L Final    CO2 11/26/2021 22 (A) 23 - 29 mmol/L Final    Glucose 11/26/2021 124 (A) 70 - 110 mg/dL Final    BUN 11/26/2021 9  6 - 20 mg/dL Final    Creatinine 11/26/2021 0.7  0.5 - 1.4 mg/dL Final     Calcium 11/26/2021 8.7  8.7 - 10.5 mg/dL Final    Total Protein 11/26/2021 7.4  6.0 - 8.4 g/dL Final    Albumin 11/26/2021 3.4 (A) 3.5 - 5.2 g/dL Final    Total Bilirubin 11/26/2021 0.7  0.1 - 1.0 mg/dL Final    Alkaline Phosphatase 11/26/2021 108  55 - 135 U/L Final    AST 11/26/2021 32  10 - 40 U/L Final    ALT 11/26/2021 54 (A) 10 - 44 U/L Final    Anion Gap 11/26/2021 9  8 - 16 mmol/L Final    eGFR if African American 11/26/2021 >60.0  >60 mL/min/1.73 m^2 Final    eGFR if non African American 11/26/2021 >60.0  >60 mL/min/1.73 m^2 Final    Uric Acid 11/26/2021 5.5  2.4 - 5.7 mg/dL Final    Sed Rate 11/26/2021 48 (A) 0 - 20 mm/Hr Final       Past Medical History:   Diagnosis Date    Anxiety     Back pain     Bipolar disorder 2004    bipolar 2    Chronic bronchitis     Depression     GERD (gastroesophageal reflux disease)     HPV (human papilloma virus) infection     Insomnia     Migraines     Ovarian cyst     Pneumonia      Past Surgical History:   Procedure Laterality Date    ARTHROSCOPY OF ANKLE Right 5/4/2022    Procedure: ARTHROSCOPY, ANKLE;  Surgeon: Bimal Mccormick DPM;  [facility];  Service: Podiatry;  Laterality: Right;  CURT EXTERNAL ANKLE DISTRACTOR    CHOLECYSTECTOMY      DILATION AND CURETTAGE OF UTERUS      HYSTERECTOMY  2017    total, ovarian cysts, torsion, [place]; hyst per Dr JESUS Manriquez    LAPAROSCOPIC APPENDECTOMY N/A 8/18/2021    Procedure: APPENDECTOMY, LAPAROSCOPIC;  Surgeon: Osiel Ramirez MD;  Location: [facility] OR;  Service: General;  Laterality: N/A;    OOPHORECTOMY      opherectom Left 2015    salpingo-opherectomy    REPAIR OF LIGAMENT OF ANKLE Right 6/23/2021    Procedure: REPAIR OF ANTERIOR TALOFIBULAR LIGAMENT CALCANEOFIBULAR LIGAMENT;  Surgeon: Bimal Mccormick DPM;  Location: [facility] OR;  Service: Podiatry;  Laterality: Right;  WITH ARTHREX INTERNAL BRACE    SURGICAL REMOVAL OF BONE SPUR Right 6/23/2021    Procedure: EXCISION OS TRIGONUM;   "Surgeon: Bimal Mccormick DPM;  Location: [facility];  Service: Podiatry;  Laterality: Right;    WISDOM TOOTH EXTRACTION       Family History   Adopted: Yes   Problem Relation Age of Onset    No Known Problems Sister     No Known Problems Sister     No Known Problems Sister        Tobacco History:  reports that she has been smoking cigarettes. She has a 15.00 pack-year smoking history. She has never used smokeless tobacco.  Alcohol History:  reports previous alcohol use.  Drug History:  reports previous drug use. Drug: "Crack" cocaine.    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 (Patient not taking: Reported on 5/17/2022), Disp: 28 patch, Rfl: 1    QUEtiapine (SEROQUEL) 300 MG Tab, Take 1 tablet (300 mg total) by mouth nightly., Disp: 90 tablet, Rfl: 1    sertraline (ZOLOFT) 100 MG tablet, Take 1 tablet (100 mg total) by mouth once daily., Disp: 30 tablet, Rfl: 0    Review of Systems  As in HPI           Objective:      Vitals:    05/17/22 0807   BP: 124/82   Pulse: 91   Temp: 98.5 °F (36.9 °C)   TempSrc: Oral   SpO2: 96%   Weight: (!) 148.1 kg (326 lb 6.4 oz)   Height: 5' 7" (1.702 m)     Physical Exam  Vitals reviewed.   Constitutional:       General: She is not in acute distress.     Appearance: She is obese.      Comments: Sitting comfortably, wearing surgical boot on R foot   Pulmonary:      Effort: Pulmonary effort is normal.   Neurological:      General: No focal deficit present.      Mental Status: She is " alert and oriented to person, place, and time.   Psychiatric:      Comments: Fairly groomed, casually dressed.  Mood is mildly depressed and anxious with congruent affect.  Good eye contact.  Normal thought process, content, and speech.  Good insight and judgment.  No delusions.  No SI. No HI.              Assessment:       1. Bipolar disorder, in partial remission, most recent episode depressed    2. Leukocytosis, unspecified type    3. Smoker    4. Need for vaccination             Plan:       Bipolar disorder, in partial remission, most recent episode depressed - improved with no manic episodes since last OV. Will increase Seroquel to 300mg given persistent insomnia. If no improvement there, will cut back to 200mg and consider separate agent for insomnia. Patient now with situational depression, which was normalized within context. If symptoms are prolonged, will increase Sertraline to 150mg.    Leukocytosis, unspecified type - will refer to Hematology.    Smoker - pt contemplative re:cessation. Encouraged to cut back.    Need for vaccination - agrees to PCV20; will go to local pharmacy for Covid booster.    No follow-ups on file.        5/17/2022 Magalie Roldan M.D.

## 2022-05-18 NOTE — PATIENT INSTRUCTIONS
"TALAR DOME LESION    What is a Talar Dome Lesion?   The ankle joint is composed of the bottom of the tibia (shin) bone and the top of the talus (ankle) bone. The top of  the  talus is dome-shaped and is completely covered with cartilage-a tough, rubbery tissue that enables the ankle to move smoothly. A talar dome lesion is an injury to the cartilage and underlying bone of the talus within the ankle joint. It is also called an osteochondral defect (OCD) or osteochondral lesion of the talus (OLT). "Osteo" means bone and "chondral" refers to cartilage.    Talar dome lesions are usually caused by an injury, such as an ankle sprain. If the cartilage doesn't heal properly following the injury, it softens and  begins  to break off. Sometimes a broken piece of the damaged cartilage and bone will "float" in the ankle.        Signs and Symptoms                Unless the injury is extensive, it may take months, a year, or even longer for symptoms to develop. The signs and symptoms of a talar dome lesion may include:  Chronic pain deep in the ankle typically worse when bearing weight on the foot (especially during sports) and less when resting  An occasional "clicking" or "catching" feeling in the ankle when walking  A sensation of the ankle "locking" or "giving out"  Episodes of swelling of the ankle-occurring when bearing weight and subsiding when at rest      Diagnosis  A talar dome lesion can be difficult to diagnose, because the precise site of the pain can be hard to pinpoint. To diagnose this injury, the foot and ankle surgeon will question the patient about recent or previous injury and will examine the foot and ankle, moving the ankle joint to help determine if there is pain, clicking, or limitation of motion within that joint.    Sometimes the surgeon will inject the joint with an anesthetic (pain - relieving medication) to see if the pain goes away for a while, indicating that the pain is coming from inside the " joint.    X-rays are taken, and often an MRI or other advanced imaging tests are ordered to further evaluate the lesion and extent of the injury.      Treatment: Non-Surgical Approaches  Treatment depends on the severity of the talar dome lesion. If the lesion is stable (without loose pieces of cartilage or bone), one or more of the following non-surgical treatment options may be considered:  Immobilization. Depending on the type of injury, the leg may be placed in a cast or cast boot to protect the talus. During this period of immobilization, non weightbearing range-of-motion exercises may be recommended.  Oral medications. Nonsteroidal anti-inflammatory drugs (NSAIDs), such as ibuprofen, may be helpful in reducing the pain and inflammation .  Physical therapy. Range-of motion and strengthening exercises are beneficial once the lesion is adequately healed. Physical therapy may also include techniques to reduce pain and swelling.  Ankle brace. Wearing an ankle brace may help protect the patient from re-injury if the ankle is unstable.      When is Surgery  Needed?   If non-surgical treatment fails to relieve the symptoms of talar dome lesions, surgery may be necessary. Surgery may involve removal of the loose bone and cartilage fragments within the joint and establishing an environment for healing. A variety of surgical techniques is available to accomplish this. The surgeon will select the best procedure based on the specific case.      Complications of Talar Dome Lesions  Depending on the amount of damage to the cartilage in the ankle joint, arthritis may develop in the joint, resulting in chronic pain, swelling and limited joint motion. Treatment for these complications is best directed by a foot and ankle surgeon, and may include one or more of the following:  Non-steroidal or steroidal anti-inflammatory medications  Physical therapy  Bracing  Surgical intervention             What Is Arthritis in the  Foot?  Degenerative arthritis is a condition that slowly wears away joints, the area where bones meet and move. In the beginning, you may notice that the affected joint seems stiff. It may even ache. As the joint lining (cartilage) breaks down, the bones rub against each other, causing pain and swelling. Over time, small pieces of rough or splintered bone (bone spurs) develop, and the joints range of motion becomes limited. But movement doesnt have to cause pain. The effects of arthritis can be reduced.    The big-toe joint  When arthritis affects your big toe, your foot hurts when it pushes off the ground. Arthritis often appears in the big-toe joint along with a bunion (a bony bump at the side of the joint) or a bone spur on top of the joint.    Other joints  When arthritis affects the rear or midfoot joints, you feel pain when you put weight on your foot. Arthritis may affect the joint where the ankle and foot meet. It may also affect other joints nearby.  Date Last Reviewed: 7/1/2016 © 2000-2017 [employer]. [address] [place] [address], [place] [address]. All rights reserved. This information is not intended as a substitute for professional medical care. Always follow your healthcare professional's instructions.

## 2022-05-19 ENCOUNTER — OFFICE VISIT (OUTPATIENT)
Dept: PODIATRY | Facility: CLINIC | Age: 32
End: 2022-05-19
Payer: MEDICAID

## 2022-05-19 ENCOUNTER — TELEPHONE (OUTPATIENT)
Dept: PODIATRY | Facility: CLINIC | Age: 32
End: 2022-05-19

## 2022-05-19 VITALS — WEIGHT: 293 LBS | BODY MASS INDEX: 45.99 KG/M2 | HEIGHT: 67 IN

## 2022-05-19 DIAGNOSIS — M19.071 ARTHRITIS OF RIGHT ANKLE: ICD-10-CM

## 2022-05-19 DIAGNOSIS — R22.41 MASS OF RIGHT ANKLE: ICD-10-CM

## 2022-05-19 DIAGNOSIS — M89.9 OSTEOCHONDRAL LESION: Primary | ICD-10-CM

## 2022-05-19 DIAGNOSIS — M94.9 OSTEOCHONDRAL LESION: Primary | ICD-10-CM

## 2022-05-19 PROCEDURE — 1160F RVW MEDS BY RX/DR IN RCRD: CPT | Mod: CPTII,S$GLB,, | Performed by: PODIATRIST

## 2022-05-19 PROCEDURE — 99024 PR POST-OP FOLLOW-UP VISIT: ICD-10-PCS | Mod: S$GLB,,, | Performed by: PODIATRIST

## 2022-05-19 PROCEDURE — 1159F MED LIST DOCD IN RCRD: CPT | Mod: CPTII,S$GLB,, | Performed by: PODIATRIST

## 2022-05-19 PROCEDURE — 3008F BODY MASS INDEX DOCD: CPT | Mod: CPTII,S$GLB,, | Performed by: PODIATRIST

## 2022-05-19 PROCEDURE — 1159F PR MEDICATION LIST DOCUMENTED IN MEDICAL RECORD: ICD-10-PCS | Mod: CPTII,S$GLB,, | Performed by: PODIATRIST

## 2022-05-19 PROCEDURE — 99024 POSTOP FOLLOW-UP VISIT: CPT | Mod: S$GLB,,, | Performed by: PODIATRIST

## 2022-05-19 PROCEDURE — 3008F PR BODY MASS INDEX (BMI) DOCUMENTED: ICD-10-PCS | Mod: CPTII,S$GLB,, | Performed by: PODIATRIST

## 2022-05-19 PROCEDURE — 1160F PR REVIEW ALL MEDS BY PRESCRIBER/CLIN PHARMACIST DOCUMENTED: ICD-10-PCS | Mod: CPTII,S$GLB,, | Performed by: PODIATRIST

## 2022-05-19 RX ORDER — HYDROCODONE BITARTRATE AND ACETAMINOPHEN 7.5; 325 MG/1; MG/1
1 TABLET ORAL EVERY 6 HOURS PRN
Qty: 28 TABLET | Refills: 0 | Status: ON HOLD | OUTPATIENT
Start: 2022-05-19 | End: 2022-08-09 | Stop reason: HOSPADM

## 2022-05-19 NOTE — PROGRESS NOTES
"  [address] [facility] [place] [place] [address]  Phone: [phone]   [phone]    Patient's PCP:Magalie Roldan MD  Referring Provider:No ref. provider found    Subjective:      Chief Complaint: Postop Evaluation (Arthroscopic repair of osteochondral lesion dome of talus right foot and debridement is sent of itis right ankle joint)    Date of Surgery: 05/04/2022  Procedure: Arthroscopic repair of osteochondral lesion dome of talus right foot and debridement is sent of itis right ankle joint    HPI:   Loretta Lea is a 31 y.o. female who returns to the clinic today for her post-operative visit. Loretta Lea rates pain a 2/10 on a pain scale. Compliance of Care:  Clean, dry intact dressing with cam walker boot cast.     Vitals:    05/19/22 0815   Weight: (!) 147.9 kg (326 lb)   Height: 5' 7" (1.702 m)   PainSc:   2        Past Surgical History:   Procedure Laterality Date    ARTHROSCOPY OF ANKLE Right 5/4/2022    Procedure: ARTHROSCOPY, ANKLE;  Surgeon: Bimal Mccormick DPM;  Location: [facility] OR;  Service: Podiatry;  Laterality: Right;  CURT EXTERNAL ANKLE DISTRACTOR    CHOLECYSTECTOMY      DILATION AND CURETTAGE OF UTERUS      HYSTERECTOMY  2017    total, ovarian cysts, torsion, [place]; hyst per Dr JESUS Manriquez    LAPAROSCOPIC APPENDECTOMY N/A 8/18/2021    Procedure: APPENDECTOMY, LAPAROSCOPIC;  Surgeon: Osiel Ramirez MD;  Location: [facility] OR;  Service: General;  Laterality: N/A;    OOPHORECTOMY      opherectom Left 2015    salpingo-opherectomy    REPAIR OF LIGAMENT OF ANKLE Right 6/23/2021    Procedure: REPAIR OF ANTERIOR TALOFIBULAR LIGAMENT CALCANEOFIBULAR LIGAMENT;  Surgeon: Bimal Mccormick DPM;  Location: [facility] OR;  Service: Podiatry;  Laterality: Right;  WITH ARTHREX INTERNAL BRACE    SURGICAL REMOVAL OF BONE SPUR Right 6/23/2021    Procedure: EXCISION OS TRIGONUM;  Surgeon: Bimal Mccormick DPM;  Location: [facility] OR;  Service: Podiatry;  Laterality: Right; " "   WISDOM TOOTH EXTRACTION       Past Medical History:   Diagnosis Date    Anxiety     Back pain     Bipolar disorder 2004    bipolar 2    Chronic bronchitis     Depression     GERD (gastroesophageal reflux disease)     HPV (human papilloma virus) infection     Insomnia     Migraines     Ovarian cyst     Pneumonia      Family History   Adopted: Yes   Problem Relation Age of Onset    No Known Problems Sister     No Known Problems Sister     No Known Problems Sister         Social History:   Marital Status:   Alcohol History:  reports previous alcohol use.  Tobacco History:  reports that she has been smoking cigarettes. She has a 15.00 pack-year smoking history. She has never used smokeless tobacco.  Drug History:  reports previous drug use. Drug: "Crack" cocaine.    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 28 tablet 0    HYDROcodone-acetaminophen (NORCO) 7.5-325 mg per tablet Take 1 tablet by mouth every 6 (six) hours as needed for Pain. 28 tablet 0    nicotine (NICODERM CQ) 21 mg/24 hr Place 1 patch onto the skin once daily. 28 patch 1    QUEtiapine (SEROQUEL) 300 MG Tab Take 1 tablet (300 mg total) by mouth nightly. 90 tablet 1    sertraline (ZOLOFT) 100 MG tablet Take 1 tablet (100 mg total) by mouth once daily. 30 tablet 0     No current facility-administered medications for this visit.       Review of Systems      Objective:        Post-op surgery of the arthroscopy right ankle for debridement of osteochondral lesion with normal healing, no signs of infection or dehiscence of wound. Hardware in place. Normal post op exam today. No redness, no drainage, no increase in local temperature, no significant swelling, " sutures.steri-strips are intact.     Imaging:     Physical Exam:   Foot Exam    General  General Appearance: appears stated age and healthy   Orientation: alert and oriented to person, place, and time   Affect: appropriate   Gait: antalgic   Assistance: (Cam Walker boot cast right lower extremity)          Physical Exam       Assessment:       1. Osteochondral lesion    2. Arthritis of right ankle    3. Mass of right ankle      Plan:   Osteochondral lesion    Arthritis of right ankle    Mass of right ankle    Patient healing normally no signs of infection removed sutures today she will continue to do range of motion exercises use Cam Walker boot cast.  I am arranging for her to go to physical therapy in 2 weeks for 4 weeks.  She return to see me in 4 weeks.    Procedures - None      This note was created using Dragon voice recognition software that occasionally misinterpreted phrases or words.

## 2022-06-15 ENCOUNTER — TELEPHONE (OUTPATIENT)
Dept: HEMATOLOGY/ONCOLOGY | Facility: CLINIC | Age: 32
End: 2022-06-15

## 2022-06-21 ENCOUNTER — PATIENT MESSAGE (OUTPATIENT)
Dept: FAMILY MEDICINE | Facility: CLINIC | Age: 32
End: 2022-06-21

## 2022-06-26 ENCOUNTER — HOSPITAL ENCOUNTER (EMERGENCY)
Facility: HOSPITAL | Age: 32
Discharge: HOME | End: 2022-06-26
Attending: EMERGENCY MEDICINE
Payer: MEDICAID

## 2022-06-26 VITALS
DIASTOLIC BLOOD PRESSURE: 88 MMHG | TEMPERATURE: 98 F | WEIGHT: 293 LBS | SYSTOLIC BLOOD PRESSURE: 144 MMHG | HEART RATE: 100 BPM | RESPIRATION RATE: 20 BRPM | BODY MASS INDEX: 45.99 KG/M2 | OXYGEN SATURATION: 98 % | HEIGHT: 67 IN

## 2022-06-26 DIAGNOSIS — M25.511 ACUTE PAIN OF RIGHT SHOULDER: Primary | ICD-10-CM

## 2022-06-26 PROCEDURE — 99282 EMERGENCY DEPT VISIT SF MDM: CPT

## 2022-06-26 RX ORDER — PREDNISONE 20 MG/1
40 TABLET ORAL DAILY
Qty: 10 TABLET | Refills: 0 | Status: SHIPPED | OUTPATIENT
Start: 2022-06-26 | End: 2022-07-01

## 2022-06-26 NOTE — Clinical Note
"Loretta Vasquez" Venu was seen and treated in our emergency department on 6/26/2022.  She may return to work on 06/30/2022.       If you have any questions or concerns, please don't hesitate to call.      Alex Denis PA-C"

## 2022-06-26 NOTE — ED PROVIDER NOTES
Encounter Date: 6/26/2022       History     Chief Complaint   Patient presents with    Shoulder Pain     Patient reports R shoulder pain x 1 week      31-year-old female presents emergency room for evaluation of right shoulder pain.  Patient with known subacromial bursitis who regularly sees Dr. Farias.  She states that it feels the same.  No trauma.  No distal paresthesias, no loss of motor function.        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;  Service: Podiatry;  Laterality: Right;  Hab Housing EXTERNAL ANKLE DISTRACTOR    CHOLECYSTECTOMY      DILATION AND CURETTAGE OF UTERUS      HYSTERECTOMY  2017    total, ovarian cysts, torsion, Berrault; hyst per Dr JESUS Manriquez    LAPAROSCOPIC APPENDECTOMY N/A 8/18/2021    Procedure: APPENDECTOMY, LAPAROSCOPIC;  Surgeon: Osiel Ramirez MD;  Location: [facility];  Service: General;  Laterality: N/A;    OOPHORECTOMY      opherectom Left 2015    salpingo-opherectomy    REPAIR OF LIGAMENT OF ANKLE Right 6/23/2021    Procedure: REPAIR OF ANTERIOR TALOFIBULAR LIGAMENT CALCANEOFIBULAR LIGAMENT;  Surgeon: Bimal Mccormick DPM;  Location: [facility] OR;  Service: Podiatry;  Laterality: Right;  WITH ARTHREX INTERNAL BRACE    SURGICAL REMOVAL OF BONE SPUR Right 6/23/2021    Procedure: EXCISION OS TRIGONUM;  Surgeon: Bimal Mccormick DPM;  Location: [facility] OR;  Service: Podiatry;  Laterality: Right;    WISDOM TOOTH  "EXTRACTION       Family History   Adopted: Yes   Problem Relation Age of Onset    No Known Problems Sister     No Known Problems Sister     No Known Problems Sister      Social History     Tobacco Use    Smoking status: Current Every Day Smoker     Packs/day: 1.00     Years: 15.00     Pack years: 15.00     Types: Cigarettes    Smokeless tobacco: Never Used   Substance Use Topics    Alcohol use: Not Currently     Comment: rare    Drug use: Not Currently     Types: "Crack" cocaine     Comment: twice     Review of Systems   Constitutional: Negative for chills, fatigue and fever.   HENT: Negative for congestion, hearing loss, sore throat and trouble swallowing.    Eyes: Negative for visual disturbance.   Respiratory: Negative for cough, chest tightness and shortness of breath.    Cardiovascular: Negative for chest pain.   Gastrointestinal: Negative for abdominal pain and nausea.   Endocrine: Negative for polyuria.   Genitourinary: Negative for difficulty urinating.   Musculoskeletal: Negative for arthralgias and myalgias.   Skin: Negative for rash.   Neurological: Negative for dizziness and headaches.   Psychiatric/Behavioral: The patient is not nervous/anxious.        Physical Exam     Initial Vitals [06/26/22 1450]   BP Pulse Resp Temp SpO2   (!) 144/88 100 20 98.1 °F (36.7 °C) 98 %      MAP       --         Physical Exam    Nursing note and vitals reviewed.  Constitutional: She appears well-developed and well-nourished.   HENT:   Head: Normocephalic and atraumatic.   Eyes: Conjunctivae are normal. Pupils are equal, round, and reactive to light.   Neck: Neck supple.   Normal range of motion.  Cardiovascular: Normal rate, regular rhythm and normal heart sounds.   Pulmonary/Chest: Breath sounds normal.   Abdominal: Abdomen is soft. She exhibits no distension. There is no abdominal tenderness.   Musculoskeletal:      Cervical back: Normal range of motion and neck supple.      Comments: Tenderness noted around the " AC joint as well as bicipital groove.  Patient range of motion is limited secondary to pain but is able to range in all directions.  Pain worse with abduction, forward flexion.    Otherwise non-tender to clavicle, SC joints, posterior shoulder, arm.    Upper Extremity otherwise skin is warm, dry, intact w/o effusions/edema/ erythema/ ecchymosis/ masses/lesions/ obvious deformities/ obvious muscle wasting. Grossly NVID - Motor intact to AIN, PIN, ulnar, and median nerves.  Sensation intact to radial, ulnar, and median nerves.  Radial Pulses 2+. Capillary Refill <2sec.     Neurological: She is alert and oriented to person, place, and time. GCS score is 15. GCS eye subscore is 4. GCS verbal subscore is 5. GCS motor subscore is 6.   Skin: Skin is warm and dry. Capillary refill takes less than 2 seconds.   Psychiatric: She has a normal mood and affect. Her behavior is normal. Judgment and thought content normal.         ED Course   Procedures  Labs Reviewed - No data to display       Imaging Results    None          Medications - No data to display  Medical Decision Making:   Initial Assessment:   Nontoxic, well-appearing and in no acute distress.  Differential Diagnosis:   Strain versus bursitis versus septic joint  ED Management:  31-year-old female presents emergency room for evaluation of acute on chronic right shoulder pain likely secondary to subacromial bursitis.  Patient has no overlying skin changes.  She is neurovascularly intact.  Nontoxic, afebrile.  No trauma.  Will prescribe patient short course of steroids and recommend follow-up with established orthopedic provider for further evaluation and treatment.  Patient will be discharged home in stable condition.    Disposition:  Improved, discharged.  Plan to discharge home with appropriate follow-up, including primary care manager.  Will discharge with prescription for prednisone.    I discussed the findings and plan of care with this patient.  All questions  were answered to the patient's satisfaction.  Disposition plan as above.  Verbal and written discharge instructions provided to the patient on discharge.  Return precautions discussed prior to discharge.     I discuss this patient case with the cosigning physician, who agrees with diagnosis and plan of care. This note was written using the assistance of a dictation program and may contain grammatical errors.                       Clinical Impression:   Final diagnoses:  [M25.511] Acute pain of right shoulder (Primary)          ED Disposition Condition    Discharge Stable        ED Prescriptions     Medication Sig Dispense Start Date End Date Auth. Provider    predniSONE (DELTASONE) 20 MG tablet Take 2 tablets (40 mg total) by mouth once daily. for 5 days 10 tablet 6/26/2022 7/1/2022 Alex Denis PA-C        Follow-up Information     Follow up With Specialties Details Why Contact Info Additional Information    Magalie Benjamin MD Family Medicine Schedule an appointment as soon as possible for a visit in 1 week  [address]  [address]  [facility] [address]  [phone]       Critical access hospital - Emergency Dept Emergency Medicine Go to  As needed, If symptoms worsen [address] [facility] [address]  [phone] 1st floor    Karthik Farias MD Orthopedic Surgery, Surgery, Sports Medicine Call in 2 days  [address]  [address]  [facility] [address]  [phone]              Alex Denis PA-C  06/26/22 0035

## 2022-06-30 ENCOUNTER — OFFICE VISIT (OUTPATIENT)
Dept: HEMATOLOGY/ONCOLOGY | Facility: CLINIC | Age: 32
End: 2022-06-30
Payer: MEDICAID

## 2022-06-30 VITALS
HEART RATE: 109 BPM | SYSTOLIC BLOOD PRESSURE: 126 MMHG | BODY MASS INDEX: 51.25 KG/M2 | WEIGHT: 293 LBS | DIASTOLIC BLOOD PRESSURE: 81 MMHG | TEMPERATURE: 98 F

## 2022-06-30 DIAGNOSIS — E07.9 THYROID DISORDER: ICD-10-CM

## 2022-06-30 DIAGNOSIS — D72.829 LEUKOCYTOSIS, UNSPECIFIED TYPE: ICD-10-CM

## 2022-06-30 DIAGNOSIS — D53.9 ANEMIA ASSOCIATED WITH NUTRITIONAL DEFICIENCY: Primary | ICD-10-CM

## 2022-06-30 PROCEDURE — 3079F PR MOST RECENT DIASTOLIC BLOOD PRESSURE 80-89 MM HG: ICD-10-PCS | Mod: CPTII,S$GLB,, | Performed by: INTERNAL MEDICINE

## 2022-06-30 PROCEDURE — 1159F MED LIST DOCD IN RCRD: CPT | Mod: CPTII,S$GLB,, | Performed by: INTERNAL MEDICINE

## 2022-06-30 PROCEDURE — 99204 OFFICE O/P NEW MOD 45 MIN: CPT | Mod: S$GLB,,, | Performed by: INTERNAL MEDICINE

## 2022-06-30 PROCEDURE — 3008F PR BODY MASS INDEX (BMI) DOCUMENTED: ICD-10-PCS | Mod: CPTII,S$GLB,, | Performed by: INTERNAL MEDICINE

## 2022-06-30 PROCEDURE — 99204 PR OFFICE/OUTPT VISIT, NEW, LEVL IV, 45-59 MIN: ICD-10-PCS | Mod: S$GLB,,, | Performed by: INTERNAL MEDICINE

## 2022-06-30 PROCEDURE — 3074F PR MOST RECENT SYSTOLIC BLOOD PRESSURE < 130 MM HG: ICD-10-PCS | Mod: CPTII,S$GLB,, | Performed by: INTERNAL MEDICINE

## 2022-06-30 PROCEDURE — 1159F PR MEDICATION LIST DOCUMENTED IN MEDICAL RECORD: ICD-10-PCS | Mod: CPTII,S$GLB,, | Performed by: INTERNAL MEDICINE

## 2022-06-30 PROCEDURE — 3074F SYST BP LT 130 MM HG: CPT | Mod: CPTII,S$GLB,, | Performed by: INTERNAL MEDICINE

## 2022-06-30 PROCEDURE — 3008F BODY MASS INDEX DOCD: CPT | Mod: CPTII,S$GLB,, | Performed by: INTERNAL MEDICINE

## 2022-06-30 PROCEDURE — 3079F DIAST BP 80-89 MM HG: CPT | Mod: CPTII,S$GLB,, | Performed by: INTERNAL MEDICINE

## 2022-07-11 ENCOUNTER — HOSPITAL ENCOUNTER (EMERGENCY)
Facility: HOSPITAL | Age: 32
Discharge: HOME | End: 2022-07-12
Attending: EMERGENCY MEDICINE
Payer: MEDICAID

## 2022-07-11 ENCOUNTER — PATIENT MESSAGE (OUTPATIENT)
Dept: PODIATRY | Facility: CLINIC | Age: 32
End: 2022-07-11
Payer: MEDICAID

## 2022-07-11 DIAGNOSIS — B96.89 ACUTE BACTERIAL PHARYNGITIS: Primary | ICD-10-CM

## 2022-07-11 DIAGNOSIS — R73.09 ELEVATED GLUCOSE: ICD-10-CM

## 2022-07-11 DIAGNOSIS — D72.829 LEUKOCYTOSIS, UNSPECIFIED TYPE: ICD-10-CM

## 2022-07-11 DIAGNOSIS — J02.8 ACUTE BACTERIAL PHARYNGITIS: Primary | ICD-10-CM

## 2022-07-11 LAB
ALBUMIN SERPL BCP-MCNC: 3.6 G/DL (ref 3.5–5.2)
ALP SERPL-CCNC: 104 U/L (ref 55–135)
ALT SERPL W/O P-5'-P-CCNC: 61 U/L (ref 10–44)
ANION GAP SERPL CALC-SCNC: 9 MMOL/L (ref 8–16)
AST SERPL-CCNC: 38 U/L (ref 10–40)
BASOPHILS # BLD AUTO: 0.06 K/UL (ref 0–0.2)
BASOPHILS NFR BLD: 0.4 % (ref 0–1.9)
BILIRUB SERPL-MCNC: 0.5 MG/DL (ref 0.1–1)
BILIRUB UR QL STRIP: NEGATIVE
BUN SERPL-MCNC: 8 MG/DL (ref 6–20)
CALCIUM SERPL-MCNC: 9.1 MG/DL (ref 8.7–10.5)
CHLORIDE SERPL-SCNC: 98 MMOL/L (ref 95–110)
CLARITY UR: CLEAR
CO2 SERPL-SCNC: 25 MMOL/L (ref 23–29)
COLOR UR: YELLOW
CREAT SERPL-MCNC: 0.7 MG/DL (ref 0.5–1.4)
DIFFERENTIAL METHOD: ABNORMAL
EOSINOPHIL # BLD AUTO: 0.1 K/UL (ref 0–0.5)
EOSINOPHIL NFR BLD: 0.6 % (ref 0–8)
ERYTHROCYTE [DISTWIDTH] IN BLOOD BY AUTOMATED COUNT: 16.2 % (ref 11.5–14.5)
EST. GFR  (AFRICAN AMERICAN): >60 ML/MIN/1.73 M^2
EST. GFR  (NON AFRICAN AMERICAN): >60 ML/MIN/1.73 M^2
GLUCOSE SERPL-MCNC: 255 MG/DL (ref 70–110)
GLUCOSE UR QL STRIP: ABNORMAL
GROUP A STREP, MOLECULAR: NEGATIVE
HCT VFR BLD AUTO: 45.5 % (ref 37–48.5)
HGB BLD-MCNC: 14.3 G/DL (ref 12–16)
HGB UR QL STRIP: NEGATIVE
IMM GRANULOCYTES # BLD AUTO: 0.08 K/UL (ref 0–0.04)
IMM GRANULOCYTES NFR BLD AUTO: 0.5 % (ref 0–0.5)
INFLUENZA A, MOLECULAR: NEGATIVE
INFLUENZA B, MOLECULAR: NEGATIVE
KETONES UR QL STRIP: NEGATIVE
LEUKOCYTE ESTERASE UR QL STRIP: NEGATIVE
LIPASE SERPL-CCNC: 39 U/L (ref 4–60)
LYMPHOCYTES # BLD AUTO: 4.2 K/UL (ref 1–4.8)
LYMPHOCYTES NFR BLD: 25.9 % (ref 18–48)
MCH RBC QN AUTO: 25 PG (ref 27–31)
MCHC RBC AUTO-ENTMCNC: 31.4 G/DL (ref 32–36)
MCV RBC AUTO: 79 FL (ref 82–98)
MONOCYTES # BLD AUTO: 0.8 K/UL (ref 0.3–1)
MONOCYTES NFR BLD: 4.6 % (ref 4–15)
NEUTROPHILS # BLD AUTO: 11.1 K/UL (ref 1.8–7.7)
NEUTROPHILS NFR BLD: 68 % (ref 38–73)
NITRITE UR QL STRIP: NEGATIVE
NRBC BLD-RTO: 0 /100 WBC
PH UR STRIP: 6 [PH] (ref 5–8)
PLATELET # BLD AUTO: 366 K/UL (ref 150–450)
PMV BLD AUTO: 8.7 FL (ref 9.2–12.9)
POTASSIUM SERPL-SCNC: 4.1 MMOL/L (ref 3.5–5.1)
PROT SERPL-MCNC: 8.1 G/DL (ref 6–8.4)
PROT UR QL STRIP: ABNORMAL
RBC # BLD AUTO: 5.73 M/UL (ref 4–5.4)
SARS-COV-2 RDRP RESP QL NAA+PROBE: NEGATIVE
SODIUM SERPL-SCNC: 132 MMOL/L (ref 136–145)
SP GR UR STRIP: 1.02 (ref 1–1.03)
SPECIMEN SOURCE: NORMAL
URN SPEC COLLECT METH UR: ABNORMAL
UROBILINOGEN UR STRIP-ACNC: NEGATIVE EU/DL
WBC # BLD AUTO: 16.37 K/UL (ref 3.9–12.7)

## 2022-07-11 PROCEDURE — 87502 INFLUENZA DNA AMP PROBE: CPT | Performed by: STUDENT IN AN ORGANIZED HEALTH CARE EDUCATION/TRAINING PROGRAM

## 2022-07-11 PROCEDURE — 87651 STREP A DNA AMP PROBE: CPT | Performed by: EMERGENCY MEDICINE

## 2022-07-11 PROCEDURE — 81003 URINALYSIS AUTO W/O SCOPE: CPT | Performed by: STUDENT IN AN ORGANIZED HEALTH CARE EDUCATION/TRAINING PROGRAM

## 2022-07-11 PROCEDURE — 99283 EMERGENCY DEPT VISIT LOW MDM: CPT

## 2022-07-11 PROCEDURE — 80053 COMPREHEN METABOLIC PANEL: CPT | Performed by: STUDENT IN AN ORGANIZED HEALTH CARE EDUCATION/TRAINING PROGRAM

## 2022-07-11 PROCEDURE — 83690 ASSAY OF LIPASE: CPT | Performed by: STUDENT IN AN ORGANIZED HEALTH CARE EDUCATION/TRAINING PROGRAM

## 2022-07-11 PROCEDURE — 85025 COMPLETE CBC W/AUTO DIFF WBC: CPT | Performed by: STUDENT IN AN ORGANIZED HEALTH CARE EDUCATION/TRAINING PROGRAM

## 2022-07-11 PROCEDURE — U0002 COVID-19 LAB TEST NON-CDC: HCPCS | Performed by: STUDENT IN AN ORGANIZED HEALTH CARE EDUCATION/TRAINING PROGRAM

## 2022-07-11 NOTE — TELEPHONE ENCOUNTER
Spoke with patient, states having increased pain and swelling from first surgery. Advised needs appt. Pt requested appt next week.

## 2022-07-12 VITALS
RESPIRATION RATE: 17 BRPM | OXYGEN SATURATION: 98 % | DIASTOLIC BLOOD PRESSURE: 92 MMHG | HEART RATE: 103 BPM | BODY MASS INDEX: 45.99 KG/M2 | TEMPERATURE: 99 F | HEIGHT: 67 IN | SYSTOLIC BLOOD PRESSURE: 142 MMHG | WEIGHT: 293 LBS

## 2022-07-12 RX ORDER — AMOXICILLIN 500 MG/1
500 CAPSULE ORAL EVERY 12 HOURS
Qty: 20 CAPSULE | Refills: 0 | Status: SHIPPED | OUTPATIENT
Start: 2022-07-12 | End: 2022-07-22

## 2022-07-12 RX ORDER — ONDANSETRON 4 MG/1
4 TABLET, ORALLY DISINTEGRATING ORAL EVERY 8 HOURS PRN
Qty: 12 TABLET | Refills: 0 | Status: ON HOLD | OUTPATIENT
Start: 2022-07-12 | End: 2022-08-09 | Stop reason: HOSPADM

## 2022-07-12 NOTE — ED PROVIDER NOTES
Encounter Date: 7/11/2022       History     Chief Complaint   Patient presents with    Abdominal Pain     Pt presents to ER with LUQ abdominal pain that started this morning along with sore throat and chills. Pt reports nausea. Denies vomiting or diarrhea.      31 yo woman with bipolar disorder, GERD, depression, insomnia presents to the ED with a sore throat and nausea. She tells me that since early this morning she has had a sore throat, body aches, subjective fevers and chills, and a dry cough. She has developed a soreness in her lower ribs/upper abdomen due to the cough. She has nausea but no vomiting. She has chronically loose stools. She has a possible covid exposure. She is vaccinated against covid 19.         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     Past Medical History:   Diagnosis Date    Anxiety     Back pain     Bipolar disorder 2004    bipolar 2    Chronic bronchitis     Depression     GERD (gastroesophageal reflux disease)     HPV (human papilloma virus) infection     Insomnia     Migraines     Ovarian cyst     Pneumonia      Past Surgical History:   Procedure Laterality Date    ARTHROSCOPY OF ANKLE Right 5/4/2022    Procedure: ARTHROSCOPY, ANKLE;  Surgeon: Bimal Mccormick DPM;  Location: [facility] OR;  Service: Podiatry;  Laterality: Right;  CURT EXTERNAL ANKLE DISTRACTOR    CHOLECYSTECTOMY      DILATION AND CURETTAGE OF UTERUS      HYSTERECTOMY  2017    total, ovarian cysts, torsion, [place]rault; hyst per Dr JESUS Manriquez    LAPAROSCOPIC APPENDECTOMY N/A 8/18/2021    Procedure: APPENDECTOMY, LAPAROSCOPIC;  Surgeon: Osiel Ramirez MD;  Location: [facility] OR;  Service: General;  Laterality: N/A;    OOPHORECTOMY      opherectom Left 2015    salpingo-opherectomy    REPAIR OF LIGAMENT OF ANKLE Right 6/23/2021    Procedure: REPAIR OF ANTERIOR TALOFIBULAR LIGAMENT CALCANEOFIBULAR LIGAMENT;  Surgeon:  "Bimal Mccormick DPM;  Location: [facility] OR;  Service: Podiatry;  Laterality: Right;  WITH ARTHREX INTERNAL BRACE    SURGICAL REMOVAL OF BONE SPUR Right 6/23/2021    Procedure: EXCISION OS TRIGONUM;  Surgeon: Bimal Mccormick DPM;  Location: [facility] OR;  Service: Podiatry;  Laterality: Right;    WISDOM TOOTH EXTRACTION       Family History   Adopted: Yes   Problem Relation Age of Onset    No Known Problems Sister     No Known Problems Sister     No Known Problems Sister      Social History     Tobacco Use    Smoking status: Current Every Day Smoker     Packs/day: 1.00     Years: 15.00     Pack years: 15.00     Types: Cigarettes    Smokeless tobacco: Never Used   Substance Use Topics    Alcohol use: Not Currently     Comment: rare    Drug use: Not Currently     Types: "Crack" cocaine     Comment: twice     Review of Systems   Constitutional: Positive for chills and fever.   HENT: Positive for sore throat.    Respiratory: Positive for cough. Negative for shortness of breath.    Cardiovascular: Negative for chest pain.   Gastrointestinal: Positive for diarrhea and nausea. Negative for abdominal pain and vomiting.   Genitourinary: Negative for dysuria.   Musculoskeletal: Negative for back pain.   Skin: Negative for rash.   Neurological: Negative for weakness.   Hematological: Does not bruise/bleed easily.   All other systems reviewed and are negative.      Physical Exam     Initial Vitals [07/11/22 2205]   BP Pulse Resp Temp SpO2   (!) 147/90 (!) 127 20 98.6 °F (37 °C) 97 %      MAP       --         Physical Exam    Nursing note and vitals reviewed.  Constitutional: She appears well-developed and well-nourished. No distress.   HENT:   Head: Normocephalic and atraumatic.   Posterior pharynx with erythema, midline uvula, bilateral tonsillar edema, tonsillar pillars intact, bilateral exudates (left greater than right), normal voice with no drooling or stridor   Eyes: EOM are normal.   Neck:   Anterior cervical " lymphadenopathy that is tender (left greater than right)   Normal range of motion.  Cardiovascular: Normal rate, regular rhythm, normal heart sounds and intact distal pulses.   Pulmonary/Chest: Breath sounds normal. She has no wheezes. She has no rhonchi. She has no rales.   Abdominal: Abdomen is soft. She exhibits no distension. There is no abdominal tenderness. There is no rebound.   Musculoskeletal:         General: Normal range of motion.      Cervical back: Normal range of motion.     Neurological: She is alert and oriented to person, place, and time. She has normal strength.   Skin: Skin is warm and dry.   Psychiatric: Thought content normal.         ED Course   Procedures  Labs Reviewed   CBC W/ AUTO DIFFERENTIAL - Abnormal; Notable for the following components:       Result Value    WBC 16.37 (*)     RBC 5.73 (*)     MCV 79 (*)     MCH 25.0 (*)     MCHC 31.4 (*)     RDW 16.2 (*)     MPV 8.7 (*)     Gran # (ANC) 11.1 (*)     Immature Grans (Abs) 0.08 (*)     All other components within normal limits   COMPREHENSIVE METABOLIC PANEL - Abnormal; Notable for the following components:    Sodium 132 (*)     Glucose 255 (*)     ALT 61 (*)     All other components within normal limits   URINALYSIS, REFLEX TO URINE CULTURE - Abnormal; Notable for the following components:    Protein, UA Trace (*)     Glucose, UA Trace (*)     All other components within normal limits    Narrative:     Specimen Source->Urine   GROUP A STREP, MOLECULAR   SARS-COV-2 RNA AMPLIFICATION, QUAL   INFLUENZA A AND B ANTIGEN    Narrative:     Specimen Source->Nasopharyngeal Swab   LIPASE          Imaging Results    None          Medications - No data to display  Medical Decision Making:   ED Management:  32-year-old female presents emergency department with sore throat and body aches.  Clinically she has bacterial pharyngitis.  Labs remarkable for chronic leukocytosis for which she follows with Dr. Bauer.  She also has hyperglycemia.  She  states that she is following up with her PCP shortly for further testing to determine if she has diabetes.  Will treat with antibiotics for bacterial pharyngitis.  Counseled on symptomatic treatment at home. The patient is aware of and agrees with the plan of care. Detailed return precautions discussed.    Ladonna Reinoso MD  Emergency Medicine  07/12/2022 1:09 AM                            Clinical Impression:   Final diagnoses:  [J02.8, B96.89] Acute bacterial pharyngitis (Primary)  [R73.09] Elevated glucose  [D72.829] Leukocytosis, unspecified type          ED Disposition Condition    Discharge Stable        ED Prescriptions     Medication Sig Dispense Start Date End Date Auth. Provider    ondansetron (ZOFRAN-ODT) 4 MG TbDL Take 1 tablet (4 mg total) by mouth every 8 (eight) hours as needed (nausea). 12 tablet 7/12/2022  Ladonna Reinoso MD    amoxicillin (AMOXIL) 500 MG capsule Take 1 capsule (500 mg total) by mouth every 12 (twelve) hours. for 10 days 20 capsule 7/12/2022 7/22/2022 Ladonna Reinoso MD        Follow-up Information     Follow up With Specialties Details Why Contact Info Additional Information    Magalie Benjamin MD Family Medicine Schedule an appointment as soon as possible for a visit in 1 week  [address]  [address]  [facility] [address]  [phone]       [facility] - Emergency Dept Emergency Medicine  As needed, If symptoms worsen [address] [facility] [address]  [phone] 1st floor           Ladonna Reinoso MD  07/12/22 0109

## 2022-07-12 NOTE — DISCHARGE INSTRUCTIONS
Tylenol on ibuprofen for pain control.  Take antibiotics as prescribed.  Zofran as needed for nausea.  Return for worsening symptoms.  Follow-up with your primary care physician as your glucose is elevated and you need further testing to determine if you have diabetes.

## 2022-07-16 ENCOUNTER — TELEPHONE (OUTPATIENT)
Dept: HEMATOLOGY/ONCOLOGY | Facility: CLINIC | Age: 32
End: 2022-07-16

## 2022-07-16 NOTE — TELEPHONE ENCOUNTER
Tell pt I had placed orders for lab at [facility].  Ask her to get lab done, so I can review results with her on RTC.    I need lab to be done in an am,  Before any cigarette, coffee or tea.  But otherwise, she does not have to be fasting.

## 2022-07-16 NOTE — PROGRESS NOTES
[facility] Hematology Oncology In Office Initial encounter Note    22    Subjective:      Patient ID:   Loretta Lea  32 y.o. female  1990  Magalie Benjamin MD      Chief Complaint:   Increased WBC    HPI:  32 y.o. female  With WBC count 13,000 to 18,000 on recent CBC.  Hgb 13-14 range, not anemia or polycythemic.  Plt Cnt NL range 376,000-456,000.  Neutrophils approximate 8,000 to 13,000.    Smokes 1 ppd x's 15 years,  ETOH no, Allergy Vancomycin.  (Red Man Syndrome).  Worked as CNA.    Meds Nicoderm, prednisone, seroquel, zoloft.    Hx bipolar disorder, anxiety, chronic bronchitis, depression, GERD, HPV, insomnia, migraine HA, ovarian cyst and pneumonia, and gait instability after foot fracture, also hx of scapular dysfunction.  Thyroid dysorder?    M0.  Menses onset age 11.  Menses were heavy.  S/P complete hysterectomy for pre cancerous cells on cervix.  Cholecystectomy, DNC, L & R oophorectomy, appendectomy, ligament repair at R ankle 2021, removal of bone spur R  Foot 2021.    Family hx lung, breast, H/N cancers, HTN, DM, aunt ovarian cysts.      ROS:   GEN: normal without any fever, night sweats or weight loss  HEENT: normal with no HA's, sore throat, stiff neck, changes in vision  CV: normal with no CP, SOB, PND, MUÑIZ or orthopnea  PULM: See HPI  GI: normal with no abdominal pain, nausea, vomiting, constipation, diarrhea, melanotic stools, BRBPR, or hematemesis  : normal with no hematuria, dysuria  GYN See HPI  BREAST: normal with no mass, discharge, pain  SKIN: normal with no rash, erythema, bruising, or swelling     Past Medical History:   Diagnosis Date    Anxiety     Back pain     Bipolar disorder 2004    bipolar 2    Chronic bronchitis     Depression     GERD (gastroesophageal reflux disease)     HPV (human papilloma virus) infection     Insomnia     Migraines     Ovarian cyst     Pneumonia      Past Surgical History:   Procedure Laterality Date     "ARTHROSCOPY OF ANKLE Right 5/4/2022    Procedure: ARTHROSCOPY, ANKLE;  Surgeon: Bimal Mccormick DPM;  Location: [facility] OR;  Service: Podiatry;  Laterality: Right;  CURT EXTERNAL ANKLE DISTRACTOR    CHOLECYSTECTOMY      DILATION AND CURETTAGE OF UTERUS      HYSTERECTOMY  2017    total, ovarian cysts, torsion, [place]rault; hyst per Dr JESUS Manriquez    LAPAROSCOPIC APPENDECTOMY N/A 8/18/2021    Procedure: APPENDECTOMY, LAPAROSCOPIC;  Surgeon: Osiel Ramirez MD;  Location: [facility] OR;  Service: General;  Laterality: N/A;    OOPHORECTOMY      opherectom Left 2015    salpingo-opherectomy    REPAIR OF LIGAMENT OF ANKLE Right 6/23/2021    Procedure: REPAIR OF ANTERIOR TALOFIBULAR LIGAMENT CALCANEOFIBULAR LIGAMENT;  Surgeon: Bimal Mccormick DPM;  Location: [facility] OR;  Service: Podiatry;  Laterality: Right;  WITH ARTHREX INTERNAL BRACE    SURGICAL REMOVAL OF BONE SPUR Right 6/23/2021    Procedure: EXCISION OS TRIGONUM;  Surgeon: Bimal Mccormick DPM;  Location: [facility] OR;  Service: Podiatry;  Laterality: Right;    WISDOM TOOTH EXTRACTION         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     Social History     Socioeconomic History    Marital status:     Number of children: 0   Tobacco Use    Smoking status: Current Every Day Smoker     Packs/day: 1.00     Years: 15.00     Pack years: 15.00     Types: Cigarettes    Smokeless tobacco: Never Used   Substance and Sexual Activity    Alcohol use: Not Currently     Comment: rare    Drug use: Not Currently     Types: "Crack" cocaine     Comment: twice    Sexual activity: Yes     Partners: Male     Birth control/protection: OCP, None         Current Outpatient Medications:     amoxicillin (AMOXIL) 500 MG capsule, Take 1 capsule (500 mg total) by mouth every 12 (twelve) hours. for 10 days, Disp: 20 capsule, Rfl: 0    HYDROcodone-acetaminophen (NORCO) 5-325 mg per tablet, Take " 1 tablet by mouth every 4 (four) hours as needed for Pain., Disp: 28 tablet, Rfl: 0    HYDROcodone-acetaminophen (NORCO) 7.5-325 mg per tablet, Take 1 tablet by mouth every 6 (six) hours as needed for Pain., Disp: 28 tablet, Rfl: 0    HYDROcodone-acetaminophen (NORCO) 7.5-325 mg per tablet, Take 1 tablet by mouth every 6 (six) hours as needed for Pain., Disp: 28 tablet, Rfl: 0    nicotine (NICODERM CQ) 21 mg/24 hr, Place 1 patch onto the skin once daily., Disp: 28 patch, Rfl: 1    ondansetron (ZOFRAN-ODT) 4 MG TbDL, Take 1 tablet (4 mg total) by mouth every 8 (eight) hours as needed (nausea)., Disp: 12 tablet, Rfl: 0    QUEtiapine (SEROQUEL) 300 MG Tab, Take 1 tablet (300 mg total) by mouth nightly., Disp: 90 tablet, Rfl: 1    sertraline (ZOLOFT) 100 MG tablet, TAKE ONE TABLET BY MOUTH ONCE DAILY, Disp: 30 tablet, Rfl: 2          Objective:   Vitals:  Blood pressure 126/81, pulse 109, temperature 98.1 °F (36.7 °C), weight (!) 148.4 kg (327 lb 3.2 oz), last menstrual period 04/15/2017.    Physical Examination:   GEN: no apparent distress, comfortable; obese  HEAD: atraumatic and normocephalic  EYES: no pallor, no icterus  ENT: no pharyngeal erythema, external ears WNL; no nasal discharge  NECK: no masses, thyroid normal, trachea midline, no LAD/LN's, supple  CV: RRR with no murmur; normal pulse; normal S1 and S2; no pedal edema  CHEST: Normal respiratory effort; CTAB; normal breath sounds; no wheeze or crackles  ABDOM: nontender and nondistended; soft; no rebound/guarding, L/S NP  MUSC/Skeletal: ROM normal; no crepitus; joints normal  EXTREM: no clubbing, cyanosis, inflammation or swelling  SKIN: no rashes, lesions, ulcers, petechiae  : no cvat  NEURO: grossly intact; motor/sensory WNL;  no tremors  PSYCH: normal mood, affect and behavior  LYMPH: normal cervical, supraclavicular, axillary LN's  BREASTS: no palpable mass L or R      Labs:   Lab Results   Component Value Date    WBC 16.37 (H) 07/11/2022    HGB  14.3 07/11/2022    HCT 45.5 07/11/2022    MCV 79 (L) 07/11/2022     07/11/2022    CMP  Sodium   Date Value Ref Range Status   07/11/2022 132 (L) 136 - 145 mmol/L Final     Potassium   Date Value Ref Range Status   07/11/2022 4.1 3.5 - 5.1 mmol/L Final     Chloride   Date Value Ref Range Status   07/11/2022 98 95 - 110 mmol/L Final     CO2   Date Value Ref Range Status   07/11/2022 25 23 - 29 mmol/L Final     Glucose   Date Value Ref Range Status   07/11/2022 255 (H) 70 - 110 mg/dL Final     BUN   Date Value Ref Range Status   07/11/2022 8 6 - 20 mg/dL Final     Creatinine   Date Value Ref Range Status   07/11/2022 0.7 0.5 - 1.4 mg/dL Final     Calcium   Date Value Ref Range Status   07/11/2022 9.1 8.7 - 10.5 mg/dL Final     Total Protein   Date Value Ref Range Status   07/11/2022 8.1 6.0 - 8.4 g/dL Final     Albumin   Date Value Ref Range Status   07/11/2022 3.6 3.5 - 5.2 g/dL Final     Total Bilirubin   Date Value Ref Range Status   07/11/2022 0.5 0.1 - 1.0 mg/dL Final     Comment:     For infants and newborns, interpretation of results should be based  on gestational age, weight and in agreement with clinical  observations.    Premature Infant recommended reference ranges:  Up to 24 hours.............<8.0 mg/dL  Up to 48 hours............<12.0 mg/dL  3-5 days..................<15.0 mg/dL  6-29 days.................<15.0 mg/dL       Alkaline Phosphatase   Date Value Ref Range Status   07/11/2022 104 55 - 135 U/L Final     AST   Date Value Ref Range Status   07/11/2022 38 10 - 40 U/L Final     ALT   Date Value Ref Range Status   07/11/2022 61 (H) 10 - 44 U/L Final     Anion Gap   Date Value Ref Range Status   07/11/2022 9 8 - 16 mmol/L Final     eGFR if    Date Value Ref Range Status   07/11/2022 >60.0 >60 mL/min/1.73 m^2 Final     eGFR if non    Date Value Ref Range Status   07/11/2022 >60.0 >60 mL/min/1.73 m^2 Final     Comment:     Calculation used to obtain the estimated  glomerular filtration  rate (eGFR) is the CKD-EPI equation.             Assessment:   (1) 32 y.o. female with diagnosis of     (2)

## 2022-07-19 NOTE — TELEPHONE ENCOUNTER
Spoke with pt. Pt informed me that she is being treated with amoxicillin for strep throat. Instructed pt to have labs drawn one week after last dose and to return to clinic in one month. Instructed pt no smoking, coffee or tea before labs. Verbalized understanding.

## 2022-07-25 ENCOUNTER — PATIENT MESSAGE (OUTPATIENT)
Dept: FAMILY MEDICINE | Facility: CLINIC | Age: 32
End: 2022-07-25

## 2022-08-02 ENCOUNTER — HOSPITAL ENCOUNTER (EMERGENCY)
Facility: HOSPITAL | Age: 32
Discharge: HOME | End: 2022-08-02
Attending: EMERGENCY MEDICINE
Payer: MEDICAID

## 2022-08-02 VITALS
OXYGEN SATURATION: 95 % | BODY MASS INDEX: 50.59 KG/M2 | WEIGHT: 293 LBS | RESPIRATION RATE: 24 BRPM | SYSTOLIC BLOOD PRESSURE: 122 MMHG | HEART RATE: 91 BPM | TEMPERATURE: 99 F | DIASTOLIC BLOOD PRESSURE: 72 MMHG

## 2022-08-02 DIAGNOSIS — J15.8 PNEUMONIA DUE TO AEROBIC BACTERIA: ICD-10-CM

## 2022-08-02 DIAGNOSIS — R06.02 SHORTNESS OF BREATH: ICD-10-CM

## 2022-08-02 DIAGNOSIS — R10.9 FLANK PAIN: Primary | ICD-10-CM

## 2022-08-02 DIAGNOSIS — N30.00 ACUTE CYSTITIS WITHOUT HEMATURIA: ICD-10-CM

## 2022-08-02 LAB
ALBUMIN SERPL BCP-MCNC: 3.6 G/DL (ref 3.5–5.2)
ALP SERPL-CCNC: 118 U/L (ref 55–135)
ALT SERPL W/O P-5'-P-CCNC: 43 U/L (ref 10–44)
ANION GAP SERPL CALC-SCNC: 7 MMOL/L (ref 8–16)
AST SERPL-CCNC: 32 U/L (ref 10–40)
BACTERIA #/AREA URNS HPF: ABNORMAL /HPF
BASOPHILS # BLD AUTO: 0.07 K/UL (ref 0–0.2)
BASOPHILS NFR BLD: 0.5 % (ref 0–1.9)
BILIRUB SERPL-MCNC: 0.5 MG/DL (ref 0.1–1)
BILIRUB UR QL STRIP: NEGATIVE
BNP SERPL-MCNC: 19 PG/ML (ref 0–99)
BUN SERPL-MCNC: 12 MG/DL (ref 6–20)
CALCIUM SERPL-MCNC: 8.6 MG/DL (ref 8.7–10.5)
CHLORIDE SERPL-SCNC: 95 MMOL/L (ref 95–110)
CLARITY UR: CLEAR
CO2 SERPL-SCNC: 28 MMOL/L (ref 23–29)
COLOR UR: YELLOW
CREAT SERPL-MCNC: 0.6 MG/DL (ref 0.5–1.4)
CREAT SERPL-MCNC: 0.6 MG/DL (ref 0.5–1.4)
DIFFERENTIAL METHOD: ABNORMAL
EOSINOPHIL # BLD AUTO: 0.1 K/UL (ref 0–0.5)
EOSINOPHIL NFR BLD: 0.7 % (ref 0–8)
ERYTHROCYTE [DISTWIDTH] IN BLOOD BY AUTOMATED COUNT: 15.6 % (ref 11.5–14.5)
EST. GFR  (NO RACE VARIABLE): >60 ML/MIN/1.73 M^2
GLUCOSE SERPL-MCNC: 290 MG/DL (ref 70–110)
GLUCOSE UR QL STRIP: ABNORMAL
HCT VFR BLD AUTO: 42.5 % (ref 37–48.5)
HGB BLD-MCNC: 13.5 G/DL (ref 12–16)
HGB UR QL STRIP: NEGATIVE
HYALINE CASTS #/AREA URNS LPF: 7 /LPF
IMM GRANULOCYTES # BLD AUTO: 0.06 K/UL (ref 0–0.04)
IMM GRANULOCYTES NFR BLD AUTO: 0.4 % (ref 0–0.5)
KETONES UR QL STRIP: NEGATIVE
LEUKOCYTE ESTERASE UR QL STRIP: ABNORMAL
LIPASE SERPL-CCNC: 49 U/L (ref 4–60)
LYMPHOCYTES # BLD AUTO: 4.3 K/UL (ref 1–4.8)
LYMPHOCYTES NFR BLD: 30.1 % (ref 18–48)
MCH RBC QN AUTO: 25 PG (ref 27–31)
MCHC RBC AUTO-ENTMCNC: 31.8 G/DL (ref 32–36)
MCV RBC AUTO: 79 FL (ref 82–98)
MICROSCOPIC COMMENT: ABNORMAL
MONOCYTES # BLD AUTO: 0.7 K/UL (ref 0.3–1)
MONOCYTES NFR BLD: 5.1 % (ref 4–15)
NEUTROPHILS # BLD AUTO: 9.1 K/UL (ref 1.8–7.7)
NEUTROPHILS NFR BLD: 63.2 % (ref 38–73)
NITRITE UR QL STRIP: NEGATIVE
NRBC BLD-RTO: 0 /100 WBC
PH UR STRIP: 6 [PH] (ref 5–8)
PLATELET # BLD AUTO: 351 K/UL (ref 150–450)
PMV BLD AUTO: 8.9 FL (ref 9.2–12.9)
POTASSIUM SERPL-SCNC: 3.7 MMOL/L (ref 3.5–5.1)
PROT SERPL-MCNC: 7.6 G/DL (ref 6–8.4)
PROT UR QL STRIP: NEGATIVE
RBC # BLD AUTO: 5.39 M/UL (ref 4–5.4)
RBC #/AREA URNS HPF: 2 /HPF (ref 0–4)
SAMPLE: NORMAL
SODIUM SERPL-SCNC: 130 MMOL/L (ref 136–145)
SP GR UR STRIP: 1.01 (ref 1–1.03)
SQUAMOUS #/AREA URNS HPF: 4 /HPF
TROPONIN I SERPL DL<=0.01 NG/ML-MCNC: <0.03 NG/ML
URN SPEC COLLECT METH UR: ABNORMAL
UROBILINOGEN UR STRIP-ACNC: NEGATIVE EU/DL
WBC # BLD AUTO: 14.37 K/UL (ref 3.9–12.7)
WBC #/AREA URNS HPF: 12 /HPF (ref 0–5)
YEAST URNS QL MICRO: ABNORMAL

## 2022-08-02 PROCEDURE — 85025 COMPLETE CBC W/AUTO DIFF WBC: CPT | Performed by: EMERGENCY MEDICINE

## 2022-08-02 PROCEDURE — C9113 INJ PANTOPRAZOLE SODIUM, VIA: HCPCS | Performed by: EMERGENCY MEDICINE

## 2022-08-02 PROCEDURE — 96365 THER/PROPH/DIAG IV INF INIT: CPT

## 2022-08-02 PROCEDURE — 25500020 PHARM REV CODE 255: Performed by: EMERGENCY MEDICINE

## 2022-08-02 PROCEDURE — 96375 TX/PRO/DX INJ NEW DRUG ADDON: CPT

## 2022-08-02 PROCEDURE — 99285 EMERGENCY DEPT VISIT HI MDM: CPT | Mod: 25

## 2022-08-02 PROCEDURE — 81001 URINALYSIS AUTO W/SCOPE: CPT | Performed by: EMERGENCY MEDICINE

## 2022-08-02 PROCEDURE — 84484 ASSAY OF TROPONIN QUANT: CPT | Performed by: EMERGENCY MEDICINE

## 2022-08-02 PROCEDURE — 96367 TX/PROPH/DG ADDL SEQ IV INF: CPT

## 2022-08-02 PROCEDURE — 93005 ELECTROCARDIOGRAM TRACING: CPT | Performed by: INTERNAL MEDICINE

## 2022-08-02 PROCEDURE — 63600175 PHARM REV CODE 636 W HCPCS: Performed by: EMERGENCY MEDICINE

## 2022-08-02 PROCEDURE — 80053 COMPREHEN METABOLIC PANEL: CPT | Performed by: EMERGENCY MEDICINE

## 2022-08-02 PROCEDURE — 25000003 PHARM REV CODE 250: Performed by: EMERGENCY MEDICINE

## 2022-08-02 PROCEDURE — 96376 TX/PRO/DX INJ SAME DRUG ADON: CPT

## 2022-08-02 PROCEDURE — 93010 ELECTROCARDIOGRAM REPORT: CPT | Mod: ,,, | Performed by: INTERNAL MEDICINE

## 2022-08-02 PROCEDURE — 87086 URINE CULTURE/COLONY COUNT: CPT | Performed by: EMERGENCY MEDICINE

## 2022-08-02 PROCEDURE — 83690 ASSAY OF LIPASE: CPT | Performed by: EMERGENCY MEDICINE

## 2022-08-02 PROCEDURE — 83880 ASSAY OF NATRIURETIC PEPTIDE: CPT | Performed by: EMERGENCY MEDICINE

## 2022-08-02 PROCEDURE — 93010 EKG 12-LEAD: ICD-10-PCS | Mod: ,,, | Performed by: INTERNAL MEDICINE

## 2022-08-02 RX ORDER — HYDROMORPHONE HYDROCHLORIDE 1 MG/ML
1 INJECTION, SOLUTION INTRAMUSCULAR; INTRAVENOUS; SUBCUTANEOUS
Status: COMPLETED | OUTPATIENT
Start: 2022-08-02 | End: 2022-08-02

## 2022-08-02 RX ORDER — ONDANSETRON 2 MG/ML
8 INJECTION INTRAMUSCULAR; INTRAVENOUS
Status: COMPLETED | OUTPATIENT
Start: 2022-08-02 | End: 2022-08-02

## 2022-08-02 RX ORDER — PANTOPRAZOLE SODIUM 40 MG/10ML
40 INJECTION, POWDER, LYOPHILIZED, FOR SOLUTION INTRAVENOUS
Status: COMPLETED | OUTPATIENT
Start: 2022-08-02 | End: 2022-08-02

## 2022-08-02 RX ORDER — ONDANSETRON 4 MG/1
4 TABLET, FILM COATED ORAL EVERY 6 HOURS PRN
Qty: 20 TABLET | Refills: 1 | Status: SHIPPED | OUTPATIENT
Start: 2022-08-02 | End: 2022-11-22

## 2022-08-02 RX ORDER — DOXYCYCLINE 100 MG/1
100 CAPSULE ORAL 2 TIMES DAILY
Qty: 20 CAPSULE | Refills: 0 | Status: ON HOLD | OUTPATIENT
Start: 2022-08-02 | End: 2022-08-09 | Stop reason: HOSPADM

## 2022-08-02 RX ORDER — OXYCODONE AND ACETAMINOPHEN 5; 325 MG/1; MG/1
1 TABLET ORAL EVERY 4 HOURS PRN
Qty: 10 TABLET | Refills: 0 | Status: SHIPPED | OUTPATIENT
Start: 2022-08-02 | End: 2023-02-20

## 2022-08-02 RX ORDER — CEFUROXIME AXETIL 500 MG/1
500 TABLET ORAL EVERY 12 HOURS
Qty: 20 TABLET | Refills: 0 | Status: ON HOLD | OUTPATIENT
Start: 2022-08-02 | End: 2022-08-09 | Stop reason: HOSPADM

## 2022-08-02 RX ADMIN — SODIUM CHLORIDE, SODIUM LACTATE, POTASSIUM CHLORIDE, AND CALCIUM CHLORIDE 1000 ML: .6; .31; .03; .02 INJECTION, SOLUTION INTRAVENOUS at 05:08

## 2022-08-02 RX ADMIN — IOHEXOL 100 ML: 350 INJECTION, SOLUTION INTRAVENOUS at 05:08

## 2022-08-02 RX ADMIN — DOXYCYCLINE 100 MG: 100 INJECTION, POWDER, LYOPHILIZED, FOR SOLUTION INTRAVENOUS at 07:08

## 2022-08-02 RX ADMIN — HYDROMORPHONE HYDROCHLORIDE 1 MG: 1 INJECTION, SOLUTION INTRAMUSCULAR; INTRAVENOUS; SUBCUTANEOUS at 06:08

## 2022-08-02 RX ADMIN — PANTOPRAZOLE SODIUM 40 MG: 40 INJECTION, POWDER, FOR SOLUTION INTRAVENOUS at 04:08

## 2022-08-02 RX ADMIN — ONDANSETRON 8 MG: 2 INJECTION INTRAMUSCULAR; INTRAVENOUS at 04:08

## 2022-08-02 RX ADMIN — CEFTRIAXONE 2 G: 2 INJECTION, SOLUTION INTRAVENOUS at 06:08

## 2022-08-02 RX ADMIN — HYDROMORPHONE HYDROCHLORIDE 1 MG: 1 INJECTION, SOLUTION INTRAMUSCULAR; INTRAVENOUS; SUBCUTANEOUS at 04:08

## 2022-08-02 NOTE — DISCHARGE INSTRUCTIONS
Stop smoking.  Rest.  Drink lots of fluids.  Return to emergency department for worsening symptoms or any problems

## 2022-08-02 NOTE — ED NOTES
Pt voiced about 4 days ago she started to feel pain in the L flank and radiating into upper abd. Today it is a constant pain. Pt denies hx of kidney stones.  Pt also voiced having changes in her urinating pattern.

## 2022-08-02 NOTE — ED PROVIDER NOTES
Encounter Date: 8/2/2022       History     Chief Complaint   Patient presents with    Flank Pain     Left side x 3 days    Shortness of Breath    Nausea     32-year-old female presented emergency department with left upper flank pain and lower ribcage pain in the subscapular area which started rather suddenly yesterday and pain is constant and radiates to the left upper abdomen.  Denies fever or chills.  Patient states it is painful when she takes a deep breath.  Patient also says she is nauseated.  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 OR;  Service: Podiatry;  Laterality: Right;  [employer] EXTERNAL ANKLE DISTRACTOR    CHOLECYSTECTOMY      DILATION AND CURETTAGE OF UTERUS      HYSTERECTOMY  2017    total, ovarian cysts, torsion, [place]; hyst per Dr JESUS Manriquez    LAPAROSCOPIC APPENDECTOMY N/A 8/18/2021    Procedure: APPENDECTOMY, LAPAROSCOPIC;  Surgeon: Osiel Ramirez MD;  Location: [facility] OR;  Service: General;  Laterality: N/A;    OOPHORECTOMY      opherectom Left 2015    salpingo-opherectomy    REPAIR OF LIGAMENT OF ANKLE Right 6/23/2021    Procedure: REPAIR OF ANTERIOR TALOFIBULAR LIGAMENT CALCANEOFIBULAR LIGAMENT;  Surgeon: Bimal Mccormick DPM;  Location: [facility] OR;  Service: Podiatry;  Laterality: Right;  WITH ARTHREX INTERNAL BRACE    SURGICAL REMOVAL OF BONE SPUR  "Right 6/23/2021    Procedure: EXCISION OS TRIGONUM;  Surgeon: Bimal Mccormick DPM;  Location: [facility] OR;  Service: Podiatry;  Laterality: Right;    WISDOM TOOTH EXTRACTION       Family History   Adopted: Yes   Problem Relation Age of Onset    No Known Problems Sister     No Known Problems Sister     No Known Problems Sister      Social History     Tobacco Use    Smoking status: Current Every Day Smoker     Packs/day: 1.00     Years: 15.00     Pack years: 15.00     Types: Cigarettes    Smokeless tobacco: Never Used   Substance Use Topics    Alcohol use: Not Currently     Comment: rare    Drug use: Not Currently     Types: "Crack" cocaine     Comment: twice     Review of Systems   Constitutional: Negative.    HENT: Negative.    Eyes: Negative.    Respiratory: Positive for shortness of breath.    Cardiovascular: Negative for chest pain.   Gastrointestinal: Positive for nausea.   Endocrine: Negative.    Genitourinary: Positive for flank pain.   Skin: Negative.    Allergic/Immunologic: Negative.    Neurological: Negative.    Hematological: Negative.    Psychiatric/Behavioral: Negative.    All other systems reviewed and are negative.      Physical Exam     Initial Vitals [08/02/22 0440]   BP Pulse Resp Temp SpO2   (!) 139/93 (!) 124 20 98.8 °F (37.1 °C) 96 %      MAP       --         Physical Exam    Nursing note and vitals reviewed.  Constitutional: She appears well-developed and well-nourished.   HENT:   Head: Normocephalic and atraumatic.   Nose: Nose normal.   Mouth/Throat: Oropharynx is clear and moist.   Eyes: Conjunctivae and EOM are normal. Pupils are equal, round, and reactive to light.   Neck: Neck supple. No thyromegaly present. No tracheal deviation present. No JVD present.   Normal range of motion.  Cardiovascular: Normal rate, regular rhythm, normal heart sounds and intact distal pulses.   No murmur heard.  Pulmonary/Chest: Breath sounds normal. No stridor. No respiratory distress. She has no " wheezes. She has no rales.   Left subscapular and left flank tenderness   Abdominal: Abdomen is soft. Bowel sounds are normal. There is abdominal tenderness.   Left upper abdominal tenderness   Musculoskeletal:         General: No tenderness or edema. Normal range of motion.      Cervical back: Normal range of motion and neck supple.     Neurological: She is alert and oriented to person, place, and time. She has normal strength. No cranial nerve deficit or sensory deficit. GCS score is 15. GCS eye subscore is 4. GCS verbal subscore is 5. GCS motor subscore is 6.   Skin: Skin is warm. Capillary refill takes less than 2 seconds.   Psychiatric: She has a normal mood and affect. Thought content normal.         ED Course   Procedures  Labs Reviewed   CBC W/ AUTO DIFFERENTIAL - Abnormal; Notable for the following components:       Result Value    WBC 14.37 (*)     MCV 79 (*)     MCH 25.0 (*)     MCHC 31.8 (*)     RDW 15.6 (*)     MPV 8.9 (*)     Gran # (ANC) 9.1 (*)     Immature Grans (Abs) 0.06 (*)     All other components within normal limits   COMPREHENSIVE METABOLIC PANEL - Abnormal; Notable for the following components:    Sodium 130 (*)     Glucose 290 (*)     Calcium 8.6 (*)     Anion Gap 7 (*)     All other components within normal limits   URINALYSIS, REFLEX TO URINE CULTURE - Abnormal; Notable for the following components:    Glucose, UA 3+ (*)     Leukocytes, UA 1+ (*)     All other components within normal limits    Narrative:     Specimen Source->Urine   URINALYSIS MICROSCOPIC - Abnormal; Notable for the following components:    WBC, UA 12 (*)     Hyaline Casts, UA 7 (*)     All other components within normal limits    Narrative:     Specimen Source->Urine   CULTURE, URINE   TROPONIN I   B-TYPE NATRIURETIC PEPTIDE   LIPASE   ISTAT CREATININE   POCT CREATININE     EKG Readings: (Independently Interpreted)   Initial Reading: No STEMI. Rhythm: Normal Sinus Rhythm. Ectopy: No Ectopy. Conduction: Normal.        Imaging Results          CT Abdomen Pelvis With Contrast (Final result)  Result time 08/02/22 05:49:20    Final result by Дмитрий La MD (08/02/22 05:49:20)                 Narrative:    CLINICAL INDICATION: Pulmonary embolism (PE) suspected, high prob    COMPARISON: None    TECHNIQUE: Multiple contiguous axial CT images of the chest were obtained during the administration of intravenous contrast per CT angiogram protocol. Sagittal and coronal reconstructions were performed for review.  3D reconstructions were also performed.  Axial imaging obtained through the abdomen and pelvis with IV contrast.    CONTRAST: 100 mL Omnipaque 350.    FINDINGS:  CT angiography chest: The thoracic aorta is normal. The heart is normal size with no pericardial effusion.. The pulmonary arteries are without evidence of pulmonary emboli.    Bilateral lung infiltrates are present with sparing of the lower lungs. Findings could represent pulmonary edema or pneumonia. Clinical correlation. Correlate with chest x-ray.    CT abdomen/pelvis: The liver is severely fatty infiltrated. No focal lesion. The gallbladder has been removed. The spleen, adrenal glands and pancreas are without significant finding. A couple calcified granulomata are seen in the liver and spleen.    Both kidneys are normal in appearance.    The aorta is normal.    No free fluid, masses or adenopathy.    The uterus is absent. The ovaries are absent or atrophic.    No bowel distention or inflammation. The terminal ileum is normal. The appendix has been removed. No constipation.    Impression:  1: Negative for pulmonary embolism.  2: Mild rather diffuse lung infiltrates. Clinical correlation.  3: Appendectomy. Cholecystectomy. Hysterectomy and possible oophorectomy.  4: Remote granulomatous disease.  5: fatty liver.  6: No acute intra-abdominal or pelvic finding.    This exam was performed according to our departmental dose-optimization program which includes use of  Automated Exposure Control, adjustment of the mA and/or kV according to patient size and/or use of iterative reconstruction technique.    Electronically signed by:  Дмитрий La MD  8/2/2022 5:49 AM CDT Workstation: [id number]                             CTA Chest Non-Coronary (PE Study) (Final result)  Result time 08/02/22 05:49:20    Final result by Дмитрий La MD (08/02/22 05:49:20)                 Narrative:    CLINICAL INDICATION: Pulmonary embolism (PE) suspected, high prob    COMPARISON: None    TECHNIQUE: Multiple contiguous axial CT images of the chest were obtained during the administration of intravenous contrast per CT angiogram protocol. Sagittal and coronal reconstructions were performed for review.  3D reconstructions were also performed.  Axial imaging obtained through the abdomen and pelvis with IV contrast.    CONTRAST: 100 mL Omnipaque 350.    FINDINGS:  CT angiography chest: The thoracic aorta is normal. The heart is normal size with no pericardial effusion.. The pulmonary arteries are without evidence of pulmonary emboli.    Bilateral lung infiltrates are present with sparing of the lower lungs. Findings could represent pulmonary edema or pneumonia. Clinical correlation. Correlate with chest x-ray.    CT abdomen/pelvis: The liver is severely fatty infiltrated. No focal lesion. The gallbladder has been removed. The spleen, adrenal glands and pancreas are without significant finding. A couple calcified granulomata are seen in the liver and spleen.    Both kidneys are normal in appearance.    The aorta is normal.    No free fluid, masses or adenopathy.    The uterus is absent. The ovaries are absent or atrophic.    No bowel distention or inflammation. The terminal ileum is normal. The appendix has been removed. No constipation.    Impression:  1: Negative for pulmonary embolism.  2: Mild rather diffuse lung infiltrates. Clinical correlation.  3: Appendectomy. Cholecystectomy. Hysterectomy  "and possible oophorectomy.  4: Remote granulomatous disease.  5: fatty liver.  6: No acute intra-abdominal or pelvic finding.    This exam was performed according to our departmental dose-optimization program which includes use of Automated Exposure Control, adjustment of the mA and/or kV according to patient size and/or use of iterative reconstruction technique.    Electronically signed by:  Дмитрий La MD  8/2/2022 5:49 AM CDT Workstation: [id number]                               Medications   lactated ringers bolus 1,000 mL (1,000 mLs Intravenous New Bag 8/2/22 0556)   cefTRIAXone (ROCEPHIN) 2 g/50 mL D5W IVPB (has no administration in time range)   HYDROmorphone injection 1 mg (has no administration in time range)   doxycycline (VIBRAMYCIN) 100mg in dextrose 5% 250 mL IVPB (ready to mix) (has no administration in time range)   HYDROmorphone injection 1 mg (1 mg Intravenous Given 8/2/22 0455)   ondansetron injection 8 mg (8 mg Intravenous Given 8/2/22 0456)   pantoprazole injection 40 mg (40 mg Intravenous Given 8/2/22 0456)   iohexoL (OMNIPAQUE 350) injection 100 mL (100 mLs Intravenous Given 8/2/22 3932)     Medical Decision Making:   Differential Diagnosis:   32-year-old female presented emergency department with left flank and left subscapular pain.  Patient also having cough.  Patient has evidence of pneumonia and urinary tract infection based on labs and urinalysis.  Patient started on broad-spectrum antibiotics.  Patient felt better and pain control so discharged with instructions and follow-up and return precautions given.  Clinical Tests:   Lab Tests: Reviewed  Radiological Study: Reviewed  Medical Tests: Reviewed  Sepsis Perfusion Assessment: "I attest a sepsis perfusion exam was performed within 6 hours of sepsis, severe sepsis, or septic shock presentation, following fluid resuscitation."                      Clinical Impression:   Final diagnoses:  [R06.02] Shortness of breath  [R10.9] Flank " pain (Primary)  [N30.00] Acute cystitis without hematuria  [J15.8] Pneumonia due to aerobic bacteria          ED Disposition Condition    Discharge Stable        ED Prescriptions     Medication Sig Dispense Start Date End Date Auth. Provider    cefUROXime (CEFTIN) 500 MG tablet Take 1 tablet (500 mg total) by mouth every 12 (twelve) hours. 20 tablet 8/2/2022  Jesus Jha MD    doxycycline (VIBRAMYCIN) 100 MG Cap Take 1 capsule (100 mg total) by mouth 2 (two) times daily. for 10 days 20 capsule 8/2/2022 8/12/2022 Jesus Jha MD    ondansetron (ZOFRAN) 4 MG tablet Take 1 tablet (4 mg total) by mouth every 6 (six) hours as needed. 20 tablet 8/2/2022 8/2/2023 Jesus Jha MD    oxyCODONE-acetaminophen (PERCOCET) 5-325 mg per tablet Take 1 tablet by mouth every 4 (four) hours as needed for Pain. 10 tablet 8/2/2022  Jesus Jha MD        Follow-up Information     Follow up With Specialties Details Why Contact Info    Magalie Benjamin MD Family Medicine In 2 days  [address]  [address]  [place] [place] [address]  [phone]             Jesus Jha MD  08/02/22 0612

## 2022-08-03 ENCOUNTER — PATIENT MESSAGE (OUTPATIENT)
Dept: FAMILY MEDICINE | Facility: CLINIC | Age: 32
End: 2022-08-03

## 2022-08-03 LAB
BACTERIA UR CULT: NORMAL
BACTERIA UR CULT: NORMAL

## 2022-08-06 ENCOUNTER — HOSPITAL ENCOUNTER (INPATIENT)
Facility: HOSPITAL | Age: 32
LOS: 3 days | Discharge: HOME | DRG: 194 | End: 2022-08-09
Attending: EMERGENCY MEDICINE | Admitting: STUDENT IN AN ORGANIZED HEALTH CARE EDUCATION/TRAINING PROGRAM
Payer: MEDICAID

## 2022-08-06 DIAGNOSIS — E11.9 TYPE 2 DIABETES MELLITUS WITHOUT COMPLICATION, WITHOUT LONG-TERM CURRENT USE OF INSULIN: ICD-10-CM

## 2022-08-06 DIAGNOSIS — F17.210 CIGARETTE SMOKER: ICD-10-CM

## 2022-08-06 DIAGNOSIS — J18.9 PNEUMONIA: ICD-10-CM

## 2022-08-06 DIAGNOSIS — A41.9 SEPSIS, DUE TO UNSPECIFIED ORGANISM, UNSPECIFIED WHETHER ACUTE ORGAN DYSFUNCTION PRESENT: Primary | ICD-10-CM

## 2022-08-06 PROBLEM — N30.00 ACUTE CYSTITIS: Status: ACTIVE | Noted: 2022-08-06

## 2022-08-06 LAB
ALBUMIN SERPL BCP-MCNC: 3.7 G/DL (ref 3.5–5.2)
ALP SERPL-CCNC: 116 U/L (ref 55–135)
ALT SERPL W/O P-5'-P-CCNC: 54 U/L (ref 10–44)
ANION GAP SERPL CALC-SCNC: 12 MMOL/L (ref 8–16)
APTT PPP: 27.8 SEC (ref 23.3–35.1)
AST SERPL-CCNC: 37 U/L (ref 10–40)
BACTERIA #/AREA URNS HPF: ABNORMAL /HPF
BASOPHILS # BLD AUTO: 0.06 K/UL (ref 0–0.2)
BASOPHILS NFR BLD: 0.5 % (ref 0–1.9)
BILIRUB SERPL-MCNC: 0.6 MG/DL (ref 0.1–1)
BILIRUB UR QL STRIP: NEGATIVE
BNP SERPL-MCNC: 16 PG/ML (ref 0–99)
BUN SERPL-MCNC: 5 MG/DL (ref 6–20)
CALCIUM SERPL-MCNC: 8.8 MG/DL (ref 8.7–10.5)
CHLORIDE SERPL-SCNC: 99 MMOL/L (ref 95–110)
CK MB SERPL-MCNC: 0.9 NG/ML (ref 0.1–6.5)
CK SERPL-CCNC: 50 U/L (ref 20–180)
CLARITY UR: ABNORMAL
CO2 SERPL-SCNC: 25 MMOL/L (ref 23–29)
COLOR UR: YELLOW
CREAT SERPL-MCNC: 0.6 MG/DL (ref 0.5–1.4)
DIFFERENTIAL METHOD: ABNORMAL
EOSINOPHIL # BLD AUTO: 0.1 K/UL (ref 0–0.5)
EOSINOPHIL NFR BLD: 0.7 % (ref 0–8)
ERYTHROCYTE [DISTWIDTH] IN BLOOD BY AUTOMATED COUNT: 15.9 % (ref 11.5–14.5)
EST. GFR  (NO RACE VARIABLE): >60 ML/MIN/1.73 M^2
GLUCOSE SERPL-MCNC: 192 MG/DL (ref 70–110)
GLUCOSE SERPL-MCNC: 373 MG/DL (ref 70–110)
GLUCOSE UR QL STRIP: NEGATIVE
HCT VFR BLD AUTO: 43.8 % (ref 37–48.5)
HGB BLD-MCNC: 13.7 G/DL (ref 12–16)
HGB UR QL STRIP: NEGATIVE
HIV1+2 IGG SERPL QL IA.RAPID: NEGATIVE
HYALINE CASTS #/AREA URNS LPF: 17 /LPF
IMM GRANULOCYTES # BLD AUTO: 0.06 K/UL (ref 0–0.04)
IMM GRANULOCYTES NFR BLD AUTO: 0.5 % (ref 0–0.5)
INFLUENZA A, MOLECULAR: NEGATIVE
INFLUENZA B, MOLECULAR: NEGATIVE
INR PPP: 1.1
KETONES UR QL STRIP: NEGATIVE
LACTATE SERPL-SCNC: 2.6 MMOL/L (ref 0.5–1.9)
LACTATE SERPL-SCNC: 4.1 MMOL/L (ref 0.5–1.9)
LACTATE SERPL-SCNC: 5 MMOL/L (ref 0.5–1.9)
LACTATE SERPL-SCNC: 5.3 MMOL/L (ref 0.5–1.9)
LEUKOCYTE ESTERASE UR QL STRIP: ABNORMAL
LYMPHOCYTES # BLD AUTO: 3.4 K/UL (ref 1–4.8)
LYMPHOCYTES NFR BLD: 26.4 % (ref 18–48)
MAGNESIUM SERPL-MCNC: 2 MG/DL (ref 1.6–2.6)
MCH RBC QN AUTO: 24.8 PG (ref 27–31)
MCHC RBC AUTO-ENTMCNC: 31.3 G/DL (ref 32–36)
MCV RBC AUTO: 79 FL (ref 82–98)
MICROSCOPIC COMMENT: ABNORMAL
MONOCYTES # BLD AUTO: 0.6 K/UL (ref 0.3–1)
MONOCYTES NFR BLD: 5 % (ref 4–15)
NEUTROPHILS # BLD AUTO: 8.6 K/UL (ref 1.8–7.7)
NEUTROPHILS NFR BLD: 66.9 % (ref 38–73)
NITRITE UR QL STRIP: NEGATIVE
NRBC BLD-RTO: 0 /100 WBC
PH UR STRIP: 7 [PH] (ref 5–8)
PLATELET # BLD AUTO: 377 K/UL (ref 150–450)
PMV BLD AUTO: 9.1 FL (ref 9.2–12.9)
POTASSIUM SERPL-SCNC: 3.7 MMOL/L (ref 3.5–5.1)
PROCALCITONIN SERPL IA-MCNC: <0.05 NG/ML (ref 0–0.5)
PROT SERPL-MCNC: 8 G/DL (ref 6–8.4)
PROT UR QL STRIP: NEGATIVE
PROTHROMBIN TIME: 13.1 SEC (ref 11.4–13.7)
RBC # BLD AUTO: 5.53 M/UL (ref 4–5.4)
RBC #/AREA URNS HPF: 1 /HPF (ref 0–4)
SARS-COV-2 RDRP RESP QL NAA+PROBE: NEGATIVE
SODIUM SERPL-SCNC: 136 MMOL/L (ref 136–145)
SP GR UR STRIP: 1.01 (ref 1–1.03)
SPECIMEN SOURCE: NORMAL
SQUAMOUS #/AREA URNS HPF: 7 /HPF
TROPONIN I SERPL DL<=0.01 NG/ML-MCNC: <0.03 NG/ML
URN SPEC COLLECT METH UR: ABNORMAL
UROBILINOGEN UR STRIP-ACNC: NEGATIVE EU/DL
WBC # BLD AUTO: 12.81 K/UL (ref 3.9–12.7)
WBC #/AREA URNS HPF: 27 /HPF (ref 0–5)

## 2022-08-06 PROCEDURE — S4991 NICOTINE PATCH NONLEGEND: HCPCS | Performed by: STUDENT IN AN ORGANIZED HEALTH CARE EDUCATION/TRAINING PROGRAM

## 2022-08-06 PROCEDURE — 96365 THER/PROPH/DIAG IV INF INIT: CPT

## 2022-08-06 PROCEDURE — 99900035 HC TECH TIME PER 15 MIN (STAT)

## 2022-08-06 PROCEDURE — 25000242 PHARM REV CODE 250 ALT 637 W/ HCPCS: Performed by: EMERGENCY MEDICINE

## 2022-08-06 PROCEDURE — 96367 TX/PROPH/DG ADDL SEQ IV INF: CPT

## 2022-08-06 PROCEDURE — 93010 EKG 12-LEAD: ICD-10-PCS | Mod: ,,, | Performed by: INTERNAL MEDICINE

## 2022-08-06 PROCEDURE — 96372 THER/PROPH/DIAG INJ SC/IM: CPT | Performed by: STUDENT IN AN ORGANIZED HEALTH CARE EDUCATION/TRAINING PROGRAM

## 2022-08-06 PROCEDURE — 85610 PROTHROMBIN TIME: CPT | Performed by: EMERGENCY MEDICINE

## 2022-08-06 PROCEDURE — 87040 BLOOD CULTURE FOR BACTERIA: CPT | Mod: 59 | Performed by: EMERGENCY MEDICINE

## 2022-08-06 PROCEDURE — 25000242 PHARM REV CODE 250 ALT 637 W/ HCPCS: Performed by: STUDENT IN AN ORGANIZED HEALTH CARE EDUCATION/TRAINING PROGRAM

## 2022-08-06 PROCEDURE — 82962 GLUCOSE BLOOD TEST: CPT

## 2022-08-06 PROCEDURE — 94799 UNLISTED PULMONARY SVC/PX: CPT

## 2022-08-06 PROCEDURE — 93005 ELECTROCARDIOGRAM TRACING: CPT | Performed by: INTERNAL MEDICINE

## 2022-08-06 PROCEDURE — 96375 TX/PRO/DX INJ NEW DRUG ADDON: CPT

## 2022-08-06 PROCEDURE — 85730 THROMBOPLASTIN TIME PARTIAL: CPT | Performed by: EMERGENCY MEDICINE

## 2022-08-06 PROCEDURE — 99900031 HC PATIENT EDUCATION (STAT)

## 2022-08-06 PROCEDURE — 80053 COMPREHEN METABOLIC PANEL: CPT | Performed by: EMERGENCY MEDICINE

## 2022-08-06 PROCEDURE — 94644 CONT INHLJ TX 1ST HOUR: CPT

## 2022-08-06 PROCEDURE — 12000002 HC ACUTE/MED SURGE SEMI-PRIVATE ROOM

## 2022-08-06 PROCEDURE — U0002 COVID-19 LAB TEST NON-CDC: HCPCS | Performed by: EMERGENCY MEDICINE

## 2022-08-06 PROCEDURE — 93010 ELECTROCARDIOGRAM REPORT: CPT | Mod: ,,, | Performed by: INTERNAL MEDICINE

## 2022-08-06 PROCEDURE — 84484 ASSAY OF TROPONIN QUANT: CPT | Performed by: EMERGENCY MEDICINE

## 2022-08-06 PROCEDURE — G0378 HOSPITAL OBSERVATION PER HR: HCPCS

## 2022-08-06 PROCEDURE — 63600175 PHARM REV CODE 636 W HCPCS: Performed by: STUDENT IN AN ORGANIZED HEALTH CARE EDUCATION/TRAINING PROGRAM

## 2022-08-06 PROCEDURE — 86738 MYCOPLASMA ANTIBODY: CPT | Mod: 91 | Performed by: STUDENT IN AN ORGANIZED HEALTH CARE EDUCATION/TRAINING PROGRAM

## 2022-08-06 PROCEDURE — 81001 URINALYSIS AUTO W/SCOPE: CPT | Performed by: EMERGENCY MEDICINE

## 2022-08-06 PROCEDURE — 94640 AIRWAY INHALATION TREATMENT: CPT

## 2022-08-06 PROCEDURE — 87086 URINE CULTURE/COLONY COUNT: CPT | Performed by: EMERGENCY MEDICINE

## 2022-08-06 PROCEDURE — 63600175 PHARM REV CODE 636 W HCPCS: Performed by: EMERGENCY MEDICINE

## 2022-08-06 PROCEDURE — 86703 HIV-1/HIV-2 1 RESULT ANTBDY: CPT | Performed by: EMERGENCY MEDICINE

## 2022-08-06 PROCEDURE — 82553 CREATINE MB FRACTION: CPT | Performed by: EMERGENCY MEDICINE

## 2022-08-06 PROCEDURE — 84145 PROCALCITONIN (PCT): CPT | Performed by: STUDENT IN AN ORGANIZED HEALTH CARE EDUCATION/TRAINING PROGRAM

## 2022-08-06 PROCEDURE — 82550 ASSAY OF CK (CPK): CPT | Performed by: EMERGENCY MEDICINE

## 2022-08-06 PROCEDURE — 99285 EMERGENCY DEPT VISIT HI MDM: CPT | Mod: 25

## 2022-08-06 PROCEDURE — 83605 ASSAY OF LACTIC ACID: CPT | Mod: 91 | Performed by: STUDENT IN AN ORGANIZED HEALTH CARE EDUCATION/TRAINING PROGRAM

## 2022-08-06 PROCEDURE — 83880 ASSAY OF NATRIURETIC PEPTIDE: CPT | Performed by: EMERGENCY MEDICINE

## 2022-08-06 PROCEDURE — 25000003 PHARM REV CODE 250: Performed by: STUDENT IN AN ORGANIZED HEALTH CARE EDUCATION/TRAINING PROGRAM

## 2022-08-06 PROCEDURE — 87502 INFLUENZA DNA AMP PROBE: CPT | Performed by: EMERGENCY MEDICINE

## 2022-08-06 PROCEDURE — 85025 COMPLETE CBC W/AUTO DIFF WBC: CPT | Performed by: EMERGENCY MEDICINE

## 2022-08-06 PROCEDURE — 36415 COLL VENOUS BLD VENIPUNCTURE: CPT | Performed by: STUDENT IN AN ORGANIZED HEALTH CARE EDUCATION/TRAINING PROGRAM

## 2022-08-06 PROCEDURE — 83735 ASSAY OF MAGNESIUM: CPT | Performed by: EMERGENCY MEDICINE

## 2022-08-06 PROCEDURE — 83605 ASSAY OF LACTIC ACID: CPT | Performed by: EMERGENCY MEDICINE

## 2022-08-06 PROCEDURE — 96361 HYDRATE IV INFUSION ADD-ON: CPT

## 2022-08-06 RX ORDER — ALBUTEROL SULFATE 90 UG/1
1-2 AEROSOL, METERED RESPIRATORY (INHALATION) EVERY 6 HOURS PRN
Qty: 1 G | Refills: 0 | Status: SHIPPED | OUTPATIENT
Start: 2022-08-06 | End: 2022-08-09 | Stop reason: SDUPTHER

## 2022-08-06 RX ORDER — LORAZEPAM 1 MG/1
1 TABLET ORAL EVERY 6 HOURS PRN
Status: DISCONTINUED | OUTPATIENT
Start: 2022-08-06 | End: 2022-08-07

## 2022-08-06 RX ORDER — SODIUM CHLORIDE 0.9 % (FLUSH) 0.9 %
10 SYRINGE (ML) INJECTION
Status: DISCONTINUED | OUTPATIENT
Start: 2022-08-06 | End: 2022-08-09 | Stop reason: HOSPADM

## 2022-08-06 RX ORDER — IBUPROFEN 200 MG
16 TABLET ORAL
Status: DISCONTINUED | OUTPATIENT
Start: 2022-08-06 | End: 2022-08-09 | Stop reason: HOSPADM

## 2022-08-06 RX ORDER — METHYLPREDNISOLONE SOD SUCC 125 MG
125 VIAL (EA) INJECTION
Status: COMPLETED | OUTPATIENT
Start: 2022-08-06 | End: 2022-08-06

## 2022-08-06 RX ORDER — BISACODYL 10 MG
10 SUPPOSITORY, RECTAL RECTAL DAILY PRN
Status: DISCONTINUED | OUTPATIENT
Start: 2022-08-06 | End: 2022-08-09 | Stop reason: HOSPADM

## 2022-08-06 RX ORDER — SERTRALINE HYDROCHLORIDE 50 MG/1
100 TABLET, FILM COATED ORAL DAILY
Status: DISCONTINUED | OUTPATIENT
Start: 2022-08-07 | End: 2022-08-09 | Stop reason: HOSPADM

## 2022-08-06 RX ORDER — IPRATROPIUM BROMIDE 0.5 MG/2.5ML
1 SOLUTION RESPIRATORY (INHALATION)
Status: COMPLETED | OUTPATIENT
Start: 2022-08-06 | End: 2022-08-06

## 2022-08-06 RX ORDER — IBUPROFEN 200 MG
24 TABLET ORAL
Status: DISCONTINUED | OUTPATIENT
Start: 2022-08-06 | End: 2022-08-09 | Stop reason: HOSPADM

## 2022-08-06 RX ORDER — ONDANSETRON 2 MG/ML
4 INJECTION INTRAMUSCULAR; INTRAVENOUS EVERY 8 HOURS PRN
Status: DISCONTINUED | OUTPATIENT
Start: 2022-08-06 | End: 2022-08-09 | Stop reason: HOSPADM

## 2022-08-06 RX ORDER — INSULIN ASPART 100 [IU]/ML
1-10 INJECTION, SOLUTION INTRAVENOUS; SUBCUTANEOUS
Status: DISCONTINUED | OUTPATIENT
Start: 2022-08-06 | End: 2022-08-09 | Stop reason: HOSPADM

## 2022-08-06 RX ORDER — LEVOFLOXACIN 5 MG/ML
750 INJECTION, SOLUTION INTRAVENOUS
Status: DISCONTINUED | OUTPATIENT
Start: 2022-08-07 | End: 2022-08-09 | Stop reason: HOSPADM

## 2022-08-06 RX ORDER — ACETAMINOPHEN 325 MG/1
650 TABLET ORAL EVERY 4 HOURS PRN
Status: DISCONTINUED | OUTPATIENT
Start: 2022-08-06 | End: 2022-08-09 | Stop reason: HOSPADM

## 2022-08-06 RX ORDER — GLUCAGON 1 MG
1 KIT INJECTION
Status: DISCONTINUED | OUTPATIENT
Start: 2022-08-06 | End: 2022-08-09 | Stop reason: HOSPADM

## 2022-08-06 RX ORDER — PREDNISONE 20 MG/1
40 TABLET ORAL DAILY
Qty: 10 TABLET | Refills: 0 | Status: SHIPPED | OUTPATIENT
Start: 2022-08-06 | End: 2022-08-09 | Stop reason: SDUPTHER

## 2022-08-06 RX ORDER — LEVALBUTEROL INHALATION SOLUTION 0.63 MG/3ML
0.63 SOLUTION RESPIRATORY (INHALATION)
Status: DISCONTINUED | OUTPATIENT
Start: 2022-08-06 | End: 2022-08-09 | Stop reason: HOSPADM

## 2022-08-06 RX ORDER — LEVALBUTEROL INHALATION SOLUTION 1.25 MG/3ML
3.75 SOLUTION RESPIRATORY (INHALATION)
Status: COMPLETED | OUTPATIENT
Start: 2022-08-06 | End: 2022-08-06

## 2022-08-06 RX ORDER — IBUPROFEN 200 MG
1 TABLET ORAL DAILY
Status: DISCONTINUED | OUTPATIENT
Start: 2022-08-06 | End: 2022-08-09 | Stop reason: HOSPADM

## 2022-08-06 RX ORDER — QUETIAPINE FUMARATE 100 MG/1
300 TABLET, FILM COATED ORAL NIGHTLY
Status: DISCONTINUED | OUTPATIENT
Start: 2022-08-06 | End: 2022-08-09 | Stop reason: HOSPADM

## 2022-08-06 RX ADMIN — ACETAMINOPHEN 650 MG: 325 TABLET ORAL at 06:08

## 2022-08-06 RX ADMIN — AZITHROMYCIN 500 MG: 500 INJECTION, POWDER, LYOPHILIZED, FOR SOLUTION INTRAVENOUS at 03:08

## 2022-08-06 RX ADMIN — QUETIAPINE 300 MG: 100 TABLET ORAL at 09:08

## 2022-08-06 RX ADMIN — INSULIN ASPART 5 UNITS: 100 INJECTION, SOLUTION INTRAVENOUS; SUBCUTANEOUS at 09:08

## 2022-08-06 RX ADMIN — LEVOFLOXACIN 750 MG: 5 INJECTION, SOLUTION INTRAVENOUS at 11:08

## 2022-08-06 RX ADMIN — CEFTRIAXONE 2 G: 2 INJECTION, SOLUTION INTRAVENOUS at 02:08

## 2022-08-06 RX ADMIN — LEVALBUTEROL HYDROCHLORIDE 0.63 MG: 0.63 SOLUTION RESPIRATORY (INHALATION) at 07:08

## 2022-08-06 RX ADMIN — LORAZEPAM 1 MG: 1 TABLET ORAL at 06:08

## 2022-08-06 RX ADMIN — ONDANSETRON 4 MG: 2 INJECTION INTRAMUSCULAR; INTRAVENOUS at 06:08

## 2022-08-06 RX ADMIN — NICOTINE 1 PATCH: 21 PATCH, EXTENDED RELEASE TRANSDERMAL at 06:08

## 2022-08-06 RX ADMIN — LEVALBUTEROL HYDROCHLORIDE 3.75 MG: 1.25 SOLUTION RESPIRATORY (INHALATION) at 12:08

## 2022-08-06 RX ADMIN — IPRATROPIUM BROMIDE 1 MG: 0.5 SOLUTION RESPIRATORY (INHALATION) at 12:08

## 2022-08-06 RX ADMIN — METHYLPREDNISOLONE SODIUM SUCCINATE 125 MG: 125 INJECTION, POWDER, FOR SOLUTION INTRAMUSCULAR; INTRAVENOUS at 12:08

## 2022-08-06 RX ADMIN — SODIUM CHLORIDE, SODIUM LACTATE, POTASSIUM CHLORIDE, AND CALCIUM CHLORIDE 2850 ML: .6; .31; .03; .02 INJECTION, SOLUTION INTRAVENOUS at 03:08

## 2022-08-06 RX ADMIN — SODIUM CHLORIDE 4356 ML: 0.9 INJECTION, SOLUTION INTRAVENOUS at 07:08

## 2022-08-06 NOTE — ED PROVIDER NOTES
Encounter Date: 8/6/2022       History     Chief Complaint   Patient presents with    Shortness of Breath     Pt has pneumonia and is becoming more SOB     Patient was seen here 2 days ago with subjective fever, mild bilateral flank pain associated with worsening with deep breath.  Nonproductive cough.  Patient was diagnosed with pneumonia and urinary tract infection.  At that time CTA of the chest, CT of the abdomen pelvis did show pneumonitis.  No pulmonary embolus or obstructing calculi.  Patient continues with symptoms.  Patient feels short of breath.  This shortness breath essentially did has not changed over last several days.  Short of breath is described as patient cannot get deep breath in.        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     Past Medical History:   Diagnosis Date    Anxiety     Back pain     Bipolar disorder 2004    bipolar 2    Chronic bronchitis     Depression     GERD (gastroesophageal reflux disease)     HPV (human papilloma virus) infection     Insomnia     Migraines     Ovarian cyst     Pneumonia      Past Surgical History:   Procedure Laterality Date    ARTHROSCOPY OF ANKLE Right 5/4/2022    Procedure: ARTHROSCOPY, ANKLE;  Surgeon: Bimal Mccormick DPM;  Location: [facility] OR;  Service: Podiatry;  Laterality: Right;  [employer] EXTERNAL ANKLE DISTRACTOR    CHOLECYSTECTOMY      DILATION AND CURETTAGE OF UTERUS      HYSTERECTOMY  2017    total, ovarian cysts, torsion, [place]; hyst per Dr JESUS Manriquez    LAPAROSCOPIC APPENDECTOMY N/A 8/18/2021    Procedure: APPENDECTOMY, LAPAROSCOPIC;  Surgeon: Osiel Ramirez MD;  Location: [facility] OR;  Service: General;  Laterality: N/A;    OOPHORECTOMY      opherectom Left 2015    salpingo-opherectomy    REPAIR OF LIGAMENT OF ANKLE Right 6/23/2021    Procedure: REPAIR OF ANTERIOR TALOFIBULAR LIGAMENT CALCANEOFIBULAR LIGAMENT;  Surgeon: Bimal MERCADO  "RUSTY Mccormick;  Location: [facility] OR;  Service: Podiatry;  Laterality: Right;  WITH ARTHREX INTERNAL BRACE    SURGICAL REMOVAL OF BONE SPUR Right 6/23/2021    Procedure: EXCISION OS TRIGONUM;  Surgeon: Bimal Mccormick DPM;  Location: [facility];  Service: Podiatry;  Laterality: Right;    WISDOM TOOTH EXTRACTION       Family History   Adopted: Yes   Problem Relation Age of Onset    No Known Problems Sister     No Known Problems Sister     No Known Problems Sister      Social History     Tobacco Use    Smoking status: Current Every Day Smoker     Packs/day: 1.00     Years: 15.00     Pack years: 15.00     Types: Cigarettes    Smokeless tobacco: Never Used   Substance Use Topics    Alcohol use: Not Currently     Comment: rare    Drug use: Not Currently     Types: "Crack" cocaine     Comment: twice     Review of Systems   Constitutional: Negative for chills and fever.   HENT: Negative for congestion.    Eyes: Negative for visual disturbance.   Respiratory: Positive for cough and shortness of breath.    Cardiovascular: Positive for chest pain. Negative for palpitations.   Gastrointestinal: Negative for abdominal pain and vomiting.   Genitourinary: Positive for flank pain. Negative for hematuria.   Musculoskeletal: Negative for joint swelling.   Neurological: Negative for seizures and syncope.   Psychiatric/Behavioral: Negative for confusion.       Physical Exam     Initial Vitals [08/06/22 1101]   BP Pulse Resp Temp SpO2   (!) 144/99 105 18 98.1 °F (36.7 °C) (!) 94 %      MAP       --         Physical Exam    Nursing note and vitals reviewed.  Constitutional: She is not diaphoretic. No distress.   HENT:   Head: Normocephalic and atraumatic.   Eyes: Conjunctivae are normal.   Neck:   Normal range of motion.  Cardiovascular: Normal rate.   Pulmonary/Chest:   Good air movement no respiratory distress.  Very faint end expiratory wheeze diffusely.   Abdominal: Abdomen is soft. Bowel sounds are normal. There is no " abdominal tenderness.   Musculoskeletal:         General: Normal range of motion.      Cervical back: Normal range of motion.     Neurological: She is alert and oriented to person, place, and time. She has normal strength. No cranial nerve deficit or sensory deficit.   No gross deficits   Skin: No rash noted.   Psychiatric: She has a normal mood and affect.         ED Course   Procedures  Labs Reviewed   LACTIC ACID, PLASMA - Abnormal; Notable for the following components:       Result Value    Lactate (Lactic Acid) 2.6 (*)     All other components within normal limits    Narrative:     Lactic Acid critical result(s) repeated. Called and verbal readback   obtained from Leonila Colmenares RN ER.  by CW1 08/06/2022 13:39   CBC W/ AUTO DIFFERENTIAL - Abnormal; Notable for the following components:    WBC 12.81 (*)     RBC 5.53 (*)     MCV 79 (*)     MCH 24.8 (*)     MCHC 31.3 (*)     RDW 15.9 (*)     MPV 9.1 (*)     Gran # (ANC) 8.6 (*)     Immature Grans (Abs) 0.06 (*)     All other components within normal limits   COMPREHENSIVE METABOLIC PANEL - Abnormal; Notable for the following components:    Glucose 192 (*)     BUN 5 (*)     ALT 54 (*)     All other components within normal limits   URINALYSIS, REFLEX TO URINE CULTURE - Abnormal; Notable for the following components:    Appearance, UA Hazy (*)     Leukocytes, UA 3+ (*)     All other components within normal limits    Narrative:     Specimen Source->Urine   URINALYSIS MICROSCOPIC - Abnormal; Notable for the following components:    WBC, UA 27 (*)     Hyaline Casts, UA 17 (*)     All other components within normal limits    Narrative:     Specimen Source->Urine   CULTURE, BLOOD   CULTURE, BLOOD   CULTURE, URINE   PROTIME-INR   APTT   SARS-COV-2 RNA AMPLIFICATION, QUAL   INFLUENZA A AND B ANTIGEN    Narrative:     Specimen Source->Nasopharyngeal Swab   TROPONIN I   B-TYPE NATRIURETIC PEPTIDE   CK-MB   CK   MAGNESIUM   RAPID HIV   LACTIC ACID, PLASMA        ECG Results   "        EKG 12-lead (In process)  Result time 08/06/22 12:25:11    In process by Interface, Lab In [facility] (08/06/22 12:25:11)                 Narrative:    Test Reason : J18.9,    Vent. Rate : 093 BPM     Atrial Rate : 093 BPM     P-R Int : 154 ms          QRS Dur : 090 ms      QT Int : 374 ms       P-R-T Axes : 004 082 045 degrees     QTc Int : 465 ms    Normal sinus rhythm  Normal ECG  When compared with ECG of 02-AUG-2022 05:09,  No significant change was found    Referred By: [email or web address]   SELF           Confirmed By:                             Imaging Results          X-Ray Chest PA And Lateral (Final result)  Result time 08/06/22 13:02:36    Final result by Cecelia Schwartz MD (08/06/22 13:02:36)                 Narrative:    Reason: SOB    FINDINGS:  PA and lateral chest is compared to 8/18/2021 show normal cardiomediastinal silhouette.    Lungs are clear. Pulmonary vasculature is normal. Bones are normal.    IMPRESSION:  Normal chest.    Electronically signed by:  Cecelia Schwartz MD  8/6/2022 1:02 PM CDT Workstation: [id number]                               Medications   cefTRIAXone (ROCEPHIN) 2 g/50 mL D5W IVPB (has no administration in time range)   azithromycin 500 mg in dextrose 5 % 250 mL IVPB (ready to mix system) (has no administration in time range)   lactated ringers bolus 2,850 mL (has no administration in time range)   methylPREDNISolone sodium succinate injection 125 mg (125 mg Intravenous Given 8/6/22 1241)   levalbuterol nebulizer solution 3.75 mg (3.75 mg Nebulization Given 8/6/22 1210)   ipratropium 0.02 % nebulizer solution 1 mg (1 mg Nebulization Given 8/6/22 1210)     Medical Decision Making:   History:   Old Medical Records: I decided to obtain old medical records.  Clinical Tests:   Lab Tests: Reviewed  Radiological Study: Reviewed  Medical Tests: Reviewed  Sepsis Perfusion Assessment: "I attest a sepsis perfusion exam was performed within 6 hours of sepsis, severe sepsis, or " "septic shock presentation, following fluid resuscitation."  ED Management:  Patient diagnosed with urinary tract infection pneumonia 4 days ago.  Patient reports continued symptoms.  CTA the chest showed some pain infiltrates bilaterally.  Very unclear etiology.  Patient already on antibiotics.  No fever chills.  At presentation O2 saturation 94%.  After treatment here patient is feeling better.  Wheezing resolved.  Room air O2 saturation is now 92%.  Given elevated lactate will begin fluids for possible severe sepsis.  Will order repeat lactate level.  Patient is hypoxic with O2 saturation 89%.  Hospitalist consulted for admission.                      Clinical Impression:   Final diagnoses:  [J18.9] Pneumonia  [A41.9] Sepsis, due to unspecified organism, unspecified whether acute organ dysfunction present (Primary)          ED Disposition Condition    Admit               Kalyan Peterson MD  08/06/22 1328       Kalyan Peterson MD  08/06/22 1352       Kalyan Peterson MD  08/06/22 1356       Kalyan Peterson MD  08/06/22 1710    "

## 2022-08-06 NOTE — H&P
[facility] History & Physical Examination   Patient Name: Loretta Lea  MRN: [id number]  Patient Class: OP- Observation   Admission Date: 8/6/2022 10:58 AM  Length of Stay: 0  Attending Physician: Solo Biggs MD  Primary Care Provider: Magalie Roldan MD  Face-to-Face encounter date: 08/06/2022  Code Status:full code  Chief Complaint: Shortness of Breath (Pt has pneumonia and is becoming more SOB)        Patient information was obtained from patient, past medical records and ER records.   HISTORY OF PRESENT ILLNESS:   Loretta Lea is a 32 y.o.  female who  has a past medical history of Anxiety, Back pain, Bipolar disorder (2004), Chronic bronchitis, Depression, GERD (gastroesophageal reflux disease), HPV (human papilloma virus) infection, Insomnia, Migraines, Ovarian cyst, and Pneumonia.. The patient presented to [facility] on 8/6/2022 with a primary complaint of Shortness of Breath (Pt has pneumonia and is becoming more SOB)  Patient states that for the past couple of weeks she has been sob associated with cough productive of yellowish sputum. No associated fevers, hemoptysis, contacts with sick persons or recent travels. She presented to the ED on Tuesday and CT was done which showed pneumonia and was subsequently discharged on abx. However she states she didn't feel better and SOB worsened. She smokes 2PPD but does not use any illict medications.  On presentation to the ED, WBC elevated at 12.81 and urinaylsis positive for UTI, Covid and flu negative.    PAST MEDICAL HISTORY:     Past Medical History:   Diagnosis Date    Anxiety     Back pain     Bipolar disorder 2004    bipolar 2    Chronic bronchitis     Depression     GERD (gastroesophageal reflux disease)     HPV (human papilloma virus) infection     Insomnia     Migraines     Ovarian cyst     Pneumonia        PAST SURGICAL HISTORY:     Past Surgical History:    Procedure Laterality Date    ARTHROSCOPY OF ANKLE Right 5/4/2022    Procedure: ARTHROSCOPY, ANKLE;  Surgeon: Bimal Mccormick DPM;  Location: [facility] OR;  Service: Podiatry;  Laterality: Right;  CURT EXTERNAL ANKLE DISTRACTOR    CHOLECYSTECTOMY      DILATION AND CURETTAGE OF UTERUS      HYSTERECTOMY  2017    total, ovarian cysts, torsion, [place]; hyst per Dr JESUS Manriquez    LAPAROSCOPIC APPENDECTOMY N/A 8/18/2021    Procedure: APPENDECTOMY, LAPAROSCOPIC;  Surgeon: Osiel Ramirez MD;  Location: [facility];  Service: General;  Laterality: N/A;    OOPHORECTOMY      opherectom Left 2015    salpingo-opherectomy    REPAIR OF LIGAMENT OF ANKLE Right 6/23/2021    Procedure: REPAIR OF ANTERIOR TALOFIBULAR LIGAMENT CALCANEOFIBULAR LIGAMENT;  Surgeon: Bimal Mccormick DPM;  Location: [facility] OR;  Service: Podiatry;  Laterality: Right;  WITH ARTHREX INTERNAL BRACE    SURGICAL REMOVAL OF BONE SPUR Right 6/23/2021    Procedure: EXCISION OS TRIGONUM;  Surgeon: Bimal Mccormick DPM;  Location: [facility] OR;  Service: Podiatry;  Laterality: Right;    WISDOM TOOTH EXTRACTION         ALLERGIES:   Vancomycin analogues    FAMILY HISTORY:     Family History   Adopted: Yes   Problem Relation Age of Onset    No Known Problems Sister     No Known Problems Sister     No Known Problems Sister      Reviewed and non- contributory   SOCIAL HISTORY:     Social History     Tobacco Use    Smoking status: Current Every Day Smoker     Packs/day: 1.00     Years: 15.00     Pack years: 15.00     Types: Cigarettes    Smokeless tobacco: Never Used   Substance Use Topics    Alcohol use: Not Currently     Comment: rare        Social History     Substance and Sexual Activity   Sexual Activity Yes    Partners: Male    Birth control/protection: OCP, None        HOME MEDICATIONS:     Prior to Admission medications    Medication Sig Start Date End Date Taking? Authorizing Provider   cefUROXime (CEFTIN) 500 MG tablet Take 1 tablet (500 mg total)  by mouth every 12 (twelve) hours. 8/2/22  Yes Jesus Jha MD   doxycycline (VIBRAMYCIN) 100 MG Cap Take 1 capsule (100 mg total) by mouth 2 (two) times daily. for 10 days 8/2/22 8/12/22 Yes Jesus Jha MD   ondansetron (ZOFRAN) 4 MG tablet Take 1 tablet (4 mg total) by mouth every 6 (six) hours as needed. 8/2/22 8/2/23 Yes Jesus Jha MD   oxyCODONE-acetaminophen (PERCOCET) 5-325 mg per tablet Take 1 tablet by mouth every 4 (four) hours as needed for Pain. 8/2/22  Yes Jesus Jha MD   QUEtiapine (SEROQUEL) 300 MG Tab Take 1 tablet (300 mg total) by mouth nightly. 5/17/22  Yes Magalie Benjamin MD   sertraline (ZOLOFT) 100 MG tablet TAKE ONE TABLET BY MOUTH ONCE DAILY 7/7/22  Yes Magalie Benjamin MD   albuterol (PROVENTIL/VENTOLIN HFA) 90 mcg/actuation inhaler Inhale 1-2 puffs into the lungs every 6 (six) hours as needed for Wheezing. Rescue 8/6/22 8/6/23  aKlyan Peterson MD   HYDROcodone-acetaminophen (NORCO) 5-325 mg per tablet Take 1 tablet by mouth every 4 (four) hours as needed for Pain. 4/14/22   Bimal Mccormick DPM   HYDROcodone-acetaminophen (NORCO) 7.5-325 mg per tablet Take 1 tablet by mouth every 6 (six) hours as needed for Pain. 5/9/22   Bimal Mccormick DPM   HYDROcodone-acetaminophen (NORCO) 7.5-325 mg per tablet Take 1 tablet by mouth every 6 (six) hours as needed for Pain. 5/19/22   Bimal Mccormick DPM   nicotine (NICODERM CQ) 21 mg/24 hr Place 1 patch onto the skin once daily. 3/25/22   Magalie Benjamin MD   ondansetron (ZOFRAN-ODT) 4 MG TbDL Take 1 tablet (4 mg total) by mouth every 8 (eight) hours as needed (nausea). 7/12/22   Ladonna Reinoso MD   predniSONE (DELTASONE) 20 MG tablet Take 2 tablets (40 mg total) by mouth once daily. for 5 days 8/6/22 8/11/22  Kalyan Peterson MD       REVIEW OF SYSTEMS:   10 Point Review of System was performed and was found to be negative except for that mentioned already in the HPI above.     PHYSICAL EXAM:   /72    "Pulse 96   Temp 98.1 °F (36.7 °C) (Oral)   Resp (!) 22   Ht 5' 7" (1.702 m)   Wt (!) 145.2 kg (320 lb)   LMP 04/15/2017 (Approximate)   SpO2 (!) 94%   BMI 50.12 kg/m²     Vitals Reviewed  General appearance: Well-developed, well-nourished female in no apparent distress.  HENT: Atraumatic head. Moist mucous membranes of oral cavity.  Eyes: Normal extraocular movements.   Neck: Supple.   Lungs: Clear to auscultation bilaterally. No wheezing present.   Heart: Regular rate and rhythm. S1 and S2 present with no murmurs/gallop/rub. No pedal edema. No JVD present.   Abdomen: Soft, non-distended, non-tender. No rebound tenderness/guarding. Bowel sounds are normal.   Extremities: No cyanosis, clubbing, or edema.  Skin: No Rash.   Neuro: Motor and sensory exams grossly intact. Good tone. Muscle strength 5/5 in all 4 extremities  Psych/mental status: Appropriate mood and affect. Responds appropriately to questions.     EMERGENCY DEPARTMENT LABS AND IMAGING:     Labs Reviewed   LACTIC ACID, PLASMA - Abnormal; Notable for the following components:       Result Value    Lactate (Lactic Acid) 2.6 (*)     All other components within normal limits    Narrative:     Lactic Acid critical result(s) repeated. Called and verbal readback                   obtained from [facility].  by CW1 08/06/2022 13:39   CBC W/ AUTO DIFFERENTIAL - Abnormal; Notable for the following components:    WBC 12.81 (*)     RBC 5.53 (*)     MCV 79 (*)     MCH 24.8 (*)     MCHC 31.3 (*)     RDW 15.9 (*)     MPV 9.1 (*)     Gran # (ANC) 8.6 (*)     Immature Grans (Abs) 0.06 (*)     All other components within normal limits   COMPREHENSIVE METABOLIC PANEL - Abnormal; Notable for the following components:    Glucose 192 (*)     BUN 5 (*)     ALT 54 (*)     All other components within normal limits   URINALYSIS, REFLEX TO URINE CULTURE - Abnormal; Notable for the following components:    Appearance, UA Hazy (*)     Leukocytes, UA 3+ (*)     All other " components within normal limits    Narrative:     Specimen Source->Urine   URINALYSIS MICROSCOPIC - Abnormal; Notable for the following components:    WBC, UA 27 (*)     Hyaline Casts, UA 17 (*)     All other components within normal limits    Narrative:     Specimen Source->Urine   CULTURE, BLOOD   CULTURE, BLOOD   CULTURE, URINE   CULTURE, RESPIRATORY   RESPIRATORY INFECTION PANEL (PCR), NASOPHARYNGEAL   PROTIME-INR   APTT   SARS-COV-2 RNA AMPLIFICATION, QUAL   INFLUENZA A AND B ANTIGEN    Narrative:     Specimen Source->Nasopharyngeal Swab   TROPONIN I   B-TYPE NATRIURETIC PEPTIDE   CK-MB   CK   MAGNESIUM   RAPID HIV   LACTIC ACID, PLASMA   MYCOPLASMA PNEUMONIAE AB IGG & IGM   LEGIONELLA PNEUMOPHILA DFA   PROCALCITONIN       X-Ray Chest PA And Lateral   Final Result          ASSESSMENT & PLAN:   Loretta Lea is a 32 y.o. female admitted for    Active Hospital Problems    Diagnosis    *Pneumonia  procal level  Patient receive azithromycin and rocephin ED  Resp panel, sputum cultures and mycoplasma  Continue on levaquin to cover atypicals and UTI  Breathing Rx      Acute cystitis  Urine culture  Iv Levaquin      Cigarette smoker  Nicotine patch      Bipolar affective disorder, current episode mixed  Continue zoloft and seroquel        DVT Prophylaxis: will be placed on SCDs for DVT prophylaxis and will be advised to be as mobile as possible and sit in a chair as tolerated.   ________________________________________________________________________________    Face-to-Face encounter date: 08/06/2022  Encounter included review of the medical records, interviewing and examining the patient face-to-face, discussion with family and other health care providers including emergency medicine physician, admission orders, interpreting lab/test results and formulating a plan of care.     Medical Decision Making during this encounter was  [_] Low Complexity  [_] Moderate Complexity  [X] High  Complexity  _________________________________________________________________________________    INPATIENT LIST OF MEDICATIONS     Current Facility-Administered Medications:     lactated ringers bolus 2,850 mL, 30 mL/kg (Adjusted), Intravenous, Once, Kalyan Petreson MD, Last Rate: 142.5 mL/hr at 08/06/22 1501, 2,850 mL at 08/06/22 1501    levalbuterol nebulizer solution 0.63 mg, 0.63 mg, Nebulization, TID WAKE, Solo Biggs MD    [START ON 8/7/2022] levoFLOXacin 750 mg/150 mL IVPB 750 mg, 750 mg, Intravenous, Q24H, Solo Biggs MD    LORazepam tablet 1 mg, 1 mg, Oral, Q6H PRN, Solo Biggs MD    Current Outpatient Medications:     cefUROXime (CEFTIN) 500 MG tablet, Take 1 tablet (500 mg total) by mouth every 12 (twelve) hours., Disp: 20 tablet, Rfl: 0    doxycycline (VIBRAMYCIN) 100 MG Cap, Take 1 capsule (100 mg total) by mouth 2 (two) times daily. for 10 days, Disp: 20 capsule, Rfl: 0    ondansetron (ZOFRAN) 4 MG tablet, Take 1 tablet (4 mg total) by mouth every 6 (six) hours as needed., Disp: 20 tablet, Rfl: 1    oxyCODONE-acetaminophen (PERCOCET) 5-325 mg per tablet, Take 1 tablet by mouth every 4 (four) hours as needed for Pain., Disp: 10 tablet, Rfl: 0    QUEtiapine (SEROQUEL) 300 MG Tab, Take 1 tablet (300 mg total) by mouth nightly., Disp: 90 tablet, Rfl: 1    sertraline (ZOLOFT) 100 MG tablet, TAKE ONE TABLET BY MOUTH ONCE DAILY, Disp: 30 tablet, Rfl: 2    albuterol (PROVENTIL/VENTOLIN HFA) 90 mcg/actuation inhaler, Inhale 1-2 puffs into the lungs every 6 (six) hours as needed for Wheezing. Rescue, Disp: 1 g, Rfl: 0    HYDROcodone-acetaminophen (NORCO) 5-325 mg per tablet, Take 1 tablet by mouth every 4 (four) hours as needed for Pain., Disp: 28 tablet, Rfl: 0    HYDROcodone-acetaminophen (NORCO) 7.5-325 mg per tablet, Take 1 tablet by mouth every 6 (six) hours as needed for Pain., Disp: 28 tablet, Rfl: 0    HYDROcodone-acetaminophen (NORCO) 7.5-325 mg per tablet, Take 1  tablet by mouth every 6 (six) hours as needed for Pain., Disp: 28 tablet, Rfl: 0    nicotine (NICODERM CQ) 21 mg/24 hr, Place 1 patch onto the skin once daily., Disp: 28 patch, Rfl: 1    ondansetron (ZOFRAN-ODT) 4 MG TbDL, Take 1 tablet (4 mg total) by mouth every 8 (eight) hours as needed (nausea)., Disp: 12 tablet, Rfl: 0    predniSONE (DELTASONE) 20 MG tablet, Take 2 tablets (40 mg total) by mouth once daily. for 5 days, Disp: 10 tablet, Rfl: 0      Scheduled Meds:   lactated ringers  30 mL/kg (Adjusted) Intravenous Once    levalbuterol  0.63 mg Nebulization TID WAKE    [START ON 8/7/2022] levoFLOXacin  750 mg Intravenous Q24H     Continuous Infusions:  PRN Meds:.LORazepam      Solo Biggs MD  [facility] Hospitalist  08/06/2022

## 2022-08-06 NOTE — DISCHARGE INSTRUCTIONS
Your diagnosed with diabetes, you are to start on metformin and with record your blood sugar levels levels to follow-up with your PCP for further fine tuning of your medications.  You also to follow-up with a pulmonologist to evaluate your for COPD/LORENA overlap.

## 2022-08-06 NOTE — CARE UPDATE
08/06/22 1210   Patient Assessment/Suction   Level of Consciousness (AVPU) alert   Respiratory Effort Unlabored   Expansion/Accessory Muscles/Retractions no retractions   All Lung Fields Breath Sounds Anterior:;diminished   Cough Frequency frequent   Cough Type fair;dry   PRE-TX-O2   O2 Device (Oxygen Therapy) room air   SpO2 (!) 94 %   Pulse 97   Resp 18   Aerosol Therapy   $ Aerosol Therapy Charges Initial Continuous   Daily Review of Necessity (SVN) completed   Respiratory Treatment Status (SVN) given   Treatment Route (SVN) mask   Patient Position (SVN) HOB elevated   Signs of Intolerance (SVN) none   Breath Sounds Post-Respiratory Treatment   Throughout All Fields Post-Treatment All Fields   Throughout All Fields Post-Treatment aeration increased   Post-treatment Heart Rate (beats/min) 20   Post-treatment Resp Rate (breaths/min) 94   Education   $ Education Bronchodilator;15 min   Respiratory Evaluation   $ Care Plan Tech Time 15 min   $ Eval/Re-eval Charges Evaluation

## 2022-08-07 LAB
ADENOVIRUS: NOT DETECTED
ANION GAP SERPL CALC-SCNC: 10 MMOL/L (ref 8–16)
BORDETELLA PARAPERTUSSIS (IS1001): NOT DETECTED
BORDETELLA PERTUSSIS (PTXP): NOT DETECTED
BUN SERPL-MCNC: 8 MG/DL (ref 6–20)
CALCIUM SERPL-MCNC: 8.5 MG/DL (ref 8.7–10.5)
CHLAMYDIA PNEUMONIAE: NOT DETECTED
CHLORIDE SERPL-SCNC: 103 MMOL/L (ref 95–110)
CO2 SERPL-SCNC: 20 MMOL/L (ref 23–29)
CORONAVIRUS 229E, COMMON COLD VIRUS: NOT DETECTED
CORONAVIRUS HKU1, COMMON COLD VIRUS: NOT DETECTED
CORONAVIRUS NL63, COMMON COLD VIRUS: NOT DETECTED
CORONAVIRUS OC43, COMMON COLD VIRUS: NOT DETECTED
CREAT SERPL-MCNC: 0.6 MG/DL (ref 0.5–1.4)
ERYTHROCYTE [DISTWIDTH] IN BLOOD BY AUTOMATED COUNT: 16 % (ref 11.5–14.5)
EST. GFR  (NO RACE VARIABLE): >60 ML/MIN/1.73 M^2
FLUBV RNA NPH QL NAA+NON-PROBE: NOT DETECTED
GLUCOSE SERPL-MCNC: 269 MG/DL (ref 70–110)
GLUCOSE SERPL-MCNC: 278 MG/DL (ref 70–110)
GLUCOSE SERPL-MCNC: 293 MG/DL (ref 70–110)
GLUCOSE SERPL-MCNC: 296 MG/DL (ref 70–110)
GLUCOSE SERPL-MCNC: 324 MG/DL (ref 70–110)
HCT VFR BLD AUTO: 38.8 % (ref 37–48.5)
HGB BLD-MCNC: 11.8 G/DL (ref 12–16)
HPIV1 RNA NPH QL NAA+NON-PROBE: NOT DETECTED
HPIV2 RNA NPH QL NAA+NON-PROBE: NOT DETECTED
HPIV3 RNA NPH QL NAA+NON-PROBE: NOT DETECTED
HPIV4 RNA NPH QL NAA+NON-PROBE: NOT DETECTED
HUMAN METAPNEUMOVIRUS: NOT DETECTED
INFLUENZA A (SUBTYPES H1,H1-2009,H3): NOT DETECTED
IRON SERPL-MCNC: 45 UG/DL (ref 30–160)
LACTATE SERPL-SCNC: 2.6 MMOL/L (ref 0.5–1.9)
LACTATE SERPL-SCNC: 4.3 MMOL/L (ref 0.5–1.9)
MAGNESIUM SERPL-MCNC: 1.9 MG/DL (ref 1.6–2.6)
MCH RBC QN AUTO: 24.8 PG (ref 27–31)
MCHC RBC AUTO-ENTMCNC: 30.4 G/DL (ref 32–36)
MCV RBC AUTO: 82 FL (ref 82–98)
MYCOPLASMA PNEUMONIAE: NOT DETECTED
PLATELET # BLD AUTO: 331 K/UL (ref 150–450)
PMV BLD AUTO: 9.2 FL (ref 9.2–12.9)
POTASSIUM SERPL-SCNC: 4.3 MMOL/L (ref 3.5–5.1)
RBC # BLD AUTO: 4.75 M/UL (ref 4–5.4)
RESPIRATORY INFECTION PANEL SOURCE: NORMAL
RSV RNA NPH QL NAA+NON-PROBE: NOT DETECTED
RV+EV RNA NPH QL NAA+NON-PROBE: NOT DETECTED
SARS-COV-2 RNA RESP QL NAA+PROBE: NOT DETECTED
SATURATED IRON: 12 % (ref 20–50)
SODIUM SERPL-SCNC: 133 MMOL/L (ref 136–145)
TOTAL IRON BINDING CAPACITY: 375 UG/DL (ref 250–450)
TRANSFERRIN SERPL-MCNC: 268 MG/DL (ref 200–375)
WBC # BLD AUTO: 15.94 K/UL (ref 3.9–12.7)

## 2022-08-07 PROCEDURE — 36415 COLL VENOUS BLD VENIPUNCTURE: CPT | Performed by: STUDENT IN AN ORGANIZED HEALTH CARE EDUCATION/TRAINING PROGRAM

## 2022-08-07 PROCEDURE — 87633 RESP VIRUS 12-25 TARGETS: CPT | Performed by: STUDENT IN AN ORGANIZED HEALTH CARE EDUCATION/TRAINING PROGRAM

## 2022-08-07 PROCEDURE — S4991 NICOTINE PATCH NONLEGEND: HCPCS | Performed by: STUDENT IN AN ORGANIZED HEALTH CARE EDUCATION/TRAINING PROGRAM

## 2022-08-07 PROCEDURE — 12000002 HC ACUTE/MED SURGE SEMI-PRIVATE ROOM

## 2022-08-07 PROCEDURE — 80048 BASIC METABOLIC PNL TOTAL CA: CPT | Performed by: STUDENT IN AN ORGANIZED HEALTH CARE EDUCATION/TRAINING PROGRAM

## 2022-08-07 PROCEDURE — 96361 HYDRATE IV INFUSION ADD-ON: CPT

## 2022-08-07 PROCEDURE — 94799 UNLISTED PULMONARY SVC/PX: CPT

## 2022-08-07 PROCEDURE — 87205 SMEAR GRAM STAIN: CPT | Performed by: STUDENT IN AN ORGANIZED HEALTH CARE EDUCATION/TRAINING PROGRAM

## 2022-08-07 PROCEDURE — 94761 N-INVAS EAR/PLS OXIMETRY MLT: CPT

## 2022-08-07 PROCEDURE — 85027 COMPLETE CBC AUTOMATED: CPT | Performed by: STUDENT IN AN ORGANIZED HEALTH CARE EDUCATION/TRAINING PROGRAM

## 2022-08-07 PROCEDURE — 63600175 PHARM REV CODE 636 W HCPCS: Performed by: STUDENT IN AN ORGANIZED HEALTH CARE EDUCATION/TRAINING PROGRAM

## 2022-08-07 PROCEDURE — 96376 TX/PRO/DX INJ SAME DRUG ADON: CPT

## 2022-08-07 PROCEDURE — 25000242 PHARM REV CODE 250 ALT 637 W/ HCPCS: Performed by: STUDENT IN AN ORGANIZED HEALTH CARE EDUCATION/TRAINING PROGRAM

## 2022-08-07 PROCEDURE — 27000221 HC OXYGEN, UP TO 24 HOURS

## 2022-08-07 PROCEDURE — 99900035 HC TECH TIME PER 15 MIN (STAT)

## 2022-08-07 PROCEDURE — 99900031 HC PATIENT EDUCATION (STAT)

## 2022-08-07 PROCEDURE — 87070 CULTURE OTHR SPECIMN AEROBIC: CPT | Performed by: STUDENT IN AN ORGANIZED HEALTH CARE EDUCATION/TRAINING PROGRAM

## 2022-08-07 PROCEDURE — 25000003 PHARM REV CODE 250: Performed by: STUDENT IN AN ORGANIZED HEALTH CARE EDUCATION/TRAINING PROGRAM

## 2022-08-07 PROCEDURE — 83036 HEMOGLOBIN GLYCOSYLATED A1C: CPT | Performed by: STUDENT IN AN ORGANIZED HEALTH CARE EDUCATION/TRAINING PROGRAM

## 2022-08-07 PROCEDURE — 83605 ASSAY OF LACTIC ACID: CPT | Performed by: STUDENT IN AN ORGANIZED HEALTH CARE EDUCATION/TRAINING PROGRAM

## 2022-08-07 PROCEDURE — 94640 AIRWAY INHALATION TREATMENT: CPT

## 2022-08-07 PROCEDURE — 84466 ASSAY OF TRANSFERRIN: CPT | Performed by: STUDENT IN AN ORGANIZED HEALTH CARE EDUCATION/TRAINING PROGRAM

## 2022-08-07 PROCEDURE — 87798 DETECT AGENT NOS DNA AMP: CPT | Mod: 59 | Performed by: STUDENT IN AN ORGANIZED HEALTH CARE EDUCATION/TRAINING PROGRAM

## 2022-08-07 PROCEDURE — 83735 ASSAY OF MAGNESIUM: CPT | Performed by: STUDENT IN AN ORGANIZED HEALTH CARE EDUCATION/TRAINING PROGRAM

## 2022-08-07 RX ORDER — HYDROCODONE BITARTRATE AND ACETAMINOPHEN 5; 325 MG/1; MG/1
1 TABLET ORAL EVERY 6 HOURS PRN
Status: DISCONTINUED | OUTPATIENT
Start: 2022-08-07 | End: 2022-08-09 | Stop reason: HOSPADM

## 2022-08-07 RX ORDER — PREDNISONE 20 MG/1
60 TABLET ORAL DAILY
Status: DISCONTINUED | OUTPATIENT
Start: 2022-08-07 | End: 2022-08-09 | Stop reason: HOSPADM

## 2022-08-07 RX ORDER — PANTOPRAZOLE SODIUM 40 MG/1
40 TABLET, DELAYED RELEASE ORAL DAILY
Status: DISCONTINUED | OUTPATIENT
Start: 2022-08-07 | End: 2022-08-09 | Stop reason: HOSPADM

## 2022-08-07 RX ORDER — LORAZEPAM 1 MG/1
1 TABLET ORAL EVERY 4 HOURS PRN
Status: DISCONTINUED | OUTPATIENT
Start: 2022-08-07 | End: 2022-08-09 | Stop reason: HOSPADM

## 2022-08-07 RX ORDER — SODIUM CHLORIDE 9 MG/ML
INJECTION, SOLUTION INTRAVENOUS CONTINUOUS
Status: DISCONTINUED | OUTPATIENT
Start: 2022-08-07 | End: 2022-08-09 | Stop reason: HOSPADM

## 2022-08-07 RX ADMIN — HYDROCODONE BITARTRATE AND ACETAMINOPHEN 1 TABLET: 5; 325 TABLET ORAL at 08:08

## 2022-08-07 RX ADMIN — SERTRALINE HYDROCHLORIDE 100 MG: 50 TABLET ORAL at 08:08

## 2022-08-07 RX ADMIN — PANTOPRAZOLE SODIUM 40 MG: 40 TABLET, DELAYED RELEASE ORAL at 01:08

## 2022-08-07 RX ADMIN — ACETAMINOPHEN 650 MG: 325 TABLET ORAL at 12:08

## 2022-08-07 RX ADMIN — HYDROCODONE BITARTRATE AND ACETAMINOPHEN 1 TABLET: 5; 325 TABLET ORAL at 09:08

## 2022-08-07 RX ADMIN — LORAZEPAM 1 MG: 1 TABLET ORAL at 09:08

## 2022-08-07 RX ADMIN — LEVALBUTEROL HYDROCHLORIDE 0.63 MG: 0.63 SOLUTION RESPIRATORY (INHALATION) at 01:08

## 2022-08-07 RX ADMIN — INSULIN ASPART 6 UNITS: 100 INJECTION, SOLUTION INTRAVENOUS; SUBCUTANEOUS at 08:08

## 2022-08-07 RX ADMIN — LORAZEPAM 1 MG: 1 TABLET ORAL at 01:08

## 2022-08-07 RX ADMIN — SODIUM CHLORIDE: 9 INJECTION, SOLUTION INTRAVENOUS at 01:08

## 2022-08-07 RX ADMIN — ACETAMINOPHEN 650 MG: 325 TABLET ORAL at 08:08

## 2022-08-07 RX ADMIN — QUETIAPINE 300 MG: 100 TABLET ORAL at 08:08

## 2022-08-07 RX ADMIN — LORAZEPAM 1 MG: 1 TABLET ORAL at 08:08

## 2022-08-07 RX ADMIN — ONDANSETRON 4 MG: 2 INJECTION INTRAMUSCULAR; INTRAVENOUS at 12:08

## 2022-08-07 RX ADMIN — INSULIN ASPART 4 UNITS: 100 INJECTION, SOLUTION INTRAVENOUS; SUBCUTANEOUS at 09:08

## 2022-08-07 RX ADMIN — INSULIN ASPART 6 UNITS: 100 INJECTION, SOLUTION INTRAVENOUS; SUBCUTANEOUS at 05:08

## 2022-08-07 RX ADMIN — ONDANSETRON 4 MG: 2 INJECTION INTRAMUSCULAR; INTRAVENOUS at 08:08

## 2022-08-07 RX ADMIN — PREDNISONE 60 MG: 20 TABLET ORAL at 01:08

## 2022-08-07 RX ADMIN — LEVALBUTEROL HYDROCHLORIDE 0.63 MG: 0.63 SOLUTION RESPIRATORY (INHALATION) at 07:08

## 2022-08-07 RX ADMIN — LORAZEPAM 1 MG: 1 TABLET ORAL at 12:08

## 2022-08-07 RX ADMIN — LEVALBUTEROL HYDROCHLORIDE 0.63 MG: 0.63 SOLUTION RESPIRATORY (INHALATION) at 09:08

## 2022-08-07 RX ADMIN — NICOTINE 1 PATCH: 21 PATCH, EXTENDED RELEASE TRANSDERMAL at 05:08

## 2022-08-07 RX ADMIN — SODIUM CHLORIDE: 9 INJECTION, SOLUTION INTRAVENOUS at 08:08

## 2022-08-07 NOTE — CARE UPDATE
08/06/22 1928   Patient Assessment/Suction   Respiratory Effort Unlabored   All Lung Fields Breath Sounds diminished   Rhythm/Pattern, Respiratory pattern regular   PRE-TX-O2   O2 Device (Oxygen Therapy) room air   SpO2 (!) 89 %  (put pt. on o2)   Pulse 85   Resp 16   Aerosol Therapy   $ Aerosol Therapy Charges Aerosol Treatment   Respiratory Treatment Status (SVN) given   Treatment Route (SVN) mouthpiece   Patient Position (SVN) HOB elevated   Post Treatment Assessment (SVN) breath sounds improved   Signs of Intolerance (SVN) none   Breath Sounds Post-Respiratory Treatment   Throughout All Fields Post-Treatment All Fields   Throughout All Fields Post-Treatment clear   Post-treatment Heart Rate (beats/min) 85   Post-treatment Resp Rate (breaths/min) 20   Equipment Change   $ RT Equipment   (o2 ext x 2, connectors x 2)   Education   $ Education 15 min;Bronchodilator   Respiratory Evaluation   $ Care Plan Tech Time 15 min   $ Eval/Re-eval Charges Re-evaluation   Evaluation For New Orders

## 2022-08-07 NOTE — PLAN OF CARE
[facility]  Initial Discharge Assessment       Primary Care Provider: Magalie Roldan MD    Admission Diagnosis: Sepsis, due to unspecified organism, unspecified whether acute organ dysfunction present [A41.9]    Admission Date: 8/6/2022  Expected Discharge Date: TBD    Discharge Barriers Identified: None    Payor: MEDICAID / [facility] / Product Type: Managed Medicaid /     Extended Emergency Contact Information  Primary Emergency Contact: Isauro Funk  Address: [address] [place] [address]           [place] [address] [facility]  Home Phone: [phone]  Mobile Phone: [phone]  Relation: Spouse    Discharge Plan A: Home with family  Discharge Plan B: Home with family      [id number] - [place] - [address]  [address] [address]  Phone: [phone] Fax: [phone]      Initial Assessment (most recent)     Adult Discharge Assessment - 08/07/22 1000        Discharge Assessment    Assessment Type Discharge Planning Assessment     Confirmed/corrected address, phone number and insurance Yes     Confirmed Demographics Correct on Facesheet     Source of Information patient     Does patient/caregiver understand observation status Yes     Communicated PEDRO with patient/caregiver Date not available/Unable to determine     Reason For Admission Pneumonia     Lives With spouse     Facility Arrived From: home     Do you expect to return to your current living situation? Yes     Do you have help at home or someone to help you manage your care at home? Yes     Who are your caregiver(s) and their phone number(s)?  / Isauro Funk [phone]     Prior to hospitilization cognitive status: Alert/Oriented     Current cognitive status: Alert/Oriented     Walking or Climbing Stairs Difficulty none     Dressing/Bathing Difficulty none     Equipment Currently Used at Home none     Readmission within 30 days? No     Patient  currently being followed by outpatient case management? No     Do you currently have service(s) that help you manage your care at home? No     Do you take prescription medications? Yes     Do you have prescription coverage? Yes     Do you have any problems affording any of your prescribed medications? No     Is the patient taking medications as prescribed? yes     Who is going to help you get home at discharge?  / Isauro Funk [phone]     How do you get to doctors appointments? car, drives self     Are you on dialysis? No     Do you take coumadin? No     Discharge Plan A Home with family     Discharge Plan B Home with family     DME Needed Upon Discharge  none     Discharge Barriers Identified None        Relationship/Environment    Name(s) of Who Lives With Patient  / Isauro Funk [phone]

## 2022-08-07 NOTE — PROGRESS NOTES
[facility] Medicine    Progress Note    Patient Name: Loretta Lea  MRN: [id number]  Patient Class: OP- Observation   Admission Date: 8/6/2022 10:58 AM  Length of Stay: 0  Attending Physician: Solo Biggs MD  Primary Care Provider: Magalie Roldan MD  Face-to-Face encounter date: 08/07/2022  Code status:  Chief Complaint: Shortness of Breath (Pt has pneumonia and is becoming more SOB)        Subjective:    HPI:  Loretta Lea is a 32 y.o.  female who  has a past medical history of Anxiety, Back pain, Bipolar disorder (2004), Chronic bronchitis, Depression, GERD (gastroesophageal reflux disease), HPV (human papilloma virus) infection, Insomnia, Migraines, Ovarian cyst, and Pneumonia.. The patient presented to [facility] on 8/6/2022 with a primary complaint of Shortness of Breath (Pt has pneumonia and is becoming more SOB)  Patient states that for the past couple of weeks she has been sob associated with cough productive of yellowish sputum. No associated fevers, hemoptysis, contacts with sick persons or recent travels. She presented to the ED on Tuesday and CT was done which showed pneumonia and was subsequently discharged on abx. However she states she didn't feel better and SOB worsened. She smokes 2PPD but does not use any illict medications.  On presentation to the ED, WBC elevated at 12.81 and urinaylsis positive for UTI, Covid and flu negative.    Interval History:   8/7: Patient states that she still short of breath and cannot ambulate to the restroom without been short of breath.  However later in the day I saw patient ambulating the hallway without shortness of breath and smoking outside without oxygen.  Sugars uncontrolled due to steroids, placed on insulin sliding scale and check A1c.  Will transition IV steroids to oral steroids 60 mg daily, continue scheduled nebulizers. No concerns/issues overnight reported by the patient or  the nursing staff.    Review of Systems All other Review of Systems were found to be negative expect for that mentioned already in HPI.     Objective:     Vitals:    08/07/22 0705 08/07/22 0800 08/07/22 0959 08/07/22 1200   BP:  116/76  (!) 163/98   Pulse: 89 104  85   Resp: 18 20 20 20   Temp:  98.3 °F (36.8 °C)  98.2 °F (36.8 °C)   TempSrc:  Oral  Oral   SpO2: 97% 97%  98%   Weight:       Height:            Vitals reviewed.  Constitutional: No distress.   HENT: NC  Head: Atraumatic.   Cardiovascular: Normal rate, regular rhythm and normal heart sounds.   Pulmonary/Chest: Effort normal. No wheezes.   Abdominal: Soft. Bowel sounds are normal. No distension and no mass. No tenderness  Neurological: Alert.   Skin: Skin is warm and dry.   Psych: Appropriate mood and affect    Following labs were Reviewed   CBC:  Recent Labs   Lab 08/07/22  0706   WBC 15.94*   HGB 11.8*   HCT 38.8        CMP:  Recent Labs   Lab 08/07/22  0706   CALCIUM 8.5*   *   K 4.3   CO2 20*      BUN 8   CREATININE 0.6       Micro Results  Microbiology Results (last 7 days)     Procedure Component Value Units Date/Time    Blood culture [[id number]] Collected: 08/06/22 1140    Order Status: Completed Specimen: Blood from Peripheral, Antecubital, Right Updated: 08/07/22 1232     Blood Culture, Routine No Growth to date      No Growth to date    Respiratory Infection Panel (PCR), Nasopharyngeal [[id number]] Collected: 08/07/22 1059    Order Status: Completed Specimen: Nasopharyngeal Swab Updated: 08/07/22 1113     Respiratory Infection Panel Source NP swab    Narrative:      Respiratory Infection Panel source->NP Swab    Urine culture [[id number]] Collected: 08/06/22 1149    Order Status: Completed Specimen: Urine Updated: 08/07/22 0844     Urine Culture, Routine No growth to date    Narrative:      Specimen Source->Urine    Blood culture [[id number]] Collected: 08/06/22 1210    Order Status: Completed Specimen: Blood from Peripheral,  Hand, Right Updated: 08/06/22 1917     Blood Culture, Routine No Growth to date    Culture, Respiratory with Gram Stain [[id number]]     Order Status: No result Specimen: Respiratory            Radiology Reports  X-Ray Chest PA And Lateral  Result Date: 8/6/2022  IMPRESSION: Normal chest. Electronically signed by:  Cecelia Schwartz MD  8/6/2022 1:02 PM CDT Workstation: [id number]    CTA Chest Non-Coronary (PE Study)  Result Date: 8/2/2022  Impression: 1: Negative for pulmonary embolism. 2: Mild rather diffuse lung infiltrates. Clinical correlation. 3: Appendectomy. Cholecystectomy. Hysterectomy and possible oophorectomy. 4: Remote granulomatous disease. 5: fatty liver. 6: No acute intra-abdominal or pelvic finding. This exam was performed according to our departmental dose-optimization program which includes use of Automated Exposure Control, adjustment of the mA and/or kV according to patient size and/or use of iterative reconstruction technique. Electronically signed by:  Дмитрий La MD  8/2/2022 5:49 AM CDT Workstation: [id number]    CT Abdomen Pelvis With Contrast  Result Date: 8/2/2022  Impression: 1: Negative for pulmonary embolism. 2: Mild rather diffuse lung infiltrates. Clinical correlation. 3: Appendectomy. Cholecystectomy. Hysterectomy and possible oophorectomy. 4: Remote granulomatous disease. 5: fatty liver. 6: No acute intra-abdominal or pelvic finding. This exam was performed according to our departmental dose-optimization program which includes use of Automated Exposure Control, adjustment of the mA and/or kV according to patient size and/or use of iterative reconstruction technique. Electronically signed by:  Дмитрий La MD  8/2/2022 5:49 AM CDT Workstation: [id number]       Meds  Scheduled Meds:   levalbuterol  0.63 mg Nebulization TID WAKE    levoFLOXacin  750 mg Intravenous Q24H    nicotine  1 patch Transdermal Daily    pantoprazole  40 mg Oral Daily    predniSONE  60 mg Oral  Daily    QUEtiapine  300 mg Oral Nightly    sertraline  100 mg Oral Daily     Continuous Infusions:   sodium chloride 0.9%       PRN Meds:.acetaminophen, bisacodyL, dextrose 50%, dextrose 50%, glucagon (human recombinant), glucose, glucose, HYDROcodone-acetaminophen, insulin aspart U-100, LORazepam, ondansetron, sodium chloride 0.9%.    Assessment & Plan:      *Pneumonia  resp panel pending  Continue on levaquin to cover atypicals and UTI  Continue oral steriods  Breathing Rx       Acute cystitis  Urine culture pending  Iv Levaquin       Cigarette smoker  Nicotine patch       Bipolar affective disorder, current episode mixed  Continue zoloft and seroquel               Discharge Planning:   Is the patient medically ready for discharge?: no    Reason for patient still in hospital (select all that apply): Patient trending condition and Treatment    Above encounter included review of the medical records, interviewing and examining the patient face-to-face, discussion with family and other health care providers, ordering and interpreting lab/test results and formulating a plan of care.     Medical Decision Making:      [_] Low Complexity  [_] Moderate Complexity  [x] High Complexity      Solo Biggs MD  Department of Hospital Medicine   [facility]

## 2022-08-07 NOTE — NURSING
Called lab to ask about lactate and all morning labs that were not drawn at ordered time; lab states they just had a phlebotomist come in and they will draw pt.

## 2022-08-07 NOTE — PLAN OF CARE
Problem: Adult Inpatient Plan of Care  Goal: Plan of Care Review  Outcome: Ongoing, Progressing  Goal: Patient-Specific Goal (Individualized)  Outcome: Ongoing, Progressing  Goal: Absence of Hospital-Acquired Illness or Injury  Outcome: Ongoing, Progressing  Goal: Optimal Comfort and Wellbeing  Outcome: Ongoing, Progressing  Goal: Readiness for Transition of Care  Outcome: Ongoing, Progressing     Problem: Bariatric Environmental Safety  Goal: Safety Maintained with Care  Outcome: Ongoing, Progressing     Problem: Fluid Imbalance (Pneumonia)  Goal: Fluid Balance  Outcome: Ongoing, Progressing     Problem: Infection (Pneumonia)  Goal: Resolution of Infection Signs and Symptoms  Outcome: Ongoing, Progressing     Problem: Respiratory Compromise (Pneumonia)  Goal: Effective Oxygenation and Ventilation  Outcome: Ongoing, Progressing      "              After Visit Summary   2017    Memo Samano    MRN: [id number]           Patient Information     Date Of Birth          2018        Visit Information        Provider Department      2017 9:00 AM Jose Barton MD [facility]        Today's Diagnoses     WCC (well child check),  8-28 days old    -  1      Care Instructions        Preventive Care at the [place] Visit    Growth Measurements & Percentiles  Head Circumference: 14\" (35.6 cm) (37 %, Source: WHO (Boys, 0-2 years)) 37 %ile based on WHO (Boys, 0-2 years) head circumference-for-age data using vitals from 2017.   Birth Weight: 6 lbs 11 oz   Weight: 7 lbs 1.5 oz / 3.22 kg (actual weight) / 8 %ile based on WHO (Boys, 0-2 years) weight-for-age data using vitals from 2017.   Length: 1' 7.75\" / 50.2 cm 12 %ile based on WHO (Boys, 0-2 years) length-for-age data using vitals from 2017.   Weight for length: 31 %ile based on WHO (Boys, 0-2 years) weight-for-recumbent length data using vitals from 2017.    Recommended preventive visits for your :  2 weeks old  2 months old    Here s what your baby might be doing from birth to 2 months of age.    Growth and development    Begins to smile at familiar faces and voices, especially parents  voices.    Movements become less jerky.    Lifts chin for a few seconds when lying on the tummy.    Cannot hold head upright without support.    Holds onto an object that is placed in his hand.    Has a different cry for different needs, such as hunger or a wet diaper.    Has a fussy time, often in the evening.  This starts at about 2 to 3 weeks of age.    Makes noises and cooing sounds.    Usually gains 4 to 5 ounces per week.      Vision and hearing    Can see about one foot away at birth.  By 2 months, he can see about 10 feet away.    Starts to follow some moving objects with eyes.  Uses eyes to explore the world.    Makes eye contact.    Can see " "colors.    Hearing is fully developed.  He will be startled by loud sounds.    Things you can do to help your child  1. Talk and sing to your baby often.  2. Let your baby look at faces and bright colors.    All babies are different    The information here shows average development.  All babies develop at their own rate.  Certain behaviors and physical milestones tend to occur at certain ages, but there is a wide range of growth and behavior that is normal.  Your baby might reach some milestones earlier or later than the average child.  If you have any concerns about your baby s development, talk with your doctor or nurse.      Feeding  The only food your baby needs right now is breast milk or iron-fortified formula.  Your baby does not need water at this age.  Ask your doctor about giving your baby a Vitamin D supplement.    Breastfeeding tips    Breastfeed every 2-4 hours. If your baby is sleepy - use breast compression, push on chin to \"start up\" baby, switch breasts, undress to diaper and wake before relatching.     Some babies \"cluster\" feed every 1 hour for a while- this is normal. Feed your baby whenever he/she is awake-  even if every hour for a while. This frequent feeding will help you make more milk and encourage your baby to sleep for longer stretches later in the evening or night.      Position your baby close to you with pillows so he/she is facing you -belly to belly laying horizontally across your lap at the level of your breast and looking a bit \"upwards\" to your breast     One hand holds the baby's neck behind the ears and the other hand holds your breast    Baby's nose should start out pointing to your nipple before latching    Hold your breast in a \"sandwich\" position by gently squeezing your breast in an oval shape and make sure your hands are not covering the areola    This \"nipple sandwich\" will make it easier for your breast to fit inside the baby's mouth-making latching more comfortable for " "you and baby and preventing sore nipples. Your baby should take a \"mouthful\" of breast!    You may want to use hand expression to \"prime the pump\" and get a drip of milk out on your nipple to wake baby     (see website: newborns.[place].edu/Breastfeeding/HandExpression.html)    Swipe your nipple on baby's upper lip and wait for a BIG open mouth    YOU bring baby to the breast (hold baby's neck with your fingers just below the ears) and bring baby's head to the breast--leading with the chin.  Try to avoid pushing your breast into baby's mouth- bring baby to you instead!    Aim to get your baby's bottom lip LOW DOWN ON AREOLA (baby's upper lip just needs to \"clear\" the nipple) .     Your baby should latch onto the areola and NOT just the nipple. That way your baby gets more milk and you don't get sore nipples!     Websites about breastfeeding  www.womenshealth.gov/breastfeeding - many topics and videos   www.breastfeedingonline.LoopIt  - general information and videos about latching  http://newborns.[place].edu/Breastfeeding/HandExpression.html - video about hand expression   http://newborns.[place].edu/Breastfeeding/ABCs.html#ABCs  - general information  [employer].[place].org - [facility] - information about breastfeeding and support groups    Formula  General guidelines    Age   # time/day   Serving Size     0-1 Month   6-8 times   2-4 oz     1-2 Months   5-7 times   3-5 oz     2-3 Months   4-6 times   4-7 oz     3-4 Months    4-6 times   5-8 oz       If bottle feeding your baby, hold the bottle.  Do not prop it up.    During the daytime, do not let your baby sleep more than four hours between feedings.  At night, it is normal for young babies to wake up to eat about every two to four hours.    Hold, cuddle and talk to your baby during feedings.    Do not give any other foods to your baby.  Your baby s body is not ready to handle them.    Babies like to suck.  For bottle-fed babies, try a pacifier if your baby " needs to suck when not feeding.  If your baby is breastfeeding, try having him suck on your finger for comfort--wait two to three weeks (or until breast feeding is well established) before giving a pacifier, so the baby learns to latch well first.    Never put formula or breast milk in the microwave.    To warm a bottle of formula or breast milk, place it in a bowl of warm water for a few minutes.  Before feeding your baby, make sure the breast milk or formula is not too hot.  Test it first by squirting it on the inside of your wrist.    Concentrated liquid or powdered formulas need to be mixed with water.  Follow the directions on the can.      Sleeping    Most babies will sleep about 16 hours a day or more.    You can do the following to reduce the risk of SIDS (sudden infant death syndrome):    Place your baby on his back.  Do not place your baby on his stomach or side.    Do not put pillows, loose blankets or stuffed animals under or near your baby.    If you think you baby is cold, put a second sleep sack on your child.    Never smoke around your baby.      If your baby sleeps in a crib or bassinet:    If you choose to have your baby sleep in a crib or bassinet, you should:      Use a firm, flat mattress.    Make sure the railings on the crib are no more than 2 3/8 inches apart.  Some older cribs are not safe because the railings are too far apart and could allow your baby s head to become trapped.    Remove any soft pillows or objects that could suffocate your baby.    Check that the mattress fits tightly against the sides of the bassinet or the railings of the crib so your baby s head cannot be trapped between the mattress and the sides.    Remove any decorative trimmings on the crib in which your baby s clothing could be caught.    Remove hanging toys, mobiles, and rattles when your baby can begin to sit up (around 5 or 6 months)    Lower the level of the mattress and remove bumper pads when your baby can  pull himself to a standing position, so he will not be able to climb out of the crib.    Avoid loose bedding.      Elimination    Your baby:    May strain to pass stools (bowel movements).  This is normal as long as the stools are soft, and he does not cry while passing them.    Has frequent, soft stools, which will be runny or pasty, yellow or green and  seedy.   This is normal.    Usually wets at least six diapers a day.      Safety      Always use an approved car seat.  This must be in the back seat of the car, facing backward.  For more information, check out www.seatcheck.org.    Never leave your baby alone with small children or pets.    Pick a safe place for your baby s crib.  Do not use an older drop-side crib.    Do not drink anything hot while holding your baby.    Don t smoke around your baby.    Never leave your baby alone in water.  Not even for a second.    Do not use sunscreen on your baby s skin.  Protect your baby from the sun with hats and canopies, or keep your baby in the shade.    Have a carbon monoxide detector near the furnace area.    Use properly working smoke detectors in your house.  Test your smoke detectors when daylight savings time begins and ends.      When to call the doctor    Call your baby s doctor or nurse if your baby:      Has a rectal temperature of 100.4 F (38 C) or higher.    Is very fussy for two hours or more and cannot be calmed or comforted.    Is very sleepy and hard to awaken.      What you can expect      You will likely be tired and busy    Spend time together with family and take time to relax.    If you are returning to work, you should think about .    You may feel overwhelmed, scared or exhausted.  Ask family or friends for help.  If you  feel blue  for more than 2 weeks, call your doctor.  You may have depression.    Being a parent is the biggest job you will ever have.  Support and information are important.  Reach out for help when you feel the  need.      For more information on recommended immunizations:    www.cdc.gov/nip    For general medical information and more  Immunization facts go to:  www.aap.org  www.aafp.org  www.[place].org  www.cdc.gov/hepatitis  www.immunize.org  www.immunize.org/express  www.immunize.org/stories  www.vaccines.org    For early childhood family education programs in your school district, go to: wwwEdvisor.io.[employer]/~vanessa    For help with food, housing, clothing, medicines and other essentials, call:  [employer]  at 455-336-9005      How often should by child/teen be seen for well check-ups?       (5-8 days)    2 weeks    2 months    4 months    6 months    9 months    12 months    15 months    18 months    24 months    3 years    4 years    5 years    6 years and every 1-2 years through 18 years of age          Thank you for choosing [facility].  You may be receiving a survey in the mail from [employer] regarding your visit today.  Please take a few minutes to complete and return the survey to let us know how we are doing.      If you have questions or concerns, please contact us via [employer] or you can contact your care team at [phone].    Our Clinic hours are:  Monday 6:40 am  to 7:00 pm   6:40 am to 5:00 pm    The [place] outpatient lab hours are:  Monday - Friday 6:10 am to 4:45 pm   7:00 am to 11:00 am  Appointments are required, call [phone]    If you have clinical questions after hours or would like to schedule an appointment,  call the clinic at [phone].        Follow-ups after your visit        Who to contact     If you have questions or need follow up information about today's clinic visit or your schedule please contact [facility] directly at [phone].  Normal or non-critical lab and imaging results will be communicated to you by MyChart, letter or phone within 4 business days after the clinic has received the results. If you do not hear from us  "within 7 days, please contact the clinic through [employer] or phone. If you have a critical or abnormal lab result, we will notify you by phone as soon as possible.  Submit refill requests through [employer] or call your pharmacy and they will forward the refill request to us. Please allow 3 business days for your refill to be completed.          Additional Information About Your Visit        [employer] Information     [employer] lets you send messages to your doctor, view your test results, renew your prescriptions, schedule appointments and more. To sign up, go to [email or web address], contact your [place] clinic or call [phone] during business hours.            Care EveryWhere ID     This is your Care EveryWhere ID. This could be used by other organizations to access your [place] medical records  [id number]        Your Vitals Were     Temperature Height Head Circumference BMI (Body Mass Index)          97  F (36.1  C) (Rectal) 1' 7.75\" (0.502 m) 14\" (35.6 cm) 12.79 kg/m2         Blood Pressure from Last 3 Encounters:   No data found for BP    Weight from Last 3 Encounters:   05/23/17 7 lb 1.5 oz (3.218 kg) (8 %)*   05/08/17 6 lb 7.2 oz (2.925 kg) (17 %)*     * Growth percentiles are based on WHO (Boys, 0-2 years) data.              Today, you had the following     No orders found for display       Primary Care Provider    None Specified       No primary provider on file.        Thank you!     Thank you for choosing [facility]  for your care. Our goal is always to provide you with excellent care. Hearing back from our patients is one way we can continue to improve our services. Please take a few minutes to complete the written survey that you may receive in the mail after your visit with us. Thank you!             Your Updated Medication List - Protect others around you: Learn how to safely use, store and throw away your medicines at www.disposemymeds.org.      Notice  As of 2017 10:08 AM    " You have not been prescribed any medications.

## 2022-08-08 LAB
ANION GAP SERPL CALC-SCNC: 10 MMOL/L (ref 8–16)
BACTERIA UR CULT: NO GROWTH
BUN SERPL-MCNC: 11 MG/DL (ref 6–20)
CALCIUM SERPL-MCNC: 8.7 MG/DL (ref 8.7–10.5)
CHLORIDE SERPL-SCNC: 101 MMOL/L (ref 95–110)
CO2 SERPL-SCNC: 26 MMOL/L (ref 23–29)
CREAT SERPL-MCNC: 0.6 MG/DL (ref 0.5–1.4)
ERYTHROCYTE [DISTWIDTH] IN BLOOD BY AUTOMATED COUNT: 16.2 % (ref 11.5–14.5)
EST. GFR  (NO RACE VARIABLE): >60 ML/MIN/1.73 M^2
ESTIMATED AVG GLUCOSE: 200 MG/DL (ref 68–131)
GLUCOSE SERPL-MCNC: 120 MG/DL (ref 70–110)
GLUCOSE SERPL-MCNC: 180 MG/DL (ref 70–110)
GLUCOSE SERPL-MCNC: 218 MG/DL (ref 70–110)
GLUCOSE SERPL-MCNC: 300 MG/DL (ref 70–110)
GLUCOSE SERPL-MCNC: 306 MG/DL (ref 70–110)
HBA1C MFR BLD: 8.6 % (ref 4.5–6.2)
HCT VFR BLD AUTO: 41 % (ref 37–48.5)
HGB BLD-MCNC: 12.7 G/DL (ref 12–16)
MAGNESIUM SERPL-MCNC: 2.3 MG/DL (ref 1.6–2.6)
MCH RBC QN AUTO: 25.2 PG (ref 27–31)
MCHC RBC AUTO-ENTMCNC: 31 G/DL (ref 32–36)
MCV RBC AUTO: 81 FL (ref 82–98)
PLATELET # BLD AUTO: 373 K/UL (ref 150–450)
PMV BLD AUTO: 9.2 FL (ref 9.2–12.9)
POTASSIUM SERPL-SCNC: 4.5 MMOL/L (ref 3.5–5.1)
RBC # BLD AUTO: 5.04 M/UL (ref 4–5.4)
SODIUM SERPL-SCNC: 137 MMOL/L (ref 136–145)
WBC # BLD AUTO: 16.97 K/UL (ref 3.9–12.7)

## 2022-08-08 PROCEDURE — 99900035 HC TECH TIME PER 15 MIN (STAT)

## 2022-08-08 PROCEDURE — 83735 ASSAY OF MAGNESIUM: CPT | Performed by: STUDENT IN AN ORGANIZED HEALTH CARE EDUCATION/TRAINING PROGRAM

## 2022-08-08 PROCEDURE — 94761 N-INVAS EAR/PLS OXIMETRY MLT: CPT

## 2022-08-08 PROCEDURE — S4991 NICOTINE PATCH NONLEGEND: HCPCS | Performed by: STUDENT IN AN ORGANIZED HEALTH CARE EDUCATION/TRAINING PROGRAM

## 2022-08-08 PROCEDURE — C9399 UNCLASSIFIED DRUGS OR BIOLOG: HCPCS | Performed by: STUDENT IN AN ORGANIZED HEALTH CARE EDUCATION/TRAINING PROGRAM

## 2022-08-08 PROCEDURE — 36415 COLL VENOUS BLD VENIPUNCTURE: CPT | Performed by: STUDENT IN AN ORGANIZED HEALTH CARE EDUCATION/TRAINING PROGRAM

## 2022-08-08 PROCEDURE — 63600175 PHARM REV CODE 636 W HCPCS: Performed by: STUDENT IN AN ORGANIZED HEALTH CARE EDUCATION/TRAINING PROGRAM

## 2022-08-08 PROCEDURE — 25000003 PHARM REV CODE 250: Performed by: STUDENT IN AN ORGANIZED HEALTH CARE EDUCATION/TRAINING PROGRAM

## 2022-08-08 PROCEDURE — 85027 COMPLETE CBC AUTOMATED: CPT | Performed by: STUDENT IN AN ORGANIZED HEALTH CARE EDUCATION/TRAINING PROGRAM

## 2022-08-08 PROCEDURE — 12000002 HC ACUTE/MED SURGE SEMI-PRIVATE ROOM

## 2022-08-08 PROCEDURE — 94640 AIRWAY INHALATION TREATMENT: CPT

## 2022-08-08 PROCEDURE — 27000221 HC OXYGEN, UP TO 24 HOURS

## 2022-08-08 PROCEDURE — 25000242 PHARM REV CODE 250 ALT 637 W/ HCPCS: Performed by: STUDENT IN AN ORGANIZED HEALTH CARE EDUCATION/TRAINING PROGRAM

## 2022-08-08 PROCEDURE — 80048 BASIC METABOLIC PNL TOTAL CA: CPT | Performed by: STUDENT IN AN ORGANIZED HEALTH CARE EDUCATION/TRAINING PROGRAM

## 2022-08-08 PROCEDURE — 94618 PULMONARY STRESS TESTING: CPT

## 2022-08-08 PROCEDURE — 99900031 HC PATIENT EDUCATION (STAT)

## 2022-08-08 RX ADMIN — LEVALBUTEROL HYDROCHLORIDE 0.63 MG: 0.63 SOLUTION RESPIRATORY (INHALATION) at 01:08

## 2022-08-08 RX ADMIN — LORAZEPAM 1 MG: 1 TABLET ORAL at 11:08

## 2022-08-08 RX ADMIN — SERTRALINE HYDROCHLORIDE 100 MG: 50 TABLET ORAL at 08:08

## 2022-08-08 RX ADMIN — LEVALBUTEROL HYDROCHLORIDE 0.63 MG: 0.63 SOLUTION RESPIRATORY (INHALATION) at 08:08

## 2022-08-08 RX ADMIN — HYDROCODONE BITARTRATE AND ACETAMINOPHEN 1 TABLET: 5; 325 TABLET ORAL at 05:08

## 2022-08-08 RX ADMIN — HYDROCODONE BITARTRATE AND ACETAMINOPHEN 1 TABLET: 5; 325 TABLET ORAL at 11:08

## 2022-08-08 RX ADMIN — HYDROCODONE BITARTRATE AND ACETAMINOPHEN 1 TABLET: 5; 325 TABLET ORAL at 04:08

## 2022-08-08 RX ADMIN — LORAZEPAM 1 MG: 1 TABLET ORAL at 07:08

## 2022-08-08 RX ADMIN — LORAZEPAM 1 MG: 1 TABLET ORAL at 12:08

## 2022-08-08 RX ADMIN — ACETAMINOPHEN 650 MG: 325 TABLET ORAL at 08:08

## 2022-08-08 RX ADMIN — INSULIN ASPART 3 UNITS: 100 INJECTION, SOLUTION INTRAVENOUS; SUBCUTANEOUS at 08:08

## 2022-08-08 RX ADMIN — NICOTINE 1 PATCH: 21 PATCH, EXTENDED RELEASE TRANSDERMAL at 06:08

## 2022-08-08 RX ADMIN — PREDNISONE 60 MG: 20 TABLET ORAL at 08:08

## 2022-08-08 RX ADMIN — LEVALBUTEROL HYDROCHLORIDE 0.63 MG: 0.63 SOLUTION RESPIRATORY (INHALATION) at 07:08

## 2022-08-08 RX ADMIN — LEVOFLOXACIN 750 MG: 5 INJECTION, SOLUTION INTRAVENOUS at 12:08

## 2022-08-08 RX ADMIN — PANTOPRAZOLE SODIUM 40 MG: 40 TABLET, DELAYED RELEASE ORAL at 05:08

## 2022-08-08 RX ADMIN — LEVOFLOXACIN 750 MG: 5 INJECTION, SOLUTION INTRAVENOUS at 11:08

## 2022-08-08 RX ADMIN — INSULIN DETEMIR 8 UNITS: 100 INJECTION, SOLUTION SUBCUTANEOUS at 05:08

## 2022-08-08 RX ADMIN — LORAZEPAM 1 MG: 1 TABLET ORAL at 06:08

## 2022-08-08 RX ADMIN — HYDROCODONE BITARTRATE AND ACETAMINOPHEN 1 TABLET: 5; 325 TABLET ORAL at 10:08

## 2022-08-08 RX ADMIN — QUETIAPINE 300 MG: 100 TABLET ORAL at 08:08

## 2022-08-08 NOTE — CARE UPDATE
08/08/22 0826   Patient Assessment/Suction   Level of Consciousness (AVPU) alert   All Lung Fields Breath Sounds clear;coarse   PRE-TX-O2   O2 Device (Oxygen Therapy) nasal cannula   $ Is the patient on Low Flow Oxygen? Yes   Flow (L/min) 2   SpO2 96 %   Pulse Oximetry Type Intermittent   $ Pulse Oximetry - Multiple Charge Pulse Oximetry - Multiple   Pulse (!) 113   Resp 15   Aerosol Therapy   $ Aerosol Therapy Charges Aerosol Treatment   Daily Review of Necessity (SVN) completed   Respiratory Treatment Status (SVN) given   Treatment Route (SVN) mouthpiece   Patient Position (SVN) semi-Linares's   Post Treatment Assessment (SVN) increased aeration   Signs of Intolerance (SVN) none   Breath Sounds Post-Respiratory Treatment   Throughout All Fields Post-Treatment aeration increased   Post-treatment Heart Rate (beats/min) 111   Post-treatment Resp Rate (breaths/min) 15   Education   $ Education Bronchodilator;15 min   Respiratory Evaluation   $ Care Plan Tech Time 15 min

## 2022-08-08 NOTE — CARE UPDATE
08/08/22 1043   Home Oxygen Qualification   $ Home O2 Qualification Pulmonary Stress Test/6 min walk;Tech time 15 minutes   Room Air SpO2 At Rest 95 %   Room Air SpO2 During Ambulation 91 %   Home O2 Eval Comments pt doesn't qualify for home O2

## 2022-08-08 NOTE — CARE UPDATE
08/07/22 2100   Patient Assessment/Suction   Level of Consciousness (AVPU) alert   Respiratory Effort Normal   Expansion/Accessory Muscles/Retractions no use of accessory muscles   All Lung Fields Breath Sounds diminished   Rhythm/Pattern, Respiratory unlabored   PRE-TX-O2   O2 Device (Oxygen Therapy) nasal cannula   $ Is the patient on Low Flow Oxygen? Yes   Flow (L/min) 3   SpO2 98 %   Pulse Oximetry Type Intermittent   $ Pulse Oximetry - Multiple Charge Pulse Oximetry - Multiple   Pulse 90   Resp 19   Aerosol Therapy   $ Aerosol Therapy Charges Aerosol Treatment   Daily Review of Necessity (SVN) completed   Respiratory Treatment Status (SVN) given   Treatment Route (SVN) mouthpiece;oxygen   Patient Position (SVN) semi-Linares's   Post Treatment Assessment (SVN) breath sounds unchanged;vital signs unchanged   Signs of Intolerance (SVN) none   Education   $ Education Bronchodilator;15 min   Respiratory Evaluation   $ Care Plan Tech Time 15 min

## 2022-08-08 NOTE — PROGRESS NOTES
[facility] Medicine    Progress Note    Patient Name: Loretta Lea  MRN: [id number]  Patient Class: IP- Inpatient   Admission Date: 8/6/2022 10:58 AM  Length of Stay: 1  Attending Physician: Solo Biggs MD  Primary Care Provider: Magalie Roldan MD  Face-to-Face encounter date: 08/08/2022  Code status:  Chief Complaint: Shortness of Breath (Pt has pneumonia and is becoming more SOB)        Subjective:    HPI:  Loretta Lea is a 32 y.o.  female who  has a past medical history of Anxiety, Back pain, Bipolar disorder (2004), Chronic bronchitis, Depression, GERD (gastroesophageal reflux disease), HPV (human papilloma virus) infection, Insomnia, Migraines, Ovarian cyst, and Pneumonia.. [facility] with a primary complaint of Shortness of Breath (Pt has pneumonia and is becoming more SOB)  Patient states that for the past couple of weeks she has been sob associated with cough productive of yellowish sputum. No associated fevers, hemoptysis, contacts with sick persons or recent travels. She presented to the ED on Tuesday and CT was done which showed pneumonia and was subsequently discharged on abx. However she states she didn't feel better and SOB worsened. She smokes 2PPD but does not use any illict medications.  On presentation to the ED, WBC elevated at 12.81 and urinaylsis positive for UTI, Covid and flu negative.    Interval History:   8/7: Patient states that she still short of breath and cannot ambulate to the restroom without been short of breath.  However later in the day I saw patient ambulating the hallway without shortness of breath and smoking outside without oxygen.  Sugars uncontrolled due to steroids, placed on insulin sliding scale and check A1c.  Will transition IV steroids to oral steroids 60 mg daily, continue scheduled nebulizers. No concerns/issues overnight reported by the patient or  the nursing staff.    8/8:  No acute changes overnight.  Would do an O2 saturation test.  Respiratory panel negative, urine culture negative.  Patient will probably need to follow-up with pulmonology outpatient for evaluation of COPD.    Review of Systems All other Review of Systems were found to be negative expect for that mentioned already in HPI.     Objective:     Vitals:    08/08/22 0700 08/08/22 0826 08/08/22 1057 08/08/22 1237   BP: (!) 155/92   (!) 135/91   Pulse: (!) 113 (!) 113  84   Resp: 18 15 18 18   Temp: 98 °F (36.7 °C)   97.7 °F (36.5 °C)   TempSrc: Oral   Oral   SpO2: 95% 96%  96%   Weight:       Height:            Vitals reviewed.  Constitutional: No distress.   HENT: NC  Head: Atraumatic.   Cardiovascular: Normal rate, regular rhythm and normal heart sounds.   Pulmonary/Chest: Effort normal. No wheezes.   Abdominal: Soft. Bowel sounds are normal. No distension and no mass. No tenderness  Neurological: Alert.   Skin: Skin is warm and dry.   Psych: Appropriate mood and affect    Following labs were Reviewed   CBC:  Recent Labs   Lab 08/08/22  0459   WBC 16.97*   HGB 12.7   HCT 41.0        CMP:  Recent Labs   Lab 08/08/22  0459   CALCIUM 8.7      K 4.5   CO2 26      BUN 11   CREATININE 0.6       Micro Results  Microbiology Results (last 7 days)     Procedure Component Value Units Date/Time    Blood culture [[id number]] Collected: 08/06/22 1140    Order Status: Completed Specimen: Blood from Peripheral, Antecubital, Right Updated: 08/08/22 1232     Blood Culture, Routine No Growth to date      No Growth to date      No Growth to date    Culture, Respiratory with Gram Stain [[id number]] Collected: 08/07/22 1736    Order Status: Completed Specimen: Respiratory from Sputum Updated: 08/08/22 1133     Respiratory Culture Normal respiratory kristine     Gram Stain (Respiratory) <10 epithelial cells per low power field.     Gram Stain (Respiratory) Few WBC's     Gram Stain (Respiratory) Rare  Gram positive cocci     Gram Stain (Respiratory) Rare Gram negative rods    Urine culture [[id number]] Collected: 08/06/22 1149    Order Status: Completed Specimen: Urine Updated: 08/08/22 0639     Urine Culture, Routine No growth    Narrative:      Specimen Source->Urine    Blood culture [[id number]] Collected: 08/06/22 1210    Order Status: Completed Specimen: Blood from Peripheral, Hand, Right Updated: 08/07/22 1432     Blood Culture, Routine No Growth to date      No Growth to date    Respiratory Infection Panel (PCR), Nasopharyngeal [[id number]] Collected: 08/07/22 1059    Order Status: Completed Specimen: Nasopharyngeal Swab Updated: 08/07/22 1421     Respiratory Infection Panel Source NP swab     Adenovirus Not Detected     Coronavirus 229E, Common Cold Virus Not Detected     Coronavirus HKU1, Common Cold Virus Not Detected     Coronavirus NL63, Common Cold Virus Not Detected     Coronavirus OC43, Common Cold Virus Not Detected     Comment: The Coronavirus strains detected in this test cause the common cold.  These strains are not the COVID-19 (novel Coronavirus)strain   associated with the respiratory disease outbreak.          SARS-CoV2 (COVID-19) Qualitative PCR Not Detected     Human Metapneumovirus Not Detected     Human Rhinovirus/Enterovirus Not Detected     Influenza A (subtypes H1, H1-2009,H3) Not Detected     Influenza B Not Detected     Parainfluenza Virus 1 Not Detected     Parainfluenza Virus 2 Not Detected     Parainfluenza Virus 3 Not Detected     Parainfluenza Virus 4 Not Detected     Respiratory Syncytial Virus Not Detected     Bordetella Parapertussis (UV9454) Not Detected     Bordetella pertussis (ptxP) Not Detected     Chlamydia pneumoniae Not Detected     Mycoplasma pneumoniae Not Detected     Comment: Respiratory Infection Panel testing performed by Multiplex PCR.       Narrative:      Respiratory Infection Panel source->NP Swab           Radiology Reports  X-Ray Chest PA And  Lateral  Result Date: 8/6/2022  IMPRESSION: Normal chest. Electronically signed by:  Cecelia Schwartz MD  8/6/2022 1:02 PM CDT Workstation: [id number]    CTA Chest Non-Coronary (PE Study)  Result Date: 8/2/2022  Impression: 1: Negative for pulmonary embolism. 2: Mild rather diffuse lung infiltrates. Clinical correlation. 3: Appendectomy. Cholecystectomy. Hysterectomy and possible oophorectomy. 4: Remote granulomatous disease. 5: fatty liver. 6: No acute intra-abdominal or pelvic finding. This exam was performed according to our departmental dose-optimization program which includes use of Automated Exposure Control, adjustment of the mA and/or kV according to patient size and/or use of iterative reconstruction technique. Electronically signed by:  Дмитрий La MD  8/2/2022 5:49 AM CDT Workstation: [id number]    CT Abdomen Pelvis With Contrast  Result Date: 8/2/2022  Impression: 1: Negative for pulmonary embolism. 2: Mild rather diffuse lung infiltrates. Clinical correlation. 3: Appendectomy. Cholecystectomy. Hysterectomy and possible oophorectomy. 4: Remote granulomatous disease. 5: fatty liver. 6: No acute intra-abdominal or pelvic finding. This exam was performed according to our departmental dose-optimization program which includes use of Automated Exposure Control, adjustment of the mA and/or kV according to patient size and/or use of iterative reconstruction technique. Electronically signed by:  Дмитрий La MD  8/2/2022 5:49 AM CDT Workstation: [id number]       Meds  Scheduled Meds:   levalbuterol  0.63 mg Nebulization TID WAKE    levoFLOXacin  750 mg Intravenous Q24H    nicotine  1 patch Transdermal Daily    pantoprazole  40 mg Oral Daily    predniSONE  60 mg Oral Daily    QUEtiapine  300 mg Oral Nightly    sertraline  100 mg Oral Daily     Continuous Infusions:   sodium chloride 0.9% 125 mL/hr at 08/07/22 2034     PRN Meds:.acetaminophen, bisacodyL, dextrose 50%, dextrose 50%, glucagon  (human recombinant), glucose, glucose, HYDROcodone-acetaminophen, insulin aspart U-100, LORazepam, ondansetron, sodium chloride 0.9%.    Assessment & Plan:      *Pneumonia  resp panel negative  Continue on levaquin to cover atypicals and UTI  Continue oral steriods  Breathing Rx       Acute cystitis  Urine culture no growth       Cigarette smoker  Nicotine patch       Bipolar affective disorder, current episode mixed  Continue zoloft and seroquel               Discharge Planning:   Is the patient medically ready for discharge?: no    Reason for patient still in hospital (select all that apply): Patient trending condition and Treatment    Above encounter included review of the medical records, interviewing and examining the patient face-to-face, discussion with family and other health care providers, ordering and interpreting lab/test results and formulating a plan of care.     Medical Decision Making:      [_] Low Complexity  [_] Moderate Complexity  [x] High Complexity      Solo Biggs MD  Department of Hospital Medicine   [facility]

## 2022-08-08 NOTE — PLAN OF CARE
Problem: Adult Inpatient Plan of Care  Goal: Plan of Care Review  Outcome: Ongoing, Progressing  Goal: Patient-Specific Goal (Individualized)  Outcome: Ongoing, Progressing  Goal: Absence of Hospital-Acquired Illness or Injury  Outcome: Ongoing, Progressing  Goal: Optimal Comfort and Wellbeing  Outcome: Ongoing, Progressing  Goal: Readiness for Transition of Care  Outcome: Ongoing, Progressing     Problem: Bariatric Environmental Safety  Goal: Safety Maintained with Care  Outcome: Ongoing, Progressing     Problem: Fluid Imbalance (Pneumonia)  Goal: Fluid Balance  Outcome: Ongoing, Progressing     Problem: Infection (Pneumonia)  Goal: Resolution of Infection Signs and Symptoms  Outcome: Ongoing, Progressing     Problem: Respiratory Compromise (Pneumonia)  Goal: Effective Oxygenation and Ventilation  Outcome: Ongoing, Progressing

## 2022-08-09 VITALS
RESPIRATION RATE: 18 BRPM | BODY MASS INDEX: 45.99 KG/M2 | TEMPERATURE: 98 F | WEIGHT: 293 LBS | HEIGHT: 67 IN | DIASTOLIC BLOOD PRESSURE: 71 MMHG | SYSTOLIC BLOOD PRESSURE: 121 MMHG | OXYGEN SATURATION: 95 % | HEART RATE: 105 BPM

## 2022-08-09 LAB
ANION GAP SERPL CALC-SCNC: 10 MMOL/L (ref 8–16)
BACTERIA SPEC AEROBE CULT: NORMAL
BUN SERPL-MCNC: 16 MG/DL (ref 6–20)
CALCIUM SERPL-MCNC: 8.6 MG/DL (ref 8.7–10.5)
CHLORIDE SERPL-SCNC: 97 MMOL/L (ref 95–110)
CO2 SERPL-SCNC: 28 MMOL/L (ref 23–29)
CREAT SERPL-MCNC: 0.6 MG/DL (ref 0.5–1.4)
ERYTHROCYTE [DISTWIDTH] IN BLOOD BY AUTOMATED COUNT: 15.9 % (ref 11.5–14.5)
EST. GFR  (NO RACE VARIABLE): >60 ML/MIN/1.73 M^2
GLUCOSE SERPL-MCNC: 110 MG/DL (ref 70–110)
GLUCOSE SERPL-MCNC: 123 MG/DL (ref 70–110)
GLUCOSE SERPL-MCNC: 169 MG/DL (ref 70–110)
GRAM STN SPEC: NORMAL
HCT VFR BLD AUTO: 40.2 % (ref 37–48.5)
HGB BLD-MCNC: 12.6 G/DL (ref 12–16)
MAGNESIUM SERPL-MCNC: 2 MG/DL (ref 1.6–2.6)
MCH RBC QN AUTO: 25 PG (ref 27–31)
MCHC RBC AUTO-ENTMCNC: 31.3 G/DL (ref 32–36)
MCV RBC AUTO: 80 FL (ref 82–98)
PLATELET # BLD AUTO: 330 K/UL (ref 150–450)
PMV BLD AUTO: 8.8 FL (ref 9.2–12.9)
POTASSIUM SERPL-SCNC: 3.8 MMOL/L (ref 3.5–5.1)
RBC # BLD AUTO: 5.05 M/UL (ref 4–5.4)
SODIUM SERPL-SCNC: 135 MMOL/L (ref 136–145)
WBC # BLD AUTO: 14.1 K/UL (ref 3.9–12.7)

## 2022-08-09 PROCEDURE — 94640 AIRWAY INHALATION TREATMENT: CPT

## 2022-08-09 PROCEDURE — 94761 N-INVAS EAR/PLS OXIMETRY MLT: CPT

## 2022-08-09 PROCEDURE — 83735 ASSAY OF MAGNESIUM: CPT | Performed by: STUDENT IN AN ORGANIZED HEALTH CARE EDUCATION/TRAINING PROGRAM

## 2022-08-09 PROCEDURE — 25000242 PHARM REV CODE 250 ALT 637 W/ HCPCS: Performed by: STUDENT IN AN ORGANIZED HEALTH CARE EDUCATION/TRAINING PROGRAM

## 2022-08-09 PROCEDURE — 99900031 HC PATIENT EDUCATION (STAT)

## 2022-08-09 PROCEDURE — 99900035 HC TECH TIME PER 15 MIN (STAT)

## 2022-08-09 PROCEDURE — 85027 COMPLETE CBC AUTOMATED: CPT | Performed by: STUDENT IN AN ORGANIZED HEALTH CARE EDUCATION/TRAINING PROGRAM

## 2022-08-09 PROCEDURE — 63600175 PHARM REV CODE 636 W HCPCS: Performed by: STUDENT IN AN ORGANIZED HEALTH CARE EDUCATION/TRAINING PROGRAM

## 2022-08-09 PROCEDURE — 36415 COLL VENOUS BLD VENIPUNCTURE: CPT | Performed by: STUDENT IN AN ORGANIZED HEALTH CARE EDUCATION/TRAINING PROGRAM

## 2022-08-09 PROCEDURE — 25000003 PHARM REV CODE 250: Performed by: STUDENT IN AN ORGANIZED HEALTH CARE EDUCATION/TRAINING PROGRAM

## 2022-08-09 PROCEDURE — 80048 BASIC METABOLIC PNL TOTAL CA: CPT | Performed by: STUDENT IN AN ORGANIZED HEALTH CARE EDUCATION/TRAINING PROGRAM

## 2022-08-09 RX ORDER — METFORMIN HYDROCHLORIDE 500 MG/1
500 TABLET, EXTENDED RELEASE ORAL 2 TIMES DAILY WITH MEALS
Qty: 180 TABLET | Refills: 0 | Status: SHIPPED | OUTPATIENT
Start: 2022-08-09 | End: 2023-01-27 | Stop reason: SDUPTHER

## 2022-08-09 RX ORDER — LANCETS
1 EACH MISCELLANEOUS
Qty: 100 EACH | Refills: 1 | Status: SHIPPED | OUTPATIENT
Start: 2022-08-09 | End: 2023-04-13 | Stop reason: SDUPTHER

## 2022-08-09 RX ORDER — LEVOFLOXACIN 750 MG/1
750 TABLET ORAL DAILY
Qty: 4 TABLET | Refills: 0 | Status: SHIPPED | OUTPATIENT
Start: 2022-08-09 | End: 2022-08-13

## 2022-08-09 RX ORDER — PREDNISONE 20 MG/1
TABLET ORAL
Qty: 10 TABLET | Refills: 0 | Status: SHIPPED | OUTPATIENT
Start: 2022-08-09 | End: 2022-08-15

## 2022-08-09 RX ORDER — ALBUTEROL SULFATE 90 UG/1
1-2 AEROSOL, METERED RESPIRATORY (INHALATION) EVERY 6 HOURS PRN
Qty: 18 G | Refills: 0 | Status: SHIPPED | OUTPATIENT
Start: 2022-08-09 | End: 2022-11-22

## 2022-08-09 RX ORDER — DEXTROSE 4 G
TABLET,CHEWABLE ORAL
Qty: 1 EACH | Refills: 0 | Status: SHIPPED | OUTPATIENT
Start: 2022-08-09

## 2022-08-09 RX ADMIN — SERTRALINE HYDROCHLORIDE 100 MG: 50 TABLET ORAL at 08:08

## 2022-08-09 RX ADMIN — LORAZEPAM 1 MG: 1 TABLET ORAL at 04:08

## 2022-08-09 RX ADMIN — LORAZEPAM 1 MG: 1 TABLET ORAL at 08:08

## 2022-08-09 RX ADMIN — PREDNISONE 60 MG: 20 TABLET ORAL at 08:08

## 2022-08-09 RX ADMIN — LEVALBUTEROL HYDROCHLORIDE 0.63 MG: 0.63 SOLUTION RESPIRATORY (INHALATION) at 07:08

## 2022-08-09 RX ADMIN — PANTOPRAZOLE SODIUM 40 MG: 40 TABLET, DELAYED RELEASE ORAL at 06:08

## 2022-08-09 RX ADMIN — LORAZEPAM 1 MG: 1 TABLET ORAL at 01:08

## 2022-08-09 RX ADMIN — HYDROCODONE BITARTRATE AND ACETAMINOPHEN 1 TABLET: 5; 325 TABLET ORAL at 05:08

## 2022-08-09 NOTE — DISCHARGE SUMMARY
[facility] Medicine  Discharge Summary      Patient Name: Loretta Lea  MRN: [id number]  Patient Class: IP- Inpatient  Admission Date: 8/6/2022  Hospital Length of Stay: 2 days  Discharge Date and Time:  08/09/2022 11:37 AM  Attending Physician: Solo Biggs MD   Discharging Provider: Solo Biggs MD  Primary Care Provider: Magalie Roldan MD      HPI:   Loretta Lea is a 32 y.o.  female who  has a past medical history of Anxiety, Back pain, Bipolar disorder (2004), Chronic bronchitis, Depression, GERD (gastroesophageal reflux disease), HPV (human papilloma virus) infection, Insomnia, Migraines, Ovarian cyst, and Pneumonia.. The patient presented to [facility] on 8/6/2022 with a primary complaint of Shortness of Breath (Pt has pneumonia and is becoming more SOB)  Patient states that for the past couple of weeks she has been sob associated with cough productive of yellowish sputum. No associated fevers, hemoptysis, contacts with sick persons or recent travels. She presented to the ED on Tuesday and CT was done which showed pneumonia and was subsequently discharged on abx. However she states she didn't feel better and SOB worsened. She smokes 2PPD but does not use any illict medications.  On presentation to the ED, WBC elevated at 12.81 and urinaylsis positive for UTI, Covid and flu negative.    * No surgery found *      Hospital Course:   8/7: Patient states that she still short of breath and cannot ambulate to the restroom without been short of breath.  However later in the day I saw patient ambulating the hallway without shortness of breath and smoking outside without oxygen.  Sugars uncontrolled due to steroids, placed on insulin sliding scale and check A1c.  Will transition IV steroids to oral steroids 60 mg daily, continue scheduled nebulizers. No concerns/issues overnight reported by the patient or the nursing staff.     8/8:   No acute changes overnight.  Would do an O2 saturation test.  Respiratory panel negative, urine culture negative.  Patient will probably need to follow-up with pulmonology outpatient for evaluation of COPD.      8/9:  Home O2 evaluation was negative for home oxygen need.  Patient doing significantly better today.  A1c is 6 diagnostic for type 2 diabetes, patient started metformin extended release b.i.d. and is shown how to check her blood sugars and she is to follow-up with her primary care doctor for further fine tuning of her medications.  Patient referred to pulmonology for evaluation of COPD/LORENA overlap.    Physical examination on discharge:  Constitutional: No distress.   HENT: NC  Head: Atraumatic.   Cardiovascular: Normal rate, regular rhythm and normal heart sounds.   Pulmonary/Chest: Effort normal. No wheezes.   Abdominal: Soft. Bowel sounds are normal. No distension and no mass. No tenderness  Neurological: Alert.   Skin: Skin is warm and dry.   Psych: Appropriate mood and affect    I have seen the patient on the day of discharge and reviewed the discharge instructions as outlined.      Goals of Care Treatment Preferences:  Code Status: Full Code      Consults:   Consults (From admission, onward)        Status Ordering Provider     Inpatient consult to Social Work/Case Management  Once        Provider:  (Not yet assigned)    Ordered SOLO BIGGS     Inpatient consult to Hospitalist  Once        Provider:  Solo Biggs MD    Acknowledged SOLO BIGGS          No new Assessment & Plan notes have been filed under this hospital service since the last note was generated.  Service: Hospital Medicine    Final Active Diagnoses:    Diagnosis Date Noted POA    PRINCIPAL PROBLEM:  Pneumonia [J18.9] 08/06/2022 Yes    Acute cystitis [N30.00] 08/06/2022 Yes    Cigarette smoker [F17.210] 04/09/2021 Yes    Bipolar affective disorder, current episode mixed [F31.60] 10/20/2020 Yes      Problems Resolved  During this Admission:       Discharged Condition: good    Disposition: Home or Self Care    Follow Up:   Follow-up Information     [facility] - Emergency Dept .    Specialty: Emergency Medicine  Why: If symptoms worsen  Contact information:  [address]  [facility] [address] [phone]  Additional information:  1st floor           Magalie Roldan MD In 2 days.    Specialty: Family Medicine  Contact information:  [address]  [address]  [facility] [address]  [phone]             Heraclio Cabral MD. Schedule an appointment as soon as possible for a visit .    Specialty: Pulmonary Disease  Contact information:  [address]  [address]  [facility] [address]  [phone]                       Patient Instructions:      Ambulatory referral/consult to Pulmonology   Standing Status: Future   Referral Priority: Routine Referral Type: Consultation   Referral Reason: Specialty Services Required   Requested Specialty: Pulmonary Disease   Number of Visits Requested: 1     Diet diabetic     Activity as tolerated       Significant Diagnostic Studies: Labs:   BMP:   Recent Labs   Lab 08/08/22 0459 08/09/22  0600   * 123*    135*   K 4.5 3.8    97   CO2 26 28   BUN 11 16   CREATININE 0.6 0.6   CALCIUM 8.7 8.6*   MG 2.3 2.0   , CMP   Recent Labs   Lab 08/08/22  0459 08/09/22  0600    135*   K 4.5 3.8    97   CO2 26 28   * 123*   BUN 11 16   CREATININE 0.6 0.6   CALCIUM 8.7 8.6*   ANIONGAP 10 10   , CBC   Recent Labs   Lab 08/08/22 0459 08/09/22  0600   WBC 16.97* 14.10*   HGB 12.7 12.6   HCT 41.0 40.2    330    and All labs within the past 24 hours have been reviewed  Microbiology:   Blood Culture   Lab Results   Component Value Date    LABBLOO No Growth to date 08/06/2022    LABBLOO No Growth to date 08/06/2022    LABBLOO No Growth to date 08/06/2022    and Urine Culture    Lab Results   Component Value Date    LABURIN  No growth 08/06/2022     Radiology: X-Ray: CXR: X-Ray Chest 1 View (CXR): No results found for this visit on 08/06/22. and X-Ray Chest PA and Lateral (CXR):   Results for orders placed or performed during the hospital encounter of 08/06/22   X-Ray Chest PA And Lateral    Narrative    Reason: SOB    FINDINGS:  PA and lateral chest is compared to 8/18/2021 show normal cardiomediastinal silhouette.    Lungs are clear. Pulmonary vasculature is normal. Bones are normal.    IMPRESSION:  Normal chest.    Electronically signed by:  Cecelia Schwartz MD  8/6/2022 1:02 PM CDT Workstation: [id number]       Pending Diagnostic Studies:     Procedure Component Value Units Date/Time    Legionella pneumophila DFA Sputum, induced [[id number]] Collected: 08/07/22 1736    Order Status: Sent Lab Status: In process Updated: 08/07/22 1736    Mycoplasma pneumoniae Ab IgG & IgM [[id number]] Collected: 08/06/22 1825    Order Status: Sent Lab Status: In process Updated: 08/06/22 1833    Specimen: Blood          Medications:  Reconciled Home Medications:      Medication List      START taking these medications    albuterol 90 mcg/actuation inhaler  Commonly known as: PROVENTIL/VENTOLIN HFA  Inhale 1-2 puffs into the lungs every 6 (six) hours as needed for Wheezing. Rescue     blood sugar diagnostic Strp  Test blood sugar 4 (four) times daily before meals and nightly.     blood-glucose meter kit  Commonly known as: FREESTYLE SYSTEM KIT  Use as instructed     lancets Misc  Commonly known as: ACCU-CHEK SOFTCLIX LANCETS  1 lancet by Misc.(Non-Drug; Combo Route) route 4 (four) times daily before meals and nightly.     levoFLOXacin 750 MG tablet  Commonly known as: LEVAQUIN  Take 1 tablet (750 mg total) by mouth once daily for 4 days     metFORMIN 500 MG ER 24hr tablet  Commonly known as: GLUCOPHAGE-XR  Take 1 tablet (500 mg total) by mouth 2 (two) times daily with meals.     predniSONE 20 MG tablet  Commonly known as: DELTASONE  Take 2 tablets (40  mg total) by mouth once daily. for 5 days        CONTINUE taking these medications    ondansetron 4 MG tablet  Commonly known as: ZOFRAN  Take 1 tablet (4 mg total) by mouth every 6 (six) hours as needed.     oxyCODONE-acetaminophen 5-325 mg per tablet  Commonly known as: PERCOCET  Take 1 tablet by mouth every 4 (four) hours as needed for Pain.     QUEtiapine 300 MG Tab  Commonly known as: SEROQUEL  Take 1 tablet (300 mg total) by mouth nightly.     sertraline 100 MG tablet  Commonly known as: ZOLOFT  TAKE ONE TABLET BY MOUTH ONCE DAILY        STOP taking these medications    cefUROXime 500 MG tablet  Commonly known as: CEFTIN     doxycycline 100 MG Cap  Commonly known as: VIBRAMYCIN     HYDROcodone-acetaminophen 5-325 mg per tablet  Commonly known as: NORCO     HYDROcodone-acetaminophen 7.5-325 mg per tablet  Commonly known as: NORCO     nicotine 21 mg/24 hr  Commonly known as: NICODERM CQ     ondansetron 4 MG Tbdl  Commonly known as: ZOFRAN-ODT            Indwelling Lines/Drains at time of discharge:   Lines/Drains/Airways     None                 Time spent on the discharge of patient: 35 minutes         Solo Biggs MD  Department of Hospital Medicine  [facility]

## 2022-08-09 NOTE — RESPIRATORY THERAPY
08/08/22 1926   Patient Assessment/Suction   Level of Consciousness (AVPU) alert   Respiratory Effort Normal;Unlabored   Expansion/Accessory Muscles/Retractions no use of accessory muscles   All Lung Fields Breath Sounds clear   Rhythm/Pattern, Respiratory pattern regular;unlabored;depth regular   PRE-TX-O2   O2 Device (Oxygen Therapy) room air   SpO2 99 %   Pulse Oximetry Type Intermittent   $ Pulse Oximetry - Multiple Charge Pulse Oximetry - Multiple   Pulse 85   Resp 18   Aerosol Therapy   $ Aerosol Therapy Charges Aerosol Treatment   Daily Review of Necessity (SVN) completed   Respiratory Treatment Status (SVN) given   Treatment Route (SVN) mouthpiece;oxygen   Patient Position (SVN) semi-Linares's   Post Treatment Assessment (SVN) breath sounds unchanged   Signs of Intolerance (SVN) none   Breath Sounds Post-Respiratory Treatment   Throughout All Fields Post-Treatment All Fields   Throughout All Fields Post-Treatment no change   Post-treatment Heart Rate (beats/min) 90   Post-treatment Resp Rate (breaths/min) 18   Education   $ Education Bronchodilator;15 min   Respiratory Evaluation   $ Care Plan Tech Time 15 min

## 2022-08-09 NOTE — HOSPITAL COURSE
Loretta Lea is a 32 y.o.  female who  has a past medical history of Anxiety, Back pain, Bipolar disorder (2004), Chronic bronchitis, Depression, GERD (gastroesophageal reflux disease), HPV (human papilloma virus) infection, Insomnia, Migraines, Ovarian cyst, and Pneumonia.. The patient presented to [facility] on 8/6/2022 with a primary complaint of Shortness of Breath (Pt has pneumonia and is becoming more SOB)  Patient states that for the past couple of weeks she has been sob associated with cough productive of yellowish sputum. No associated fevers, hemoptysis, contacts with sick persons or recent travels. She presented to the ED on Tuesday and CT was done which showed pneumonia and was subsequently discharged on abx. However she states she didn't feel better and SOB worsened. She smokes 2PPD but does not use any illict medications.  On presentation to the ED, WBC elevated at 12.81 and urinaylsis positive for UTI, Covid and flu negative.     Interval History:   8/7: Patient states that she still short of breath and cannot ambulate to the restroom without been short of breath.  However later in the day I saw patient ambulating the hallway without shortness of breath and smoking outside without oxygen.  Sugars uncontrolled due to steroids, placed on insulin sliding scale and check A1c.  Will transition IV steroids to oral steroids 60 mg daily, continue scheduled nebulizers. No concerns/issues overnight reported by the patient or the nursing staff.     8/8:  No acute changes overnight.  Would do an O2 saturation test.  Respiratory panel negative, urine culture negative.  Patient will probably need to follow-up with pulmonology outpatient for evaluation of COPD.

## 2022-08-09 NOTE — CARE UPDATE
08/09/22 0743   Patient Assessment/Suction   Level of Consciousness (AVPU) alert   Respiratory Effort Normal;Unlabored   Expansion/Accessory Muscles/Retractions no use of accessory muscles;no retractions;expansion symmetric   All Lung Fields Breath Sounds diminished   Rhythm/Pattern, Respiratory unlabored;pattern regular;depth regular;chest wiggle adequate;no shortness of breath reported   Cough Frequency infrequent   Cough Type good   PRE-TX-O2   O2 Device (Oxygen Therapy) room air   SpO2 95 %   Pulse Oximetry Type Intermittent   $ Pulse Oximetry - Multiple Charge Pulse Oximetry - Multiple   Pulse 100   Resp 18   Aerosol Therapy   $ Aerosol Therapy Charges Aerosol Treatment   Daily Review of Necessity (SVN) completed   Respiratory Treatment Status (SVN) given   Treatment Route (SVN) mouthpiece;oxygen   Patient Position (SVN) HOB elevated   Post Treatment Assessment (SVN) increased aeration   Signs of Intolerance (SVN) none   Breath Sounds Post-Respiratory Treatment   Throughout All Fields Post-Treatment All Fields   Throughout All Fields Post-Treatment aeration increased   Post-treatment Heart Rate (beats/min) 100   Post-treatment Resp Rate (breaths/min) 18   Education   $ Education Bronchodilator;15 min   Respiratory Evaluation   $ Care Plan Tech Time 15 min

## 2022-08-10 LAB
M PNEUMO IGG SER IA-ACNC: <100 U/ML (ref 0–99)
M PNEUMO IGM SER IA-ACNC: <770 U/ML (ref 0–769)

## 2022-08-11 ENCOUNTER — PATIENT OUTREACH (OUTPATIENT)
Dept: FAMILY MEDICINE | Facility: CLINIC | Age: 32
End: 2022-08-11

## 2022-08-11 LAB
BACTERIA BLD CULT: NORMAL
BACTERIA BLD CULT: NORMAL

## 2022-08-11 NOTE — PLAN OF CARE
Chart and discharge orders reviewed.  Patient discharged home with no further case management needs.     08/10/22 2111   Final Note   Assessment Type Final Discharge Note   Anticipated Discharge Disposition Home   Post-Acute Status   Discharge Delays None known at this time

## 2022-08-12 NOTE — TELEPHONE ENCOUNTER
Discharge Information     Discharge Date:   08/09/22    Primary Discharge Diagnosis:  pneumonia    Discharge Summary:  Reviewed      Medication & Order Review     Were medication changes made or new medications added?   Yes    If so, has the patient filled the prescriptions?  Yes     Was Home Health ordered? No    If so, has Home Health contacted patient and/or initiated services? N/A     Name of Home Health Agency? N/A    Durable Medical Equipment ordered?  No     If so, has the DME provider contacted patient and delivered equipment?  N/A    Follow Up               Any problems since discharge? Yes    How is the patient feeling since returning home?  Patient states that she is confused about her diagnosis of DM. She's having a lot of anxiety about it.     Have you set up recommended follow up appointments?  (cardiology, surgery, etc.) Not yet patient will call to set up with pulmonary    Schedule Hospital Follow-up appointment within 7-14 days (preferably 7).  Moved patient appointment from 8/18/22 to tomorrow 8/12/22 due to patient's current concern.     Notes:          Jade Cochran

## 2022-10-05 ENCOUNTER — PATIENT MESSAGE (OUTPATIENT)
Dept: PODIATRY | Facility: CLINIC | Age: 32
End: 2022-10-05
Payer: MEDICAID

## 2022-10-06 ENCOUNTER — PATIENT MESSAGE (OUTPATIENT)
Dept: PODIATRY | Facility: CLINIC | Age: 32
End: 2022-10-06
Payer: MEDICAID

## 2022-10-11 ENCOUNTER — OFFICE VISIT (OUTPATIENT)
Dept: PODIATRY | Facility: CLINIC | Age: 32
End: 2022-10-11
Payer: MEDICAID

## 2022-10-11 VITALS
BODY MASS INDEX: 45.99 KG/M2 | OXYGEN SATURATION: 96 % | WEIGHT: 293 LBS | HEART RATE: 101 BPM | RESPIRATION RATE: 16 BRPM | HEIGHT: 67 IN

## 2022-10-11 DIAGNOSIS — M25.571 CHRONIC PAIN OF RIGHT ANKLE: ICD-10-CM

## 2022-10-11 DIAGNOSIS — M94.9 OSTEOCHONDRAL LESION: Primary | ICD-10-CM

## 2022-10-11 DIAGNOSIS — M89.9 OSTEOCHONDRAL LESION: Primary | ICD-10-CM

## 2022-10-11 DIAGNOSIS — G89.29 CHRONIC PAIN OF RIGHT ANKLE: ICD-10-CM

## 2022-10-11 DIAGNOSIS — M19.071 ARTHRITIS OF RIGHT ANKLE: ICD-10-CM

## 2022-10-11 PROCEDURE — 3008F BODY MASS INDEX DOCD: CPT | Mod: CPTII,S$GLB,, | Performed by: PODIATRIST

## 2022-10-11 PROCEDURE — 99213 OFFICE O/P EST LOW 20 MIN: CPT | Mod: 25,S$GLB,, | Performed by: PODIATRIST

## 2022-10-11 PROCEDURE — 20605 DRAIN/INJ JOINT/BURSA W/O US: CPT | Mod: RT,S$GLB,, | Performed by: PODIATRIST

## 2022-10-11 PROCEDURE — 3008F PR BODY MASS INDEX (BMI) DOCUMENTED: ICD-10-PCS | Mod: CPTII,S$GLB,, | Performed by: PODIATRIST

## 2022-10-11 PROCEDURE — 1159F MED LIST DOCD IN RCRD: CPT | Mod: CPTII,S$GLB,, | Performed by: PODIATRIST

## 2022-10-11 PROCEDURE — 1160F RVW MEDS BY RX/DR IN RCRD: CPT | Mod: CPTII,S$GLB,, | Performed by: PODIATRIST

## 2022-10-11 PROCEDURE — 3052F HG A1C>EQUAL 8.0%<EQUAL 9.0%: CPT | Mod: CPTII,S$GLB,, | Performed by: PODIATRIST

## 2022-10-11 PROCEDURE — 1159F PR MEDICATION LIST DOCUMENTED IN MEDICAL RECORD: ICD-10-PCS | Mod: CPTII,S$GLB,, | Performed by: PODIATRIST

## 2022-10-11 PROCEDURE — 20605 PR DRAIN/INJECT INTERMEDIATE JOINT/BURSA: ICD-10-PCS | Mod: RT,S$GLB,, | Performed by: PODIATRIST

## 2022-10-11 PROCEDURE — 99213 PR OFFICE/OUTPT VISIT, EST, LEVL III, 20-29 MIN: ICD-10-PCS | Mod: 25,S$GLB,, | Performed by: PODIATRIST

## 2022-10-11 PROCEDURE — 3052F PR MOST RECENT HEMOGLOBIN A1C LEVEL 8.0 - < 9.0%: ICD-10-PCS | Mod: CPTII,S$GLB,, | Performed by: PODIATRIST

## 2022-10-11 PROCEDURE — 1160F PR REVIEW ALL MEDS BY PRESCRIBER/CLIN PHARMACIST DOCUMENTED: ICD-10-PCS | Mod: CPTII,S$GLB,, | Performed by: PODIATRIST

## 2022-10-11 RX ORDER — METHYLPREDNISOLONE ACETATE 40 MG/ML
20 INJECTION, SUSPENSION INTRA-ARTICULAR; INTRALESIONAL; INTRAMUSCULAR; SOFT TISSUE
Status: COMPLETED | OUTPATIENT
Start: 2022-10-11 | End: 2022-10-11

## 2022-10-11 RX ORDER — BUPIVACAINE HYDROCHLORIDE 5 MG/ML
1.5 INJECTION, SOLUTION PERINEURAL
Status: COMPLETED | OUTPATIENT
Start: 2022-10-11 | End: 2022-10-11

## 2022-10-11 RX ORDER — DEXAMETHASONE SODIUM PHOSPHATE 4 MG/ML
4 INJECTION, SOLUTION INTRA-ARTICULAR; INTRALESIONAL; INTRAMUSCULAR; INTRAVENOUS; SOFT TISSUE
Status: COMPLETED | OUTPATIENT
Start: 2022-10-11 | End: 2022-10-11

## 2022-10-11 RX ADMIN — BUPIVACAINE HYDROCHLORIDE 7.5 MG: 5 INJECTION, SOLUTION PERINEURAL at 12:10

## 2022-10-11 RX ADMIN — METHYLPREDNISOLONE ACETATE 20 MG: 40 INJECTION, SUSPENSION INTRA-ARTICULAR; INTRALESIONAL; INTRAMUSCULAR; SOFT TISSUE at 12:10

## 2022-10-11 RX ADMIN — DEXAMETHASONE SODIUM PHOSPHATE 4 MG: 4 INJECTION, SOLUTION INTRA-ARTICULAR; INTRALESIONAL; INTRAMUSCULAR; INTRAVENOUS; SOFT TISSUE at 12:10

## 2022-10-11 NOTE — LETTER
October 11, 2022      [facility] - Podiatry  [address]  [facility] [address]  Phone: [phone]  Fax: [phone]       Patient: Loretta Lea   YOB: 1990  Date of Visit: 10/11/2022    To Whom It May Concern:    Kirti Lea  was at [facility] on 10/11/2022 for condition starting 10/07/2022 . The patient may return to work/school on 10/17/2022 with no restrictions. If you have any questions or concerns, or if I can be of further assistance, please do not hesitate to contact me.    Sincerely,    Electronically Signed by: RUSTY Romo DPM

## 2022-10-11 NOTE — PROGRESS NOTES
"  [address] [facility] [address] [place] [address]  Phone: [phone]   [phone]    Patient's PCP:Magalie Roldan MD  Referring Provider: No ref. provider found    Subjective:      Chief Complaint:: Ankle Pain (s/p Arthroscopic repair of osteochondral lesion dome of talus right foot and debridement is sent of itis right ankle joint and 06/23/2021 Repair of torn anterior talofibular ligament with modified Brostrom and Arthrex internal brace. Excision of painful os trigonum posterior talus right. 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   Loretta Lea is a 32 y.o. female who presents with a complaint of right ankle pain lasting for years and status post repair of torn anterior talofibular ligament with modified Brostrom and Arthrex internal brace. Excision of painful os trigonum posterior talus right. 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 Current symptoms include swelling and pain that gets worse throughout the day.  Aggravating factors are standing, driving and walking for long periods of time. Symptoms have worsened. Treatment to date have included Ibuprofen 800 mg, tens unit, physical therapy and surgery.     Systemic Doctor: Magalie Roldan MD  Date Last Seen: 05/17/2022  Blood Sugar: does not check  Hemoglobin A1c: 8.6 (8/7/22)    Vitals:    10/11/22 0906   Pulse: 101   Resp: 16   SpO2: 96%   Weight: (!) 148.8 kg (328 lb)   Height: 5' 7" (1.702 m)   PainSc:   6      Shoe Size: 11    Past Surgical History:   Procedure Laterality Date    ARTHROSCOPY OF ANKLE Right 5/4/2022    Procedure: ARTHROSCOPY, ANKLE;  Surgeon: Bimal Mccormick DPM;  Location: [facility];  Service: Podiatry;  Laterality: Right;  CURT EXTERNAL ANKLE DISTRACTOR    CHOLECYSTECTOMY      DILATION AND CURETTAGE OF UTERUS      HYSTERECTOMY  2017    total, ovarian cysts, torsion, [place]rault; hyst per Dr JESUS Manriquez    " "LAPAROSCOPIC APPENDECTOMY N/A 8/18/2021    Procedure: APPENDECTOMY, LAPAROSCOPIC;  Surgeon: Osiel Ramirez MD;  [facility];  Service: General;  Laterality: N/A;    OOPHORECTOMY      opherectom Left 2015    salpingo-opherectomy    REPAIR OF LIGAMENT OF ANKLE Right 6/23/2021    Procedure: REPAIR OF ANTERIOR TALOFIBULAR LIGAMENT CALCANEOFIBULAR LIGAMENT;  Surgeon: Bimal Mccormick DPM;  Location: [facility] OR;  Service: Podiatry;  Laterality: Right;  WITH ARTHREX INTERNAL BRACE    SURGICAL REMOVAL OF BONE SPUR Right 6/23/2021    Procedure: EXCISION OS TRIGONUM;  Surgeon: Bimal Mccormick DPM;  Location: [facility] OR;  Service: Podiatry;  Laterality: Right;    WISDOM TOOTH EXTRACTION       Past Medical History:   Diagnosis Date    Anxiety     Back pain     Bipolar disorder 2004    bipolar 2    Chronic bronchitis     Depression     GERD (gastroesophageal reflux disease)     HPV (human papilloma virus) infection     Insomnia     Migraines     Ovarian cyst     Pneumonia      Family History   Adopted: Yes   Problem Relation Age of Onset    No Known Problems Sister     No Known Problems Sister     No Known Problems Sister         Social History:   Marital Status:   Alcohol History:  reports that she does not currently use alcohol.  Tobacco History:  reports that she has been smoking cigarettes. She has a 15.00 pack-year smoking history. She has never used smokeless tobacco.  Drug History:  reports that she does not currently use drugs after having used the following drugs: "Crack" cocaine.    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 Rescue 18 g 0    blood sugar diagnostic Strp Test blood sugar 4 (four) times daily before " meals and nightly. 100 each 0    blood-glucose meter (ACCU-CHEK GUIDE GLUCOSE METER) [facility] Use to test blood sugar 1 each 0    lancets (ACCU-CHEK SOFTCLIX LANCETS) Misc Test blood sugar 4 (four) times daily before meals and nightly. 100 each 1    metFORMIN (GLUCOPHAGE-XR) 500 MG ER 24hr tablet Take 1 tablet (500 mg total) by mouth 2 (two) times daily with meals. 180 tablet 0    ondansetron (ZOFRAN) 4 MG tablet Take 1 tablet (4 mg total) by mouth every 6 (six) hours as needed. 20 tablet 1    oxyCODONE-acetaminophen (PERCOCET) 5-325 mg per tablet Take 1 tablet by mouth every 4 (four) hours as needed for Pain. 10 tablet 0    QUEtiapine (SEROQUEL) 300 MG Tab Take 1 tablet (300 mg total) by mouth nightly. 90 tablet 1    sertraline (ZOLOFT) 100 MG tablet TAKE ONE TABLET BY MOUTH ONCE DAILY 30 tablet 2     No current facility-administered medications for this visit.       Review of Systems   Constitutional:  Negative for chills, fatigue, fever and unexpected weight change.   HENT:  Negative for hearing loss and trouble swallowing.    Eyes:  Negative for photophobia and visual disturbance.   Respiratory:  Negative for cough, shortness of breath and wheezing.    Cardiovascular:  Negative for chest pain, palpitations and leg swelling.   Gastrointestinal:  Negative for abdominal pain and nausea.   Genitourinary:  Negative for dysuria and frequency.   Musculoskeletal:  Positive for gait problem and myalgias. Negative for arthralgias, back pain and joint swelling.   Skin:  Positive for wound. Negative for rash.   Neurological:  Negative for tremors, seizures, weakness, numbness and headaches.   Hematological:  Does not bruise/bleed easily.       Objective:        Physical Exam:   Foot Exam    General  General Appearance: appears stated age and healthy   Orientation: alert and oriented to person, place, and time   Affect: appropriate       Right Foot/Ankle     Inspection and Palpation  Ecchymosis: none  Tenderness: ankle (Pain  throughout anterior ankle)  Swelling: ankle (Swelling throughout the ankle)  Arch: normal  Skin Exam: skin intact;   Neurovascular  Dorsalis pedis: 2+  Posterior tibial: 2+  Capillary Refill: 2+  Saphenous nerve sensation: normal  Tibial nerve sensation: normal  Superficial peroneal nerve sensation: normal  Deep peroneal nerve sensation: normal  Sural nerve sensation: normal    Muscle Strength  Ankle dorsiflexion: 5  Ankle plantar flexion: 5  Ankle inversion: 5  Ankle eversion: 5  Great toe extension: 5  Great toe flexion: 5    Range of Motion    Passive  Ankle dorsiflexion: pain  Ankle plantar flexion: pain  Ankle eversion: pain  Ankle inversion: pain    Active  Ankle dorsiflexion: pain  Ankle plantar flexion: pain  Ankle eversion: pain  Ankle inversion: pain    Tests  Anterior drawer: negative   Varus tilt: negative   Syndesmosis squeeze: negative   Calcaneal squeeze: negative   Talar tilt: negative   PT Tinel's sign: negative    Paresthesia: negative      Physical Exam  Cardiovascular:      Pulses:           Dorsalis pedis pulses are 2+ on the right side.        Posterior tibial pulses are 2+ on the right side.   Musculoskeletal:      Right ankle: Swelling present. Tenderness present.        Feet:                  Muscle Strength   Right Lower Extremity   Ankle Dorsiflexion:  5   Plantar flexion:  5/5    Vascular Exam     Right Pulses  Dorsalis Pedis:      2+  Posterior Tibial:      2+         Imaging:            Assessment:       1. Osteochondral lesion    2. Chronic pain of right ankle    3. Arthritis of right ankle      Plan:   Osteochondral lesion    Chronic pain of right ankle    Arthritis of right ankle    Evaluated patient had discussion with her about the fact she does have a damage ankle from previous injury where I had to repair ligaments which appeared to be stable on the lateral side remove osteochondral lesion from the dome of the talus as well as bone fragments that she had in the posterior part of  the ankle joint and the fibula.  Explained her I doubt that she will ever have a completely normal ankle that is pain-free.  At this time I do not recommend ankle fusion because she is young and the joints not that bad.  She may be a candidate for ankle replacement later on in life but at this point time I recommending conservative care.  Today I am going to inject the ankle joint with cortisone and Marcaine and see if this gives her some relief.  She will continue anti-inflammatories and see how she responds.  Return as needed    Procedures Informed consent was obtained.  Time-out was called.  The area was prepped with alcohol, and a steroid injection was performed at right ankle joint through an anterior medial approach using 1.5 cc of 0.5% Marcaine w/out epi, 1 cc Dexamethasone, 0.5 cc Methylprednisolone. Patient tolerated the procedure well.            Counseling:     I provided patient education verbally regarding:   Patient diagnosis, treatment options, as well as alternatives, risks, and benefits.     I discussed the conservative treatent of arthritis consisiting of shoe modification, OTC NSAID, cortisone shots, a series of supartz injections, avoiding painful activities and common sense.  I explained that the arthritis may become more painful causng more disability and the possibility of further treatment.  Also discussed may need evaluation by Rheumatologist for possible inflammatory arthritis.     osteochondritis of a joint was explained to the pt.  I discussed the stages of the lesions and conservative care with NSAIDs, cortisone shots, a series of  supartz injections, DME and surgical care if conservative care fails or the lesion is too advanced.   This note was created using Dragon voice recognition software that occasionally misinterpreted phrases or words.

## 2022-10-14 ENCOUNTER — PATIENT MESSAGE (OUTPATIENT)
Dept: PODIATRY | Facility: CLINIC | Age: 32
End: 2022-10-14
Payer: MEDICAID

## 2022-10-14 DIAGNOSIS — M89.9 OSTEOCHONDRAL LESION: Primary | ICD-10-CM

## 2022-10-14 DIAGNOSIS — M94.9 OSTEOCHONDRAL LESION: Primary | ICD-10-CM

## 2022-10-14 RX ORDER — MELOXICAM 15 MG/1
15 TABLET ORAL DAILY
Qty: 30 TABLET | Refills: 2 | OUTPATIENT
Start: 2022-10-14 | End: 2023-02-02

## 2022-10-17 ENCOUNTER — PATIENT MESSAGE (OUTPATIENT)
Dept: FAMILY MEDICINE | Facility: CLINIC | Age: 32
End: 2022-10-17

## 2022-11-14 PROBLEM — J18.9 PNEUMONIA: Status: RESOLVED | Noted: 2022-08-06 | Resolved: 2022-11-14

## 2022-11-18 ENCOUNTER — PATIENT MESSAGE (OUTPATIENT)
Dept: PODIATRY | Facility: CLINIC | Age: 32
End: 2022-11-18
Payer: MEDICAID

## 2022-11-22 ENCOUNTER — OFFICE VISIT (OUTPATIENT)
Dept: PODIATRY | Facility: CLINIC | Age: 32
End: 2022-11-22
Payer: MEDICAID

## 2022-11-22 DIAGNOSIS — M25.571 CHRONIC PAIN OF RIGHT ANKLE: ICD-10-CM

## 2022-11-22 DIAGNOSIS — G89.29 CHRONIC PAIN OF RIGHT ANKLE: ICD-10-CM

## 2022-11-22 DIAGNOSIS — R22.41 MASS OF RIGHT ANKLE: ICD-10-CM

## 2022-11-22 DIAGNOSIS — M89.9 OSTEOCHONDRAL LESION: Primary | ICD-10-CM

## 2022-11-22 DIAGNOSIS — M25.371 ANKLE INSTABILITY, RIGHT: ICD-10-CM

## 2022-11-22 DIAGNOSIS — T14.8XXA FRAGMENTED BONE: ICD-10-CM

## 2022-11-22 DIAGNOSIS — M19.071 ARTHRITIS OF RIGHT ANKLE: ICD-10-CM

## 2022-11-22 DIAGNOSIS — M94.9 OSTEOCHONDRAL LESION: Primary | ICD-10-CM

## 2022-11-22 PROCEDURE — 1159F PR MEDICATION LIST DOCUMENTED IN MEDICAL RECORD: ICD-10-PCS | Mod: CPTII,S$GLB,, | Performed by: PODIATRIST

## 2022-11-22 PROCEDURE — 99213 PR OFFICE/OUTPT VISIT, EST, LEVL III, 20-29 MIN: ICD-10-PCS | Mod: S$GLB,,, | Performed by: PODIATRIST

## 2022-11-22 PROCEDURE — 1160F RVW MEDS BY RX/DR IN RCRD: CPT | Mod: CPTII,S$GLB,, | Performed by: PODIATRIST

## 2022-11-22 PROCEDURE — 1159F MED LIST DOCD IN RCRD: CPT | Mod: CPTII,S$GLB,, | Performed by: PODIATRIST

## 2022-11-22 PROCEDURE — 3052F PR MOST RECENT HEMOGLOBIN A1C LEVEL 8.0 - < 9.0%: ICD-10-PCS | Mod: CPTII,S$GLB,, | Performed by: PODIATRIST

## 2022-11-22 PROCEDURE — 99213 OFFICE O/P EST LOW 20 MIN: CPT | Mod: S$GLB,,, | Performed by: PODIATRIST

## 2022-11-22 PROCEDURE — 3052F HG A1C>EQUAL 8.0%<EQUAL 9.0%: CPT | Mod: CPTII,S$GLB,, | Performed by: PODIATRIST

## 2022-11-22 PROCEDURE — 1160F PR REVIEW ALL MEDS BY PRESCRIBER/CLIN PHARMACIST DOCUMENTED: ICD-10-PCS | Mod: CPTII,S$GLB,, | Performed by: PODIATRIST

## 2022-11-22 NOTE — PROGRESS NOTES
[address] [facility] [address] [place] [address]  Phone: [phone]   [phone]    Patient's PCP:Magalie Roldan MD  Referring Provider: No ref. provider found    Subjective:      Chief Complaint:: Foot Pain (Right foot pain and swelling . Her surgery was 05/04/2022 )    RAPHAEL Lea is a 32 y.o. female who presents today with a complaint of Right foot pain and swelling . Her 2nd surgery was 05/04/2022. Onset of symptoms started 4 months ago  and reports no trauma.  Current symptoms include pain and swelling .  Aggravating factors are driving , on and off her foot . Symptoms have gotten worse . Treatment to date have included mobic , wears shoes and socks , and ankle brace .     Systemic Doctor: Magalie Roldan MD  Date Last Seen: 05/17/2022  Blood Sugar: does not check  Hemoglobin A1c: 8.6 (8/7/22)       There were no vitals filed for this visit.   Shoe Size: 11     Past Surgical History:   Procedure Laterality Date    ARTHROSCOPY OF ANKLE Right 5/4/2022    Procedure: ARTHROSCOPY, ANKLE;  Surgeon: Bimal Mccormick DPM;  Location: [facility] OR;  Service: Podiatry;  Laterality: Right;  CURT EXTERNAL ANKLE DISTRACTOR    CHOLECYSTECTOMY      DILATION AND CURETTAGE OF UTERUS      HYSTERECTOMY  2017    total, ovarian cysts, torsion, [place]; hyst per Dr JESUS Manriquez    LAPAROSCOPIC APPENDECTOMY N/A 8/18/2021    Procedure: APPENDECTOMY, LAPAROSCOPIC;  Surgeon: Osiel Ramirez MD;  Location: [facility] OR;  Service: General;  Laterality: N/A;    OOPHORECTOMY      opherectom Left 2015    salpingo-opherectomy    REPAIR OF LIGAMENT OF ANKLE Right 6/23/2021    Procedure: REPAIR OF ANTERIOR TALOFIBULAR LIGAMENT CALCANEOFIBULAR LIGAMENT;  Surgeon: Bimal Mccormick DPM;  Location: [facility] OR;  Service: Podiatry;  Laterality: Right;  WITH ARTHREX INTERNAL BRACE    SURGICAL REMOVAL OF BONE SPUR Right 6/23/2021    Procedure: EXCISION OS TRIGONUM;  Surgeon: Bimal Mccormick DPM;  Location: [facility]  "OR;  Service: Podiatry;  Laterality: Right;    WISDOM TOOTH EXTRACTION       Past Medical History:   Diagnosis Date    Anxiety     Back pain     Bipolar disorder 2004    bipolar 2    Chronic bronchitis     Depression     GERD (gastroesophageal reflux disease)     HPV (human papilloma virus) infection     Insomnia     Migraines     Ovarian cyst     Pneumonia      Family History   Adopted: Yes   Problem Relation Age of Onset    No Known Problems Sister     No Known Problems Sister     No Known Problems Sister         Social History:   Marital Status:   Alcohol History:  reports that she does not currently use alcohol.  Tobacco History:  reports that she has been smoking cigarettes. She has a 15.00 pack-year smoking history. She has never used smokeless tobacco.  Drug History:  reports that she does not currently use drugs after having used the following drugs: "Crack" cocaine.    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 100 each 0    blood-glucose meter (ACCU-CHEK GUIDE GLUCOSE METER) Misc Use to test blood sugar 1 each 0    lancets (ACCU-CHEK SOFTCLIX LANCETS) Misc Test blood sugar 4 (four) times daily before meals and nightly. 100 each 1    meloxicam (MOBIC) 15 MG tablet Take 1 tablet (15 mg total) by mouth once daily. 30 tablet 2    oxyCODONE-acetaminophen (PERCOCET) 5-325 mg per tablet Take 1 tablet by mouth every 4 (four) hours as needed for Pain. 10 tablet 0    QUEtiapine (SEROQUEL) 300 MG Tab Take 1 tablet (300 mg total) by mouth nightly. 90 tablet 1    sertraline (ZOLOFT) 100 MG tablet TAKE ONE TABLET BY MOUTH ONCE DAILY 30 tablet 2    metFORMIN (GLUCOPHAGE-XR) 500 MG ER 24hr tablet Take 1 tablet (500 mg total) by mouth 2 (two) times daily with meals. " 180 tablet 0     No current facility-administered medications for this visit.       Review of Systems   Constitutional:  Negative for chills, fatigue, fever and unexpected weight change.   HENT:  Negative for hearing loss and trouble swallowing.    Eyes:  Negative for photophobia and visual disturbance.   Respiratory:  Negative for cough, shortness of breath and wheezing.    Cardiovascular:  Negative for chest pain, palpitations and leg swelling.   Gastrointestinal:  Negative for abdominal pain and nausea.   Genitourinary:  Negative for dysuria and frequency.   Musculoskeletal:  Positive for joint swelling. Negative for arthralgias, back pain, gait problem and myalgias.   Skin:  Negative for rash and wound.   Neurological:  Positive for numbness. Negative for tremors, seizures, weakness and headaches.   Hematological:  Does not bruise/bleed easily.       Objective:        Physical Exam:   Foot Exam    General  General Appearance: appears stated age and healthy   Orientation: alert and oriented to person, place, and time   Affect: appropriate   Gait: antalgic       Right Foot/Ankle     Inspection and Palpation  Ecchymosis: none  Tenderness: ankle (Pain throughout the anterior ankle no pain by the peroneal brevis tendon repair.  Mild pain with dorsiflexion plantar flexion no pain with inversion eversion)  Swelling: ankle (Moderate swelling throughout the ankle)  Skin Exam: skin intact;   Neurovascular  Dorsalis pedis: 2+  Posterior tibial: 2+  Capillary Refill: 2+  Saphenous nerve sensation: normal  Tibial nerve sensation: normal  Superficial peroneal nerve sensation: normal  Deep peroneal nerve sensation: normal  Sural nerve sensation: normal    Muscle Strength  Ankle dorsiflexion: 5  Ankle plantar flexion: 5  Ankle inversion: 5  Ankle eversion: 5  Great toe extension: 5  Great toe flexion: 5    Range of Motion    Normal right ankle ROM  Passive  Ankle dorsiflexion: pain  Ankle plantar flexion: pain    Active  Ankle  dorsiflexion: pain  Ankle plantar flexion: pain    Tests  Anterior drawer: negative   Varus tilt: negative   Calcaneal squeeze: negative   Talar tilt: negative   Alejandre squeeze: negative       Physical Exam  Cardiovascular:      Pulses:           Dorsalis pedis pulses are 2+ on the right side.        Posterior tibial pulses are 2+ on the right side.   Musculoskeletal:      Right ankle: Swelling present. Tenderness present.             Right Ankle/Foot Exam     Range of Motion   The patient has normal right ankle ROM.        Muscle Strength   Right Lower Extremity   Ankle Dorsiflexion:  5   Plantar flexion:  5/5    Vascular Exam     Right Pulses  Dorsalis Pedis:      2+  Posterior Tibial:      2+         Imaging:            Assessment:       1. Osteochondral lesion    2. Chronic pain of right ankle    3. Arthritis of right ankle    4. Mass of right ankle    5. Ankle instability, right    6. Fragmented bone      Plan:   Osteochondral lesion  -     MRI Ankle Without Contrast Right; Future; Expected date: 11/22/2022    Chronic pain of right ankle  -     MRI Ankle Without Contrast Right; Future; Expected date: 11/22/2022    Arthritis of right ankle    Mass of right ankle    Ankle instability, right    Fragmented bone  -     MRI Ankle Without Contrast Right; Future; Expected date: 11/22/2022    Evaluated patient today and she has no pain by with the tendon repair was or the ankle ligament repair but she has pain throughout the ankle with the arthroscopy was performed for the osteochondral lesion.  There is little minimal crepitus that is felt range of motion dorsiflexion plantar flexion.  The ankle feels really stable and inversion at the repaired site she has no pain there.  Because is been going on for months and gradually getting worse and swelling getting worse I am going to repeat the MRI of the right ankle to see if there is any problem with the surgery done for the osteochondral lesion of the talus.  She return  to see me to review the MRI results and come up with a treatment plan.  I have already given her an injection in the past into the joint as well as physical therapy which has not relieved her pain.      Procedures          Counseling:     I provided patient education verbally regarding:   Patient diagnosis, treatment options, as well as alternatives, risks, and benefits.     This note was created using Dragon voice recognition software that occasionally misinterpreted phrases or words.

## 2022-12-12 ENCOUNTER — TELEPHONE (OUTPATIENT)
Dept: PODIATRY | Facility: CLINIC | Age: 32
End: 2022-12-12
Payer: MEDICAID

## 2022-12-12 NOTE — TELEPHONE ENCOUNTER
Please let me know what I need to do for this patient to get MRI authorized.     Thanks,    Loretta Nam RN

## 2022-12-20 ENCOUNTER — PATIENT MESSAGE (OUTPATIENT)
Dept: PODIATRY | Facility: CLINIC | Age: 32
End: 2022-12-20
Payer: MEDICAID

## 2022-12-29 ENCOUNTER — PATIENT MESSAGE (OUTPATIENT)
Dept: FAMILY MEDICINE | Facility: CLINIC | Age: 32
End: 2022-12-29

## 2023-01-26 ENCOUNTER — TELEPHONE (OUTPATIENT)
Dept: PODIATRY | Facility: CLINIC | Age: 33
End: 2023-01-26
Payer: MEDICAID

## 2023-01-26 NOTE — TELEPHONE ENCOUNTER
----- Message from Judy Sabillon sent at 1/25/2023  2:24 PM CST -----  Regarding: Pt call  Please call pt back at [phone]. She is having issuers with the scheduling dept regarding an MRI she is supposed to have.    Thank Judy parker

## 2023-01-27 ENCOUNTER — OFFICE VISIT (OUTPATIENT)
Dept: FAMILY MEDICINE | Facility: CLINIC | Age: 33
End: 2023-01-27
Payer: MEDICAID

## 2023-01-27 DIAGNOSIS — F31.75 BIPOLAR DISORDER, IN PARTIAL REMISSION, MOST RECENT EPISODE DEPRESSED: Primary | ICD-10-CM

## 2023-01-27 DIAGNOSIS — R73.9 HYPERGLYCEMIA: ICD-10-CM

## 2023-01-27 DIAGNOSIS — Z79.899 ENCOUNTER FOR LONG-TERM (CURRENT) USE OF MEDICATIONS: ICD-10-CM

## 2023-01-27 PROCEDURE — 3008F PR BODY MASS INDEX (BMI) DOCUMENTED: ICD-10-PCS | Mod: CPTII,,, | Performed by: FAMILY MEDICINE

## 2023-01-27 PROCEDURE — 1159F PR MEDICATION LIST DOCUMENTED IN MEDICAL RECORD: ICD-10-PCS | Mod: CPTII,,, | Performed by: FAMILY MEDICINE

## 2023-01-27 PROCEDURE — 3008F BODY MASS INDEX DOCD: CPT | Mod: CPTII,,, | Performed by: FAMILY MEDICINE

## 2023-01-27 PROCEDURE — 3075F SYST BP GE 130 - 139MM HG: CPT | Mod: CPTII,,, | Performed by: FAMILY MEDICINE

## 2023-01-27 PROCEDURE — 3075F PR MOST RECENT SYSTOLIC BLOOD PRESS GE 130-139MM HG: ICD-10-PCS | Mod: CPTII,,, | Performed by: FAMILY MEDICINE

## 2023-01-27 PROCEDURE — 3078F PR MOST RECENT DIASTOLIC BLOOD PRESSURE < 80 MM HG: ICD-10-PCS | Mod: CPTII,,, | Performed by: FAMILY MEDICINE

## 2023-01-27 PROCEDURE — 99214 OFFICE O/P EST MOD 30 MIN: CPT | Performed by: FAMILY MEDICINE

## 2023-01-27 PROCEDURE — 1159F MED LIST DOCD IN RCRD: CPT | Mod: CPTII,,, | Performed by: FAMILY MEDICINE

## 2023-01-27 PROCEDURE — 99213 OFFICE O/P EST LOW 20 MIN: CPT | Mod: S$PBB,,, | Performed by: FAMILY MEDICINE

## 2023-01-27 PROCEDURE — 99213 PR OFFICE/OUTPT VISIT, EST, LEVL III, 20-29 MIN: ICD-10-PCS | Mod: S$PBB,,, | Performed by: FAMILY MEDICINE

## 2023-01-27 PROCEDURE — 3078F DIAST BP <80 MM HG: CPT | Mod: CPTII,,, | Performed by: FAMILY MEDICINE

## 2023-01-27 RX ORDER — QUETIAPINE FUMARATE 300 MG/1
300 TABLET, FILM COATED ORAL NIGHTLY
Qty: 90 TABLET | Refills: 1 | Status: SHIPPED | OUTPATIENT
Start: 2023-01-27 | End: 2023-05-04

## 2023-01-27 RX ORDER — SERTRALINE HYDROCHLORIDE 100 MG/1
100 TABLET, FILM COATED ORAL DAILY
Qty: 90 TABLET | Refills: 1 | Status: SHIPPED | OUTPATIENT
Start: 2023-01-27 | End: 2023-05-04

## 2023-01-27 RX ORDER — METFORMIN HYDROCHLORIDE 500 MG/1
500 TABLET, EXTENDED RELEASE ORAL 2 TIMES DAILY WITH MEALS
Qty: 180 TABLET | Refills: 3 | Status: SHIPPED | OUTPATIENT
Start: 2023-01-27 | End: 2023-09-18 | Stop reason: SDUPTHER

## 2023-01-27 NOTE — PROGRESS NOTES
SUBJECTIVE:    Patient ID: Loretta Lea is a 32 y.o. female.    Chief Complaint: Medication Refill    HPI  Loretta Lea is a 32 y.o. female with a hx of Bipolar disorder, HTN, Depression and Anxiety. She is here today for medication management.    She is on Quetiapine 300mg qHS and Sertraline 100mg daily. Was out of meds for over a month and felt depressed, but starting to level out, feeling motivated and more engaged in daily life.     Reports was recently in hospital and was told prediabetic and given Metformin 500mg BID, but has been checking sugars at home and they are above 200. Has also been gaining weight. Endorses polydispia, polyuria, blurred vision. Some hand paresthesias but does a lot of hand crafts, so unsure if related.   Also w liquid stools after meals.         Admission on 08/06/2022, Discharged on 08/09/2022   Component Date Value Ref Range Status    Blood Culture, Routine 08/06/2022 No growth after 5 days.   Final    Blood Culture, Routine 08/06/2022 No growth after 5 days.   Final    Lactate (Lactic Acid) 08/06/2022 2.6 (HH)  0.5 - 1.9 mmol/L Final    PT 08/06/2022 13.1  11.4 - 13.7 sec Final    INR 08/06/2022 1.1   Final    aPTT 08/06/2022 27.8  23.3 - 35.1 sec Final    SARS-CoV-2 RNA, Amplification, Qual 08/06/2022 Negative  Negative Final    Influenza A, Molecular 08/06/2022 Negative  Negative Final    Influenza B, Molecular 08/06/2022 Negative  Negative Final    Flu A & B Source 08/06/2022 Nasal swab   Final    WBC 08/06/2022 12.81 (H)  3.90 - 12.70 K/uL Final    RBC 08/06/2022 5.53 (H)  4.00 - 5.40 M/uL Final    Hemoglobin 08/06/2022 13.7  12.0 - 16.0 g/dL Final    Hematocrit 08/06/2022 43.8  37.0 - 48.5 % Final    MCV 08/06/2022 79 (L)  82 - 98 fL Final    MCH 08/06/2022 24.8 (L)  27.0 - 31.0 pg Final    MCHC 08/06/2022 31.3 (L)  32.0 - 36.0 g/dL Final    RDW 08/06/2022 15.9 (H)  11.5 - 14.5 % Final    Platelets 08/06/2022 377  150 - 450 K/uL Final    MPV 08/06/2022 9.1  (L)  9.2 - 12.9 fL Final    Immature Granulocytes 08/06/2022 0.5  0.0 - 0.5 % Final    Gran # (ANC) 08/06/2022 8.6 (H)  1.8 - 7.7 K/uL Final    Immature Grans (Abs) 08/06/2022 0.06 (H)  0.00 - 0.04 K/uL Final    Lymph # 08/06/2022 3.4  1.0 - 4.8 K/uL Final    Mono # 08/06/2022 0.6  0.3 - 1.0 K/uL Final    Eos # 08/06/2022 0.1  0.0 - 0.5 K/uL Final    Baso # 08/06/2022 0.06  0.00 - 0.20 K/uL Final    nRBC 08/06/2022 0  0 /100 WBC Final    Gran % 08/06/2022 66.9  38.0 - 73.0 % Final    Lymph % 08/06/2022 26.4  18.0 - 48.0 % Final    Mono % 08/06/2022 5.0  4.0 - 15.0 % Final    Eosinophil % 08/06/2022 0.7  0.0 - 8.0 % Final    Basophil % 08/06/2022 0.5  0.0 - 1.9 % Final    Differential Method 08/06/2022 Automated   Final    Sodium 08/06/2022 136  136 - 145 mmol/L Final    Potassium 08/06/2022 3.7  3.5 - 5.1 mmol/L Final    Chloride 08/06/2022 99  95 - 110 mmol/L Final    CO2 08/06/2022 25  23 - 29 mmol/L Final    Glucose 08/06/2022 192 (H)  70 - 110 mg/dL Final    BUN 08/06/2022 5 (L)  6 - 20 mg/dL Final    Creatinine 08/06/2022 0.6  0.5 - 1.4 mg/dL Final    Calcium 08/06/2022 8.8  8.7 - 10.5 mg/dL Final    Total Protein 08/06/2022 8.0  6.0 - 8.4 g/dL Final    Albumin 08/06/2022 3.7  3.5 - 5.2 g/dL Final    Total Bilirubin 08/06/2022 0.6  0.1 - 1.0 mg/dL Final    Alkaline Phosphatase 08/06/2022 116  55 - 135 U/L Final    AST 08/06/2022 37  10 - 40 U/L Final    ALT 08/06/2022 54 (H)  10 - 44 U/L Final    Anion Gap 08/06/2022 12  8 - 16 mmol/L Final    eGFR 08/06/2022 >60.0  >60 mL/min/1.73 m^2 Final    Troponin I 08/06/2022 <0.030  <=0.040 ng/mL Final    BNP 08/06/2022 16  0 - 99 pg/mL Final    CPK MB 08/06/2022 0.9  0.1 - 6.5 ng/mL Final    CPK 08/06/2022 50  20 - 180 U/L Final    Magnesium 08/06/2022 2.0  1.6 - 2.6 mg/dL Final    Specimen UA 08/06/2022 Urine, Clean Catch   Final    Color, UA 08/06/2022 Yellow  Yellow, Straw, Raven Final    Appearance, UA 08/06/2022 Hazy (A)  Clear Final    pH, UA 08/06/2022 7.0  5.0  - 8.0 Final    Specific Gravity, UA 08/06/2022 1.015  1.005 - 1.030 Final    Protein, UA 08/06/2022 Negative  Negative Final    Glucose, UA 08/06/2022 Negative  Negative Final    Ketones, UA 08/06/2022 Negative  Negative Final    Bilirubin (UA) 08/06/2022 Negative  Negative Final    Occult Blood UA 08/06/2022 Negative  Negative Final    Nitrite, UA 08/06/2022 Negative  Negative Final    Urobilinogen, UA 08/06/2022 Negative  Negative EU/dL Final    Leukocytes, UA 08/06/2022 3+ (A)  Negative Final    HIV Rapid Testing 08/06/2022 Negative  Negative Final    RBC, UA 08/06/2022 1  0 - 4 /hpf Final    WBC, UA 08/06/2022 27 (H)  0 - 5 /hpf Final    Bacteria 08/06/2022 Occasional  None-Occ /hpf Final    Squam Epithel, UA 08/06/2022 7  /hpf Final    Hyaline Casts, UA 08/06/2022 17 (A)  0-1/lpf /lpf Final    Microscopic Comment 08/06/2022 SEE COMMENT   Final    Urine Culture, Routine 08/06/2022 No growth   Final    Lactate (Lactic Acid) 08/06/2022 5.3 (HH)  0.5 - 1.9 mmol/L Final    Respiratory Culture 08/07/2022 Reduced normal respiratory kritsine   Final    Gram Stain (Respiratory) 08/07/2022 <10 epithelial cells per low power field.   Final    Gram Stain (Respiratory) 08/07/2022 Few WBC's   Final    Gram Stain (Respiratory) 08/07/2022 Rare Gram positive cocci   Final    Gram Stain (Respiratory) 08/07/2022 Rare Gram negative rods   Final    Respiratory Infection Panel Source 08/07/2022 NP swab   Final    Adenovirus 08/07/2022 Not Detected  Not Detected Final    Coronavirus 229E, Common Cold Virus 08/07/2022 Not Detected  Not Detected Final    Coronavirus HKU1, Common Cold Virus 08/07/2022 Not Detected  Not Detected Final    Coronavirus NL63, Common Cold Virus 08/07/2022 Not Detected  Not Detected Final    Coronavirus OC43, Common Cold Virus 08/07/2022 Not Detected  Not Detected Final    SARS-CoV2 (COVID-19) Qualitative P* 08/07/2022 Not Detected  Not Detected Final    Human Metapneumovirus 08/07/2022 Not Detected  Not Detected  Final    Human Rhinovirus/Enterovirus 08/07/2022 Not Detected  Not Detected Final    Influenza A (subtypes H1, H1-2009,* 08/07/2022 Not Detected  Not Detected Final    Influenza B 08/07/2022 Not Detected  Not Detected Final    Parainfluenza Virus 1 08/07/2022 Not Detected  Not Detected Final    Parainfluenza Virus 2 08/07/2022 Not Detected  Not Detected Final    Parainfluenza Virus 3 08/07/2022 Not Detected  Not Detected Final    Parainfluenza Virus 4 08/07/2022 Not Detected  Not Detected Final    Respiratory Syncytial Virus 08/07/2022 Not Detected  Not Detected Final    Bordetella Parapertussis ([id number]) 08/07/2022 Not Detected  Not Detected Final    Bordetella pertussis (ptxP) 08/07/2022 Not Detected  Not Detected Final    Chlamydia pneumoniae 08/07/2022 Not Detected  Not Detected Final    Mycoplasma pneumoniae 08/07/2022 Not Detected  Not Detected Final    Mycoplasma pneu. IgG 08/06/2022 <100  0 - 99 U/mL Final    Mycoplasma pneu. IgM 08/06/2022 <770  0 - 769 U/mL Final    Procalcitonin 08/06/2022 <0.05  0.00 - 0.50 ng/mL Final    Lactate (Lactic Acid) 08/06/2022 5.0 (HH)  0.5 - 1.9 mmol/L Final    POC Glucose 08/06/2022 373 (H)  70 - 110 Final    Lactate (Lactic Acid) 08/06/2022 4.1 (HH)  0.5 - 1.9 mmol/L Final    WBC 08/07/2022 15.94 (H)  3.90 - 12.70 K/uL Final    RBC 08/07/2022 4.75  4.00 - 5.40 M/uL Final    Hemoglobin 08/07/2022 11.8 (L)  12.0 - 16.0 g/dL Final    Hematocrit 08/07/2022 38.8  37.0 - 48.5 % Final    MCV 08/07/2022 82  82 - 98 fL Final    MCH 08/07/2022 24.8 (L)  27.0 - 31.0 pg Final    MCHC 08/07/2022 30.4 (L)  32.0 - 36.0 g/dL Final    RDW 08/07/2022 16.0 (H)  11.5 - 14.5 % Final    Platelets 08/07/2022 331  150 - 450 K/uL Final    MPV 08/07/2022 9.2  9.2 - 12.9 fL Final    Sodium 08/07/2022 133 (L)  136 - 145 mmol/L Final    Potassium 08/07/2022 4.3  3.5 - 5.1 mmol/L Final    Chloride 08/07/2022 103  95 - 110 mmol/L Final    CO2 08/07/2022 20 (L)  23 - 29 mmol/L Final    Glucose 08/07/2022  293 (H)  70 - 110 mg/dL Final    BUN 08/07/2022 8  6 - 20 mg/dL Final    Creatinine 08/07/2022 0.6  0.5 - 1.4 mg/dL Final    Calcium 08/07/2022 8.5 (L)  8.7 - 10.5 mg/dL Final    Anion Gap 08/07/2022 10  8 - 16 mmol/L Final    eGFR 08/07/2022 >60.0  >60 mL/min/1.73 m^2 Final    Magnesium 08/07/2022 1.9  1.6 - 2.6 mg/dL Final    Iron 08/07/2022 45  30 - 160 ug/dL Final    Transferrin 08/07/2022 268  200 - 375 mg/dL Final    TIBC 08/07/2022 375  250 - 450 ug/dL Final    Saturated Iron 08/07/2022 12 (L)  20 - 50 % Final    Lactate (Lactic Acid) 08/07/2022 4.3 (HH)  0.5 - 1.9 mmol/L Final    POC Glucose 08/07/2022 296 (H)  70 - 110 Final    Lactate (Lactic Acid) 08/07/2022 2.6 (HH)  0.5 - 1.9 mmol/L Final    POC Glucose 08/07/2022 278 (H)  70 - 110 Final    Hemoglobin A1C 08/07/2022 8.6 (H)  4.5 - 6.2 % Final    Estimated Avg Glucose 08/07/2022 200 (H)  68 - 131 mg/dL Final    POC Glucose 08/07/2022 269 (H)  70 - 110 Final    POC Glucose 08/07/2022 324 (H)  70 - 110 Final    WBC 08/08/2022 16.97 (H)  3.90 - 12.70 K/uL Final    RBC 08/08/2022 5.04  4.00 - 5.40 M/uL Final    Hemoglobin 08/08/2022 12.7  12.0 - 16.0 g/dL Final    Hematocrit 08/08/2022 41.0  37.0 - 48.5 % Final    MCV 08/08/2022 81 (L)  82 - 98 fL Final    MCH 08/08/2022 25.2 (L)  27.0 - 31.0 pg Final    MCHC 08/08/2022 31.0 (L)  32.0 - 36.0 g/dL Final    RDW 08/08/2022 16.2 (H)  11.5 - 14.5 % Final    Platelets 08/08/2022 373  150 - 450 K/uL Final    MPV 08/08/2022 9.2  9.2 - 12.9 fL Final    Sodium 08/08/2022 137  136 - 145 mmol/L Final    Potassium 08/08/2022 4.5  3.5 - 5.1 mmol/L Final    Chloride 08/08/2022 101  95 - 110 mmol/L Final    CO2 08/08/2022 26  23 - 29 mmol/L Final    Glucose 08/08/2022 180 (H)  70 - 110 mg/dL Final    BUN 08/08/2022 11  6 - 20 mg/dL Final    Creatinine 08/08/2022 0.6  0.5 - 1.4 mg/dL Final    Calcium 08/08/2022 8.7  8.7 - 10.5 mg/dL Final    Anion Gap 08/08/2022 10  8 - 16 mmol/L Final    eGFR 08/08/2022 >60.0  >60  mL/min/1.73 m^2 Final    Magnesium 08/08/2022 2.3  1.6 - 2.6 mg/dL Final    POC Glucose 08/08/2022 120 (H)  70 - 110 Final    POC Glucose 08/08/2022 218 (H)  70 - 110 Final    POC Glucose 08/08/2022 306 (H)  70 - 110 Final    POC Glucose 08/08/2022 300 (H)  70 - 110 Final    WBC 08/09/2022 14.10 (H)  3.90 - 12.70 K/uL Final    RBC 08/09/2022 5.05  4.00 - 5.40 M/uL Final    Hemoglobin 08/09/2022 12.6  12.0 - 16.0 g/dL Final    Hematocrit 08/09/2022 40.2  37.0 - 48.5 % Final    MCV 08/09/2022 80 (L)  82 - 98 fL Final    MCH 08/09/2022 25.0 (L)  27.0 - 31.0 pg Final    MCHC 08/09/2022 31.3 (L)  32.0 - 36.0 g/dL Final    RDW 08/09/2022 15.9 (H)  11.5 - 14.5 % Final    Platelets 08/09/2022 330  150 - 450 K/uL Final    MPV 08/09/2022 8.8 (L)  9.2 - 12.9 fL Final    Sodium 08/09/2022 135 (L)  136 - 145 mmol/L Final    Potassium 08/09/2022 3.8  3.5 - 5.1 mmol/L Final    Chloride 08/09/2022 97  95 - 110 mmol/L Final    CO2 08/09/2022 28  23 - 29 mmol/L Final    Glucose 08/09/2022 123 (H)  70 - 110 mg/dL Final    BUN 08/09/2022 16  6 - 20 mg/dL Final    Creatinine 08/09/2022 0.6  0.5 - 1.4 mg/dL Final    Calcium 08/09/2022 8.6 (L)  8.7 - 10.5 mg/dL Final    Anion Gap 08/09/2022 10  8 - 16 mmol/L Final    eGFR 08/09/2022 >60.0  >60 mL/min/1.73 m^2 Final    Magnesium 08/09/2022 2.0  1.6 - 2.6 mg/dL Final    POC Glucose 08/09/2022 110  70 - 110 Final    POC Glucose 08/09/2022 169 (H)  70 - 110 Final   Admission on 08/02/2022, Discharged on 08/02/2022   Component Date Value Ref Range Status    WBC 08/02/2022 14.37 (H)  3.90 - 12.70 K/uL Final    RBC 08/02/2022 5.39  4.00 - 5.40 M/uL Final    Hemoglobin 08/02/2022 13.5  12.0 - 16.0 g/dL Final    Hematocrit 08/02/2022 42.5  37.0 - 48.5 % Final    MCV 08/02/2022 79 (L)  82 - 98 fL Final    MCH 08/02/2022 25.0 (L)  27.0 - 31.0 pg Final    MCHC 08/02/2022 31.8 (L)  32.0 - 36.0 g/dL Final    RDW 08/02/2022 15.6 (H)  11.5 - 14.5 % Final    Platelets 08/02/2022 351  150 - 450 K/uL Final     MPV 08/02/2022 8.9 (L)  9.2 - 12.9 fL Final    Immature Granulocytes 08/02/2022 0.4  0.0 - 0.5 % Final    Gran # (ANC) 08/02/2022 9.1 (H)  1.8 - 7.7 K/uL Final    Immature Grans (Abs) 08/02/2022 0.06 (H)  0.00 - 0.04 K/uL Final    Lymph # 08/02/2022 4.3  1.0 - 4.8 K/uL Final    Mono # 08/02/2022 0.7  0.3 - 1.0 K/uL Final    Eos # 08/02/2022 0.1  0.0 - 0.5 K/uL Final    Baso # 08/02/2022 0.07  0.00 - 0.20 K/uL Final    nRBC 08/02/2022 0  0 /100 WBC Final    Gran % 08/02/2022 63.2  38.0 - 73.0 % Final    Lymph % 08/02/2022 30.1  18.0 - 48.0 % Final    Mono % 08/02/2022 5.1  4.0 - 15.0 % Final    Eosinophil % 08/02/2022 0.7  0.0 - 8.0 % Final    Basophil % 08/02/2022 0.5  0.0 - 1.9 % Final    Differential Method 08/02/2022 Automated   Final    Sodium 08/02/2022 130 (L)  136 - 145 mmol/L Final    Potassium 08/02/2022 3.7  3.5 - 5.1 mmol/L Final    Chloride 08/02/2022 95  95 - 110 mmol/L Final    CO2 08/02/2022 28  23 - 29 mmol/L Final    Glucose 08/02/2022 290 (H)  70 - 110 mg/dL Final    BUN 08/02/2022 12  6 - 20 mg/dL Final    Creatinine 08/02/2022 0.6  0.5 - 1.4 mg/dL Final    Calcium 08/02/2022 8.6 (L)  8.7 - 10.5 mg/dL Final    Total Protein 08/02/2022 7.6  6.0 - 8.4 g/dL Final    Albumin 08/02/2022 3.6  3.5 - 5.2 g/dL Final    Total Bilirubin 08/02/2022 0.5  0.1 - 1.0 mg/dL Final    Alkaline Phosphatase 08/02/2022 118  55 - 135 U/L Final    AST 08/02/2022 32  10 - 40 U/L Final    ALT 08/02/2022 43  10 - 44 U/L Final    Anion Gap 08/02/2022 7 (L)  8 - 16 mmol/L Final    eGFR 08/02/2022 >60.0  >60 mL/min/1.73 m^2 Final    Troponin I 08/02/2022 <0.030  <=0.040 ng/mL Final    BNP 08/02/2022 19  0 - 99 pg/mL Final    Lipase 08/02/2022 49  4 - 60 U/L Final    Specimen UA 08/02/2022 Urine, Clean Catch   Final    Color, UA 08/02/2022 Yellow  Yellow, Straw, Raven Final    Appearance, UA 08/02/2022 Clear  Clear Final    pH, UA 08/02/2022 6.0  5.0 - 8.0 Final    Specific Gravity, UA 08/02/2022 1.015  1.005 - 1.030 Final     Protein, UA 08/02/2022 Negative  Negative Final    Glucose, UA 08/02/2022 3+ (A)  Negative Final    Ketones, UA 08/02/2022 Negative  Negative Final    Bilirubin (UA) 08/02/2022 Negative  Negative Final    Occult Blood UA 08/02/2022 Negative  Negative Final    Nitrite, UA 08/02/2022 Negative  Negative Final    Urobilinogen, UA 08/02/2022 Negative  Negative EU/dL Final    Leukocytes, UA 08/02/2022 1+ (A)  Negative Final    POC Creatinine 08/02/2022 0.6  0.5 - 1.4 mg/dL Final    Sample 08/02/2022 VENOUS   Final    RBC, UA 08/02/2022 2  0 - 4 /hpf Final    WBC, UA 08/02/2022 12 (H)  0 - 5 /hpf Final    Bacteria 08/02/2022 Occasional  None-Occ /hpf Final    Yeast, UA 08/02/2022 None  None Final    Squam Epithel, UA 08/02/2022 4  /hpf Final    Hyaline Casts, UA 08/02/2022 7 (A)  0-1/lpf /lpf Final    Microscopic Comment 08/02/2022 SEE COMMENT   Final    Urine Culture, Routine 08/02/2022 Multiple organisms isolated. None in predominance.  Repeat if   Final    Urine Culture, Routine 08/02/2022 clinically necessary.   Final       Past Medical History:   Diagnosis Date    Anxiety     Back pain     Bipolar disorder 2004    bipolar 2    Chronic bronchitis     Depression     GERD (gastroesophageal reflux disease)     HPV (human papilloma virus) infection     Insomnia     Migraines     Ovarian cyst     Pneumonia      Past Surgical History:   Procedure Laterality Date    ARTHROSCOPY OF ANKLE Right 5/4/2022    Procedure: ARTHROSCOPY, ANKLE;  Surgeon: Bimal Mccormick DPM;  Location: [facility] OR;  Service: Podiatry;  Laterality: Right;  CURT EXTERNAL ANKLE DISTRACTOR    CHOLECYSTECTOMY      DILATION AND CURETTAGE OF UTERUS      HYSTERECTOMY  2017    total, ovarian cysts, torsion, [place]; hyst per Dr JESUS Manriquez    LAPAROSCOPIC APPENDECTOMY N/A 8/18/2021    Procedure: APPENDECTOMY, LAPAROSCOPIC;  Surgeon: Osiel Ramirez MD;  Location: [facility] OR;  Service: General;  Laterality: N/A;    OOPHORECTOMY      opherectom Left 2015     "salpingo-opherectomy    REPAIR OF LIGAMENT OF ANKLE Right 6/23/2021    Procedure: REPAIR OF ANTERIOR TALOFIBULAR LIGAMENT CALCANEOFIBULAR LIGAMENT;  Surgeon: Bimal Mccormick DPM;  Location: [facility] OR;  Service: Podiatry;  Laterality: Right;  WITH ARTHREX INTERNAL BRACE    SURGICAL REMOVAL OF BONE SPUR Right 6/23/2021    Procedure: EXCISION OS TRIGONUM;  Surgeon: Bimal Mccormick DPM;  Location: [facility] OR;  Service: Podiatry;  Laterality: Right;    WISDOM TOOTH EXTRACTION       Family History   Adopted: Yes   Problem Relation Age of Onset    No Known Problems Sister     No Known Problems Sister     No Known Problems Sister        Tobacco History:  reports that she has been smoking cigarettes. She has a 15.00 pack-year smoking history. She has never used smokeless tobacco.  Alcohol History:  reports that she does not currently use alcohol.  Drug History:  reports that she does not currently use drugs after having used the following drugs: "Crack" cocaine.    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       Current Outpatient Medications:     blood sugar diagnostic Strp, Test blood sugar 4 (four) times daily before meals and nightly., Disp: 100 each, Rfl: 0    blood-glucose meter (ACCU-CHEK GUIDE GLUCOSE METER) Misc, Use to test blood sugar, Disp: 1 each, Rfl: 0    lancets (ACCU-CHEK SOFTCLIX LANCETS) Misc, Test blood sugar 4 (four) times daily before meals and nightly., Disp: 100 each, Rfl: 1    meloxicam (MOBIC) 15 MG tablet, Take 1 tablet (15 mg total) by mouth once daily., Disp: 30 tablet, Rfl: 2    oxyCODONE-acetaminophen (PERCOCET) 5-325 mg per tablet, Take 1 tablet by mouth every 4 (four) hours as needed for Pain., Disp: 10 tablet, Rfl: 0    metFORMIN (GLUCOPHAGE-XR) 500 MG ER 24hr tablet, Take 1 tablet (500 mg total) by mouth 2 (two) times daily with meals., Disp: 180 tablet, Rfl: 3    QUEtiapine (SEROQUEL) 300 MG Tab, " "Take 1 tablet (300 mg total) by mouth every evening., Disp: 90 tablet, Rfl: 1    sertraline (ZOLOFT) 100 MG tablet, Take 1 tablet (100 mg total) by mouth once daily., Disp: 90 tablet, Rfl: 1    ROS:  As in HPI           Objective:      Vitals:    01/27/23 1050   BP: 134/74   Pulse: 84   Resp: 18   SpO2: 98%   Weight: (!) 148.8 kg (328 lb)   Height: 5' 7" (1.702 m)     Physical Exam  Vitals reviewed.   Constitutional:       General: She is not in acute distress.     Appearance: She is obese.   Cardiovascular:      Rate and Rhythm: Normal rate and regular rhythm.      Pulses: Normal pulses.      Heart sounds: Normal heart sounds.   Pulmonary:      Effort: Pulmonary effort is normal.      Breath sounds: Normal breath sounds.   Neurological:      General: No focal deficit present.      Mental Status: She is alert and oriented to person, place, and time.   Psychiatric:         Mood and Affect: Mood normal.         Behavior: Behavior normal.            Assessment:       1. Bipolar disorder, in partial remission, most recent episode depressed    2. Encounter for long-term (current) use of medications    3. Hyperglycemia             Plan:       Bipolar disorder, in partial remission, most recent episode depressed  -     QUEtiapine (SEROQUEL) 300 MG Tab; Take 1 tablet (300 mg total) by mouth every evening.  Dispense: 90 tablet; Refill: 1  -     sertraline (ZOLOFT) 100 MG tablet; Take 1 tablet (100 mg total) by mouth once daily.  Dispense: 90 tablet; Refill: 1    Encounter for long-term (current) use of medications  -     Lipid Panel; Future; Expected date: 01/27/2023  -     Hemoglobin A1C; Future; Expected date: 01/27/2023  -     Comprehensive Metabolic Panel; Future; Expected date: 01/27/2023    Hyperglycemia  -     metFORMIN (GLUCOPHAGE-XR) 500 MG ER 24hr tablet; Take 1 tablet (500 mg total) by mouth 2 (two) times daily with meals.  Dispense: 180 tablet; Refill: 3    Plan:  - Mood normalizing now that meds have been " restarted. Continue.  - Check a1c, if diabetic consider glp1  - Other labs as above    No follow-ups on file.        1/30/2023 Magalie Roldan M.D.

## 2023-01-28 PROBLEM — F31.77 BIPOLAR DISORDER, IN PARTIAL REMISSION, MOST RECENT EPISODE MIXED: Status: ACTIVE | Noted: 2020-10-20

## 2023-01-30 VITALS
OXYGEN SATURATION: 98 % | RESPIRATION RATE: 18 BRPM | WEIGHT: 293 LBS | DIASTOLIC BLOOD PRESSURE: 74 MMHG | BODY MASS INDEX: 45.99 KG/M2 | SYSTOLIC BLOOD PRESSURE: 134 MMHG | HEIGHT: 67 IN | HEART RATE: 84 BPM

## 2023-02-01 ENCOUNTER — PATIENT MESSAGE (OUTPATIENT)
Dept: PODIATRY | Facility: CLINIC | Age: 33
End: 2023-02-01
Payer: MEDICAID

## 2023-02-02 ENCOUNTER — PATIENT MESSAGE (OUTPATIENT)
Dept: PODIATRY | Facility: CLINIC | Age: 33
End: 2023-02-02
Payer: MEDICAID

## 2023-02-02 ENCOUNTER — HOSPITAL ENCOUNTER (EMERGENCY)
Facility: HOSPITAL | Age: 33
Discharge: HOME | End: 2023-02-02
Attending: EMERGENCY MEDICINE
Payer: MEDICAID

## 2023-02-02 VITALS
RESPIRATION RATE: 17 BRPM | TEMPERATURE: 98 F | OXYGEN SATURATION: 96 % | WEIGHT: 293 LBS | BODY MASS INDEX: 45.99 KG/M2 | DIASTOLIC BLOOD PRESSURE: 99 MMHG | SYSTOLIC BLOOD PRESSURE: 146 MMHG | HEIGHT: 67 IN | HEART RATE: 109 BPM

## 2023-02-02 DIAGNOSIS — M79.671 CHRONIC PAIN IN RIGHT FOOT: Primary | ICD-10-CM

## 2023-02-02 DIAGNOSIS — M25.579 ANKLE PAIN: ICD-10-CM

## 2023-02-02 DIAGNOSIS — M79.673 FOOT PAIN: ICD-10-CM

## 2023-02-02 DIAGNOSIS — G89.29 CHRONIC PAIN IN RIGHT FOOT: Primary | ICD-10-CM

## 2023-02-02 PROCEDURE — 99283 EMERGENCY DEPT VISIT LOW MDM: CPT

## 2023-02-02 PROCEDURE — 25000003 PHARM REV CODE 250

## 2023-02-02 RX ORDER — MELOXICAM 15 MG/1
15 TABLET ORAL DAILY
Qty: 14 TABLET | Refills: 0 | Status: SHIPPED | OUTPATIENT
Start: 2023-02-02 | End: 2023-02-06

## 2023-02-02 RX ORDER — HYDROCODONE BITARTRATE AND ACETAMINOPHEN 5; 325 MG/1; MG/1
1 TABLET ORAL
Status: COMPLETED | OUTPATIENT
Start: 2023-02-02 | End: 2023-02-02

## 2023-02-02 RX ADMIN — HYDROCODONE BITARTRATE AND ACETAMINOPHEN 1 TABLET: 5; 325 TABLET ORAL at 04:02

## 2023-02-03 ENCOUNTER — TELEPHONE (OUTPATIENT)
Dept: PODIATRY | Facility: CLINIC | Age: 33
End: 2023-02-03
Payer: MEDICAID

## 2023-02-03 RX ORDER — HYDROCODONE BITARTRATE AND ACETAMINOPHEN 7.5; 325 MG/1; MG/1
1 TABLET ORAL EVERY 6 HOURS PRN
Qty: 20 TABLET | Refills: 0 | Status: SHIPPED | OUTPATIENT
Start: 2023-02-03 | End: 2023-02-10

## 2023-02-03 NOTE — TELEPHONE ENCOUNTER
Spoke to patient to let her know that her medication was called in and that she needed to keep the appointment on Monday. Patient states understanding.

## 2023-02-03 NOTE — ED PROVIDER NOTES
Encounter Date: 2/2/2023       History     Chief Complaint   Patient presents with    Foot Pain     Atraumatic foot pain being followed by Dr. Temple, MRI scheduled for the 8th. Pain is just too high to wait.      Loretta Lea is a 32 year old female with pmh anxiety, bipolar disorder, depression, GERD, migraines presenting to the ED with c/o right foot pain. She has had two surgeries on the foot by Dr. Mccormick and is scheduled for a MRI on 2-8 to evaluate ongoing pain. She states that she has had worsening pain for the past two days with no specific trauma.     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     Past Medical History:   Diagnosis Date    Anxiety     Back pain     Bipolar disorder 2004    bipolar 2    Chronic bronchitis     Depression     GERD (gastroesophageal reflux disease)     HPV (human papilloma virus) infection     Insomnia     Migraines     Ovarian cyst     Pneumonia      Past Surgical History:   Procedure Laterality Date    ARTHROSCOPY OF ANKLE Right 5/4/2022    Procedure: ARTHROSCOPY, ANKLE;  Surgeon: Bimal Mccormick DPM;  Location: [facility] OR;  Service: Podiatry;  Laterality: Right;  CURT EXTERNAL ANKLE DISTRACTOR    CHOLECYSTECTOMY      DILATION AND CURETTAGE OF UTERUS      HYSTERECTOMY  2017    total, ovarian cysts, torsion, [place]; hyst per Dr JESUS Manriquez    LAPAROSCOPIC APPENDECTOMY N/A 8/18/2021    Procedure: APPENDECTOMY, LAPAROSCOPIC;  Surgeon: Osiel Ramirez MD;  Location: [facility] OR;  Service: General;  Laterality: N/A;    OOPHORECTOMY      opherectom Left 2015    salpingo-opherectomy    REPAIR OF LIGAMENT OF ANKLE Right 6/23/2021    Procedure: REPAIR OF ANTERIOR TALOFIBULAR LIGAMENT CALCANEOFIBULAR LIGAMENT;  Surgeon: Bimal Mccormick DPM;  Location: [facility] OR;  Service: Podiatry;  Laterality: Right;  WITH ARTHREX INTERNAL BRACE    SURGICAL REMOVAL OF BONE SPUR Right 6/23/2021    Procedure:  "EXCISION OS TRIGONUM;  Surgeon: Bimal Mccormick DPM;  Location: [facility];  Service: Podiatry;  Laterality: Right;    WISDOM TOOTH EXTRACTION       Family History   Adopted: Yes   Problem Relation Age of Onset    No Known Problems Sister     No Known Problems Sister     No Known Problems Sister      Social History     Tobacco Use    Smoking status: Every Day     Packs/day: 1.00     Years: 15.00     Pack years: 15.00     Types: Cigarettes    Smokeless tobacco: Never   Substance Use Topics    Alcohol use: Not Currently     Comment: rare    Drug use: Not Currently     Types: "Crack" cocaine     Comment: twice     Review of Systems   Constitutional:  Negative for fever.   HENT:  Negative for sore throat.    Respiratory:  Negative for shortness of breath.    Cardiovascular:  Negative for chest pain.   Gastrointestinal:  Negative for nausea.   Genitourinary:  Negative for dysuria.   Musculoskeletal:  Positive for arthralgias (right foot/ankle). Negative for back pain.   Skin:  Negative for rash.   Neurological:  Negative for weakness.   Hematological:  Does not bruise/bleed easily.     Physical Exam     Initial Vitals [02/02/23 1616]   BP Pulse Resp Temp SpO2   (!) 151/103 109 18 98.4 °F (36.9 °C) 96 %      MAP       --         Physical Exam    Nursing note and vitals reviewed.  Constitutional: She appears well-developed and well-nourished. She is not diaphoretic. No distress.   HENT:   Head: Normocephalic and atraumatic.   Mouth/Throat: Oropharynx is clear and moist.   Eyes: Conjunctivae are normal.   Neck: Neck supple.   Cardiovascular:  Normal rate, regular rhythm, normal heart sounds and intact distal pulses.     Exam reveals no gallop and no friction rub.       No murmur heard.  Pulmonary/Chest: Breath sounds normal. No respiratory distress. She has no wheezes. She has no rhonchi. She has no rales.   Musculoskeletal:      Cervical back: Neck supple.      Right ankle: Swelling present. Decreased range of motion. " Normal pulse.      Comments: Diffuse tenderness to dorsum of right foot and right lateral malleolus. Small area of ecchymosis to lateral malleolus. No erythema or warmth to the joint. Decreased ROM due to pain.      Neurological: She is alert and oriented to person, place, and time.   Skin: No rash noted. No erythema.   Psychiatric: Her speech is normal.       ED Course   Procedures  Labs Reviewed - No data to display       Imaging Results              X-Ray Ankle Complete Right (Final result)  Result time 02/02/23 17:01:19      Final result by Bimal Díaz Jr., MD (02/02/23 17:01:19)                   Narrative:    HISTORY: Right ankle pain. No trauma.    COMPARISON:None available.    FINDINGS:Multiple views of the right ankle demonstrate normal osseous mineralization without evidence of an acute fracture deformity. Small enthesophyte formation at the Achilles tendon insertion point on lateral inspection. No evidence of soft tissue gas or radiopaque foreign bodies. Medial and lateral clear space are well-maintained    IMPRESSION:No evidence of acute traumatic injury. Small plantar calcaneal heel spur noted.    Electronically signed by:  Bimal Díaz MD  2/2/2023 5:01 PM CST Workstation: [id number]                                     X-Ray Foot Complete Right (Final result)  Result time 02/02/23 17:02:18      Final result by Bimal Díaz Jr., MD (02/02/23 17:02:18)                   Narrative:    HISTORY: Right foot pain. No trauma.    COMPARISON:None available.    FINDINGS:Multiple views the right foot display normal osseous mineralization without evidence of an acute fracture deformity. Subtalar joint is normal radiographic appearance. Enthesis at the Achilles tendon insertion point noted.    IMPRESSION:No incidence of acute trauma.    Electronically signed by:  Bimal Díaz MD  2/2/2023 5:02 PM CST Workstation: [id number]                                     Medications   HYDROcodone-acetaminophen  5-325 mg per tablet 1 tablet (1 tablet Oral Given 2/2/23 1305)           APC / Resident Notes:   This is an urgent evaluation of a 32 year old female presenting to the ED with c/o right ankle pain. The patient has had chronic pain in the foot/ankle and is followed by Dr. Mccormick. She is scheduled for MRI in one week. She has a normal 2+ DP pulse with no erythema or warmth to the joint suggestive of septic joint. She has had no fever. She does have a small area of ecchymosis over the lateral malleolus with no acute abnormal xray findings. The patient requested narcotic pain medication but was advised that I am unable to treat chronic pain. She was instructed to contact Dr. Mccormick to schedule follow up. Will discharge in walking boot for support of the joint as well as meloxicam since patient has been prescribed this in the past by podiatry. She was also evaluated by Dr. Mcdowell in the ED prior to discharge.   Based on my clinical evaluation, I do not appreciate any immediate, emergent, or life threatening condition or etiology that warrants additional workup today and feel that the patient can be discharged with close follow up care.                      Clinical Impression:   Final diagnoses:  [M79.673] Foot pain  [M25.579] Ankle pain  [M79.671, G89.29] Chronic pain in right foot (Primary)        ED Disposition Condition    Discharge Stable          ED Prescriptions    None       Follow-up Information    None          Shayy Bonner NP  02/02/23 1676

## 2023-02-03 NOTE — TELEPHONE ENCOUNTER
----- Message from Bimal Mccormick DPM sent at 2/3/2023  8:24 AM CST -----  Regarding: Continued ankle pain  Yes I will have my office contact her to see me if possible Monday.  She needs the repeat MRI to see what is going on.  To my office staff please contact patient have her see me Monday  ----- Message -----  From: Ryan Bonner NP  Sent: 2/2/2023   7:03 PM CST  To: RUSTY Munoz Dr.,         I just wanted to let you know that Ms. Lea was in the ED today for worsening right foot/ankle pain. Normal xrays and no evidence of septic joint on exam. She requested narcotics but was discharged with meloxicam and a walking boot. Are you able to see her prior to her MRI? Thank you so much!       Ryan Bonner NP

## 2023-02-03 NOTE — TELEPHONE ENCOUNTER
Patient contacted the office before we could contact her.  Patient was in tears, stated the pain is worse now than it ever was before.  She cannot bear-weight at all.  She is in the boot, requesting something for the pain.  Stated the Mobic does not help with the pain.      Advised patient to remain in the boot and try to stay off of the foot/ankle as much as she can. She is scheduled to see you Monday morning.     Wasn't sure what you wanted to send in for the pain?

## 2023-02-06 ENCOUNTER — OFFICE VISIT (OUTPATIENT)
Dept: PODIATRY | Facility: CLINIC | Age: 33
End: 2023-02-06
Payer: MEDICAID

## 2023-02-06 VITALS — WEIGHT: 293 LBS | BODY MASS INDEX: 45.99 KG/M2 | HEIGHT: 67 IN

## 2023-02-06 DIAGNOSIS — M19.071 ARTHRITIS OF RIGHT ANKLE: ICD-10-CM

## 2023-02-06 DIAGNOSIS — G89.29 CHRONIC PAIN OF RIGHT ANKLE: ICD-10-CM

## 2023-02-06 DIAGNOSIS — M25.571 CHRONIC PAIN OF RIGHT ANKLE: ICD-10-CM

## 2023-02-06 DIAGNOSIS — M25.371 ANKLE INSTABILITY, RIGHT: ICD-10-CM

## 2023-02-06 DIAGNOSIS — R22.41 MASS OF RIGHT ANKLE: ICD-10-CM

## 2023-02-06 DIAGNOSIS — M89.9 OSTEOCHONDRAL LESION: Primary | ICD-10-CM

## 2023-02-06 DIAGNOSIS — M94.9 OSTEOCHONDRAL LESION: Primary | ICD-10-CM

## 2023-02-06 PROCEDURE — 1160F PR REVIEW ALL MEDS BY PRESCRIBER/CLIN PHARMACIST DOCUMENTED: ICD-10-PCS | Mod: CPTII,S$GLB,, | Performed by: PODIATRIST

## 2023-02-06 PROCEDURE — 1159F MED LIST DOCD IN RCRD: CPT | Mod: CPTII,S$GLB,, | Performed by: PODIATRIST

## 2023-02-06 PROCEDURE — 3008F BODY MASS INDEX DOCD: CPT | Mod: CPTII,S$GLB,, | Performed by: PODIATRIST

## 2023-02-06 PROCEDURE — 99213 PR OFFICE/OUTPT VISIT, EST, LEVL III, 20-29 MIN: ICD-10-PCS | Mod: S$GLB,,, | Performed by: PODIATRIST

## 2023-02-06 PROCEDURE — 99213 OFFICE O/P EST LOW 20 MIN: CPT | Mod: S$GLB,,, | Performed by: PODIATRIST

## 2023-02-06 PROCEDURE — 1160F RVW MEDS BY RX/DR IN RCRD: CPT | Mod: CPTII,S$GLB,, | Performed by: PODIATRIST

## 2023-02-06 PROCEDURE — 3008F PR BODY MASS INDEX (BMI) DOCUMENTED: ICD-10-PCS | Mod: CPTII,S$GLB,, | Performed by: PODIATRIST

## 2023-02-06 PROCEDURE — 1159F PR MEDICATION LIST DOCUMENTED IN MEDICAL RECORD: ICD-10-PCS | Mod: CPTII,S$GLB,, | Performed by: PODIATRIST

## 2023-02-06 RX ORDER — HYDROCODONE BITARTRATE AND ACETAMINOPHEN 7.5; 325 MG/1; MG/1
1 TABLET ORAL EVERY 6 HOURS PRN
Qty: 20 TABLET | Refills: 0 | Status: SHIPPED | OUTPATIENT
Start: 2023-02-06 | End: 2023-02-13

## 2023-02-06 RX ORDER — NAPROXEN 500 MG/1
500 TABLET ORAL 2 TIMES DAILY
Qty: 60 TABLET | Refills: 2 | Status: SHIPPED | OUTPATIENT
Start: 2023-02-06 | End: 2023-03-08

## 2023-02-06 NOTE — PROGRESS NOTES
"  [address] [facility] [address] [place] [address]  Phone: [phone]   [phone]    Patient's PCP:Magalie Roldan MD  Referring Provider: No ref. provider found    Subjective:      Chief Complaint:: Follow-up    Follow-up  Pertinent negatives include no abdominal pain, arthralgias, chest pain, chills, coughing, fatigue, fever, headaches, joint swelling, myalgias, nausea, numbness, rash or weakness.   Loretta Lea is a 32 y.o. female who presents today for follow up right foot pain/swelling.  Presents in tall boot cast.  She was last seen in 11/2022 where she was given a steroid injection which helped so did not need the MRI.  Wednesday, she was working the sewing machine for hours in no pain and then Thursday she could not bear weight due to the pain. She is c/o pain in the lateral ankle and swelling in the back on the ankle/foot. She went to the ER Friday where they gave her pain medicine and discharged her.      Vitals:    02/06/23 0853   Weight: (!) 145.2 kg (320 lb)   Height: 5' 7" (1.702 m)   PainSc:   7      Shoe Size:     Past Surgical History:   Procedure Laterality Date    ARTHROSCOPY OF ANKLE Right 5/4/2022    Procedure: ARTHROSCOPY, ANKLE;  Surgeon: Bimal Mccormick DPM;  Location: [facility] OR;  Service: Podiatry;  Laterality: Right;  CURT EXTERNAL ANKLE DISTRACTOR    CHOLECYSTECTOMY      DILATION AND CURETTAGE OF UTERUS      HYSTERECTOMY  2017    total, ovarian cysts, torsion, [place]; hyst per Dr JESUS Manriquez    LAPAROSCOPIC APPENDECTOMY N/A 8/18/2021    Procedure: APPENDECTOMY, LAPAROSCOPIC;  Surgeon: Osiel Ramirez MD;  Location: [facility] OR;  Service: General;  Laterality: N/A;    OOPHORECTOMY      opherectom Left 2015    salpingo-opherectomy    REPAIR OF LIGAMENT OF ANKLE Right 6/23/2021    Procedure: REPAIR OF ANTERIOR TALOFIBULAR LIGAMENT CALCANEOFIBULAR LIGAMENT;  Surgeon: Bimal Mccormick DPM;  Location: [facility] OR;  Service: Podiatry;  Laterality: Right;  WITH ARTHREX " "INTERNAL BRACE    SURGICAL REMOVAL OF BONE SPUR Right 6/23/2021    Procedure: EXCISION OS TRIGONUM;  Surgeon: Bimal Mccormick DPM;  Location: [facility];  Service: Podiatry;  Laterality: Right;    WISDOM TOOTH EXTRACTION       Past Medical History:   Diagnosis Date    Anxiety     Back pain     Bipolar disorder 2004    bipolar 2    Chronic bronchitis     Depression     GERD (gastroesophageal reflux disease)     HPV (human papilloma virus) infection     Insomnia     Migraines     Ovarian cyst     Pneumonia      Family History   Adopted: Yes   Problem Relation Age of Onset    No Known Problems Sister     No Known Problems Sister     No Known Problems Sister         Social History:   Marital Status:   Alcohol History:  reports that she does not currently use alcohol.  Tobacco History:  reports that she has been smoking cigarettes. She has a 15.00 pack-year smoking history. She has never used smokeless tobacco.  Drug History:  reports that she does not currently use drugs after having used the following drugs: "Crack" cocaine.    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 100 each 0    blood-glucose meter (ACCU-CHEK GUIDE GLUCOSE METER) Misc Use to test blood sugar 1 each 0    HYDROcodone-acetaminophen (NORCO) 7.5-325 mg per tablet Take 1 tablet by mouth every 6 (six) hours as needed for Pain. 20 tablet 0    lancets (ACCU-CHEK SOFTCLIX LANCETS) Misc Test blood sugar 4 (four) times daily before meals and nightly. 100 each 1    metFORMIN (GLUCOPHAGE-XR) 500 MG ER 24hr tablet Take 1 tablet (500 mg total) by mouth 2 (two) times daily with meals. 180 tablet 3    QUEtiapine (SEROQUEL) 300 MG Tab Take 1 tablet (300 mg total) by mouth every evening. 90 tablet 1    " sertraline (ZOLOFT) 100 MG tablet Take 1 tablet (100 mg total) by mouth once daily. 90 tablet 1    HYDROcodone-acetaminophen (NORCO) 7.5-325 mg per tablet Take 1 tablet by mouth every 6 (six) hours as needed for Pain. 20 tablet 0    naproxen (NAPROSYN) 500 MG tablet Take 1 tablet (500 mg total) by mouth 2 (two) times daily. 60 tablet 2    oxyCODONE-acetaminophen (PERCOCET) 5-325 mg per tablet Take 1 tablet by mouth every 4 (four) hours as needed for Pain. (Patient not taking: Reported on 2/6/2023) 10 tablet 0     No current facility-administered medications for this visit.       Review of Systems   Constitutional:  Negative for chills, fatigue, fever and unexpected weight change.   HENT:  Negative for hearing loss and trouble swallowing.    Eyes:  Negative for photophobia and visual disturbance.   Respiratory:  Negative for cough, shortness of breath and wheezing.    Cardiovascular:  Negative for chest pain, palpitations and leg swelling.   Gastrointestinal:  Negative for abdominal pain and nausea.   Genitourinary:  Negative for dysuria and frequency.   Musculoskeletal:  Positive for gait problem. Negative for arthralgias, back pain, joint swelling and myalgias.   Skin:  Negative for rash and wound.   Neurological:  Negative for tremors, seizures, weakness, numbness and headaches.   Hematological:  Does not bruise/bleed easily.       Objective:        Physical Exam:   Foot Exam    General  General Appearance: appears stated age and healthy   Orientation: alert and oriented to person, place, and time   Affect: appropriate   Gait: antalgic   Assistance: (Cam Walker boot cast right lower extremity)      Right Foot/Ankle     Inspection and Palpation  Ecchymosis: none  Tenderness: ankle (Severe pain ankle joint anterior lateral)  Swelling: ankle (Swelling lateral ankle by peroneal tendons and anterior ankle)  Arch: normal  Skin Exam: skin intact;   Neurovascular  Dorsalis pedis: 2+  Posterior tibial: 2+  Capillary  Refill: 2+  Saphenous nerve sensation: normal  Tibial nerve sensation: normal  Superficial peroneal nerve sensation: normal  Deep peroneal nerve sensation: normal  Sural nerve sensation: normal        Physical Exam  Cardiovascular:      Pulses:           Dorsalis pedis pulses are 2+ on the right side.        Posterior tibial pulses are 2+ on the right side.   Musculoskeletal:      Right ankle: Swelling present. Tenderness present.        Feet:                  Vascular Exam     Right Pulses  Dorsalis Pedis:      2+  Posterior Tibial:      2+         Imaging:            Assessment:       1. Osteochondral lesion    2. Chronic pain of right ankle    3. Arthritis of right ankle    4. Mass of right ankle    5. Ankle instability, right      Plan:   Osteochondral lesion    Chronic pain of right ankle    Arthritis of right ankle    Mass of right ankle    Ankle instability, right    Other orders  -     naproxen (NAPROSYN) 500 MG tablet; Take 1 tablet (500 mg total) by mouth 2 (two) times daily.  Dispense: 60 tablet; Refill: 2  -     HYDROcodone-acetaminophen (NORCO) 7.5-325 mg per tablet; Take 1 tablet by mouth every 6 (six) hours as needed for Pain.  Dispense: 20 tablet; Refill: 0    Evaluated patient she does have pain and swelling more than normal in the right ankle.  Hard to evaluate today because of pain and swelling.  No history of trauma although she has had done repetitive work with the ankle that is seem to have aggravated.  She was seen emergency department x-rays taken there were normal of both the foot and ankle.  She was given a prescription for narcotic and she is requesting more today.  Her MRI scheduled for 2 days from now she return to see me after she gets MRI done to evaluate the repair of the tendons and the ankle arthroscopy and the osteochondral lesion that was repaired.  She is failed to respond to conservative care including injections in the ankle and physical therapy.  As explained to her and her   I am not sure why she is having so much pain and swelling but hopefully the MRI will give us more information.      Procedures          Counseling:     I provided patient education verbally regarding:   Patient diagnosis, treatment options, as well as alternatives, risks, and benefits.     This note was created using Dragon voice recognition software that occasionally misinterpreted phrases or words.

## 2023-02-08 ENCOUNTER — HOSPITAL ENCOUNTER (OUTPATIENT)
Dept: RADIOLOGY | Facility: HOSPITAL | Age: 33
Discharge: HOME | End: 2023-02-08
Attending: PODIATRIST
Payer: MEDICAID

## 2023-02-08 DIAGNOSIS — M94.9 OSTEOCHONDRAL LESION: ICD-10-CM

## 2023-02-08 DIAGNOSIS — M25.571 CHRONIC PAIN OF RIGHT ANKLE: ICD-10-CM

## 2023-02-08 DIAGNOSIS — G89.29 CHRONIC PAIN OF RIGHT ANKLE: ICD-10-CM

## 2023-02-08 DIAGNOSIS — T14.8XXA FRAGMENTED BONE: ICD-10-CM

## 2023-02-08 DIAGNOSIS — M89.9 OSTEOCHONDRAL LESION: ICD-10-CM

## 2023-02-08 PROCEDURE — 73721 MRI JNT OF LWR EXTRE W/O DYE: CPT | Mod: TC,RT

## 2023-02-13 ENCOUNTER — OFFICE VISIT (OUTPATIENT)
Dept: PODIATRY | Facility: CLINIC | Age: 33
End: 2023-02-13
Payer: MEDICAID

## 2023-02-13 VITALS
HEART RATE: 94 BPM | WEIGHT: 293 LBS | BODY MASS INDEX: 45.99 KG/M2 | OXYGEN SATURATION: 95 % | HEIGHT: 67 IN | RESPIRATION RATE: 16 BRPM

## 2023-02-13 DIAGNOSIS — F17.210 CIGARETTE SMOKER: ICD-10-CM

## 2023-02-13 DIAGNOSIS — M94.9 OSTEOCHONDRAL LESION: ICD-10-CM

## 2023-02-13 DIAGNOSIS — M25.371 ANKLE INSTABILITY, RIGHT: ICD-10-CM

## 2023-02-13 DIAGNOSIS — M89.9 OSTEOCHONDRAL LESION: ICD-10-CM

## 2023-02-13 DIAGNOSIS — G89.29 CHRONIC PAIN OF RIGHT ANKLE: ICD-10-CM

## 2023-02-13 DIAGNOSIS — S86.311D RUPTURE OF PERONEAL TENDON, RIGHT, SUBSEQUENT ENCOUNTER: Primary | ICD-10-CM

## 2023-02-13 DIAGNOSIS — M25.571 CHRONIC PAIN OF RIGHT ANKLE: ICD-10-CM

## 2023-02-13 PROCEDURE — 3046F PR MOST RECENT HEMOGLOBIN A1C LEVEL > 9.0%: ICD-10-PCS | Mod: CPTII,S$GLB,, | Performed by: PODIATRIST

## 2023-02-13 PROCEDURE — 99214 PR OFFICE/OUTPT VISIT, EST, LEVL IV, 30-39 MIN: ICD-10-PCS | Mod: S$GLB,,, | Performed by: PODIATRIST

## 2023-02-13 PROCEDURE — 99214 OFFICE O/P EST MOD 30 MIN: CPT | Mod: S$GLB,,, | Performed by: PODIATRIST

## 2023-02-13 PROCEDURE — 1160F RVW MEDS BY RX/DR IN RCRD: CPT | Mod: CPTII,S$GLB,, | Performed by: PODIATRIST

## 2023-02-13 PROCEDURE — 1159F PR MEDICATION LIST DOCUMENTED IN MEDICAL RECORD: ICD-10-PCS | Mod: CPTII,S$GLB,, | Performed by: PODIATRIST

## 2023-02-13 PROCEDURE — 3046F HEMOGLOBIN A1C LEVEL >9.0%: CPT | Mod: CPTII,S$GLB,, | Performed by: PODIATRIST

## 2023-02-13 PROCEDURE — 3008F PR BODY MASS INDEX (BMI) DOCUMENTED: ICD-10-PCS | Mod: CPTII,S$GLB,, | Performed by: PODIATRIST

## 2023-02-13 PROCEDURE — 1159F MED LIST DOCD IN RCRD: CPT | Mod: CPTII,S$GLB,, | Performed by: PODIATRIST

## 2023-02-13 PROCEDURE — 1160F PR REVIEW ALL MEDS BY PRESCRIBER/CLIN PHARMACIST DOCUMENTED: ICD-10-PCS | Mod: CPTII,S$GLB,, | Performed by: PODIATRIST

## 2023-02-13 PROCEDURE — 3008F BODY MASS INDEX DOCD: CPT | Mod: CPTII,S$GLB,, | Performed by: PODIATRIST

## 2023-02-13 RX ORDER — HYDROCODONE BITARTRATE AND ACETAMINOPHEN 7.5; 325 MG/1; MG/1
1 TABLET ORAL EVERY 6 HOURS PRN
Qty: 20 TABLET | Refills: 0 | Status: SHIPPED | OUTPATIENT
Start: 2023-02-13 | End: 2023-02-20 | Stop reason: SDUPTHER

## 2023-02-13 NOTE — PROGRESS NOTES
"  [address] [place] [address]  Phone: [phone]   [phone]    Patient's PCP:Magalie Roldan MD  Referring Provider: No ref. provider found    Subjective:      Chief Complaint:: Follow-up (MRI results of Right ankle)    Follow-up  Pertinent negatives include no abdominal pain, arthralgias, chest pain, chills, coughing, fatigue, fever, headaches, joint swelling, myalgias, nausea, numbness, rash or weakness.   Loretta Lea is a 32 y.o. female who presents today for MRI results of Right ankle. Patient rates pain 7/10. Patient said the ankle has swelling even when patient is not on foot.   Systemic Doctor: Magalie Roldan  Date Last Seen: 1/27/2023  Blood Sugar: 223  Hemoglobin A1c: 9.7    Vitals:    02/13/23 1610   Pulse: 94   Resp: 16   SpO2: 95%   Weight: (!) 145.2 kg (320 lb)   Height: 5' 7" (1.702 m)   PainSc:   7      Shoe Size:     Past Surgical History:   Procedure Laterality Date    ARTHROSCOPY OF ANKLE Right 5/4/2022    Procedure: ARTHROSCOPY, ANKLE;  Surgeon: Bimal Mccormick DPM;  Location: [facility];  Service: Podiatry;  Laterality: Right;  CURT EXTERNAL ANKLE DISTRACTOR    CHOLECYSTECTOMY      DILATION AND CURETTAGE OF UTERUS      HYSTERECTOMY  2017    total, ovarian cysts, torsion, [place]; hyst per Dr JESUS Manriquez    LAPAROSCOPIC APPENDECTOMY N/A 8/18/2021    Procedure: APPENDECTOMY, LAPAROSCOPIC;  Surgeon: Osiel Ramirez MD;  Location: [facility];  Service: General;  Laterality: N/A;    OOPHORECTOMY      opherectom Left 2015    salpingo-opherectomy    REPAIR OF LIGAMENT OF ANKLE Right 6/23/2021    Procedure: REPAIR OF ANTERIOR TALOFIBULAR LIGAMENT CALCANEOFIBULAR LIGAMENT;  Surgeon: Bimal Mccormick DPM;  Location: [facility] OR;  Service: Podiatry;  Laterality: Right;  WITH ARTHREX INTERNAL BRACE    SURGICAL REMOVAL OF BONE SPUR Right 6/23/2021    Procedure: EXCISION OS TRIGONUM;  Surgeon: Bimal Mccormick DPM;  Location: [facility];  Service: " "Podiatry;  Laterality: Right;    WISDOM TOOTH EXTRACTION       Past Medical History:   Diagnosis Date    Anxiety     Back pain     Bipolar disorder 2004    bipolar 2    Chronic bronchitis     Depression     GERD (gastroesophageal reflux disease)     HPV (human papilloma virus) infection     Insomnia     Migraines     Ovarian cyst     Pneumonia      Family History   Adopted: Yes   Problem Relation Age of Onset    No Known Problems Sister     No Known Problems Sister     No Known Problems Sister         Social History:   Marital Status:   Alcohol History:  reports that she does not currently use alcohol.  Tobacco History:  reports that she has been smoking cigarettes. She has a 15.00 pack-year smoking history. She has never used smokeless tobacco.  Drug History:  reports that she does not currently use drugs after having used the following drugs: "Crack" cocaine.    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 100 each 0    blood-glucose meter (ACCU-CHEK GUIDE GLUCOSE METER) Misc Use to test blood sugar 1 each 0    HYDROcodone-acetaminophen (NORCO) 7.5-325 mg per tablet Take 1 tablet by mouth every 6 (six) hours as needed for Pain. 20 tablet 0    HYDROcodone-acetaminophen (NORCO) 7.5-325 mg per tablet Take 1 tablet by mouth every 6 (six) hours as needed for Pain. 20 tablet 0    lancets (ACCU-CHEK SOFTCLIX LANCETS) Misc Test blood sugar 4 (four) times daily before meals and nightly. 100 each 1    metFORMIN (GLUCOPHAGE-XR) 500 MG ER 24hr tablet Take 1 tablet (500 mg total) by mouth 2 (two) times daily with meals. 180 tablet 3    naproxen (NAPROSYN) 500 MG tablet Take 1 tablet (500 mg total) by mouth 2 (two) times daily. 60 tablet 2    oxyCODONE-acetaminophen " (PERCOCET) 5-325 mg per tablet Take 1 tablet by mouth every 4 (four) hours as needed for Pain. (Patient not taking: Reported on 2/6/2023) 10 tablet 0    QUEtiapine (SEROQUEL) 300 MG Tab Take 1 tablet (300 mg total) by mouth every evening. 90 tablet 1    sertraline (ZOLOFT) 100 MG tablet Take 1 tablet (100 mg total) by mouth once daily. 90 tablet 1     No current facility-administered medications for this visit.       Review of Systems   Constitutional:  Negative for chills, fatigue, fever and unexpected weight change.   HENT:  Negative for hearing loss and trouble swallowing.    Eyes:  Negative for photophobia and visual disturbance.   Respiratory:  Negative for cough, shortness of breath and wheezing.    Cardiovascular:  Negative for chest pain, palpitations and leg swelling.   Gastrointestinal:  Negative for abdominal pain and nausea.   Genitourinary:  Negative for dysuria and frequency.   Musculoskeletal:  Positive for gait problem. Negative for arthralgias, back pain, joint swelling and myalgias.   Skin:  Negative for rash and wound.   Neurological:  Negative for tremors, seizures, weakness, numbness and headaches.   Hematological:  Does not bruise/bleed easily.       Objective:        Physical Exam:   Foot Exam    General  General Appearance: appears stated age and healthy   Orientation: alert and oriented to person, place, and time   Affect: appropriate   Gait: antalgic       Right Foot/Ankle     Inspection and Palpation  Ecchymosis: none  Tenderness: ankle and subtalar joint (severe pain and edema lateral ankle by peroneal tendon repair and sub talar joint.   Pain with ROM)  Swelling: ankle and subtalar joint (lataeral ankle and peroneal tendon previous surgery site)  Skin Exam: skin intact;   Neurovascular  Dorsalis pedis: 2+  Posterior tibial: 2+  Capillary Refill: 2+  Saphenous nerve sensation: normal  Tibial nerve sensation: normal  Superficial peroneal nerve sensation: normal  Deep peroneal nerve  sensation: normal  Sural nerve sensation: normal    Muscle Strength  Ankle dorsiflexion: 5  Ankle plantar flexion: 5  Ankle inversion: 5  Ankle eversion: 5  Great toe extension: 5  Great toe flexion: 5    Range of Motion    Passive  Ankle eversion: pain  Ankle inversion: pain    Active  Ankle eversion: pain  Ankle inversion: pain    Tests  Varus tilt: positive   Talar tilt: positive       Physical Exam  Cardiovascular:      Pulses:           Dorsalis pedis pulses are 2+ on the right side.        Posterior tibial pulses are 2+ on the right side.   Musculoskeletal:      Right ankle: Swelling present. Tenderness present.        Feet:              Right Ankle/Foot Exam     Swelling   The patient is swollen on the subtalar joint.    Tenderness   The patient is tender to palpation of the subtalar joint.        Muscle Strength   Right Lower Extremity   Ankle Dorsiflexion:  5   Plantar flexion:  5/5    Vascular Exam     Right Pulses  Dorsalis Pedis:      2+  Posterior Tibial:      2+         Imaging:   MRI Ankle Without Contrast Right  MRI right ankle without contrast    CLINICAL DATA: Trauma, right ankle pain.    FINDINGS: Multiplanar noncontrast imaging was performed. Comparison is made to radiographs from February 2, 2023.    Exam is significantly limited by early extensive anterior, lateral, and posterior metallic susceptibility artifact. Of particular note, lateral ligamentous structures are poorly assessed. Increased signal within the anterior tibiofibular ligament suggests sprain/partial tear. All other lateral ligaments are poorly visualized. The deltoid ligament complex is within normal limits.    Whole included tendons appear intact. There is a small plantar calcaneal spur. There is slight thickening of the plantar fascial at its calcaneal attachment.    There is marrow edema in the talus, however this is likely mostly postoperative in nature, with orthopedic anchor from lateral approach noted. Best appreciated on  sagittal images, there is a small osteochondral defect involving the lateral talar dome. No displaced bone fragments are identified, however this would be difficult to completely exclude given the degree of metallic susceptibility artifact.    Alignment is normal. No pathologic joint fluid accumulation is identified.    The navicular, cuboid, cuneiforms, and included portions of the metatarsals are normal in appearance. The Lisfranc ligament is visualized and appears intact.    IMPRESSION:  1. Limited examination secondary to extensive metallic susceptibility artifact.  2. Small osteochondral defect at the lateral talar dome. No displaced fragments are identified, however sensitivity is limited in that regard by metallic artifact.  3. Talar marrow edema, felt to be primarily related to postoperative change with orthopedic anchor in place.  4. Poor visualization of the lateral ligament. There is evidence of sprain/partial thickness tear of the anterior tibiofibular ligament.    Electronically signed by:  Bart Mayers MD  2/8/2023 1:53 PM CST Workstation: [id number]            Assessment:       1. Rupture of peroneal tendon, right, subsequent encounter    2. Osteochondral lesion    3. Chronic pain of right ankle    4. Ankle instability, right    5. Cigarette smoker      Plan:   Rupture of peroneal tendon, right, subsequent encounter    Osteochondral lesion    Chronic pain of right ankle    Ankle instability, right    Cigarette smoker    Other orders  -     HYDROcodone-acetaminophen (NORCO) 7.5-325 mg per tablet; Take 1 tablet by mouth every 6 (six) hours as needed for Pain.  Dispense: 20 tablet; Refill: 0    I evaluated the patient and reviewed the MRI results with her and her .  I explained the MRI does not show any major damage and it is hard to completely to evaluate the ankle because of distortion from previous surgery.  We discussed that her pain is becoming worst and is affecting her daily life  activities.  She has failed to respond to injections, immobilization and physical therapy.  I explain to her and her  that I am not sure how much if any damage has occurred in the tendon at site of previous surgery.  Her ankle joint does not seem to hurt  like her tendons and lateral ankle subtalar joint.  I explained to them that if we choose to do suregey to see if there is more damage to the tendons that it may help a lot or may not give much relief.  The patient wants to proceed with surgery.    Patient was educated about the entire pre, herve and post-operative time period.  They  were educated about the pro's and con's of surgery.  All conservative measures have been exhausted. Patient understands the particular risks involved which may occur in connection with the procedure proposed including pain, swelling, infection, stiffness, decreased ROM, recurrence, rejection, numbness, delayed healing, scar formation.    Patient was educated that their diagnosis may be surgically treated.  The patient's problem will probably advance and usually will not get better without surgery.  All of the pre-operative treatment plans have been exhausted or will no longer be successful at this point in time.  Patient was told of the possible outcomes and expectations of the surgical procedure.  They  will need to be followed-up post-operatively.  Today, pictures were drawn, questions answered.      We discussed the following surgical procedures: redo repair of rupture peroneal tendons right ankle and possible ankle ligaments.  I told her I can't guarantee any results.  Pt wants to proceed and my nurse will schedule pt and she will get all required test done.    Return for post op visit    Procedures          Counseling:     I provided patient education verbally regarding:   Patient diagnosis, treatment options, as well as alternatives, risks, and benefits.     I discussed conservative treatment of minor tendon tear with cast  for 6 to 8 weeks, MRI, ultrasound if needed for evaluation of the rupture and possible need for surgical repair.   This note was created using Dragon voice recognition software that occasionally misinterpreted phrases or words.

## 2023-02-14 DIAGNOSIS — S86.311D RUPTURE OF PERONEAL TENDON, RIGHT, SUBSEQUENT ENCOUNTER: Primary | ICD-10-CM

## 2023-02-20 ENCOUNTER — HOSPITAL ENCOUNTER (OUTPATIENT)
Dept: PREADMISSION TESTING | Facility: HOSPITAL | Age: 33
Discharge: HOME | End: 2023-02-20
Attending: PODIATRIST
Payer: MEDICAID

## 2023-02-20 ENCOUNTER — PATIENT MESSAGE (OUTPATIENT)
Dept: SURGERY | Facility: HOSPITAL | Age: 33
End: 2023-02-20

## 2023-02-20 ENCOUNTER — HOSPITAL ENCOUNTER (OUTPATIENT)
Dept: RADIOLOGY | Facility: HOSPITAL | Age: 33
Discharge: HOME | End: 2023-02-20
Attending: PODIATRIST
Payer: MEDICAID

## 2023-02-20 ENCOUNTER — PATIENT MESSAGE (OUTPATIENT)
Dept: FAMILY MEDICINE | Facility: CLINIC | Age: 33
End: 2023-02-20

## 2023-02-20 VITALS
WEIGHT: 293 LBS | TEMPERATURE: 99 F | RESPIRATION RATE: 20 BRPM | DIASTOLIC BLOOD PRESSURE: 87 MMHG | HEIGHT: 67 IN | HEART RATE: 95 BPM | BODY MASS INDEX: 45.99 KG/M2 | SYSTOLIC BLOOD PRESSURE: 134 MMHG | OXYGEN SATURATION: 94 %

## 2023-02-20 DIAGNOSIS — G89.29 CHRONIC PAIN OF RIGHT ANKLE: ICD-10-CM

## 2023-02-20 DIAGNOSIS — S82.831K OTHER CLOSED FRACTURE OF DISTAL END OF RIGHT FIBULA WITH NONUNION, SUBSEQUENT ENCOUNTER: Primary | ICD-10-CM

## 2023-02-20 DIAGNOSIS — S86.311D RUPTURE OF PERONEAL TENDON, RIGHT, SUBSEQUENT ENCOUNTER: ICD-10-CM

## 2023-02-20 DIAGNOSIS — M25.571 CHRONIC PAIN OF RIGHT ANKLE: ICD-10-CM

## 2023-02-20 DIAGNOSIS — S92.134K: ICD-10-CM

## 2023-02-20 DIAGNOSIS — Z01.818 PRE-OP EXAMINATION: ICD-10-CM

## 2023-02-20 LAB
BASOPHILS # BLD AUTO: 0.06 K/UL (ref 0–0.2)
BASOPHILS NFR BLD: 0.4 % (ref 0–1.9)
DIFFERENTIAL METHOD: ABNORMAL
EOSINOPHIL # BLD AUTO: 0.1 K/UL (ref 0–0.5)
EOSINOPHIL NFR BLD: 0.9 % (ref 0–8)
ERYTHROCYTE [DISTWIDTH] IN BLOOD BY AUTOMATED COUNT: 16 % (ref 11.5–14.5)
HCT VFR BLD AUTO: 46.4 % (ref 37–48.5)
HGB BLD-MCNC: 14.6 G/DL (ref 12–16)
IMM GRANULOCYTES # BLD AUTO: 0.07 K/UL (ref 0–0.04)
IMM GRANULOCYTES NFR BLD AUTO: 0.4 % (ref 0–0.5)
LYMPHOCYTES # BLD AUTO: 3.9 K/UL (ref 1–4.8)
LYMPHOCYTES NFR BLD: 23.8 % (ref 18–48)
MCH RBC QN AUTO: 25.3 PG (ref 27–31)
MCHC RBC AUTO-ENTMCNC: 31.5 G/DL (ref 32–36)
MCV RBC AUTO: 80 FL (ref 82–98)
MONOCYTES # BLD AUTO: 0.8 K/UL (ref 0.3–1)
MONOCYTES NFR BLD: 4.8 % (ref 4–15)
NEUTROPHILS # BLD AUTO: 11.4 K/UL (ref 1.8–7.7)
NEUTROPHILS NFR BLD: 69.7 % (ref 38–73)
NRBC BLD-RTO: 0 /100 WBC
PLATELET # BLD AUTO: 388 K/UL (ref 150–450)
PMV BLD AUTO: 9.2 FL (ref 9.2–12.9)
RBC # BLD AUTO: 5.78 M/UL (ref 4–5.4)
WBC # BLD AUTO: 16.31 K/UL (ref 3.9–12.7)

## 2023-02-20 PROCEDURE — 93010 ELECTROCARDIOGRAM REPORT: CPT | Mod: ,,, | Performed by: SPECIALIST

## 2023-02-20 PROCEDURE — 93005 ELECTROCARDIOGRAM TRACING: CPT | Performed by: SPECIALIST

## 2023-02-20 PROCEDURE — 71046 X-RAY EXAM CHEST 2 VIEWS: CPT | Mod: TC

## 2023-02-20 PROCEDURE — 93010 EKG 12-LEAD: ICD-10-PCS | Mod: ,,, | Performed by: SPECIALIST

## 2023-02-20 PROCEDURE — 85025 COMPLETE CBC W/AUTO DIFF WBC: CPT | Performed by: PODIATRIST

## 2023-02-20 RX ORDER — HYDROCODONE BITARTRATE AND ACETAMINOPHEN 7.5; 325 MG/1; MG/1
1 TABLET ORAL EVERY 6 HOURS PRN
Qty: 20 TABLET | Refills: 0 | Status: SHIPPED | OUTPATIENT
Start: 2023-02-20 | End: 2023-02-27

## 2023-02-20 NOTE — PRE ADMISSION SCREENING
Preadmit assessment complete. Review of pt health history and home medications. Questions answered. Pt/ voiced understanding. Pt instructed no smoking 24 hours before surgery. Preop instructions per AVS

## 2023-02-20 NOTE — DISCHARGE INSTRUCTIONS
To confirm, Your doctor has instructed you that surgery is scheduled for: Wednesday 2/22/23    Pre-Op will call the afternoon prior to surgery between 4:00 and 6:00 PM with the final arrival time.  Today     Please report to Registration at front of the hospital --it is best to [address]    Do not eat or drink anything after midnight the night before your surgery - THIS INCLUDES  WATER, GUM, MINTS AND CANDY.    YOU MAY BRUSH YOUR TEETH     TAKE APPROVED  MEDICATIONS WITH A SMALL SIP OF WATER THE MORNING OF YOUR PROCEDURE: See Medication list    PLEASE NOTE:  The surgery schedule has many variables which may affect the time of your surgery case.  Family members should be available if your surgery time changes.  Plan to be here the day of your procedure between 4-6 hours.    DO NOT TAKE THESE MEDICATIONS 5-7 DAYS PRIOR to your procedure or per your surgeon's request: ASPIRIN, ALEVE, ADVIL, IBUPROFEN,  MOLLY SELTZER, BC , FISH OIL , VITAMIN E, HERBALS  (May take Tylenol)    ONLY if you are prescribed any types of blood thinners such as:  Aspirin, Coumadin, Plavix, Pradaxa, Xarelto, Aggrenox, Effient, Eliquis, Savasya, Brilinta, or any other, ask your surgeon whether you should stop taking them and how long before surgery you should stop.  You may also need to verify with the prescribing physician if it is ok to stop your medication.                                                     IMPORTANT INSTRUCTIONS    Do not smoke, vape or drink alcoholic beverages 24 hours prior to your procedure.  Shower the night before AND the morning of your procedure with a Chlorhexidine wash such as Hibiclens or Dial antibacterial soap from the neck down.    Do not get it on your face or in your eyes.  You may use your own shampoo and face wash. This helps your skin to be as bacteria free as possible.   Do not apply any deodorant, lotion or powder after you shower.   DO NOT remove hair from the surgery site.  Do not  shave the incision site unless you are given specific instructions to do so.    Sleep in a bed with clean sheets.  Do not sleep with a pet in the bed.   If you wear contact lenses, dentures, hearing aids or glasses, bring a container to put them in during surgery and give to a family member for safe keeping.    Please leave all jewelry, piercing's and valuables at home.   Wear comfortable clothing that is easy to remove and place back on after surgery.     Make arrangements in advance for transportation home by a responsible adult.    You must make arrangements for transportation, TAXI'S, UBER'S OR LYFTS ARE NOT ALLOWED.      If you have any questions about these instructions, call Pre-Op Admit  Nursing at [phone] , Pre-Op Day Surgery Unit at [phone]

## 2023-02-24 ENCOUNTER — TELEPHONE (OUTPATIENT)
Dept: FAMILY MEDICINE | Facility: CLINIC | Age: 33
End: 2023-02-24

## 2023-02-24 ENCOUNTER — TELEPHONE (OUTPATIENT)
Dept: PODIATRY | Facility: CLINIC | Age: 33
End: 2023-02-24
Payer: MEDICAID

## 2023-02-24 ENCOUNTER — TELEPHONE (OUTPATIENT)
Dept: HEMATOLOGY/ONCOLOGY | Facility: CLINIC | Age: 33
End: 2023-02-24

## 2023-02-24 NOTE — TELEPHONE ENCOUNTER
Talked to patient. She is due to have a tendon repair next week with Dr. Mccormick, she does have an elevated WBC, and they are requesting a clearance from hematologist. Informed the patient, we will do our best to get her in but we have not seen her since June.

## 2023-02-24 NOTE — TELEPHONE ENCOUNTER
Spoke with patient about rescheduling her appointment due to elevated WBC and A1C. Advised patient to schedule appointment with her hematologist first for WBC clearance, than Dr. Yen for A1C clearance and than rescheduling with Jeromy Ruiz NP for surgery clearance. Patient said ok.

## 2023-03-01 ENCOUNTER — PATIENT MESSAGE (OUTPATIENT)
Dept: PODIATRY | Facility: CLINIC | Age: 33
End: 2023-03-01
Payer: MEDICAID

## 2023-03-01 DIAGNOSIS — E11.65 UNCONTROLLED TYPE 2 DIABETES MELLITUS WITH HYPERGLYCEMIA: Primary | ICD-10-CM

## 2023-03-01 RX ORDER — SEMAGLUTIDE 1.34 MG/ML
INJECTION, SOLUTION SUBCUTANEOUS
Qty: 3 PEN | Refills: 3 | Status: SHIPPED | OUTPATIENT
Start: 2023-03-01 | End: 2023-04-13 | Stop reason: SDUPTHER

## 2023-03-03 DIAGNOSIS — S86.311D RUPTURE OF PERONEAL TENDON, RIGHT, SUBSEQUENT ENCOUNTER: Primary | ICD-10-CM

## 2023-03-03 DIAGNOSIS — G89.29 CHRONIC PAIN OF RIGHT ANKLE: ICD-10-CM

## 2023-03-03 DIAGNOSIS — M25.571 CHRONIC PAIN OF RIGHT ANKLE: ICD-10-CM

## 2023-03-03 RX ORDER — HYDROCODONE BITARTRATE AND ACETAMINOPHEN 7.5; 325 MG/1; MG/1
1 TABLET ORAL EVERY 6 HOURS PRN
Qty: 20 TABLET | Refills: 0 | Status: CANCELLED | OUTPATIENT
Start: 2023-03-03 | End: 2023-03-10

## 2023-03-03 RX ORDER — HYDROCODONE BITARTRATE AND ACETAMINOPHEN 7.5; 325 MG/1; MG/1
1 TABLET ORAL EVERY 6 HOURS PRN
Qty: 20 TABLET | Refills: 0 | Status: SHIPPED | OUTPATIENT
Start: 2023-03-03 | End: 2023-03-06 | Stop reason: SDUPTHER

## 2023-03-06 DIAGNOSIS — M25.571 CHRONIC PAIN OF RIGHT ANKLE: ICD-10-CM

## 2023-03-06 DIAGNOSIS — G89.29 CHRONIC PAIN OF RIGHT ANKLE: ICD-10-CM

## 2023-03-06 DIAGNOSIS — S86.311D RUPTURE OF PERONEAL TENDON, RIGHT, SUBSEQUENT ENCOUNTER: ICD-10-CM

## 2023-03-06 NOTE — TELEPHONE ENCOUNTER
----- Message from Vikki Frank sent at 3/6/2023  1:43 PM CST -----  Regarding: rx refill  Who Called:MARY ALICE TOMLINSON [[id number]]         Rx :Refill     HYDROcodone-acetaminophen (NORCO) 7.5-325 mg per tablet         Pharmacy:   [id number] - [place] - [address]  [address] [address]  Phone: [phone] Fax: [phone]        Can the clinic reply by [facility]?no         Best Call Back Number:[phone]           Additional Information: Pt is in pain needing medication refill

## 2023-03-07 RX ORDER — HYDROCODONE BITARTRATE AND ACETAMINOPHEN 7.5; 325 MG/1; MG/1
1 TABLET ORAL EVERY 6 HOURS PRN
Qty: 20 TABLET | Refills: 0 | Status: SHIPPED | OUTPATIENT
Start: 2023-03-07 | End: 2023-03-31

## 2023-03-07 NOTE — TELEPHONE ENCOUNTER
Spoke with patient to let her know it was filled and that it would be the last time until we see her back in the office.  Patient stated understanding.

## 2023-03-13 ENCOUNTER — LAB VISIT (OUTPATIENT)
Dept: LAB | Facility: HOSPITAL | Age: 33
End: 2023-03-13
Attending: FAMILY MEDICINE
Payer: MEDICAID

## 2023-03-13 ENCOUNTER — LAB VISIT (OUTPATIENT)
Dept: LAB | Facility: HOSPITAL | Age: 33
End: 2023-03-13
Attending: INTERNAL MEDICINE
Payer: MEDICAID

## 2023-03-13 ENCOUNTER — OFFICE VISIT (OUTPATIENT)
Dept: HEMATOLOGY/ONCOLOGY | Facility: CLINIC | Age: 33
End: 2023-03-13
Payer: MEDICAID

## 2023-03-13 VITALS
BODY MASS INDEX: 50.9 KG/M2 | TEMPERATURE: 98 F | HEIGHT: 67 IN | RESPIRATION RATE: 18 BRPM | DIASTOLIC BLOOD PRESSURE: 89 MMHG | SYSTOLIC BLOOD PRESSURE: 125 MMHG

## 2023-03-13 DIAGNOSIS — E66.01 CLASS 3 SEVERE OBESITY WITH BODY MASS INDEX (BMI) OF 45.0 TO 49.9 IN ADULT, UNSPECIFIED OBESITY TYPE, UNSPECIFIED WHETHER SERIOUS COMORBIDITY PRESENT: ICD-10-CM

## 2023-03-13 DIAGNOSIS — Z11.59 NEED FOR HEPATITIS C SCREENING TEST: ICD-10-CM

## 2023-03-13 DIAGNOSIS — Z11.4 SCREENING FOR HIV (HUMAN IMMUNODEFICIENCY VIRUS): ICD-10-CM

## 2023-03-13 DIAGNOSIS — D72.829 LEUKOCYTOSIS, UNSPECIFIED TYPE: ICD-10-CM

## 2023-03-13 DIAGNOSIS — D72.829 LEUKOCYTOSIS, UNSPECIFIED TYPE: Primary | ICD-10-CM

## 2023-03-13 LAB
BASOPHILS # BLD AUTO: 0.08 K/UL (ref 0–0.2)
BASOPHILS NFR BLD: 0.6 % (ref 0–1.9)
DIFFERENTIAL METHOD: ABNORMAL
EOSINOPHIL # BLD AUTO: 0.1 K/UL (ref 0–0.5)
EOSINOPHIL NFR BLD: 0.9 % (ref 0–8)
ERYTHROCYTE [DISTWIDTH] IN BLOOD BY AUTOMATED COUNT: 15.5 % (ref 11.5–14.5)
FERRITIN SERPL-MCNC: 62 NG/ML (ref 20–300)
FOLATE SERPL-MCNC: 7.7 NG/ML (ref 4–24)
HCT VFR BLD AUTO: 45.9 % (ref 37–48.5)
HGB BLD-MCNC: 15 G/DL (ref 12–16)
IMM GRANULOCYTES # BLD AUTO: 0.05 K/UL (ref 0–0.04)
IMM GRANULOCYTES NFR BLD AUTO: 0.4 % (ref 0–0.5)
LYMPHOCYTES # BLD AUTO: 3.7 K/UL (ref 1–4.8)
LYMPHOCYTES NFR BLD: 27.4 % (ref 18–48)
MCH RBC QN AUTO: 26.1 PG (ref 27–31)
MCHC RBC AUTO-ENTMCNC: 32.7 G/DL (ref 32–36)
MCV RBC AUTO: 80 FL (ref 82–98)
MONOCYTES # BLD AUTO: 0.7 K/UL (ref 0.3–1)
MONOCYTES NFR BLD: 5.5 % (ref 4–15)
NEUTROPHILS # BLD AUTO: 8.8 K/UL (ref 1.8–7.7)
NEUTROPHILS NFR BLD: 65.2 % (ref 38–73)
NRBC BLD-RTO: 0 /100 WBC
PLATELET # BLD AUTO: 377 K/UL (ref 150–450)
PMV BLD AUTO: 8.7 FL (ref 9.2–12.9)
RBC # BLD AUTO: 5.75 M/UL (ref 4–5.4)
RETICS/RBC NFR AUTO: 2.1 % (ref 0.5–2.5)
TSH SERPL DL<=0.005 MIU/L-ACNC: 2.07 UIU/ML (ref 0.34–5.6)
VIT B12 SERPL-MCNC: 874 PG/ML (ref 210–950)
WBC # BLD AUTO: 13.52 K/UL (ref 3.9–12.7)

## 2023-03-13 PROCEDURE — 82607 VITAMIN B-12: CPT | Performed by: INTERNAL MEDICINE

## 2023-03-13 PROCEDURE — 3008F BODY MASS INDEX DOCD: CPT | Mod: CPTII,S$GLB,, | Performed by: INTERNAL MEDICINE

## 2023-03-13 PROCEDURE — 3079F PR MOST RECENT DIASTOLIC BLOOD PRESSURE 80-89 MM HG: ICD-10-PCS | Mod: CPTII,S$GLB,, | Performed by: INTERNAL MEDICINE

## 2023-03-13 PROCEDURE — 99214 PR OFFICE/OUTPT VISIT, EST, LEVL IV, 30-39 MIN: ICD-10-PCS | Mod: S$GLB,,, | Performed by: INTERNAL MEDICINE

## 2023-03-13 PROCEDURE — 87389 HIV-1 AG W/HIV-1&-2 AB AG IA: CPT | Performed by: FAMILY MEDICINE

## 2023-03-13 PROCEDURE — 3074F SYST BP LT 130 MM HG: CPT | Mod: CPTII,S$GLB,, | Performed by: INTERNAL MEDICINE

## 2023-03-13 PROCEDURE — 85045 AUTOMATED RETICULOCYTE COUNT: CPT | Performed by: INTERNAL MEDICINE

## 2023-03-13 PROCEDURE — 1159F MED LIST DOCD IN RCRD: CPT | Mod: CPTII,S$GLB,, | Performed by: INTERNAL MEDICINE

## 2023-03-13 PROCEDURE — 82746 ASSAY OF FOLIC ACID SERUM: CPT | Performed by: INTERNAL MEDICINE

## 2023-03-13 PROCEDURE — 36415 COLL VENOUS BLD VENIPUNCTURE: CPT | Performed by: FAMILY MEDICINE

## 2023-03-13 PROCEDURE — 3074F PR MOST RECENT SYSTOLIC BLOOD PRESSURE < 130 MM HG: ICD-10-PCS | Mod: CPTII,S$GLB,, | Performed by: INTERNAL MEDICINE

## 2023-03-13 PROCEDURE — 3046F PR MOST RECENT HEMOGLOBIN A1C LEVEL > 9.0%: ICD-10-PCS | Mod: CPTII,S$GLB,, | Performed by: INTERNAL MEDICINE

## 2023-03-13 PROCEDURE — 1159F PR MEDICATION LIST DOCUMENTED IN MEDICAL RECORD: ICD-10-PCS | Mod: CPTII,S$GLB,, | Performed by: INTERNAL MEDICINE

## 2023-03-13 PROCEDURE — 3046F HEMOGLOBIN A1C LEVEL >9.0%: CPT | Mod: CPTII,S$GLB,, | Performed by: INTERNAL MEDICINE

## 2023-03-13 PROCEDURE — 86803 HEPATITIS C AB TEST: CPT | Performed by: FAMILY MEDICINE

## 2023-03-13 PROCEDURE — 84443 ASSAY THYROID STIM HORMONE: CPT | Performed by: FAMILY MEDICINE

## 2023-03-13 PROCEDURE — 3008F PR BODY MASS INDEX (BMI) DOCUMENTED: ICD-10-PCS | Mod: CPTII,S$GLB,, | Performed by: INTERNAL MEDICINE

## 2023-03-13 PROCEDURE — 82668 ASSAY OF ERYTHROPOIETIN: CPT | Performed by: INTERNAL MEDICINE

## 2023-03-13 PROCEDURE — 84207 ASSAY OF VITAMIN B-6: CPT | Performed by: INTERNAL MEDICINE

## 2023-03-13 PROCEDURE — 3079F DIAST BP 80-89 MM HG: CPT | Mod: CPTII,S$GLB,, | Performed by: INTERNAL MEDICINE

## 2023-03-13 PROCEDURE — 99214 OFFICE O/P EST MOD 30 MIN: CPT | Mod: S$GLB,,, | Performed by: INTERNAL MEDICINE

## 2023-03-13 PROCEDURE — 82728 ASSAY OF FERRITIN: CPT | Performed by: INTERNAL MEDICINE

## 2023-03-13 PROCEDURE — 85025 COMPLETE CBC W/AUTO DIFF WBC: CPT | Performed by: INTERNAL MEDICINE

## 2023-03-13 NOTE — PROGRESS NOTES
[facility] Hematology Oncology In Office Subsequent encounter Note    3/13/23    Subjective:      Patient ID:   Loretta Lea  32 y.o. female  1990  Magalie Benjamin MD      Chief Complaint:   Increased WBC    HPI:  32 y.o. female  With WBC count 13,000 to 18,000 on recent CBC.  Hgb 13-14 range, not anemia or polycythemic.  Plt Cnt NL range 376,000-456,000.  Neutrophils approximate 8,000 to 13,000.    She was seen 2022 for eval of increased WBC count.  She did not get her lab done and did not return.    She has hx of R ankle injury, wearing a boot to protect the area.  Due for elective R foot surgery, Dr. Rod.  She was referred back for clearance.    Smokes 1 ppd x's 15 years,  ETOH no, Allergy Vancomycin.  (Red Man Syndrome).  Worked as CNA.    Meds Nicoderm, prednisone, seroquel, zoloft.    Hx bipolar disorder, anxiety, chronic bronchitis, depression, GERD, HPV, insomnia, migraine HA, ovarian cyst and pneumonia, and gait instability after foot fracture, also hx of scapular dysfunction.  Thyroid dysorder?    M0.  Menses onset age 11.  Menses were heavy.  S/P complete hysterectomy for pre cancerous cells on cervix.  Cholecystectomy, DNC, L & R oophorectomy, appendectomy, ligament repair at R ankle 2021, removal of bone spur R  Foot 2021.    Family hx lung, breast, H/N cancers, HTN, DM, aunt ovarian cysts.      ROS:   GEN: normal without any fever, night sweats or weight loss  HEENT: normal with no HA's, sore throat, stiff neck, changes in vision  CV: normal with no CP, SOB, PND, MUÑIZ or orthopnea  PULM: See HPI  GI: normal with no abdominal pain, nausea, vomiting, constipation, diarrhea, melanotic stools, BRBPR, or hematemesis  : normal with no hematuria, dysuria  GYN See HPI  BREAST: normal with no mass, discharge, pain  SKIN: normal with no rash, erythema, bruising, or swelling     Past Medical History:   Diagnosis Date    Anxiety     Back pain     Bipolar disorder      bipolar 2    Chronic bronchitis     Depression     GERD (gastroesophageal reflux disease)     HPV (human papilloma virus) infection     Insomnia     Migraines     Ovarian cyst     Pneumonia     PONV (postoperative nausea and vomiting)      Past Surgical History:   Procedure Laterality Date    ARTHROSCOPY OF ANKLE Right 5/4/2022    Procedure: ARTHROSCOPY, ANKLE;  Surgeon: Bimal Mccormick DPM;  Location: [facility] OR;  Service: Podiatry;  Laterality: Right;  CURT EXTERNAL ANKLE DISTRACTOR    CHOLECYSTECTOMY      DILATION AND CURETTAGE OF UTERUS      HYSTERECTOMY  2017    total, ovarian cysts, torsion, Berrault; hyst per Dr JESUS Manriquez    LAPAROSCOPIC APPENDECTOMY N/A 8/18/2021    Procedure: APPENDECTOMY, LAPAROSCOPIC;  Surgeon: Osiel Ramirez MD;  Location: [facility] OR;  Service: General;  Laterality: N/A;    OOPHORECTOMY      opherectom Left 2015    salpingo-opherectomy    REPAIR OF LIGAMENT OF ANKLE Right 6/23/2021    Procedure: REPAIR OF ANTERIOR TALOFIBULAR LIGAMENT CALCANEOFIBULAR LIGAMENT;  Surgeon: Bimal Mccormick DPM;  Location: [facility] OR;  Service: Podiatry;  Laterality: Right;  WITH ARTHREX INTERNAL BRACE    SURGICAL REMOVAL OF BONE SPUR Right 6/23/2021    Procedure: EXCISION OS TRIGONUM;  Surgeon: Bimal Mccormick DPM;  Location: [facility] OR;  Service: Podiatry;  Laterality: Right;    WISDOM TOOTH EXTRACTION         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     Social History     Socioeconomic History    Marital status:     Number of children: 0   Tobacco Use    Smoking status: Every Day     Packs/day: 1.00     Years: 18.00     Pack years: 18.00     Types: Cigarettes    Smokeless tobacco: Never    Tobacco comments:     2/20/23--pt instructed no smoking 24hours before sx, pt voiced understanding.   Substance and Sexual Activity    Alcohol use: Not Currently     Comment: rare    Drug use: Not Currently     Types:  ""Crack" cocaine     Comment: twice    Sexual activity: Yes     Partners: Male     Birth control/protection: OCP, None         Current Outpatient Medications:     blood sugar diagnostic Strp, Test blood sugar 4 (four) times daily before meals and nightly., Disp: 100 each, Rfl: 0    blood-glucose meter (ACCU-CHEK GUIDE GLUCOSE METER) Misc, Use to test blood sugar, Disp: 1 each, Rfl: 0    diphenhydrAMINE (BENADRYL) 50 MG tablet, Take 50 mg by mouth every 6 (six) hours as needed for Itching., Disp: , Rfl:     HYDROcodone-acetaminophen (NORCO) 7.5-325 mg per tablet, Take 1 tablet by mouth every 6 (six) hours as needed for Pain., Disp: 20 tablet, Rfl: 0    lancets (ACCU-CHEK SOFTCLIX LANCETS) Misc, Test blood sugar 4 (four) times daily before meals and nightly., Disp: 100 each, Rfl: 1    metFORMIN (GLUCOPHAGE-XR) 500 MG ER 24hr tablet, Take 1 tablet (500 mg total) by mouth 2 (two) times daily with meals., Disp: 180 tablet, Rfl: 3    QUEtiapine (SEROQUEL) 300 MG Tab, Take 1 tablet (300 mg total) by mouth every evening., Disp: 90 tablet, Rfl: 1    semaglutide (OZEMPIC) 0.25 mg or 0.5 mg(2 mg/1.5 mL) pen injector, Inject 0.25mg subQ q7 days x 4 weeks, then increase to 0.5mg q7 days for diabetes management., Disp: 3 pen, Rfl: 3    sertraline (ZOLOFT) 100 MG tablet, Take 1 tablet (100 mg total) by mouth once daily., Disp: 90 tablet, Rfl: 1          Objective:   Vitals:  Blood pressure 125/89, temperature 97.8 °F (36.6 °C), resp. rate 18, height 5' 7" (1.702 m), last menstrual period 04/15/2017.    Physical Examination:   GEN: no apparent distress, comfortable; obese  HEAD: atraumatic and normocephalic  EYES: no pallor, no icterus  ENT: no pharyngeal erythema, external ears WNL; no nasal discharge  NECK: no masses, thyroid normal, trachea midline, no LAD/LN's, supple  CV: RRR with no murmur; normal pulse; normal S1 and S2; no pedal edema  CHEST: Normal respiratory effort; CTAB; normal breath sounds; no wheeze or " crackles  ABDOM: nontender and nondistended; soft; no rebound/guarding, L/S NP  MUSC/Skeletal: ROM normal; no crepitus; joints normal  EXTREM: no clubbing, cyanosis, inflammation or swelling  SKIN: no rashes, lesions, ulcers, petechiae  : no cvat  NEURO: grossly intact; motor/sensory WNL;  no tremors  PSYCH: normal mood, affect and behavior  LYMPH: normal cervical, supraclavicular, axillary LN's  BREASTS: no palpable mass L or R      Labs:   Lab Results   Component Value Date    WBC 13.52 (H) 03/13/2023    HGB 15.0 03/13/2023    HCT 45.9 03/13/2023    MCV 80 (L) 03/13/2023     03/13/2023    CMP  Sodium   Date Value Ref Range Status   02/08/2023 131 (L) 136 - 145 mmol/L Final     Potassium   Date Value Ref Range Status   02/08/2023 4.2 3.5 - 5.1 mmol/L Final     Chloride   Date Value Ref Range Status   02/08/2023 98 95 - 110 mmol/L Final     CO2   Date Value Ref Range Status   02/08/2023 25 23 - 29 mmol/L Final     Glucose   Date Value Ref Range Status   02/08/2023 271 (H) 70 - 110 mg/dL Final     BUN   Date Value Ref Range Status   02/08/2023 7 6 - 20 mg/dL Final     Creatinine   Date Value Ref Range Status   02/08/2023 0.6 0.5 - 1.4 mg/dL Final     Calcium   Date Value Ref Range Status   02/08/2023 8.5 (L) 8.7 - 10.5 mg/dL Final     Total Protein   Date Value Ref Range Status   02/08/2023 7.5 6.0 - 8.4 g/dL Final     Albumin   Date Value Ref Range Status   02/08/2023 3.5 3.5 - 5.2 g/dL Final     Total Bilirubin   Date Value Ref Range Status   02/08/2023 0.6 0.1 - 1.0 mg/dL Final     Comment:     For infants and newborns, interpretation of results should be based  on gestational age, weight and in agreement with clinical  observations.    Premature Infant recommended reference ranges:  Up to 24 hours.............<8.0 mg/dL  Up to 48 hours............<12.0 mg/dL  3-5 days..................<15.0 mg/dL  6-29 days.................<15.0 mg/dL       Alkaline Phosphatase   Date Value Ref Range Status   02/08/2023  121 55 - 135 U/L Final     AST   Date Value Ref Range Status   02/08/2023 34 10 - 40 U/L Final     ALT   Date Value Ref Range Status   02/08/2023 45 (H) 10 - 44 U/L Final     Anion Gap   Date Value Ref Range Status   02/08/2023 8 8 - 16 mmol/L Final     eGFR if    Date Value Ref Range Status   07/11/2022 >60.0 >60 mL/min/1.73 m^2 Final     eGFR if non    Date Value Ref Range Status   07/11/2022 >60.0 >60 mL/min/1.73 m^2 Final     Comment:     Calculation used to obtain the estimated glomerular filtration  rate (eGFR) is the CKD-EPI equation.             Assessment:   (1) 32 y.o. female with diagnosis of chronic leukocytosis.  Check for nutritional deficiency.  May be a leukemoid reaction.  May be due to smoking and/or anxiety.  Consider myeloproliferative Disorder, including CML, CMML.    Lab eval. Ordered today at [facility].  RTC 2 weeks.      (2)

## 2023-03-14 ENCOUNTER — PATIENT MESSAGE (OUTPATIENT)
Dept: PODIATRY | Facility: CLINIC | Age: 33
End: 2023-03-14
Payer: MEDICAID

## 2023-03-15 LAB
HCV AB S/CO SERPL IA: NON REACTIVE
HIV 1+2 AB+HIV1 P24 AG SERPL QL IA: NON REACTIVE

## 2023-03-16 ENCOUNTER — PATIENT MESSAGE (OUTPATIENT)
Dept: PODIATRY | Facility: CLINIC | Age: 33
End: 2023-03-16
Payer: MEDICAID

## 2023-03-16 LAB — EPO SERPL-ACNC: 23 MIU/ML (ref 2.6–18.5)

## 2023-03-17 LAB — VIT B6 SERPL-MCNC: 2.4 UG/L (ref 3.4–65.2)

## 2023-03-30 ENCOUNTER — OFFICE VISIT (OUTPATIENT)
Dept: HEMATOLOGY/ONCOLOGY | Facility: CLINIC | Age: 33
End: 2023-03-30
Payer: MEDICAID

## 2023-03-30 VITALS
DIASTOLIC BLOOD PRESSURE: 100 MMHG | BODY MASS INDEX: 45.99 KG/M2 | HEART RATE: 95 BPM | HEIGHT: 67 IN | WEIGHT: 293 LBS | SYSTOLIC BLOOD PRESSURE: 149 MMHG | TEMPERATURE: 98 F | RESPIRATION RATE: 18 BRPM

## 2023-03-30 DIAGNOSIS — E53.1 PYRIDOXINE DEFICIENCY: ICD-10-CM

## 2023-03-30 DIAGNOSIS — D72.829 LEUKOCYTOSIS, UNSPECIFIED TYPE: Primary | ICD-10-CM

## 2023-03-30 PROCEDURE — 3077F SYST BP >= 140 MM HG: CPT | Mod: CPTII,S$GLB,, | Performed by: INTERNAL MEDICINE

## 2023-03-30 PROCEDURE — 99214 OFFICE O/P EST MOD 30 MIN: CPT | Mod: S$GLB,,, | Performed by: INTERNAL MEDICINE

## 2023-03-30 PROCEDURE — 3008F PR BODY MASS INDEX (BMI) DOCUMENTED: ICD-10-PCS | Mod: CPTII,S$GLB,, | Performed by: INTERNAL MEDICINE

## 2023-03-30 PROCEDURE — 3080F PR MOST RECENT DIASTOLIC BLOOD PRESSURE >= 90 MM HG: ICD-10-PCS | Mod: CPTII,S$GLB,, | Performed by: INTERNAL MEDICINE

## 2023-03-30 PROCEDURE — 3077F PR MOST RECENT SYSTOLIC BLOOD PRESSURE >= 140 MM HG: ICD-10-PCS | Mod: CPTII,S$GLB,, | Performed by: INTERNAL MEDICINE

## 2023-03-30 PROCEDURE — 3008F BODY MASS INDEX DOCD: CPT | Mod: CPTII,S$GLB,, | Performed by: INTERNAL MEDICINE

## 2023-03-30 PROCEDURE — 3052F PR MOST RECENT HEMOGLOBIN A1C LEVEL 8.0 - < 9.0%: ICD-10-PCS | Mod: CPTII,S$GLB,, | Performed by: INTERNAL MEDICINE

## 2023-03-30 PROCEDURE — 3080F DIAST BP >= 90 MM HG: CPT | Mod: CPTII,S$GLB,, | Performed by: INTERNAL MEDICINE

## 2023-03-30 PROCEDURE — 99214 PR OFFICE/OUTPT VISIT, EST, LEVL IV, 30-39 MIN: ICD-10-PCS | Mod: S$GLB,,, | Performed by: INTERNAL MEDICINE

## 2023-03-30 PROCEDURE — 3052F HG A1C>EQUAL 8.0%<EQUAL 9.0%: CPT | Mod: CPTII,S$GLB,, | Performed by: INTERNAL MEDICINE

## 2023-03-31 ENCOUNTER — OFFICE VISIT (OUTPATIENT)
Dept: FAMILY MEDICINE | Facility: CLINIC | Age: 33
End: 2023-03-31
Payer: MEDICAID

## 2023-03-31 VITALS
WEIGHT: 293 LBS | OXYGEN SATURATION: 99 % | HEART RATE: 96 BPM | RESPIRATION RATE: 18 BRPM | HEIGHT: 67 IN | SYSTOLIC BLOOD PRESSURE: 138 MMHG | BODY MASS INDEX: 45.99 KG/M2 | DIASTOLIC BLOOD PRESSURE: 88 MMHG

## 2023-03-31 DIAGNOSIS — E11.65 UNCONTROLLED TYPE 2 DIABETES MELLITUS WITH HYPERGLYCEMIA: ICD-10-CM

## 2023-03-31 DIAGNOSIS — R11.0 NAUSEA: ICD-10-CM

## 2023-03-31 DIAGNOSIS — M25.571 RIGHT ANKLE PAIN, UNSPECIFIED CHRONICITY: ICD-10-CM

## 2023-03-31 DIAGNOSIS — Z01.818 PREOPERATIVE CLEARANCE: Primary | ICD-10-CM

## 2023-03-31 PROBLEM — N30.00 ACUTE CYSTITIS: Status: RESOLVED | Noted: 2022-08-06 | Resolved: 2023-03-31

## 2023-03-31 PROBLEM — R73.9 HYPERGLYCEMIA: Status: RESOLVED | Noted: 2020-10-20 | Resolved: 2023-03-31

## 2023-03-31 LAB — HBA1C MFR BLD: 8.8 %

## 2023-03-31 PROCEDURE — 3008F PR BODY MASS INDEX (BMI) DOCUMENTED: ICD-10-PCS | Mod: CPTII,,, | Performed by: FAMILY MEDICINE

## 2023-03-31 PROCEDURE — 99214 OFFICE O/P EST MOD 30 MIN: CPT | Performed by: FAMILY MEDICINE

## 2023-03-31 PROCEDURE — 3052F PR MOST RECENT HEMOGLOBIN A1C LEVEL 8.0 - < 9.0%: ICD-10-PCS | Mod: CPTII,,, | Performed by: FAMILY MEDICINE

## 2023-03-31 PROCEDURE — 99214 PR OFFICE/OUTPT VISIT, EST, LEVL IV, 30-39 MIN: ICD-10-PCS | Mod: 25,S$PBB,, | Performed by: FAMILY MEDICINE

## 2023-03-31 PROCEDURE — 99214 OFFICE O/P EST MOD 30 MIN: CPT | Mod: 25,S$PBB,, | Performed by: FAMILY MEDICINE

## 2023-03-31 PROCEDURE — 83036 HEMOGLOBIN GLYCOSYLATED A1C: CPT | Mod: PBBFAC | Performed by: FAMILY MEDICINE

## 2023-03-31 PROCEDURE — 3079F PR MOST RECENT DIASTOLIC BLOOD PRESSURE 80-89 MM HG: ICD-10-PCS | Mod: CPTII,,, | Performed by: FAMILY MEDICINE

## 2023-03-31 PROCEDURE — 3008F BODY MASS INDEX DOCD: CPT | Mod: CPTII,,, | Performed by: FAMILY MEDICINE

## 2023-03-31 PROCEDURE — 3052F HG A1C>EQUAL 8.0%<EQUAL 9.0%: CPT | Mod: CPTII,,, | Performed by: FAMILY MEDICINE

## 2023-03-31 PROCEDURE — 3079F DIAST BP 80-89 MM HG: CPT | Mod: CPTII,,, | Performed by: FAMILY MEDICINE

## 2023-03-31 PROCEDURE — 3075F PR MOST RECENT SYSTOLIC BLOOD PRESS GE 130-139MM HG: ICD-10-PCS | Mod: CPTII,,, | Performed by: FAMILY MEDICINE

## 2023-03-31 PROCEDURE — 1159F MED LIST DOCD IN RCRD: CPT | Mod: CPTII,,, | Performed by: FAMILY MEDICINE

## 2023-03-31 PROCEDURE — 1159F PR MEDICATION LIST DOCUMENTED IN MEDICAL RECORD: ICD-10-PCS | Mod: CPTII,,, | Performed by: FAMILY MEDICINE

## 2023-03-31 PROCEDURE — 3075F SYST BP GE 130 - 139MM HG: CPT | Mod: CPTII,,, | Performed by: FAMILY MEDICINE

## 2023-03-31 RX ORDER — HYDROCODONE BITARTRATE AND ACETAMINOPHEN 7.5; 325 MG/1; MG/1
1 TABLET ORAL EVERY 6 HOURS PRN
Qty: 20 TABLET | Refills: 0 | Status: SHIPPED | OUTPATIENT
Start: 2023-03-31 | End: 2023-04-05 | Stop reason: SDUPTHER

## 2023-03-31 RX ORDER — ONDANSETRON 4 MG/1
TABLET, FILM COATED ORAL
Qty: 30 TABLET | Refills: 1 | Status: SHIPPED | OUTPATIENT
Start: 2023-03-31 | End: 2023-07-13

## 2023-03-31 NOTE — PROGRESS NOTES
Chief Complaint: Pre-op Exam    HPI  Loretta Lea is a 32 y.o. F who comes in for preoperative evaluation for ankle surgery soon to be scheduled with Dr. Poole. Plans for general anesthesia, which patient has had in the past without issue.    The patient has non-insulin dependent Diabetes which has been uncontrolled but is actively being more aggressively managed with improving A1c (8.8% today, previously 9.7% less than two months ago). She also has had leukocytosis, for which she is being evaluated by Dr Bauer in Hematology, whom she saw yesterday. She reports being told by the specialist that she has B6 deficiency that could be causing WBC to be elevated, and that she does not have leukemia. She was started on supplementation, and was told she would be cleared for surgery. (Record not yet available.) She is a cigarette smoker. She has no known active cardiac conditions, and denies any recent chest pain, shortness of breath, or palpitations.     The patient states she can go up 1-2 flights of stairs without needing to stop and without chest pain or shortness of breath (though currently limited by R foot pain). She can also complete both light and heavy housework without adverse cardiopulmonary effects. As such, she is able to complete greater than or equal to four (4) METS.    ====  MOD25:  Reports has been using Ozempic now for a few weeks. Does get nauseated for the first 2 days after injection despite watching what she is eating.    She has also been in significant pain due to her R foot issues for which she is awaiting surgery. Though her podiatrist (Dr Mccormick) was prescribing pain medication, he is now retired and patient needs to establish with new specialist (Dr Poole, scheduled early next week) before he can take over management. She asks if I can provide a short-term prescription to bridge her to upcoming appointment.      Past Medical History:   Diagnosis Date    Anxiety     Back pain      Bipolar disorder 2004    bipolar 2    Chronic bronchitis     Depression     GERD (gastroesophageal reflux disease)     HPV (human papilloma virus) infection     Insomnia     Migraines     Ovarian cyst     Pneumonia     PONV (postoperative nausea and vomiting)      Past Surgical History:   Procedure Laterality Date    ARTHROSCOPY OF ANKLE Right 5/4/2022    Procedure: ARTHROSCOPY, ANKLE;  Surgeon: Bimal Mccormick DPM;  Location: [facility] OR;  Service: Podiatry;  Laterality: Right;  CURT EXTERNAL ANKLE DISTRACTOR    CHOLECYSTECTOMY      DILATION AND CURETTAGE OF UTERUS      HYSTERECTOMY  2017    total, ovarian cysts, torsion, [place]rault; hyst per Dr JESUS Manriquez    LAPAROSCOPIC APPENDECTOMY N/A 8/18/2021    Procedure: APPENDECTOMY, LAPAROSCOPIC;  Surgeon: Osiel Ramirez MD;  Location: [facility] OR;  Service: General;  Laterality: N/A;    OOPHORECTOMY      opherectom Left 2015    salpingo-opherectomy    REPAIR OF LIGAMENT OF ANKLE Right 6/23/2021    Procedure: REPAIR OF ANTERIOR TALOFIBULAR LIGAMENT CALCANEOFIBULAR LIGAMENT;  Surgeon: Bimal Mccormick DPM;  Location: [facility] OR;  Service: Podiatry;  Laterality: Right;  WITH ARTHREX INTERNAL BRACE    SURGICAL REMOVAL OF BONE SPUR Right 6/23/2021    Procedure: EXCISION OS TRIGONUM;  Surgeon: Bimal Mccormick DPM;  Location: [facility] OR;  Service: Podiatry;  Laterality: Right;    WISDOM TOOTH EXTRACTION         Alcohol History:  reports that she does not currently use alcohol.  Tobacco History:  reports that she has been smoking cigarettes. She has a 18.00 pack-year smoking history. She has been exposed to tobacco smoke. She has never used smokeless tobacco.    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       Current Outpatient Medications:     blood sugar diagnostic Strp, Test blood sugar 4 (four) times daily before meals and nightly., Disp: 100 each, Rfl: 0    blood-glucose meter (ACCU-CHEK  "GUIDE GLUCOSE METER) Misc, Use to test blood sugar, Disp: 1 each, Rfl: 0    diphenhydrAMINE (BENADRYL) 50 MG tablet, Take 50 mg by mouth every 6 (six) hours as needed for Itching., Disp: , Rfl:     lancets (ACCU-CHEK SOFTCLIX LANCETS) Misc, Test blood sugar 4 (four) times daily before meals and nightly., Disp: 100 each, Rfl: 1    metFORMIN (GLUCOPHAGE-XR) 500 MG ER 24hr tablet, Take 1 tablet (500 mg total) by mouth 2 (two) times daily with meals., Disp: 180 tablet, Rfl: 3    QUEtiapine (SEROQUEL) 300 MG Tab, Take 1 tablet (300 mg total) by mouth every evening., Disp: 90 tablet, Rfl: 1    semaglutide (OZEMPIC) 0.25 mg or 0.5 mg(2 mg/1.5 mL) pen injector, Inject 0.25mg subQ q7 days x 4 weeks, then increase to 0.5mg q7 days for diabetes management., Disp: 3 pen, Rfl: 3    sertraline (ZOLOFT) 100 MG tablet, Take 1 tablet (100 mg total) by mouth once daily., Disp: 90 tablet, Rfl: 1    HYDROcodone-acetaminophen (NORCO) 7.5-325 mg per tablet, Take 1 tablet by mouth every 6 (six) hours as needed for Pain., Disp: 20 tablet, Rfl: 0    ondansetron (ZOFRAN) 4 MG tablet, Take 1 PO daily prn nausea., Disp: 30 tablet, Rfl: 1    ROS:     As in HPI    Objective:      Vitals:    03/31/23 1108   BP: 138/88   Pulse: 96   Resp: 18   SpO2: 99%   Weight: (!) 147 kg (324 lb)   Height: 5' 7" (1.702 m)     Physical Exam  Vitals reviewed.   Constitutional:       Appearance: She is obese.   Cardiovascular:      Rate and Rhythm: Normal rate and regular rhythm.      Pulses: Normal pulses.      Heart sounds: Normal heart sounds. No murmur heard.  Pulmonary:      Effort: Pulmonary effort is normal.      Breath sounds: Normal breath sounds.   Musculoskeletal:      Comments: Wearing orthopedic boot on R foot/ankle.   Neurological:      General: No focal deficit present.      Mental Status: She is alert and oriented to person, place, and time.   Psychiatric:         Mood and Affect: Mood normal.         Behavior: Behavior normal.         Assessment: "       1. Preoperative clearance    2. Uncontrolled type 2 diabetes mellitus with hyperglycemia    3. Nausea    4. Right ankle pain, unspecified chronicity           Plan:       Preoperative clearance    Uncontrolled type 2 diabetes mellitus with hyperglycemia  -     Hemoglobin A1C, POCT    Nausea  -     ondansetron (ZOFRAN) 4 MG tablet; Take 1 PO daily prn nausea.  Dispense: 30 tablet; Refill: 1    Right ankle pain, unspecified chronicity  -     HYDROcodone-acetaminophen (NORCO) 7.5-325 mg per tablet; Take 1 tablet by mouth every 6 (six) hours as needed for Pain.  Dispense: 20 tablet; Refill: 0    Based on the ACC/AHA Guidelines for Perioperative Cardiovascular Evaluation for Noncardiac Surgery, the patient is medically cleared for this intermediate risk surgery pending clearance from Hematology (Dr Bauer) and with the understanding that she might experience delayed healing and higher risk for infection due to current A1c. Predisposing risk factors for pulmonary complications include smoking and obesity. She has been asked to stop Aspirin and NSAIDs one week prior to surgery.      All smokers should quit smoking eight or more weeks prior to procedure to minimize complications associated with smoking. Reduction or cessation of smoking for less than four to eight weeks before surgery is of questionable benefit, and has actually been shown in some studies to result in higher complication rates.      No follow-ups on file.        3/31/2023 Magalie Roldan M.D.

## 2023-04-02 NOTE — PROGRESS NOTES
[facility] Oncology In Office Subsequent encounter Note    3/30/23    Subjective:      Patient ID:   Loretta Lea  32 y.o. female  1990  Magalie Benjamin MD      Chief Complaint:   Increased WBC    HPI:  32 y.o. female  With WBC count 13,000 to 18,000 on recent CBC.  Hgb 13-14 range, not anemia or polycythemic.  Plt Cnt NL range 376,000-456,000.  Neutrophils approximate 8,000 to 13,000.    She was seen 2022 for eval of increased WBC count.  She did not get her lab done and did not return.    She has hx of R ankle injury, wearing a boot to protect the area.  Due for elective R foot surgery, Dr. Rod.  She was referred back for clearance.    Smokes 1 ppd x's 15 years,  ETOH no, Allergy Vancomycin.  (Red Man Syndrome).  Worked as CNA.    Meds Nicoderm, prednisone, seroquel, zoloft.    Hx bipolar disorder, anxiety, chronic bronchitis, depression, GERD, HPV, insomnia, migraine HA, ovarian cyst and pneumonia, and gait instability after foot fracture, also hx of scapular dysfunction.  Thyroid dysorder?    M0.  Menses onset age 11.  Menses were heavy.  S/P complete hysterectomy for pre cancerous cells on cervix.  Cholecystectomy, DNC, L & R oophorectomy, appendectomy, ligament repair at R ankle 2021, removal of bone spur R  Foot 2021.    Family hx lung, breast, H/N cancers, HTN, DM, aunt ovarian cysts.      ROS:   GEN: normal without any fever, night sweats or weight loss  HEENT: normal with no HA's, sore throat, stiff neck, changes in vision  CV: normal with no CP, SOB, PND, MUÑIZ or orthopnea  PULM: See HPI  GI: normal with no abdominal pain, nausea, vomiting, constipation, diarrhea, melanotic stools, BRBPR, or hematemesis  : normal with no hematuria, dysuria  GYN See HPI  BREAST: normal with no mass, discharge, pain  SKIN: normal with no rash, erythema, bruising, or swelling     Past Medical History:   Diagnosis Date    Anxiety     Back pain     Bipolar disorder      bipolar 2    Chronic bronchitis     Depression     GERD (gastroesophageal reflux disease)     HPV (human papilloma virus) infection     Insomnia     Migraines     Ovarian cyst     Pneumonia     PONV (postoperative nausea and vomiting)      Past Surgical History:   Procedure Laterality Date    ARTHROSCOPY OF ANKLE Right 5/4/2022    Procedure: ARTHROSCOPY, ANKLE;  Surgeon: Bimal Mccormick DPM;  Location: [facility] OR;  Service: Podiatry;  Laterality: Right;  CURT EXTERNAL ANKLE DISTRACTOR    CHOLECYSTECTOMY      DILATION AND CURETTAGE OF UTERUS      HYSTERECTOMY  2017    total, ovarian cysts, torsion, Berrault; hyst per Dr JESUS Manriquez    LAPAROSCOPIC APPENDECTOMY N/A 8/18/2021    Procedure: APPENDECTOMY, LAPAROSCOPIC;  Surgeon: Osiel Ramirez MD;  Location: [facility] OR;  Service: General;  Laterality: N/A;    OOPHORECTOMY      opherectom Left 2015    salpingo-opherectomy    REPAIR OF LIGAMENT OF ANKLE Right 6/23/2021    Procedure: REPAIR OF ANTERIOR TALOFIBULAR LIGAMENT CALCANEOFIBULAR LIGAMENT;  Surgeon: Bimal Mccormick DPM;  Location: [facility] OR;  Service: Podiatry;  Laterality: Right;  WITH ARTHREX INTERNAL BRACE    SURGICAL REMOVAL OF BONE SPUR Right 6/23/2021    Procedure: EXCISION OS TRIGONUM;  Surgeon: Bimal Mccormick DPM;  Location: [facility] OR;  Service: Podiatry;  Laterality: Right;    WISDOM TOOTH EXTRACTION         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     Social History     Socioeconomic History    Marital status:     Number of children: 0   Tobacco Use    Smoking status: Every Day     Packs/day: 1.00     Years: 18.00     Pack years: 18.00     Types: Cigarettes     Passive exposure: Current    Smokeless tobacco: Never    Tobacco comments:     2/20/23--pt instructed no smoking 24hours before sx, pt voiced understanding.   Substance and Sexual Activity    Alcohol use: Not Currently     Comment: rare    Drug use:  "Not Currently     Types: "Crack" cocaine     Comment: twice    Sexual activity: Yes     Partners: Male     Birth control/protection: OCP, None         Current Outpatient Medications:     blood sugar diagnostic Strp, Test blood sugar 4 (four) times daily before meals and nightly., Disp: 100 each, Rfl: 0    blood-glucose meter (ACCU-CHEK GUIDE GLUCOSE METER) Misc, Use to test blood sugar, Disp: 1 each, Rfl: 0    diphenhydrAMINE (BENADRYL) 50 MG tablet, Take 50 mg by mouth every 6 (six) hours as needed for Itching., Disp: , Rfl:     lancets (ACCU-CHEK SOFTCLIX LANCETS) Misc, Test blood sugar 4 (four) times daily before meals and nightly., Disp: 100 each, Rfl: 1    metFORMIN (GLUCOPHAGE-XR) 500 MG ER 24hr tablet, Take 1 tablet (500 mg total) by mouth 2 (two) times daily with meals., Disp: 180 tablet, Rfl: 3    QUEtiapine (SEROQUEL) 300 MG Tab, Take 1 tablet (300 mg total) by mouth every evening., Disp: 90 tablet, Rfl: 1    semaglutide (OZEMPIC) 0.25 mg or 0.5 mg(2 mg/1.5 mL) pen injector, Inject 0.25mg subQ q7 days x 4 weeks, then increase to 0.5mg q7 days for diabetes management., Disp: 3 pen, Rfl: 3    sertraline (ZOLOFT) 100 MG tablet, Take 1 tablet (100 mg total) by mouth once daily., Disp: 90 tablet, Rfl: 1    HYDROcodone-acetaminophen (NORCO) 7.5-325 mg per tablet, Take 1 tablet by mouth every 6 (six) hours as needed for Pain., Disp: 20 tablet, Rfl: 0    ondansetron (ZOFRAN) 4 MG tablet, Take 1 PO daily prn nausea., Disp: 30 tablet, Rfl: 1          Objective:   Vitals:  Blood pressure (!) 149/100, pulse 95, temperature 97.6 °F (36.4 °C), resp. rate 18, height 5' 7" (1.702 m), weight (!) 145.9 kg (321 lb 9.6 oz), last menstrual period 04/15/2017.    Physical Examination:   GEN: no apparent distress, comfortable; obese  HEAD: atraumatic and normocephalic  EYES: no pallor, no icterus  ENT: no pharyngeal erythema, external ears WNL; no nasal discharge  NECK: no masses, thyroid normal, trachea midline, no LAD/LN's, " supple  CV: RRR with no murmur; normal pulse; normal S1 and S2; no pedal edema  CHEST: Normal respiratory effort; CTAB; normal breath sounds; no wheeze or crackles  ABDOM: nontender and nondistended; soft; no rebound/guarding, L/S NP  MUSC/Skeletal: ROM normal; no crepitus; joints normal  EXTREM: no clubbing, cyanosis, inflammation or swelling  SKIN: no rashes, lesions, ulcers, petechiae  : no cvat  NEURO: grossly intact; motor/sensory WNL;  no tremors  PSYCH: normal mood, affect and behavior  LYMPH: normal cervical, supraclavicular, axillary LN's  BREASTS: no palpable mass L or R      Labs:   Lab Results   Component Value Date    WBC 13.52 (H) 03/13/2023    HGB 15.0 03/13/2023    HCT 45.9 03/13/2023    MCV 80 (L) 03/13/2023     03/13/2023    CMP  Sodium   Date Value Ref Range Status   02/08/2023 131 (L) 136 - 145 mmol/L Final     Potassium   Date Value Ref Range Status   02/08/2023 4.2 3.5 - 5.1 mmol/L Final     Chloride   Date Value Ref Range Status   02/08/2023 98 95 - 110 mmol/L Final     CO2   Date Value Ref Range Status   02/08/2023 25 23 - 29 mmol/L Final     Glucose   Date Value Ref Range Status   02/08/2023 271 (H) 70 - 110 mg/dL Final     BUN   Date Value Ref Range Status   02/08/2023 7 6 - 20 mg/dL Final     Creatinine   Date Value Ref Range Status   02/08/2023 0.6 0.5 - 1.4 mg/dL Final     Calcium   Date Value Ref Range Status   02/08/2023 8.5 (L) 8.7 - 10.5 mg/dL Final     Total Protein   Date Value Ref Range Status   02/08/2023 7.5 6.0 - 8.4 g/dL Final     Albumin   Date Value Ref Range Status   02/08/2023 3.5 3.5 - 5.2 g/dL Final     Total Bilirubin   Date Value Ref Range Status   02/08/2023 0.6 0.1 - 1.0 mg/dL Final     Comment:     For infants and newborns, interpretation of results should be based  on gestational age, weight and in agreement with clinical  observations.    Premature Infant recommended reference ranges:  Up to 24 hours.............<8.0 mg/dL  Up to 48 hours............<12.0  mg/dL  3-5 days..................<15.0 mg/dL  6-29 days.................<15.0 mg/dL       Alkaline Phosphatase   Date Value Ref Range Status   02/08/2023 121 55 - 135 U/L Final     AST   Date Value Ref Range Status   02/08/2023 34 10 - 40 U/L Final     ALT   Date Value Ref Range Status   02/08/2023 45 (H) 10 - 44 U/L Final     Anion Gap   Date Value Ref Range Status   02/08/2023 8 8 - 16 mmol/L Final     eGFR if    Date Value Ref Range Status   07/11/2022 >60.0 >60 mL/min/1.73 m^2 Final     eGFR if non    Date Value Ref Range Status   07/11/2022 >60.0 >60 mL/min/1.73 m^2 Final     Comment:     Calculation used to obtain the estimated glomerular filtration  rate (eGFR) is the CKD-EPI equation.             Assessment:   (1) 32 y.o. female with diagnosis of chronic leukocytosis.   [place] [address], improved.  Check for nutritional deficiency.  B 6 low at 2.4.  Leukemoid reaction suspected.  May be due to smoking and/or anxiety.  Less likely here is  myeloproliferative Disorder, including CML, CMML.    (2)Begin B 6 orally daily.    (3) From heme onc standpoint, pt may proceed with elective ankle surgery per Dr. Poole.    RTC CBC, B6 in 4 months.

## 2023-04-03 ENCOUNTER — OFFICE VISIT (OUTPATIENT)
Dept: PODIATRY | Facility: CLINIC | Age: 33
End: 2023-04-03
Payer: MEDICAID

## 2023-04-03 VITALS — WEIGHT: 293 LBS | BODY MASS INDEX: 45.99 KG/M2 | HEIGHT: 67 IN

## 2023-04-03 DIAGNOSIS — M25.371 ANKLE INSTABILITY, RIGHT: ICD-10-CM

## 2023-04-03 DIAGNOSIS — S93.491S SPRAIN OF ANTERIOR TALOFIBULAR LIGAMENT OF RIGHT ANKLE, SEQUELA: ICD-10-CM

## 2023-04-03 DIAGNOSIS — M25.571 CHRONIC PAIN OF RIGHT ANKLE: ICD-10-CM

## 2023-04-03 DIAGNOSIS — G89.29 CHRONIC PAIN OF RIGHT ANKLE: ICD-10-CM

## 2023-04-03 DIAGNOSIS — S86.311D RUPTURE OF PERONEAL TENDON, RIGHT, SUBSEQUENT ENCOUNTER: Primary | ICD-10-CM

## 2023-04-03 PROCEDURE — 99215 PR OFFICE/OUTPT VISIT, EST, LEVL V, 40-54 MIN: ICD-10-PCS | Mod: S$GLB,,, | Performed by: PODIATRIST

## 2023-04-03 PROCEDURE — 3008F PR BODY MASS INDEX (BMI) DOCUMENTED: ICD-10-PCS | Mod: CPTII,S$GLB,, | Performed by: PODIATRIST

## 2023-04-03 PROCEDURE — 3008F BODY MASS INDEX DOCD: CPT | Mod: CPTII,S$GLB,, | Performed by: PODIATRIST

## 2023-04-03 PROCEDURE — 3052F PR MOST RECENT HEMOGLOBIN A1C LEVEL 8.0 - < 9.0%: ICD-10-PCS | Mod: CPTII,S$GLB,, | Performed by: PODIATRIST

## 2023-04-03 PROCEDURE — 1160F RVW MEDS BY RX/DR IN RCRD: CPT | Mod: CPTII,S$GLB,, | Performed by: PODIATRIST

## 2023-04-03 PROCEDURE — 99215 OFFICE O/P EST HI 40 MIN: CPT | Mod: S$GLB,,, | Performed by: PODIATRIST

## 2023-04-03 PROCEDURE — 1159F PR MEDICATION LIST DOCUMENTED IN MEDICAL RECORD: ICD-10-PCS | Mod: CPTII,S$GLB,, | Performed by: PODIATRIST

## 2023-04-03 PROCEDURE — 1159F MED LIST DOCD IN RCRD: CPT | Mod: CPTII,S$GLB,, | Performed by: PODIATRIST

## 2023-04-03 PROCEDURE — 3052F HG A1C>EQUAL 8.0%<EQUAL 9.0%: CPT | Mod: CPTII,S$GLB,, | Performed by: PODIATRIST

## 2023-04-03 PROCEDURE — 1160F PR REVIEW ALL MEDS BY PRESCRIBER/CLIN PHARMACIST DOCUMENTED: ICD-10-PCS | Mod: CPTII,S$GLB,, | Performed by: PODIATRIST

## 2023-04-03 NOTE — H&P (VIEW-ONLY)
"  [address] [facility] [address] [place] [address]  Phone: [phone]   [phone]    Patient's PCP:Magalie Roldan MD  Referring Provider: No ref. provider found    Subjective:      Chief Complaint:: Surgical Consult    HPI  Loretta Lea is a 32 y.o. female who presents today for surgical consult.  She did see Hem/Onc on 03/30/23 and her PCP on 03/31/23 for surgery clearance which both have cleared her for the surgery.     Systemic Doctor: Magalie Roldan  Date Last Seen: 03/31/2023  Blood Sugar: 223  Hemoglobin A1c: 9.7 (02/08/23)    Vitals:    04/03/23 1554   Weight: (!) 147 kg (324 lb)   Height: 5' 7" (1.702 m)   PainSc:   7      Shoe Size:     Past Surgical History:   Procedure Laterality Date    ARTHROSCOPY OF ANKLE Right 5/4/2022    Procedure: ARTHROSCOPY, ANKLE;  Surgeon: Bimal Mccormick DPM;  Location: [facility];  Service: Podiatry;  Laterality: Right;  CURT EXTERNAL ANKLE DISTRACTOR    CHOLECYSTECTOMY      DILATION AND CURETTAGE OF UTERUS      HYSTERECTOMY  2017    total, ovarian cysts, torsion, [place]; hyst per Dr JESUS Manriquez    LAPAROSCOPIC APPENDECTOMY N/A 8/18/2021    Procedure: APPENDECTOMY, LAPAROSCOPIC;  Surgeon: Osiel Ramirez MD;  Location: [facility];  Service: General;  Laterality: N/A;    OOPHORECTOMY      opherectom Left 2015    salpingo-opherectomy    REPAIR OF LIGAMENT OF ANKLE Right 6/23/2021    Procedure: REPAIR OF ANTERIOR TALOFIBULAR LIGAMENT CALCANEOFIBULAR LIGAMENT;  Surgeon: Bimal Mccormick DPM;  Location: [facility] OR;  Service: Podiatry;  Laterality: Right;  WITH ARTHREX INTERNAL BRACE    SURGICAL REMOVAL OF BONE SPUR Right 6/23/2021    Procedure: EXCISION OS TRIGONUM;  Surgeon: Bimal Mccormick DPM;  Location: [facility] OR;  Service: Podiatry;  Laterality: Right;    WISDOM TOOTH EXTRACTION       Past Medical History:   Diagnosis Date    Anxiety     Back pain     Bipolar disorder 2004    bipolar 2    Chronic bronchitis     Depression     GERD " "(gastroesophageal reflux disease)     HPV (human papilloma virus) infection     Insomnia     Migraines     Ovarian cyst     Pneumonia     PONV (postoperative nausea and vomiting)      Family History   Adopted: Yes   Problem Relation Age of Onset    No Known Problems Sister     No Known Problems Sister     No Known Problems Sister         Social History:   Marital Status:   Alcohol History:  reports that she does not currently use alcohol.  Tobacco History:  reports that she has been smoking cigarettes. She has a 18.00 pack-year smoking history. She has been exposed to tobacco smoke. She has never used smokeless tobacco.  Drug History:  reports that she does not currently use drugs after having used the following drugs: "Crack" cocaine.    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 100 each 0    blood-glucose meter (ACCU-CHEK GUIDE GLUCOSE METER) Misc Use to test blood sugar 1 each 0    diphenhydrAMINE (BENADRYL) 50 MG tablet Take 50 mg by mouth every 6 (six) hours as needed for Itching.      HYDROcodone-acetaminophen (NORCO) 7.5-325 mg per tablet Take 1 tablet by mouth every 6 (six) hours as needed for Pain. 20 tablet 0    lancets (ACCU-CHEK SOFTCLIX LANCETS) Misc Test blood sugar 4 (four) times daily before meals and nightly. 100 each 1    metFORMIN (GLUCOPHAGE-XR) 500 MG ER 24hr tablet Take 1 tablet (500 mg total) by mouth 2 (two) times daily with meals. 180 tablet 3    ondansetron (ZOFRAN) 4 MG tablet Take 1 PO daily prn nausea. 30 tablet 1    QUEtiapine (SEROQUEL) 300 MG Tab Take 1 tablet (300 mg total) by mouth every evening. 90 tablet 1    semaglutide (OZEMPIC) 0.25 mg or 0.5 mg(2 mg/1.5 mL) pen injector Inject 0.25mg subQ q7 days x 4 weeks, then increase " to 0.5mg q7 days for diabetes management. 3 pen 3    sertraline (ZOLOFT) 100 MG tablet Take 1 tablet (100 mg total) by mouth once daily. 90 tablet 1     No current facility-administered medications for this visit.       Review of Systems   Constitutional:  Negative for chills, fatigue, fever and unexpected weight change.   HENT:  Negative for hearing loss and trouble swallowing.    Eyes:  Negative for photophobia and visual disturbance.   Respiratory:  Negative for cough, shortness of breath and wheezing.    Cardiovascular:  Negative for chest pain, palpitations and leg swelling.   Gastrointestinal:  Negative for abdominal pain and nausea.   Genitourinary:  Negative for dysuria and frequency.   Musculoskeletal:  Positive for arthralgias, gait problem, joint swelling and myalgias.   Skin:  Negative for rash and wound.   Neurological:  Negative for tremors, seizures, weakness, numbness and headaches.   Hematological:  Does not bruise/bleed easily.       Objective:        Physical Exam:   Foot Exam    General  General Appearance: appears stated age and healthy   Orientation: alert and oriented to person, place, and time   Affect: appropriate   Gait: antalgic       Right Foot/Ankle     Inspection and Palpation  Ecchymosis: none  Tenderness: ankle (lateral ankle along peroneal tendons. ATFL)  Swelling: ankle (lataeral ankle and peroneal tendon previous surgery site)  Skin Exam: skin intact;   Neurovascular  Dorsalis pedis: 2+  Posterior tibial: 2+  Capillary Refill: 2+  Saphenous nerve sensation: normal  Tibial nerve sensation: normal  Superficial peroneal nerve sensation: normal  Deep peroneal nerve sensation: normal  Sural nerve sensation: normal    Muscle Strength  Ankle dorsiflexion: 5  Ankle plantar flexion: 5  Ankle inversion: 5  Ankle eversion: 5  Great toe extension: 5  Great toe flexion: 5    Range of Motion    Passive  Ankle eversion: pain  Ankle inversion: pain    Active  Ankle eversion: pain  Ankle inversion:  pain    Tests  Anterior drawer: (pain)  Talar tilt: (pain)      Physical Exam  Cardiovascular:      Pulses:           Dorsalis pedis pulses are 2+ on the right side.        Posterior tibial pulses are 2+ on the right side.   Musculoskeletal:      Right ankle: Swelling present. Tenderness present.                 Muscle Strength   Right Lower Extremity   Ankle Dorsiflexion:  5   Plantar flexion:  5/5    Vascular Exam     Right Pulses  Dorsalis Pedis:      2+  Posterior Tibial:      2+         Imaging: MRI right ankle without contrast     CLINICAL DATA: Trauma, right ankle pain.     FINDINGS: Multiplanar noncontrast imaging was performed. Comparison is made to radiographs from February 2, 2023.     Exam is significantly limited by early extensive anterior, lateral, and posterior metallic susceptibility artifact. Of particular note, lateral ligamentous structures are poorly assessed. Increased signal within the anterior tibiofibular ligament suggests sprain/partial tear. All other lateral ligaments are poorly visualized. The deltoid ligament complex is within normal limits.     Whole included tendons appear intact. There is a small plantar calcaneal spur. There is slight thickening of the plantar fascial at its calcaneal attachment.     There is marrow edema in the talus, however this is likely mostly postoperative in nature, with orthopedic anchor from lateral approach noted. Best appreciated on sagittal images, there is a small osteochondral defect involving the lateral talar dome. No displaced bone fragments are identified, however this would be difficult to completely exclude given the degree of metallic susceptibility artifact.     Alignment is normal. No pathologic joint fluid accumulation is identified.     The navicular, cuboid, cuneiforms, and included portions of the metatarsals are normal in appearance. The Lisfranc ligament is visualized and appears intact.     IMPRESSION:  1. Limited examination secondary  to extensive metallic susceptibility artifact.  2. Small osteochondral defect at the lateral talar dome. No displaced fragments are identified, however sensitivity is limited in that regard by metallic artifact.  3. Talar marrow edema, felt to be primarily related to postoperative change with orthopedic anchor in place.  4. Poor visualization of the lateral ligament. There is evidence of sprain/partial thickness tear of the anterior tibiofibular ligament.     Electronically signed by:  Bart Mayers MD  2/8/2023 1:53 PM [facility] Workstation: [id number]           Assessment:       1. Rupture of peroneal tendon, right, subsequent encounter    2. Sprain of anterior talofibular ligament of right ankle, sequela    3. Chronic pain of right ankle    4. Ankle instability, right      Plan:   Rupture of peroneal tendon, right, subsequent encounter    Sprain of anterior talofibular ligament of right ankle, sequela    Chronic pain of right ankle    Ankle instability, right      Follow up for pt will be scheduled for outpatient surgery.    Procedures        First with the patient that given the clinical and imaging findings of the right ankle it appears that she still has symptomatic damage to the peroneal tendons as well as the anterior talofibular ligament.  We discussed different ongoing treatment options.  At this time patient wishes to proceed with surgical intervention.  I did discuss at length with the patient that this will be the 3rd operation performed on this right ankle and that this significantly increases the surgical risks including continued or worsening pain.    Patient was educated about the entire pre, herve and post-operative time period.  They  were educated about the pro's and con's of surgery.  All conservative measures have been exhausted. Patient understands the particular risks involved which may occur in connection with the procedure proposed including pain, swelling, infection, stiffness, decreased  ROM, recurrence, rejection, numbness, delayed healing, scar formation.    Patient was educated that their diagnosis may be surgically treated.  The patient's problem will probably advance and usually will not get better without surgery.  All of the pre-operative treatment plans have been exhausted or will no longer be successful at this point in time.  Patient was told of the possible outcomes and expectations of the surgical procedure.  They  will need to be followed-up post-operatively.  Today, pictures were drawn, questions answered.      We discussed the following surgical procedures:  1.  Repair of peroneal tendons right ankle 2.  Repair of anterior talofibular ligament right ankle       Patient's previous imaging records and operative reports were reviewed extensively both before and after her visit.    I spent a total of 45 minutes on the day of the visit.This includes face to face time and non-face to face time preparing to see the patient (eg, review of tests), obtaining and/or reviewing separately obtained history, documenting clinical information in the electronic or other health record, independently interpreting results and communicating results to the patient/family/caregiver, or care coordinator.           Counseling:     I provided patient education verbally regarding:   Patient diagnosis, treatment options, as well as alternatives, risks, and benefits.     Treatment of tendonitis with rest, ice, oral NSAID, topical antiinflammatory creams, cam walker boot if needed, and MRI if needed.    I discussed ankle sprain with pt and the different grades/severity of sprain and different ligaments that can be torn.  I also discussed that most ankle sprains are treated conservatively and the pt does well.  Occasionally some sprains do not heal normally and there is insatbility of the joint leading to pain and possible arthritis.  these are usually evaluated by MRI, stress test.      This note was created using  Dragon voice recognition software that occasionally misinterpreted phrases or words.

## 2023-04-03 NOTE — PROGRESS NOTES
"  [address] [facility] [address] [place] [address]  Phone: [phone]   [phone]    Patient's PCP:Magalie Roldan MD  Referring Provider: No ref. provider found    Subjective:      Chief Complaint:: Surgical Consult    HPI  Loretta Lea is a 32 y.o. female who presents today for surgical consult.  She did see Hem/Onc on 03/30/23 and her PCP on 03/31/23 for surgery clearance which both have cleared her for the surgery.     Systemic Doctor: Magalie Roldan  Date Last Seen: 03/31/2023  Blood Sugar: 223  Hemoglobin A1c: 9.7 (02/08/23)    Vitals:    04/03/23 1554   Weight: (!) 147 kg (324 lb)   Height: 5' 7" (1.702 m)   PainSc:   7      Shoe Size:     Past Surgical History:   Procedure Laterality Date    ARTHROSCOPY OF ANKLE Right 5/4/2022    Procedure: ARTHROSCOPY, ANKLE;  Surgeon: Bimal Mccormick DPM;  Location: [facility];  Service: Podiatry;  Laterality: Right;  CURT EXTERNAL ANKLE DISTRACTOR    CHOLECYSTECTOMY      DILATION AND CURETTAGE OF UTERUS      HYSTERECTOMY  2017    total, ovarian cysts, torsion, [place]; hyst per Dr JESUS Manriquez    LAPAROSCOPIC APPENDECTOMY N/A 8/18/2021    Procedure: APPENDECTOMY, LAPAROSCOPIC;  Surgeon: Osiel Ramirez MD;  Location: [facility];  Service: General;  Laterality: N/A;    OOPHORECTOMY      opherectom Left 2015    salpingo-opherectomy    REPAIR OF LIGAMENT OF ANKLE Right 6/23/2021    Procedure: REPAIR OF ANTERIOR TALOFIBULAR LIGAMENT CALCANEOFIBULAR LIGAMENT;  Surgeon: Bimal Mccormick DPM;  Location: [facility] OR;  Service: Podiatry;  Laterality: Right;  WITH ARTHREX INTERNAL BRACE    SURGICAL REMOVAL OF BONE SPUR Right 6/23/2021    Procedure: EXCISION OS TRIGONUM;  Surgeon: Bimal Mccormick DPM;  Location: [facility] OR;  Service: Podiatry;  Laterality: Right;    WISDOM TOOTH EXTRACTION       Past Medical History:   Diagnosis Date    Anxiety     Back pain     Bipolar disorder 2004    bipolar 2    Chronic bronchitis     Depression     GERD " "(gastroesophageal reflux disease)     HPV (human papilloma virus) infection     Insomnia     Migraines     Ovarian cyst     Pneumonia     PONV (postoperative nausea and vomiting)      Family History   Adopted: Yes   Problem Relation Age of Onset    No Known Problems Sister     No Known Problems Sister     No Known Problems Sister         Social History:   Marital Status:   Alcohol History:  reports that she does not currently use alcohol.  Tobacco History:  reports that she has been smoking cigarettes. She has a 18.00 pack-year smoking history. She has been exposed to tobacco smoke. She has never used smokeless tobacco.  Drug History:  reports that she does not currently use drugs after having used the following drugs: "Crack" cocaine.    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 100 each 0    blood-glucose meter (ACCU-CHEK GUIDE GLUCOSE METER) Misc Use to test blood sugar 1 each 0    diphenhydrAMINE (BENADRYL) 50 MG tablet Take 50 mg by mouth every 6 (six) hours as needed for Itching.      HYDROcodone-acetaminophen (NORCO) 7.5-325 mg per tablet Take 1 tablet by mouth every 6 (six) hours as needed for Pain. 20 tablet 0    lancets (ACCU-CHEK SOFTCLIX LANCETS) Misc Test blood sugar 4 (four) times daily before meals and nightly. 100 each 1    metFORMIN (GLUCOPHAGE-XR) 500 MG ER 24hr tablet Take 1 tablet (500 mg total) by mouth 2 (two) times daily with meals. 180 tablet 3    ondansetron (ZOFRAN) 4 MG tablet Take 1 PO daily prn nausea. 30 tablet 1    QUEtiapine (SEROQUEL) 300 MG Tab Take 1 tablet (300 mg total) by mouth every evening. 90 tablet 1    semaglutide (OZEMPIC) 0.25 mg or 0.5 mg(2 mg/1.5 mL) pen injector Inject 0.25mg subQ q7 days x 4 weeks, then increase " to 0.5mg q7 days for diabetes management. 3 pen 3    sertraline (ZOLOFT) 100 MG tablet Take 1 tablet (100 mg total) by mouth once daily. 90 tablet 1     No current facility-administered medications for this visit.       Review of Systems   Constitutional:  Negative for chills, fatigue, fever and unexpected weight change.   HENT:  Negative for hearing loss and trouble swallowing.    Eyes:  Negative for photophobia and visual disturbance.   Respiratory:  Negative for cough, shortness of breath and wheezing.    Cardiovascular:  Negative for chest pain, palpitations and leg swelling.   Gastrointestinal:  Negative for abdominal pain and nausea.   Genitourinary:  Negative for dysuria and frequency.   Musculoskeletal:  Positive for arthralgias, gait problem, joint swelling and myalgias.   Skin:  Negative for rash and wound.   Neurological:  Negative for tremors, seizures, weakness, numbness and headaches.   Hematological:  Does not bruise/bleed easily.       Objective:        Physical Exam:   Foot Exam    General  General Appearance: appears stated age and healthy   Orientation: alert and oriented to person, place, and time   Affect: appropriate   Gait: antalgic       Right Foot/Ankle     Inspection and Palpation  Ecchymosis: none  Tenderness: ankle (lateral ankle along peroneal tendons. ATFL)  Swelling: ankle (lataeral ankle and peroneal tendon previous surgery site)  Skin Exam: skin intact;   Neurovascular  Dorsalis pedis: 2+  Posterior tibial: 2+  Capillary Refill: 2+  Saphenous nerve sensation: normal  Tibial nerve sensation: normal  Superficial peroneal nerve sensation: normal  Deep peroneal nerve sensation: normal  Sural nerve sensation: normal    Muscle Strength  Ankle dorsiflexion: 5  Ankle plantar flexion: 5  Ankle inversion: 5  Ankle eversion: 5  Great toe extension: 5  Great toe flexion: 5    Range of Motion    Passive  Ankle eversion: pain  Ankle inversion: pain    Active  Ankle eversion: pain  Ankle inversion:  pain    Tests  Anterior drawer: (pain)  Talar tilt: (pain)      Physical Exam  Cardiovascular:      Pulses:           Dorsalis pedis pulses are 2+ on the right side.        Posterior tibial pulses are 2+ on the right side.   Musculoskeletal:      Right ankle: Swelling present. Tenderness present.                 Muscle Strength   Right Lower Extremity   Ankle Dorsiflexion:  5   Plantar flexion:  5/5    Vascular Exam     Right Pulses  Dorsalis Pedis:      2+  Posterior Tibial:      2+         Imaging: MRI right ankle without contrast     CLINICAL DATA: Trauma, right ankle pain.     FINDINGS: Multiplanar noncontrast imaging was performed. Comparison is made to radiographs from February 2, 2023.     Exam is significantly limited by early extensive anterior, lateral, and posterior metallic susceptibility artifact. Of particular note, lateral ligamentous structures are poorly assessed. Increased signal within the anterior tibiofibular ligament suggests sprain/partial tear. All other lateral ligaments are poorly visualized. The deltoid ligament complex is within normal limits.     Whole included tendons appear intact. There is a small plantar calcaneal spur. There is slight thickening of the plantar fascial at its calcaneal attachment.     There is marrow edema in the talus, however this is likely mostly postoperative in nature, with orthopedic anchor from lateral approach noted. Best appreciated on sagittal images, there is a small osteochondral defect involving the lateral talar dome. No displaced bone fragments are identified, however this would be difficult to completely exclude given the degree of metallic susceptibility artifact.     Alignment is normal. No pathologic joint fluid accumulation is identified.     The navicular, cuboid, cuneiforms, and included portions of the metatarsals are normal in appearance. The Lisfranc ligament is visualized and appears intact.     IMPRESSION:  1. Limited examination secondary  to extensive metallic susceptibility artifact.  2. Small osteochondral defect at the lateral talar dome. No displaced fragments are identified, however sensitivity is limited in that regard by metallic artifact.  3. Talar marrow edema, felt to be primarily related to postoperative change with orthopedic anchor in place.  4. Poor visualization of the lateral ligament. There is evidence of sprain/partial thickness tear of the anterior tibiofibular ligament.     Electronically signed by:  Bart Mayers MD  2/8/2023 1:53 PM [facility] Workstation: [id number]           Assessment:       1. Rupture of peroneal tendon, right, subsequent encounter    2. Sprain of anterior talofibular ligament of right ankle, sequela    3. Chronic pain of right ankle    4. Ankle instability, right      Plan:   Rupture of peroneal tendon, right, subsequent encounter    Sprain of anterior talofibular ligament of right ankle, sequela    Chronic pain of right ankle    Ankle instability, right      Follow up for pt will be scheduled for outpatient surgery.    Procedures        First with the patient that given the clinical and imaging findings of the right ankle it appears that she still has symptomatic damage to the peroneal tendons as well as the anterior talofibular ligament.  We discussed different ongoing treatment options.  At this time patient wishes to proceed with surgical intervention.  I did discuss at length with the patient that this will be the 3rd operation performed on this right ankle and that this significantly increases the surgical risks including continued or worsening pain.    Patient was educated about the entire pre, herve and post-operative time period.  They  were educated about the pro's and con's of surgery.  All conservative measures have been exhausted. Patient understands the particular risks involved which may occur in connection with the procedure proposed including pain, swelling, infection, stiffness, decreased  ROM, recurrence, rejection, numbness, delayed healing, scar formation.    Patient was educated that their diagnosis may be surgically treated.  The patient's problem will probably advance and usually will not get better without surgery.  All of the pre-operative treatment plans have been exhausted or will no longer be successful at this point in time.  Patient was told of the possible outcomes and expectations of the surgical procedure.  They  will need to be followed-up post-operatively.  Today, pictures were drawn, questions answered.      We discussed the following surgical procedures:  1.  Repair of peroneal tendons right ankle 2.  Repair of anterior talofibular ligament right ankle       Patient's previous imaging records and operative reports were reviewed extensively both before and after her visit.    I spent a total of 45 minutes on the day of the visit.This includes face to face time and non-face to face time preparing to see the patient (eg, review of tests), obtaining and/or reviewing separately obtained history, documenting clinical information in the electronic or other health record, independently interpreting results and communicating results to the patient/family/caregiver, or care coordinator.           Counseling:     I provided patient education verbally regarding:   Patient diagnosis, treatment options, as well as alternatives, risks, and benefits.     Treatment of tendonitis with rest, ice, oral NSAID, topical antiinflammatory creams, cam walker boot if needed, and MRI if needed.    I discussed ankle sprain with pt and the different grades/severity of sprain and different ligaments that can be torn.  I also discussed that most ankle sprains are treated conservatively and the pt does well.  Occasionally some sprains do not heal normally and there is insatbility of the joint leading to pain and possible arthritis.  these are usually evaluated by MRI, stress test.      This note was created using  Dragon voice recognition software that occasionally misinterpreted phrases or words.

## 2023-04-05 ENCOUNTER — PATIENT MESSAGE (OUTPATIENT)
Dept: PODIATRY | Facility: CLINIC | Age: 33
End: 2023-04-05
Payer: MEDICAID

## 2023-04-05 DIAGNOSIS — M25.571 RIGHT ANKLE PAIN, UNSPECIFIED CHRONICITY: ICD-10-CM

## 2023-04-05 RX ORDER — HYDROCODONE BITARTRATE AND ACETAMINOPHEN 7.5; 325 MG/1; MG/1
1 TABLET ORAL EVERY 6 HOURS PRN
Qty: 20 TABLET | Refills: 0 | Status: SHIPPED | OUTPATIENT
Start: 2023-04-05 | End: 2023-04-10

## 2023-04-12 ENCOUNTER — PATIENT MESSAGE (OUTPATIENT)
Dept: PODIATRY | Facility: CLINIC | Age: 33
End: 2023-04-12
Payer: MEDICAID

## 2023-04-12 DIAGNOSIS — M25.571 CHRONIC PAIN OF RIGHT ANKLE: Primary | ICD-10-CM

## 2023-04-12 DIAGNOSIS — G89.29 CHRONIC PAIN OF RIGHT ANKLE: Primary | ICD-10-CM

## 2023-04-13 DIAGNOSIS — E11.65 UNCONTROLLED TYPE 2 DIABETES MELLITUS WITH HYPERGLYCEMIA: ICD-10-CM

## 2023-04-13 RX ORDER — LANCETS
1 EACH MISCELLANEOUS
Qty: 200 EACH | Refills: 5 | Status: SHIPPED | OUTPATIENT
Start: 2023-04-13

## 2023-04-13 RX ORDER — SEMAGLUTIDE 1.34 MG/ML
INJECTION, SOLUTION SUBCUTANEOUS
Qty: 3 EACH | Refills: 3 | Status: SHIPPED | OUTPATIENT
Start: 2023-04-13 | End: 2023-04-17 | Stop reason: CLARIF

## 2023-04-13 NOTE — TELEPHONE ENCOUNTER
Patient called stated that pharmacy told her she needed a new rx for her Ozempic and accuchek lancets and strips.

## 2023-04-14 RX ORDER — HYDROCODONE BITARTRATE AND ACETAMINOPHEN 5; 325 MG/1; MG/1
1 TABLET ORAL EVERY 6 HOURS PRN
Qty: 28 TABLET | Refills: 0 | Status: SHIPPED | OUTPATIENT
Start: 2023-04-14 | End: 2023-05-03

## 2023-04-16 ENCOUNTER — PATIENT MESSAGE (OUTPATIENT)
Dept: PODIATRY | Facility: CLINIC | Age: 33
End: 2023-04-16
Payer: MEDICAID

## 2023-04-17 ENCOUNTER — PATIENT MESSAGE (OUTPATIENT)
Dept: PODIATRY | Facility: CLINIC | Age: 33
End: 2023-04-17
Payer: MEDICAID

## 2023-04-17 NOTE — TELEPHONE ENCOUNTER
Received rx from [facility] stating that they need new rx for Ozempic due to formulary change as of March 2023. Ozempic is now in 3mls.

## 2023-04-20 ENCOUNTER — PATIENT MESSAGE (OUTPATIENT)
Dept: ADMINISTRATIVE | Facility: OTHER | Age: 33
End: 2023-04-20
Payer: MEDICAID

## 2023-04-20 DIAGNOSIS — M25.571 CHRONIC PAIN OF RIGHT ANKLE: ICD-10-CM

## 2023-04-20 DIAGNOSIS — G89.29 CHRONIC PAIN OF RIGHT ANKLE: ICD-10-CM

## 2023-04-20 DIAGNOSIS — S86.311D RUPTURE OF PERONEAL TENDON, RIGHT, SUBSEQUENT ENCOUNTER: Primary | ICD-10-CM

## 2023-04-20 DIAGNOSIS — S93.491S SPRAIN OF ANTERIOR TALOFIBULAR LIGAMENT OF RIGHT ANKLE, SEQUELA: ICD-10-CM

## 2023-04-25 ENCOUNTER — HOSPITAL ENCOUNTER (OUTPATIENT)
Dept: PREADMISSION TESTING | Facility: HOSPITAL | Age: 33
Discharge: HOME | End: 2023-04-25
Attending: PODIATRIST
Payer: MEDICAID

## 2023-04-25 VITALS
WEIGHT: 293 LBS | HEART RATE: 102 BPM | BODY MASS INDEX: 45.99 KG/M2 | OXYGEN SATURATION: 95 % | SYSTOLIC BLOOD PRESSURE: 122 MMHG | RESPIRATION RATE: 20 BRPM | TEMPERATURE: 99 F | DIASTOLIC BLOOD PRESSURE: 90 MMHG | HEIGHT: 67 IN

## 2023-04-25 DIAGNOSIS — Z01.818 PREOP TESTING: Primary | ICD-10-CM

## 2023-04-25 DIAGNOSIS — M25.571 CHRONIC PAIN OF RIGHT ANKLE: ICD-10-CM

## 2023-04-25 DIAGNOSIS — G89.29 CHRONIC PAIN OF RIGHT ANKLE: ICD-10-CM

## 2023-04-25 DIAGNOSIS — S86.311D RUPTURE OF PERONEAL TENDON, RIGHT, SUBSEQUENT ENCOUNTER: ICD-10-CM

## 2023-04-25 DIAGNOSIS — S93.491S SPRAIN OF ANTERIOR TALOFIBULAR LIGAMENT OF RIGHT ANKLE, SEQUELA: ICD-10-CM

## 2023-04-25 RX ORDER — PYRIDOXINE HCL (VITAMIN B6) 25 MG
25 TABLET ORAL DAILY
COMMUNITY

## 2023-04-25 RX ORDER — NAPROXEN 500 MG/1
500 TABLET ORAL 2 TIMES DAILY
COMMUNITY
Start: 2023-04-13 | End: 2023-12-22 | Stop reason: SDUPTHER

## 2023-04-25 NOTE — DISCHARGE INSTRUCTIONS
To confirm, Your doctor has instructed you that surgery is scheduled for: Thursday 4/27/23    Pre-Op will call the afternoon prior to surgery between 4:00 and 6:00 PM with the final arrival time.  Wednesday 4/26/23    Please report to Registration at front of the hospital --it is best to park in the garage on [facility]    Do not eat or drink anything after midnight the night before your surgery - THIS INCLUDES  WATER, GUM, MINTS AND CANDY.    YOU MAY BRUSH YOUR TEETH     TAKE APPROVED  MEDICATIONS WITH A SMALL SIP OF WATER THE MORNING OF YOUR PROCEDURE: See Medication list    ONLY if you are diabetic, check your sugar in the morning before your procedure.       Do not take any diabetic medicines or insulin the morning of surgery .     PLEASE NOTE:  The surgery schedule has many variables which may affect the time of your surgery case.  Family members should be available if your surgery time changes.  Plan to be here the day of your procedure between 4-6 hours.    DO NOT TAKE THESE MEDICATIONS 5-7 DAYS PRIOR to your procedure or per your surgeon's request: ASPIRIN, ALEVE, ADVIL, IBUPROFEN,  MOLLY SELTZER, BC , FISH OIL , VITAMIN E, HERBALS  (May take Tylenol)    ONLY if you are prescribed any types of blood thinners such as:  Aspirin, Coumadin, Plavix, Pradaxa, Xarelto, Aggrenox, Effient, Eliquis, Savasya, Brilinta, or any other, ask your surgeon whether you should stop taking them and how long before surgery you should stop.  You may also need to verify with the prescribing physician if it is ok to stop your medication.                                                     IMPORTANT INSTRUCTIONS    Do not smoke, vape or drink alcoholic beverages 24 hours prior to your procedure.  Shower the night before AND the morning of your procedure with a Chlorhexidine wash such as Hibiclens or Dial antibacterial soap from the neck down.    Do not get it on your face or in your eyes.  You may use your own shampoo and face wash.  This helps your skin to be as bacteria free as possible.   Do not apply any deodorant, lotion or powder after you shower.   DO NOT remove hair from the surgery site.  Do not shave the incision site unless you are given specific instructions to do so.    Sleep in a bed with clean sheets.  Do not sleep with a pet in the bed.   If you wear contact lenses, dentures, hearing aids or glasses, bring a container to put them in during surgery and give to a family member for safe keeping.    Please leave all jewelry, piercing's and valuables at home.   Wear comfortable clothing that is easy to remove and place back on after surgery.     Make arrangements in advance for transportation home by a responsible adult.    You must make arrangements for transportation, TAXI'S, UBER'S OR LYFTS ARE NOT ALLOWED.      If you have any questions about these instructions, call Pre-Op Admit  Nursing at [phone] , Pre-Op Day Surgery Unit at [phone]

## 2023-04-27 ENCOUNTER — ANESTHESIA EVENT (OUTPATIENT)
Dept: SURGERY | Facility: HOSPITAL | Age: 33
End: 2023-04-27
Payer: MEDICAID

## 2023-04-27 ENCOUNTER — HOSPITAL ENCOUNTER (OUTPATIENT)
Facility: HOSPITAL | Age: 33
Discharge: HOME | End: 2023-04-27
Attending: PODIATRIST | Admitting: PODIATRIST
Payer: MEDICAID

## 2023-04-27 ENCOUNTER — ANESTHESIA (OUTPATIENT)
Dept: SURGERY | Facility: HOSPITAL | Age: 33
End: 2023-04-27
Payer: MEDICAID

## 2023-04-27 VITALS
SYSTOLIC BLOOD PRESSURE: 134 MMHG | OXYGEN SATURATION: 96 % | DIASTOLIC BLOOD PRESSURE: 86 MMHG | TEMPERATURE: 99 F | RESPIRATION RATE: 16 BRPM | WEIGHT: 293 LBS | HEIGHT: 67 IN | HEART RATE: 95 BPM | BODY MASS INDEX: 45.99 KG/M2

## 2023-04-27 DIAGNOSIS — S86.311D RUPTURE OF PERONEAL TENDON, RIGHT, SUBSEQUENT ENCOUNTER: ICD-10-CM

## 2023-04-27 DIAGNOSIS — S93.491S SPRAIN OF ANTERIOR TALOFIBULAR LIGAMENT OF RIGHT ANKLE, SEQUELA: ICD-10-CM

## 2023-04-27 DIAGNOSIS — G89.29 CHRONIC PAIN OF RIGHT ANKLE: ICD-10-CM

## 2023-04-27 DIAGNOSIS — M25.571 CHRONIC PAIN OF RIGHT ANKLE: ICD-10-CM

## 2023-04-27 PROBLEM — S93.491A SPRAIN OF ANTERIOR TALOFIBULAR LIGAMENT OF RIGHT ANKLE: Status: RESOLVED | Noted: 2023-04-27 | Resolved: 2023-04-27

## 2023-04-27 PROBLEM — S93.491A SPRAIN OF ANTERIOR TALOFIBULAR LIGAMENT OF RIGHT ANKLE: Status: ACTIVE | Noted: 2023-04-27

## 2023-04-27 LAB
GLUCOSE SERPL-MCNC: 144 MG/DL (ref 70–110)
GLUCOSE SERPL-MCNC: 173 MG/DL (ref 70–110)

## 2023-04-27 PROCEDURE — 71000033 HC RECOVERY, INTIAL HOUR: Performed by: PODIATRIST

## 2023-04-27 PROCEDURE — 36000708 HC OR TIME LEV III 1ST 15 MIN: Performed by: PODIATRIST

## 2023-04-27 PROCEDURE — 27658 REPAIR OF LEG TENDON EACH: CPT | Mod: 51,RT,, | Performed by: PODIATRIST

## 2023-04-27 PROCEDURE — 25000003 PHARM REV CODE 250: Performed by: PODIATRIST

## 2023-04-27 PROCEDURE — 01470 ANES PX NRV MSC LW L/A/F NOS: CPT | Performed by: PODIATRIST

## 2023-04-27 PROCEDURE — 63600175 PHARM REV CODE 636 W HCPCS: Performed by: NURSE ANESTHETIST, CERTIFIED REGISTERED

## 2023-04-27 PROCEDURE — D9220A PRA ANESTHESIA: ICD-10-PCS | Mod: ANES,,, | Performed by: STUDENT IN AN ORGANIZED HEALTH CARE EDUCATION/TRAINING PROGRAM

## 2023-04-27 PROCEDURE — 27658 PR REPAIR FLEX LEG TENDON,PRIM,EA: ICD-10-PCS | Mod: 51,RT,, | Performed by: PODIATRIST

## 2023-04-27 PROCEDURE — C9290 INJ, BUPIVACAINE LIPOSOME: HCPCS | Performed by: PODIATRIST

## 2023-04-27 PROCEDURE — D9220A PRA ANESTHESIA: Mod: CRNA,,, | Performed by: NURSE ANESTHETIST, CERTIFIED REGISTERED

## 2023-04-27 PROCEDURE — 27000080 OPTIME MED/SURG SUP & DEVICES GENERAL CLASSIFICATION: Performed by: PODIATRIST

## 2023-04-27 PROCEDURE — 37000009 HC ANESTHESIA EA ADD 15 MINS: Performed by: PODIATRIST

## 2023-04-27 PROCEDURE — 25000003 PHARM REV CODE 250: Performed by: STUDENT IN AN ORGANIZED HEALTH CARE EDUCATION/TRAINING PROGRAM

## 2023-04-27 PROCEDURE — 71000039 HC RECOVERY, EACH ADD'L HOUR: Performed by: PODIATRIST

## 2023-04-27 PROCEDURE — 36000709 HC OR TIME LEV III EA ADD 15 MIN: Performed by: PODIATRIST

## 2023-04-27 PROCEDURE — 27698 REPAIR OF ANKLE LIGAMENT: CPT | Mod: RT,,, | Performed by: PODIATRIST

## 2023-04-27 PROCEDURE — D9220A PRA ANESTHESIA: ICD-10-PCS | Mod: CRNA,,, | Performed by: NURSE ANESTHETIST, CERTIFIED REGISTERED

## 2023-04-27 PROCEDURE — 27201423 OPTIME MED/SURG SUP & DEVICES STERILE SUPPLY: Performed by: PODIATRIST

## 2023-04-27 PROCEDURE — 63600175 PHARM REV CODE 636 W HCPCS: Performed by: PODIATRIST

## 2023-04-27 PROCEDURE — 25000003 PHARM REV CODE 250: Performed by: NURSE ANESTHETIST, CERTIFIED REGISTERED

## 2023-04-27 PROCEDURE — 71000015 HC POSTOP RECOV 1ST HR: Performed by: PODIATRIST

## 2023-04-27 PROCEDURE — 37000008 HC ANESTHESIA 1ST 15 MINUTES: Performed by: PODIATRIST

## 2023-04-27 PROCEDURE — 63600175 PHARM REV CODE 636 W HCPCS: Performed by: STUDENT IN AN ORGANIZED HEALTH CARE EDUCATION/TRAINING PROGRAM

## 2023-04-27 PROCEDURE — 71000016 HC POSTOP RECOV ADDL HR: Performed by: PODIATRIST

## 2023-04-27 PROCEDURE — 63600175 PHARM REV CODE 636 W HCPCS: Performed by: ANESTHESIOLOGY

## 2023-04-27 PROCEDURE — 25000003 PHARM REV CODE 250: Performed by: ANESTHESIOLOGY

## 2023-04-27 PROCEDURE — 27698 PR REPAIR COLLAT ANKLE LIGMNT,SECONDARY: ICD-10-PCS | Mod: RT,,, | Performed by: PODIATRIST

## 2023-04-27 PROCEDURE — D9220A PRA ANESTHESIA: Mod: ANES,,, | Performed by: STUDENT IN AN ORGANIZED HEALTH CARE EDUCATION/TRAINING PROGRAM

## 2023-04-27 RX ORDER — HYDROMORPHONE HYDROCHLORIDE 1 MG/ML
0.2 INJECTION, SOLUTION INTRAMUSCULAR; INTRAVENOUS; SUBCUTANEOUS EVERY 5 MIN PRN
Status: COMPLETED | OUTPATIENT
Start: 2023-04-27 | End: 2023-04-27

## 2023-04-27 RX ORDER — PROMETHAZINE HYDROCHLORIDE 25 MG/1
25 TABLET ORAL EVERY 6 HOURS PRN
Status: DISCONTINUED | OUTPATIENT
Start: 2023-04-27 | End: 2023-04-27 | Stop reason: HOSPADM

## 2023-04-27 RX ORDER — HYDROCODONE BITARTRATE AND ACETAMINOPHEN 10; 325 MG/1; MG/1
1 TABLET ORAL EVERY 6 HOURS PRN
Qty: 30 TABLET | Refills: 0 | Status: SHIPPED | OUTPATIENT
Start: 2023-04-27 | End: 2023-05-03

## 2023-04-27 RX ORDER — SODIUM CHLORIDE 0.9 G/100ML
IRRIGANT IRRIGATION
Status: DISCONTINUED | OUTPATIENT
Start: 2023-04-27 | End: 2023-04-27 | Stop reason: HOSPADM

## 2023-04-27 RX ORDER — PROPOFOL 10 MG/ML
VIAL (ML) INTRAVENOUS
Status: DISCONTINUED | OUTPATIENT
Start: 2023-04-27 | End: 2023-04-27

## 2023-04-27 RX ORDER — DIPHENHYDRAMINE HYDROCHLORIDE 50 MG/ML
12.5 INJECTION INTRAMUSCULAR; INTRAVENOUS
Status: DISCONTINUED | OUTPATIENT
Start: 2023-04-27 | End: 2023-04-27 | Stop reason: HOSPADM

## 2023-04-27 RX ORDER — CEFAZOLIN SODIUM 1 G/3ML
INJECTION, POWDER, FOR SOLUTION INTRAMUSCULAR; INTRAVENOUS
Status: DISCONTINUED | OUTPATIENT
Start: 2023-04-27 | End: 2023-04-27

## 2023-04-27 RX ORDER — DOXYCYCLINE 100 MG/1
100 CAPSULE ORAL 2 TIMES DAILY
Qty: 14 CAPSULE | Refills: 0 | Status: SHIPPED | OUTPATIENT
Start: 2023-04-27 | End: 2023-05-04

## 2023-04-27 RX ORDER — ACETAMINOPHEN 10 MG/ML
INJECTION, SOLUTION INTRAVENOUS
Status: DISCONTINUED | OUTPATIENT
Start: 2023-04-27 | End: 2023-04-27

## 2023-04-27 RX ORDER — ONDANSETRON 2 MG/ML
4 INJECTION INTRAMUSCULAR; INTRAVENOUS DAILY PRN
Status: DISCONTINUED | OUTPATIENT
Start: 2023-04-27 | End: 2023-04-27 | Stop reason: HOSPADM

## 2023-04-27 RX ORDER — BUPIVACAINE HYDROCHLORIDE 5 MG/ML
INJECTION, SOLUTION EPIDURAL; INTRACAUDAL
Status: DISCONTINUED | OUTPATIENT
Start: 2023-04-27 | End: 2023-04-27 | Stop reason: HOSPADM

## 2023-04-27 RX ORDER — SUCCINYLCHOLINE CHLORIDE 20 MG/ML
INJECTION INTRAMUSCULAR; INTRAVENOUS
Status: DISCONTINUED | OUTPATIENT
Start: 2023-04-27 | End: 2023-04-27

## 2023-04-27 RX ORDER — ONDANSETRON 2 MG/ML
INJECTION INTRAMUSCULAR; INTRAVENOUS
Status: DISCONTINUED | OUTPATIENT
Start: 2023-04-27 | End: 2023-04-27

## 2023-04-27 RX ORDER — ONDANSETRON 4 MG/1
8 TABLET, ORALLY DISINTEGRATING ORAL EVERY 8 HOURS PRN
Status: DISCONTINUED | OUTPATIENT
Start: 2023-04-27 | End: 2023-04-27 | Stop reason: HOSPADM

## 2023-04-27 RX ORDER — FENTANYL CITRATE 50 UG/ML
INJECTION, SOLUTION INTRAMUSCULAR; INTRAVENOUS
Status: DISCONTINUED | OUTPATIENT
Start: 2023-04-27 | End: 2023-04-27

## 2023-04-27 RX ORDER — CELECOXIB 100 MG/1
200 CAPSULE ORAL ONCE
Status: COMPLETED | OUTPATIENT
Start: 2023-04-27 | End: 2023-04-27

## 2023-04-27 RX ORDER — ROCURONIUM BROMIDE 10 MG/ML
INJECTION, SOLUTION INTRAVENOUS
Status: DISCONTINUED | OUTPATIENT
Start: 2023-04-27 | End: 2023-04-27

## 2023-04-27 RX ORDER — OXYCODONE HYDROCHLORIDE 5 MG/1
5 TABLET ORAL
Status: DISCONTINUED | OUTPATIENT
Start: 2023-04-27 | End: 2023-04-27 | Stop reason: HOSPADM

## 2023-04-27 RX ORDER — FAMOTIDINE 10 MG/ML
INJECTION INTRAVENOUS
Status: DISCONTINUED | OUTPATIENT
Start: 2023-04-27 | End: 2023-04-27

## 2023-04-27 RX ORDER — HYDROCODONE BITARTRATE AND ACETAMINOPHEN 5; 325 MG/1; MG/1
1 TABLET ORAL EVERY 4 HOURS PRN
Status: DISCONTINUED | OUTPATIENT
Start: 2023-04-27 | End: 2023-04-27 | Stop reason: HOSPADM

## 2023-04-27 RX ORDER — ONDANSETRON 4 MG/1
8 TABLET, ORALLY DISINTEGRATING ORAL EVERY 8 HOURS PRN
Qty: 30 TABLET | Refills: 0 | Status: SHIPPED | OUTPATIENT
Start: 2023-04-27 | End: 2023-07-13 | Stop reason: SDUPTHER

## 2023-04-27 RX ORDER — KETAMINE HYDROCHLORIDE 10 MG/ML
INJECTION, SOLUTION INTRAMUSCULAR; INTRAVENOUS
Status: DISCONTINUED | OUTPATIENT
Start: 2023-04-27 | End: 2023-04-27

## 2023-04-27 RX ORDER — DEXAMETHASONE SODIUM PHOSPHATE 4 MG/ML
INJECTION, SOLUTION INTRA-ARTICULAR; INTRALESIONAL; INTRAMUSCULAR; INTRAVENOUS; SOFT TISSUE
Status: DISCONTINUED | OUTPATIENT
Start: 2023-04-27 | End: 2023-04-27

## 2023-04-27 RX ORDER — MIDAZOLAM HYDROCHLORIDE 1 MG/ML
INJECTION INTRAMUSCULAR; INTRAVENOUS
Status: DISCONTINUED | OUTPATIENT
Start: 2023-04-27 | End: 2023-04-27

## 2023-04-27 RX ORDER — HYDROCODONE BITARTRATE AND ACETAMINOPHEN 10; 325 MG/1; MG/1
1 TABLET ORAL EVERY 4 HOURS PRN
Status: DISCONTINUED | OUTPATIENT
Start: 2023-04-27 | End: 2023-04-27 | Stop reason: HOSPADM

## 2023-04-27 RX ORDER — GABAPENTIN 300 MG/1
300 CAPSULE ORAL ONCE
Status: COMPLETED | OUTPATIENT
Start: 2023-04-27 | End: 2023-04-27

## 2023-04-27 RX ADMIN — HYDROMORPHONE HYDROCHLORIDE 0.2 MG: 1 INJECTION, SOLUTION INTRAMUSCULAR; INTRAVENOUS; SUBCUTANEOUS at 01:04

## 2023-04-27 RX ADMIN — CEFAZOLIN 3 G: 330 INJECTION, POWDER, FOR SOLUTION INTRAMUSCULAR; INTRAVENOUS at 12:04

## 2023-04-27 RX ADMIN — HYDROMORPHONE HYDROCHLORIDE 0.2 MG: 1 INJECTION, SOLUTION INTRAMUSCULAR; INTRAVENOUS; SUBCUTANEOUS at 02:04

## 2023-04-27 RX ADMIN — FAMOTIDINE 20 MG: 10 INJECTION, SOLUTION INTRAVENOUS at 12:04

## 2023-04-27 RX ADMIN — CELECOXIB 200 MG: 100 CAPSULE ORAL at 11:04

## 2023-04-27 RX ADMIN — KETAMINE HYDROCHLORIDE 50 MG: 10 INJECTION, SOLUTION INTRAMUSCULAR; INTRAVENOUS at 12:04

## 2023-04-27 RX ADMIN — MIDAZOLAM HYDROCHLORIDE 2 MG: 1 INJECTION, SOLUTION INTRAMUSCULAR; INTRAVENOUS at 11:04

## 2023-04-27 RX ADMIN — ACETAMINOPHEN 1000 MG: 10 INJECTION, SOLUTION INTRAVENOUS at 12:04

## 2023-04-27 RX ADMIN — SODIUM CHLORIDE, SODIUM LACTATE, POTASSIUM CHLORIDE, AND CALCIUM CHLORIDE: .6; .31; .03; .02 INJECTION, SOLUTION INTRAVENOUS at 11:04

## 2023-04-27 RX ADMIN — PROMETHAZINE HYDROCHLORIDE 6.25 MG: 25 INJECTION INTRAMUSCULAR; INTRAVENOUS at 02:04

## 2023-04-27 RX ADMIN — FENTANYL CITRATE 100 MCG: 50 INJECTION, SOLUTION INTRAMUSCULAR; INTRAVENOUS at 12:04

## 2023-04-27 RX ADMIN — Medication 160 MG: at 12:04

## 2023-04-27 RX ADMIN — ROCURONIUM BROMIDE 30 MG: 10 INJECTION, SOLUTION INTRAVENOUS at 12:04

## 2023-04-27 RX ADMIN — ONDANSETRON 4 MG: 2 INJECTION INTRAMUSCULAR; INTRAVENOUS at 12:04

## 2023-04-27 RX ADMIN — PROPOFOL 200 MG: 10 INJECTION, EMULSION INTRAVENOUS at 12:04

## 2023-04-27 RX ADMIN — DEXAMETHASONE SODIUM PHOSPHATE 8 MG: 4 INJECTION, SOLUTION INTRAMUSCULAR; INTRAVENOUS at 12:04

## 2023-04-27 RX ADMIN — OXYCODONE HYDROCHLORIDE 5 MG: 5 TABLET ORAL at 02:04

## 2023-04-27 RX ADMIN — ROCURONIUM BROMIDE 10 MG: 10 INJECTION, SOLUTION INTRAVENOUS at 12:04

## 2023-04-27 RX ADMIN — GABAPENTIN 300 MG: 300 CAPSULE ORAL at 11:04

## 2023-04-27 RX ADMIN — ONDANSETRON 4 MG: 2 INJECTION INTRAMUSCULAR; INTRAVENOUS at 01:04

## 2023-04-27 NOTE — DISCHARGE INSTRUCTIONS
FOLLOW THE WRITTEN INSTRUCTIONS I REVIEWED WITH YOU AND A COPY GIVEN TO YOU.  NO WEIGHT BEARING ON THE RIGHT FOOT. USE YOUR CRUTCHES YOU HAVE AT HOME UNTIL YOU SEE DR ENNIS NEXT WEEK, THEN HE WILL ORDER [employer] A KNEE SCOOTER.  DO NOT DRIVE, OPERATE MACHINERY OR SIGN LEGAL DOCUMENTS FOR THE NEXT 24HOURS.  FOLLOW UP WITH DR ENNIS NEXT WEEK.

## 2023-04-27 NOTE — ANESTHESIA POSTPROCEDURE EVALUATION
Anesthesia Post Evaluation    Patient: Loretta Lea    Procedure(s) Performed: Procedure(s) (LRB):  REPAIR, TENDON, LOWER EXTREMITY (Right)  REPAIR, LIGAMENT, ANKLE (Right)    Final Anesthesia Type: general      Patient location during evaluation: PACU  Patient participation: Yes- Able to Participate  Level of consciousness: awake and alert and oriented  Post-procedure vital signs: reviewed and stable  Pain management: adequate  Airway patency: patent    PONV status at discharge: No PONV  Anesthetic complications: no      Cardiovascular status: blood pressure returned to baseline and hemodynamically stable  Respiratory status: unassisted, spontaneous ventilation and room air  Hydration status: euvolemic  Follow-up not needed.          Vitals Value Taken Time   /59 04/27/23 1500   Temp 36.2 °C (97.2 °F) 04/27/23 1415   Pulse 97 04/27/23 1505   Resp 25 04/27/23 1505   SpO2 99 % 04/27/23 1505   Vitals shown include unvalidated device data.      No case tracking events are documented in the log.      Pain/Luz Maria Score: Pain Rating Prior to Med Admin: 6 (4/27/2023  2:40 PM)  Luz Maria Score: 10 (4/27/2023  2:45 PM)

## 2023-04-27 NOTE — PLAN OF CARE
I CALLED DR ENNIS AT 4:PM TODAY REGARDING NON WEIGHT BEARING ON THE RIGHT FOOT. HE STATED TO HAVE PT USE HER CRUTCHES SHE HAS AT HOME UNTIL SHE SEES HIM IN HIS CLINIC NEXT WEEK. HE WILL ORDER HER A KNEE SCOOTER. PT INFORMED AND VERBALIZED UNDERSTANDING.

## 2023-04-27 NOTE — OP NOTE
Operative Report     Patient name: Loretta Lea   MRN: [id number]  Date of surgery: 4/27/2023    Surgeon: Aryan Poole DPM   Assistant:  None    Preoperative diagnosis:  1. Peroneal tendinosis right 2.  Sprain of anterior talofibular ligament right   Postoperative diagnosis:  Same as above  Procedure:  1.  Repair of peroneus brevis tendon right 2.  Repair of anterior talofibular ligament right  Anesthesia:  General  Hemostasis:  Pneumatic thigh tourniquet at 325 mmHg  Estimated blood loss:  10 mL   Specimen:  None  Complications: None  Condition upon discharge: Stable    Procedure in detail:  Patient was brought the operating room placed the operating table in a supine position.  Following induction general anesthesia a well-padded pneumatic thigh tourniquet was placed around the patient's right thigh and set at 325 mmHg.  The right lower extremity was then prepped scrubbed and draped in the normal aseptic manner.  A time-out was then called.  An Esmarch bandage was then utilized to exsanguinate the right lower extremity and the pneumatic thigh tourniquet was inflated to 325 mmHg.  At this time attention was directed to the lateral aspect of the patient's right ankle where utilizing a 15 blade a curvilinear incision was made overlying the course of the peroneal tendons beginning just proximal to the distal fibula.  This incision somewhat encompass patient's previous surgical incision in this area.  Dissection was then carried through the subcutaneous tissues ensuring to retract all vital neurovascular structures.  Bleeders cauterized as necessary.  A large amount of scar tissue was encountered from patient's previous operations.  Peroneal retinaculum was identified appeared intact.  Incision was made into the peroneal retinaculum and the peroneal tendons were visualized.  The peroneus longus appeared healthy with no fraying or tears.  There was some interstitial tearing of the peroneus brevis tendon as well  as some splaying of the tendon.  At this time the peroneus brevis tendon was repaired utilizing 3-0 FiberWire.  Attention was then directed to the anterior lateral ankle where a linear longitudinal incision was made overlying the course the anterior talofibular ligament.  Dissection was carried bluntly through the subcutaneous tissues ensuring to retract all vital neurovascular structures.  Bleeders cauterized as necessary.  Dissection was carried down and the anterior talofibular ligament was identified.  Internal brace from previous repair was visualized noted be partially intact.  The anterior talofibular ligament was noted to be intact but attenuated.  At this time a Brostrom repair of the anterior talofibular ligament was performed utilizing 3-0 FiberWire.  The incisions were then flushed with copious amounts of sterile saline.  Peroneal retinaculum was closed utilizing 2 Vicryl.  The subcutaneous tissues of both incisions were closed utilizing 3-0 Vicryl.  Skin was closed utilizing 4-0 Vicryl in a running subcuticular fashion.  20 cc of 0.5% Marcaine plain and Exparel was utilized in a block of the surgical site.  The incisions were then dressed with Mastisol Steri-Strips 4x4s Kerlix and an Ace wrap.  Pneumatic thigh tourniquet was deflated neurovascular status noted be intact to the right lower extremity.  Patient's foot was placed in a Cam Walker boot.  Patient tolerated the procedure well.  She had anesthesia reversed and left the operating Room stable vitals.    1. Keep dressings, clean, dry, and intact to surgical extremity.  2. Rest, ice, and elevate the surgical extremity.  3. Nonweightbearing to surgical extremity in Cam Walker boot  4. Take all medication as discussed at discharge.  5. Contact the clinic with any postoperative concerns or complications.  6. Follow up in one week for 1st post op.

## 2023-04-27 NOTE — TRANSFER OF CARE
"Anesthesia Transfer of Care Note    Patient: Loretta Lea    Procedure(s) Performed: Procedure(s) (LRB):  REPAIR, TENDON, LOWER EXTREMITY (Right)  REPAIR, LIGAMENT, ANKLE (Right)    Patient location: PACU    Anesthesia Type: general    Transport from OR: Transported from OR on room air with adequate spontaneous ventilation    Post pain: adequate analgesia    Post assessment: no apparent anesthetic complications    Post vital signs: stable    Level of consciousness: awake and alert    Nausea/Vomiting: no nausea/vomiting    Complications: none    Transfer of care protocol was followed      Last vitals:   Visit Vitals  BP (!) 140/94   Pulse 103   Temp 36.9 °C (98.5 °F) (Oral)   Resp 20   Ht 5' 7" (1.702 m)   Wt (!) 147 kg (323 lb 15.8 oz)   LMP 04/15/2017 (Approximate)   SpO2 99%   BMI 50.74 kg/m²     "

## 2023-04-27 NOTE — ANESTHESIA PREPROCEDURE EVALUATION
04/27/2023  Loretta Lea is a 32 y.o., female.      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    Gait abnormality    Scapular dysfunction    Uncontrolled type 2 diabetes mellitus with hyperglycemia       Past Surgical History:   Procedure Laterality Date    APPENDECTOMY      ARTHROSCOPY OF ANKLE Right 05/04/2022    Procedure: ARTHROSCOPY, ANKLE;  Surgeon: Bimal Mccormick DPM;  Location: [facility] OR;  Service: Podiatry;  Laterality: Right;  CURT EXTERNAL ANKLE DISTRACTOR    CHOLECYSTECTOMY      DILATION AND CURETTAGE OF UTERUS      HYSTERECTOMY  2017    total, ovarian cysts, torsion, [place]; hyst per Dr JESUS Manriquez    LAPAROSCOPIC APPENDECTOMY N/A 08/18/2021    Procedure: APPENDECTOMY, LAPAROSCOPIC;  Surgeon: Osiel Ramirez MD;  Location: [facility] OR;  Service: General;  Laterality: N/A;    OOPHORECTOMY      opherectom Left 2015    salpingo-opherectomy    REPAIR OF LIGAMENT OF ANKLE Right 06/23/2021    Procedure: REPAIR OF ANTERIOR TALOFIBULAR LIGAMENT CALCANEOFIBULAR LIGAMENT;  Surgeon: Bimal Mccormick DPM;  Location: [facility] OR;  Service: Podiatry;  Laterality: Right;  WITH ARTHREX INTERNAL BRACE    SURGICAL REMOVAL OF BONE SPUR Right 06/23/2021    Procedure: EXCISION OS TRIGONUM;  Surgeon: Bimal Mccormick DPM;  Location: [facility] OR;  Service: Podiatry;  Laterality: Right;    WISDOM TOOTH EXTRACTION          Tobacco Use:  The patient  reports that she has been smoking cigarettes. She has a 27.00 pack-year smoking history. She has been exposed to tobacco smoke. She has never used smokeless tobacco.     Results  for orders placed or performed during the hospital encounter of 02/20/23   EKG 12-lead    Collection Time: 02/20/23  7:36 AM    Narrative    Test Reason : S86.311D,    Vent. Rate : 084 BPM     Atrial Rate : 084 BPM     P-R Int : 148 ms          QRS Dur : 086 ms      QT Int : 386 ms       P-R-T Axes : 010 056 067 degrees     QTc Int : 456 ms    Normal sinus rhythm  Normal ECG  When compared with ECG of 06-AUG-2022 12:16,  No significant change was found  Confirmed by Krzysztof Phillip MD ([id number]) on 2/23/2023 6:50:31 PM    Referred By:             Confirmed By:Krzysztof Phillip MD         s/p hysterectomy    Lab Results   Component Value Date    WBC 13.05 (H) 04/25/2023    HGB 14.8 04/25/2023    HCT 45.5 04/25/2023    MCV 81 (L) 04/25/2023     04/25/2023     BMP  Lab Results   Component Value Date     04/25/2023    K 4.4 04/25/2023    CL 99 04/25/2023    CO2 26 04/25/2023    BUN 13 04/25/2023    CREATININE 0.7 04/25/2023    CALCIUM 9.3 04/25/2023    ANIONGAP 11 04/25/2023     (H) 04/25/2023     (H) 02/08/2023     (H) 08/09/2022       No results found for this or any previous visit.              Pre-op Assessment    I have reviewed the Patient Summary Reports.     I have reviewed the Nursing Notes. I have reviewed the NPO Status.   I have reviewed the Medications.     Review of Systems  Anesthesia Hx:  No problems with previous Anesthesia  Denies Family Hx of Anesthesia complications.  Personal Hx of Anesthesia complications, Post-Operative Nausea/Vomiting   Social:  Smoker    Hematology/Oncology:  Hematology Normal        Cardiovascular:  Cardiovascular Normal  ECG has been reviewed.    Pulmonary:  Pulmonary Normal    Hepatic/GI:   GERD, well controlled    Musculoskeletal:  Musculoskeletal Normal    Neurological:   Headaches (migraines)    Endocrine:   Diabetes, poorly controlled, type 2  Morbid Obesity / BMI > 40  Psych:   Psychiatric History (bipolar disorder, depression) depression           Physical Exam  General: Well nourished, Cooperative, Alert and Oriented    Airway:  Mallampati: III / II  Mouth Opening: Normal  TM Distance: Normal  Tongue: Normal  Neck ROM: Normal ROM    Chest/Lungs:  Clear to auscultation    Heart:  Rate: Normal  Rhythm: Regular Rhythm  Sounds: Normal    Abdomen:  Normal, Soft, Nontender        Anesthesia Plan  Type of Anesthesia, risks & benefits discussed:    Anesthesia Type: Gen ETT  Intra-op Monitoring Plan: Standard ASA Monitors  Post Op Pain Control Plan: multimodal analgesia and IV/PO Opioids PRN  Induction:  IV  Airway Plan: Video, Post-Induction  Informed Consent: Informed consent signed with the Patient and all parties understand the risks and agree with anesthesia plan.  All questions answered.   ASA Score: 3  Anesthesia Plan Notes: GETA  Neurontin 300, celebrex 200 po in holding   Ketamine intraop  IV tylenol  Hx of PONV: Zofran decadron Pepcid  Hx of requiring CPAP in recovery, limit narcotics and versed    Ready For Surgery From Anesthesia Perspective.     .

## 2023-04-28 ENCOUNTER — PATIENT MESSAGE (OUTPATIENT)
Dept: PODIATRY | Facility: CLINIC | Age: 33
End: 2023-04-28
Payer: MEDICAID

## 2023-04-28 NOTE — TELEPHONE ENCOUNTER
Spoke with patient who states her pain is terrible. Patient states she took Naproxen at 7:30 Patient was advised she can not take any other antiinflammatory until 7:30 tonight only if It was 3 hours since her pain medication. Patient was advised to get on a schedule after her Naproxen wears off so she taking a pain pill and three hours later the ibuprofen and three hours later a pain pill until the pain gets under control. Patient stated understanding.

## 2023-05-03 ENCOUNTER — OFFICE VISIT (OUTPATIENT)
Dept: PODIATRY | Facility: CLINIC | Age: 33
End: 2023-05-03
Payer: MEDICAID

## 2023-05-03 VITALS — BODY MASS INDEX: 45.99 KG/M2 | HEIGHT: 67 IN | WEIGHT: 293 LBS | RESPIRATION RATE: 18 BRPM

## 2023-05-03 DIAGNOSIS — S86.311D RUPTURE OF PERONEAL TENDON, RIGHT, SUBSEQUENT ENCOUNTER: Primary | ICD-10-CM

## 2023-05-03 DIAGNOSIS — F31.75 BIPOLAR DISORDER, IN PARTIAL REMISSION, MOST RECENT EPISODE DEPRESSED: ICD-10-CM

## 2023-05-03 DIAGNOSIS — G89.29 CHRONIC PAIN OF RIGHT ANKLE: ICD-10-CM

## 2023-05-03 DIAGNOSIS — M25.571 CHRONIC PAIN OF RIGHT ANKLE: ICD-10-CM

## 2023-05-03 DIAGNOSIS — S93.491S SPRAIN OF ANTERIOR TALOFIBULAR LIGAMENT OF RIGHT ANKLE, SEQUELA: ICD-10-CM

## 2023-05-03 PROCEDURE — 1159F PR MEDICATION LIST DOCUMENTED IN MEDICAL RECORD: ICD-10-PCS | Mod: CPTII,S$GLB,, | Performed by: PODIATRIST

## 2023-05-03 PROCEDURE — 3008F BODY MASS INDEX DOCD: CPT | Mod: CPTII,S$GLB,, | Performed by: PODIATRIST

## 2023-05-03 PROCEDURE — 1159F MED LIST DOCD IN RCRD: CPT | Mod: CPTII,S$GLB,, | Performed by: PODIATRIST

## 2023-05-03 PROCEDURE — 1160F RVW MEDS BY RX/DR IN RCRD: CPT | Mod: CPTII,S$GLB,, | Performed by: PODIATRIST

## 2023-05-03 PROCEDURE — 1160F PR REVIEW ALL MEDS BY PRESCRIBER/CLIN PHARMACIST DOCUMENTED: ICD-10-PCS | Mod: CPTII,S$GLB,, | Performed by: PODIATRIST

## 2023-05-03 PROCEDURE — 99024 PR POST-OP FOLLOW-UP VISIT: ICD-10-PCS | Mod: S$GLB,,, | Performed by: PODIATRIST

## 2023-05-03 PROCEDURE — 3008F PR BODY MASS INDEX (BMI) DOCUMENTED: ICD-10-PCS | Mod: CPTII,S$GLB,, | Performed by: PODIATRIST

## 2023-05-03 PROCEDURE — 3052F PR MOST RECENT HEMOGLOBIN A1C LEVEL 8.0 - < 9.0%: ICD-10-PCS | Mod: CPTII,S$GLB,, | Performed by: PODIATRIST

## 2023-05-03 PROCEDURE — 3052F HG A1C>EQUAL 8.0%<EQUAL 9.0%: CPT | Mod: CPTII,S$GLB,, | Performed by: PODIATRIST

## 2023-05-03 PROCEDURE — 99024 POSTOP FOLLOW-UP VISIT: CPT | Mod: S$GLB,,, | Performed by: PODIATRIST

## 2023-05-03 RX ORDER — HYDROCODONE BITARTRATE AND ACETAMINOPHEN 10; 325 MG/1; MG/1
1 TABLET ORAL EVERY 4 HOURS PRN
Qty: 40 TABLET | Refills: 0 | Status: SHIPPED | OUTPATIENT
Start: 2023-05-03 | End: 2023-05-12

## 2023-05-03 NOTE — PROGRESS NOTES
"  [address] [facility] [place] [address]  Phone: [phone]   [phone]    Patient's PCP:Magalie Roldan MD  Referring Provider:No ref. provider found    Subjective:      Chief Complaint: Post-op Evaluation ( Repair of peroneus brevis tendon right 2.  Repair of anterior talofibular ligament right)    Date of Surgery:  04/27/2023  Procedure:  Repair of peroneus brevis tendon right 2.  Repair of anterior talofibular ligament right    HPI:   Loretta Lea is a 32 y.o. female who returns to the clinic today for her post-operative visit. Loretta Lea rates pain a 910 on a pain scale. Compliance of Care: nonweightbearing in cam walker boot cast, ace wrap and dressing intact with drainage. Notes increased pain and itching sensation. Pain medication has not lessened pain.      Vitals:    05/03/23 0936   Resp: 18   Weight: (!) 146.5 kg (323 lb)   Height: 5' 7" (1.702 m)   PainSc:   9        Past Surgical History:   Procedure Laterality Date    APPENDECTOMY      ARTHROSCOPY OF ANKLE Right 05/04/2022    Procedure: ARTHROSCOPY, ANKLE;  Surgeon: Bimal Mccormick DPM;  Location: [facility] OR;  Service: Podiatry;  Laterality: Right;  CURT EXTERNAL ANKLE DISTRACTOR    CHOLECYSTECTOMY      DILATION AND CURETTAGE OF UTERUS      HYSTERECTOMY  2017    total, ovarian cysts, torsion, [place]; hyst per Dr JESUS Manriquez    LAPAROSCOPIC APPENDECTOMY N/A 08/18/2021    Procedure: APPENDECTOMY, LAPAROSCOPIC;  Surgeon: Osiel Ramirez MD;  Location: [facility] OR;  Service: General;  Laterality: N/A;    OOPHORECTOMY      opherectom Left 2015    salpingo-opherectomy    REPAIR OF LIGAMENT OF ANKLE Right 06/23/2021    Procedure: REPAIR OF ANTERIOR TALOFIBULAR LIGAMENT CALCANEOFIBULAR LIGAMENT;  Surgeon: Bimal Mccormick DPM;  Location: [facility] OR;  Service: Podiatry;  Laterality: Right;  WITH ARTHREX INTERNAL BRACE    REPAIR OF LIGAMENT OF ANKLE Right 4/27/2023    Procedure: REPAIR, LIGAMENT, ANKLE;  Surgeon: " Aryan Poole DPM;  Location: [facility];  Service: Podiatry;  Laterality: Right;    REPAIR OF TENDON OF LOWER EXTREMITY Right 4/27/2023    Procedure: REPAIR, TENDON, LOWER EXTREMITY;  Surgeon: Aryan Poole DPM;  Location: [facility] OR;  Service: Podiatry;  Laterality: Right;  arthrex    SURGICAL REMOVAL OF BONE SPUR Right 06/23/2021    Procedure: EXCISION OS TRIGONUM;  Surgeon: Bimal Mccormick DPM;  Location: [facility] OR;  Service: Podiatry;  Laterality: Right;    WISDOM TOOTH EXTRACTION       Past Medical History:   Diagnosis Date    Anxiety     Back pain     Bipolar disorder 2004    bipolar 2    Chronic bronchitis     Depression     Diabetes mellitus     GERD (gastroesophageal reflux disease)     HPV (human papilloma virus) infection     Insomnia     Migraines     Ovarian cyst     Pneumonia     PONV (postoperative nausea and vomiting)      Family History   Adopted: Yes   Problem Relation Age of Onset    No Known Problems Sister     No Known Problems Sister     No Known Problems Sister         Social History:   Marital Status:   Alcohol History:  reports that she does not currently use alcohol.  Tobacco History:  reports that she has been smoking cigarettes. She has a 27.00 pack-year smoking history. She has been exposed to tobacco smoke. She has never used smokeless tobacco.  Drug History:  reports that she does not currently use drugs.    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 200 each 5    blood-glucose meter (ACCU-CHEK GUIDE GLUCOSE METER) [facility] Use to test blood sugar 1 each 0    diphenhydrAMINE (BENADRYL) 50 MG tablet Take 50 mg by mouth every 6 (six) hours as needed for Itching.      doxycycline (MONODOX) 100 MG capsule Take 1 capsule (100 mg total) by  mouth 2 (two) times daily. for 7 days 14 capsule 0    HYDROcodone-acetaminophen (NORCO)  mg per tablet Take 1 tablet by mouth every 4 (four) hours as needed for Pain. 40 tablet 0    lancets (ACCU-CHEK SOFTCLIX LANCETS) Misc Test blood sugar 4 (four) times daily before meals and nightly. 200 each 5    metFORMIN (GLUCOPHAGE-XR) 500 MG ER 24hr tablet Take 1 tablet (500 mg total) by mouth 2 (two) times daily with meals. 180 tablet 3    naproxen (NAPROSYN) 500 MG tablet Take 500 mg by mouth 2 (two) times daily.      ondansetron (ZOFRAN) 4 MG tablet Take 1 PO daily prn nausea. 30 tablet 1    ondansetron (ZOFRAN-ODT) 4 MG TbDL Take 2 tablets (8 mg total) by mouth every 8 (eight) hours as needed (Nausea). 30 tablet 0    pyridoxine, vitamin B6, (VITAMIN B-6) 25 MG Tab Take 25 mg by mouth once daily.      QUEtiapine (SEROQUEL) 300 MG Tab Take 1 tablet (300 mg total) by mouth every evening. 90 tablet 1    semaglutide (OZEMPIC) 0.25 mg or 0.5 mg (2 mg/3 mL) pen injector Inject 0.5 mg into the skin every 7 days. 3 each 3    sertraline (ZOLOFT) 100 MG tablet Take 1 tablet (100 mg total) by mouth once daily. 90 tablet 1     No current facility-administered medications for this visit.       Review of Systems   Constitutional:  Negative for chills, fatigue, fever and unexpected weight change.   HENT:  Negative for hearing loss and trouble swallowing.    Eyes:  Negative for photophobia and visual disturbance.   Respiratory:  Negative for cough, shortness of breath and wheezing.    Cardiovascular:  Negative for chest pain, palpitations and leg swelling.   Gastrointestinal:  Negative for abdominal pain and nausea.   Genitourinary:  Negative for dysuria and frequency.   Musculoskeletal:  Positive for arthralgias, gait problem, joint swelling and myalgias.   Skin:  Negative for rash and wound.   Neurological:  Negative for tremors, seizures, weakness, numbness and headaches.   Hematological:  Does not bruise/bleed easily.        Objective:        Post-op surgery of the right foot and ankle with normal healing, no signs of infection or dehiscence of wound. Hardware in place. Normal post op exam today. No redness, no drainage, no increase in local temperature, no significant swelling, sutures.steri-strips are intact.     Imaging:  None    Physical Exam:   Foot Exam  Physical Exam       Assessment:       1. Rupture of peroneal tendon, right, subsequent encounter    2. Sprain of anterior talofibular ligament of right ankle, sequela    3. Chronic pain of right ankle      Plan:   Rupture of peroneal tendon, right, subsequent encounter  -     HYDROcodone-acetaminophen (NORCO)  mg per tablet; Take 1 tablet by mouth every 4 (four) hours as needed for Pain.  Dispense: 40 tablet; Refill: 0    Sprain of anterior talofibular ligament of right ankle, sequela  -     HYDROcodone-acetaminophen (NORCO)  mg per tablet; Take 1 tablet by mouth every 4 (four) hours as needed for Pain.  Dispense: 40 tablet; Refill: 0    Chronic pain of right ankle  -     HYDROcodone-acetaminophen (NORCO)  mg per tablet; Take 1 tablet by mouth every 4 (four) hours as needed for Pain.  Dispense: 40 tablet; Refill: 0      Follow up in about 1 week (around 5/10/2023), or if symptoms worsen or fail to improve.    Procedures - None    The surgical dressing was removed showing no signs of infection, excess edema or malalignment. A new dry dressing was applied and patient was instructed to leave dressing intact until next visit or until instructed to remove.     CAM walker boot with limited weight-bearing  Ice to painful area, 15 minutes at a time  Ace-wrap to help with swelling  No barefoot   Keep affected extremity elevated while seated    Recommend she take anti-inflammatories in the intervals between her pain medicine dosage is to help with her symptoms.    This note was created using Dragon voice recognition software that occasionally misinterpreted phrases or  words.

## 2023-05-04 RX ORDER — SERTRALINE HYDROCHLORIDE 100 MG/1
TABLET, FILM COATED ORAL
Qty: 90 TABLET | Refills: 1 | Status: SHIPPED | OUTPATIENT
Start: 2023-05-04 | End: 2023-06-13 | Stop reason: SDUPTHER

## 2023-05-04 RX ORDER — QUETIAPINE FUMARATE 300 MG/1
TABLET, FILM COATED ORAL
Qty: 90 TABLET | Refills: 1 | Status: SHIPPED | OUTPATIENT
Start: 2023-05-04 | End: 2023-06-02 | Stop reason: SDUPTHER

## 2023-05-12 ENCOUNTER — OFFICE VISIT (OUTPATIENT)
Dept: PODIATRY | Facility: CLINIC | Age: 33
End: 2023-05-12
Payer: MEDICAID

## 2023-05-12 VITALS — HEART RATE: 98 BPM | OXYGEN SATURATION: 94 % | WEIGHT: 293 LBS | HEIGHT: 67 IN | BODY MASS INDEX: 45.99 KG/M2

## 2023-05-12 DIAGNOSIS — G89.29 CHRONIC PAIN OF RIGHT ANKLE: ICD-10-CM

## 2023-05-12 DIAGNOSIS — S93.491S SPRAIN OF ANTERIOR TALOFIBULAR LIGAMENT OF RIGHT ANKLE, SEQUELA: ICD-10-CM

## 2023-05-12 DIAGNOSIS — M25.571 CHRONIC PAIN OF RIGHT ANKLE: ICD-10-CM

## 2023-05-12 DIAGNOSIS — S86.311D RUPTURE OF PERONEAL TENDON, RIGHT, SUBSEQUENT ENCOUNTER: Primary | ICD-10-CM

## 2023-05-12 PROCEDURE — 99213 OFFICE O/P EST LOW 20 MIN: CPT | Mod: PBBFAC,PN | Performed by: PODIATRIST

## 2023-05-12 PROCEDURE — 3052F HG A1C>EQUAL 8.0%<EQUAL 9.0%: CPT | Mod: CPTII,,, | Performed by: PODIATRIST

## 2023-05-12 PROCEDURE — 1159F PR MEDICATION LIST DOCUMENTED IN MEDICAL RECORD: ICD-10-PCS | Mod: CPTII,,, | Performed by: PODIATRIST

## 2023-05-12 PROCEDURE — 1160F RVW MEDS BY RX/DR IN RCRD: CPT | Mod: CPTII,,, | Performed by: PODIATRIST

## 2023-05-12 PROCEDURE — 3008F BODY MASS INDEX DOCD: CPT | Mod: CPTII,,, | Performed by: PODIATRIST

## 2023-05-12 PROCEDURE — 99024 POSTOP FOLLOW-UP VISIT: CPT | Mod: ,,, | Performed by: PODIATRIST

## 2023-05-12 PROCEDURE — 1160F PR REVIEW ALL MEDS BY PRESCRIBER/CLIN PHARMACIST DOCUMENTED: ICD-10-PCS | Mod: CPTII,,, | Performed by: PODIATRIST

## 2023-05-12 PROCEDURE — 1159F MED LIST DOCD IN RCRD: CPT | Mod: CPTII,,, | Performed by: PODIATRIST

## 2023-05-12 PROCEDURE — 3052F PR MOST RECENT HEMOGLOBIN A1C LEVEL 8.0 - < 9.0%: ICD-10-PCS | Mod: CPTII,,, | Performed by: PODIATRIST

## 2023-05-12 PROCEDURE — 3008F PR BODY MASS INDEX (BMI) DOCUMENTED: ICD-10-PCS | Mod: CPTII,,, | Performed by: PODIATRIST

## 2023-05-12 PROCEDURE — 99999 PR PBB SHADOW E&M-EST. PATIENT-LVL III: CPT | Mod: PBBFAC,,, | Performed by: PODIATRIST

## 2023-05-12 PROCEDURE — 99999 PR PBB SHADOW E&M-EST. PATIENT-LVL III: ICD-10-PCS | Mod: PBBFAC,,, | Performed by: PODIATRIST

## 2023-05-12 PROCEDURE — 99024 PR POST-OP FOLLOW-UP VISIT: ICD-10-PCS | Mod: ,,, | Performed by: PODIATRIST

## 2023-05-12 RX ORDER — HYDROCODONE BITARTRATE AND ACETAMINOPHEN 10; 325 MG/1; MG/1
1 TABLET ORAL EVERY 6 HOURS PRN
Qty: 28 TABLET | Refills: 0 | Status: SHIPPED | OUTPATIENT
Start: 2023-05-12 | End: 2023-06-12 | Stop reason: SDUPTHER

## 2023-05-12 NOTE — PATIENT INSTRUCTIONS
Peroneal Tendon Injuries    What Are the Peroneal Tendons?  A tendon is a band of tissue that connects a muscle to a bone. The two peroneal tendons in the foot run [id number] behind the outer ankle bone. One peroneal tendon attaches to the outer part of the midfoot, while the other tendon runs under the foot and attaches near the inside of the arch. The main function of the peroneal tendons is to stabilize the foot and ankle and protect them from sprains.      Causes and Symptoms of Peroneal Tendon Injuries  Peroneal tendon injuries may be acute (occurring suddenly) or chronic (developing over a period of time). They most commonly occur in individuals who participate in sports that involve repetitive ankle motion. In addition, people with higher arches are at risk for developing peroneal tendon injuries. Basic types of peroneal tendon injuries are tendonitis, tears, and subluxation.    Tendonitis is an inflammation of one or both tendons. The inflammation is caused by activities involving repetitive use of the tendon, overuse of the tendon, or trauma (such as an ankle sprain). Symptoms of tendonitis include:  Pain  Swelling  Warmth to the touch    Acute tears are caused by repetitive activity or trauma. Immediate symptoms of acute tears include:  Pain  Swelling  Weakness or instability of the foot and ankleYour Diabetes Foot Care Program    Every day you depend on your feet to keep you moving. But when you have diabetes, your feet need special care. Even a small foot problem can become very serious. So dont take your feet for granted. By working with your diabetes healthcare team, you can learn how to protect your feet and keep them healthy.  Evaluating your feet  An evaluation helps your healthcare provider check the condition of your feet. The evaluation includes a review of your diabetes history and overall health. It may also include a foot exam, X-rays, or other tests. These can help show problems beneath  the skin that you cant see or feel.  Medical history  You will be asked about your overall health and any history of foot problems. Youll also discuss your diabetes history, such as whether your blood sugar level has changed over time. It also includes questions about sensations of pain, tingling, pins and needles, or numbness. Your healthcare provider will also want to know if you have high blood pressure and heart disease, or if you smoke. Be sure to mention any medicines (including over-the-counter), supplements, or herbal remedies you take.  Foot exam  A foot exam checks the condition of different parts of your foot. First, your skin and nails are examined for any signs of infection. Blood flow is checked by feeling for the pulses in each foot. You may also have tests to study the nerves in the foot. These include using a small filament (wire) to see how sensitive your feet are. In certain cases, you will be asked to walk a short distance to check for bone, joint, and muscle problems.  Diagnostic tests  If needed, your healthcare provider will suggest certain tests to learn more about your feet. These include:  Doppler tests to measure blood flow in the feet and lower leg.  X-rays, which can show bone or joint problems.  Other imaging tests, such as an MRI (magnetic resonance imaging), bone scan, and CT (computed tomography) scan. These can help show bone infections.  Other tests, such as vascular tests, which study the blood flow in your feet and legs. You may also have nerve studies to learn how sensitive your feet are.  Creating a foot care program  Based on the evaluation, your healthcare provider will create a foot care program for you. Your program may be as simple as starting a daily self-care routine and changing the types of shoes your wear. It may also involve treating minor foot problems, such as a corn or blister. In some cases, surgery will be needed to treat an infection or mechanical problems,  such as hammer toes.  Preventing problems  When you have diabetes, its easier to prevent problems than to treat them later on. So see your healthcare team for regular checkups and foot care. Your healthcare team can also help you learn more about caring for your feet at home. For example, you may be told to avoid walking barefoot. Or you may be told that special footwear is needed to protect your feet.  Have regular checkups  Foot problems can develop quickly. So be sure to follow your healthcare teams schedule for regular checkups. During office visits, take off your shoes and socks as soon as you get in the exam room. Ask your healthcare provider to examine your feet for problems. This will make it easier to find and treat small skin irritations before they get worse. Regular checkups can also help keep track of the blood flow and feeling in your feet. If you have neuropathy (lack of feeling in your feet), you will need to have checkups more often.  Learn about self-care  The more you know about diabetes and your feet, the easier it will be to prevent problems. Members of your healthcare team can teach you how to inspect your feet and teach you to look for warning signs. They can also give you other foot care tips. During office visits, be sure to ask any questions you have.  Date Last Reviewed: 7/1/2016 © 2000-2017 [employer]. [address] [place] [address], [place] [address]. All rights reserved. This information is not intended as a substitute for professional medical care. Always follow your healthcare professional's instructions.           As time goes on, these tears may lead to a change in the shape of the foot, in which the arch may become higher.    Degenerative tears (tendonosis) are usually due to overuse and occur over long periods of time - often years. In degenerative tears, the tendon is like taffy that has been overstretched until it becomes thin and eventually frays. Having high arches  also puts you at risk for developing a degenerative tear. The symptoms of degenerative tears may include:  Sporadic pain (occurring from time to time) on the outside of the ankle  Weakness or instability in the ankle  An increase in the height of the arch    Subluxation - one or both tendons have slipped out of their normal position. In some cases, subluxation is due to a condition in which a person is born with a variation in the shape of the bone or muscle. In other cases, subluxation occurs following trauma, such as an ankle sprain. Damage or injury to the tissues that stabilize the tendons (retinaculum) can lead to chronic tendon subluxation. The symptoms of subluxation may include:  A snapping feeling of the tendon around the ankle bone  Sporadic pain behind the outside ankle bone  Ankle instability or weakness    Early treatment of a subluxation is critical, since a tendon that continues to sublux (move out of position) is more likely to tear or rupture. Therefore, if you feel the characteristic snapping, see a foot and ankle surgeon immediately.      Diagnosis  because peroneal tendon injuries are sometimes misdiagnosed and may worsen without proper treatment, prompt evaluation by a foot and ankle surgeon is advised. To diagnose a peroneal tendon injury, the surgeon will examine the foot and look for pain, instability, swelling, warmth, and weakness on the outer side of the ankle. In addition, an x-ray or other advanced imaging studies may be needed to fully evaluate the injury. The foot and ankle surgeon will also look for signs of an ankle sprain and other related injuries that sometimes accompany a peroneal tendon injury. Proper diagnosis is important because prolonged discomfort after a simple sprain may be a sign of additional problems.      Non-Surgical Treatment  Treatment depends on the type of peroneal tendon injury. Options include:  Immobilization. A cast or splint may be used to keep the foot and  ankle from moving and allow the injury to heal.  Medications. Oral or injected anti-inflammatory drugs may help relieve the pain and inflammation.  Physical therapy. Ice, heat, or ultrasound therapy may be used to reduce swelling and pain. As symptoms improve, exercises can be added to strengthen the muscles and improve range of motion and balance.  Bracing. The surgeon may provide a brace to use for a short while or during activities requiring repetitive ankle motion. Bracing may also be an option when a patient is not a candidate for surgery.      When is Surgery Needed?  In some cases, surgery may be needed to repair the tendon or tendons and perhaps the supporting structures of the foot. The foot and ankle surgeon will determine the most appropriate procedure for the patient's condition and lifestyle. After surgery, physical therapy is an important part of rehabilitation.

## 2023-05-12 NOTE — PROGRESS NOTES
"  [address] [facility] [address] [place] [address]  Phone: [phone]   [phone]    Patient's PCP:Magalie Roldan MD  Referring Provider:No ref. provider found    Subjective:      Chief Complaint: Post-op Evaluation (1.Repair of peroneus brevis tendon right 2.  Repair of anterior talofibular ligament right)      Systemic Doctor: Magalie Roldan  Date Last Seen: 03/31/2023  Blood Sugar: 142  Hemoglobin A1c: 8.8(03/31/23)       Date of Surgery:  04/27/2023  Procedure:  Repair of peroneus brevis tendon right 2.  Repair of anterior talofibular ligament right  HPI:   Loretta Lea is a 32 y.o. female who returns to the clinic today for her post-operative visit. Loretta Lea rates pain a 6/10 on a pain scale. Compliance of Care: patient presents in bandage, ace wrap, and boot cast.  Patient states that she has done some weight-bearing in the Cam Walker boot.  She also states her pain is improved and she is requesting pain medicine be decreased from every 4 hours to every 6 hours.    Vitals:    05/12/23 1107   Pulse: 98   SpO2: (!) 94%   Weight: (!) 146.5 kg (323 lb)   Height: 5' 7" (1.702 m)   PainSc:   6        Past Surgical History:   Procedure Laterality Date    APPENDECTOMY      ARTHROSCOPY OF ANKLE Right 05/04/2022    Procedure: ARTHROSCOPY, ANKLE;  Surgeon: Bimal Mccormick DPM;  Location: [facility] OR;  Service: Podiatry;  Laterality: Right;  CURT EXTERNAL ANKLE DISTRACTOR    CHOLECYSTECTOMY      DILATION AND CURETTAGE OF UTERUS      HYSTERECTOMY  2017    total, ovarian cysts, torsion, Berrault; hyst per Dr JESUS Manriquez    LAPAROSCOPIC APPENDECTOMY N/A 08/18/2021    Procedure: APPENDECTOMY, LAPAROSCOPIC;  Surgeon: Osiel Ramirez MD;  Location: [facility] OR;  Service: General;  Laterality: N/A;    OOPHORECTOMY      opherectom Left 2015    salpingo-opherectomy    REPAIR OF LIGAMENT OF ANKLE Right 06/23/2021    Procedure: REPAIR OF ANTERIOR TALOFIBULAR LIGAMENT CALCANEOFIBULAR " LIGAMENT;  Surgeon: Bimal Mccormick DPM;  Location: [facility] OR;  Service: Podiatry;  Laterality: Right;  WITH ARTHREX INTERNAL BRACE    REPAIR OF LIGAMENT OF ANKLE Right 4/27/2023    Procedure: REPAIR, LIGAMENT, ANKLE;  Surgeon: Aryan Poole DPM;  Location: [facility] OR;  Service: Podiatry;  Laterality: Right;    REPAIR OF TENDON OF LOWER EXTREMITY Right 4/27/2023    Procedure: REPAIR, TENDON, LOWER EXTREMITY;  Surgeon: Aryan Poole DPM;  Location: [facility] OR;  Service: Podiatry;  Laterality: Right;  arthrex    SURGICAL REMOVAL OF BONE SPUR Right 06/23/2021    Procedure: EXCISION OS TRIGONUM;  Surgeon: Bimal Mccormick DPM;  Location: [facility] OR;  Service: Podiatry;  Laterality: Right;    WISDOM TOOTH EXTRACTION       Past Medical History:   Diagnosis Date    Anxiety     Back pain     Bipolar disorder 2004    bipolar 2    Chronic bronchitis     Depression     Diabetes mellitus     GERD (gastroesophageal reflux disease)     HPV (human papilloma virus) infection     Insomnia     Migraines     Ovarian cyst     Pneumonia     PONV (postoperative nausea and vomiting)      Family History   Adopted: Yes   Problem Relation Age of Onset    No Known Problems Sister     No Known Problems Sister     No Known Problems Sister         Social History:   Marital Status:   Alcohol History:  reports that she does not currently use alcohol.  Tobacco History:  reports that she has been smoking cigarettes. She has a 27.00 pack-year smoking history. She has been exposed to tobacco smoke. She has never used smokeless tobacco.  Drug History:  reports that she does not currently use drugs.    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 200 each 5    blood-glucose meter (ACCU-CHEK GUIDE GLUCOSE  METER) Misc Use to test blood sugar 1 each 0    diphenhydrAMINE (BENADRYL) 50 MG tablet Take 50 mg by mouth every 6 (six) hours as needed for Itching.      HYDROcodone-acetaminophen (NORCO)  mg per tablet Take 1 tablet by mouth every 6 (six) hours as needed for Pain. 28 tablet 0    lancets (ACCU-CHEK SOFTCLIX LANCETS) Misc Test blood sugar 4 (four) times daily before meals and nightly. 200 each 5    metFORMIN (GLUCOPHAGE-XR) 500 MG ER 24hr tablet Take 1 tablet (500 mg total) by mouth 2 (two) times daily with meals. 180 tablet 3    naproxen (NAPROSYN) 500 MG tablet Take 500 mg by mouth 2 (two) times daily.      ondansetron (ZOFRAN) 4 MG tablet Take 1 PO daily prn nausea. 30 tablet 1    ondansetron (ZOFRAN-ODT) 4 MG TbDL Take 2 tablets (8 mg total) by mouth every 8 (eight) hours as needed (Nausea). 30 tablet 0    pyridoxine, vitamin B6, (VITAMIN B-6) 25 MG Tab Take 25 mg by mouth once daily.      QUEtiapine (SEROQUEL) 300 MG Tab TAKE 1 TABLET BY MOUTH ONCE DAILY AT BEDTIME 90 tablet 1    semaglutide (OZEMPIC) 0.25 mg or 0.5 mg (2 mg/3 mL) pen injector Inject 0.5 mg into the skin every 7 days. 3 each 3    sertraline (ZOLOFT) 100 MG tablet TAKE ONE TABLET BY MOUTH ONCE DAILY 90 tablet 1     No current facility-administered medications for this visit.       Review of Systems   Constitutional:  Negative for chills, fatigue, fever and unexpected weight change.   HENT:  Negative for hearing loss and trouble swallowing.    Eyes:  Negative for photophobia and visual disturbance.   Respiratory:  Positive for shortness of breath. Negative for cough and wheezing.    Cardiovascular:  Negative for chest pain, palpitations and leg swelling.   Gastrointestinal:  Negative for abdominal pain and nausea.   Genitourinary:  Negative for dysuria and frequency.   Musculoskeletal:  Positive for arthralgias, gait problem, joint swelling and myalgias. Negative for back pain.   Skin:  Negative for rash and wound.   Neurological:   Negative for tremors, seizures, weakness, numbness and headaches.   Hematological:  Does not bruise/bleed easily.       Objective:        Post-op surgery of the right foot and ankle with normal healing, no signs of infection or dehiscence of wound. Hardware in place. Normal post op exam today. No redness, no drainage, no increase in local temperature, no significant swelling, sutures.steri-strips are intact.     Imaging:  None    Physical Exam:   Foot Exam  Physical Exam       Assessment:       1. Rupture of peroneal tendon, right, subsequent encounter    2. Sprain of anterior talofibular ligament of right ankle, sequela    3. Chronic pain of right ankle      Plan:   Rupture of peroneal tendon, right, subsequent encounter  -     HYDROcodone-acetaminophen (NORCO)  mg per tablet; Take 1 tablet by mouth every 6 (six) hours as needed for Pain.  Dispense: 28 tablet; Refill: 0    Sprain of anterior talofibular ligament of right ankle, sequela  -     HYDROcodone-acetaminophen (NORCO)  mg per tablet; Take 1 tablet by mouth every 6 (six) hours as needed for Pain.  Dispense: 28 tablet; Refill: 0    Chronic pain of right ankle  -     HYDROcodone-acetaminophen (NORCO)  mg per tablet; Take 1 tablet by mouth every 6 (six) hours as needed for Pain.  Dispense: 28 tablet; Refill: 0      Follow up in about 4 weeks (around 6/9/2023), or if symptoms worsen or fail to improve.    Procedures - Steri-Strips removed.  Surgical incisions are well healed.    Patient is okay to begin showering.  She can also slowly begin weight-bearing in the Cam Walker boot.  Encouraged her to remove the boot while seated sleeping.  Encouraged her to work on non resistance range-of-motion exercises for the foot and ankle.  Ice as needed for pain and swelling.    CAM walker boot with weight bearing  Ice to painful area, 15 minutes at a time  Ace-wrap to help with swelling  No barefoot   Keep affected extremity elevated while seated      This  note was created using Dragon voice recognition software that occasionally misinterpreted phrases or words.

## 2023-05-30 ENCOUNTER — PATIENT MESSAGE (OUTPATIENT)
Dept: PODIATRY | Facility: CLINIC | Age: 33
End: 2023-05-30
Payer: MEDICAID

## 2023-05-30 NOTE — PATIENT INSTRUCTIONS
Peroneal Tendon Injuries    What Are the Peroneal Tendons?  A tendon is a band of tissue that connects a muscle to a bone. The two peroneal tendons in the foot run [id number] behind the outer ankle bone. One peroneal tendon attaches to the outer part of the midfoot, while the other tendon runs under the foot and attaches near the inside of the arch. The main function of the peroneal tendons is to stabilize the foot and ankle and protect them from sprains.      Causes and Symptoms of Peroneal Tendon Injuries  Peroneal tendon injuries may be acute (occurring suddenly) or chronic (developing over a period of time). They most commonly occur in individuals who participate in sports that involve repetitive ankle motion. In addition, people with higher arches are at risk for developing peroneal tendon injuries. Basic types of peroneal tendon injuries are tendonitis, tears, and subluxation.    Tendonitis is an inflammation of one or both tendons. The inflammation is caused by activities involving repetitive use of the tendon, overuse of the tendon, or trauma (such as an ankle sprain). Symptoms of tendonitis include:  Pain  Swelling  Warmth to the touch    Acute tears are caused by repetitive activity or trauma. Immediate symptoms of acute tears include:  Pain  Swelling  Weakness or instability of the foot and ankle    As time goes on, these tears may lead to a change in the shape of the foot, in which the arch may become higher.    Degenerative tears (tendonosis) are usually due to overuse and occur over long periods of time - often years. In degenerative tears, the tendon is like taffy that has been overstretched until it becomes thin and eventually frays. Having high arches also puts you at risk for developing a degenerative tear. The symptoms of degenerative tears may include:  Sporadic pain (occurring from time to time) on the outside of the ankle  Weakness or instability in the ankle  An increase in the height of the  arch    Subluxation - one or both tendons have slipped out of their normal position. In some cases, subluxation is due to a condition in which a person is born with a variation in the shape of the bone or muscle. In other cases, subluxation occurs following trauma, such as an ankle sprain. Damage or injury to the tissues that stabilize the tendons (retinaculum) can lead to chronic tendon subluxation. The symptoms of subluxation may include:  A snapping feeling of the tendon around the ankle bone  Sporadic pain behind the outside ankle bone  Ankle instability or weakness    Early treatment of a subluxation is critical, since a tendon that continues to sublux (move out of position) is more likely to tear or rupture. Therefore, if you feel the characteristic snapping, see a foot and ankle surgeon immediately.      Diagnosis  because peroneal tendon injuries are sometimes misdiagnosed and may worsen without proper treatment, prompt evaluation by a foot and ankle surgeon is advised. To diagnose a peroneal tendon injury, the surgeon will examine the foot and look for pain, instability, swelling, warmth, and weakness on the outer side of the ankle. In addition, an x-ray or other advanced imaging studies may be needed to fully evaluate the injury. The foot and ankle surgeon will also look for signs of an ankle sprain and other related injuries that sometimes accompany a peroneal tendon injury. Proper diagnosis is important because prolonged discomfort after a simple sprain may be a sign of additional problems.      Non-Surgical Treatment  Treatment depends on the type of peroneal tendon injury. Options include:  Immobilization. A cast or splint may be used to keep the foot and ankle from moving and allow the injury to heal.  Medications. Oral or injected anti-inflammatory drugs may help relieve the pain and inflammation.  Physical therapy. Ice, heat, or ultrasound therapy may be used to reduce swelling and pain. As symptoms  improve, exercises can be added to strengthen the muscles and improve range of motion and balance.  Bracing. The surgeon may provide a brace to use for a short while or during activities requiring repetitive ankle motion. Bracing may also be an option when a patient is not a candidate for surgery.      When is Surgery Needed?  In some cases, surgery may be needed to repair the tendon or tendons and perhaps the supporting structures of the foot. The foot and ankle surgeon will determine the most appropriate procedure for the patient's condition and lifestyle. After surgery, physical therapy is an important part of rehabilitation.

## 2023-05-31 ENCOUNTER — PATIENT MESSAGE (OUTPATIENT)
Dept: FAMILY MEDICINE | Facility: CLINIC | Age: 33
End: 2023-05-31

## 2023-05-31 DIAGNOSIS — F31.75 BIPOLAR DISORDER, IN PARTIAL REMISSION, MOST RECENT EPISODE DEPRESSED: ICD-10-CM

## 2023-06-02 ENCOUNTER — PATIENT MESSAGE (OUTPATIENT)
Dept: FAMILY MEDICINE | Facility: CLINIC | Age: 33
End: 2023-06-02

## 2023-06-02 RX ORDER — QUETIAPINE FUMARATE 150 MG/1
TABLET, FILM COATED ORAL
Qty: 90 TABLET | Refills: 0 | Status: SHIPPED | OUTPATIENT
Start: 2023-06-02 | End: 2023-06-27

## 2023-06-11 ENCOUNTER — PATIENT MESSAGE (OUTPATIENT)
Dept: ADMINISTRATIVE | Facility: OTHER | Age: 33
End: 2023-06-11
Payer: MEDICAID

## 2023-06-12 ENCOUNTER — OFFICE VISIT (OUTPATIENT)
Dept: PODIATRY | Facility: CLINIC | Age: 33
End: 2023-06-12
Payer: MEDICAID

## 2023-06-12 VITALS — BODY MASS INDEX: 45.99 KG/M2 | OXYGEN SATURATION: 95 % | HEIGHT: 67 IN | HEART RATE: 105 BPM | WEIGHT: 293 LBS

## 2023-06-12 DIAGNOSIS — S86.311D RUPTURE OF PERONEAL TENDON, RIGHT, SUBSEQUENT ENCOUNTER: Primary | ICD-10-CM

## 2023-06-12 DIAGNOSIS — G57.81 NEURITIS OF RIGHT SURAL NERVE: ICD-10-CM

## 2023-06-12 DIAGNOSIS — G89.29 CHRONIC PAIN OF RIGHT ANKLE: ICD-10-CM

## 2023-06-12 DIAGNOSIS — S93.491S SPRAIN OF ANTERIOR TALOFIBULAR LIGAMENT OF RIGHT ANKLE, SEQUELA: ICD-10-CM

## 2023-06-12 DIAGNOSIS — M25.571 CHRONIC PAIN OF RIGHT ANKLE: ICD-10-CM

## 2023-06-12 PROCEDURE — 1159F PR MEDICATION LIST DOCUMENTED IN MEDICAL RECORD: ICD-10-PCS | Mod: CPTII,,, | Performed by: PODIATRIST

## 2023-06-12 PROCEDURE — 99999 PR PBB SHADOW E&M-EST. PATIENT-LVL III: CPT | Mod: PBBFAC,,, | Performed by: PODIATRIST

## 2023-06-12 PROCEDURE — 99999 PR PBB SHADOW E&M-EST. PATIENT-LVL III: ICD-10-PCS | Mod: PBBFAC,,, | Performed by: PODIATRIST

## 2023-06-12 PROCEDURE — 1160F RVW MEDS BY RX/DR IN RCRD: CPT | Mod: CPTII,,, | Performed by: PODIATRIST

## 2023-06-12 PROCEDURE — 1160F PR REVIEW ALL MEDS BY PRESCRIBER/CLIN PHARMACIST DOCUMENTED: ICD-10-PCS | Mod: CPTII,,, | Performed by: PODIATRIST

## 2023-06-12 PROCEDURE — 3008F BODY MASS INDEX DOCD: CPT | Mod: CPTII,,, | Performed by: PODIATRIST

## 2023-06-12 PROCEDURE — 3052F PR MOST RECENT HEMOGLOBIN A1C LEVEL 8.0 - < 9.0%: ICD-10-PCS | Mod: CPTII,,, | Performed by: PODIATRIST

## 2023-06-12 PROCEDURE — 99024 PR POST-OP FOLLOW-UP VISIT: ICD-10-PCS | Mod: ,,, | Performed by: PODIATRIST

## 2023-06-12 PROCEDURE — 99213 OFFICE O/P EST LOW 20 MIN: CPT | Mod: PBBFAC,PN | Performed by: PODIATRIST

## 2023-06-12 PROCEDURE — 99024 POSTOP FOLLOW-UP VISIT: CPT | Mod: ,,, | Performed by: PODIATRIST

## 2023-06-12 PROCEDURE — 3052F HG A1C>EQUAL 8.0%<EQUAL 9.0%: CPT | Mod: CPTII,,, | Performed by: PODIATRIST

## 2023-06-12 PROCEDURE — 3008F PR BODY MASS INDEX (BMI) DOCUMENTED: ICD-10-PCS | Mod: CPTII,,, | Performed by: PODIATRIST

## 2023-06-12 PROCEDURE — 1159F MED LIST DOCD IN RCRD: CPT | Mod: CPTII,,, | Performed by: PODIATRIST

## 2023-06-12 RX ORDER — HYDROCODONE BITARTRATE AND ACETAMINOPHEN 10; 325 MG/1; MG/1
1 TABLET ORAL EVERY 6 HOURS PRN
Qty: 28 TABLET | Refills: 0 | Status: SHIPPED | OUTPATIENT
Start: 2023-06-12 | End: 2023-07-10 | Stop reason: SDUPTHER

## 2023-06-12 RX ORDER — GABAPENTIN 300 MG/1
300 CAPSULE ORAL NIGHTLY
Qty: 30 CAPSULE | Refills: 2 | Status: SHIPPED | OUTPATIENT
Start: 2023-06-12 | End: 2023-07-10 | Stop reason: SDUPTHER

## 2023-06-12 NOTE — PROGRESS NOTES
"  [address] [facility] [address] [place] [address]  Phone: [phone]   [phone]    Patient's PCP:Magalie Roldan MD  Referring Provider:No ref. provider found    Subjective:      Chief Complaint: Follow-up      Date of Surgery: 04/27/2023  Procedure: right ankle tendon repair    HPI:   Loretta Lea is a 32 y.o. female who returns to the clinic today for her post-operative visit. Loretta Lea rates pain a 6/10 on a pain scale. Compliance of Care:  Paper shunt presents weight-bearing in Cam boot.  She states that she has been doing weight-bearing around her house without the boot.  She states she is still having pain and swelling on the outside of the ankle.  She also states that she is having numbness and burning along the outside of the foot.  She states that this is unchanged since the 1st surgery on her ankle several years ago.    Vitals:    06/12/23 0921   Pulse: 105   SpO2: 95%   Weight: (!) 147.2 kg (324 lb 8 oz)   Height: 5' 7" (1.702 m)        Past Surgical History:   Procedure Laterality Date    APPENDECTOMY      ARTHROSCOPY OF ANKLE Right 05/04/2022    Procedure: ARTHROSCOPY, ANKLE;  Surgeon: Bimal Mccormick DPM;  Location: [facility] OR;  Service: Podiatry;  Laterality: Right;  CURT EXTERNAL ANKLE DISTRACTOR    CHOLECYSTECTOMY      DILATION AND CURETTAGE OF UTERUS      HYSTERECTOMY  2017    total, ovarian cysts, torsion, [place]; hyst per Dr JESUS Manriquez    LAPAROSCOPIC APPENDECTOMY N/A 08/18/2021    Procedure: APPENDECTOMY, LAPAROSCOPIC;  Surgeon: Osiel Ramirez MD;  Location: [facility] OR;  Service: General;  Laterality: N/A;    OOPHORECTOMY      opherectom Left 2015    salpingo-opherectomy    REPAIR OF LIGAMENT OF ANKLE Right 06/23/2021    Procedure: REPAIR OF ANTERIOR TALOFIBULAR LIGAMENT CALCANEOFIBULAR LIGAMENT;  Surgeon: Bimal Mccormick DPM;  Location: [facility] OR;  Service: Podiatry;  Laterality: Right;  WITH ARTHREX INTERNAL BRACE    REPAIR OF LIGAMENT OF ANKLE " Right 4/27/2023    Procedure: REPAIR, LIGAMENT, ANKLE;  Surgeon: Aryan Poole DPM;  Location: [facility] OR;  Service: Podiatry;  Laterality: Right;    REPAIR OF TENDON OF LOWER EXTREMITY Right 4/27/2023    Procedure: REPAIR, TENDON, LOWER EXTREMITY;  Surgeon: Aryan Poole DPM;  Location: [facility] OR;  Service: Podiatry;  Laterality: Right;  arthrex    SURGICAL REMOVAL OF BONE SPUR Right 06/23/2021    Procedure: EXCISION OS TRIGONUM;  Surgeon: Bimal Mccormick DPM;  Location: [facility] OR;  Service: Podiatry;  Laterality: Right;    WISDOM TOOTH EXTRACTION       Past Medical History:   Diagnosis Date    Anxiety     Back pain     Bipolar disorder 2004    bipolar 2    Chronic bronchitis     Depression     Diabetes mellitus     GERD (gastroesophageal reflux disease)     HPV (human papilloma virus) infection     Insomnia     Migraines     Ovarian cyst     Pneumonia     PONV (postoperative nausea and vomiting)      Family History   Adopted: Yes   Problem Relation Age of Onset    No Known Problems Sister     No Known Problems Sister     No Known Problems Sister         Social History:   Marital Status:   Alcohol History:  reports that she does not currently use alcohol.  Tobacco History:  reports that she has been smoking cigarettes. She has a 27.00 pack-year smoking history. She has been exposed to tobacco smoke. She has never used smokeless tobacco.  Drug History:  reports that she does not currently use drugs.    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 200 each 5    blood-glucose meter (ACCU-CHEK GUIDE GLUCOSE METER) [facility] Use to test blood sugar 1 each 0    diphenhydrAMINE (BENADRYL) 50 MG tablet Take 50 mg by mouth every 6 (six) hours as needed for Itching.      lancets  (ACCU-CHEK SOFTCLIX LANCETS) Misc Test blood sugar 4 (four) times daily before meals and nightly. 200 each 5    metFORMIN (GLUCOPHAGE-XR) 500 MG ER 24hr tablet Take 1 tablet (500 mg total) by mouth 2 (two) times daily with meals. 180 tablet 3    naproxen (NAPROSYN) 500 MG tablet Take 500 mg by mouth 2 (two) times daily.      ondansetron (ZOFRAN) 4 MG tablet Take 1 PO daily prn nausea. 30 tablet 1    ondansetron (ZOFRAN-ODT) 4 MG TbDL Take 2 tablets (8 mg total) by mouth every 8 (eight) hours as needed (Nausea). 30 tablet 0    pyridoxine, vitamin B6, (B-6) 25 MG Tab Take 25 mg by mouth once daily.      QUEtiapine 150 mg Tab Take 3 tabs PO qHS. 90 tablet 0    semaglutide (OZEMPIC) 0.25 mg or 0.5 mg (2 mg/3 mL) pen injector Inject 0.5 mg into the skin every 7 days. 3 each 3    sertraline (ZOLOFT) 100 MG tablet TAKE ONE TABLET BY MOUTH ONCE DAILY 90 tablet 1    gabapentin (NEURONTIN) 300 MG capsule Take 1 capsule (300 mg total) by mouth every evening. 30 capsule 2    HYDROcodone-acetaminophen (NORCO)  mg per tablet Take 1 tablet by mouth every 6 (six) hours as needed for Pain. 28 tablet 0     No current facility-administered medications for this visit.       Review of Systems   Constitutional:  Negative for chills, fatigue, fever and unexpected weight change.   HENT:  Negative for hearing loss and trouble swallowing.    Eyes:  Negative for photophobia and visual disturbance.   Respiratory:  Positive for shortness of breath. Negative for cough and wheezing.    Cardiovascular:  Negative for chest pain, palpitations and leg swelling.   Gastrointestinal:  Negative for abdominal pain and nausea.   Genitourinary:  Negative for dysuria and frequency.   Musculoskeletal:  Positive for arthralgias, gait problem, joint swelling and myalgias. Negative for back pain.   Skin:  Negative for rash and wound.   Neurological:  Positive for numbness. Negative for tremors, seizures, weakness and headaches.   Hematological:  Does not  bruise/bleed easily.       Objective:        Post-op surgery of the right foot and ankle with no signs of infection or dehiscence of wound. No redness, no drainage, no increase in local temperature, no significant swelling, surgical incisions are well healed.  Moderate pain with range of motion.  Peroneal muscle strength 4/5.  Decreased sensation along the lateral foot along the course of the sural nerve.  There is pain on palpation with percussion overlying the incision from the patient's previous ankle surgery.    Imaging:  None    Physical Exam:   Foot Exam  Physical Exam       Assessment:       1. Rupture of peroneal tendon, right, subsequent encounter    2. Sprain of anterior talofibular ligament of right ankle, sequela    3. Chronic pain of right ankle    4. Neuritis of right sural nerve      Plan:   Rupture of peroneal tendon, right, subsequent encounter  -     Ambulatory referral/consult to Physical/Occupational Therapy; Future; Expected date: 06/19/2023  -     IMMOBILIZER FOR HOME USE  -     HYDROcodone-acetaminophen (NORCO)  mg per tablet; Take 1 tablet by mouth every 6 (six) hours as needed for Pain.  Dispense: 28 tablet; Refill: 0    Sprain of anterior talofibular ligament of right ankle, sequela  -     Ambulatory referral/consult to Physical/Occupational Therapy; Future; Expected date: 06/19/2023  -     IMMOBILIZER FOR HOME USE  -     HYDROcodone-acetaminophen (NORCO)  mg per tablet; Take 1 tablet by mouth every 6 (six) hours as needed for Pain.  Dispense: 28 tablet; Refill: 0    Chronic pain of right ankle  -     Ambulatory referral/consult to Physical/Occupational Therapy; Future; Expected date: 06/19/2023  -     IMMOBILIZER FOR HOME USE  -     HYDROcodone-acetaminophen (NORCO)  mg per tablet; Take 1 tablet by mouth every 6 (six) hours as needed for Pain.  Dispense: 28 tablet; Refill: 0    Neuritis of right sural nerve  -     gabapentin (NEURONTIN) 300 MG capsule; Take 1 capsule (300  mg total) by mouth every evening.  Dispense: 30 capsule; Refill: 2      Follow up in about 4 weeks (around 7/10/2023), or if symptoms worsen or fail to improve.    Procedures - None    I discussed with the patient that swelling and pain as well as some weakness in the tendons is normal and expected for this surgery.  For this I am going to refer her to physical therapy.  I am also going to have her transition from the cam boot to a supportive ankle brace for weight-bearing.  Continue icing daily for pain and swelling.    I discussed with her, as I have previously, findings consistent with neuritis of the sural nerve.  I explained that this is likely been present since her very 1st ankle surgery as she has complained with continued burning and numbness and the intraoperative findings of significant scar tissue in the area of the sural nerve.  Given her continued symptoms with this I am going to send in gabapentin for her to see if this provides any relief.  We did discuss other potential treatment options including injections.    This note was created using Dragon voice recognition software that occasionally misinterpreted phrases or words.

## 2023-06-13 ENCOUNTER — CLINICAL SUPPORT (OUTPATIENT)
Dept: REHABILITATION | Facility: HOSPITAL | Age: 33
End: 2023-06-13
Attending: PODIATRIST
Payer: MEDICAID

## 2023-06-13 ENCOUNTER — OFFICE VISIT (OUTPATIENT)
Dept: FAMILY MEDICINE | Facility: CLINIC | Age: 33
End: 2023-06-13
Payer: MEDICAID

## 2023-06-13 VITALS
OXYGEN SATURATION: 94 % | WEIGHT: 293 LBS | HEIGHT: 67 IN | SYSTOLIC BLOOD PRESSURE: 128 MMHG | BODY MASS INDEX: 45.99 KG/M2 | RESPIRATION RATE: 18 BRPM | HEART RATE: 111 BPM | DIASTOLIC BLOOD PRESSURE: 70 MMHG

## 2023-06-13 DIAGNOSIS — R68.89 DECREASED FUNCTIONAL ACTIVITY TOLERANCE: ICD-10-CM

## 2023-06-13 DIAGNOSIS — F31.77 BIPOLAR DISORDER, IN PARTIAL REMISSION, MOST RECENT EPISODE MIXED: ICD-10-CM

## 2023-06-13 DIAGNOSIS — M25.571 CHRONIC PAIN OF RIGHT ANKLE: ICD-10-CM

## 2023-06-13 DIAGNOSIS — G89.29 CHRONIC PAIN OF RIGHT ANKLE: ICD-10-CM

## 2023-06-13 DIAGNOSIS — Z55.9 SPECIAL EDUCATIONAL NEEDS: ICD-10-CM

## 2023-06-13 DIAGNOSIS — R26.2 DIFFICULTY IN WALKING INVOLVING ANKLE AND FOOT JOINT: ICD-10-CM

## 2023-06-13 DIAGNOSIS — M25.671 DECREASED RANGE OF MOTION OF RIGHT ANKLE: ICD-10-CM

## 2023-06-13 DIAGNOSIS — S93.491S SPRAIN OF ANTERIOR TALOFIBULAR LIGAMENT OF RIGHT ANKLE, SEQUELA: ICD-10-CM

## 2023-06-13 DIAGNOSIS — S86.311D RUPTURE OF PERONEAL TENDON, RIGHT, SUBSEQUENT ENCOUNTER: ICD-10-CM

## 2023-06-13 DIAGNOSIS — M62.89 ABNORMAL INCREASED MUSCLE TONE: ICD-10-CM

## 2023-06-13 DIAGNOSIS — E11.65 UNCONTROLLED TYPE 2 DIABETES MELLITUS WITH HYPERGLYCEMIA: Primary | ICD-10-CM

## 2023-06-13 DIAGNOSIS — R29.898 ANKLE WEAKNESS: Primary | ICD-10-CM

## 2023-06-13 LAB — HBA1C MFR BLD: 7.8 %

## 2023-06-13 PROCEDURE — 83036 HEMOGLOBIN GLYCOSYLATED A1C: CPT | Mod: PBBFAC | Performed by: FAMILY MEDICINE

## 2023-06-13 PROCEDURE — 3074F SYST BP LT 130 MM HG: CPT | Mod: CPTII,,, | Performed by: FAMILY MEDICINE

## 2023-06-13 PROCEDURE — 99214 PR OFFICE/OUTPT VISIT, EST, LEVL IV, 30-39 MIN: ICD-10-PCS | Mod: S$PBB,,, | Performed by: FAMILY MEDICINE

## 2023-06-13 PROCEDURE — 97163 PT EVAL HIGH COMPLEX 45 MIN: CPT | Mod: PN

## 2023-06-13 PROCEDURE — 97110 THERAPEUTIC EXERCISES: CPT | Mod: PN

## 2023-06-13 PROCEDURE — 3074F PR MOST RECENT SYSTOLIC BLOOD PRESSURE < 130 MM HG: ICD-10-PCS | Mod: CPTII,,, | Performed by: FAMILY MEDICINE

## 2023-06-13 PROCEDURE — 3051F HG A1C>EQUAL 7.0%<8.0%: CPT | Mod: CPTII,,, | Performed by: FAMILY MEDICINE

## 2023-06-13 PROCEDURE — 3051F PR MOST RECENT HEMOGLOBIN A1C LEVEL 7.0 - < 8.0%: ICD-10-PCS | Mod: CPTII,,, | Performed by: FAMILY MEDICINE

## 2023-06-13 PROCEDURE — 99215 OFFICE O/P EST HI 40 MIN: CPT | Performed by: FAMILY MEDICINE

## 2023-06-13 PROCEDURE — 3078F PR MOST RECENT DIASTOLIC BLOOD PRESSURE < 80 MM HG: ICD-10-PCS | Mod: CPTII,,, | Performed by: FAMILY MEDICINE

## 2023-06-13 PROCEDURE — 3078F DIAST BP <80 MM HG: CPT | Mod: CPTII,,, | Performed by: FAMILY MEDICINE

## 2023-06-13 PROCEDURE — 99214 OFFICE O/P EST MOD 30 MIN: CPT | Mod: S$PBB,,, | Performed by: FAMILY MEDICINE

## 2023-06-13 PROCEDURE — 3008F PR BODY MASS INDEX (BMI) DOCUMENTED: ICD-10-PCS | Mod: CPTII,,, | Performed by: FAMILY MEDICINE

## 2023-06-13 PROCEDURE — 3008F BODY MASS INDEX DOCD: CPT | Mod: CPTII,,, | Performed by: FAMILY MEDICINE

## 2023-06-13 RX ORDER — SEMAGLUTIDE 1.34 MG/ML
1 INJECTION, SOLUTION SUBCUTANEOUS
Qty: 3 ML | Refills: 11 | Status: SHIPPED | OUTPATIENT
Start: 2023-06-13 | End: 2023-09-18 | Stop reason: SDUPTHER

## 2023-06-13 RX ORDER — SERTRALINE HYDROCHLORIDE 100 MG/1
150 TABLET, FILM COATED ORAL DAILY
Qty: 135 TABLET | Refills: 1 | Status: SHIPPED | OUTPATIENT
Start: 2023-06-13 | End: 2023-09-18 | Stop reason: SDUPTHER

## 2023-06-13 RX ORDER — CARIPRAZINE 1.5 MG/1
CAPSULE, GELATIN COATED ORAL
Qty: 60 CAPSULE | Refills: 1 | Status: SHIPPED | OUTPATIENT
Start: 2023-06-13 | End: 2023-06-27

## 2023-06-13 SDOH — SOCIAL DETERMINANTS OF HEALTH (SDOH): PROBLEMS RELATED TO EDUCATION AND LITERACY, UNSPECIFIED: Z55.9

## 2023-06-13 NOTE — PATIENT INSTRUCTIONS
Week 1:   - Take 1.5mg Vraylar  - Take 300mg Seroquel    Week 2:  - Take 3mg Vraylar  - Take 150mg Seroquel    Week 3:  - Stop Seroquel

## 2023-06-13 NOTE — PROGRESS NOTES
"  SUBJECTIVE:    Patient ID: Loretta Lea is a 32 y.o. female.    Chief Complaint: Follow-up, Diabetes, and Insomnia    HPI  Loretta Lea is a 32 y.o. female with a hx of Uncontrolled NIDDM, Bipolar disorder, and Class 3 Obesity. She comes in today for medication management.    *BPD - she is rx'd Sertraline 100mg and Quetiapine 450mg qHS (a recent increase from 300mg). She reports hypomanic episodes - has been up for at least 72h, 3 bouts in 2 weeks. Last night 24h. Mind will not shut off. Playing phone games and computer games. Irritable. Then crashes into a depressive state. Using Seroquel 450mg currently. Prior on Trazodone. Was on Abilify in past (at 16yo) did not work (first med). Everything her roommate does puts her on edge right now. Before this, full blown manic episode was 2 months ago.    Also on Sertraline 100mg. On edge, anxious. Panic attacks about once a week, though "not full blown where I feel I'm having a heart attack" which has happened before. +Palpitations, diaphoresis, SOB. No recognized triggers.     *DM - a1c 7.8% (decreased from 8.8% three months ago). Currently rx'd Metformin 500mg BID and Ozempic 0.5mg weekly. BG at home 120s. Endorses polydipsia, no polyuria, vision changes or paresthesias. No hypoglycemic episodes. No AE on Ozempic except when transitioning gets nausea. Has diarrhea chronically, not worse since on meds.     HM due: microalbumin, eye exam, foot exam      Office Visit on 06/13/2023   Component Date Value Ref Range Status    Hemoglobin A1C, POC 06/13/2023 7.8  % Final   Admission on 04/27/2023, Discharged on 04/27/2023   Component Date Value Ref Range Status    POC Glucose 04/27/2023 144 (H)  70 - 110 Final    POC Glucose 04/27/2023 173 (H)  70 - 110 Final   Lab Visit on 04/25/2023   Component Date Value Ref Range Status    WBC 04/25/2023 13.05 (H)  3.90 - 12.70 K/uL Final    RBC 04/25/2023 5.65 (H)  4.00 - 5.40 M/uL Final    Hemoglobin 04/25/2023 14.8  " 12.0 - 16.0 g/dL Final    Hematocrit 04/25/2023 45.5  37.0 - 48.5 % Final    MCV 04/25/2023 81 (L)  82 - 98 fL Final    MCH 04/25/2023 26.2 (L)  27.0 - 31.0 pg Final    MCHC 04/25/2023 32.5  32.0 - 36.0 g/dL Final    RDW 04/25/2023 15.4 (H)  11.5 - 14.5 % Final    Platelets 04/25/2023 410  150 - 450 K/uL Final    MPV 04/25/2023 8.9 (L)  9.2 - 12.9 fL Final    Immature Granulocytes 04/25/2023 0.3  0.0 - 0.5 % Final    Gran # (ANC) 04/25/2023 9.2 (H)  1.8 - 7.7 K/uL Final    Immature Grans (Abs) 04/25/2023 0.04  0.00 - 0.04 K/uL Final    Lymph # 04/25/2023 3.1  1.0 - 4.8 K/uL Final    Mono # 04/25/2023 0.6  0.3 - 1.0 K/uL Final    Eos # 04/25/2023 0.1  0.0 - 0.5 K/uL Final    Baso # 04/25/2023 0.06  0.00 - 0.20 K/uL Final    nRBC 04/25/2023 0  0 /100 WBC Final    Gran % 04/25/2023 70.2  38.0 - 73.0 % Final    Lymph % 04/25/2023 23.5  18.0 - 48.0 % Final    Mono % 04/25/2023 4.8  4.0 - 15.0 % Final    Eosinophil % 04/25/2023 0.7  0.0 - 8.0 % Final    Basophil % 04/25/2023 0.5  0.0 - 1.9 % Final    Differential Method 04/25/2023 Automated   Final    Sodium 04/25/2023 136  136 - 145 mmol/L Final    Potassium 04/25/2023 4.4  3.5 - 5.1 mmol/L Final    Chloride 04/25/2023 99  95 - 110 mmol/L Final    CO2 04/25/2023 26  23 - 29 mmol/L Final    Glucose 04/25/2023 145 (H)  70 - 110 mg/dL Final    BUN 04/25/2023 13  6 - 20 mg/dL Final    Creatinine 04/25/2023 0.7  0.5 - 1.4 mg/dL Final    Calcium 04/25/2023 9.3  8.7 - 10.5 mg/dL Final    Total Protein 04/25/2023 8.4  6.0 - 8.4 g/dL Final    Albumin 04/25/2023 4.0  3.5 - 5.2 g/dL Final    Total Bilirubin 04/25/2023 0.4  0.1 - 1.0 mg/dL Final    Alkaline Phosphatase 04/25/2023 109  55 - 135 U/L Final    AST 04/25/2023 59 (H)  10 - 40 U/L Final    ALT 04/25/2023 69 (H)  10 - 44 U/L Final    Anion Gap 04/25/2023 11  8 - 16 mmol/L Final    eGFR 04/25/2023 >60.0  >60 mL/min/1.73 m^2 Final   Office Visit on 03/31/2023   Component Date Value Ref Range Status    Hemoglobin A1C, POC  03/31/2023 8.8  % Final   Lab Visit on 03/13/2023   Component Date Value Ref Range Status    HIV Screen 4th Generation wRfx 03/13/2023 Non Reactive  Non Reactive Final    Hepatitis C Ab 03/13/2023 Non Reactive  Non Reactive Final    TSH 03/13/2023 2.070  0.340 - 5.600 uIU/mL Final   Lab Visit on 03/13/2023   Component Date Value Ref Range Status    WBC 03/13/2023 13.52 (H)  3.90 - 12.70 K/uL Final    RBC 03/13/2023 5.75 (H)  4.00 - 5.40 M/uL Final    Hemoglobin 03/13/2023 15.0  12.0 - 16.0 g/dL Final    Hematocrit 03/13/2023 45.9  37.0 - 48.5 % Final    MCV 03/13/2023 80 (L)  82 - 98 fL Final    MCH 03/13/2023 26.1 (L)  27.0 - 31.0 pg Final    MCHC 03/13/2023 32.7  32.0 - 36.0 g/dL Final    RDW 03/13/2023 15.5 (H)  11.5 - 14.5 % Final    Platelets 03/13/2023 377  150 - 450 K/uL Final    MPV 03/13/2023 8.7 (L)  9.2 - 12.9 fL Final    Immature Granulocytes 03/13/2023 0.4  0.0 - 0.5 % Final    Gran # (ANC) 03/13/2023 8.8 (H)  1.8 - 7.7 K/uL Final    Immature Grans (Abs) 03/13/2023 0.05 (H)  0.00 - 0.04 K/uL Final    Lymph # 03/13/2023 3.7  1.0 - 4.8 K/uL Final    Mono # 03/13/2023 0.7  0.3 - 1.0 K/uL Final    Eos # 03/13/2023 0.1  0.0 - 0.5 K/uL Final    Baso # 03/13/2023 0.08  0.00 - 0.20 K/uL Final    nRBC 03/13/2023 0  0 /100 WBC Final    Gran % 03/13/2023 65.2  38.0 - 73.0 % Final    Lymph % 03/13/2023 27.4  18.0 - 48.0 % Final    Mono % 03/13/2023 5.5  4.0 - 15.0 % Final    Eosinophil % 03/13/2023 0.9  0.0 - 8.0 % Final    Basophil % 03/13/2023 0.6  0.0 - 1.9 % Final    Differential Method 03/13/2023 Automated   Final    Vitamin B-12 03/13/2023 874  210 - 950 pg/mL Final    Folate 03/13/2023 7.7  4.0 - 24.0 ng/mL Final    Ferritin 03/13/2023 62  20.0 - 300.0 ng/mL Final    Retic 03/13/2023 2.1  0.5 - 2.5 % Final    Erythropoietin 03/13/2023 23.0 (H)  2.6 - 18.5 mIU/mL Final    Vitamin B6 03/13/2023 2.4 (L)  3.4 - 65.2 ug/L Final   Lab Visit on 02/20/2023   Component Date Value Ref Range Status    Specimen UA  02/20/2023 Urine, Clean Catch   Final    Color, UA 02/20/2023 Yellow  Yellow, Straw, Raven Final    Appearance, UA 02/20/2023 Clear  Clear Final    pH, UA 02/20/2023 6.0  5.0 - 8.0 Final    Specific Gravity, UA 02/20/2023 1.030  1.005 - 1.030 Final    Protein, UA 02/20/2023 Trace (A)  Negative Final    Glucose, UA 02/20/2023 4+ (A)  Negative Final    Ketones, UA 02/20/2023 Negative  Negative Final    Bilirubin (UA) 02/20/2023 Negative  Negative Final    Occult Blood UA 02/20/2023 Negative  Negative Final    Nitrite, UA 02/20/2023 Negative  Negative Final    Urobilinogen, UA 02/20/2023 Negative  Negative EU/dL Final    Leukocytes, UA 02/20/2023 Negative  Negative Final    RBC, UA 02/20/2023 3  0 - 4 /hpf Final    WBC, UA 02/20/2023 3  0 - 5 /hpf Final    Bacteria 02/20/2023 Rare  None-Occ /hpf Final    Yeast, UA 02/20/2023 None  None Final    Squam Epithel, UA 02/20/2023 2  /hpf Final    Hyaline Casts, UA 02/20/2023 12 (A)  0-1/lpf /lpf Final    Microscopic Comment 02/20/2023 SEE COMMENT   Final   Hospital Outpatient Visit on 02/20/2023   Component Date Value Ref Range Status    WBC 02/20/2023 16.31 (H)  3.90 - 12.70 K/uL Final    RBC 02/20/2023 5.78 (H)  4.00 - 5.40 M/uL Final    Hemoglobin 02/20/2023 14.6  12.0 - 16.0 g/dL Final    Hematocrit 02/20/2023 46.4  37.0 - 48.5 % Final    MCV 02/20/2023 80 (L)  82 - 98 fL Final    MCH 02/20/2023 25.3 (L)  27.0 - 31.0 pg Final    MCHC 02/20/2023 31.5 (L)  32.0 - 36.0 g/dL Final    RDW 02/20/2023 16.0 (H)  11.5 - 14.5 % Final    Platelets 02/20/2023 388  150 - 450 K/uL Final    MPV 02/20/2023 9.2  9.2 - 12.9 fL Final    Immature Granulocytes 02/20/2023 0.4  0.0 - 0.5 % Final    Gran # (ANC) 02/20/2023 11.4 (H)  1.8 - 7.7 K/uL Final    Immature Grans (Abs) 02/20/2023 0.07 (H)  0.00 - 0.04 K/uL Final    Lymph # 02/20/2023 3.9  1.0 - 4.8 K/uL Final    Mono # 02/20/2023 0.8  0.3 - 1.0 K/uL Final    Eos # 02/20/2023 0.1  0.0 - 0.5 K/uL Final    Baso # 02/20/2023 0.06  0.00 -  0.20 K/uL Final    nRBC 02/20/2023 0  0 /100 WBC Final    Gran % 02/20/2023 69.7  38.0 - 73.0 % Final    Lymph % 02/20/2023 23.8  18.0 - 48.0 % Final    Mono % 02/20/2023 4.8  4.0 - 15.0 % Final    Eosinophil % 02/20/2023 0.9  0.0 - 8.0 % Final    Basophil % 02/20/2023 0.4  0.0 - 1.9 % Final    Differential Method 02/20/2023 Automated   Final   Lab Visit on 02/08/2023   Component Date Value Ref Range Status    Cholesterol 02/08/2023 208 (H)  120 - 199 mg/dL Final    Triglycerides 02/08/2023 472 (H)  30 - 150 mg/dL Final    HDL 02/08/2023 20 (L)  40 - 75 mg/dL Final    LDL Cholesterol 02/08/2023 Invalid, Trig>400.0  63.0 - 159.0 mg/dL Final    HDL/Cholesterol Ratio 02/08/2023 9.6 (L)  20.0 - 50.0 % Final    Total Cholesterol/HDL Ratio 02/08/2023 10.4 (H)  2.0 - 5.0 Final    Non-HDL Cholesterol 02/08/2023 188  mg/dL Final    Hemoglobin A1C 02/08/2023 9.7 (H)  4.5 - 6.2 % Final    Estimated Avg Glucose 02/08/2023 232 (H)  68 - 131 mg/dL Final    Sodium 02/08/2023 131 (L)  136 - 145 mmol/L Final    Potassium 02/08/2023 4.2  3.5 - 5.1 mmol/L Final    Chloride 02/08/2023 98  95 - 110 mmol/L Final    CO2 02/08/2023 25  23 - 29 mmol/L Final    Glucose 02/08/2023 271 (H)  70 - 110 mg/dL Final    BUN 02/08/2023 7  6 - 20 mg/dL Final    Creatinine 02/08/2023 0.6  0.5 - 1.4 mg/dL Final    Calcium 02/08/2023 8.5 (L)  8.7 - 10.5 mg/dL Final    Total Protein 02/08/2023 7.5  6.0 - 8.4 g/dL Final    Albumin 02/08/2023 3.5  3.5 - 5.2 g/dL Final    Total Bilirubin 02/08/2023 0.6  0.1 - 1.0 mg/dL Final    Alkaline Phosphatase 02/08/2023 121  55 - 135 U/L Final    AST 02/08/2023 34  10 - 40 U/L Final    ALT 02/08/2023 45 (H)  10 - 44 U/L Final    Anion Gap 02/08/2023 8  8 - 16 mmol/L Final    eGFR 02/08/2023 >60.0  >60 mL/min/1.73 m^2 Final       Past Medical History:   Diagnosis Date    Anxiety     Back pain     Bipolar disorder 2004    bipolar 2    Chronic bronchitis     Depression     Diabetes mellitus     GERD (gastroesophageal  reflux disease)     HPV (human papilloma virus) infection     Insomnia     Migraines     Ovarian cyst     Pneumonia     PONV (postoperative nausea and vomiting)      Past Surgical History:   Procedure Laterality Date    APPENDECTOMY      ARTHROSCOPY OF ANKLE Right 05/04/2022    Procedure: ARTHROSCOPY, ANKLE;  Surgeon: Bimal Mccormick DPM;  Location: [facility];  Service: Podiatry;  Laterality: Right;  CURT EXTERNAL ANKLE DISTRACTOR    CHOLECYSTECTOMY      DILATION AND CURETTAGE OF UTERUS      HYSTERECTOMY  2017    total, ovarian cysts, torsion, [place]; hyst per Dr JESUS Manriquez    LAPAROSCOPIC APPENDECTOMY N/A 08/18/2021    Procedure: APPENDECTOMY, LAPAROSCOPIC;  Surgeon: Osiel Ramirez MD;  Location: [facility];  Service: General;  Laterality: N/A;    OOPHORECTOMY      opherectom Left 2015    salpingo-opherectomy    REPAIR OF LIGAMENT OF ANKLE Right 06/23/2021    Procedure: REPAIR OF ANTERIOR TALOFIBULAR LIGAMENT CALCANEOFIBULAR LIGAMENT;  Surgeon: Bimal Mccormick DPM;  Location: [facility] OR;  Service: Podiatry;  Laterality: Right;  WITH ARTHREX INTERNAL BRACE    REPAIR OF LIGAMENT OF ANKLE Right 4/27/2023    Procedure: REPAIR, LIGAMENT, ANKLE;  Surgeon: Aryan Poole DPM;  Location: [facility] OR;  Service: Podiatry;  Laterality: Right;    REPAIR OF TENDON OF LOWER EXTREMITY Right 4/27/2023    Procedure: REPAIR, TENDON, LOWER EXTREMITY;  Surgeon: Aryan Poole DPM;  Location: [facility] OR;  Service: Podiatry;  Laterality: Right;  arthrex    SURGICAL REMOVAL OF BONE SPUR Right 06/23/2021    Procedure: EXCISION OS TRIGONUM;  Surgeon: Bimal Mccormick DPM;  Location: [facility];  Service: Podiatry;  Laterality: Right;    WISDOM TOOTH EXTRACTION       Family History   Adopted: Yes   Problem Relation Age of Onset    No Known Problems Sister     No Known Problems Sister     No Known Problems Sister        Tobacco History:  reports that she has been smoking cigarettes. She has a 27.00 pack-year smoking history. She has been exposed  to tobacco smoke. She has never used smokeless tobacco.  Alcohol History:  reports that she does not currently use alcohol.  Drug History:  reports that she does not currently use drugs.    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       Current Outpatient Medications:     blood sugar diagnostic Strp, Test blood sugar 4 (four) times daily before meals and nightly., Disp: 200 each, Rfl: 5    blood-glucose meter (ACCU-CHEK GUIDE GLUCOSE METER) Misc, Use to test blood sugar, Disp: 1 each, Rfl: 0    gabapentin (NEURONTIN) 300 MG capsule, Take 1 capsule (300 mg total) by mouth every evening., Disp: 30 capsule, Rfl: 2    HYDROcodone-acetaminophen (NORCO)  mg per tablet, Take 1 tablet by mouth every 6 (six) hours as needed for Pain., Disp: 28 tablet, Rfl: 0    lancets (ACCU-CHEK SOFTCLIX LANCETS) Misc, Test blood sugar 4 (four) times daily before meals and nightly., Disp: 200 each, Rfl: 5    metFORMIN (GLUCOPHAGE-XR) 500 MG ER 24hr tablet, Take 1 tablet (500 mg total) by mouth 2 (two) times daily with meals., Disp: 180 tablet, Rfl: 3    naproxen (NAPROSYN) 500 MG tablet, Take 500 mg by mouth 2 (two) times daily., Disp: , Rfl:     ondansetron (ZOFRAN) 4 MG tablet, Take 1 PO daily prn nausea., Disp: 30 tablet, Rfl: 1    ondansetron (ZOFRAN-ODT) 4 MG TbDL, Take 2 tablets (8 mg total) by mouth every 8 (eight) hours as needed (Nausea)., Disp: 30 tablet, Rfl: 0    pyridoxine, vitamin B6, (B-6) 25 MG Tab, Take 25 mg by mouth once daily., Disp: , Rfl:     clotrimazole (LOTRIMIN) 1 % cream, Apply topically 2 (two) times daily. for 7 days, Disp: 60 g, Rfl: 0    hydrOXYzine pamoate (VISTARIL) 25 MG Cap, Take 1 capsule (25 mg total) by mouth every 4 (four) hours as needed (itching)., Disp: 12 capsule, Rfl: 0    risperiDONE (RISPERDAL) 1 MG tablet, Take 1 tablet (1 mg total) by mouth 2 (two) times daily., Disp: 60 tablet, Rfl: 1    semaglutide  "(OZEMPIC) 1 mg/dose (4 mg/3 mL), Inject 1 mg into the skin every 7 days., Disp: 3 mL, Rfl: 11    sertraline (ZOLOFT) 100 MG tablet, Take 1.5 tablets (150 mg total) by mouth once daily., Disp: 135 tablet, Rfl: 1    traZODone (DESYREL) 50 MG tablet, Take 1-2 tabs PO qHS prn insomnia., Disp: 60 tablet, Rfl: 1    Review of Systems  As in HPI           Objective:      Vitals:    06/13/23 0806   BP: 128/70   Pulse: (!) 111   Resp: 18   SpO2: (!) 94%   Weight: (!) 146.1 kg (322 lb)   Height: 5' 7" (1.702 m)     Physical Exam  Vitals reviewed.   Constitutional:       General: She is not in acute distress.     Appearance: She is obese.   Cardiovascular:      Rate and Rhythm: Regular rhythm. Tachycardia present.      Pulses: Normal pulses.      Heart sounds: Normal heart sounds.   Pulmonary:      Effort: Pulmonary effort is normal.      Breath sounds: Normal breath sounds.   Neurological:      General: No focal deficit present.      Mental Status: She is alert and oriented to person, place, and time.   Psychiatric:         Mood and Affect: Mood normal.         Behavior: Behavior normal.            Assessment:       1. Uncontrolled type 2 diabetes mellitus with hyperglycemia    2. Bipolar disorder, in partial remission, most recent episode mixed             Plan:       Uncontrolled type 2 diabetes mellitus with hyperglycemia  -     Hemoglobin A1C, POCT  -     Ambulatory referral/consult to Ophthalmology; Future; Expected date: 06/20/2023  -     semaglutide (OZEMPIC) 1 mg/dose (4 mg/3 mL); Inject 1 mg into the skin every 7 days.  Dispense: 3 mL; Refill: 11    Bipolar disorder, in partial remission, most recent episode mixed  -     Hemoglobin A1C, POCT  -     sertraline (ZOLOFT) 100 MG tablet; Take 1.5 tablets (150 mg total) by mouth once daily.  Dispense: 135 tablet; Refill: 1  -     Discontinue: cariprazine (VRAYLAR) 1.5 mg Cap; Take 1 cap PO daily x 1 week, then increase to 2 caps PO daily.  Dispense: 60 capsule; Refill: " 1    - increase sertraline to 150mg  - start vraylar and off seroquel  - increase ozempic to 1mg    No follow-ups on file.        6/30/2023 Magalie Roldan M.D.

## 2023-06-14 PROBLEM — M25.671 DECREASED RANGE OF MOTION OF RIGHT ANKLE: Status: ACTIVE | Noted: 2023-06-14

## 2023-06-14 PROBLEM — R68.89 DECREASED FUNCTIONAL ACTIVITY TOLERANCE: Status: ACTIVE | Noted: 2023-06-14

## 2023-06-14 PROBLEM — M62.89 ABNORMAL INCREASED MUSCLE TONE: Status: ACTIVE | Noted: 2023-06-14

## 2023-06-14 PROBLEM — R29.898 ANKLE WEAKNESS: Status: ACTIVE | Noted: 2023-06-14

## 2023-06-14 PROBLEM — R26.2 DIFFICULTY IN WALKING INVOLVING ANKLE AND FOOT JOINT: Status: ACTIVE | Noted: 2023-06-14

## 2023-06-14 PROBLEM — Z55.9 SPECIAL EDUCATIONAL NEEDS: Status: ACTIVE | Noted: 2023-06-14

## 2023-06-15 NOTE — PLAN OF CARE
"[facility]   Physical Therapy Initial Evaluation     Date: 6/13/2023   Name: Loretta Lea  Clinic Number: [id number]    Therapy Diagnosis:   Encounter Diagnoses   Name Primary?    Rupture of peroneal tendon, right, subsequent encounter     Sprain of anterior talofibular ligament of right ankle, sequela     Chronic pain of right ankle     Decreased range of motion of right ankle     Abnormal increased muscle tone     Ankle weakness Yes    Difficulty in walking involving ankle and foot joint     Decreased functional activity tolerance     Special educational needs      Physician: Aryan Poole DPM    Physician Orders: PT Eval and Treat   Medical Diagnosis from Referral: S86.311D (ICD-10-CM) - Rupture of peroneal tendon, right, subsequent encounter S93.491S (ICD-10-CM) - Sprain of anterior talofibular ligament of right ankle, sequela M25.571,G89.29 (ICD-10-CM) - Chronic pain of right ankle   Evaluation Date: 6/13/2023  Authorization Period Expiration: 06/11/2024  Plan of Care Expiration: 08/25/2023 at 2x/wk x 8 weeks.  Progress Note Due: 07/13/2023  Visit # / Visits authorized: 1/ 1   FOTO: 1/ 3     Time In: 11:16 am  Time Out: 12:12 pm  Total Appointment Time (timed & untimed codes): 56 minutes    Precautions: Standard and surgical  SUBJECTIVE   Date of onset: This patient's current surgery was 6 weeks ago. 04/27/2023    History of current condition - Loretta reports: she is now 6 weeks out from her most recent ankle surgery. She saw her Dr yesterday. Today is her first day out of the walking boot and into an ankle brace. This is her 3rd surgery on the right ankle. She reports that on her first surgery (approximately 2 years ago" she had "some ligaments fixed."(postero infero lateral incision light scar). Then she had an ankle scope where a "partial tendon on the top was fixed(ATFL)(two anterior circles). The Dr also tried to remove scar tissue from around the Sural nerve. Her " "third and most recent surgery are the two freshest scars. This recent surgery's issues began about a year ago after stepping in a gopher hole and then stepping off of the porch. Her ankle continued to hurt and swell. That is when testing showed the peroneal tendon tear and the need for the ATFL area to be cleaned up and fixed again. She is now six weeks post op. Her surgeon told her that she can do whatever she wants, but to take a break if she has pain. "He said the tendons were fixed at 6 weeks out." She does go without her ankle brace at times in the house, but she will wear it if she anticipates prolonged standing or when she leaves the house and goes into the public for activity. She reports that she is aware that she will always have pain due to the sural nerve damage, but it is tolerable.      Falls: The patient reports no recent falls.     Imaging, No recent imaging is available.    Prior Therapy: The patient has not had PT for this recent ankle surgery, but she did have PT after her previous two ankle surgeries.  Social History: She lives with her  and has a roommate who is there 24 hours a day to help her if needed when her  is at work.   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   Prior Level of Function: Her activities were tolerable but limited due to right ankle pain and weakness.  Current Level of Function: The patient's weight bearing activities are limited by post surgical pain and weakness. She can currently tolerate weight bearing activities with rest and while wearing her ankle brace, but she continues to have pain.    Pain:  Current 5/10, worst 8/10, best 3/10   Location: The right ankle's lateral side and distally into the forefoot.  Description: Burning, Throbbing, Tingling, Numb, Sharp, Shooting, and Variable.  Aggravating Factors: Standing, Touching the dorsal right foot, Walking, Night Time, Getting out of bed/chair, and over activity without the " "brace.   Easing Factors: pain medication, ice, nothing, rest, and elevationice and TENs and brace.    Patients goals: "I want to be able to safely take care of my dogs and train them."     Medical History:   Past Medical History:   Diagnosis Date    Anxiety     Back pain     Bipolar disorder 2004    bipolar 2    Chronic bronchitis     Depression     Diabetes mellitus     GERD (gastroesophageal reflux disease)     HPV (human papilloma virus) infection     Insomnia     Migraines     Ovarian cyst     Pneumonia     PONV (postoperative nausea and vomiting)      Surgical History:   Loretta Lea  has a past surgical history that includes Cholecystectomy; Dilation and curettage of uterus; Oophorectomy; opherectom (Left, 2015); Hysterectomy (2017); Repair of ligament of ankle (Right, 06/23/2021); Surgical removal of bone spur (Right, 06/23/2021); Laparoscopic appendectomy (N/A, 08/18/2021); [place] tooth extraction; Arthroscopy of ankle (Right, 05/04/2022); Appendectomy; Repair of tendon of lower extremity (Right, 4/27/2023); and Repair of ligament of ankle (Right, 4/27/2023).    Medications:   Loretta has a current medication list which includes the following prescription(s): blood sugar diagnostic, blood-glucose meter, vraylar, diphenhydramine, gabapentin, hydrocodone-acetaminophen, lancets, metformin, naproxen, ondansetron, ondansetron, pyridoxine (vitamin b6), quetiapine, ozempic, and sertraline.    Allergies:   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      OBJECTIVE     ANKLE    Impaired Ankle: Right      Structural/Postural Inspection/Palpation: The patient appears with an upright posture. She appears to ambulate with her ankle brace on and with the right leg in slight abduction to compensate for lack of heel strike and toe off. This may be related to the brace. No other abnormalities are noted.      Palpation tenderness " to: She is slightly tender to the lateral side of the right ankle. She is with significant tenderness, almost hypersensitivity, to the dorsum of her right foot and ankle.     Tone    Muscle Left Right Comment         Gastrocnemius Normal tone Moderate increase in tone    Soleus Normal tone Normal tone      ROM       Ankle AROM AROM  Comment    Left Right            Dorsiflexion +7* 0*     Plantarflexion 35* 35*     INversion 40* 15*     EVersion 20 * 10 *                MMT      Ankle   Comment    Left Right          Dorsiflexion 5/5 4/5    Plantarflexion 5/5 4/5    INversion 5/5 4-/5    EVersion 5/5 3+/5          Hip Flex. 5/5 5/5    Knee Flex. 5/5 5/5    Knee Ext. 5/5 5/5               GIRTH: No warmth noted at the evaluation.    Ankle   Comment    Left Right    Mid mal 24 cm 25.5 cm    Figure 8 54 cm 55.5 cm    5th met 24 cm 24 cm               SPECIAL TESTS      Ankle   Status Comment    Left Right            Anterior Drawer Negative. Negative.     Posterior Drawer Negative. Negative.     Talar Tilt Negative. Negative.         Limitation/Restriction for FOTO Ankle Survey    Therapist reviewed FOTO scores for Loretta Lea on 6/13/2023.   FOTO documents entered into [employer] - see Media section.    Limitation Score: 37% limitation at the evaluation.        TREATMENT     Total Treatment time (time-based codes) separate from Evaluation: 10 minutes      Loretta received the treatments listed below:    -gastrocnemius stretch with sheet with 30 second hold x 3  -soleus stretch with 30 second hold x 3  -right ankle red banded: dorsiflexion, plantarflexion, inversion, and eversion x 10 each direction    PATIENT EDUCATION AND HOME EXERCISES     Education provided:   -The patient received his initial written home exercise program. He returned demo to PT and was without questions.    Written Home Exercises Provided: yes. Exercises were reviewed and Loretta was able to demonstrate them prior to the end of the session.   Loretta demonstrated good  understanding of the education provided. See EMR under Patient Instructions for exercises provided during therapy sessions.  ASSESSMENT     Loretta is a 32 y.o. female referred to outpatient Physical Therapy with a medical diagnosis of S86.311D (ICD-10-CM) - Rupture of peroneal tendon, right, subsequent encounter S93.491S (ICD-10-CM) - Sprain of anterior talofibular ligament of right ankle, sequela M25.571,G89.29 (ICD-10-CM) - Chronic pain of right ankle. Patient presents with post surgical right ankle pain and weakness and increased muscle tone and difficulty walking and reports a decrease in functional ability and needing patient education and a custom written home exercise program.     Patient prognosis is Good.   Patientt will benefit from skilled outpatient Physical Therapy to address the deficits stated above and in the chart below, provide patient /family education, and to maximize patientt's level of independence.     Plan of care discussed with patient: Yes  Patient's spiritual, cultural and educational needs considered and patient is agreeable to the plan of care and goals as stated below:     Anticipated Barriers for therapy: Post surgical weakness and co-morbidities and two previous right ankle surgeries.    Medical Necessity is demonstrated by the following  History  Co-morbidities and personal factors that may impact the plan of care Co-morbidities:   anxiety, coping style/mechanism, depression, diabetes, difficulty sleeping, level of undertstanding of current condition, and back pain, bipolar disorder, chronic bronchitis, GERD, insomnia, migraines, and pneumonia.    Personal Factors:   coping style  social background  lifestyle  character  attitudes     high   Examination  Body Structures and Functions, activity limitations and participation restrictions that may impact the plan of care Body Regions:   lower extremities  ankle    Body Systems:    ROM  strength  gait  motor  control  motor learning  scar formation  Increased muscle tone and pain control.    Participation Restrictions:   none    Activity limitations:   Learning and applying knowledge  no deficits    General Tasks and Commands  undertaking multiple tasks    Communication  no deficits    Mobility  lifting and carrying objects  walking  driving (bike, car, motorcycle)    Self care  looking after one's health    Domestic Life  shopping  cooking  doing house work (cleaning house, washing dishes, laundry)  assisting others    Interactions/Relationships  no deficits    Life Areas  employment    Community and Social Life  community life  recreation and leisure         high   Clinical Presentation evolving clinical presentation with changing clinical characteristics high   Decision Making/ Complexity Score: high     Goals:    STG  Weeks/Visits Date Established  Date Met   1.Decrease max right ankle pain to 5/10 to improve the patient's quality of life.  4 weeks 6/13/2023   In Progress   2. Decrease right gastrocnemius muscle tone to minimal increase to improve heel strike and toe off during gait.  4 weeks 6/13/2023   In Progress   3.Increase right ankle strength 1/2 grade to improve the patient's tolerance to weight bearing activities on all surfaces.  4 weeks 6/13/2023   In Progress   4.Decrease FOTO limitation score to <=32% to improve her ability to properly train her dogs.  4 weeks 6/13/2023   In Progress   5.Fair initial written home exercise program knowledge and be without questions to have carryover after discharge from PT.  4 weeks 6/13/2023   In Progress     LTG Weeks/Visits Date Established  Date Met   1.Decrease max right ankle pain to 2/10 to improve the patient's quality of life.  8 weeks. 6/13/2023   In Progress   2. Decrease right gastrocnemius muscle tone to normal to minimal increase to improve heel strike and toe off during gait without wearing her ankle brace.  8 weeks. 6/13/2023     In Progress   3.Increase  right ankle strength one grade from evaluation date to improve the patient's tolerance to weight bearing activities on all surfaces.  8 weeks. 6/13/2023   In Progress   4.Decrease FOTO limitation score to <=27% to improve her ability to properly train her dogs.  8 weeks. 6/13/2023   In Progress   5.Good Progressed written home exercise program knowledge and be without questions to have carryover after discharge from PT.  8 weeks. 6/13/2023   In Progress     PLAN   Plan of care Certification: 6/13/2023 to 08/25/2023.    Outpatient Physical Therapy 2 times weekly for 8 weeks to include the following interventions: Electrical Stimulation unattended, Gait Training, Manual Therapy, Moist Heat/ Ice, Neuromuscular Re-ed, Orthotic Management and Training, Patient Education, Self Care, Therapeutic Activities, Therapeutic Exercise, Ultrasound, and care by a PTA.     Raimundo Best, PT      I CERTIFY THE NEED FOR THESE SERVICES FURNISHED UNDER THIS PLAN OF TREATMENT AND WHILE UNDER MY CARE   Physician's comments:     Physician's Signature: ___________________________________________________

## 2023-06-19 ENCOUNTER — HOSPITAL ENCOUNTER (EMERGENCY)
Facility: HOSPITAL | Age: 33
Discharge: HOME | End: 2023-06-19
Attending: EMERGENCY MEDICINE
Payer: MEDICAID

## 2023-06-19 VITALS
DIASTOLIC BLOOD PRESSURE: 87 MMHG | SYSTOLIC BLOOD PRESSURE: 155 MMHG | TEMPERATURE: 99 F | RESPIRATION RATE: 20 BRPM | HEART RATE: 87 BPM | OXYGEN SATURATION: 98 % | WEIGHT: 293 LBS | HEIGHT: 67 IN | BODY MASS INDEX: 45.99 KG/M2

## 2023-06-19 DIAGNOSIS — R21 RASH AND NONSPECIFIC SKIN ERUPTION: Primary | ICD-10-CM

## 2023-06-19 LAB — GLUCOSE SERPL-MCNC: 148 MG/DL (ref 70–110)

## 2023-06-19 PROCEDURE — 96374 THER/PROPH/DIAG INJ IV PUSH: CPT

## 2023-06-19 PROCEDURE — 63600175 PHARM REV CODE 636 W HCPCS: Performed by: EMERGENCY MEDICINE

## 2023-06-19 PROCEDURE — 99284 EMERGENCY DEPT VISIT MOD MDM: CPT | Mod: 25

## 2023-06-19 PROCEDURE — 82962 GLUCOSE BLOOD TEST: CPT

## 2023-06-19 PROCEDURE — 96375 TX/PRO/DX INJ NEW DRUG ADDON: CPT

## 2023-06-19 PROCEDURE — 25000003 PHARM REV CODE 250: Performed by: EMERGENCY MEDICINE

## 2023-06-19 PROCEDURE — 96361 HYDRATE IV INFUSION ADD-ON: CPT

## 2023-06-19 RX ORDER — HYDROXYZINE PAMOATE 25 MG/1
25 CAPSULE ORAL EVERY 4 HOURS PRN
Qty: 12 CAPSULE | Refills: 0 | Status: SHIPPED | OUTPATIENT
Start: 2023-06-19

## 2023-06-19 RX ORDER — FLUCONAZOLE 200 MG/1
200 TABLET ORAL DAILY
Qty: 7 TABLET | Refills: 0 | Status: SHIPPED | OUTPATIENT
Start: 2023-06-19 | End: 2023-06-26

## 2023-06-19 RX ORDER — CEPHALEXIN 500 MG/1
500 CAPSULE ORAL EVERY 12 HOURS
Qty: 14 CAPSULE | Refills: 0 | Status: SHIPPED | OUTPATIENT
Start: 2023-06-19 | End: 2023-06-26

## 2023-06-19 RX ORDER — FAMOTIDINE 10 MG/ML
20 INJECTION INTRAVENOUS
Status: COMPLETED | OUTPATIENT
Start: 2023-06-19 | End: 2023-06-19

## 2023-06-19 RX ORDER — CLOTRIMAZOLE 1 %
CREAM (GRAM) TOPICAL 2 TIMES DAILY
Qty: 60 G | Refills: 0 | Status: SHIPPED | OUTPATIENT
Start: 2023-06-19 | End: 2024-03-13

## 2023-06-19 RX ORDER — SODIUM CHLORIDE 9 MG/ML
1000 INJECTION, SOLUTION INTRAVENOUS
Status: COMPLETED | OUTPATIENT
Start: 2023-06-19 | End: 2023-06-19

## 2023-06-19 RX ORDER — METHYLPREDNISOLONE SOD SUCC 125 MG
125 VIAL (EA) INJECTION
Status: COMPLETED | OUTPATIENT
Start: 2023-06-19 | End: 2023-06-19

## 2023-06-19 RX ORDER — DIPHENHYDRAMINE HYDROCHLORIDE 50 MG/ML
25 INJECTION INTRAMUSCULAR; INTRAVENOUS
Status: COMPLETED | OUTPATIENT
Start: 2023-06-19 | End: 2023-06-19

## 2023-06-19 RX ADMIN — SODIUM CHLORIDE 1000 ML: 0.9 INJECTION, SOLUTION INTRAVENOUS at 02:06

## 2023-06-19 RX ADMIN — DIPHENHYDRAMINE HYDROCHLORIDE 25 MG: 50 INJECTION INTRAMUSCULAR; INTRAVENOUS at 02:06

## 2023-06-19 RX ADMIN — FAMOTIDINE 20 MG: 10 INJECTION, SOLUTION INTRAVENOUS at 02:06

## 2023-06-19 RX ADMIN — METHYLPREDNISOLONE SODIUM SUCCINATE 125 MG: 125 INJECTION, POWDER, FOR SOLUTION INTRAMUSCULAR; INTRAVENOUS at 02:06

## 2023-06-19 NOTE — ED PROVIDER NOTES
Encounter Date: 6/19/2023       History     Chief Complaint   Patient presents with    Rash     Rash to neck and chest x2 weeks     32-year-old obese female presents emergency department with complaint of raised itchy rash to her neck and chest that started approximately 2 weeks ago patient denies any new soaps laundry detergents, perfumes lotions antibiotics etc..  Patient states she is been taking Benadryl without relief of symptoms.  The patient denies any associated fevers    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     Past Medical History:   Diagnosis Date    Anxiety     Back pain     Bipolar disorder 2004    bipolar 2    Chronic bronchitis     Depression     Diabetes mellitus     GERD (gastroesophageal reflux disease)     HPV (human papilloma virus) infection     Insomnia     Migraines     Ovarian cyst     Pneumonia     PONV (postoperative nausea and vomiting)      Past Surgical History:   Procedure Laterality Date    APPENDECTOMY      ARTHROSCOPY OF ANKLE Right 05/04/2022    Procedure: ARTHROSCOPY, ANKLE;  Surgeon: Bimal Mccormick DPM;  Location: [facility] OR;  Service: Podiatry;  Laterality: Right;  CURT EXTERNAL ANKLE DISTRACTOR    CHOLECYSTECTOMY      DILATION AND CURETTAGE OF UTERUS      HYSTERECTOMY  2017    total, ovarian cysts, torsion, [place]; hyst per Dr JESUS Manriquez    LAPAROSCOPIC APPENDECTOMY N/A 08/18/2021    Procedure: APPENDECTOMY, LAPAROSCOPIC;  Surgeon: Osiel Ramirez MD;  Location: [facility] OR;  Service: General;  Laterality: N/A;    OOPHORECTOMY      opherectom Left 2015    salpingo-opherectomy    REPAIR OF LIGAMENT OF ANKLE Right 06/23/2021    Procedure: REPAIR OF ANTERIOR TALOFIBULAR LIGAMENT CALCANEOFIBULAR LIGAMENT;  Surgeon: Bimal Mccormick DPM;  Location: [facility] OR;  Service: Podiatry;  Laterality: Right;  WITH ARTHREX INTERNAL BRACE    REPAIR OF LIGAMENT OF ANKLE Right 4/27/2023    Procedure:  REPAIR, LIGAMENT, ANKLE;  Surgeon: Aryan Poole DPM;  Location: [facility] OR;  Service: Podiatry;  Laterality: Right;    REPAIR OF TENDON OF LOWER EXTREMITY Right 4/27/2023    Procedure: REPAIR, TENDON, LOWER EXTREMITY;  Surgeon: Aryan Poole DPM;  Location: [facility] OR;  Service: Podiatry;  Laterality: Right;  arthrex    SURGICAL REMOVAL OF BONE SPUR Right 06/23/2021    Procedure: EXCISION OS TRIGONUM;  Surgeon: Bimal Mccormick DPM;  Location: [facility] OR;  Service: Podiatry;  Laterality: Right;    WISDOM TOOTH EXTRACTION       Family History   Adopted: Yes   Problem Relation Age of Onset    No Known Problems Sister     No Known Problems Sister     No Known Problems Sister      Social History     Tobacco Use    Smoking status: Every Day     Packs/day: 1.50     Years: 18.00     Pack years: 27.00     Types: Cigarettes     Passive exposure: Current    Smokeless tobacco: Never    Tobacco comments:     4/25/23--pt instructed no smoking 24hours before sx, pt voiced understanding.   Substance Use Topics    Alcohol use: Not Currently     Comment: rare    Drug use: Not Currently     Comment: twice     Review of Systems   Constitutional: Negative.    Cardiovascular: Negative.    Gastrointestinal: Negative.    Musculoskeletal: Negative.    Skin:  Positive for rash.   Hematological: Negative.    Psychiatric/Behavioral: Negative.     All other systems reviewed and are negative.    Physical Exam     Initial Vitals [06/19/23 1341]   BP Pulse Resp Temp SpO2   126/77 99 18 98.3 °F (36.8 °C) 96 %      MAP       --         Physical Exam    Nursing note and vitals reviewed.  Constitutional: She appears well-developed and well-nourished.   Musculoskeletal:         General: Normal range of motion.     Neurological: She is alert and oriented to person, place, and time. She has normal strength.   Skin:   Patient has a red erythematous rash to the neck, anterior chest, and under both breast that is reddened, skin feels as if it has a rough scaly  feeling, the rash does mona with pressure       ED Course   Procedures  Labs Reviewed   POCT GLUCOSE - Abnormal; Notable for the following components:       Result Value    POC Glucose 148 (*)     All other components within normal limits   POCT GLUCOSE MONITORING CONTINUOUS          Imaging Results    None          Medications   0.9%  NaCl infusion (0 mLs Intravenous Stopped 6/19/23 1450)   diphenhydrAMINE injection 25 mg (25 mg Intravenous Given 6/19/23 1409)   famotidine (PF) injection 20 mg (20 mg Intravenous Given 6/19/23 1409)   methylPREDNISolone sodium succinate injection 125 mg (125 mg Intravenous Given 6/19/23 1449)     Medical Decision Making:   History:   Old Records Summarized: records from previous admission(s).  Initial Assessment:   32-year-old obese female presents emergency department with complaint of raised itchy rash to her neck and chest that started approximately 2 weeks ago patient denies any new soaps laundry detergents, perfumes lotions antibiotics etc..  Patient states she is been taking Benadryl without relief of symptoms.  The patient denies any associated fevers    Differential Diagnosis:   Considerations include contact dermatitis, fungal infection, allergic reaction  ED Management:  32-year-old female presents emergency department with a red raised itchy rash that blanches with pressure for last 2 weeks patient was given IV Benadryl p Solu-Medrol and Pepcid with very mild relief of symptoms.  The patient will be discharged home at this time with Vistaril for itching, Diflucan and clotrimazole for fungal infection, patient was referred to allergist, given return precautions                        Clinical Impression:   Final diagnoses:  [R21] Rash and nonspecific skin eruption (Primary)        ED Disposition Condition    Discharge Stable          ED Prescriptions       Medication Sig Dispense Start Date End Date Auth. Provider    clotrimazole (LOTRIMIN) 1 % cream Apply topically 2  (two) times daily. for 7 days 60 g 6/19/2023 6/26/2023 ARACELI Ford    fluconazole (DIFLUCAN) 200 MG Tab Take 1 tablet (200 mg total) by mouth once daily. for 7 days 7 tablet 6/19/2023 6/26/2023 ARACELI Ford    hydrOXYzine pamoate (VISTARIL) 25 MG Cap Take 1 capsule (25 mg total) by mouth every 4 (four) hours as needed (itching). 12 capsule 6/19/2023 -- ARACELI Ford    cephALEXin (KEFLEX) 500 MG capsule Take 1 capsule (500 mg total) by mouth every 12 (twelve) hours. for 7 days 14 capsule 6/19/2023 6/26/2023 ARACELI Ford          Follow-up Information       Follow up With Specialties Details Why Contact Info    Magalie Benjamin MD Family Medicine Schedule an appointment as soon as possible for a visit in 2 days  [address]  [address]  [place] [address]  [phone]      Rosemary Marquez MD Allergy, Allergy and Immunology, Immunology, Pediatric Allergy, Pediatric Immunology Schedule an appointment as soon as possible for a visit in 1 week  [address]  [address]  [place] [address]  [phone]               ARACELI Ford  06/19/23 1805

## 2023-06-19 NOTE — DISCHARGE INSTRUCTIONS
Take medications as directed  Follow-up as directed  Return if condition becomes worse for any concerns

## 2023-06-21 NOTE — PROGRESS NOTES
"[facility]   Physical Therapy Treatment Note     Name: Loretta Lea  Clinic Number: [id number]    Therapy Diagnosis:   Encounter Diagnoses   Name Primary?    Decreased range of motion of right ankle     Abnormal increased muscle tone     Ankle weakness Yes    Difficulty in walking involving ankle and foot joint     Decreased functional activity tolerance     Special educational needs      Physician: Aryan Poole DPM    Visit Date: 6/22/2023    Physician Orders: PT Eval and Treat   Medical Diagnosis from Referral: S86.311D (ICD-10-CM) - Rupture of peroneal tendon, right, subsequent encounter S93.491S (ICD-10-CM) - Sprain of anterior talofibular ligament of right ankle, sequela M25.571,G89.29 (ICD-10-CM) - Chronic pain of right ankle   Evaluation Date: 6/13/2023  Authorization Period Expiration: 12/31/2023.  Plan of Care Expiration: 08/25/2023 at 2x/wk x 8 weeks.  Progress Note Due: 07/13/2023  Visit # / Visits authorized: 1/? (2/16 on plan of care)   FOTO: 1/ 3     PTA Visit #: 0/5      Time In: 3:14 pm   Time Out: 4:20 pm  Total Appointment Time (timed & untimed codes): 55 minutes     Precautions: Standard and surgical  SUBJECTIVE     Pt reports: "I have been trying to do my exercises that you showed me. I don't feel like I was doing them correctly. I feel like some of the swelling has gone down. I'd say I am like a 3/10 right now, but I have been up on it al day." PT acknowledges.    She was compliant with home exercise program.    Response to previous treatment: This is the patient's first PT session since the evaluation.  Functional change: This is the patient's first PT session since the evaluation.    Pain: 3/10  Location: The right ankle's lateral side and distally into the forefoot.  OBJECTIVE     Objective Measures updated at progress report unless specified.     Treatment     Loretta received the treatments listed below for:  55 minutes  +++Per Medicaid guidelines, all " below billed as therapeutic exercises+++     Loretta received therapeutic exercises to develop strength, endurance, ROM, flexibility, and posture:  -+Nu-step on Level 4 x 8 minutes  -+Precor knee extension with 20 pounds x 15  -+Precor knee flexion with 30 pounds x 15  -gastrocnemius stretch on ramp for 30 seconds x 3  -+heel raises on blue foam with ankle brace on x 15  -+single leg stance on right on blue foam with 5 second effort x 8  -+Grade 1 joint mobilization by PT.   -right ankle red banded: dorsiflexion, plantarflexion, inversion, and eversion x 20 each direction  -soleus stretch with 30 second hold x 3    Loretta received the following supervised modalities after being cleared for contradictions: IFC Electrical Stimulation:  Loretta received IFC Electrical Stimulation for pain control applied to the right ankle. Pt received stimulation at 100% scan at a frequency of 0-10 bps for 15 minutes. Loretta tolderated treatment well without any adverse effects.      Loretta received cold pack for 15 minutes while on IFC.  Patient Education and Home Exercises     Home Exercises Provided and Patient Education Provided     Education provided:   -The patient is independent with her initial written home exercise program. She returned demo to PT again this day and was without questions.     Written Home Exercises Provided: Patient instructed to cont prior HEP. Exercises were reviewed and Loretta was able to demonstrate them prior to the end of the session.  Loretta demonstrated good  understanding of the education provided. See EMR under Patient Instructions for exercises provided during therapy sessions.  ASSESSMENT     Loretta continued with her PT plan of care. Today, she entered with a less antalgic gait pattern. Visible decrease in edema was noted. Per subjective, her program was progressed this day where she was able. Positioning and repetitions were adjusted if needed. Fatigue was present after her routine. No  increase in pain was reported, but her pain was still present. Modality was used this day to assist with her pain relief. She tolerated today's PT session well.     Loretta Is progressing well towards her goals.   Pt prognosis is Good.     Pt will continue to benefit from skilled outpatient physical therapy to address the deficits listed in the problem list box on initial evaluation, provide pt/family education and to maximize pt's level of independence in the home and community environment.     Pt's spiritual, cultural and educational needs considered and pt agreeable to plan of care and goals.     Anticipated barriers to physical therapy: Post surgical weakness and co-morbidities and two previous right ankle surgeries.    Goals:     STG  Weeks/Visits Date Established  Date Met   1.Decrease max right ankle pain to 5/10 to improve the patient's quality of life.  4 weeks 6/13/2023    In Progress 6/22/2023     2. Decrease right gastrocnemius muscle tone to minimal increase to improve heel strike and toe off during gait.  4 weeks 6/13/2023    In Progress 6/22/2023     3.Increase right ankle strength 1/2 grade to improve the patient's tolerance to weight bearing activities on all surfaces.  4 weeks 6/13/2023    In Progress 6/22/2023     4.Decrease FOTO limitation score to <=32% to improve her ability to properly train her dogs.  4 weeks 6/13/2023    In Progress 6/22/2023     5.Fair initial written home exercise program knowledge and be without questions to have carryover after discharge from PT.  4 weeks 6/13/2023    In Progress 6/22/2023        LTG Weeks/Visits Date Established  Date Met   1.Decrease max right ankle pain to 2/10 to improve the patient's quality of life.  8 weeks. 6/13/2023    In Progress 6/22/2023     2. Decrease right gastrocnemius muscle tone to normal to minimal increase to improve heel strike and toe off during gait without wearing her ankle brace.  8 weeks. 6/13/2023       In Progress 6/22/2023      3.Increase right ankle strength one grade from evaluation date to improve the patient's tolerance to weight bearing activities on all surfaces.  8 weeks. 6/13/2023    In Progress 6/22/2023     4.Decrease FOTO limitation score to <=27% to improve her ability to properly train her dogs.  8 weeks. 6/13/2023    In Progress 6/22/2023     5.Good Progressed written home exercise program knowledge and be without questions to have carryover after discharge from PT.  8 weeks. 6/13/2023    In Progress 6/22/2023        PLAN     Plan of care Certification: 6/13/2023 to 08/25/2023. Outpatient Physical Therapy 2 times weekly for 8 weeks. Plan to safely maximize the right foot and ankle's strength and motion to provide long term pain relief to perform her desired activities.        Raimundo Best, PT

## 2023-06-22 ENCOUNTER — CLINICAL SUPPORT (OUTPATIENT)
Dept: REHABILITATION | Facility: HOSPITAL | Age: 33
End: 2023-06-22
Payer: MEDICAID

## 2023-06-22 DIAGNOSIS — R29.898 ANKLE WEAKNESS: Primary | ICD-10-CM

## 2023-06-22 DIAGNOSIS — R26.2 DIFFICULTY IN WALKING INVOLVING ANKLE AND FOOT JOINT: ICD-10-CM

## 2023-06-22 DIAGNOSIS — Z55.9 SPECIAL EDUCATIONAL NEEDS: ICD-10-CM

## 2023-06-22 DIAGNOSIS — M62.89 ABNORMAL INCREASED MUSCLE TONE: ICD-10-CM

## 2023-06-22 DIAGNOSIS — M25.671 DECREASED RANGE OF MOTION OF RIGHT ANKLE: ICD-10-CM

## 2023-06-22 DIAGNOSIS — R68.89 DECREASED FUNCTIONAL ACTIVITY TOLERANCE: ICD-10-CM

## 2023-06-22 PROCEDURE — 97110 THERAPEUTIC EXERCISES: CPT | Mod: PN

## 2023-06-22 SDOH — SOCIAL DETERMINANTS OF HEALTH (SDOH): PROBLEMS RELATED TO EDUCATION AND LITERACY, UNSPECIFIED: Z55.9

## 2023-06-26 ENCOUNTER — PATIENT MESSAGE (OUTPATIENT)
Dept: FAMILY MEDICINE | Facility: CLINIC | Age: 33
End: 2023-06-26

## 2023-06-27 ENCOUNTER — CLINICAL SUPPORT (OUTPATIENT)
Dept: REHABILITATION | Facility: HOSPITAL | Age: 33
End: 2023-06-27
Payer: MEDICAID

## 2023-06-27 ENCOUNTER — OFFICE VISIT (OUTPATIENT)
Dept: FAMILY MEDICINE | Facility: CLINIC | Age: 33
End: 2023-06-27
Payer: MEDICAID

## 2023-06-27 VITALS
HEART RATE: 105 BPM | DIASTOLIC BLOOD PRESSURE: 90 MMHG | SYSTOLIC BLOOD PRESSURE: 142 MMHG | RESPIRATION RATE: 24 BRPM | WEIGHT: 293 LBS | HEIGHT: 67 IN | BODY MASS INDEX: 45.99 KG/M2

## 2023-06-27 DIAGNOSIS — Z55.9 SPECIAL EDUCATIONAL NEEDS: ICD-10-CM

## 2023-06-27 DIAGNOSIS — R29.898 ANKLE WEAKNESS: Primary | ICD-10-CM

## 2023-06-27 DIAGNOSIS — F99 INSOMNIA DUE TO OTHER MENTAL DISORDER: ICD-10-CM

## 2023-06-27 DIAGNOSIS — M25.671 DECREASED RANGE OF MOTION OF RIGHT ANKLE: ICD-10-CM

## 2023-06-27 DIAGNOSIS — R68.89 DECREASED FUNCTIONAL ACTIVITY TOLERANCE: ICD-10-CM

## 2023-06-27 DIAGNOSIS — R26.2 DIFFICULTY IN WALKING INVOLVING ANKLE AND FOOT JOINT: ICD-10-CM

## 2023-06-27 DIAGNOSIS — F31.77 BIPOLAR DISORDER, IN PARTIAL REMISSION, MOST RECENT EPISODE MIXED: Primary | ICD-10-CM

## 2023-06-27 DIAGNOSIS — F51.05 INSOMNIA DUE TO OTHER MENTAL DISORDER: ICD-10-CM

## 2023-06-27 DIAGNOSIS — M62.89 ABNORMAL INCREASED MUSCLE TONE: ICD-10-CM

## 2023-06-27 PROCEDURE — 1159F MED LIST DOCD IN RCRD: CPT | Mod: CPTII,,, | Performed by: FAMILY MEDICINE

## 2023-06-27 PROCEDURE — 3080F PR MOST RECENT DIASTOLIC BLOOD PRESSURE >= 90 MM HG: ICD-10-PCS | Mod: CPTII,,, | Performed by: FAMILY MEDICINE

## 2023-06-27 PROCEDURE — 3077F SYST BP >= 140 MM HG: CPT | Mod: CPTII,,, | Performed by: FAMILY MEDICINE

## 2023-06-27 PROCEDURE — 3051F PR MOST RECENT HEMOGLOBIN A1C LEVEL 7.0 - < 8.0%: ICD-10-PCS | Mod: CPTII,,, | Performed by: FAMILY MEDICINE

## 2023-06-27 PROCEDURE — 3008F BODY MASS INDEX DOCD: CPT | Mod: CPTII,,, | Performed by: FAMILY MEDICINE

## 2023-06-27 PROCEDURE — 99214 OFFICE O/P EST MOD 30 MIN: CPT | Mod: S$PBB,,, | Performed by: FAMILY MEDICINE

## 2023-06-27 PROCEDURE — 3077F PR MOST RECENT SYSTOLIC BLOOD PRESSURE >= 140 MM HG: ICD-10-PCS | Mod: CPTII,,, | Performed by: FAMILY MEDICINE

## 2023-06-27 PROCEDURE — 3008F PR BODY MASS INDEX (BMI) DOCUMENTED: ICD-10-PCS | Mod: CPTII,,, | Performed by: FAMILY MEDICINE

## 2023-06-27 PROCEDURE — 99214 OFFICE O/P EST MOD 30 MIN: CPT | Performed by: FAMILY MEDICINE

## 2023-06-27 PROCEDURE — 97110 THERAPEUTIC EXERCISES: CPT | Mod: PN

## 2023-06-27 PROCEDURE — 1159F PR MEDICATION LIST DOCUMENTED IN MEDICAL RECORD: ICD-10-PCS | Mod: CPTII,,, | Performed by: FAMILY MEDICINE

## 2023-06-27 PROCEDURE — 99214 PR OFFICE/OUTPT VISIT, EST, LEVL IV, 30-39 MIN: ICD-10-PCS | Mod: S$PBB,,, | Performed by: FAMILY MEDICINE

## 2023-06-27 PROCEDURE — 3080F DIAST BP >= 90 MM HG: CPT | Mod: CPTII,,, | Performed by: FAMILY MEDICINE

## 2023-06-27 PROCEDURE — 3051F HG A1C>EQUAL 7.0%<8.0%: CPT | Mod: CPTII,,, | Performed by: FAMILY MEDICINE

## 2023-06-27 RX ORDER — RISPERIDONE 1 MG/1
1 TABLET ORAL 2 TIMES DAILY
Qty: 60 TABLET | Refills: 1 | Status: SHIPPED | OUTPATIENT
Start: 2023-06-27 | End: 2023-07-13 | Stop reason: SDUPTHER

## 2023-06-27 RX ORDER — TRAZODONE HYDROCHLORIDE 50 MG/1
TABLET ORAL
Qty: 60 TABLET | Refills: 1 | Status: SHIPPED | OUTPATIENT
Start: 2023-06-27 | End: 2023-07-13 | Stop reason: SDUPTHER

## 2023-06-27 SDOH — SOCIAL DETERMINANTS OF HEALTH (SDOH): PROBLEMS RELATED TO EDUCATION AND LITERACY, UNSPECIFIED: Z55.9

## 2023-06-27 NOTE — PROGRESS NOTES
"[facility]   Physical Therapy Treatment Note     Name: Loretta Lea  Clinic Number: [id number]    Therapy Diagnosis:   Encounter Diagnoses   Name Primary?    Decreased range of motion of right ankle     Abnormal increased muscle tone     Ankle weakness Yes    Difficulty in walking involving ankle and foot joint     Decreased functional activity tolerance     Special educational needs      Physician: Aryan Poole DPM    Visit Date: 6/27/2023    Physician Orders: PT Eval and Treat   Medical Diagnosis from Referral: S86.311D (ICD-10-CM) - Rupture of peroneal tendon, right, subsequent encounter S93.491S (ICD-10-CM) - Sprain of anterior talofibular ligament of right ankle, sequela M25.571,G89.29 (ICD-10-CM) - Chronic pain of right ankle   Evaluation Date: 6/13/2023  Authorization Period Expiration: 08/03/2023.  Plan of Care Expiration: 08/25/2023 at 2x/wk x 8 weeks.  Progress Note Due: 07/13/2023  Visit # / Visits authorized: 2/12    (3/16 on plan of care)   FOTO: 1/ 3     PTA Visit #: 0/5      Time In: 1:45 pm   Time Out:  2:30 pm  Total Appointment Time (timed & untimed codes): 40 minutes     Precautions: Standard and surgical  SUBJECTIVE     Pt reports:   "I don't want to ride the Nu-step today. My knees and ankles have been hurting since I was here last. I'd say that I am a 5/10 right now, but I have been doing a lot of walking today." PT acknowledges. The patient ambulates in wearing her ankle brace.     She was compliant with home exercise program.    Response to previous treatment: The patient reports muscle soreness after the last PT session.  Functional change: There are no changes to report since the last PT session.    Pain: 5/10 upon entering the clinic today.  Location: The right ankle's lateral side and distally into the forefoot.  OBJECTIVE     Objective Measures updated at progress report unless specified.     Treatment     Loretta received the treatments listed below " for:  40 minutes  +++Per Medicaid guidelines, all below billed as therapeutic exercises+++     Loretta received therapeutic exercises to develop strength, endurance, ROM, flexibility, and posture:  +seated soleus raises x 20 with half bolster  -standing on ramp gastrocnemius stretch with 30 second hold x 3  -backwards ambulation at windows x 4 minutes leading with right leg and getting heel down  -Precor knee extension with 10 pounds x 20  -Precor knee flexion with 15 pounds x 20  -heel raises on blue foam with ankle brace on x 15  -gastrocnemius stretch on ramp for 30 seconds x 3  -right ankle red banded: dorsiflexion, plantarflexion, inversion, and eversion x 20 each direction    Below not performed today:  -single leg stance on right on blue foam with 5 second effort x 8  -Grade 1 joint mobilization by PT.   -Nu-step on Level 4 x 8 minutes  -soleus stretch with 30 second hold x 3    Loretta received the following supervised modalities after being cleared for contradictions: IFC Electrical Stimulation:  Loretta received IFC Electrical Stimulation for pain control applied to the right ankle. Pt received stimulation at 100% scan at a frequency of 0-10 bps for 15 minutes. Loretta tolderated treatment well without any adverse effects.      Loretta received cold pack for 15 minutes while on IFC.  Patient Education and Home Exercises     Home Exercises Provided and Patient Education Provided     Education provided:   -The patient is independent with her initial written home exercise program. She returned demo to PT again this day and was without questions.     Written Home Exercises Provided: Patient instructed to cont prior HEP. Exercises were reviewed and Loretta was able to demonstrate them prior to the end of the session.  Loretta demonstrated good  understanding of the education provided. See EMR under Patient Instructions for exercises provided during therapy sessions.  ASSESSMENT     Loretta entered today  reporting moderate right foot and ankle pain. Exercise positions and resistances were adjusted to allow her to continue with her rehab. She required rest, but she was able to complete all tasks. She exhibited good right ankle motion. She benefited from soleus exercises. She had a good heel flat during backwards ambulation. She had no loss of balance. Modality was used again this day to assist with her pain relief. She tolerated today's PT session well.     Loretta Is progressing well towards her goals.   Pt prognosis is Good.     Pt will continue to benefit from skilled outpatient physical therapy to address the deficits listed in the problem list box on initial evaluation, provide pt/family education and to maximize pt's level of independence in the home and community environment.     Pt's spiritual, cultural and educational needs considered and pt agreeable to plan of care and goals.     Anticipated barriers to physical therapy: Post surgical weakness and co-morbidities and two previous right ankle surgeries.    Goals:     STG  Weeks/Visits Date Established  Date Met   1.Decrease max right ankle pain to 5/10 to improve the patient's quality of life.  4 weeks 6/13/2023    In Progress 6/27/2023     2. Decrease right gastrocnemius muscle tone to minimal increase to improve heel strike and toe off during gait.  4 weeks 6/13/2023    In Progress 6/27/2023     3.Increase right ankle strength 1/2 grade to improve the patient's tolerance to weight bearing activities on all surfaces.  4 weeks 6/13/2023    In Progress 6/27/2023     4.Decrease FOTO limitation score to <=32% to improve her ability to properly train her dogs.  4 weeks 6/13/2023    In Progress 6/27/2023     5.Fair initial written home exercise program knowledge and be without questions to have carryover after discharge from PT.  4 weeks 6/13/2023    In Progress 6/27/2023        LTG Weeks/Visits Date Established  Date Met   1.Decrease max right ankle pain to 2/10  to improve the patient's quality of life.  8 weeks. 6/13/2023    In Progress 6/27/2023     2. Decrease right gastrocnemius muscle tone to normal to minimal increase to improve heel strike and toe off during gait without wearing her ankle brace.  8 weeks. 6/13/2023       In Progress 6/27/2023     3.Increase right ankle strength one grade from evaluation date to improve the patient's tolerance to weight bearing activities on all surfaces.  8 weeks. 6/13/2023    In Progress 6/27/2023     4.Decrease FOTO limitation score to <=27% to improve her ability to properly train her dogs.  8 weeks. 6/13/2023    In Progress 6/27/2023     5.Good Progressed written home exercise program knowledge and be without questions to have carryover after discharge from PT.  8 weeks. 6/13/2023    In Progress 6/27/2023        PLAN     Plan of care Certification: 6/13/2023 to 08/25/2023. Outpatient Physical Therapy 2 times weekly for 8 weeks. Plan to safely maximize the right foot and ankle's strength and motion to provide long term pain relief to perform her desired activities.        Raimundo Best, PT

## 2023-06-27 NOTE — PROGRESS NOTES
"  SUBJECTIVE:   HPI:  Med Change Follow Up (Discuss Vraylar patient unable to sleep and felt wired )    HPI  Loretta Lea is a 32 y.o. female who comes in for acute visit.     The patient was started on Vraylar for her bipolar disorder that was no longer responding to high doses of Seroquel. Reports that she started the new medication last week. Shortly thereafter, she had to go to ER with new rash. Was treated for "rash and nonspecific skin eruption" with Benadryl, Solu-Medrol, Pepcid, Vistaril, Diflucan, and Clotrimazole, but new medication was not addressed as a possible culprit. As such, she increased her dose of Vraylar 2 days ago as previously directed. Since, has felt "wired" and has been having intermittent involuntary movements that last about 30 minutes at a time. She denies any mucocutaneous lesions. No fevers, no malaise. She was up for at least two 24-hour periods, then "crashed" and slept all day yesterday. Denies grandiosity, impulsivity, distractibility, flight of ideas. No AVH. No SI. No HI. Feels depression is "okay."    Has tried Seroquel, Trazodone, Abilify and Zyprexa in the past, some of which worked for awhile but eventually ineffective. Has never used Risperidone, Lamictal, Valproic acid, Lithium, or Latuda.      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       Current Outpatient Medications on File Prior to Visit   Medication Sig Dispense Refill    blood sugar diagnostic Strp Test blood sugar 4 (four) times daily before meals and nightly. 200 each 5    blood-glucose meter (ACCU-CHEK GUIDE GLUCOSE METER) [facility] Use to test blood sugar 1 each 0    gabapentin (NEURONTIN) 300 MG capsule Take 1 capsule (300 mg total) by mouth every evening. 30 capsule 2    HYDROcodone-acetaminophen (NORCO)  mg per tablet Take 1 tablet by mouth every 6 (six) hours as needed for Pain. 28 tablet 0    hydrOXYzine " pamoate (VISTARIL) 25 MG Cap Take 1 capsule (25 mg total) by mouth every 4 (four) hours as needed (itching). 12 capsule 0    lancets (ACCU-CHEK SOFTCLIX LANCETS) Misc Test blood sugar 4 (four) times daily before meals and nightly. 200 each 5    metFORMIN (GLUCOPHAGE-XR) 500 MG ER 24hr tablet Take 1 tablet (500 mg total) by mouth 2 (two) times daily with meals. 180 tablet 3    naproxen (NAPROSYN) 500 MG tablet Take 500 mg by mouth 2 (two) times daily.      ondansetron (ZOFRAN) 4 MG tablet Take 1 PO daily prn nausea. 30 tablet 1    ondansetron (ZOFRAN-ODT) 4 MG TbDL Take 2 tablets (8 mg total) by mouth every 8 (eight) hours as needed (Nausea). 30 tablet 0    pyridoxine, vitamin B6, (B-6) 25 MG Tab Take 25 mg by mouth once daily.      semaglutide (OZEMPIC) 1 mg/dose (4 mg/3 mL) Inject 1 mg into the skin every 7 days. 3 mL 11    sertraline (ZOLOFT) 100 MG tablet Take 1.5 tablets (150 mg total) by mouth once daily. 135 tablet 1    [DISCONTINUED] cariprazine (VRAYLAR) 1.5 mg Cap Take 1 cap PO daily x 1 week, then increase to 2 caps PO daily. 60 capsule 1    [] cephALEXin (KEFLEX) 500 MG capsule Take 1 capsule (500 mg total) by mouth every 12 (twelve) hours. for 7 days 14 capsule 0    clotrimazole (LOTRIMIN) 1 % cream Apply topically 2 (two) times daily. for 7 days 60 g 0    [] fluconazole (DIFLUCAN) 200 MG Tab Take 1 tablet (200 mg total) by mouth once daily. for 7 days 7 tablet 0    [DISCONTINUED] QUEtiapine 150 mg Tab Take 3 tabs PO qHS. (Patient not taking: Reported on 2023) 90 tablet 0     No current facility-administered medications on file prior to visit.       Past Medical History:   Diagnosis Date    Anxiety     Back pain     Bipolar disorder 2004    bipolar 2    Chronic bronchitis     Depression     Diabetes mellitus     GERD (gastroesophageal reflux disease)     HPV (human papilloma virus) infection     Insomnia     Migraines     Ovarian cyst     Pneumonia     PONV (postoperative nausea and  "vomiting)      Past Surgical History:   Procedure Laterality Date    APPENDECTOMY      ARTHROSCOPY OF ANKLE Right 05/04/2022    Procedure: ARTHROSCOPY, ANKLE;  Surgeon: Bimal Mccormick DPM;  Location: [facility] OR;  Service: Podiatry;  Laterality: Right;  CURT EXTERNAL ANKLE DISTRACTOR    CHOLECYSTECTOMY      DILATION AND CURETTAGE OF UTERUS      HYSTERECTOMY  2017    total, ovarian cysts, torsion, [place]; hyst per Dr JESUS Manriquez    LAPAROSCOPIC APPENDECTOMY N/A 08/18/2021    Procedure: APPENDECTOMY, LAPAROSCOPIC;  Surgeon: Osiel Ramirez MD;  Location: [facility];  Service: General;  Laterality: N/A;    OOPHORECTOMY      opherectom Left 2015    salpingo-opherectomy    REPAIR OF LIGAMENT OF ANKLE Right 06/23/2021    Procedure: REPAIR OF ANTERIOR TALOFIBULAR LIGAMENT CALCANEOFIBULAR LIGAMENT;  Surgeon: Bimal Mccormick DPM;  Location: [facility];  Service: Podiatry;  Laterality: Right;  WITH ARTHREX INTERNAL BRACE    REPAIR OF LIGAMENT OF ANKLE Right 4/27/2023    Procedure: REPAIR, LIGAMENT, ANKLE;  Surgeon: Aryan Poole DPM;  Location: [facility] OR;  Service: Podiatry;  Laterality: Right;    REPAIR OF TENDON OF LOWER EXTREMITY Right 4/27/2023    Procedure: REPAIR, TENDON, LOWER EXTREMITY;  Surgeon: Aryan Poole DPM;  Location: [facility] OR;  Service: Podiatry;  Laterality: Right;  arthrex    SURGICAL REMOVAL OF BONE SPUR Right 06/23/2021    Procedure: EXCISION OS TRIGONUM;  Surgeon: Bimal Mccormick DPM;  Location: [facility];  Service: Podiatry;  Laterality: Right;    WISDOM TOOTH EXTRACTION       Family History   Adopted: Yes   Problem Relation Age of Onset    No Known Problems Sister     No Known Problems Sister     No Known Problems Sister        Review of Systems  As in HPI       OBJECTIVE:      Vitals:    06/27/23 0906   BP: (!) 142/90   BP Location: Left arm   Patient Position: Sitting   BP Method: X-Large (Automatic)   Pulse: 105   Resp: (!) 24   Weight: (!) 143 kg (315 lb 3.2 oz)   Height: 5' 7" (1.702 m) "     Physical Exam  Vitals reviewed.   Constitutional:       General: She is not in acute distress.     Appearance: She is obese. She is not ill-appearing.   Pulmonary:      Effort: Pulmonary effort is normal.   Neurological:      Mental Status: She is alert and oriented to person, place, and time.   Psychiatric:      Comments: Casually groomed and dressed. Mood is anxious with congruent affect. Good eye contact. Good rapport. Normal thought process, content, and speech. Good insight. No delusions elicited. No hallucinations. No SI. No HI.        Assessment:       ICD-10-CM ICD-9-CM    1. Bipolar disorder, in partial remission, most recent episode mixed  F31.77 296.65 risperiDONE (RISPERDAL) 1 MG tablet      2. Insomnia due to other mental disorder  F51.05 300.9 traZODone (DESYREL) 50 MG tablet    F99 327.02            Plan:   Bipolar disorder, in partial remission, most recent episode mixed  -     risperiDONE (RISPERDAL) 1 MG tablet; Take 1 tablet (1 mg total) by mouth 2 (two) times daily.  Dispense: 60 tablet; Refill: 1    Insomnia due to other mental disorder  -     traZODone (DESYREL) 50 MG tablet; Take 1-2 tabs PO qHS prn insomnia.  Dispense: 60 tablet; Refill: 1      - Discontinue Vraylar. Discussed T1/2 is 1 week, so might continue to experience symptoms for a while longer.   - Trial Risperidone in addition to current Sertraline 150mg.   - If not sleeping well, rechallenge with Trazodone.  - Consider Cogentin if EPS continue (none witnessed during appt today).  - Close follow up.  - Patient left the office before nursing staff could repeat BP.    No follow-ups on file.      6/27/2023 Magalie Roldan M.D.

## 2023-06-30 ENCOUNTER — PATIENT MESSAGE (OUTPATIENT)
Dept: REHABILITATION | Facility: HOSPITAL | Age: 33
End: 2023-06-30
Payer: MEDICAID

## 2023-07-07 NOTE — PATIENT INSTRUCTIONS
Peroneal Tendon Injuries    What Are the Peroneal Tendons?  A tendon is a band of tissue that connects a muscle to a bone. The two peroneal tendons in the foot run [id number] behind the outer ankle bone. One peroneal tendon attaches to the outer part of the midfoot, while the other tendon runs under the foot and attaches near the inside of the arch. The main function of the peroneal tendons is to stabilize the foot and ankle and protect them from sprains.      Causes and Symptoms of Peroneal Tendon Injuries  Peroneal tendon injuries may be acute (occurring suddenly) or chronic (developing over a period of time). They most commonly occur in individuals who participate in sports that involve repetitive ankle motion. In addition, people with higher arches are at risk for developing peroneal tendon injuries. Basic types of peroneal tendon injuries are tendonitis, tears, and subluxation.    Tendonitis is an inflammation of one or both tendons. The inflammation is caused by activities involving repetitive use of the tendon, overuse of the tendon, or trauma (such as an ankle sprain). Symptoms of tendonitis include:  Pain  Swelling  Warmth to the touch    Acute tears are caused by repetitive activity or trauma. Immediate symptoms of acute tears include:  Pain  Swelling  Weakness or instability of the foot and ankle    As time goes on, these tears may lead to a change in the shape of the foot, in which the arch may become higher.    Degenerative tears (tendonosis) are usually due to overuse and occur over long periods of time - often years. In degenerative tears, the tendon is like taffy that has been overstretched until it becomes thin and eventually frays. Having high arches also puts you at risk for developing a degenerative tear. The symptoms of degenerative tears may include:  Sporadic pain (occurring from time to time) on the outside of the ankle  Weakness or instability in the ankle  An increase in the height of the  arch    Subluxation - one or both tendons have slipped out of their normal position. In some cases, subluxation is due to a condition in which a person is born with a variation in the shape of the bone or muscle. In other cases, subluxation occurs following trauma, such as an ankle sprain. Damage or injury to the tissues that stabilize the tendons (retinaculum) can lead to chronic tendon subluxation. The symptoms of subluxation may include:  A snapping feeling of the tendon around the ankle bone  Sporadic pain behind the outside ankle bone  Ankle instability or weakness    Early treatment of a subluxation is critical, since a tendon that continues to sublux (move out of position) is more likely to tear or rupture. Therefore, if you feel the characteristic snapping, see a foot and ankle surgeon immediately.      Diagnosis  because peroneal tendon injuries are sometimes misdiagnosed and may worsen without proper treatment, prompt evaluation by a foot and ankle surgeon is advised. To diagnose a peroneal tendon injury, the surgeon will examine the foot and look for pain, instability, swelling, warmth, and weakness on the outer side of the ankle. In addition, an x-ray or other advanced imaging studies may be needed to fully evaluate the injury. The foot and ankle surgeon will also look for signs of an ankle sprain and other related injuries that sometimes accompany a peroneal tendon injury. Proper diagnosis is important because prolonged discomfort after a simple sprain may be a sign of additional problems.      Non-Surgical Treatment  Treatment depends on the type of peroneal tendon injury. Options include:  Immobilization. A cast or splint may be used to keep the foot and ankle from moving and allow the injury to heal.  Medications. Oral or injected anti-inflammatory drugs may help relieve the pain and inflammation.  Physical therapy. Ice, heat, or ultrasound therapy may be used to reduce swelling and pain. As symptoms  improve, exercises can be added to strengthen the muscles and improve range of motion and balance.  Bracing. The surgeon may provide a brace to use for a short while or during activities requiring repetitive ankle motion. Bracing may also be an option when a patient is not a candidate for surgery.      When is Surgery Needed?  In some cases, surgery may be needed to repair the tendon or tendons and perhaps the supporting structures of the foot. The foot and ankle surgeon will determine the most appropriate procedure for the patient's condition and lifestyle. After surgery, physical therapy is an important part of rehabilitation.

## 2023-07-10 ENCOUNTER — OFFICE VISIT (OUTPATIENT)
Dept: PODIATRY | Facility: CLINIC | Age: 33
End: 2023-07-10
Payer: MEDICAID

## 2023-07-10 VITALS — HEIGHT: 67 IN | WEIGHT: 293 LBS | BODY MASS INDEX: 45.99 KG/M2 | RESPIRATION RATE: 16 BRPM

## 2023-07-10 DIAGNOSIS — G89.29 CHRONIC PAIN OF RIGHT ANKLE: ICD-10-CM

## 2023-07-10 DIAGNOSIS — M25.571 CHRONIC PAIN OF RIGHT ANKLE: ICD-10-CM

## 2023-07-10 DIAGNOSIS — S93.491S SPRAIN OF ANTERIOR TALOFIBULAR LIGAMENT OF RIGHT ANKLE, SEQUELA: ICD-10-CM

## 2023-07-10 DIAGNOSIS — M25.371 ANKLE INSTABILITY, RIGHT: ICD-10-CM

## 2023-07-10 DIAGNOSIS — G57.81 NEURITIS OF RIGHT SURAL NERVE: ICD-10-CM

## 2023-07-10 DIAGNOSIS — S86.311D RUPTURE OF PERONEAL TENDON, RIGHT, SUBSEQUENT ENCOUNTER: Primary | ICD-10-CM

## 2023-07-10 PROCEDURE — 1160F RVW MEDS BY RX/DR IN RCRD: CPT | Mod: CPTII,,, | Performed by: PODIATRIST

## 2023-07-10 PROCEDURE — 1159F MED LIST DOCD IN RCRD: CPT | Mod: CPTII,,, | Performed by: PODIATRIST

## 2023-07-10 PROCEDURE — 3008F BODY MASS INDEX DOCD: CPT | Mod: CPTII,,, | Performed by: PODIATRIST

## 2023-07-10 PROCEDURE — 3051F PR MOST RECENT HEMOGLOBIN A1C LEVEL 7.0 - < 8.0%: ICD-10-PCS | Mod: CPTII,,, | Performed by: PODIATRIST

## 2023-07-10 PROCEDURE — 99024 POSTOP FOLLOW-UP VISIT: CPT | Mod: ,,, | Performed by: PODIATRIST

## 2023-07-10 PROCEDURE — 3051F HG A1C>EQUAL 7.0%<8.0%: CPT | Mod: CPTII,,, | Performed by: PODIATRIST

## 2023-07-10 PROCEDURE — 99213 OFFICE O/P EST LOW 20 MIN: CPT | Mod: PBBFAC,PN | Performed by: PODIATRIST

## 2023-07-10 PROCEDURE — 99024 PR POST-OP FOLLOW-UP VISIT: ICD-10-PCS | Mod: ,,, | Performed by: PODIATRIST

## 2023-07-10 PROCEDURE — 99999 PR PBB SHADOW E&M-EST. PATIENT-LVL III: ICD-10-PCS | Mod: PBBFAC,,, | Performed by: PODIATRIST

## 2023-07-10 PROCEDURE — 1159F PR MEDICATION LIST DOCUMENTED IN MEDICAL RECORD: ICD-10-PCS | Mod: CPTII,,, | Performed by: PODIATRIST

## 2023-07-10 PROCEDURE — 99999 PR PBB SHADOW E&M-EST. PATIENT-LVL III: CPT | Mod: PBBFAC,,, | Performed by: PODIATRIST

## 2023-07-10 PROCEDURE — 1160F PR REVIEW ALL MEDS BY PRESCRIBER/CLIN PHARMACIST DOCUMENTED: ICD-10-PCS | Mod: CPTII,,, | Performed by: PODIATRIST

## 2023-07-10 PROCEDURE — 3008F PR BODY MASS INDEX (BMI) DOCUMENTED: ICD-10-PCS | Mod: CPTII,,, | Performed by: PODIATRIST

## 2023-07-10 RX ORDER — HYDROCODONE BITARTRATE AND ACETAMINOPHEN 10; 325 MG/1; MG/1
1 TABLET ORAL EVERY 6 HOURS PRN
Qty: 28 TABLET | Refills: 0 | Status: SHIPPED | OUTPATIENT
Start: 2023-07-10 | End: 2023-08-07 | Stop reason: SDUPTHER

## 2023-07-10 RX ORDER — GABAPENTIN 300 MG/1
300 CAPSULE ORAL NIGHTLY
Qty: 30 CAPSULE | Refills: 2 | Status: SHIPPED | OUTPATIENT
Start: 2023-07-10 | End: 2023-10-17

## 2023-07-10 NOTE — PROGRESS NOTES
"    [address] [facility] [place] [place] [address]  Phone: [phone]   [phone]    Patient's PCP:Magalie Roldan MD  Referring Provider: No ref. provider found    Subjective:      Chief Complaint:: Post-op Evaluation (Post-op surgery of the right foot and ankle)    RAPHAEL Lea is a 33 y.o. female who presents today for postop follow-up of the right foot and ankle.  Patient states that she has been working with physical therapy.  She states that she has noticed improvement in her symptoms.    Vitals:    07/10/23 1029   Resp: 16   Weight: (!) 144.2 kg (318 lb)   Height: 5' 7" (1.702 m)   PainSc: 0-No pain      Shoe Size: 11    Past Surgical History:   Procedure Laterality Date    APPENDECTOMY      ARTHROSCOPY OF ANKLE Right 05/04/2022    Procedure: ARTHROSCOPY, ANKLE;  Surgeon: Bimal Mccormick DPM;  Location: [facility];  Service: Podiatry;  Laterality: Right;  CURT EXTERNAL ANKLE DISTRACTOR    CHOLECYSTECTOMY      DILATION AND CURETTAGE OF UTERUS      HYSTERECTOMY  2017    total, ovarian cysts, torsion, [place]rault; hyst per Dr JESUS Manriquez    LAPAROSCOPIC APPENDECTOMY N/A 08/18/2021    Procedure: APPENDECTOMY, LAPAROSCOPIC;  Surgeon: Osiel Ramirez MD;  Location: [facility];  Service: General;  Laterality: N/A;    OOPHORECTOMY      opherectom Left 2015    salpingo-opherectomy    REPAIR OF LIGAMENT OF ANKLE Right 06/23/2021    Procedure: REPAIR OF ANTERIOR TALOFIBULAR LIGAMENT CALCANEOFIBULAR LIGAMENT;  Surgeon: Bimal Mccormick DPM;  Location: [facility] OR;  Service: Podiatry;  Laterality: Right;  WITH ARTHREX INTERNAL BRACE    REPAIR OF LIGAMENT OF ANKLE Right 4/27/2023    Procedure: REPAIR, LIGAMENT, ANKLE;  Surgeon: Aryan Poole DPM;  Location: [facility] OR;  Service: Podiatry;  Laterality: Right;    REPAIR OF TENDON OF LOWER EXTREMITY Right 4/27/2023    Procedure: REPAIR, TENDON, LOWER EXTREMITY;  Surgeon: Aryan Poole DPM;  Location: [facility] OR;  Service: Podiatry;  Laterality: Right;  " arthrex    SURGICAL REMOVAL OF BONE SPUR Right 06/23/2021    Procedure: EXCISION OS TRIGONUM;  Surgeon: Bimal Mccormick DPM;  Location: [facility];  Service: Podiatry;  Laterality: Right;    WISDOM TOOTH EXTRACTION       Past Medical History:   Diagnosis Date    Anxiety     Back pain     Bipolar disorder 2004    bipolar 2    Chronic bronchitis     Depression     Diabetes mellitus     GERD (gastroesophageal reflux disease)     HPV (human papilloma virus) infection     Insomnia     Migraines     Ovarian cyst     Pneumonia     PONV (postoperative nausea and vomiting)      Family History   Adopted: Yes   Problem Relation Age of Onset    No Known Problems Sister     No Known Problems Sister     No Known Problems Sister         Social History:   Marital Status:   Alcohol History:  reports that she does not currently use alcohol.  Tobacco History:  reports that she has been smoking cigarettes. She has a 27.00 pack-year smoking history. She has been exposed to tobacco smoke. She has never used smokeless tobacco.  Drug History:  reports that she does not currently use drugs.    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 12 capsule 0    lancets (ACCU-CHEK SOFTCLIX LANCETS) Misc Test blood sugar 4 (four) times daily before meals and nightly. 200 each 5    metFORMIN (GLUCOPHAGE-XR) 500 MG ER 24hr tablet Take 1 tablet (500 mg total) by mouth 2 (two) times daily with meals. 180 tablet 3    naproxen (NAPROSYN) 500 MG tablet Take 500 mg by mouth 2 (two) times daily.      ondansetron (ZOFRAN) 4 MG tablet Take 1 PO daily prn nausea. 30 tablet 1    ondansetron (ZOFRAN-ODT) 4 MG TbDL Take 2 tablets (8 mg total) by mouth every 8 (eight) hours as needed  (Nausea). 30 tablet 0    pyridoxine, vitamin B6, (B-6) 25 MG Tab Take 25 mg by mouth once daily.      risperiDONE (RISPERDAL) 1 MG tablet Take 1 tablet (1 mg total) by mouth 2 (two) times daily. 60 tablet 1    semaglutide (OZEMPIC) 1 mg/dose (4 mg/3 mL) Inject 1 mg into the skin every 7 days. 3 mL 11    sertraline (ZOLOFT) 100 MG tablet Take 1.5 tablets (150 mg total) by mouth once daily. 135 tablet 1    traZODone (DESYREL) 50 MG tablet Take 1-2 tabs PO qHS prn insomnia. 60 tablet 1    blood sugar diagnostic Strp Test blood sugar 4 (four) times daily before meals and nightly. (Patient not taking: Reported on 7/10/2023) 200 each 5    blood-glucose meter (ACCU-CHEK GUIDE GLUCOSE METER) [facility] Use to test blood sugar (Patient not taking: Reported on 7/10/2023) 1 each 0    clotrimazole (LOTRIMIN) 1 % cream Apply topically 2 (two) times daily. for 7 days 60 g 0    gabapentin (NEURONTIN) 300 MG capsule Take 1 capsule (300 mg total) by mouth every evening. 30 capsule 2    HYDROcodone-acetaminophen (NORCO)  mg per tablet Take 1 tablet by mouth every 6 (six) hours as needed for Pain. 28 tablet 0     No current facility-administered medications for this visit.       Review of Systems   Constitutional:  Negative for chills, fatigue, fever and unexpected weight change.   HENT:  Negative for hearing loss and trouble swallowing.    Eyes:  Negative for photophobia and visual disturbance.   Respiratory:  Positive for shortness of breath. Negative for cough and wheezing.    Cardiovascular:  Negative for chest pain, palpitations and leg swelling.   Gastrointestinal:  Negative for abdominal pain and nausea.   Genitourinary:  Negative for dysuria and frequency.   Musculoskeletal:  Positive for arthralgias, gait problem, joint swelling and myalgias. Negative for back pain.   Skin:  Negative for rash and wound.   Neurological:  Positive for numbness. Negative for tremors, seizures, weakness and headaches.   Hematological:  Does not  bruise/bleed easily.       Objective:        Post-op surgery of the right foot and ankle with no signs of infection or dehiscence of wound. No redness, no drainage, no increase in local temperature, no significant swelling, surgical incisions are well healed. Mild pain with range of motion.  Peroneal muscle strength 4+/5.  Decreased sensation along the lateral foot along the course of the sural nerve.  There is pain on palpation with percussion overlying the incision from the patient's previous ankle surgery.    Physical Exam:   Foot Exam  Physical Exam    Imaging: none            Assessment:       1. Rupture of peroneal tendon, right, subsequent encounter    2. Sprain of anterior talofibular ligament of right ankle, sequela    3. Chronic pain of right ankle    4. Neuritis of right sural nerve    5. Ankle instability, right      Plan:   Rupture of peroneal tendon, right, subsequent encounter  -     HYDROcodone-acetaminophen (NORCO)  mg per tablet; Take 1 tablet by mouth every 6 (six) hours as needed for Pain.  Dispense: 28 tablet; Refill: 0    Sprain of anterior talofibular ligament of right ankle, sequela  -     HYDROcodone-acetaminophen (NORCO)  mg per tablet; Take 1 tablet by mouth every 6 (six) hours as needed for Pain.  Dispense: 28 tablet; Refill: 0    Chronic pain of right ankle  -     HYDROcodone-acetaminophen (NORCO)  mg per tablet; Take 1 tablet by mouth every 6 (six) hours as needed for Pain.  Dispense: 28 tablet; Refill: 0    Neuritis of right sural nerve  -     gabapentin (NEURONTIN) 300 MG capsule; Take 1 capsule (300 mg total) by mouth every evening.  Dispense: 30 capsule; Refill: 2    Ankle instability, right      Follow up in about 4 weeks (around 8/7/2023), or if symptoms worsen or fail to improve.    Procedures        I discussed with the patient that I do appreciate continued improvement in her symptoms. At patient's request, I am okay with her discontinuing PT and continuing  with home exercises. I recommend she continue to work on progressing her activities. Ankle brace for extended activities. Ice as needed for pain and swelling.       Counseling:     I provided patient education verbally regarding:   Patient diagnosis, treatment options, as well as alternatives, risks, and benefits.     This note was created using Dragon voice recognition software that occasionally misinterpreted phrases or words.

## 2023-07-13 ENCOUNTER — OFFICE VISIT (OUTPATIENT)
Dept: FAMILY MEDICINE | Facility: CLINIC | Age: 33
End: 2023-07-13
Payer: MEDICAID

## 2023-07-13 VITALS
SYSTOLIC BLOOD PRESSURE: 137 MMHG | BODY MASS INDEX: 45.99 KG/M2 | HEIGHT: 67 IN | WEIGHT: 293 LBS | OXYGEN SATURATION: 95 % | HEART RATE: 97 BPM | DIASTOLIC BLOOD PRESSURE: 87 MMHG

## 2023-07-13 DIAGNOSIS — R11.0 NAUSEA: ICD-10-CM

## 2023-07-13 DIAGNOSIS — F99 INSOMNIA DUE TO OTHER MENTAL DISORDER: ICD-10-CM

## 2023-07-13 DIAGNOSIS — F31.77 BIPOLAR DISORDER, IN PARTIAL REMISSION, MOST RECENT EPISODE MIXED: Primary | ICD-10-CM

## 2023-07-13 DIAGNOSIS — G43.009 MIGRAINE WITHOUT AURA AND WITHOUT STATUS MIGRAINOSUS, NOT INTRACTABLE: ICD-10-CM

## 2023-07-13 DIAGNOSIS — F51.05 INSOMNIA DUE TO OTHER MENTAL DISORDER: ICD-10-CM

## 2023-07-13 PROCEDURE — 99214 OFFICE O/P EST MOD 30 MIN: CPT | Performed by: FAMILY MEDICINE

## 2023-07-13 PROCEDURE — 99214 OFFICE O/P EST MOD 30 MIN: CPT | Mod: S$PBB,,, | Performed by: FAMILY MEDICINE

## 2023-07-13 PROCEDURE — 3075F SYST BP GE 130 - 139MM HG: CPT | Mod: CPTII,,, | Performed by: FAMILY MEDICINE

## 2023-07-13 PROCEDURE — 3079F DIAST BP 80-89 MM HG: CPT | Mod: CPTII,,, | Performed by: FAMILY MEDICINE

## 2023-07-13 PROCEDURE — 3008F BODY MASS INDEX DOCD: CPT | Mod: CPTII,,, | Performed by: FAMILY MEDICINE

## 2023-07-13 PROCEDURE — 3079F PR MOST RECENT DIASTOLIC BLOOD PRESSURE 80-89 MM HG: ICD-10-PCS | Mod: CPTII,,, | Performed by: FAMILY MEDICINE

## 2023-07-13 PROCEDURE — 3051F PR MOST RECENT HEMOGLOBIN A1C LEVEL 7.0 - < 8.0%: ICD-10-PCS | Mod: CPTII,,, | Performed by: FAMILY MEDICINE

## 2023-07-13 PROCEDURE — 3008F PR BODY MASS INDEX (BMI) DOCUMENTED: ICD-10-PCS | Mod: CPTII,,, | Performed by: FAMILY MEDICINE

## 2023-07-13 PROCEDURE — 3075F PR MOST RECENT SYSTOLIC BLOOD PRESS GE 130-139MM HG: ICD-10-PCS | Mod: CPTII,,, | Performed by: FAMILY MEDICINE

## 2023-07-13 PROCEDURE — 1159F MED LIST DOCD IN RCRD: CPT | Mod: CPTII,,, | Performed by: FAMILY MEDICINE

## 2023-07-13 PROCEDURE — 99214 PR OFFICE/OUTPT VISIT, EST, LEVL IV, 30-39 MIN: ICD-10-PCS | Mod: S$PBB,,, | Performed by: FAMILY MEDICINE

## 2023-07-13 PROCEDURE — 3051F HG A1C>EQUAL 7.0%<8.0%: CPT | Mod: CPTII,,, | Performed by: FAMILY MEDICINE

## 2023-07-13 PROCEDURE — 1159F PR MEDICATION LIST DOCUMENTED IN MEDICAL RECORD: ICD-10-PCS | Mod: CPTII,,, | Performed by: FAMILY MEDICINE

## 2023-07-13 RX ORDER — ONDANSETRON 4 MG/1
8 TABLET, ORALLY DISINTEGRATING ORAL EVERY 8 HOURS PRN
Qty: 30 TABLET | Refills: 2 | Status: SHIPPED | OUTPATIENT
Start: 2023-07-13

## 2023-07-13 RX ORDER — RISPERIDONE 1 MG/1
1 TABLET ORAL 2 TIMES DAILY
Qty: 60 TABLET | Refills: 2 | Status: SHIPPED | OUTPATIENT
Start: 2023-07-13 | End: 2023-09-18 | Stop reason: SDUPTHER

## 2023-07-13 RX ORDER — TRAZODONE HYDROCHLORIDE 100 MG/1
TABLET ORAL
Qty: 60 TABLET | Refills: 2 | Status: SHIPPED | OUTPATIENT
Start: 2023-07-13 | End: 2023-09-18 | Stop reason: SDUPTHER

## 2023-07-13 RX ORDER — SUMATRIPTAN SUCCINATE 100 MG/1
TABLET ORAL
Qty: 12 TABLET | Refills: 1 | Status: SHIPPED | OUTPATIENT
Start: 2023-07-13

## 2023-07-13 NOTE — PROGRESS NOTES
"  SUBJECTIVE:    Patient ID: Loretta Lea is a 33 y.o. female.    Chief Complaint: Medication Refill    HPI  Loretta Lea is a 33 y.o. female with a hx of Bipolar disorder who comes in for follow up.     6/27/23:  The patient was started on Vraylar for her bipolar disorder that was no longer responding to high doses of Seroquel. Reports that she started the new medication last week. Shortly thereafter, she had to go to ER with new rash. Was treated for "rash and nonspecific skin eruption" with Benadryl, Solu-Medrol, Pepcid, Vistaril, Diflucan, and Clotrimazole, but new medication was not addressed as a possible culprit. As such, she increased her dose of Vraylar 2 days ago as previously directed. Since, has felt "wired" and has been having intermittent involuntary movements that last about 30 minutes at a time. She denies any mucocutaneous lesions. No fevers, no malaise. She was up for at least two 24-hour periods, then "crashed" and slept all day yesterday. Denies grandiosity, impulsivity, distractibility, flight of ideas. No AVH. No SI. No HI. Feels depression is "okay." Has tried Seroquel, Trazodone, Abilify and Zyprexa in the past, some of which worked for awhile but eventually ineffective. Has never used Risperidone, Lamictal, Valproic acid, Lithium, or Latuda.    7/13/23:  At last OV, Vraylar was discontinued, Risperidone was started and she was continued on Sertraline. Today, states that Trazodone worked well at first at 100mg, not as well anymore - has not been able to sleep well over past couple of nights. Anxiety and depression both improved, mariela improved, thinks Risperidone is working well. Does not feel she needs increase in dose at this point.    Getting migraines more often. No aura. +Photo and phonophobia, nausea. Throbbing. Clenching jaw at night as well. The only thing she's tried in the past is Fioricet, which worked well.     Needs refill on Zofran, Risperidone, " Trazodone      Past Medical History:   Diagnosis Date    Anxiety     Back pain     Bipolar disorder 2004    bipolar 2    Chronic bronchitis     Depression     Diabetes mellitus     GERD (gastroesophageal reflux disease)     HPV (human papilloma virus) infection     Insomnia     Migraines     Ovarian cyst     Pneumonia     PONV (postoperative nausea and vomiting)      Past Surgical History:   Procedure Laterality Date    APPENDECTOMY      ARTHROSCOPY OF ANKLE Right 05/04/2022    Procedure: ARTHROSCOPY, ANKLE;  Surgeon: Bimal Mccormick DPM;  Location: [facility];  Service: Podiatry;  Laterality: Right;  CURT EXTERNAL ANKLE DISTRACTOR    CHOLECYSTECTOMY      DILATION AND CURETTAGE OF UTERUS      HYSTERECTOMY  2017    total, ovarian cysts, torsion, [place]; hyst per Dr JESUS Manriquez    LAPAROSCOPIC APPENDECTOMY N/A 08/18/2021    Procedure: APPENDECTOMY, LAPAROSCOPIC;  Surgeon: Osiel Ramirez MD;  Location: [facility];  Service: General;  Laterality: N/A;    OOPHORECTOMY      opherectom Left 2015    salpingo-opherectomy    REPAIR OF LIGAMENT OF ANKLE Right 06/23/2021    Procedure: REPAIR OF ANTERIOR TALOFIBULAR LIGAMENT CALCANEOFIBULAR LIGAMENT;  Surgeon: Bimal Mccormick DPM;  Location: [facility];  Service: Podiatry;  Laterality: Right;  WITH ARTHREX INTERNAL BRACE    REPAIR OF LIGAMENT OF ANKLE Right 4/27/2023    Procedure: REPAIR, LIGAMENT, ANKLE;  Surgeon: Aryan Poole DPM;  Location: [facility] OR;  Service: Podiatry;  Laterality: Right;    REPAIR OF TENDON OF LOWER EXTREMITY Right 4/27/2023    Procedure: REPAIR, TENDON, LOWER EXTREMITY;  Surgeon: Aryan Poole DPM;  Location: [facility] OR;  Service: Podiatry;  Laterality: Right;  arthrex    SURGICAL REMOVAL OF BONE SPUR Right 06/23/2021    Procedure: EXCISION OS TRIGONUM;  Surgeon: Bimal Mccormick DPM;  Location: [facility] OR;  Service: Podiatry;  Laterality: Right;    WISDOM TOOTH EXTRACTION       Family History   Adopted: Yes   Problem Relation Age of Onset    No Known  Problems Sister     No Known Problems Sister     No Known Problems Sister        Tobacco History:  reports that she has been smoking cigarettes. She has a 27.00 pack-year smoking history. She has been exposed to tobacco smoke. She has never used smokeless tobacco.  Alcohol History:  reports that she does not currently use alcohol.  Drug History:  reports that she does not currently use drugs.    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 for 7 days, Disp: 60 g, Rfl: 0    gabapentin (NEURONTIN) 300 MG capsule, Take 1 capsule (300 mg total) by mouth every evening., Disp: 30 capsule, Rfl: 2    HYDROcodone-acetaminophen (NORCO)  mg per tablet, Take 1 tablet by mouth every 6 (six) hours as needed for Pain., Disp: 28 tablet, Rfl: 0    hydrOXYzine pamoate (VISTARIL) 25 MG Cap, Take 1 capsule (25 mg total) by mouth every 4 (four) hours as needed (itching)., Disp: 12 capsule, Rfl: 0    lancets (ACCU-CHEK SOFTCLIX LANCETS) Misc, Test blood sugar 4 (four) times daily before meals and nightly., Disp: 200 each, Rfl: 5    metFORMIN (GLUCOPHAGE-XR) 500 MG ER 24hr tablet, Take 1 tablet (500 mg total) by mouth 2 (two) times daily with meals., Disp: 180 tablet, Rfl: 3    naproxen (NAPROSYN) 500 MG tablet, Take 500 mg by mouth 2 (two) times daily., Disp: , Rfl:     pyridoxine, vitamin B6, (B-6) 25 MG Tab, Take 25 mg by mouth once daily., Disp: , Rfl:     semaglutide (OZEMPIC) 1 mg/dose (4 mg/3 mL), Inject 1 mg into the skin every 7 days., Disp: 3 mL, Rfl: 11    sertraline (ZOLOFT) 100 MG tablet, Take 1.5  "tablets (150 mg total) by mouth once daily., Disp: 135 tablet, Rfl: 1    moxifloxacin (VIGAMOX) 0.5 % ophthalmic solution, Place 1 drop into the left eye 3 (three) times daily. Instill the drops to the left ear and not the left eye. for 5 days, Disp: 3 mL, Rfl: 0    ondansetron (ZOFRAN-ODT) 4 MG TbDL, Take 2 tablets (8 mg total) by mouth every 8 (eight) hours as needed (Nausea)., Disp: 30 tablet, Rfl: 2    risperiDONE (RISPERDAL) 1 MG tablet, Take 1 tablet (1 mg total) by mouth 2 (two) times daily., Disp: 60 tablet, Rfl: 2    sumatriptan (IMITREX) 100 MG tablet, Take 1 tab PO at first sign of migraine. If not effective, repeat dose in 2 hours. Do not take more than 200mg in 24 hours., Disp: 12 tablet, Rfl: 1    traZODone (DESYREL) 100 MG tablet, Take 1-2 tabs PO qHS prn insomnia., Disp: 60 tablet, Rfl: 2    Review of Systems  As in HPI           Objective:      Vitals:    07/13/23 1001   BP: 137/87   Pulse: 97   SpO2: 95%   Weight: (!) 145.1 kg (319 lb 14.4 oz)   Height: 5' 7" (1.702 m)     Physical Exam  Vitals reviewed.   Constitutional:       General: She is not in acute distress.     Appearance: She is obese. She is not ill-appearing.      Comments: Strong cigarette odor   Pulmonary:      Effort: Pulmonary effort is normal.   Neurological:      Mental Status: She is alert and oriented to person, place, and time.   Psychiatric:      Comments: Casually groomed and dressed. Mood is "better" with anxious but noticeably improved affect. Good eye contact. Good rapport. Normal thought process, content, and speech. Good insight. No delusions elicited. No hallucinations. No SI. No HI.            Assessment:       1. Bipolar disorder, in partial remission, most recent episode mixed    2. Insomnia due to other mental disorder    3. Nausea    4. Migraine without aura and without status migrainosus, not intractable             Plan:       Bipolar disorder, in partial remission, most recent episode mixed  -     risperiDONE " (RISPERDAL) 1 MG tablet; Take 1 tablet (1 mg total) by mouth 2 (two) times daily.  Dispense: 60 tablet; Refill: 2    Insomnia due to other mental disorder  -     traZODone (DESYREL) 100 MG tablet; Take 1-2 tabs PO qHS prn insomnia.  Dispense: 60 tablet; Refill: 2    Nausea  -     ondansetron (ZOFRAN-ODT) 4 MG TbDL; Take 2 tablets (8 mg total) by mouth every 8 (eight) hours as needed (Nausea).  Dispense: 30 tablet; Refill: 2    Migraine without aura and without status migrainosus, not intractable  -     sumatriptan (IMITREX) 100 MG tablet; Take 1 tab PO at first sign of migraine. If not effective, repeat dose in 2 hours. Do not take more than 200mg in 24 hours.  Dispense: 12 tablet; Refill: 1      - continue risperidone at current dose  - increase trazodone to 200mg qHS  - refills issued  - discussed fioricet not a preferred first line agent. trial of sumatriptan    No follow-ups on file.        7/23/2023 Magalie Roldan M.D.

## 2023-07-20 ENCOUNTER — HOSPITAL ENCOUNTER (EMERGENCY)
Facility: HOSPITAL | Age: 33
Discharge: HOME | End: 2023-07-20
Attending: EMERGENCY MEDICINE
Payer: MEDICAID

## 2023-07-20 VITALS
WEIGHT: 293 LBS | OXYGEN SATURATION: 95 % | SYSTOLIC BLOOD PRESSURE: 141 MMHG | HEIGHT: 68 IN | HEART RATE: 88 BPM | DIASTOLIC BLOOD PRESSURE: 87 MMHG | BODY MASS INDEX: 44.41 KG/M2 | RESPIRATION RATE: 19 BRPM | TEMPERATURE: 98 F

## 2023-07-20 DIAGNOSIS — H60.92 OTITIS EXTERNA OF LEFT EAR, UNSPECIFIED CHRONICITY, UNSPECIFIED TYPE: Primary | ICD-10-CM

## 2023-07-20 PROCEDURE — 99283 EMERGENCY DEPT VISIT LOW MDM: CPT

## 2023-07-20 PROCEDURE — 25000003 PHARM REV CODE 250: Performed by: NURSE PRACTITIONER

## 2023-07-20 RX ORDER — MOXIFLOXACIN 5 MG/ML
1 SOLUTION/ DROPS OPHTHALMIC
Status: COMPLETED | OUTPATIENT
Start: 2023-07-20 | End: 2023-07-20

## 2023-07-20 RX ORDER — MOXIFLOXACIN 5 MG/ML
1 SOLUTION/ DROPS OPHTHALMIC 3 TIMES DAILY
Qty: 3 ML | Refills: 0 | Status: SHIPPED | OUTPATIENT
Start: 2023-07-20 | End: 2023-07-25

## 2023-07-20 RX ADMIN — MOXIFLOXACIN HYDROCHLORIDE 1 DROP: 5 SOLUTION/ DROPS OPHTHALMIC at 01:07

## 2023-07-20 NOTE — DISCHARGE INSTRUCTIONS
Instilled the drops I have given you into the left ear 3 times a day.  Return to the ED for any worsening of symptoms or any other concerns.  Please go by ear plugs to keep the water out of your ear until this has healed.

## 2023-07-20 NOTE — ED PROVIDER NOTES
Encounter Date: 7/20/2023       History     Chief Complaint   Patient presents with    Otalgia     C/o L ear pain since 4 days, denies fever/chills     Presents with left ear pain.  Onset 4 days.  Patient reports she has been swimming.  Reports the pain is getting worse.  Denies fever.    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     Past Medical History:   Diagnosis Date    Anxiety     Back pain     Bipolar disorder 2004    bipolar 2    Chronic bronchitis     Depression     Diabetes mellitus     GERD (gastroesophageal reflux disease)     HPV (human papilloma virus) infection     Insomnia     Migraines     Ovarian cyst     Pneumonia     PONV (postoperative nausea and vomiting)      Past Surgical History:   Procedure Laterality Date    APPENDECTOMY      ARTHROSCOPY OF ANKLE Right 05/04/2022    Procedure: ARTHROSCOPY, ANKLE;  Surgeon: Bimal Mccormick DPM;  Location: [facility];  Service: Podiatry;  Laterality: Right;  CURT EXTERNAL ANKLE DISTRACTOR    CHOLECYSTECTOMY      DILATION AND CURETTAGE OF UTERUS      HYSTERECTOMY  2017    total, ovarian cysts, torsion, Berrault; hyst per Dr JESUS Manriquez    LAPAROSCOPIC APPENDECTOMY N/A 08/18/2021    Procedure: APPENDECTOMY, LAPAROSCOPIC;  Surgeon: Osiel Ramirez MD;  Location: [facility];  Service: General;  Laterality: N/A;    OOPHORECTOMY      opherectom Left 2015    salpingo-opherectomy    REPAIR OF LIGAMENT OF ANKLE Right 06/23/2021    Procedure: REPAIR OF ANTERIOR TALOFIBULAR LIGAMENT CALCANEOFIBULAR LIGAMENT;  Surgeon: Bimal Mccormick DPM;  Location: [facility] OR;  Service: Podiatry;  Laterality: Right;  WITH ARTHREX INTERNAL BRACE    REPAIR OF LIGAMENT OF ANKLE Right 4/27/2023    Procedure: REPAIR, LIGAMENT, ANKLE;  Surgeon: Aryan Poole DPM;  Location: [facility] OR;  Service: Podiatry;  Laterality: Right;    REPAIR OF TENDON OF LOWER EXTREMITY Right 4/27/2023    Procedure: REPAIR, TENDON,  LOWER EXTREMITY;  Surgeon: Aryan Poole DPM;  Location: [facility] OR;  Service: Podiatry;  Laterality: Right;  arthrex    SURGICAL REMOVAL OF BONE SPUR Right 06/23/2021    Procedure: EXCISION OS TRIGONUM;  Surgeon: Bimal Mccormick DPM;  Location: [facility] OR;  Service: Podiatry;  Laterality: Right;    WISDOM TOOTH EXTRACTION       Family History   Adopted: Yes   Problem Relation Age of Onset    No Known Problems Sister     No Known Problems Sister     No Known Problems Sister      Social History     Tobacco Use    Smoking status: Every Day     Packs/day: 1.50     Years: 18.00     Pack years: 27.00     Types: Cigarettes     Passive exposure: Current    Smokeless tobacco: Never    Tobacco comments:     4/25/23--pt instructed no smoking 24hours before sx, pt voiced understanding.   Substance Use Topics    Alcohol use: Not Currently     Comment: rare    Drug use: Not Currently     Comment: twice     Review of Systems   Constitutional:  Negative for fever.   HENT:  Positive for ear pain. Negative for congestion, ear discharge, facial swelling, sinus pressure and trouble swallowing.    Respiratory:  Negative for cough, shortness of breath and wheezing.    Cardiovascular:  Negative for chest pain, palpitations and leg swelling.   Gastrointestinal:  Negative for abdominal pain, diarrhea, nausea and vomiting.   Musculoskeletal:  Negative for back pain.   Skin:  Negative for rash.   Neurological:  Negative for dizziness and weakness.     Physical Exam     Initial Vitals [07/20/23 1329]   BP Pulse Resp Temp SpO2   (!) 141/87 88 19 98 °F (36.7 °C) 95 %      MAP       --         Physical Exam    Constitutional: She appears well-developed and well-nourished.   HENT:   Head: Normocephalic and atraumatic.   Mouth/Throat: Oropharynx is clear and moist.   Left ear pain with movement of the pinna.  Negative for drainage.  Negative for swelling.  Negative for erythema   Eyes: Conjunctivae are normal.   Neck: Neck supple.   Normal range of  motion.  Cardiovascular:  Normal rate and regular rhythm.           Pulmonary/Chest: Breath sounds normal. No respiratory distress.   Musculoskeletal:         General: Normal range of motion.      Cervical back: Normal range of motion and neck supple.     Neurological: She is alert and oriented to person, place, and time. No sensory deficit. GCS score is 15. GCS eye subscore is 4. GCS verbal subscore is 5. GCS motor subscore is 6.   Skin: Skin is warm and dry.   Psychiatric: She has a normal mood and affect. Thought content normal.       ED Course   Procedures  Labs Reviewed - No data to display       Imaging Results    None          Medications   moxifloxacin 0.5 % ophthalmic solution 1 drop (1 drop Left Eye Given 7/20/23 4487)     Medical Decision Making:   Initial Assessment:   Presents with left ear pain.  Onset 4 days ago .  Patient reports the pain is getting worse as the days go on.  She reports having gone swimming a couple of days ago.  Denies drainage or fever.  ED Management:  I have instilled Vigamox eyedrops to the left ear.  Patient was given a prescription for the same.  She is been instructed as to the use of these for the ear not the eye.  She verbalizes understanding.  She is also been instructed to get ear plugs to keep the water out of her ear until this has healed.  At no time while in the ED did she appear to be in any acute distress.  She is been given return precautions.  She was instructed to follow up with the primary care doctor.  I have discussed this patient with Dr. Sigala                        Clinical Impression:   Final diagnoses:  [H60.92] Otitis externa of left ear, unspecified chronicity, unspecified type (Primary)        ED Disposition Condition    Discharge Stable          ED Prescriptions       Medication Sig Dispense Start Date End Date Auth. Provider    moxifloxacin (VIGAMOX) 0.5 % ophthalmic solution Place 1 drop into the left eye 3 (three) times daily. Instill the drops  to the left ear and not the left eye. for 5 days 3 mL 7/20/2023 7/25/2023 Whitney Paulino NP          Follow-up Information       Follow up With Specialties Details Why Contact Info    Magalie Benjamin MD Family Medicine In 3 days  [address]  [address]  [place] [address]  [phone]               Whitney Paulino NP  07/20/23 5413

## 2023-07-24 ENCOUNTER — TELEPHONE (OUTPATIENT)
Dept: HEMATOLOGY/ONCOLOGY | Facility: CLINIC | Age: 33
End: 2023-07-24

## 2023-07-24 NOTE — PATIENT INSTRUCTIONS
Ankle Sprain (Adult)  An ankle sprain is a stretching or tearing of the ligaments that hold the ankle joint together. There are no broken bones.  An ankle sprain is a common injury for both children and adults. It happens when the ankle turns, twists, or rolls in an awkward way. This can be caused by a sports injury. Or it can happen from doing something as simple as stepping on an uneven surface.  Ligaments are made of tough connective tissue. Normally, ligaments stretch a certain amount and then go back to their normal place. A sprain happens when a ligament is forced to stretch more than the normal amount. A severe sprain can actually tear the ligaments. If you have a severe sprain, you may have felt or heard something like a pop when you were injured.  Ankle sprains are given a grade depending on whether they are mild, moderate, or severe:  Grade 1 sprain. A mild sprain with minor stretching and damage to the ligament.  Grade 2 sprain. A moderate sprain where the ligament is partly torn.  Grade 3 sprain. The most severe kind of sprain. The ligament is completely torn.  Most sprains take about 4 to 6 weeks to heal. A severe sprain can take several months to recover.  Your healthcare provider may order X-rays to be sure you dont have a fracture, or broken bone.  The injured area will feel sore.  Swelling and pain may make it hard to walk. You may need crutches if walking is painful. Or your provider may have you use a cast boot or air splint. This will depend on the grade of ankle sprain that you have.    Home care  For a Grade 1 sprain, use RICE (rest, ice, compression, and elevation):  Rest your ankle. Dont walk on it.  Ice should be used right away to help control swelling. Place an ice pack over the injured area for 20 minutes. Do this every 3 to 6 hours for the first 24 to 48 hours. Keep using ice packs to ease pain and swelling as needed. To make an ice pack, put ice cubes in a plastic bag that seals at  the top. Wrap the bag in a clean, thin towel or cloth. Never put ice or an ice pack directly on the skin. The ice pack can be put right on the cast, bandage, or splint. As the ice melts, be careful that the cast, bandage, or splint doesnt get wet. If you have a boot, open it to apply an ice pack, unless told otherwise by your provider.  Compression devices help to control swelling. They also keep the ankle from moving and support your injured ankle. These devices include dressings, bandages, and wraps.  Elevate or raise your ankle above the level of your heart when sitting or lying down. This is very important for the first 48 hours.  Follow the RICE guidelines for a Grade 2 sprain. This type of sprain will take longer to heal. Your provider may have you wear a splint, cast, or brace to keep your ankle from moving.   If you have a Grade 3 sprain, you are at risk for long-term ankle instability. In rare cases, surgery may be needed. Your provider may have you wear a short leg cast or a walking boot for 2 to 3 weeks.  After 48 hours, it may be helpful to apply heat for 20 minutes several times a day. You can do this with a heating pad or warm compress. Or you may want to go back and forth between using ice and heat. Never apply heat directly to the skin. Always wrap the heating pad or warm compress in a clean, thin towel or cloth.  You may use over-the-counter pain medicine (NSAIDS or nonsteroidal anti-inflammatory drugs) to control pain, unless another pain medicine was prescribed. Talk with your provider before using these medicines if you have chronic liver or kidney disease, or have ever had a stomach ulcer or GI (gastrointestinal) bleeding.  Follow any rehabilitation exercises your provider gives you. These can help you be more flexible and improve your balance and coordination. This is helpful in preventing long-term ankle problems.  Prevention  To help prevent ankle sprains, its important to have good  strength, balance, and flexibility. Be sure to:  Always warm up before you exercise or do something very active  Be careful when walking or running on uneven or cracked surfaces  Wear shoes that are in good condition and fit well  Listen to your bodys signals to slow down when you are in pain or tired  Follow-up care  Any X-rays you had today dont show any broken bones, breaks, or fractures. Sometimes fractures dont show up on the first X-ray. Bruises and sprains can sometimes hurt as much as a fracture. These injuries can take time to heal completely. If your symptoms dont get better or they get worse, talk with your healthcare provider. You may need a repeat X-ray.  Follow up with your healthcare provider, or as advised. Check for any warning signs listed below.  When to seek medical advice  Call your healthcare provider right away if any of these occur:  Fever of 100.4 F (38 C) or higher, or as directed by your healthcare provider  The injury doesnt seem to be healing  The swelling comes back  The cast has a bad smell  The plaster cast or splint gets wet or soft  The fiberglass cast or splint gets wet and does not dry for 24 hours  The pain or swelling increases, or redness appears  Your toes become cold, blue, numb, or tingly  The skin is discolored (looks blue, purple, or gray), has blisters, or is irritated  You re-injure your ankle  Date Last Reviewed: 11/20/2015  © 3888-7859 [employer] [address] [place] [address], [place] [address]. All rights reserved. This information is not intended as a substitute for professional medical care. Always follow your healthcare professional's instructions.

## 2023-07-24 NOTE — TELEPHONE ENCOUNTER
I spoke with pt and reminded her to have labs done prior to appt here on 8/1/23 pt states that she currently has a ear infection and is worried about her white blood count being high because of it and would like to r/s. I sent a message to Ailyn to r/s appt for pt.

## 2023-07-25 ENCOUNTER — HOSPITAL ENCOUNTER (EMERGENCY)
Facility: HOSPITAL | Age: 33
Discharge: HOME | End: 2023-07-25
Attending: EMERGENCY MEDICINE
Payer: MEDICAID

## 2023-07-25 VITALS
TEMPERATURE: 98 F | WEIGHT: 293 LBS | OXYGEN SATURATION: 100 % | DIASTOLIC BLOOD PRESSURE: 80 MMHG | BODY MASS INDEX: 45.99 KG/M2 | RESPIRATION RATE: 16 BRPM | SYSTOLIC BLOOD PRESSURE: 130 MMHG | HEIGHT: 67 IN | HEART RATE: 97 BPM

## 2023-07-25 DIAGNOSIS — H60.90 OTITIS EXTERNA, UNSPECIFIED CHRONICITY, UNSPECIFIED LATERALITY, UNSPECIFIED TYPE: Primary | ICD-10-CM

## 2023-07-25 PROCEDURE — 63600175 PHARM REV CODE 636 W HCPCS: Performed by: EMERGENCY MEDICINE

## 2023-07-25 PROCEDURE — 99285 EMERGENCY DEPT VISIT HI MDM: CPT | Mod: 25

## 2023-07-25 PROCEDURE — 96372 THER/PROPH/DIAG INJ SC/IM: CPT | Performed by: EMERGENCY MEDICINE

## 2023-07-25 PROCEDURE — 25000003 PHARM REV CODE 250: Performed by: EMERGENCY MEDICINE

## 2023-07-25 RX ORDER — CEFTRIAXONE 1 G/1
1 INJECTION, POWDER, FOR SOLUTION INTRAMUSCULAR; INTRAVENOUS
Status: COMPLETED | OUTPATIENT
Start: 2023-07-25 | End: 2023-07-25

## 2023-07-25 RX ORDER — AMOXICILLIN AND CLAVULANATE POTASSIUM 875; 125 MG/1; MG/1
1 TABLET, FILM COATED ORAL 2 TIMES DAILY
Qty: 20 TABLET | Refills: 0 | Status: SHIPPED | OUTPATIENT
Start: 2023-07-25

## 2023-07-25 RX ORDER — IBUPROFEN 400 MG/1
400 TABLET ORAL
Status: COMPLETED | OUTPATIENT
Start: 2023-07-25 | End: 2023-07-25

## 2023-07-25 RX ORDER — CIPROFLOXACIN AND DEXAMETHASONE 3; 1 MG/ML; MG/ML
4 SUSPENSION/ DROPS AURICULAR (OTIC) 2 TIMES DAILY
Qty: 7.5 ML | Refills: 0 | Status: SHIPPED | OUTPATIENT
Start: 2023-07-25

## 2023-07-25 RX ADMIN — CEFTRIAXONE 1 G: 1 INJECTION, POWDER, FOR SOLUTION INTRAMUSCULAR; INTRAVENOUS at 04:07

## 2023-07-25 RX ADMIN — IBUPROFEN 400 MG: 400 TABLET ORAL at 04:07

## 2023-07-25 NOTE — ED PROVIDER NOTES
Encounter Date: 7/25/2023       History     Chief Complaint   Patient presents with    Otalgia     X 7 days. Pt states seen here for same x 5 days ago.      33-year-old female presents emergency department for emergent evaluation of pain to the left ear.  Patient states she is had pain for the last 7 days she was seen here 5 days ago with the same complaints diagnosed with a external ear infection.  Patient states she is been using Vigamox antibiotic drops without relief of symptoms.  Patient reports she is been having intermittent.  Headaches however denies any fevers.  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     Past Medical History:   Diagnosis Date    Anxiety     Back pain     Bipolar disorder 2004    bipolar 2    Chronic bronchitis     Depression     Diabetes mellitus     GERD (gastroesophageal reflux disease)     HPV (human papilloma virus) infection     Insomnia     Migraines     Ovarian cyst     Pneumonia     PONV (postoperative nausea and vomiting)      Past Surgical History:   Procedure Laterality Date    APPENDECTOMY      ARTHROSCOPY OF ANKLE Right 05/04/2022    Procedure: ARTHROSCOPY, ANKLE;  Surgeon: Bimal Mccormick DPM;  Location: [facility] OR;  Service: Podiatry;  Laterality: Right;  CURT EXTERNAL ANKLE DISTRACTOR    CHOLECYSTECTOMY      DILATION AND CURETTAGE OF UTERUS      HYSTERECTOMY  2017    total, ovarian cysts, torsion, [place]ra[place]; hyst per Dr JESUS Manriquez    LAPAROSCOPIC APPENDECTOMY N/A 08/18/2021    Procedure: APPENDECTOMY, LAPAROSCOPIC;  Surgeon: Osiel Ramirez MD;  Location: [facility] OR;  Service: General;  Laterality: N/A;    OOPHORECTOMY      opherectom Left 2015    salpingo-opherectomy    REPAIR OF LIGAMENT OF ANKLE Right 06/23/2021    Procedure: REPAIR OF ANTERIOR TALOFIBULAR LIGAMENT  CALCANEOFIBULAR LIGAMENT;  Surgeon: Bimal Mccormick DPM;  Location: [facility] OR;  Service: Podiatry;  Laterality: Right;  WITH ARTHREX INTERNAL BRACE    REPAIR OF LIGAMENT OF ANKLE Right 4/27/2023    Procedure: REPAIR, LIGAMENT, ANKLE;  Surgeon: Aryan Poole DPM;  Location: [facility] OR;  Service: Podiatry;  Laterality: Right;    REPAIR OF TENDON OF LOWER EXTREMITY Right 4/27/2023    Procedure: REPAIR, TENDON, LOWER EXTREMITY;  Surgeon: Aryan Poole DPM;  Location: [facility] OR;  Service: Podiatry;  Laterality: Right;  arthrex    SURGICAL REMOVAL OF BONE SPUR Right 06/23/2021    Procedure: EXCISION OS TRIGONUM;  Surgeon: Bimal Mccormick DPM;  Location: [facility] OR;  Service: Podiatry;  Laterality: Right;    WISDOM TOOTH EXTRACTION       Family History   Adopted: Yes   Problem Relation Age of Onset    No Known Problems Sister     No Known Problems Sister     No Known Problems Sister      Social History     Tobacco Use    Smoking status: Every Day     Packs/day: 1.50     Years: 18.00     Pack years: 27.00     Types: Cigarettes     Passive exposure: Current    Smokeless tobacco: Never    Tobacco comments:     4/25/23--pt instructed no smoking 24hours before sx, pt voiced understanding.   Substance Use Topics    Alcohol use: Not Currently     Comment: rare    Drug use: Not Currently     Comment: twice     Review of Systems   Constitutional:  Negative for fever.   HENT:  Positive for ear pain.    Respiratory: Negative.     Cardiovascular: Negative.    Gastrointestinal: Negative.    Genitourinary: Negative.    Musculoskeletal: Negative.    Skin: Negative.    Neurological:  Positive for headaches.   Hematological: Negative.    Psychiatric/Behavioral: Negative.     All other systems reviewed and are negative.    Physical Exam     Initial Vitals [07/25/23 1235]   BP Pulse Resp Temp SpO2   124/82 97 18 98.3 °F (36.8 °C) 99 %      MAP       --         Physical Exam    Nursing note and vitals reviewed.  Constitutional: She appears  well-developed and well-nourished.   HENT:   Head: Normocephalic and atraumatic.   Right Ear: No swelling. No mastoid tenderness.   Left Ear: There is swelling. There is mastoid tenderness.   Pain with manipulation of the left pinna, external canal reddened and erythematous suggestive of otitis externa.  Mild tenderness with palpation to the left mastoid no erythema to the mastoid noted   Eyes: Conjunctivae and EOM are normal. Pupils are equal, round, and reactive to light.   Neck: Neck supple.   Normal range of motion.  Cardiovascular:  Normal rate, regular rhythm, normal heart sounds and intact distal pulses.           Pulmonary/Chest: Breath sounds normal.   Abdominal: Abdomen is soft. Bowel sounds are normal. She exhibits no distension.   Musculoskeletal:         General: Normal range of motion.      Cervical back: Normal range of motion and neck supple.     Neurological: She is alert and oriented to person, place, and time. She has normal strength. GCS score is 15. GCS eye subscore is 4. GCS verbal subscore is 5. GCS motor subscore is 6.   Skin: Skin is warm. No rash noted.       ED Course   Procedures  Labs Reviewed - No data to display       Imaging Results              CT Head Without Contrast (Final result)  Result time 07/25/23 15:34:12      Final result by Shamir Mcgrath MD (07/25/23 15:34:12)                   Narrative:    CMS MANDATED QUALITY DATA - CT RADIATION - 436    All CT scans at this facility utilize dose modulation, iterative reconstruction, and/or weight based dosing when appropriate to reduce radiation dose to as low as reasonably achievable.        Reason: Headache, new or worsening, neuro deficit (Age 19-49y); left mastoid tenderness Otalgia, headache    TECHNIQUE: Head CT without IV contrast.    COMPARISON: CT head August 6, 2021    FINDINGS:    Gray-white differentiation is maintained without hemorrhage, midline shift, or mass effect. There is a soft tissue protuberance into the upper  portion of the anterior horn of the right lateral ventricle, which is unchanged from prior CT exam. It is isoechoic to gray matter and to extend to the right frontal gray matter. This could potentially reflect ectopic gray matter    The ventricles and cisterns are maintained.    Calvarium is intact. Visualized sinuses are clear. Small amount of fluid noted in left mastoid air cells felt to reflect a mastoid effusion.    IMPRESSION:    Soft tissue protuberance into the upper portion of the anterior horn of the right lateral ventricle is unchanged from prior CT exam, is isoechoic clear matter and appears to extend to right frontal gray matter. This could potentially reflect ectopic gray matter. Consider further evaluation with MRI brain in a nonemergent setting.        Electronically signed by:  Shamir Mcgrath DO  7/25/2023 3:34 PM CDT Workstation: [id number]                                     Medications   cefTRIAXone injection 1 g (has no administration in time range)   ibuprofen tablet 400 mg (has no administration in time range)     Medical Decision Making:   Initial Assessment:   33-year-old female presents emergency department for emergent evaluation of pain to the left ear.  Patient states she is had pain for the last 7 days she was seen here 5 days ago with the same complaints diagnosed with a external ear infection.  Patient states she is been using Vigamox antibiotic drops without relief of symptoms.  Patient reports she is been having intermittent.  Headaches however denies any fevers.  The patient has been taking no other medications for relief of symptoms she does not appear ill appearing    Differential Diagnosis:   Considerations include otitis externa, otitis media, mastoiditis  ED Management:  33-year-old female presents emergency department for emergent evaluation of pain to left ear.  Patient is very well-appearing she states she was seen here 5 days ago and placed on Vigamox which she is been  using in her ear without relief of symptoms.  Patient denies any drainage from her ear she is had intermittent headaches she denies any associated fever or vomiting.  The patient has not followed up with her primary care provider or seen an ENT.  On exam patient has evidence of otitis externa she did have mild tenderness to the mastoid therefore CT imaging performed patient does have evidence of small effusion to the mastoid therefore she did receive IM Rocephin she will be discharged home on Augmentin her antibiotic for ear drop will be changed to Ciprodex otic.  Patient was counseled to follow up with her primary care provider and ENT given return precautions                 I was personally available for consultation in the emergency department.  I have not seen this patient.   Jack Olvera MD MPH  07/25/2023 5:31 PM         Clinical Impression:   Final diagnoses:  [H60.90] Otitis externa, unspecified chronicity, unspecified laterality, unspecified type (Primary)        ED Disposition Condition    Discharge Stable          ED Prescriptions       Medication Sig Dispense Start Date End Date Auth. Provider    ciprofloxacin-dexAMETHasone 0.3-0.1% (CIPRODEX) 0.3-0.1 % DrpS Place 4 drops into both ears 2 (two) times daily. 7.5 mL 7/25/2023 -- ARACELI Ford    amoxicillin-clavulanate 875-125mg (AUGMENTIN) 875-125 mg per tablet Take 1 tablet by mouth 2 (two) times daily. 20 tablet 7/25/2023 -- ARACELI Ford          Follow-up Information       Follow up With Specialties Details Why Contact Info    Magalie Benjamin MD Family Medicine Schedule an appointment as soon as possible for a visit in 2 days  [address]  [address]  [place] [address]  [phone]      Fabrizio Andrews MD Otolaryngology Schedule an appointment as soon as possible for a visit in 2 days  [address] [facility]  [place] [address] [phone]               ARACELI Ford  07/25/23  1618       Jack Olvera Jr., MD  07/25/23 0161

## 2023-07-25 NOTE — DISCHARGE INSTRUCTIONS
Take medications as directed  Motrin for pain   Follow-up as directed   Return for any concerns of condition becomes worse

## 2023-08-07 ENCOUNTER — OFFICE VISIT (OUTPATIENT)
Dept: PODIATRY | Facility: CLINIC | Age: 33
End: 2023-08-07
Payer: MEDICAID

## 2023-08-07 VITALS — WEIGHT: 293 LBS | HEIGHT: 67 IN | BODY MASS INDEX: 45.99 KG/M2

## 2023-08-07 DIAGNOSIS — S86.311D RUPTURE OF PERONEAL TENDON, RIGHT, SUBSEQUENT ENCOUNTER: Primary | ICD-10-CM

## 2023-08-07 DIAGNOSIS — G57.81 NEURITIS OF RIGHT SURAL NERVE: ICD-10-CM

## 2023-08-07 DIAGNOSIS — G89.29 CHRONIC PAIN OF RIGHT ANKLE: ICD-10-CM

## 2023-08-07 DIAGNOSIS — M25.371 ANKLE INSTABILITY, RIGHT: ICD-10-CM

## 2023-08-07 DIAGNOSIS — S93.491S SPRAIN OF ANTERIOR TALOFIBULAR LIGAMENT OF RIGHT ANKLE, SEQUELA: ICD-10-CM

## 2023-08-07 DIAGNOSIS — M25.571 CHRONIC PAIN OF RIGHT ANKLE: ICD-10-CM

## 2023-08-07 PROCEDURE — 20605 DRAIN/INJ JOINT/BURSA W/O US: CPT | Mod: S$PBB,RT,, | Performed by: PODIATRIST

## 2023-08-07 PROCEDURE — 1159F MED LIST DOCD IN RCRD: CPT | Mod: CPTII,,, | Performed by: PODIATRIST

## 2023-08-07 PROCEDURE — 3051F HG A1C>EQUAL 7.0%<8.0%: CPT | Mod: CPTII,,, | Performed by: PODIATRIST

## 2023-08-07 PROCEDURE — 99999 PR PBB SHADOW E&M-EST. PATIENT-LVL III: ICD-10-PCS | Mod: PBBFAC,,, | Performed by: PODIATRIST

## 2023-08-07 PROCEDURE — 99213 PR OFFICE/OUTPT VISIT, EST, LEVL III, 20-29 MIN: ICD-10-PCS | Mod: 25,S$PBB,, | Performed by: PODIATRIST

## 2023-08-07 PROCEDURE — 1160F PR REVIEW ALL MEDS BY PRESCRIBER/CLIN PHARMACIST DOCUMENTED: ICD-10-PCS | Mod: CPTII,,, | Performed by: PODIATRIST

## 2023-08-07 PROCEDURE — 99213 OFFICE O/P EST LOW 20 MIN: CPT | Mod: 25,S$PBB,, | Performed by: PODIATRIST

## 2023-08-07 PROCEDURE — 1160F RVW MEDS BY RX/DR IN RCRD: CPT | Mod: CPTII,,, | Performed by: PODIATRIST

## 2023-08-07 PROCEDURE — 3008F PR BODY MASS INDEX (BMI) DOCUMENTED: ICD-10-PCS | Mod: CPTII,,, | Performed by: PODIATRIST

## 2023-08-07 PROCEDURE — 99999PBSHW PR PBB SHADOW TECHNICAL ONLY FILED TO HB: ICD-10-PCS | Mod: PBBFAC,,,

## 2023-08-07 PROCEDURE — 99999 PR PBB SHADOW E&M-EST. PATIENT-LVL III: CPT | Mod: PBBFAC,,, | Performed by: PODIATRIST

## 2023-08-07 PROCEDURE — 20605 PR DRAIN/INJECT INTERMEDIATE JOINT/BURSA: ICD-10-PCS | Mod: S$PBB,RT,, | Performed by: PODIATRIST

## 2023-08-07 PROCEDURE — 3051F PR MOST RECENT HEMOGLOBIN A1C LEVEL 7.0 - < 8.0%: ICD-10-PCS | Mod: CPTII,,, | Performed by: PODIATRIST

## 2023-08-07 PROCEDURE — 20605 DRAIN/INJ JOINT/BURSA W/O US: CPT | Mod: PBBFAC,PN | Performed by: PODIATRIST

## 2023-08-07 PROCEDURE — 1159F PR MEDICATION LIST DOCUMENTED IN MEDICAL RECORD: ICD-10-PCS | Mod: CPTII,,, | Performed by: PODIATRIST

## 2023-08-07 PROCEDURE — 3008F BODY MASS INDEX DOCD: CPT | Mod: CPTII,,, | Performed by: PODIATRIST

## 2023-08-07 PROCEDURE — 99999PBSHW PR PBB SHADOW TECHNICAL ONLY FILED TO HB: Mod: PBBFAC,,,

## 2023-08-07 PROCEDURE — 99213 OFFICE O/P EST LOW 20 MIN: CPT | Mod: PBBFAC,PN,25 | Performed by: PODIATRIST

## 2023-08-07 RX ORDER — METHYLPREDNISOLONE ACETATE 40 MG/ML
20 INJECTION, SUSPENSION INTRA-ARTICULAR; INTRALESIONAL; INTRAMUSCULAR; SOFT TISSUE
Status: COMPLETED | OUTPATIENT
Start: 2023-08-07 | End: 2023-08-07

## 2023-08-07 RX ORDER — HYDROCODONE BITARTRATE AND ACETAMINOPHEN 10; 325 MG/1; MG/1
1 TABLET ORAL EVERY 6 HOURS PRN
Qty: 28 TABLET | Refills: 0 | Status: SHIPPED | OUTPATIENT
Start: 2023-08-07 | End: 2023-09-05 | Stop reason: SDUPTHER

## 2023-08-07 RX ORDER — BUPIVACAINE HYDROCHLORIDE 5 MG/ML
1.5 INJECTION, SOLUTION PERINEURAL
Status: COMPLETED | OUTPATIENT
Start: 2023-08-07 | End: 2023-08-07

## 2023-08-07 RX ORDER — DEXAMETHASONE SODIUM PHOSPHATE 4 MG/ML
4 INJECTION, SOLUTION INTRA-ARTICULAR; INTRALESIONAL; INTRAMUSCULAR; INTRAVENOUS; SOFT TISSUE
Status: COMPLETED | OUTPATIENT
Start: 2023-08-07 | End: 2023-08-07

## 2023-08-07 RX ADMIN — BUPIVACAINE HYDROCHLORIDE 7.5 MG: 5 INJECTION, SOLUTION PERINEURAL at 01:08

## 2023-08-07 RX ADMIN — DEXAMETHASONE SODIUM PHOSPHATE 4 MG: 4 INJECTION INTRA-ARTICULAR; INTRALESIONAL; INTRAMUSCULAR; INTRAVENOUS; SOFT TISSUE at 01:08

## 2023-08-07 RX ADMIN — METHYLPREDNISOLONE ACETATE 20 MG: 40 INJECTION, SUSPENSION INTRA-ARTICULAR; INTRALESIONAL; INTRAMUSCULAR; INTRASYNOVIAL; SOFT TISSUE at 01:08

## 2023-08-07 NOTE — PROGRESS NOTES
"  [address] [facility] [address] [place] [address]  Phone: [phone]   [phone]    Patient's PCP:Magalie Euceda MD  Referring Provider: No ref. provider found    Subjective:      Chief Complaint:: Follow-up    Follow-up  Associated symptoms include arthralgias, joint swelling, myalgias and numbness. Pertinent negatives include no abdominal pain, chest pain, chills, coughing, fatigue, fever, headaches, nausea, rash or weakness.     Loretta Lea is a 33 y.o. female who presents today for follow up s/p surgery performed on 04/27/2023, Procedure:  Repair of peroneus brevis tendon right 2.  Repair of anterior talofibular ligament right.  C/o swelling and pain still in the area.  Still doing PT exercises on her own.      Patient also states that she had a drill fall and hit the posterior lateral aspect of her right foot at the previous surgical site.  She states having increased pain in the area.    Systemic Doctor: Magalie Benjamin  Date Last Seen: 03/31/2023  Blood Sugar: 130  Hemoglobin A1c: 7.8 (02/08/23)    Vitals:    08/07/23 0853   Weight: (!) 143.1 kg (315 lb 8 oz)   Height: 5' 7" (1.702 m)   PainSc:   8      Shoe Size:     Past Surgical History:   Procedure Laterality Date    APPENDECTOMY      ARTHROSCOPY OF ANKLE Right 05/04/2022    Procedure: ARTHROSCOPY, ANKLE;  Surgeon: Bimal Mccormick DPM;  Location: [facility] OR;  Service: Podiatry;  Laterality: Right;  CURT EXTERNAL ANKLE DISTRACTOR    CHOLECYSTECTOMY      DILATION AND CURETTAGE OF UTERUS      HYSTERECTOMY  2017    total, ovarian cysts, torsion, [place]; hyst per Dr JESUS Manriquez    LAPAROSCOPIC APPENDECTOMY N/A 08/18/2021    Procedure: APPENDECTOMY, LAPAROSCOPIC;  Surgeon: Osiel Ramirez MD;  Location: [facility] OR;  Service: General;  Laterality: N/A;    OOPHORECTOMY      opherectom Left 2015    salpingo-opherectomy    REPAIR OF LIGAMENT OF ANKLE Right 06/23/2021    Procedure: REPAIR OF ANTERIOR TALOFIBULAR LIGAMENT " CALCANEOFIBULAR LIGAMENT;  Surgeon: Bimal Mccormick DPM;  Location: [facility] OR;  Service: Podiatry;  Laterality: Right;  WITH ARTHREX INTERNAL BRACE    REPAIR OF LIGAMENT OF ANKLE Right 4/27/2023    Procedure: REPAIR, LIGAMENT, ANKLE;  Surgeon: Aryan Poole DPM;  Location: [facility] OR;  Service: Podiatry;  Laterality: Right;    REPAIR OF TENDON OF LOWER EXTREMITY Right 4/27/2023    Procedure: REPAIR, TENDON, LOWER EXTREMITY;  Surgeon: Aryan Poole DPM;  Location: [facility] OR;  Service: Podiatry;  Laterality: Right;  arthrex    SURGICAL REMOVAL OF BONE SPUR Right 06/23/2021    Procedure: EXCISION OS TRIGONUM;  Surgeon: Bimal Mccormick DPM;  Location: [facility] OR;  Service: Podiatry;  Laterality: Right;    WISDOM TOOTH EXTRACTION       Past Medical History:   Diagnosis Date    Anxiety     Back pain     Bipolar disorder 2004    bipolar 2    Chronic bronchitis     Depression     Diabetes mellitus     GERD (gastroesophageal reflux disease)     HPV (human papilloma virus) infection     Insomnia     Migraines     Ovarian cyst     Pneumonia     PONV (postoperative nausea and vomiting)      Family History   Adopted: Yes   Problem Relation Age of Onset    No Known Problems Sister     No Known Problems Sister     No Known Problems Sister         Social History:   Marital Status:   Alcohol History:  reports that she does not currently use alcohol.  Tobacco History:  reports that she has been smoking cigarettes. She has a 27.0 pack-year smoking history. She has been exposed to tobacco smoke. She has never used smokeless tobacco.  Drug History:  reports that she does not currently use drugs.    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       Current Outpatient Medications   Medication Sig Dispense Refill    amoxicillin-clavulanate 875-125mg (AUGMENTIN) 875-125 mg per tablet Take 1 tablet by mouth 2 (two) times daily. 20 tablet 0    blood  sugar diagnostic Strp Test blood sugar 4 (four) times daily before meals and nightly. 200 each 5    blood-glucose meter (ACCU-CHEK GUIDE GLUCOSE METER) [facility] Use to test blood sugar 1 each 0    ciprofloxacin-dexAMETHasone 0.3-0.1% (CIPRODEX) 0.3-0.1 % DrpS Place 4 drops into both ears 2 (two) times daily. 7.5 mL 0    clotrimazole (LOTRIMIN) 1 % cream Apply topically 2 (two) times daily. for 7 days 60 g 0    gabapentin (NEURONTIN) 300 MG capsule Take 1 capsule (300 mg total) by mouth every evening. 30 capsule 2    HYDROcodone-acetaminophen (NORCO)  mg per tablet Take 1 tablet by mouth every 6 (six) hours as needed for Pain. 28 tablet 0    hydrOXYzine pamoate (VISTARIL) 25 MG Cap Take 1 capsule (25 mg total) by mouth every 4 (four) hours as needed (itching). 12 capsule 0    lancets (ACCU-CHEK SOFTCLIX LANCETS) Misc Test blood sugar 4 (four) times daily before meals and nightly. 200 each 5    metFORMIN (GLUCOPHAGE-XR) 500 MG ER 24hr tablet Take 1 tablet (500 mg total) by mouth 2 (two) times daily with meals. 180 tablet 3    naproxen (NAPROSYN) 500 MG tablet Take 500 mg by mouth 2 (two) times daily.      ondansetron (ZOFRAN-ODT) 4 MG TbDL Take 2 tablets (8 mg total) by mouth every 8 (eight) hours as needed (Nausea). 30 tablet 2    pyridoxine, vitamin B6, (B-6) 25 MG Tab Take 25 mg by mouth once daily.      risperiDONE (RISPERDAL) 1 MG tablet Take 1 tablet (1 mg total) by mouth 2 (two) times daily. 60 tablet 2    semaglutide (OZEMPIC) 1 mg/dose (4 mg/3 mL) Inject 1 mg into the skin every 7 days. 3 mL 11    sertraline (ZOLOFT) 100 MG tablet Take 1.5 tablets (150 mg total) by mouth once daily. 135 tablet 1    sumatriptan (IMITREX) 100 MG tablet Take 1 tab PO at first sign of migraine. If not effective, repeat dose in 2 hours. Do not take more than 200mg in 24 hours. 12 tablet 1    traZODone (DESYREL) 100 MG tablet Take 1-2 tabs PO qHS prn insomnia. 60 tablet 2     No current facility-administered medications for this  visit.       Review of Systems   Constitutional:  Negative for chills, fatigue, fever and unexpected weight change.   HENT:  Negative for hearing loss and trouble swallowing.    Eyes:  Negative for photophobia and visual disturbance.   Respiratory:  Positive for shortness of breath. Negative for cough and wheezing.    Cardiovascular:  Negative for chest pain, palpitations and leg swelling.   Gastrointestinal:  Negative for abdominal pain and nausea.   Genitourinary:  Negative for dysuria and frequency.   Musculoskeletal:  Positive for arthralgias, gait problem, joint swelling and myalgias. Negative for back pain.   Skin:  Negative for rash and wound.   Neurological:  Positive for numbness. Negative for tremors, seizures, weakness and headaches.   Hematological:  Does not bruise/bleed easily.         Objective:        Physical Exam:   Foot Exam    General  General Appearance: appears stated age and healthy   Orientation: alert and oriented to person, place, and time   Affect: appropriate   Gait: antalgic       Right Foot/Ankle     Inspection and Palpation  Ecchymosis: none  Tenderness: ankle (Lateral ankle and anterior ankle)  Swelling: ankle (Lateral ankle)  Skin Exam: skin intact;   Neurovascular  Dorsalis pedis: 2+  Posterior tibial: 2+  Capillary Refill: 2+  Saphenous nerve sensation: normal  Tibial nerve sensation: normal  Superficial peroneal nerve sensation: normal  Deep peroneal nerve sensation: normal  Sural nerve sensation: normal    Muscle Strength  Ankle dorsiflexion: 4+  Ankle plantar flexion: 4+  Ankle inversion: 4+  Ankle eversion: 4+  Great toe extension: 4+  Great toe flexion: 4+    Range of Motion    Passive  Ankle dorsiflexion: pain  Ankle eversion: pain  Ankle inversion: pain    Active  Ankle eversion: pain  Ankle inversion: pain    Tests  Anterior drawer: (pain)  Talar tilt: (pain)  Paresthesia: positive        Physical Exam  Cardiovascular:      Pulses:           Dorsalis pedis pulses are 2+ on  the right side.        Posterior tibial pulses are 2+ on the right side.   Musculoskeletal:      Right ankle: Swelling present. Tenderness present.                   Muscle Strength   Right Lower Extremity   Ankle Dorsiflexion:  4+   Plantar flexion:  4+/5    Reflexes     Right Side   Paresthesia: present    Vascular Exam     Right Pulses  Dorsalis Pedis:      2+  Posterior Tibial:      2+           Imaging: none            Assessment:       1. Rupture of peroneal tendon, right, subsequent encounter    2. Sprain of anterior talofibular ligament of right ankle, sequela    3. Chronic pain of right ankle    4. Neuritis of right sural nerve    5. Ankle instability, right      Plan:   Rupture of peroneal tendon, right, subsequent encounter  -     HYDROcodone-acetaminophen (NORCO)  mg per tablet; Take 1 tablet by mouth every 6 (six) hours as needed for Pain.  Dispense: 28 tablet; Refill: 0    Sprain of anterior talofibular ligament of right ankle, sequela  -     HYDROcodone-acetaminophen (NORCO)  mg per tablet; Take 1 tablet by mouth every 6 (six) hours as needed for Pain.  Dispense: 28 tablet; Refill: 0    Chronic pain of right ankle  -     methylPREDNISolone acetate injection 20 mg  -     BUPivacaine injection 7.5 mg  -     dexAMETHasone injection 4 mg  -     HYDROcodone-acetaminophen (NORCO)  mg per tablet; Take 1 tablet by mouth every 6 (six) hours as needed for Pain.  Dispense: 28 tablet; Refill: 0    Neuritis of right sural nerve    Ankle instability, right      Follow up in about 4 weeks (around 9/4/2023), or if symptoms worsen or fail to improve.    Procedures        I discussed with the patient that she does still have weakness in all fields in the ankle.  Stress the importance of continuing to work on strengthening and range of motion.  I did also encourage her to continue work on increasing her activities as much as possible.    We discussed different ongoing treatment options.  Given that she  is point tender in the central anterior ankle I discussed possibility of steroid injection.  Patient would like to proceed with this today.    Informed consent was obtained.  Time-out was called.  The area was prepped with alcohol, and a steroid injection was performed at right ankle using 1.5 cc of 0.5% Marcaine w/out epi, 1 cc Dexamethasone, 0.5 cc Methylprednisolone. Patient tolerated the procedure well.       Counseling:     I provided patient education verbally regarding:   Patient diagnosis, treatment options, as well as alternatives, risks, and benefits.     This note was created using Dragon voice recognition software that occasionally misinterpreted phrases or words.

## 2023-08-09 ENCOUNTER — DOCUMENTATION ONLY (OUTPATIENT)
Dept: REHABILITATION | Facility: HOSPITAL | Age: 33
End: 2023-08-09
Payer: MEDICAID

## 2023-08-09 NOTE — PROGRESS NOTES
Outpatient Therapy Discharge Summary                                [facility]    Name: Loretta Lea  Clinic Number: [id number]    Physician Orders: PT Eval and Treat   Medical Diagnosis from Referral: S86.311D (ICD-10-CM) - Rupture of peroneal tendon, right, subsequent encounter S93.491S (ICD-10-CM) - Sprain of anterior talofibular ligament of right ankle, sequela M25.571,G89.29 (ICD-10-CM) - Chronic pain of right ankle   Evaluation Date: 6/13/2023  Authorization Period Expiration: 08/03/2023.  Plan of Care Expiration: 08/25/2023 at 2x/wk x 8 weeks.  Progress Note Due:   Visit # / Visits authorized: 2/12   (3/16 on plan of care)   FOTO: 1/ 3    PTA Visit #: 0/5     Date of Last visit: 06/27/2023  Total Visits Received: 3    Precautions: Standard and surgical  SUBJECTIVE      The patient was last seen by this PT on 06/27/2023. She has not returned to PT. She canceled all of her remaining PT sessions, and she did not call to cancel or give reason. She is being discharged due to non-compliance with attendance. She was not physically present at this discharge.    Assessment      The below goals were established at her evaluation. They could not be re-assessed at discharge as she did not return to PT.     Goals:    STG  Weeks/Visits Date Established  Date Met   1.Decrease max right ankle pain to 5/10 to improve the patient's quality of life.  4 weeks 6/13/2023    In Progress 6/27/2023      2. Decrease right gastrocnemius muscle tone to minimal increase to improve heel strike and toe off during gait.  4 weeks 6/13/2023    In Progress 6/27/2023      3.Increase right ankle strength 1/2 grade to improve the patient's tolerance to weight bearing activities on all surfaces.  4 weeks 6/13/2023    In Progress 6/27/2023      4.Decrease FOTO limitation score to <=32% to improve her ability to properly train her dogs.  4 weeks 6/13/2023    In Progress 6/27/2023      5.Fair  initial written home exercise program knowledge and be without questions to have carryover after discharge from PT.  4 weeks 6/13/2023    In Progress 6/27/2023         LTG Weeks/Visits Date Established  Date Met   1.Decrease max right ankle pain to 2/10 to improve the patient's quality of life.  8 weeks. 6/13/2023    In Progress 6/27/2023      2. Decrease right gastrocnemius muscle tone to normal to minimal increase to improve heel strike and toe off during gait without wearing her ankle brace.  8 weeks. 6/13/2023       In Progress 6/27/2023      3.Increase right ankle strength one grade from evaluation date to improve the patient's tolerance to weight bearing activities on all surfaces.  8 weeks. 6/13/2023    In Progress 6/27/2023      4.Decrease FOTO limitation score to <=27% to improve her ability to properly train her dogs.  8 weeks. 6/13/2023    In Progress 6/27/2023      5.Good Progressed written home exercise program knowledge and be without questions to have carryover after discharge from PT.  8 weeks. 6/13/2023    In Progress 6/27/2023     Discharge reason: Patient has not attended therapy since 06/27/2023. She is being discharged due to non-compliance with attendance.     Plan     This patient is discharged from skilled PT at this time.       Raimundo Best, PT

## 2023-08-29 NOTE — PATIENT INSTRUCTIONS
Ankle Sprain (Adult)  An ankle sprain is a stretching or tearing of the ligaments that hold the ankle joint together. There are no broken bones.  An ankle sprain is a common injury for both children and adults. It happens when the ankle turns, twists, or rolls in an awkward way. This can be caused by a sports injury. Or it can happen from doing something as simple as stepping on an uneven surface.  Ligaments are made of tough connective tissue. Normally, ligaments stretch a certain amount and then go back to their normal place. A sprain happens when a ligament is forced to stretch more than the normal amount. A severe sprain can actually tear the ligaments. If you have a severe sprain, you may have felt or heard something like a pop when you were injured.  Ankle sprains are given a grade depending on whether they are mild, moderate, or severe:  Grade 1 sprain. A mild sprain with minor stretching and damage to the ligament.  Grade 2 sprain. A moderate sprain where the ligament is partly torn.  Grade 3 sprain. The most severe kind of sprain. The ligament is completely torn.  Most sprains take about 4 to 6 weeks to heal. A severe sprain can take several months to recover.  Your healthcare provider may order X-rays to be sure you dont have a fracture, or broken bone.  The injured area will feel sore.  Swelling and pain may make it hard to walk. You may need crutches if walking is painful. Or your provider may have you use a cast boot or air splint. This will depend on the grade of ankle sprain that you have.    Home care  For a Grade 1 sprain, use RICE (rest, ice, compression, and elevation):  Rest your ankle. Dont walk on it.  Ice should be used right away to help control swelling. Place an ice pack over the injured area for 20 minutes. Do this every 3 to 6 hours for the first 24 to 48 hours. Keep using ice packs to ease pain and swelling as needed. To make an ice pack, put ice cubes in a plastic bag that seals at  the top. Wrap the bag in a clean, thin towel or cloth. Never put ice or an ice pack directly on the skin. The ice pack can be put right on the cast, bandage, or splint. As the ice melts, be careful that the cast, bandage, or splint doesnt get wet. If you have a boot, open it to apply an ice pack, unless told otherwise by your provider.  Compression devices help to control swelling. They also keep the ankle from moving and support your injured ankle. These devices include dressings, bandages, and wraps.  Elevate or raise your ankle above the level of your heart when sitting or lying down. This is very important for the first 48 hours.  Follow the RICE guidelines for a Grade 2 sprain. This type of sprain will take longer to heal. Your provider may have you wear a splint, cast, or brace to keep your ankle from moving.   If you have a Grade 3 sprain, you are at risk for long-term ankle instability. In rare cases, surgery may be needed. Your provider may have you wear a short leg cast or a walking boot for 2 to 3 weeks.  After 48 hours, it may be helpful to apply heat for 20 minutes several times a day. You can do this with a heating pad or warm compress. Or you may want to go back and forth between using ice and heat. Never apply heat directly to the skin. Always wrap the heating pad or warm compress in a clean, thin towel or cloth.  You may use over-the-counter pain medicine (NSAIDS or nonsteroidal anti-inflammatory drugs) to control pain, unless another pain medicine was prescribed. Talk with your provider before using these medicines if you have chronic liver or kidney disease, or have ever had a stomach ulcer or GI (gastrointestinal) bleeding.  Follow any rehabilitation exercises your provider gives you. These can help you be more flexible and improve your balance and coordination. This is helpful in preventing long-term ankle problems.  Prevention  To help prevent ankle sprains, its important to have good  strength, balance, and flexibility. Be sure to:  Always warm up before you exercise or do something very active  Be careful when walking or running on uneven or cracked surfaces  Wear shoes that are in good condition and fit well  Listen to your bodys signals to slow down when you are in pain or tired  Follow-up care  Any X-rays you had today dont show any broken bones, breaks, or fractures. Sometimes fractures dont show up on the first X-ray. Bruises and sprains can sometimes hurt as much as a fracture. These injuries can take time to heal completely. If your symptoms dont get better or they get worse, talk with your healthcare provider. You may need a repeat X-ray.  Follow up with your healthcare provider, or as advised. Check for any warning signs listed below.  When to seek medical advice  Call your healthcare provider right away if any of these occur:  Fever of 100.4 F (38 C) or higher, or as directed by your healthcare provider  The injury doesnt seem to be healing  The swelling comes back  The cast has a bad smell  The plaster cast or splint gets wet or soft  The fiberglass cast or splint gets wet and does not dry for 24 hours  The pain or swelling increases, or redness appears  Your toes become cold, blue, numb, or tingly  The skin is discolored (looks blue, purple, or gray), has blisters, or is irritated  You re-injure your ankle  Date Last Reviewed: 11/20/2015  © 4114-1220 [employer]. [address] [place] [address], [place] [address]. All rights reserved. This information is not intended as a substitute for professional medical care. Always follow your healthcare professional's instructions.

## 2023-09-05 ENCOUNTER — OFFICE VISIT (OUTPATIENT)
Dept: PODIATRY | Facility: CLINIC | Age: 33
End: 2023-09-05
Payer: MEDICAID

## 2023-09-05 VITALS — OXYGEN SATURATION: 95 % | WEIGHT: 293 LBS | HEART RATE: 94 BPM | BODY MASS INDEX: 45.99 KG/M2 | HEIGHT: 67 IN

## 2023-09-05 DIAGNOSIS — S86.311D RUPTURE OF PERONEAL TENDON, RIGHT, SUBSEQUENT ENCOUNTER: Primary | ICD-10-CM

## 2023-09-05 DIAGNOSIS — M25.571 CHRONIC PAIN OF RIGHT ANKLE: ICD-10-CM

## 2023-09-05 DIAGNOSIS — S93.491S SPRAIN OF ANTERIOR TALOFIBULAR LIGAMENT OF RIGHT ANKLE, SEQUELA: ICD-10-CM

## 2023-09-05 DIAGNOSIS — G89.29 CHRONIC PAIN OF RIGHT ANKLE: ICD-10-CM

## 2023-09-05 PROCEDURE — 20605 DRAIN/INJ JOINT/BURSA W/O US: CPT | Mod: S$PBB,RT,, | Performed by: PODIATRIST

## 2023-09-05 PROCEDURE — 20605 PR DRAIN/INJECT INTERMEDIATE JOINT/BURSA: ICD-10-PCS | Mod: S$PBB,RT,, | Performed by: PODIATRIST

## 2023-09-05 PROCEDURE — 1159F PR MEDICATION LIST DOCUMENTED IN MEDICAL RECORD: ICD-10-PCS | Mod: CPTII,,, | Performed by: PODIATRIST

## 2023-09-05 PROCEDURE — 20605 DRAIN/INJ JOINT/BURSA W/O US: CPT | Mod: PBBFAC,PN,RT | Performed by: PODIATRIST

## 2023-09-05 PROCEDURE — 99999PBSHW PR PBB SHADOW TECHNICAL ONLY FILED TO HB: Mod: PBBFAC,,,

## 2023-09-05 PROCEDURE — 3008F PR BODY MASS INDEX (BMI) DOCUMENTED: ICD-10-PCS | Mod: CPTII,,, | Performed by: PODIATRIST

## 2023-09-05 PROCEDURE — 99213 PR OFFICE/OUTPT VISIT, EST, LEVL III, 20-29 MIN: ICD-10-PCS | Mod: 25,S$PBB,, | Performed by: PODIATRIST

## 2023-09-05 PROCEDURE — 99999PBSHW PR PBB SHADOW TECHNICAL ONLY FILED TO HB: ICD-10-PCS | Mod: PBBFAC,,,

## 2023-09-05 PROCEDURE — 3051F PR MOST RECENT HEMOGLOBIN A1C LEVEL 7.0 - < 8.0%: ICD-10-PCS | Mod: CPTII,,, | Performed by: PODIATRIST

## 2023-09-05 PROCEDURE — 99213 OFFICE O/P EST LOW 20 MIN: CPT | Mod: 25,S$PBB,, | Performed by: PODIATRIST

## 2023-09-05 PROCEDURE — 3008F BODY MASS INDEX DOCD: CPT | Mod: CPTII,,, | Performed by: PODIATRIST

## 2023-09-05 PROCEDURE — 99999 PR PBB SHADOW E&M-EST. PATIENT-LVL IV: ICD-10-PCS | Mod: PBBFAC,,, | Performed by: PODIATRIST

## 2023-09-05 PROCEDURE — 1159F MED LIST DOCD IN RCRD: CPT | Mod: CPTII,,, | Performed by: PODIATRIST

## 2023-09-05 PROCEDURE — 99999 PR PBB SHADOW E&M-EST. PATIENT-LVL IV: CPT | Mod: PBBFAC,,, | Performed by: PODIATRIST

## 2023-09-05 PROCEDURE — 1160F PR REVIEW ALL MEDS BY PRESCRIBER/CLIN PHARMACIST DOCUMENTED: ICD-10-PCS | Mod: CPTII,,, | Performed by: PODIATRIST

## 2023-09-05 PROCEDURE — 99214 OFFICE O/P EST MOD 30 MIN: CPT | Mod: PBBFAC,PN,25 | Performed by: PODIATRIST

## 2023-09-05 PROCEDURE — 3051F HG A1C>EQUAL 7.0%<8.0%: CPT | Mod: CPTII,,, | Performed by: PODIATRIST

## 2023-09-05 PROCEDURE — 1160F RVW MEDS BY RX/DR IN RCRD: CPT | Mod: CPTII,,, | Performed by: PODIATRIST

## 2023-09-05 RX ORDER — BUPIVACAINE HYDROCHLORIDE 5 MG/ML
1.5 INJECTION, SOLUTION PERINEURAL
Status: COMPLETED | OUTPATIENT
Start: 2023-09-05 | End: 2023-09-05

## 2023-09-05 RX ORDER — HYDROCODONE BITARTRATE AND ACETAMINOPHEN 10; 325 MG/1; MG/1
1 TABLET ORAL EVERY 6 HOURS PRN
Qty: 28 TABLET | Refills: 0 | Status: SHIPPED | OUTPATIENT
Start: 2023-09-05 | End: 2023-10-02 | Stop reason: SDUPTHER

## 2023-09-05 RX ORDER — DEXAMETHASONE SODIUM PHOSPHATE 4 MG/ML
4 INJECTION, SOLUTION INTRA-ARTICULAR; INTRALESIONAL; INTRAMUSCULAR; INTRAVENOUS; SOFT TISSUE
Status: COMPLETED | OUTPATIENT
Start: 2023-09-05 | End: 2023-09-05

## 2023-09-05 RX ORDER — METHYLPREDNISOLONE ACETATE 40 MG/ML
20 INJECTION, SUSPENSION INTRA-ARTICULAR; INTRALESIONAL; INTRAMUSCULAR; SOFT TISSUE
Status: COMPLETED | OUTPATIENT
Start: 2023-09-05 | End: 2023-09-05

## 2023-09-05 RX ADMIN — METHYLPREDNISOLONE ACETATE 20 MG: 40 INJECTION, SUSPENSION INTRA-ARTICULAR; INTRALESIONAL; INTRAMUSCULAR; INTRASYNOVIAL; SOFT TISSUE at 01:09

## 2023-09-05 RX ADMIN — BUPIVACAINE HYDROCHLORIDE 7.5 MG: 5 INJECTION, SOLUTION PERINEURAL at 01:09

## 2023-09-05 RX ADMIN — DEXAMETHASONE SODIUM PHOSPHATE 4 MG: 4 INJECTION INTRA-ARTICULAR; INTRALESIONAL; INTRAMUSCULAR; INTRAVENOUS; SOFT TISSUE at 01:09

## 2023-09-05 NOTE — PROGRESS NOTES
"  [address] [facility] [address] [place] [address]  Phone: [phone]   [phone]    Patient's PCP:Magalie Euceda MD  Referring Provider: No ref. provider found    Subjective:      Chief Complaint:: Follow-up (Rupture of peroneal tendon, right, subsequent encounter)    Follow-up  Associated symptoms include arthralgias, joint swelling, myalgias and numbness. Pertinent negatives include no abdominal pain, chest pain, chills, coughing, fatigue, fever, headaches, nausea, rash or weakness.     Loretta Lea is a 33 y.o. female who presents today for follow up s/p surgery performed on 04/27/2023, Procedure:  Repair of peroneus brevis tendon right 2.  Repair of anterior talofibular ligament right. Patient is still doing at home exercises and working. Pain score 6/10.  Patient states that she has returned to work.  She states she is still having swelling in the ankle at the end of her shift.  However, she states overall her symptoms have improved.      Systemic Doctor: Dr. Magalie Benjamin  Date Last Seen: 06/13/2023  Blood Sugar: didn't take  Hemoglobin A1c: 9.7 on 02/08/2023    Vitals:    09/05/23 0919   Pulse: 94   SpO2: 95%   Weight: (!) 143.6 kg (316 lb 9.3 oz)   Height: 5' 7" (1.702 m)   PainSc:   6      Shoe Size:     Past Surgical History:   Procedure Laterality Date    APPENDECTOMY      ARTHROSCOPY OF ANKLE Right 05/04/2022    Procedure: ARTHROSCOPY, ANKLE;  Surgeon: Bimal Mccormick DPM;  Location: [facility] OR;  Service: Podiatry;  Laterality: Right;  CURT EXTERNAL ANKLE DISTRACTOR    CHOLECYSTECTOMY      DILATION AND CURETTAGE OF UTERUS      HYSTERECTOMY  2017    total, ovarian cysts, torsion, [place]; hyst per Dr JESUS Manriquez    LAPAROSCOPIC APPENDECTOMY N/A 08/18/2021    Procedure: APPENDECTOMY, LAPAROSCOPIC;  Surgeon: Osiel Ramirez MD;  Location: [facility] OR;  Service: General;  Laterality: N/A;    OOPHORECTOMY      opherectom Left 2015    salpingo-opherectomy    REPAIR OF " LIGAMENT OF ANKLE Right 06/23/2021    Procedure: REPAIR OF ANTERIOR TALOFIBULAR LIGAMENT CALCANEOFIBULAR LIGAMENT;  Surgeon: Bimal Mccormick DPM;  Location: [facility] OR;  Service: Podiatry;  Laterality: Right;  WITH ARTHREX INTERNAL BRACE    REPAIR OF LIGAMENT OF ANKLE Right 4/27/2023    Procedure: REPAIR, LIGAMENT, ANKLE;  Surgeon: Aryan Poole DPM;  Location: [facility] OR;  Service: Podiatry;  Laterality: Right;    REPAIR OF TENDON OF LOWER EXTREMITY Right 4/27/2023    Procedure: REPAIR, TENDON, LOWER EXTREMITY;  Surgeon: Aryan Poole DPM;  Location: [facility] OR;  Service: Podiatry;  Laterality: Right;  arthrex    SURGICAL REMOVAL OF BONE SPUR Right 06/23/2021    Procedure: EXCISION OS TRIGONUM;  Surgeon: Bimal Mccormick DPM;  Location: [facility] OR;  Service: Podiatry;  Laterality: Right;    WISDOM TOOTH EXTRACTION       Past Medical History:   Diagnosis Date    Anxiety     Back pain     Bipolar disorder 2004    bipolar 2    Chronic bronchitis     Depression     Diabetes mellitus     GERD (gastroesophageal reflux disease)     HPV (human papilloma virus) infection     Insomnia     Migraines     Ovarian cyst     Pneumonia     PONV (postoperative nausea and vomiting)      Family History   Adopted: Yes   Problem Relation Age of Onset    No Known Problems Sister     No Known Problems Sister     No Known Problems Sister         Social History:   Marital Status:   Alcohol History:  reports that she does not currently use alcohol.  Tobacco History:  reports that she has been smoking cigarettes. She has a 27.0 pack-year smoking history. She has been exposed to tobacco smoke. She has never used smokeless tobacco.  Drug History:  reports that she does not currently use drugs.    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 20 tablet 0    blood sugar diagnostic Strp Test blood sugar 4 (four) times daily before meals and nightly. 200 each 5    blood-glucose meter (ACCU-CHEK GUIDE GLUCOSE METER) [facility] Use to test blood sugar 1 each 0    ciprofloxacin-dexAMETHasone 0.3-0.1% (CIPRODEX) 0.3-0.1 % DrpS Place 4 drops into both ears 2 (two) times daily. 7.5 mL 0    gabapentin (NEURONTIN) 300 MG capsule Take 1 capsule (300 mg total) by mouth every evening. 30 capsule 2    hydrOXYzine pamoate (VISTARIL) 25 MG Cap Take 1 capsule (25 mg total) by mouth every 4 (four) hours as needed (itching). 12 capsule 0    lancets (ACCU-CHEK SOFTCLIX LANCETS) Misc Test blood sugar 4 (four) times daily before meals and nightly. 200 each 5    metFORMIN (GLUCOPHAGE-XR) 500 MG ER 24hr tablet Take 1 tablet (500 mg total) by mouth 2 (two) times daily with meals. 180 tablet 3    naproxen (NAPROSYN) 500 MG tablet Take 500 mg by mouth 2 (two) times daily.      ondansetron (ZOFRAN-ODT) 4 MG TbDL Take 2 tablets (8 mg total) by mouth every 8 (eight) hours as needed (Nausea). 30 tablet 2    pyridoxine, vitamin B6, (B-6) 25 MG Tab Take 25 mg by mouth once daily.      risperiDONE (RISPERDAL) 1 MG tablet Take 1 tablet (1 mg total) by mouth 2 (two) times daily. 60 tablet 2    semaglutide (OZEMPIC) 1 mg/dose (4 mg/3 mL) Inject 1 mg into the skin every 7 days. 3 mL 11    sertraline (ZOLOFT) 100 MG tablet Take 1.5 tablets (150 mg total) by mouth once daily. 135 tablet 1    sumatriptan (IMITREX) 100 MG tablet Take 1 tab PO at first sign of migraine. If not effective, repeat dose in 2 hours. Do not take more than 200mg in 24 hours. 12 tablet 1    traZODone (DESYREL) 100 MG tablet Take 1-2 tabs PO qHS prn insomnia. 60 tablet 2    clotrimazole (LOTRIMIN) 1 % cream Apply topically 2 (two) times daily. for 7 days 60 g 0    HYDROcodone-acetaminophen (NORCO)  mg per tablet Take 1 tablet by mouth every 6 (six) hours as  needed for Pain. 28 tablet 0     No current facility-administered medications for this visit.       Review of Systems   Constitutional:  Negative for chills, fatigue, fever and unexpected weight change.   HENT:  Negative for hearing loss and trouble swallowing.    Eyes:  Negative for photophobia and visual disturbance.   Respiratory:  Positive for shortness of breath. Negative for cough and wheezing.    Cardiovascular:  Negative for chest pain, palpitations and leg swelling.   Gastrointestinal:  Negative for abdominal pain and nausea.   Genitourinary:  Negative for dysuria and frequency.   Musculoskeletal:  Positive for arthralgias, gait problem, joint swelling and myalgias. Negative for back pain.   Skin:  Negative for rash and wound.   Neurological:  Positive for numbness. Negative for tremors, seizures, weakness and headaches.   Hematological:  Does not bruise/bleed easily.         Objective:        Physical Exam:   Foot Exam    General  General Appearance: appears stated age and healthy   Orientation: alert and oriented to person, place, and time   Affect: appropriate   Gait: antalgic       Right Foot/Ankle     Inspection and Palpation  Ecchymosis: none  Tenderness: ankle (Improved)  Swelling: ankle (Improved)  Skin Exam: skin intact;   Neurovascular  Dorsalis pedis: 2+  Posterior tibial: 2+  Capillary Refill: 2+  Saphenous nerve sensation: normal  Tibial nerve sensation: normal  Superficial peroneal nerve sensation: normal  Deep peroneal nerve sensation: normal  Sural nerve sensation: normal    Muscle Strength  Ankle dorsiflexion: 4+  Ankle plantar flexion: 4+  Ankle inversion: 4+  Ankle eversion: 4+  Great toe extension: 4+  Great toe flexion: 4+    Range of Motion    Passive  Ankle inversion: pain    Active  Ankle eversion: pain    Tests  Anterior drawer: (pain)  Talar tilt: (pain)  Paresthesia: positive        Physical Exam  Cardiovascular:      Pulses:           Dorsalis pedis pulses are 2+ on the right  side.        Posterior tibial pulses are 2+ on the right side.   Musculoskeletal:      Right ankle: Swelling present. Tenderness present.                   Muscle Strength   Right Lower Extremity   Ankle Dorsiflexion:  4+   Plantar flexion:  4+/5    Reflexes     Right Side   Paresthesia: present    Vascular Exam     Right Pulses  Dorsalis Pedis:      2+  Posterior Tibial:      2+           Imaging: none            Assessment:       1. Rupture of peroneal tendon, right, subsequent encounter    2. Sprain of anterior talofibular ligament of right ankle, sequela    3. Chronic pain of right ankle      Plan:   Rupture of peroneal tendon, right, subsequent encounter  -     HYDROcodone-acetaminophen (NORCO)  mg per tablet; Take 1 tablet by mouth every 6 (six) hours as needed for Pain.  Dispense: 28 tablet; Refill: 0    Sprain of anterior talofibular ligament of right ankle, sequela  -     HYDROcodone-acetaminophen (NORCO)  mg per tablet; Take 1 tablet by mouth every 6 (six) hours as needed for Pain.  Dispense: 28 tablet; Refill: 0    Chronic pain of right ankle  -     methylPREDNISolone acetate injection 20 mg  -     BUPivacaine injection 7.5 mg  -     dexAMETHasone injection 4 mg  -     HYDROcodone-acetaminophen (NORCO)  mg per tablet; Take 1 tablet by mouth every 6 (six) hours as needed for Pain.  Dispense: 28 tablet; Refill: 0      Follow up in about 2 months (around 11/5/2023), or if symptoms worsen or fail to improve.    I discussed with the patient I do appreciate improvement in both her pain and swelling.  I encouraged her to continue to work on increasing her activities.  Do recommend that she continue utilizing the ankle brace while at work.  Continue icing as needed for pain and swelling.  Given the generalized pain in her ankle we are also going to do a steroid injection today.      Procedures Informed consent was obtained.  Time-out was called.  The area was prepped with alcohol, and a steroid  injection was performed at right ankle using 1.5 cc of 0.5% Marcaine w/out epi, 1 cc Dexamethasone, 0.5 cc Methylprednisolone. Patient tolerated the procedure well.             Counseling:     I provided patient education verbally regarding:   Patient diagnosis, treatment options, as well as alternatives, risks, and benefits.     This note was created using Dragon voice recognition software that occasionally misinterpreted phrases or words.

## 2023-09-14 ENCOUNTER — TELEPHONE (OUTPATIENT)
Dept: HEMATOLOGY/ONCOLOGY | Facility: CLINIC | Age: 33
End: 2023-09-14

## 2023-09-14 DIAGNOSIS — E53.1 PYRIDOXINE DEFICIENCY: Primary | ICD-10-CM

## 2023-09-14 DIAGNOSIS — D72.829 LEUKOCYTOSIS, UNSPECIFIED TYPE: ICD-10-CM

## 2023-09-18 ENCOUNTER — OFFICE VISIT (OUTPATIENT)
Dept: FAMILY MEDICINE | Facility: CLINIC | Age: 33
End: 2023-09-18
Payer: MEDICAID

## 2023-09-18 VITALS
DIASTOLIC BLOOD PRESSURE: 86 MMHG | BODY MASS INDEX: 45.99 KG/M2 | HEART RATE: 84 BPM | SYSTOLIC BLOOD PRESSURE: 125 MMHG | WEIGHT: 293 LBS | HEIGHT: 67 IN | OXYGEN SATURATION: 93 % | TEMPERATURE: 98 F

## 2023-09-18 DIAGNOSIS — F51.05 INSOMNIA DUE TO OTHER MENTAL DISORDER: ICD-10-CM

## 2023-09-18 DIAGNOSIS — M54.50 CHRONIC BILATERAL LOW BACK PAIN, UNSPECIFIED WHETHER SCIATICA PRESENT: ICD-10-CM

## 2023-09-18 DIAGNOSIS — E11.65 UNCONTROLLED TYPE 2 DIABETES MELLITUS WITH HYPERGLYCEMIA: Primary | ICD-10-CM

## 2023-09-18 DIAGNOSIS — F31.77 BIPOLAR DISORDER, IN PARTIAL REMISSION, MOST RECENT EPISODE MIXED: ICD-10-CM

## 2023-09-18 DIAGNOSIS — G89.29 CHRONIC BILATERAL LOW BACK PAIN, UNSPECIFIED WHETHER SCIATICA PRESENT: ICD-10-CM

## 2023-09-18 DIAGNOSIS — F99 INSOMNIA DUE TO OTHER MENTAL DISORDER: ICD-10-CM

## 2023-09-18 DIAGNOSIS — M70.62 GREATER TROCHANTERIC BURSITIS OF LEFT HIP: ICD-10-CM

## 2023-09-18 DIAGNOSIS — E78.2 MIXED HYPERLIPIDEMIA: ICD-10-CM

## 2023-09-18 PROCEDURE — 1159F MED LIST DOCD IN RCRD: CPT | Mod: CPTII,,, | Performed by: NURSE PRACTITIONER

## 2023-09-18 PROCEDURE — 1159F PR MEDICATION LIST DOCUMENTED IN MEDICAL RECORD: ICD-10-PCS | Mod: CPTII,,, | Performed by: NURSE PRACTITIONER

## 2023-09-18 PROCEDURE — 1160F RVW MEDS BY RX/DR IN RCRD: CPT | Mod: CPTII,,, | Performed by: NURSE PRACTITIONER

## 2023-09-18 PROCEDURE — 3079F DIAST BP 80-89 MM HG: CPT | Mod: CPTII,,, | Performed by: NURSE PRACTITIONER

## 2023-09-18 PROCEDURE — 3051F PR MOST RECENT HEMOGLOBIN A1C LEVEL 7.0 - < 8.0%: ICD-10-PCS | Mod: CPTII,,, | Performed by: NURSE PRACTITIONER

## 2023-09-18 PROCEDURE — 99215 OFFICE O/P EST HI 40 MIN: CPT | Performed by: NURSE PRACTITIONER

## 2023-09-18 PROCEDURE — 1160F PR REVIEW ALL MEDS BY PRESCRIBER/CLIN PHARMACIST DOCUMENTED: ICD-10-PCS | Mod: CPTII,,, | Performed by: NURSE PRACTITIONER

## 2023-09-18 PROCEDURE — 3079F PR MOST RECENT DIASTOLIC BLOOD PRESSURE 80-89 MM HG: ICD-10-PCS | Mod: CPTII,,, | Performed by: NURSE PRACTITIONER

## 2023-09-18 PROCEDURE — 99214 OFFICE O/P EST MOD 30 MIN: CPT | Mod: S$PBB,,, | Performed by: NURSE PRACTITIONER

## 2023-09-18 PROCEDURE — 99214 PR OFFICE/OUTPT VISIT, EST, LEVL IV, 30-39 MIN: ICD-10-PCS | Mod: S$PBB,,, | Performed by: NURSE PRACTITIONER

## 2023-09-18 PROCEDURE — 3074F PR MOST RECENT SYSTOLIC BLOOD PRESSURE < 130 MM HG: ICD-10-PCS | Mod: CPTII,,, | Performed by: NURSE PRACTITIONER

## 2023-09-18 PROCEDURE — 3074F SYST BP LT 130 MM HG: CPT | Mod: CPTII,,, | Performed by: NURSE PRACTITIONER

## 2023-09-18 PROCEDURE — 3008F PR BODY MASS INDEX (BMI) DOCUMENTED: ICD-10-PCS | Mod: CPTII,,, | Performed by: NURSE PRACTITIONER

## 2023-09-18 PROCEDURE — 3008F BODY MASS INDEX DOCD: CPT | Mod: CPTII,,, | Performed by: NURSE PRACTITIONER

## 2023-09-18 PROCEDURE — 3051F HG A1C>EQUAL 7.0%<8.0%: CPT | Mod: CPTII,,, | Performed by: NURSE PRACTITIONER

## 2023-09-18 RX ORDER — TRAZODONE HYDROCHLORIDE 100 MG/1
TABLET ORAL
Qty: 60 TABLET | Refills: 2 | Status: SHIPPED | OUTPATIENT
Start: 2023-09-18

## 2023-09-18 RX ORDER — SEMAGLUTIDE 1.34 MG/ML
1 INJECTION, SOLUTION SUBCUTANEOUS
Qty: 3 ML | Refills: 2 | Status: SHIPPED | OUTPATIENT
Start: 2023-09-18

## 2023-09-18 RX ORDER — METFORMIN HYDROCHLORIDE 500 MG/1
1000 TABLET, EXTENDED RELEASE ORAL 2 TIMES DAILY WITH MEALS
Qty: 120 TABLET | Refills: 2 | Status: SHIPPED | OUTPATIENT
Start: 2023-09-18

## 2023-09-18 RX ORDER — RISPERIDONE 1 MG/1
1 TABLET ORAL 2 TIMES DAILY
Qty: 60 TABLET | Refills: 2 | Status: SHIPPED | OUTPATIENT
Start: 2023-09-18

## 2023-09-18 RX ORDER — SERTRALINE HYDROCHLORIDE 100 MG/1
150 TABLET, FILM COATED ORAL DAILY
Qty: 135 TABLET | Refills: 1 | Status: SHIPPED | OUTPATIENT
Start: 2023-09-18

## 2023-09-18 NOTE — PROGRESS NOTES
SUBJECTIVE:      Patient ID: Loretta Lea is a 33 y.o. female.    Chief Complaint: Follow-up, Diabetes (Pt declines flu vaccine), and Hip Pain (X 3 days level 7 )    33-year-old female with a history of type 2 diabetes, anxiety, back pain, bipolar disorder, migraines, insomnia, GERD, and chronic bronchitis presents to the clinic for diabetes follow-up. She is an established patient of Dr. Yen.     Last A1c uncontrolled, A1c 7.8%. Glucose has been running 170-215. Diabetes is managed with Metformin 500 mg bid and Ozempic 1 mg weekly. Noncompliant with diet.  Weight is gradually increasing.     Patient reports her mental health is stable. Currently taking Risperidone 1 mg bid and Zoloft 150 mg. Sleeping well with Trazodone. Reports compliance with medications. Requesting refills. Denies SI.      She recently went back to work at [employer] and reports turning around at work and her left leg went numb. She reports stabbing pain to leg and left hip pressure.  The pain is intermittent. The pain radiates to the toes. The left hip is tender and unable to lay on left side due to pain. She has mild lower back pain. Denies injury or trauma. Hx of 3 right ankles surgeries, often having to compensate with the left leg.         Family History   Adopted: Yes   Problem Relation Age of Onset    No Known Problems Sister     No Known Problems Sister     No Known Problems Sister       Social History     Socioeconomic History    Marital status:     Number of children: 0   Tobacco Use    Smoking status: Every Day     Current packs/day: 1.50     Average packs/day: 1.5 packs/day for 18.0 years (27.0 ttl pk-yrs)     Types: Cigarettes     Passive exposure: Current    Smokeless tobacco: Never    Tobacco comments:     4/25/23--pt instructed no smoking 24hours before sx, pt voiced understanding.   Substance and Sexual Activity    Alcohol use: Not Currently     Comment: rare    Drug use: Not Currently     Comment: twice     Sexual activity: Yes     Partners: Male     Birth control/protection: OCP, None     Social Determinants of Health     Stress: Stress Concern Present (10/20/2020)    [employer] [place] [employer] - Occupational Stress Questionnaire     Feeling of Stress : Very much     Current Outpatient Medications   Medication Sig Dispense Refill    blood sugar diagnostic Strp Test blood sugar 4 (four) times daily before meals and nightly. 200 each 5    blood-glucose meter (ACCU-CHEK GUIDE GLUCOSE METER) Misc Use to test blood sugar 1 each 0    ciprofloxacin-dexAMETHasone 0.3-0.1% (CIPRODEX) 0.3-0.1 % DrpS Place 4 drops into both ears 2 (two) times daily. 7.5 mL 0    gabapentin (NEURONTIN) 300 MG capsule Take 1 capsule (300 mg total) by mouth every evening. 30 capsule 2    HYDROcodone-acetaminophen (NORCO)  mg per tablet Take 1 tablet by mouth every 6 (six) hours as needed for Pain. 28 tablet 0    hydrOXYzine pamoate (VISTARIL) 25 MG Cap Take 1 capsule (25 mg total) by mouth every 4 (four) hours as needed (itching). 12 capsule 0    lancets (ACCU-CHEK SOFTCLIX LANCETS) Misc Test blood sugar 4 (four) times daily before meals and nightly. 200 each 5    naproxen (NAPROSYN) 500 MG tablet Take 500 mg by mouth 2 (two) times daily.      ondansetron (ZOFRAN-ODT) 4 MG TbDL Take 2 tablets (8 mg total) by mouth every 8 (eight) hours as needed (Nausea). 30 tablet 2    pyridoxine, vitamin B6, (B-6) 25 MG Tab Take 25 mg by mouth once daily.      sumatriptan (IMITREX) 100 MG tablet Take 1 tab PO at first sign of migraine. If not effective, repeat dose in 2 hours. Do not take more than 200mg in 24 hours. 12 tablet 1    amoxicillin-clavulanate 875-125mg (AUGMENTIN) 875-125 mg per tablet Take 1 tablet by mouth 2 (two) times daily. (Patient not taking: Reported on 9/18/2023) 20 tablet 0    clotrimazole (LOTRIMIN) 1 % cream Apply topically 2 (two) times daily. for 7 days 60 g 0    metFORMIN (GLUCOPHAGE-XR) 500 MG ER 24hr tablet Take  2 tablets (1,000 mg total) by mouth 2 (two) times daily with meals. 120 tablet 2    risperiDONE (RISPERDAL) 1 MG tablet Take 1 tablet (1 mg total) by mouth 2 (two) times daily. 60 tablet 2    semaglutide (OZEMPIC) 1 mg/dose (4 mg/3 mL) Inject 1 mg into the skin every 7 days. 3 mL 2    sertraline (ZOLOFT) 100 MG tablet Take 1.5 tablets (150 mg total) by mouth once daily. 135 tablet 1    traZODone (DESYREL) 100 MG tablet Take 1-2 tabs PO qHS prn insomnia. 60 tablet 2     No current facility-administered medications for this visit.     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      Past Medical History:   Diagnosis Date    Anxiety     Back pain     Bipolar disorder 2004    bipolar 2    Chronic bronchitis     Depression     Diabetes mellitus     GERD (gastroesophageal reflux disease)     HPV (human papilloma virus) infection     Insomnia     Migraines     Ovarian cyst     Pneumonia     PONV (postoperative nausea and vomiting)      Past Surgical History:   Procedure Laterality Date    APPENDECTOMY      ARTHROSCOPY OF ANKLE Right 05/04/2022    Procedure: ARTHROSCOPY, ANKLE;  Surgeon: Bimal Mccormick DPM;  Location: [facility] OR;  Service: Podiatry;  Laterality: Right;  CURT EXTERNAL ANKLE DISTRACTOR    CHOLECYSTECTOMY      DILATION AND CURETTAGE OF UTERUS      HYSTERECTOMY  2017    total, ovarian cysts, torsion, Berrault; hyst per Dr JESUS Manriquez    LAPAROSCOPIC APPENDECTOMY N/A 08/18/2021    Procedure: APPENDECTOMY, LAPAROSCOPIC;  Surgeon: Oisel Ramirez MD;  Location: [facility] OR;  Service: General;  Laterality: N/A;    OOPHORECTOMY      opherectom Left 2015    salpingo-opherectomy    REPAIR OF LIGAMENT OF ANKLE Right 06/23/2021    Procedure: REPAIR OF ANTERIOR TALOFIBULAR LIGAMENT CALCANEOFIBULAR LIGAMENT;  Surgeon: Bimal Mccormick DPM;  Location: [facility] OR;  Service: Podiatry;  Laterality: Right;  WITH ARTHREX INTERNAL BRACE    REPAIR  OF LIGAMENT OF ANKLE Right 4/27/2023    Procedure: REPAIR, LIGAMENT, ANKLE;  Surgeon: Aryan Poole DPM;  Location: [facility] OR;  Service: Podiatry;  Laterality: Right;    REPAIR OF TENDON OF LOWER EXTREMITY Right 4/27/2023    Procedure: REPAIR, TENDON, LOWER EXTREMITY;  Surgeon: Aryan Poole DPM;  Location: [facility] OR;  Service: Podiatry;  Laterality: Right;  arthrex    SURGICAL REMOVAL OF BONE SPUR Right 06/23/2021    Procedure: EXCISION OS TRIGONUM;  Surgeon: Bimal Mccormick DPM;  Location: [facility] OR;  Service: Podiatry;  Laterality: Right;    WISDOM TOOTH EXTRACTION         Review of Systems   Constitutional:  Negative for activity change, appetite change, chills, diaphoresis, fatigue, fever and unexpected weight change.   HENT:  Negative for congestion, ear pain, sinus pressure, sore throat, trouble swallowing and voice change.    Eyes:  Negative for pain, discharge and visual disturbance.   Respiratory:  Negative for cough, chest tightness, shortness of breath and wheezing.    Cardiovascular:  Negative for chest pain and palpitations.        Dyslipidemia    Gastrointestinal:  Negative for abdominal pain, constipation, diarrhea, nausea and vomiting.   Endocrine:        Uncontrolled type 2 diabetes    Genitourinary:  Negative for difficulty urinating, flank pain, frequency and urgency.   Musculoskeletal:  Positive for arthralgias, back pain and gait problem. Negative for joint swelling and neck pain.        Left hip pain   Skin:  Negative for color change and rash.   Neurological:  Positive for numbness. Negative for dizziness, seizures, syncope, weakness and headaches.   Hematological:  Negative for adenopathy.   Psychiatric/Behavioral:  Negative for dysphoric mood and sleep disturbance. The patient is not nervous/anxious.       OBJECTIVE:      Vitals:    09/18/23 0831   BP: 125/86   BP Location: Left arm   Patient Position: Sitting   BP Method: Large (Automatic)   Pulse: 84   Temp: 98 °F (36.7 °C)   TempSrc: Oral  "  SpO2: (!) 93%   Weight: (!) 144.2 kg (318 lb)   Height: 5' 7" (1.702 m)     Physical Exam  Vitals and nursing note reviewed.   Constitutional:       General: She is awake. She is not in acute distress.     Appearance: Normal appearance. She is morbidly obese. She is not ill-appearing, toxic-appearing or diaphoretic.   HENT:      Head: Normocephalic and atraumatic.      Nose: Nose normal.   Eyes:      General: Lids are normal. Gaze aligned appropriately.      Conjunctiva/sclera: Conjunctivae normal.      Right eye: Right conjunctiva is not injected.      Left eye: Left conjunctiva is not injected.      Pupils: Pupils are equal, round, and reactive to light.   Cardiovascular:      Rate and Rhythm: Normal rate and regular rhythm.      Pulses: Normal pulses.           Dorsalis pedis pulses are 2+ on the left side.        Posterior tibial pulses are 2+ on the left side.      Heart sounds: Normal heart sounds, S1 normal and S2 normal. No murmur heard.     No friction rub. No gallop.   Pulmonary:      Effort: Pulmonary effort is normal. No respiratory distress.      Breath sounds: Normal breath sounds. No stridor. No decreased breath sounds, wheezing, rhonchi or rales.   Chest:      Chest wall: No tenderness.   Musculoskeletal:      Cervical back: Neck supple.      Lumbar back: Tenderness present.      Left hip: Tenderness and bony tenderness present.      Right lower leg: No edema.      Left lower leg: No edema.        Legs:       Comments: Tenderness to paraspinal muscles. Left greater trochanter tender to palpation.  Pain to left with adduction +2 pulses to left foot.    Lymphadenopathy:      Cervical: No cervical adenopathy.   Skin:     General: Skin is warm and dry.      Capillary Refill: Capillary refill takes less than 2 seconds.      Findings: No erythema or rash.   Neurological:      Mental Status: She is alert and oriented to person, place, and time. Mental status is at baseline.   Psychiatric:         " Attention and Perception: Attention normal.         Mood and Affect: Mood normal.         Speech: Speech normal.         Behavior: Behavior normal. Behavior is cooperative.         Thought Content: Thought content normal.         Judgment: Judgment normal.        Admission on 06/19/2023, Discharged on 06/19/2023   Component Date Value Ref Range Status    POC Glucose 06/19/2023 148 (H)  70 - 110 Final   Office Visit on 06/13/2023   Component Date Value Ref Range Status    Hemoglobin A1C, POC 06/13/2023 7.8  % Final     Assessment:       1. Uncontrolled type 2 diabetes mellitus with hyperglycemia    2. Mixed hyperlipidemia    3. Insomnia due to other mental disorder    4. Bipolar disorder, in partial remission, most recent episode mixed    5. Chronic bilateral low back pain, unspecified whether sciatica present    6. Greater trochanteric bursitis of left hip        Plan:       Uncontrolled type 2 diabetes mellitus with hyperglycemia  Due for repeat A1c, patient is fasting today, recommended getting them completed today.  Metformin increased to 1000 mg bid. Continue Ozempic 1 mg weekly.  If a1c is not at goal will have patient start Jardiance 10 mg. She is noncompliant with diet.  Recommend ADA diet.  Continue home glucose monitoring. Weight loss strongly encouraged.   -     Comprehensive Metabolic Panel; Future; Expected date: 09/18/2023  -     Lipid Panel; Future; Expected date: 09/18/2023  -     TSH; Future; Expected date: 09/18/2023  -     Microalbumin/Creatinine Ratio, Urine; Future; Expected date: 09/18/2023  -     Urinalysis; Future; Expected date: 09/18/2023  -     Hemoglobin A1C; Future; Expected date: 09/18/2023  -     semaglutide (OZEMPIC) 1 mg/dose (4 mg/3 mL); Inject 1 mg into the skin every 7 days.  Dispense: 3 mL; Refill: 2  -     metFORMIN (GLUCOPHAGE-XR) 500 MG ER 24hr tablet; Take 2 tablets (1,000 mg total) by mouth 2 (two) times daily with meals.  Dispense: 120 tablet; Refill: 2    Mixed  hyperlipidemia  Will need to start a statin, lipid panel pending. Limit red meat, butter, fried foods, cheese, and other foods that have a lot of saturated fat. Consume more: lean meats, fish, fruits, vegetables, whole grains, beans, lentils, and nuts.  Weight loss, and 30-45 min of cardiovascular exercise daily.  -     Comprehensive Metabolic Panel; Future; Expected date: 09/18/2023  -     Lipid Panel; Future; Expected date: 09/18/2023  -     TSH; Future; Expected date: 09/18/2023    Insomnia due to other mental disorder  Stable, sleeping well with Trazodone. Continue current treatment.   -     traZODone (DESYREL) 100 MG tablet; Take 1-2 tabs PO qHS prn insomnia.  Dispense: 60 tablet; Refill: 2    Bipolar disorder, in partial remission, most recent episode mixed  Stable, continue Zoloft and Risperdal.   -     sertraline (ZOLOFT) 100 MG tablet; Take 1.5 tablets (150 mg total) by mouth once daily.  Dispense: 135 tablet; Refill: 1  -     risperiDONE (RISPERDAL) 1 MG tablet; Take 1 tablet (1 mg total) by mouth 2 (two) times daily.  Dispense: 60 tablet; Refill: 2    Chronic bilateral low back pain, unspecified whether sciatica present  NSAIDs prn pain, suspect the numbness to LLE is related to her back. X-rays pending.   -     X-Ray Lumbar Spine 5 View; Future; Expected date: 09/18/2023    Greater trochanteric bursitis of left hip  Suspect bursitis.  Referral placed to ortho for further eval and possible steroid injection.  X-rays pending.  NSAIDs prn pain.   -     X-Ray Hip 2 or 3 views Left (with Pelvis when performed); Future; Expected date: 09/18/2023  -     Ambulatory referral/consult to Orthopedics; Future; Expected date: 09/25/2023    This note was created using [employer] voice recognition software that occasionally misinterprets phrases or words.     I spent a total of 33 minutes on the day of the visit.This includes face to face time and non-face to face time preparing to see the patient (eg, review of tests),  obtaining and/or reviewing separately obtained history, documenting clinical information in the electronic or other health record, independently interpreting results and communicating results to the patient/family/caregiver, or care coordinator.    Follow up in about 6 months (around 3/18/2024) for Diabetes and Lipids.      9/18/2023 JULIO CESAR Mullins, FNP

## 2023-09-20 ENCOUNTER — TELEPHONE (OUTPATIENT)
Dept: HEMATOLOGY/ONCOLOGY | Facility: CLINIC | Age: 33
End: 2023-09-20

## 2023-09-25 LAB
LEFT EYE DM RETINOPATHY: POSITIVE
RIGHT EYE DM RETINOPATHY: POSITIVE

## 2023-09-27 ENCOUNTER — PATIENT OUTREACH (OUTPATIENT)
Dept: ADMINISTRATIVE | Facility: HOSPITAL | Age: 33
End: 2023-09-27
Payer: MEDICAID

## 2023-10-02 DIAGNOSIS — S93.491S SPRAIN OF ANTERIOR TALOFIBULAR LIGAMENT OF RIGHT ANKLE, SEQUELA: ICD-10-CM

## 2023-10-02 DIAGNOSIS — S86.311D RUPTURE OF PERONEAL TENDON, RIGHT, SUBSEQUENT ENCOUNTER: ICD-10-CM

## 2023-10-02 DIAGNOSIS — G89.29 CHRONIC PAIN OF RIGHT ANKLE: ICD-10-CM

## 2023-10-02 DIAGNOSIS — M25.571 CHRONIC PAIN OF RIGHT ANKLE: ICD-10-CM

## 2023-10-02 RX ORDER — HYDROCODONE BITARTRATE AND ACETAMINOPHEN 10; 325 MG/1; MG/1
1 TABLET ORAL EVERY 6 HOURS PRN
Qty: 28 TABLET | Refills: 0 | Status: SHIPPED | OUTPATIENT
Start: 2023-10-02 | End: 2023-12-13 | Stop reason: SDUPTHER

## 2023-10-10 ENCOUNTER — OFFICE VISIT (OUTPATIENT)
Dept: ORTHOPEDICS | Facility: CLINIC | Age: 33
End: 2023-10-10
Payer: MEDICAID

## 2023-10-10 VITALS — BODY MASS INDEX: 45.99 KG/M2 | WEIGHT: 293 LBS | HEIGHT: 67 IN

## 2023-10-10 DIAGNOSIS — M70.62 GREATER TROCHANTERIC BURSITIS OF LEFT HIP: ICD-10-CM

## 2023-10-10 DIAGNOSIS — M54.16 LUMBAR RADICULOPATHY: Primary | ICD-10-CM

## 2023-10-10 DIAGNOSIS — M43.16 SPONDYLOLISTHESIS OF LUMBAR REGION: ICD-10-CM

## 2023-10-10 PROCEDURE — 3008F BODY MASS INDEX DOCD: CPT | Mod: CPTII,S$GLB,, | Performed by: ORTHOPAEDIC SURGERY

## 2023-10-10 PROCEDURE — 20610 LARGE JOINT ASPIRATION/INJECTION: L GREATER TROCHANTERIC BURSA: ICD-10-PCS | Mod: LT,S$GLB,, | Performed by: ORTHOPAEDIC SURGERY

## 2023-10-10 PROCEDURE — 1159F PR MEDICATION LIST DOCUMENTED IN MEDICAL RECORD: ICD-10-PCS | Mod: CPTII,S$GLB,, | Performed by: ORTHOPAEDIC SURGERY

## 2023-10-10 PROCEDURE — 99213 PR OFFICE/OUTPT VISIT, EST, LEVL III, 20-29 MIN: ICD-10-PCS | Mod: 25,S$GLB,, | Performed by: ORTHOPAEDIC SURGERY

## 2023-10-10 PROCEDURE — 1160F PR REVIEW ALL MEDS BY PRESCRIBER/CLIN PHARMACIST DOCUMENTED: ICD-10-PCS | Mod: CPTII,S$GLB,, | Performed by: ORTHOPAEDIC SURGERY

## 2023-10-10 PROCEDURE — 99213 OFFICE O/P EST LOW 20 MIN: CPT | Mod: 25,S$GLB,, | Performed by: ORTHOPAEDIC SURGERY

## 2023-10-10 PROCEDURE — 20610 DRAIN/INJ JOINT/BURSA W/O US: CPT | Mod: LT,S$GLB,, | Performed by: ORTHOPAEDIC SURGERY

## 2023-10-10 PROCEDURE — 3051F HG A1C>EQUAL 7.0%<8.0%: CPT | Mod: CPTII,S$GLB,, | Performed by: ORTHOPAEDIC SURGERY

## 2023-10-10 PROCEDURE — 1160F RVW MEDS BY RX/DR IN RCRD: CPT | Mod: CPTII,S$GLB,, | Performed by: ORTHOPAEDIC SURGERY

## 2023-10-10 PROCEDURE — 3008F PR BODY MASS INDEX (BMI) DOCUMENTED: ICD-10-PCS | Mod: CPTII,S$GLB,, | Performed by: ORTHOPAEDIC SURGERY

## 2023-10-10 PROCEDURE — 1159F MED LIST DOCD IN RCRD: CPT | Mod: CPTII,S$GLB,, | Performed by: ORTHOPAEDIC SURGERY

## 2023-10-10 PROCEDURE — 3051F PR MOST RECENT HEMOGLOBIN A1C LEVEL 7.0 - < 8.0%: ICD-10-PCS | Mod: CPTII,S$GLB,, | Performed by: ORTHOPAEDIC SURGERY

## 2023-10-10 RX ORDER — TRIAMCINOLONE ACETONIDE 40 MG/ML
40 INJECTION, SUSPENSION INTRA-ARTICULAR; INTRAMUSCULAR
Status: DISCONTINUED | OUTPATIENT
Start: 2023-10-10 | End: 2023-10-10 | Stop reason: HOSPADM

## 2023-10-10 RX ADMIN — TRIAMCINOLONE ACETONIDE 40 MG: 40 INJECTION, SUSPENSION INTRA-ARTICULAR; INTRAMUSCULAR at 07:10

## 2023-10-10 NOTE — PROGRESS NOTES
[facility]    Subjective:     Chief Complaint:   Chief Complaint   Patient presents with    Left Hip - Pain     Left hip x 2 months, pain at side of hip and radiates down to knee, denies lumbar pain, numbness to left leg       Past Medical History:   Diagnosis Date    Anxiety     Back pain     Bipolar disorder 2004    bipolar 2    Chronic bronchitis     Depression     Diabetes mellitus     GERD (gastroesophageal reflux disease)     HPV (human papilloma virus) infection     Insomnia     Migraines     Non-proliferative diabetic retinopathy, mild, both eyes 09/25/2023    Ovarian cyst     Pneumonia     PONV (postoperative nausea and vomiting)        Past Surgical History:   Procedure Laterality Date    APPENDECTOMY      ARTHROSCOPY OF ANKLE Right 05/04/2022    Procedure: ARTHROSCOPY, ANKLE;  Surgeon: Bimal Mccormick DPM;  Location: [facility] OR;  Service: Podiatry;  Laterality: Right;  CURT EXTERNAL ANKLE DISTRACTOR    CHOLECYSTECTOMY      DILATION AND CURETTAGE OF UTERUS      HYSTERECTOMY  2017    total, ovarian cysts, torsion, [place]rault; hyst per Dr JEUSS Manriquez    LAPAROSCOPIC APPENDECTOMY N/A 08/18/2021    Procedure: APPENDECTOMY, LAPAROSCOPIC;  Surgeon: Osiel Ramirez MD;  Location: [facility];  Service: General;  Laterality: N/A;    OOPHORECTOMY      opherectom Left 2015    salpingo-opherectomy    REPAIR OF LIGAMENT OF ANKLE Right 06/23/2021    Procedure: REPAIR OF ANTERIOR TALOFIBULAR LIGAMENT CALCANEOFIBULAR LIGAMENT;  Surgeon: Bimal Mccormick DPM;  Location: [facility];  Service: Podiatry;  Laterality: Right;  WITH ARTHREX INTERNAL BRACE    REPAIR OF LIGAMENT OF ANKLE Right 4/27/2023    Procedure: REPAIR, LIGAMENT, ANKLE;  Surgeon: Aryan Poole DPM;  Location: [facility] OR;  Service: Podiatry;  Laterality: Right;    REPAIR OF TENDON OF LOWER EXTREMITY Right 4/27/2023    Procedure: REPAIR, TENDON, LOWER EXTREMITY;  Surgeon: Aryan Poole DPM;  Location: [facility] OR;  Service: Podiatry;  Laterality: Right;   arthrex    SURGICAL REMOVAL OF BONE SPUR Right 06/23/2021    Procedure: EXCISION OS TRIGONUM;  Surgeon: Bimal Mccormick DPM;  Location: [facility];  Service: Podiatry;  Laterality: Right;    WISDOM TOOTH EXTRACTION         Current Outpatient Medications   Medication Sig    amoxicillin-clavulanate 875-125mg (AUGMENTIN) 875-125 mg per tablet Take 1 tablet by mouth 2 (two) times daily.    blood sugar diagnostic Strp Test blood sugar 4 (four) times daily before meals and nightly.    blood-glucose meter (ACCU-CHEK GUIDE GLUCOSE METER) [facility] Use to test blood sugar    ciprofloxacin-dexAMETHasone 0.3-0.1% (CIPRODEX) 0.3-0.1 % DrpS Place 4 drops into both ears 2 (two) times daily.    clotrimazole (LOTRIMIN) 1 % cream Apply topically 2 (two) times daily. for 7 days    gabapentin (NEURONTIN) 300 MG capsule Take 1 capsule (300 mg total) by mouth every evening.    HYDROcodone-acetaminophen (NORCO)  mg per tablet Take 1 tablet by mouth every 6 (six) hours as needed for Pain.    hydrOXYzine pamoate (VISTARIL) 25 MG Cap Take 1 capsule (25 mg total) by mouth every 4 (four) hours as needed (itching).    lancets (ACCU-CHEK SOFTCLIX LANCETS) Misc Test blood sugar 4 (four) times daily before meals and nightly.    metFORMIN (GLUCOPHAGE-XR) 500 MG ER 24hr tablet Take 2 tablets (1,000 mg total) by mouth 2 (two) times daily with meals.    naproxen (NAPROSYN) 500 MG tablet Take 500 mg by mouth 2 (two) times daily.    ondansetron (ZOFRAN-ODT) 4 MG TbDL Take 2 tablets (8 mg total) by mouth every 8 (eight) hours as needed (Nausea).    pyridoxine, vitamin B6, (B-6) 25 MG Tab Take 25 mg by mouth once daily.    risperiDONE (RISPERDAL) 1 MG tablet Take 1 tablet (1 mg total) by mouth 2 (two) times daily.    semaglutide (OZEMPIC) 1 mg/dose (4 mg/3 mL) Inject 1 mg into the skin every 7 days.    sertraline (ZOLOFT) 100 MG tablet Take 1.5 tablets (150 mg total) by mouth once daily.    sumatriptan (IMITREX) 100 MG tablet Take 1 tab PO at first  sign of migraine. If not effective, repeat dose in 2 hours. Do not take more than 200mg in 24 hours.    traZODone (DESYREL) 100 MG tablet Take 1-2 tabs PO qHS prn insomnia.     No current facility-administered medications for this visit.       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       Family History   Adopted: Yes   Problem Relation Age of Onset    No Known Problems Sister     No Known Problems Sister     No Known Problems Sister        Social History     Socioeconomic History    Marital status:     Number of children: 0   Tobacco Use    Smoking status: Every Day     Current packs/day: 1.50     Average packs/day: 1.5 packs/day for 18.0 years (27.0 ttl pk-yrs)     Types: Cigarettes     Passive exposure: Current    Smokeless tobacco: Never    Tobacco comments:     4/25/23--pt instructed no smoking 24hours before sx, pt voiced understanding.   Substance and Sexual Activity    Alcohol use: Not Currently     Comment: rare    Drug use: Not Currently     Comment: twice    Sexual activity: Yes     Partners: Male     Birth control/protection: OCP, None     Social Determinants of Health     Stress: Stress Concern Present (10/20/2020)    [facility] [place] [facility] [employer] - Occupational Stress Questionnaire     Feeling of Stress : Very much       History of present illness:  33-year-old female, returns to clinic today for her left hip and left leg.  She states that she is had some point tenderness or pain primarily over lateral aspect the left hip periods very tender to the touch.  No groin pain.  No significant limitations range motion she also denies any pain but she does feel like pain sometimes radiates from the left hip or buttocks region to left leg or thigh.  Occasionally to the foot or ankle.  She denies back pain however.  She denies numbness or tingling symptoms.      Review of Systems:    Constitution: Negative  for chills, fever, and sweats.  Negative for unexplained weight loss.    HENT:  Negative for headaches and blurry vision.    Cardiovascular:Negative for chest pain or irregular heart beat. Negative for hypertension.    Respiratory:  Negative for cough and shortness of breath.    Gastrointestinal: Negative for abdominal pain, heartburn, melena, nausea, and vomitting.    Genitourinary:  Negative bladder incontinence and dysuria.    Musculoskeletal:  See HPI for details.     Neurological: Negative for numbness.    Psychiatric/Behavioral: Negative for depression.  The patient is not nervous/anxious.      Endocrine: Negative for polyuria    Hematologic/Lymphatic: Negative for bleeding problem.  Does not bruise/bleed easily.    Skin: Negative for poor would healing and rash    Objective:      Physical Examination:    Vital Signs:  There were no vitals filed for this visit.    Body mass index is 50.12 kg/m².    This a well-developed, well nourished patient in no acute distress.  They are alert and oriented and cooperative to examination.        Examination lumbar spine, no midline vertebral tenderness, no paravertebral spasm.  No lumbar sacral pain or tenderness to palpation.  No restrictions in range of motion with flexion extension rotation or lateral bending.  She is point tender over the left hip, the area of the greater trochanter.  She no pain in the groin with vigorous range of motion of the left hip.  No pain with internal external rotation, flexion extension or adduction abduction of the hip.  Straight leg raise is negative.  Calf soft nontender.  Pertinent New Results:    XRAY Report / Interpretation:   AP and lateral views lumbar spine taken today in the office demonstrate a anterolisthesis of L4 on L5 with a pars defect foraminal narrowing.  She also has degenerative disc disease, most notable at L4-5 and L5-S1.    AP pelvis, hips some mild loss of the femoral acetabular space medially noted on the left hip.   "Body habitus however does limit the exam.    Assessment/Plan:      Left hip pain, trochanteric bursitis, we injected the left hip over point of maximal tenderness lidocaine and triamcinolone.  The patient has somewhat of a mixed picture with complaints of pain radiating from the left hip buttocks to the left thigh.  She has a significant spondylolisthesis at L4-5 with an apparent pars defect.  More than likely congenital in nature and absence of significant back pain if she having some lumbar radiculopathy as well associated with this advanced degeneration.  We will see how she responds to the trach trochanteric bursal injection, will do physical therapy for the back and left hip.  If she continues to have symptoms plan to be to do an MRI evaluate for lumbar radiculitis.  We will see her back after physical therapy.  Follow-up 6 weeks.    Alex Moseley, Physician Assistant, served in the capacity as a "scribe" for this patient encounter.  A "face-to-face" encounter occurred with Dr. Karthik Farias on this date.  The treatment plan and medical decision-making is outlined above. Patient was seen and examined with a chaperone.       This note was created using Dragon voice recognition software that occasionally misinterpreted phrases or words.          "

## 2023-10-10 NOTE — PROCEDURES
Large Joint Aspiration/Injection: L greater trochanteric bursa    Date/Time: 10/10/2023 7:45 AM    Performed by: Karthik Farias MD  Authorized by: Karthik Farias MD    Consent Done?:  Yes (Verbal)  Indications:  Pain  Site marked: the procedure site was marked    Timeout: prior to procedure the correct patient, procedure, and site was verified    Prep: patient was prepped and draped in usual sterile fashion      Local anesthesia used?: Yes    Local anesthetic:  Lidocaine 1% without epinephrine    Details:  Needle Size:  25 G  Ultrasonic Guidance for needle placement?: No    Location:  Hip  Site:  L greater trochanteric bursa  Medications:  40 mg triamcinolone acetonide 40 mg/mL  Patient tolerance:  Patient tolerated the procedure well with no immediate complications

## 2023-10-14 DIAGNOSIS — G57.81 NEURITIS OF RIGHT SURAL NERVE: ICD-10-CM

## 2023-10-17 RX ORDER — GABAPENTIN 300 MG/1
300 CAPSULE ORAL NIGHTLY
Qty: 30 CAPSULE | Refills: 2 | Status: SHIPPED | OUTPATIENT
Start: 2023-10-17

## 2023-10-23 ENCOUNTER — PATIENT MESSAGE (OUTPATIENT)
Dept: ADMINISTRATIVE | Facility: OTHER | Age: 33
End: 2023-10-23
Payer: MEDICAID

## 2023-10-24 ENCOUNTER — CLINICAL SUPPORT (OUTPATIENT)
Dept: REHABILITATION | Facility: HOSPITAL | Age: 33
End: 2023-10-24
Payer: MEDICAID

## 2023-10-24 DIAGNOSIS — M25.60 DECREASED RANGE OF MOTION: ICD-10-CM

## 2023-10-24 DIAGNOSIS — M62.89 MUSCLE TIGHTNESS: ICD-10-CM

## 2023-10-24 DIAGNOSIS — M54.16 LUMBAR RADICULOPATHY: ICD-10-CM

## 2023-10-24 DIAGNOSIS — R53.1 DECREASED STRENGTH: ICD-10-CM

## 2023-10-24 DIAGNOSIS — M70.62 GREATER TROCHANTERIC BURSITIS OF LEFT HIP: ICD-10-CM

## 2023-10-24 DIAGNOSIS — M43.16 SPONDYLOLISTHESIS OF LUMBAR REGION: ICD-10-CM

## 2023-10-24 DIAGNOSIS — R26.89 DECREASED FUNCTIONAL MOBILITY: ICD-10-CM

## 2023-10-24 PROCEDURE — 97161 PT EVAL LOW COMPLEX 20 MIN: CPT | Mod: PN

## 2023-10-24 PROCEDURE — 97110 THERAPEUTIC EXERCISES: CPT | Mod: PN

## 2023-10-24 NOTE — PLAN OF CARE
[facility]  Physical Therapy Initial Evaluation    Name: Loretta Lea  Clinic Number: [id number]    Therapy Diagnosis:   Encounter Diagnoses   Name Primary?    Lumbar radiculopathy     Greater trochanteric bursitis of left hip     Spondylolisthesis of lumbar region     Decreased strength     Muscle tightness     Decreased functional mobility     Decreased range of motion      Physician: Karthik Farias MD   Physician Orders: PT Eval and Treat  Medical Diagnosis from Referral: M54.16 (ICD-10-CM) - Lumbar radiculopathy M70.62 (ICD-10-CM) - Greater trochanteric bursitis of left hip M43.16 (ICD-10-CM) - Spondylolisthesis of lumbar region   Evaluation Date: 10/24/2023  Authorization Period Expiration: 10/09/2024   Plan of Care Expiration: 1/30/2024    Progress Update: 11/24/2023  FOTO: 1 / 3   Visit # / Visits authorized: 1 / 1       PRECAUTIONS: Standard Precautions and Orthopedic: Lumbar anterolisthesis     Time In: 1445  Time Out: 1535  Total Appointment Time (timed & untimed codes): 50 minutes    SUBJECTIVE     Chief complaint:  P1:  Low back pain     P2:   Numbness and tingling down left leg to foot    History of current condition:  Pain started about 2 months ago.  States that she started back at work ([employer]) and had some L hip pain.  States that left leg goes numb all the way down to her feet daily.   States that left leg feels much weaker and gives way on her daily.   Denies bowel/bladder issues.  Pain intensity and frequency has been about the since onset.        Previous episode:  none    Aggravating Factors:  prolonged standing > 15 mins, squatting, laying on left side  Easing Factors:  laying down on R side    Imaging:  See epic.    Prior Therapy: Yes  Social History: Pt lives alone  Occupation: Pt is a .  Prior Level of Function: Independent with all ADLs  Current Level of Function: 65% of PLOF    Pain:  Current 6 /10, worst 8 /10, best 0 /10   Description:  Aching and Dull    Pts goals: Pt reported goal is to be able to walk without pain.    _______________________________________________________  Medical History:   Past Medical History:   Diagnosis Date    Anxiety     Back pain     Bipolar disorder 2004    bipolar 2    Chronic bronchitis     Depression     Diabetes mellitus     GERD (gastroesophageal reflux disease)     HPV (human papilloma virus) infection     Insomnia     Migraines     Non-proliferative diabetic retinopathy, mild, both eyes 09/25/2023    Ovarian cyst     Pneumonia     PONV (postoperative nausea and vomiting)        Surgical History:   Loretta Lea  has a past surgical history that includes Cholecystectomy; Dilation and curettage of uterus; Oophorectomy; opherectom (Left, 2015); Hysterectomy (2017); Repair of ligament of ankle (Right, 06/23/2021); Surgical removal of bone spur (Right, 06/23/2021); Laparoscopic appendectomy (N/A, 08/18/2021); [place] tooth extraction; Arthroscopy of ankle (Right, 05/04/2022); Appendectomy; Repair of tendon of lower extremity (Right, 4/27/2023); and Repair of ligament of ankle (Right, 4/27/2023).    Medications:   Loretta has a current medication list which includes the following prescription(s): amoxicillin-clavulanate 875-125mg, blood sugar diagnostic, blood-glucose meter, ciprofloxacin-dexamethasone 0.3-0.1%, clotrimazole, gabapentin, hydrocodone-acetaminophen, hydroxyzine pamoate, lancets, metformin, naproxen, ondansetron, pyridoxine (vitamin b6), risperidone, ozempic, sertraline, sumatriptan, and trazodone.    Allergies:   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        OBJECTIVE   (x = not tested due to pain and/or inability to obtain test position)    RANGE OF MOTION:    Lumbar AROM/PROM Right  (spine)  10/24/2023 Left    10/24/2023 Goal   Lumbar Flexion (60) Wfl c pain --- 55     Lumbar Extension (30) Wfl c pain  --- 30     Lumbar Side Bending (25) Wfl c pain Wfl c pain 20         Hip AROM/PROM Right  10/24/2023 Left  10/24/2023 Goal   Hip Flexion (120) wfl wfl 115     Hip IR (45) 38 38 c  pain 40     Hip ER (45) wfl wfl 40         Knee AROM/PROM Right  10/24/2023 Left  10/24/2023 Goal   Hyper - Zero - Flexion wfl wfl 0-0-135         STRENGTH:    L/E MMT Right  10/24/2023 Goal   Hip Flexion  4-/5 5/5 B    Hip Extension  4-/5 5/5 B   Hip Abduction  4-/5 5/5 B   Hip IR 4-/5 5/5 B   Hip ER 4-/5 5/5 B   Knee Flexion 4-/5 5/5 B   Knee Extension 4-/5 5/5 B   Ankle DF 4-/5 5/5 B   Ankle PF 4-/5 5/5 B   Ankle Inversion 4-/5 5/5 B   Ankle Eversion 4-/5 5/5 B   Big Toe Extension 4-/5 5/5 B     L/E MMT Left  10/24/2023 Goal   Hip Flexion  4-/5 5/5 B    Hip Extension  4-/5 5/5 B   Hip Abduction  4-/5 5/5 B   Hip IR 4-/5 5/5 B   Hip ER 4-/5 5/5 B   Knee Flexion 4-/5 5/5 B   Knee Extension 4-/5 5/5 B   Ankle DF 4-/5 5/5 B   Ankle PF 4-/5 5/5 B   Ankle Inversion 4-/5 5/5 B   Ankle Eversion 4-/5 5/5 B   Big Toe Extension 4-/5 5/5 B       MUSCLE LENGTH:     Muscle Tested  Right  10/24/2023 Left   10/24/2023 Goal   Hip Flexors  decreased decreased Normal B   Quadriceps normal normal Normal B   Hamstrings  decreased decreased Normal B   Piriformis  decreased decreased Normal B   Gastrocnemius  decreased decreased Normal B   Soleus  decreased decreased Normal B       SPECIAL TESTS:     Right  (spine)  10/24/2023 Goal   SLR Negative Negative    Crossover SLR Negative Negative    Slump Negative Negative    90/90 Positive Negative   Ralph Positive Negative   Standing forward flexion test Negative Negative   Sacral thrust Negative Negative   SIJ Distraction Negative Negative   SIJ Compression Negative Negative       Sensation:  Sensation is intact to light touch    Palpation:  TTP from L3-L5 spinous processes.    Posture:  Pt presents with postural abnormalities which include: forward head, rounded shoulders, and ankle/foot pronation    Gait  "Analysis: The patient ambulated with the following assistive device: none; the pt presents with the following gait abnormalities: decreased step length, ishmael, and arm swing; increased forward flexed posture, trunk sway     Movement Analysis:   Functional Test  Outcome   Double Leg Squat 1/4 squat with pain   Single Leg Step Down NT           TREATMENT   Total Treatment time separate from Evaluation: (10) minutes    Loretta received therapeutic exercises to develop strength, endurance, ROM, flexibility, and posture for (10) minutes including: x = exercises performed     TherEx 10/24/2023    Seated hamstring stretch X 3x30 secs   Clams with red tb x 3x10   Bridges x 3x10          Plan for Next Visit:     Recumbent bike x 10 minutes, level 2    Seated hamstring stretch 3 x 30 seconds Bilateral lower extremity     Seated piriformis stretch 3 x 30 seconds Bilateral   Seated Physioball rollout  3 x 10 reps   Standing gastroc stretch with incline 3 x 30 seconds Bilateral        Bridges 3 x 10 reps     Clams green Theraband  3 x 10 reps    Double Knee To Chest with Physioball 3 x 10 reps   Lower trunk rotation x 30 reps bilateral   Hip adduction with ball 3 x 10 reps   TrA sets with PB  2x10     Step ups 6"  3 x 10 reps bilateral   Hip 3 way 2 x 10 reps Bilateral             Home Exercises and Patient Education Provided    Education/Self-Care provided:  Patient educated on the impairments noted above and the effects of physical therapy intervention to improve overall condition and quality of life.   Patient was educated on all the above exercise prior/during/after for proper posture, positioning, and execution for safe performance with home exercise program.     Written Home Exercises Provided: yes. Prefers: Electronic Copies  Exercises were reviewed and Loretta was able to demonstrate them prior to the end of the session.  Loretta demonstrated good understanding of the education provided.     See EMR under Patient " Instructions for exercises provided during therapy sessions.    ASSESSMENT   Loretta is a 33 y.o. female referred to outpatient Physical Therapy with a medical diagnosis of M54.16 (ICD-10-CM) - Lumbar radiculopathy M70.62 (ICD-10-CM) - Greater trochanteric bursitis of left hip M43.16 (ICD-10-CM) - Spondylolisthesis of lumbar region . Loretta presents with clinical signs and symptoms that support this diagnosis with decreased lower extremity strength, Lumbar joint(s) hypomobility, lower extremity neural tension, and impaired functional mobility. Radicular symptoms are present from the lumbar spine down into the left leg to left foot.    The above impairments will be addressed through manual therapy techniques, therapeutic exercises, functional training, and modalities as necessary. Patient was treated and educated on exercises for home program, progression of therapy, and benefits of therapy to achieve full functional mobility. Patient will benefit from skilled physical therapy to meet short and long term goals and return to prior level of function.    Pt prognosis is Good.   Pt will benefit from skilled outpatient Physical Therapy to address the deficits stated above and in the chart below, provide pt/family education, and to maximize pt's level of independence.     Plan of care discussed with patient: Yes  Pt's spiritual, cultural and educational needs considered and patient is agreeable to the plan of care and goals as stated below:     Anticipated Barriers for therapy: none    Medical Necessity is demonstrated by the following:   History  Co-morbidities and personal factors that may impact the plan of care Co-morbidities:   high BMI    Personal Factors:   no deficits     low   Examination  Body Structures and Functions, activity limitations and participation restrictions that may impact the plan of care Body Regions:   back    Body Systems:    gross symmetry  ROM  strength  gross coordinated movement  motor  "control  motor learning    Participation Restrictions:   See above in "Current Level of Function"     Activity limitations:   Learning and applying knowledge  no deficits    General Tasks and Commands  no deficits    Communication  no deficits    Mobility  no deficits    Self care  no deficits    Domestic Life  no deficits    Interactions/Relationships  no deficits    Life Areas  no deficits    Community and Social Life  community life  recreation and leisure  no deficits         low   Clinical Presentation stable and uncomplicated low   Decision Making/ Complexity Score: low       GOALS:  SHORT TERM GOALS: 4 weeks 10/24/2023   Recent signs and systems trend is improving in order to progress towards LTG's.    Patient will be independent with HEP in order to further progress and return to maximal function.    Pain rating at Worst: 5/10 in order to progress towards increased independence with activity.    Patient will be able to correct postural deviations in sitting and standing, to decrease pain and promote postural awareness for injury prevention.       LONG TERM GOALS: 8 weeks 10/24/2023   Patient will return to normal ADL, recreational, and work related activities with less pain and limitation.     Patient will improve AROM to stated goals in order to return to maximal functional potential.     Patient will improve Strength to stated goals of appropriate musculature in order to improve functional independence.     Pain Rating at Best: 1/10 to improve Quality of Life.     Patient will meet predicted functional outcome (FOTO) score: 80% to increase self-worth & perceived functional ability.    Patient will have met/partially met personal goal of being able to walk without pain.          PLAN   Plan of care Certification: 10/24/2023 to 1/30/2024    Outpatient Physical Therapy 2 times weekly for 6 weeks to include any combination of the following interventions: virtual visits, dry needling, modalities, electrical " stimulation (IFC, Pre-Mod, Attended with Functional Dry Needling), Manual Therapy, Moist Heat/ Ice, Neuromuscular Re-ed, Patient Education, Self Care, Therapeutic Exercise, Functional Training, and Therapeutic Activites     Thank you for this referral.    These services are reasonable and necessary for the conditions set forth above while under my care.    Shimon Byrd, PT, DPT

## 2023-10-30 ENCOUNTER — CLINICAL SUPPORT (OUTPATIENT)
Dept: REHABILITATION | Facility: HOSPITAL | Age: 33
End: 2023-10-30
Payer: MEDICAID

## 2023-10-30 DIAGNOSIS — R53.1 DECREASED STRENGTH: Primary | ICD-10-CM

## 2023-10-30 DIAGNOSIS — M25.60 DECREASED RANGE OF MOTION: ICD-10-CM

## 2023-10-30 DIAGNOSIS — M62.89 MUSCLE TIGHTNESS: ICD-10-CM

## 2023-10-30 DIAGNOSIS — R26.89 DECREASED FUNCTIONAL MOBILITY: ICD-10-CM

## 2023-10-30 PROCEDURE — 97110 THERAPEUTIC EXERCISES: CPT | Mod: PN,CQ

## 2023-10-30 NOTE — PROGRESS NOTES
[facility]   Physical Therapy Treatment Note      Name: Loretta Lea  Clinic Number: [id number]    Therapy Diagnosis:   Encounter Diagnoses   Name Primary?    Decreased strength Yes    Muscle tightness     Decreased functional mobility     Decreased range of motion      Physician: Karthik Farias MD    Visit Date: 10/30/2023  Physician: Karthik Farias MD             Physician Orders: PT Eval and Treat  Medical Diagnosis from Referral: M54.16 (ICD-10-CM) - Lumbar radiculopathy M70.62 (ICD-10-CM) - Greater trochanteric bursitis of left hip M43.16 (ICD-10-CM) - Spondylolisthesis of lumbar region   Evaluation Date: 10/24/2023  Authorization Period Expiration:  11/27/2023  Plan of Care Expiration: 1/30/2024                Progress Update: 11/24/2023                      FOTO: 1 / 3   Visit # / Visits authorized: 1 / 8  PTA Visit #: 1/5                          PRECAUTIONS: Standard Precautions and Orthopedic: Lumbar anterolisthesis      Time In: 0705  Time Out: 0800  Total Billable Time: 55 minutes       Subjective     Patient reports: she is hurting this morning and not felling good.  Patient stated she had a fall Saturday morning.  She was leaving for work and missed a step and fell on her steps.  Patient also stated yesterday at work she she got pain from her Left hip to her Left knee.  She also felt numb and had to sit for about 20 minutes before she could move again. Patient stated when this happens her leg fees like it's going to give out.   She was not compliant with home exercise program.  Response to previous treatment:   Functional change: first visit from eval    Pain: 9/10  Location: left hip       Objective      Objective Measures updated at progress report unless specified.     Treatment     Loretta received the treatments listed below:      +++All charges filed per Medicaid Guidelines+++    therapeutic exercises to develop strength, endurance, ROM, flexibility, posture,  "and core stabilization for 40 minutes including:    Seated exercises:  Hamstring stretch 3 x 30 seconds bilateral   Gastroc stretch 3 x 30 seconds bilateral   Seated Physioball rollout  3 x 10 reps     Supine exercises:  Bridging with ball squeezes x 10 reps   Clams green Theraband  3 x 10 reps    Double Knee To Chest with Physioball x 15 reps   Lower trunk rotation x 15 reps bilateral   Transverse abdominal sets with physioball 2 x 10 reps   Left IT band stretch with strap 3  x 10 seconds     Sidelying exercises:  Open books x 10 reps Right hip, x 5 reps Left hip (some discomfort lying on Left hip)    Step ups 6"  3 x 10 reps bilateral (not performed)  Hip 3 way 2 x 10 reps Bilateral  (not performed)    Lorteta received the following supervised modalities after being cleared for contradictions: IFC Electrical Stimulation:  Loretta received IFC Electrical Stimulation for pain control applied to the Left hip/lower leg with cold pack. Pt received stimulation at a frequency of 12.0 mA for 15 minutes. Loretta tolderated treatment well without any adverse effects.         Patient Education and Home Exercises       Education provided:   -apply ice as needed for delayed muscle soreness  -Continue with Home exercise program daily     Written Home Exercises Provided: yes. Exercises were reviewed and Loretta was able to demonstrate them prior to the end of the session.  Loretta demonstrated good  understanding of the education provided. See Electronic Medical Record under Patient Instructions for exercises provided during therapy sessions    Assessment     Loretta provided good effort and participation toward therapeutic interventions today with focus on  stretching, core strengthening, and trunk mobility.  Pt was unable to lie on her Left hip to do exercises due to increased pain.  Pain also reported with It band stretch.  Patient pain level did decrease after Interferential electrical stimulation and cold pack.    Loretta " Is progressing well towards her goals.   Patient prognosis is Good.     Patient will continue to benefit from skilled outpatient physical therapy to address the deficits listed in the problem list box on initial evaluation, provide pt/family education and to maximize pt's level of independence in the home and community environment.     Patient's spiritual, cultural and educational needs considered and pt agreeable to plan of care and goals.     Anticipated barriers to physical therapy: none    Goals:   SHORT TERM GOALS: 4 weeks 10/24/2023   Recent signs and systems trend is improving in order to progress towards LTG's.     Patient will be independent with HEP in order to further progress and return to maximal function.     Pain rating at Worst: 5/10 in order to progress towards increased independence with activity.     Patient will be able to correct postural deviations in sitting and standing, to decrease pain and promote postural awareness for injury prevention.         LONG TERM GOALS: 8 weeks 10/24/2023   Patient will return to normal ADL, recreational, and work related activities with less pain and limitation.      Patient will improve AROM to stated goals in order to return to maximal functional potential.      Patient will improve Strength to stated goals of appropriate musculature in order to improve functional independence.      Pain Rating at Best: 1/10 to improve Quality of Life.      Patient will meet predicted functional outcome (FOTO) score: 80% to increase self-worth & perceived functional ability.     Patient will have met/partially met personal goal of being able to walk without pain.              PLAN   Plan of care Certification: 10/24/2023 to 1/30/2024     Outpatient Physical Therapy 2 times weekly for 6 weeks to include any combination of the following interventions: virtual visits, dry needling, modalities, electrical stimulation (IFC, Pre-Mod, Attended with Functional Dry Needling), Manual  Therapy, Moist Heat/ Ice, Neuromuscular Re-ed, Patient Education, Self Care, Therapeutic Exercise, Functional Training, and Therapeutic Activites       Jade Hernández, PTA

## 2023-11-01 ENCOUNTER — DOCUMENTATION ONLY (OUTPATIENT)
Dept: REHABILITATION | Facility: HOSPITAL | Age: 33
End: 2023-11-01
Payer: MEDICAID

## 2023-11-01 NOTE — PROGRESS NOTES
PT/PTA met face to face to discuss pt's treatment plan and progress towards established goals. Pt will be seen by a physical therapist minimally every 6th visit or every 30 days.        Jade Hernández PTA

## 2023-11-13 ENCOUNTER — HOSPITAL ENCOUNTER (EMERGENCY)
Facility: HOSPITAL | Age: 33
Discharge: HOME | End: 2023-11-13
Attending: EMERGENCY MEDICINE
Payer: MEDICAID

## 2023-11-13 ENCOUNTER — TELEPHONE (OUTPATIENT)
Dept: PODIATRY | Facility: CLINIC | Age: 33
End: 2023-11-13

## 2023-11-13 VITALS
BODY MASS INDEX: 45.99 KG/M2 | TEMPERATURE: 99 F | OXYGEN SATURATION: 98 % | HEART RATE: 78 BPM | DIASTOLIC BLOOD PRESSURE: 72 MMHG | HEIGHT: 67 IN | WEIGHT: 293 LBS | SYSTOLIC BLOOD PRESSURE: 131 MMHG | RESPIRATION RATE: 16 BRPM

## 2023-11-13 DIAGNOSIS — M54.50 CHRONIC MIDLINE LOW BACK PAIN, UNSPECIFIED WHETHER SCIATICA PRESENT: Primary | ICD-10-CM

## 2023-11-13 DIAGNOSIS — G89.29 CHRONIC MIDLINE LOW BACK PAIN, UNSPECIFIED WHETHER SCIATICA PRESENT: Primary | ICD-10-CM

## 2023-11-13 DIAGNOSIS — M25.559 HIP PAIN: ICD-10-CM

## 2023-11-13 LAB
BILIRUB UR QL STRIP: NEGATIVE
CLARITY UR: CLEAR
COLOR UR: YELLOW
GLUCOSE SERPL-MCNC: 132 MG/DL (ref 70–110)
GLUCOSE UR QL STRIP: NEGATIVE
HGB UR QL STRIP: NEGATIVE
KETONES UR QL STRIP: NEGATIVE
LEUKOCYTE ESTERASE UR QL STRIP: NEGATIVE
NITRITE UR QL STRIP: NEGATIVE
PH UR STRIP: 7 [PH] (ref 5–8)
PROT UR QL STRIP: NEGATIVE
SP GR UR STRIP: 1.01 (ref 1–1.03)
URN SPEC COLLECT METH UR: NORMAL
UROBILINOGEN UR STRIP-ACNC: NEGATIVE EU/DL

## 2023-11-13 PROCEDURE — 63600175 PHARM REV CODE 636 W HCPCS: Performed by: STUDENT IN AN ORGANIZED HEALTH CARE EDUCATION/TRAINING PROGRAM

## 2023-11-13 PROCEDURE — 96372 THER/PROPH/DIAG INJ SC/IM: CPT | Performed by: NURSE PRACTITIONER

## 2023-11-13 PROCEDURE — 81003 URINALYSIS AUTO W/O SCOPE: CPT | Performed by: STUDENT IN AN ORGANIZED HEALTH CARE EDUCATION/TRAINING PROGRAM

## 2023-11-13 PROCEDURE — 96372 THER/PROPH/DIAG INJ SC/IM: CPT | Performed by: STUDENT IN AN ORGANIZED HEALTH CARE EDUCATION/TRAINING PROGRAM

## 2023-11-13 PROCEDURE — 99284 EMERGENCY DEPT VISIT MOD MDM: CPT

## 2023-11-13 PROCEDURE — 63600175 PHARM REV CODE 636 W HCPCS: Performed by: NURSE PRACTITIONER

## 2023-11-13 PROCEDURE — 82962 GLUCOSE BLOOD TEST: CPT

## 2023-11-13 RX ORDER — DEXAMETHASONE SODIUM PHOSPHATE 4 MG/ML
8 INJECTION, SOLUTION INTRA-ARTICULAR; INTRALESIONAL; INTRAMUSCULAR; INTRAVENOUS; SOFT TISSUE
Status: COMPLETED | OUTPATIENT
Start: 2023-11-13 | End: 2023-11-13

## 2023-11-13 RX ORDER — ORPHENADRINE CITRATE 30 MG/ML
60 INJECTION INTRAMUSCULAR; INTRAVENOUS
Status: COMPLETED | OUTPATIENT
Start: 2023-11-13 | End: 2023-11-13

## 2023-11-13 RX ORDER — KETOROLAC TROMETHAMINE 30 MG/ML
30 INJECTION, SOLUTION INTRAMUSCULAR; INTRAVENOUS
Status: COMPLETED | OUTPATIENT
Start: 2023-11-13 | End: 2023-11-13

## 2023-11-13 RX ORDER — HYDROMORPHONE HYDROCHLORIDE 1 MG/ML
1 INJECTION, SOLUTION INTRAMUSCULAR; INTRAVENOUS; SUBCUTANEOUS
Status: COMPLETED | OUTPATIENT
Start: 2023-11-13 | End: 2023-11-13

## 2023-11-13 RX ADMIN — ORPHENADRINE CITRATE 60 MG: 60 INJECTION INTRAMUSCULAR; INTRAVENOUS at 08:11

## 2023-11-13 RX ADMIN — KETOROLAC TROMETHAMINE 30 MG: 30 INJECTION, SOLUTION INTRAMUSCULAR at 08:11

## 2023-11-13 RX ADMIN — DEXAMETHASONE SODIUM PHOSPHATE 8 MG: 4 INJECTION, SOLUTION INTRA-ARTICULAR; INTRALESIONAL; INTRAMUSCULAR; INTRAVENOUS; SOFT TISSUE at 09:11

## 2023-11-13 RX ADMIN — HYDROMORPHONE HYDROCHLORIDE 1 MG: 1 INJECTION, SOLUTION INTRAMUSCULAR; INTRAVENOUS; SUBCUTANEOUS at 10:11

## 2023-11-14 NOTE — DISCHARGE INSTRUCTIONS
You were evaluated and treated in the emergency department today. You were found to have a diagnoses that can be managed well at home, however that requires your commitment to getting better.   Problem-Specific Instructions:  Please follow-up with your orthopedic provider.  I have also placed a consult for pain management to help you explore alternate therapies.      Ensure you follow up with your Primary Care Provider or any additional providers listed on this discharge sheet. While you may be healthy enough to go home today, I cannot predict the exact course of your diagnoses. As such, it is your responsibility to monitor symptoms, follow-up with another healthcare provider, or return to the emergency room for new or worsening concerns. Unless otherwise instructed, continue all home medications and any new medications prescribed to you in the Emergency Department.   General Maintenance: Ensure adequate hydration to prevent prolonged illness and recovery. Monitor your caloric intake with a goal of obtaining and maintaining a healthy weight to help prevent the development of chronic and life-threatening medical conditions. Start healthy fitness habits and aim for a goal of 30 minutes to an hour of exercise 3-5 times a week. Avoid the use of tobacco, alcohol, and illicit drugs as these may be detrimental to your health goals.

## 2023-11-14 NOTE — PROGRESS NOTES
[facility]   Physical Therapy Treatment Note      Name: Loretta Lea  Clinic Number: [id number]    Therapy Diagnosis:   Encounter Diagnoses   Name Primary?    Decreased strength Yes    Muscle tightness     Decreased functional mobility     Decreased range of motion        Physician: Karthik Farias MD    Visit Date: 11/15/2023  Physician: Karthik Farias MD             Physician Orders: PT Eval and Treat  Medical Diagnosis from Referral: M54.16 (ICD-10-CM) - Lumbar radiculopathy M70.62 (ICD-10-CM) - Greater trochanteric bursitis of left hip M43.16 (ICD-10-CM) - Spondylolisthesis of lumbar region   Evaluation Date: 10/24/2023  Authorization Period Expiration:  11/27/2023  Plan of Care Expiration: 1/30/2024                Progress Update: 11/24/2023                      FOTO: 1 / 3   Visit # / Visits authorized: / 8  PTA Visit #: 1/5                          PRECAUTIONS: Standard Precautions and Orthopedic: Lumbar anterolisthesis      Time In: 0750  Time Out: 0848  Total Billable Time: 58 minutes       Subjective     Patient reports: she had to go to the Emergency room 2 nights ago because she was in a lot of pain. She is still having pain today but it is not as bad.    Patient stated she got 4 injections muscle relaxer, toradol, steroid and dilaudid.  She was not compliant with home exercise program.  Response to previous treatment:   Functional change: first visit from eval    Pain: 5/10  Location: left hip       Objective      Objective Measures updated at progress report unless specified.     Treatment     Loretta received the treatments listed below:      +++All charges filed per Medicaid Guidelines+++    therapeutic exercises to develop strength, endurance, ROM, flexibility, posture, and core stabilization for  minutes including:    Seated exercises:  Hamstring stretch 3 x 10 seconds bilateral   Seated Physioball rollout forward x 15 reps; lateral Left and Right x 10 reps  "    Supine exercises:  Bridging with ball squeezes x 10 reps   Clams green Theraband 3 x 10 reps    Double Knee To Chest with Physioball x 20 reps   Lower trunk rotation 2 x 10 reps bilateral   Transverse abdominal sets with physioball 2 x 10 reps   Left IT band stretch with strap 3  x 10 seconds     Muscle energy technique/shotgun x 2 (Left push down/Right pull up)    Sidelying exercises:  Open books x 10 reps Right hip, x 5 reps Left hip (some discomfort lying on Left hip)    Standing exercises:  Lumbar extension over mat x 10 reps (decreased pain)  Hip Abduction x 10 reps (increased numbness)  Hip extension x 10 reps (increased numbness Left lower extremity),   Patient then did x 5 reps of lumbar extension which did not decrease her pain    Not performed today:  Gastroc stretch 3 x 30 seconds bilateral (not performed)  Step ups 6"  3 x 10 reps bilateral (not performed)  Hip 3 way 2 x 10 reps Bilateral  (not performed)    Loretta received the following supervised modalities after being cleared for contradictions: IFC Electrical Stimulation:  Loretta received IFC Electrical Stimulation for pain control applied to the Left hip/lower leg with cold pack. Pt received stimulation at a frequency of 13.5 mA for 8 minutes. Loretta tolderated treatment well without any adverse effects.         Patient Education and Home Exercises       Education provided:   -apply ice as needed for delayed muscle soreness  -Continue with Home exercise program daily     Written Home Exercises Provided: yes. Exercises were reviewed and Loretta was able to demonstrate them prior to the end of the session.  Loretta demonstrated good  understanding of the education provided. See Electronic Medical Record under Patient Instructions for exercises provided during therapy sessions    Assessment   Patient presents to PT with pain in her low back and numbness down her Left leg.  Pt  appeared to have Right innominate.  Thus Muscle energy technique was " performed which correct the innominate.  Patient could not tell if this helped her pain level at that time but she was noted to have improved lumbar extension after Muscle energy technique.  AT end of session patient did report feeling better than when she first came in.      Loretta Is progressing well towards her goals.   Patient prognosis is Good.     Patient will continue to benefit from skilled outpatient physical therapy to address the deficits listed in the problem list box on initial evaluation, provide pt/family education and to maximize pt's level of independence in the home and community environment.     Patient's spiritual, cultural and educational needs considered and pt agreeable to plan of care and goals.     Anticipated barriers to physical therapy: none    Goals:   SHORT TERM GOALS: 4 weeks 10/24/2023   Recent signs and systems trend is improving in order to progress towards LTG's.     Patient will be independent with HEP in order to further progress and return to maximal function.     Pain rating at Worst: 5/10 in order to progress towards increased independence with activity.     Patient will be able to correct postural deviations in sitting and standing, to decrease pain and promote postural awareness for injury prevention.         LONG TERM GOALS: 8 weeks 10/24/2023   Patient will return to normal ADL, recreational, and work related activities with less pain and limitation.      Patient will improve AROM to stated goals in order to return to maximal functional potential.      Patient will improve Strength to stated goals of appropriate musculature in order to improve functional independence.      Pain Rating at Best: 1/10 to improve Quality of Life.      Patient will meet predicted functional outcome (FOTO) score: 80% to increase self-worth & perceived functional ability.     Patient will have met/partially met personal goal of being able to walk without pain.              PLAN   Plan of care  Certification: 10/24/2023 to 1/30/2024     Outpatient Physical Therapy 2 times weekly for 6 weeks to include any combination of the following interventions: virtual visits, dry needling, modalities, electrical stimulation (IFC, Pre-Mod, Attended with Functional Dry Needling), Manual Therapy, Moist Heat/ Ice, Neuromuscular Re-ed, Patient Education, Self Care, Therapeutic Exercise, Functional Training, and Therapeutic Activites       Jade Hernández, PTA

## 2023-11-14 NOTE — ED PROVIDER NOTES
Encounter Date: 11/13/2023       History     Chief Complaint   Patient presents with    Back Pain     Pt states lower back pain starting today. Pt has hx of back dx. Pt denies any recent trauma      Loretta Venu is a 33 year old female with pmh bipolar disorder, depression, DM, GERD, migraines, chronic back pain, arthritis presenting to the ED with c/o low back pain. She states that this is worse than her typical chronic back pain and has been unrelieved with Norco. She denies injury or trauma. Pain begins in the left lower back and radiates to the hip and into the left lower extremity. She denies saddle anesthesia, bowel/bladder incontinence, fever, IV drug use.         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     Past Medical History:   Diagnosis Date    Anxiety     Back pain     Bipolar disorder 2004    bipolar 2    Chronic bronchitis     Depression     Diabetes mellitus     GERD (gastroesophageal reflux disease)     HPV (human papilloma virus) infection     Insomnia     Migraines     Non-proliferative diabetic retinopathy, mild, both eyes 09/25/2023    Ovarian cyst     Pneumonia     PONV (postoperative nausea and vomiting)      Past Surgical History:   Procedure Laterality Date    APPENDECTOMY      ARTHROSCOPY OF ANKLE Right 05/04/2022    Procedure: ARTHROSCOPY, ANKLE;  Surgeon: Bimal Mccormick DPM;  Location: [facility] OR;  Service: Podiatry;  Laterality: Right;  CURT EXTERNAL ANKLE DISTRACTOR    CHOLECYSTECTOMY      DILATION AND CURETTAGE OF UTERUS      HYSTERECTOMY  2017    total, ovarian cysts, torsion, Berrault; hyst per Dr JESUS Manriquez    LAPAROSCOPIC APPENDECTOMY N/A 08/18/2021    Procedure: APPENDECTOMY, LAPAROSCOPIC;  Surgeon: Osiel Ramirez MD;  Location: [facility] OR;  Service: General;  Laterality: N/A;    OOPHORECTOMY      opherectom Left 2015    salpingo-opherectomy    REPAIR OF LIGAMENT OF ANKLE Right 06/23/2021     Procedure: REPAIR OF ANTERIOR TALOFIBULAR LIGAMENT CALCANEOFIBULAR LIGAMENT;  Surgeon: Bimal Mccormick DPM;  Location: [facility] OR;  Service: Podiatry;  Laterality: Right;  WITH ARTHREX INTERNAL BRACE    REPAIR OF LIGAMENT OF ANKLE Right 4/27/2023    Procedure: REPAIR, LIGAMENT, ANKLE;  Surgeon: Aryan Poole DPM;  Location: [facility] OR;  Service: Podiatry;  Laterality: Right;    REPAIR OF TENDON OF LOWER EXTREMITY Right 4/27/2023    Procedure: REPAIR, TENDON, LOWER EXTREMITY;  Surgeon: Aryan Poole DPM;  Location: [facility] OR;  Service: Podiatry;  Laterality: Right;  arthrex    SURGICAL REMOVAL OF BONE SPUR Right 06/23/2021    Procedure: EXCISION OS TRIGONUM;  Surgeon: Bimal Mccormick DPM;  Location: [facility] OR;  Service: Podiatry;  Laterality: Right;    WISDOM TOOTH EXTRACTION       Family History   Adopted: Yes   Problem Relation Age of Onset    No Known Problems Sister     No Known Problems Sister     No Known Problems Sister      Social History     Tobacco Use    Smoking status: Every Day     Current packs/day: 1.50     Average packs/day: 1.5 packs/day for 18.0 years (27.0 ttl pk-yrs)     Types: Cigarettes     Passive exposure: Current    Smokeless tobacco: Never    Tobacco comments:     4/25/23--pt instructed no smoking 24hours before sx, pt voiced understanding.   Substance Use Topics    Alcohol use: Not Currently     Comment: rare    Drug use: Not Currently     Comment: twice     Review of Systems   Constitutional:  Negative for fever.   HENT:  Negative for sore throat.    Respiratory:  Negative for shortness of breath.    Cardiovascular:  Negative for chest pain.   Gastrointestinal:  Negative for nausea.   Genitourinary:  Negative for dysuria.   Musculoskeletal:  Positive for back pain.   Skin:  Negative for rash.   Neurological:  Negative for weakness.   Hematological:  Does not bruise/bleed easily.       Physical Exam     Initial Vitals [11/13/23 1931]   BP Pulse Resp Temp SpO2   (!) 178/102 96 18 99.5 °F (37.5  °C) 99 %      MAP       --         Physical Exam    Nursing note and vitals reviewed.  Constitutional: She appears well-developed and well-nourished. She is not diaphoretic. No distress.   HENT:   Head: Normocephalic and atraumatic.   Mouth/Throat: Oropharynx is clear and moist.   Eyes: Conjunctivae are normal.   Neck: Neck supple.   Cardiovascular:  Normal rate, regular rhythm, normal heart sounds and intact distal pulses.     Exam reveals no gallop and no friction rub.       No murmur heard.  Pulmonary/Chest: Breath sounds normal. No respiratory distress. She has no wheezes. She has no rhonchi. She has no rales.   Abdominal: Abdomen is soft. She exhibits no distension. There is no abdominal tenderness.   Musculoskeletal:      Cervical back: Neck supple.      Lumbar back: Tenderness present.      Left hip: Tenderness present. Decreased range of motion.     Neurological: She is alert and oriented to person, place, and time.   Skin: No rash noted. No erythema.   Psychiatric: Her speech is normal.         ED Course   Procedures  Labs Reviewed   POCT GLUCOSE - Abnormal; Notable for the following components:       Result Value    POC Glucose 132 (*)     All other components within normal limits   URINALYSIS, REFLEX TO URINE CULTURE    Narrative:     Specimen Source->Urine   POCT GLUCOSE MONITORING CONTINUOUS          Imaging Results              X-Ray Hip 2 or 3 views Left (with Pelvis when performed) (In process)                      X-Ray Lumbar Spine Ap And Lateral (In process)                      Medications   orphenadrine injection 60 mg (60 mg Intramuscular Given 11/13/23 2052)   ketorolac injection 30 mg (30 mg Intramuscular Given 11/13/23 2052)   dexAMETHasone injection 8 mg (8 mg Intramuscular Given 11/13/23 2147)   HYDROmorphone injection 1 mg (1 mg Intramuscular Given 11/13/23 2250)     Medical Decision Making  Amount and/or Complexity of Data Reviewed  Radiology: ordered.    Risk  Prescription drug  management.         APC / Resident Notes:   This patient was transferred to my care at shift change.  I performed an independent evaluation of the patient.  This is an urgent evaluation of a 33 year old female presenting to the ED for low back pain. She denies injury and trauma. She has no red flag symptoms concerning for cauda equina, spinal epidural abscess, vertebral fracture/subluxation.   On further history, patient has chronic pain and is working with orthopedic spine for evaluation.  Awaiting insurance to authorize MRI.  Patient has known L5-S1 anterolisthesis.  She has no new pain but tells me that it is acutely worse today and her regular Norco 10 that she takes for chronic ankle pain is not helping as it usually does.  I discussed that is unlikely that I would be able to completely resolve her acute on chronic pain, nor would I be able to offer her long-term solution.  I did offer acute pain management in the emergency room which she agreed to.  She received Toradol, Norflex, Dilaudid.  I did repeat plain films which include left hip and pelvis, lumbar spine.  I do not appreciate acute fracture, dislocation.  She again demonstrates L5-S1 anterolisthesis.  I will discharge the patient home in stable condition with recommendation for close orthopedic follow-up.  Strict and strong return precautions were discussed with the patient.                           Clinical Impression:   Final diagnoses:  [M25.559] Hip pain  [M54.50, G89.29] Chronic midline low back pain, unspecified whether sciatica present (Primary)        ED Disposition Condition    Discharge           ED Prescriptions    None       Follow-up Information       Follow up With Specialties Details Why Contact Info Additional Information    Magalie Euceda MD Family Medicine Schedule an appointment as soon as possible for a visit in 1 week  [address]  [address]  [place] [address]  [phone]       [facility] - Emergency  Dept Emergency Medicine Go to  As needed, If symptoms worsen [address]  [facility] [id number] [phone] 1st floor    Karthik Farias MD Orthopedic Surgery, Surgery, Sports Medicine Call in 1 day  [address]  [facility] [place] [address]  [phone]                Alex Denis PA-C  11/13/23 1715

## 2023-11-15 ENCOUNTER — CLINICAL SUPPORT (OUTPATIENT)
Dept: REHABILITATION | Facility: HOSPITAL | Age: 33
End: 2023-11-15
Payer: MEDICAID

## 2023-11-15 DIAGNOSIS — R26.89 DECREASED FUNCTIONAL MOBILITY: ICD-10-CM

## 2023-11-15 DIAGNOSIS — M25.60 DECREASED RANGE OF MOTION: ICD-10-CM

## 2023-11-15 DIAGNOSIS — R53.1 DECREASED STRENGTH: Primary | ICD-10-CM

## 2023-11-15 DIAGNOSIS — M62.89 MUSCLE TIGHTNESS: ICD-10-CM

## 2023-11-15 PROCEDURE — 97110 THERAPEUTIC EXERCISES: CPT | Mod: PN,CQ

## 2023-11-21 ENCOUNTER — OFFICE VISIT (OUTPATIENT)
Dept: ORTHOPEDICS | Facility: CLINIC | Age: 33
End: 2023-11-21
Payer: MEDICAID

## 2023-11-21 VITALS
OXYGEN SATURATION: 99 % | HEIGHT: 67 IN | DIASTOLIC BLOOD PRESSURE: 80 MMHG | WEIGHT: 293 LBS | SYSTOLIC BLOOD PRESSURE: 136 MMHG | HEART RATE: 75 BPM | BODY MASS INDEX: 45.99 KG/M2

## 2023-11-21 DIAGNOSIS — M43.16 SPONDYLOLISTHESIS OF LUMBAR REGION: ICD-10-CM

## 2023-11-21 DIAGNOSIS — M54.16 LUMBAR RADICULOPATHY: Primary | ICD-10-CM

## 2023-11-21 DIAGNOSIS — M70.62 GREATER TROCHANTERIC BURSITIS OF LEFT HIP: ICD-10-CM

## 2023-11-21 PROCEDURE — 3008F BODY MASS INDEX DOCD: CPT | Mod: CPTII,S$GLB,, | Performed by: ORTHOPAEDIC SURGERY

## 2023-11-21 PROCEDURE — 3075F PR MOST RECENT SYSTOLIC BLOOD PRESS GE 130-139MM HG: ICD-10-PCS | Mod: CPTII,S$GLB,, | Performed by: ORTHOPAEDIC SURGERY

## 2023-11-21 PROCEDURE — 3079F DIAST BP 80-89 MM HG: CPT | Mod: CPTII,S$GLB,, | Performed by: ORTHOPAEDIC SURGERY

## 2023-11-21 PROCEDURE — 99213 PR OFFICE/OUTPT VISIT, EST, LEVL III, 20-29 MIN: ICD-10-PCS | Mod: S$GLB,,, | Performed by: ORTHOPAEDIC SURGERY

## 2023-11-21 PROCEDURE — 3008F PR BODY MASS INDEX (BMI) DOCUMENTED: ICD-10-PCS | Mod: CPTII,S$GLB,, | Performed by: ORTHOPAEDIC SURGERY

## 2023-11-21 PROCEDURE — 3075F SYST BP GE 130 - 139MM HG: CPT | Mod: CPTII,S$GLB,, | Performed by: ORTHOPAEDIC SURGERY

## 2023-11-21 PROCEDURE — 99213 OFFICE O/P EST LOW 20 MIN: CPT | Mod: S$GLB,,, | Performed by: ORTHOPAEDIC SURGERY

## 2023-11-21 PROCEDURE — 3051F PR MOST RECENT HEMOGLOBIN A1C LEVEL 7.0 - < 8.0%: ICD-10-PCS | Mod: CPTII,S$GLB,, | Performed by: ORTHOPAEDIC SURGERY

## 2023-11-21 PROCEDURE — 1159F MED LIST DOCD IN RCRD: CPT | Mod: CPTII,S$GLB,, | Performed by: ORTHOPAEDIC SURGERY

## 2023-11-21 PROCEDURE — 1159F PR MEDICATION LIST DOCUMENTED IN MEDICAL RECORD: ICD-10-PCS | Mod: CPTII,S$GLB,, | Performed by: ORTHOPAEDIC SURGERY

## 2023-11-21 PROCEDURE — 3051F HG A1C>EQUAL 7.0%<8.0%: CPT | Mod: CPTII,S$GLB,, | Performed by: ORTHOPAEDIC SURGERY

## 2023-11-21 PROCEDURE — 3079F PR MOST RECENT DIASTOLIC BLOOD PRESSURE 80-89 MM HG: ICD-10-PCS | Mod: CPTII,S$GLB,, | Performed by: ORTHOPAEDIC SURGERY

## 2023-11-21 NOTE — PROGRESS NOTES
[facility]    Subjective:     Chief Complaint:   Chief Complaint   Patient presents with    Lumbar Spine - Pain     Lumbar pain, the same as before, but did go to ER for a fall (11/13/23)    Left Hip - Pain     GTB, completed 6 weeks PT. Had a fall and went to ER 2 weeks ago (11/13/23) had XR. Taking naproxen regularly, and using leftover hydrocodone and gabapentin       Past Medical History:   Diagnosis Date    Anxiety     Back pain     Bipolar disorder 2004    bipolar 2    Chronic bronchitis     Depression     Diabetes mellitus     GERD (gastroesophageal reflux disease)     HPV (human papilloma virus) infection     Insomnia     Migraines     Non-proliferative diabetic retinopathy, mild, both eyes 09/25/2023    Ovarian cyst     Pneumonia     PONV (postoperative nausea and vomiting)        Past Surgical History:   Procedure Laterality Date    APPENDECTOMY      ARTHROSCOPY OF ANKLE Right 05/04/2022    Procedure: ARTHROSCOPY, ANKLE;  Surgeon: Bimal Mccormick DPM;  Location: [facility] OR;  Service: Podiatry;  Laterality: Right;  CURT EXTERNAL ANKLE DISTRACTOR    CHOLECYSTECTOMY      DILATION AND CURETTAGE OF UTERUS      HYSTERECTOMY  2017    total, ovarian cysts, torsion, Berrault; hyst per Dr JESUS Manriquez    LAPAROSCOPIC APPENDECTOMY N/A 08/18/2021    Procedure: APPENDECTOMY, LAPAROSCOPIC;  Surgeon: Osiel Ramirez MD;  Location: [facility] OR;  Service: General;  Laterality: N/A;    OOPHORECTOMY      opherectom Left 2015    salpingo-opherectomy    REPAIR OF LIGAMENT OF ANKLE Right 06/23/2021    Procedure: REPAIR OF ANTERIOR TALOFIBULAR LIGAMENT CALCANEOFIBULAR LIGAMENT;  Surgeon: Bimal Mccormick DPM;  Location: [facility] OR;  Service: Podiatry;  Laterality: Right;  WITH ARTHREX INTERNAL BRACE    REPAIR OF LIGAMENT OF ANKLE Right 4/27/2023    Procedure: REPAIR, LIGAMENT, ANKLE;  Surgeon: Aryan Poole DPM;  Location: [facility] OR;  Service: Podiatry;  Laterality: Right;    REPAIR OF TENDON OF LOWER EXTREMITY Right  4/27/2023    Procedure: REPAIR, TENDON, LOWER EXTREMITY;  Surgeon: Aryan Poole DPM;  Location: [facility] OR;  Service: Podiatry;  Laterality: Right;  arthrex    SURGICAL REMOVAL OF BONE SPUR Right 06/23/2021    Procedure: EXCISION OS TRIGONUM;  Surgeon: Bimal Mccormick DPM;  Location: [facility] OR;  Service: Podiatry;  Laterality: Right;    WISDOM TOOTH EXTRACTION         Current Outpatient Medications   Medication Sig    blood sugar diagnostic Strp Test blood sugar 4 (four) times daily before meals and nightly.    blood-glucose meter (ACCU-CHEK GUIDE GLUCOSE METER) Misc Use to test blood sugar    gabapentin (NEURONTIN) 300 MG capsule TAKE ONE CAPSULE BY MOUTH EVERY EVENING    HYDROcodone-acetaminophen (NORCO)  mg per tablet Take 1 tablet by mouth every 6 (six) hours as needed for Pain.    lancets (ACCU-CHEK SOFTCLIX LANCETS) Misc Test blood sugar 4 (four) times daily before meals and nightly.    metFORMIN (GLUCOPHAGE-XR) 500 MG ER 24hr tablet Take 2 tablets (1,000 mg total) by mouth 2 (two) times daily with meals.    naproxen (NAPROSYN) 500 MG tablet Take 500 mg by mouth 2 (two) times daily.    ondansetron (ZOFRAN-ODT) 4 MG TbDL Take 2 tablets (8 mg total) by mouth every 8 (eight) hours as needed (Nausea).    pyridoxine, vitamin B6, (B-6) 25 MG Tab Take 25 mg by mouth once daily.    risperiDONE (RISPERDAL) 1 MG tablet Take 1 tablet (1 mg total) by mouth 2 (two) times daily.    semaglutide (OZEMPIC) 1 mg/dose (4 mg/3 mL) Inject 1 mg into the skin every 7 days.    sertraline (ZOLOFT) 100 MG tablet Take 1.5 tablets (150 mg total) by mouth once daily.    sumatriptan (IMITREX) 100 MG tablet Take 1 tab PO at first sign of migraine. If not effective, repeat dose in 2 hours. Do not take more than 200mg in 24 hours.    traZODone (DESYREL) 100 MG tablet Take 1-2 tabs PO qHS prn insomnia.    amoxicillin-clavulanate 875-125mg (AUGMENTIN) 875-125 mg per tablet Take 1 tablet by mouth 2 (two) times daily.     ciprofloxacin-dexAMETHasone 0.3-0.1% (CIPRODEX) 0.3-0.1 % DrpS Place 4 drops into both ears 2 (two) times daily. (Patient not taking: Reported on 11/21/2023)    clotrimazole (LOTRIMIN) 1 % cream Apply topically 2 (two) times daily. for 7 days    hydrOXYzine pamoate (VISTARIL) 25 MG Cap Take 1 capsule (25 mg total) by mouth every 4 (four) hours as needed (itching). (Patient not taking: Reported on 11/21/2023)     No current facility-administered medications for this visit.       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       Family History   Adopted: Yes   Problem Relation Age of Onset    No Known Problems Sister     No Known Problems Sister     No Known Problems Sister        Social History     Socioeconomic History    Marital status:     Number of children: 0   Tobacco Use    Smoking status: Every Day     Current packs/day: 1.50     Average packs/day: 1.5 packs/day for 18.0 years (27.0 ttl pk-yrs)     Types: Cigarettes     Passive exposure: Current    Smokeless tobacco: Never    Tobacco comments:     4/25/23--pt instructed no smoking 24hours before sx, pt voiced understanding.   Substance and Sexual Activity    Alcohol use: Not Currently     Comment: rare    Drug use: Not Currently     Comment: twice    Sexual activity: Yes     Partners: Male     Birth control/protection: OCP, None     Social Determinants of Health     Stress: Stress Concern Present (10/20/2020)    [employer] [employer] - Occupational Stress Questionnaire     Feeling of Stress : Very much       History of present illness:  Patient comes in today for her back and left leg pain.  Continues complain of severe back and leg pain.  She is miserable.  She has done physical therapy.      Review of Systems:    Constitution: Negative for chills, fever, and sweats.  Negative for unexplained weight loss.    HENT:  Negative for headaches and blurry  vision.    Cardiovascular:Negative for chest pain or irregular heart beat. Negative for hypertension.    Respiratory:  Negative for cough and shortness of breath.    Gastrointestinal: Negative for abdominal pain, heartburn, melena, nausea, and vomitting.    Genitourinary:  Negative bladder incontinence and dysuria.    Musculoskeletal:  See HPI for details.     Neurological: Negative for numbness.    Psychiatric/Behavioral: Negative for depression.  The patient is not nervous/anxious.      Endocrine: Negative for polyuria    Hematologic/Lymphatic: Negative for bleeding problem.  Does not bruise/bleed easily.    Skin: Negative for poor would healing and rash    Objective:      Physical Examination:    Vital Signs:    Vitals:    11/21/23 0842   BP: 136/80   Pulse: 75       Body mass index is 49.34 kg/m².    This a well-developed, well nourished patient in no acute distress.  They are alert and oriented and cooperative to examination.        Patient has a positive straight leg raise on the left EHL is grossly intact plantar flexion is grossly intact deep tendon reflexes are grossly intact no groin pain with rigorous motion of the hip  Pertinent New Results:    XRAY Report / Interpretation:       Assessment/Plan:      Lumbar radicular pain.  She is failed physical therapy. I have also referred her for possible epidural injection.  Additionally we have ordered an MRI of the lumbar spine.  She will follow-up with that MRI with Dr. Cassidy who is an expert in these matters      This note was created using Dragon voice recognition software that occasionally misinterpreted phrases or words.

## 2023-12-13 ENCOUNTER — OFFICE VISIT (OUTPATIENT)
Dept: ORTHOPEDICS | Facility: CLINIC | Age: 33
End: 2023-12-13
Payer: MEDICAID

## 2023-12-13 VITALS — HEIGHT: 67 IN | WEIGHT: 293 LBS | BODY MASS INDEX: 45.99 KG/M2

## 2023-12-13 DIAGNOSIS — M54.16 LUMBAR RADICULOPATHY, ACUTE: Primary | ICD-10-CM

## 2023-12-13 DIAGNOSIS — M51.36 DISC DEGENERATION, LUMBAR: ICD-10-CM

## 2023-12-13 DIAGNOSIS — M43.16 SPONDYLOLISTHESIS OF LUMBAR REGION: ICD-10-CM

## 2023-12-13 DIAGNOSIS — S86.311D RUPTURE OF PERONEAL TENDON, RIGHT, SUBSEQUENT ENCOUNTER: ICD-10-CM

## 2023-12-13 DIAGNOSIS — S93.491S SPRAIN OF ANTERIOR TALOFIBULAR LIGAMENT OF RIGHT ANKLE, SEQUELA: ICD-10-CM

## 2023-12-13 DIAGNOSIS — M48.062 LUMBAR STENOSIS WITH NEUROGENIC CLAUDICATION: ICD-10-CM

## 2023-12-13 DIAGNOSIS — G89.29 CHRONIC PAIN OF RIGHT ANKLE: ICD-10-CM

## 2023-12-13 DIAGNOSIS — M25.571 CHRONIC PAIN OF RIGHT ANKLE: ICD-10-CM

## 2023-12-13 PROCEDURE — 3051F PR MOST RECENT HEMOGLOBIN A1C LEVEL 7.0 - < 8.0%: ICD-10-PCS | Mod: CPTII,S$GLB,, | Performed by: ORTHOPAEDIC SURGERY

## 2023-12-13 PROCEDURE — 99213 OFFICE O/P EST LOW 20 MIN: CPT | Mod: S$GLB,,, | Performed by: ORTHOPAEDIC SURGERY

## 2023-12-13 PROCEDURE — 1160F RVW MEDS BY RX/DR IN RCRD: CPT | Mod: CPTII,S$GLB,, | Performed by: ORTHOPAEDIC SURGERY

## 2023-12-13 PROCEDURE — 3008F BODY MASS INDEX DOCD: CPT | Mod: CPTII,S$GLB,, | Performed by: ORTHOPAEDIC SURGERY

## 2023-12-13 PROCEDURE — 1159F MED LIST DOCD IN RCRD: CPT | Mod: CPTII,S$GLB,, | Performed by: ORTHOPAEDIC SURGERY

## 2023-12-13 PROCEDURE — 99213 PR OFFICE/OUTPT VISIT, EST, LEVL III, 20-29 MIN: ICD-10-PCS | Mod: S$GLB,,, | Performed by: ORTHOPAEDIC SURGERY

## 2023-12-13 PROCEDURE — 3008F PR BODY MASS INDEX (BMI) DOCUMENTED: ICD-10-PCS | Mod: CPTII,S$GLB,, | Performed by: ORTHOPAEDIC SURGERY

## 2023-12-13 PROCEDURE — 1160F PR REVIEW ALL MEDS BY PRESCRIBER/CLIN PHARMACIST DOCUMENTED: ICD-10-PCS | Mod: CPTII,S$GLB,, | Performed by: ORTHOPAEDIC SURGERY

## 2023-12-13 PROCEDURE — 3051F HG A1C>EQUAL 7.0%<8.0%: CPT | Mod: CPTII,S$GLB,, | Performed by: ORTHOPAEDIC SURGERY

## 2023-12-13 PROCEDURE — 1159F PR MEDICATION LIST DOCUMENTED IN MEDICAL RECORD: ICD-10-PCS | Mod: CPTII,S$GLB,, | Performed by: ORTHOPAEDIC SURGERY

## 2023-12-13 RX ORDER — HYDROCODONE BITARTRATE AND ACETAMINOPHEN 10; 325 MG/1; MG/1
1 TABLET ORAL EVERY 6 HOURS PRN
Qty: 28 TABLET | Refills: 0 | Status: SHIPPED | OUTPATIENT
Start: 2023-12-13 | End: 2024-03-13 | Stop reason: ALTCHOICE

## 2023-12-13 RX ORDER — TRAMADOL HYDROCHLORIDE 50 MG/1
50 TABLET ORAL EVERY 6 HOURS PRN
Qty: 28 TABLET | Refills: 0 | Status: SHIPPED | OUTPATIENT
Start: 2023-12-13 | End: 2023-12-20

## 2023-12-13 NOTE — PROGRESS NOTES
Subjective:       Patient ID: Loretta Lea is a 33 y.o. female.    Chief Complaint: Pain of the Lumbar Spine (BL lumbar pain and numbness that starts midline lumbar to her buttocks down BL legs. Left is worse than right Sometimes to knees, sometimes to feet. Describes the pain as constant and severe. States that she is unable to perform ADL. States that she had to take a MARIBELL from work because she is unable to stand for long periods of time.)      History of Present Illness    Prior to meeting with the patient I reviewed the medical chart in [facility]. This included reviewing the previous progress notes from our office, review of the patient's last appointment with their primary care provider, review of any visits to the emergency room, and review of any pain management appointments or procedures.   Patient had a fall November 13, 2023 and injured her back.  She saw Dr. Farias recommend MRI recommended an MRI but was not approved because she never had any physical therapy to the lumbar spine.  She is experiencing some continued back pain with radiation down both legs predominantly on the left going past the knee the right is not as severe she had a history of back pain many years ago but did not have any symptoms until this most recent episode.  She she completed 4 weeks of physical therapy to the lumbar spine but the MRI was denied.  Physical therapy did not resolve her symptomatology.  Perhaps of probably the reason the lumbar MRI was denied was that the authorization process did not have a full account of her response to physical therapy she did a full 4 weeks of physical therapy with no improvement whatsoever in her symptoms.    Current Medications  Current Outpatient Medications   Medication Sig Dispense Refill    amoxicillin-clavulanate 875-125mg (AUGMENTIN) 875-125 mg per tablet Take 1 tablet by mouth 2 (two) times daily. 20 tablet 0    blood sugar diagnostic Strp Test blood sugar 4 (four) times daily  before meals and nightly. 200 each 5    blood-glucose meter (ACCU-CHEK GUIDE GLUCOSE METER) [facility] [place] Use to test blood sugar 1 each 0    gabapentin (NEURONTIN) 300 MG capsule TAKE ONE CAPSULE BY MOUTH EVERY EVENING 30 capsule 2    HYDROcodone-acetaminophen (NORCO)  mg per tablet Take 1 tablet by mouth every 6 (six) hours as needed for Pain. 28 tablet 0    lancets (ACCU-CHEK SOFTCLIX LANCETS) Misc Test blood sugar 4 (four) times daily before meals and nightly. 200 each 5    metFORMIN (GLUCOPHAGE-XR) 500 MG ER 24hr tablet Take 2 tablets (1,000 mg total) by mouth 2 (two) times daily with meals. 120 tablet 2    naproxen (NAPROSYN) 500 MG tablet Take 500 mg by mouth 2 (two) times daily.      ondansetron (ZOFRAN-ODT) 4 MG TbDL Take 2 tablets (8 mg total) by mouth every 8 (eight) hours as needed (Nausea). 30 tablet 2    pyridoxine, vitamin B6, (B-6) 25 MG Tab Take 25 mg by mouth once daily.      risperiDONE (RISPERDAL) 1 MG tablet Take 1 tablet (1 mg total) by mouth 2 (two) times daily. 60 tablet 2    semaglutide (OZEMPIC) 1 mg/dose (4 mg/3 mL) Inject 1 mg into the skin every 7 days. 3 mL 2    sertraline (ZOLOFT) 100 MG tablet Take 1.5 tablets (150 mg total) by mouth once daily. 135 tablet 1    sumatriptan (IMITREX) 100 MG tablet Take 1 tab PO at first sign of migraine. If not effective, repeat dose in 2 hours. Do not take more than 200mg in 24 hours. 12 tablet 1    traZODone (DESYREL) 100 MG tablet Take 1-2 tabs PO qHS prn insomnia. 60 tablet 2    ciprofloxacin-dexAMETHasone 0.3-0.1% (CIPRODEX) 0.3-0.1 % DrpS Place 4 drops into both ears 2 (two) times daily. (Patient not taking: Reported on 11/21/2023) 7.5 mL 0    clotrimazole (LOTRIMIN) 1 % cream Apply topically 2 (two) times daily. for 7 days 60 g 0    hydrOXYzine pamoate (VISTARIL) 25 MG Cap Take 1 capsule (25 mg total) by mouth every 4 (four) hours as needed (itching). (Patient not taking: Reported on 11/21/2023) 12 capsule 0     No current facility-administered  medications for this visit.       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       Past Medical History  Past Medical History:   Diagnosis Date    Anxiety     Back pain     Bipolar disorder 2004    bipolar 2    Chronic bronchitis     Depression     Diabetes mellitus     GERD (gastroesophageal reflux disease)     HPV (human papilloma virus) infection     Insomnia     Migraines     Non-proliferative diabetic retinopathy, mild, both eyes 09/25/2023    Ovarian cyst     Pneumonia     PONV (postoperative nausea and vomiting)        Surgical History  Past Surgical History:   Procedure Laterality Date    APPENDECTOMY      ARTHROSCOPY OF ANKLE Right 05/04/2022    Procedure: ARTHROSCOPY, ANKLE;  Surgeon: Bimal Mccormick DPM;  Location: [facility] OR;  Service: Podiatry;  Laterality: Right;  CURT EXTERNAL ANKLE DISTRACTOR    CHOLECYSTECTOMY      DILATION AND CURETTAGE OF UTERUS      HYSTERECTOMY  2017    total, ovarian cysts, torsion, [place]; hyst per Dr JESUS Manriquez    LAPAROSCOPIC APPENDECTOMY N/A 08/18/2021    Procedure: APPENDECTOMY, LAPAROSCOPIC;  Surgeon: Osiel Ramirez MD;  Location: [facility] OR;  Service: General;  Laterality: N/A;    OOPHORECTOMY      opherectom Left 2015    salpingo-opherectomy    REPAIR OF LIGAMENT OF ANKLE Right 06/23/2021    Procedure: REPAIR OF ANTERIOR TALOFIBULAR LIGAMENT CALCANEOFIBULAR LIGAMENT;  Surgeon: Bimal Mccormick DPM;  Location: [facility] OR;  Service: Podiatry;  Laterality: Right;  WITH ARTHREX INTERNAL BRACE    REPAIR OF LIGAMENT OF ANKLE Right 4/27/2023    Procedure: REPAIR, LIGAMENT, ANKLE;  Surgeon: Aryan Poole DPM;  Location: [facility] OR;  Service: Podiatry;  Laterality: Right;    REPAIR OF TENDON OF LOWER EXTREMITY Right 4/27/2023    Procedure: REPAIR, TENDON, LOWER EXTREMITY;  Surgeon: Aryan Poole DPM;  Location: [facility] OR;  Service: Podiatry;  Laterality: Right;  arthrex    SURGICAL  REMOVAL OF BONE SPUR Right 06/23/2021    Procedure: EXCISION OS TRIGONUM;  Surgeon: Bimal Mccormick DPM;  Location: [facility];  Service: Podiatry;  Laterality: Right;    WISDOM TOOTH EXTRACTION         Family History:   Family History   Adopted: Yes   Problem Relation Age of Onset    No Known Problems Sister     No Known Problems Sister     No Known Problems Sister        Social History:   Social History     Socioeconomic History    Marital status:     Number of children: 0   Tobacco Use    Smoking status: Every Day     Current packs/day: 1.50     Average packs/day: 1.5 packs/day for 18.0 years (27.0 ttl pk-yrs)     Types: Cigarettes     Passive exposure: Current    Smokeless tobacco: Never    Tobacco comments:     4/25/23--pt instructed no smoking 24hours before sx, pt voiced understanding.   Substance and Sexual Activity    Alcohol use: Not Currently     Comment: rare    Drug use: Not Currently     Comment: twice    Sexual activity: Yes     Partners: Male     Birth control/protection: OCP, None     Social Determinants of Health     Stress: Stress Concern Present (10/20/2020)    [facility] - Occupational Stress Questionnaire     Feeling of Stress : Very much       Hospitalization/Major Diagnostic Procedure:     Review of Systems     General/Constitutional:  Chills denies. Fatigue denies. Fever denies. Weight gain denies. Weight loss denies.    Respiratory:  Shortness of breath denies.    Cardiovascular:  Chest pain denies.    Gastrointestinal:  Constipation denies. Diarrhea denies. Nausea denies. Vomiting denies.     Hematology:  Easy bruising denies. Prolonged bleeding denies.     Genitourinary:  Frequent urination denies. Pain in lower back denies. Painful urination denies.     Musculoskeletal:  See HPI for details    Skin:  Rash denies.    Neurologic:  Dizziness denies. Gait abnormalities denies. Seizures denies. Tingling/Numbess denies.    Psychiatric:  Anxiety denies.  Depressed mood denies.     Objective:   Vital Signs: There were no vitals filed for this visit.     Physical Exam      General Examination:     Constitutional: The patient is alert and oriented to lace person and time. Mood is pleasant.     Head/Face: Normal facial features normal eyebrows    Eyes: Normal extraocular motion bilaterally    Lungs: Respirations are equal and unlabored    Gait is coordinated.    Cardiovascular: There are no swelling or varicosities present.    Lymphatic: Negative for adenopathy    Skin: Normal    Neurological: Level of consciousness normal. Oriented to place person and time and situation    Psychiatric: Oriented to time place person and situation    Patient stand erect walks with an antalgic gait bilateral paraspinous muscle spasm L4-S1 range of motion moderate restricted.  Straight leg raising positive on the left side subjective numbness L4 nerve root distribution on the left.  Motor exam grossly intact all major muscle groups.  XRAY Report/ Interpretation:  Prior lumbar x-rays reviewed degenerative spondylolisthesis at L4-5 noted moderate disc space narrowing L4-5 and L5-S1 levels.      Assessment:       1. Lumbar radiculopathy, acute    2. Spondylolisthesis of lumbar region    3. Disc degeneration, lumbar        Plan:       Loretta was seen today for pain.    Diagnoses and all orders for this visit:    Lumbar radiculopathy, acute    Spondylolisthesis of lumbar region    Disc degeneration, lumbar         No follow-ups on file.    We will put in a request for a lumbar MRI because 4 weeks of physical therapy has failed and it is doubtful in my opinion that an additional 2 weeks would be of any significant benefit to this patient.      If her insurance carrier insist on her completing the total of 6 weeks we will also suggest 2 more weeks of physical therapy which in my opinion is wakeful but if we required to go by the guidelines then we will comply.  We have explained this to the  patient.      Treatment options were discussed with regards to the nature of the medical condition. Conservative pain intervention and surgical options were discussed in detail. The probability of success of each separate treatment option was discussed. The patient expressed a clear understanding of the treatment options. With regards to surgery, the procedure risk, benefits, complications, and outcomes were discussed. No guarantees were given with regards to surgical outcome.   The risk of complications, morbidity, and mortality of patient management decisions have been made at the time of this visit. These are associated with the patient's problems, diagnostic procedures and treatment options. This includes the possible management options selected and those considered but not selected by the patient after shared medical decision making we discussed with the patient.     This note was created using Dragon voice recognition software that occasionally misinterpreted phrases or words.

## 2023-12-19 ENCOUNTER — CLINICAL SUPPORT (OUTPATIENT)
Dept: REHABILITATION | Facility: HOSPITAL | Age: 33
End: 2023-12-19
Payer: MEDICAID

## 2023-12-19 DIAGNOSIS — M62.89 MUSCLE TIGHTNESS: ICD-10-CM

## 2023-12-19 DIAGNOSIS — R53.1 DECREASED STRENGTH: Primary | ICD-10-CM

## 2023-12-19 DIAGNOSIS — R26.89 DECREASED FUNCTIONAL MOBILITY: ICD-10-CM

## 2023-12-19 DIAGNOSIS — M25.60 DECREASED RANGE OF MOTION: ICD-10-CM

## 2023-12-19 PROCEDURE — 97110 THERAPEUTIC EXERCISES: CPT | Mod: PN

## 2023-12-19 NOTE — PROGRESS NOTES
[facility]   Physical Therapy Treatment Note      Name: Loretta Lea  Clinic Number: [id number]    Therapy Diagnosis:   Encounter Diagnoses   Name Primary?    Decreased strength Yes    Muscle tightness     Decreased functional mobility     Decreased range of motion        Physician: Karthik Farias MD    Visit Date: 12/19/2023  Physician: Karthik Farias MD             Physician Orders: PT Eval and Treat  Medical Diagnosis from Referral: M54.16 (ICD-10-CM) - Lumbar radiculopathy M70.62 (ICD-10-CM) - Greater trochanteric bursitis of left hip M43.16 (ICD-10-CM) - Spondylolisthesis of lumbar region   Evaluation Date: 10/24/2023  Authorization Period Expiration:  11/27/2023  Plan of Care Expiration: 1/30/2024                Progress Update: 11/24/2023                      FOTO: 1 / 3   Visit # / Visits authorized: 3/ 8  PTA Visit #: 1/5                          PRECAUTIONS: Standard Precautions and Orthopedic: Lumbar anterolisthesis      Time In: 1400  Time Out: 1408  (Pnt stopped therapy due to significant increase in pain)  Total Billable Time: 10 minutes      Subjective     Patient reports: that she is afraid to perform any exercises due to the significant increase in pain that she has been experiencing.    She was not compliant with home exercise program.  Response to previous treatment:   Functional change: first visit from eval    Pain: 8/10  Location: left hip       Objective      Objective Measures updated at progress report unless specified.     Treatment     Loretta received the treatments listed below:      +++All charges filed per Medicaid Guidelines+++    therapeutic exercises to develop strength, endurance, ROM, flexibility, posture, and core stabilization for 8 minutes including:    Lower trunk rotations 3x10    Not performed today:  Seated exercises:  Hamstring stretch 3 x 10 seconds bilateral   Seated Physioball rollout forward x 15 reps; lateral Left and Right x 10  "reps     Supine exercises:  Bridging with ball squeezes x 10 reps   Clams green Theraband 3 x 10 reps    Double Knee To Chest with Physioball x 20 reps   Transverse abdominal sets with physioball 2 x 10 reps   Left IT band stretch with strap 3  x 10 seconds     Muscle energy technique/shotgun x 2 (Left push down/Right pull up)    Sidelying exercises:  Open books x 10 reps Right hip, x 5 reps Left hip (some discomfort lying on Left hip)    Standing exercises:  Lumbar extension over mat x 10 reps (decreased pain)  Hip Abduction x 10 reps (increased numbness)  Hip extension x 10 reps (increased numbness Left lower extremity),   Patient then did x 5 reps of lumbar extension which did not decrease her pain    Not performed today:  Gastroc stretch 3 x 30 seconds bilateral (not performed)  Step ups 6"  3 x 10 reps bilateral (not performed)  Hip 3 way 2 x 10 reps Bilateral  (not performed)    Loretta received the following supervised modalities after being cleared for contradictions: IFC Electrical Stimulation:  Loretta received IFC Electrical Stimulation for pain control applied to the Left hip/lower leg with cold pack. Pt received stimulation at a frequency of 13.5 mA for 0 minutes. Loretta tolderated treatment well without any adverse effects.         Patient Education and Home Exercises       Education provided:   -apply ice as needed for delayed muscle soreness  -Continue with Home exercise program daily     Written Home Exercises Provided: yes. Exercises were reviewed and Loretta was able to demonstrate them prior to the end of the session.  Loretta demonstrated good  understanding of the education provided. See Electronic Medical Record under Patient Instructions for exercises provided during therapy sessions    Assessment   Patient presents to PT with significant pain not allowing her to continue with therapy.  Pnt advised to message referring provider to inform him of significant increase in pain.  Will continue " attempting to progress with therapy.    Loretta Is progressing well towards her goals.   Patient prognosis is Poor.     Patient will continue to benefit from skilled outpatient physical therapy to address the deficits listed in the problem list box on initial evaluation, provide pt/family education and to maximize pt's level of independence in the home and community environment.     Patient's spiritual, cultural and educational needs considered and pt agreeable to plan of care and goals.     Anticipated barriers to physical therapy: none    Goals:   SHORT TERM GOALS: 4 weeks 12/19/2023     Recent signs and systems trend is improving in order to progress towards LTG's. ongoing   Patient will be independent with HEP in order to further progress and return to maximal function. ongoing   Pain rating at Worst: 5/10 in order to progress towards increased independence with activity. ongoing   Patient will be able to correct postural deviations in sitting and standing, to decrease pain and promote postural awareness for injury prevention.  ongoing      LONG TERM GOALS: 8 weeks 12/19/2023     Patient will return to normal ADL, recreational, and work related activities with less pain and limitation.  ongoing   Patient will improve AROM to stated goals in order to return to maximal functional potential.  ongoing   Patient will improve Strength to stated goals of appropriate musculature in order to improve functional independence.  ongoing   Pain Rating at Best: 1/10 to improve Quality of Life.  ongoing   Patient will meet predicted functional outcome (FOTO) score: 80% to increase self-worth & perceived functional ability. ongoing   Patient will have met/partially met personal goal of being able to walk without pain. ongoing            PLAN   Plan of care Certification: 10/24/2023 to 1/30/2024     Outpatient Physical Therapy 2 times weekly for 6 weeks to include any combination of the following interventions: virtual visits,  dry needling, modalities, electrical stimulation (IFC, Pre-Mod, Attended with Functional Dry Needling), Manual Therapy, Moist Heat/ Ice, Neuromuscular Re-ed, Patient Education, Self Care, Therapeutic Exercise, Functional Training, and Therapeutic Activites       Shimon Byrd, PT

## 2023-12-21 ENCOUNTER — HOSPITAL ENCOUNTER (EMERGENCY)
Facility: HOSPITAL | Age: 33
Discharge: HOME | End: 2023-12-21
Attending: EMERGENCY MEDICINE
Payer: MEDICAID

## 2023-12-21 ENCOUNTER — TELEPHONE (OUTPATIENT)
Dept: PODIATRY | Facility: CLINIC | Age: 33
End: 2023-12-21
Payer: MEDICAID

## 2023-12-21 VITALS
TEMPERATURE: 99 F | BODY MASS INDEX: 45.99 KG/M2 | HEIGHT: 67 IN | OXYGEN SATURATION: 96 % | DIASTOLIC BLOOD PRESSURE: 69 MMHG | WEIGHT: 293 LBS | HEART RATE: 69 BPM | SYSTOLIC BLOOD PRESSURE: 116 MMHG | RESPIRATION RATE: 18 BRPM

## 2023-12-21 DIAGNOSIS — M54.50 ACUTE EXACERBATION OF CHRONIC LOW BACK PAIN: Primary | ICD-10-CM

## 2023-12-21 DIAGNOSIS — M25.571 CHRONIC PAIN OF RIGHT ANKLE: Primary | ICD-10-CM

## 2023-12-21 DIAGNOSIS — G89.29 ACUTE EXACERBATION OF CHRONIC LOW BACK PAIN: Primary | ICD-10-CM

## 2023-12-21 DIAGNOSIS — G89.29 CHRONIC PAIN OF RIGHT ANKLE: Primary | ICD-10-CM

## 2023-12-21 PROCEDURE — 96372 THER/PROPH/DIAG INJ SC/IM: CPT | Performed by: EMERGENCY MEDICINE

## 2023-12-21 PROCEDURE — 99284 EMERGENCY DEPT VISIT MOD MDM: CPT

## 2023-12-21 PROCEDURE — 63600175 PHARM REV CODE 636 W HCPCS: Performed by: EMERGENCY MEDICINE

## 2023-12-21 PROCEDURE — 25000003 PHARM REV CODE 250: Performed by: EMERGENCY MEDICINE

## 2023-12-21 RX ORDER — HYDROMORPHONE HYDROCHLORIDE 1 MG/ML
1 INJECTION, SOLUTION INTRAMUSCULAR; INTRAVENOUS; SUBCUTANEOUS
Status: COMPLETED | OUTPATIENT
Start: 2023-12-21 | End: 2023-12-21

## 2023-12-21 RX ORDER — DIPHENHYDRAMINE HYDROCHLORIDE 50 MG/ML
25 INJECTION INTRAMUSCULAR; INTRAVENOUS
Status: COMPLETED | OUTPATIENT
Start: 2023-12-21 | End: 2023-12-21

## 2023-12-21 RX ORDER — KETOROLAC TROMETHAMINE 30 MG/ML
60 INJECTION, SOLUTION INTRAMUSCULAR; INTRAVENOUS
Status: COMPLETED | OUTPATIENT
Start: 2023-12-21 | End: 2023-12-21

## 2023-12-21 RX ADMIN — KETOROLAC TROMETHAMINE 60 MG: 30 INJECTION, SOLUTION INTRAMUSCULAR; INTRAVENOUS at 08:12

## 2023-12-21 RX ADMIN — HYDROMORPHONE HYDROCHLORIDE 1 MG: 1 INJECTION, SOLUTION INTRAMUSCULAR; INTRAVENOUS; SUBCUTANEOUS at 08:12

## 2023-12-21 RX ADMIN — DIPHENHYDRAMINE HYDROCHLORIDE 25 MG: 50 INJECTION INTRAMUSCULAR; INTRAVENOUS at 09:12

## 2023-12-21 RX ADMIN — TIZANIDINE 6 MG: 4 TABLET ORAL at 08:12

## 2023-12-21 NOTE — FIRST PROVIDER EVALUATION
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 She has been taking tramadol and norco without relief. She has been seeing a back specialist. She reports numbness and weakness in the leg that is NOT new. No urinary or fecal incontinence.       ORDERS  Labs Reviewed - No data to display    ED Orders (720h ago, onward)      None              Virtual Visit Note: The provider triage portion of this emergency department evaluation and documentation was performed via Smart [employer]ube, a HIPAA-compliant telemedicine application, in concert with a tele-presenter in the room. A face to face patient evaluation with one of my colleagues will occur once the patient is placed in an emergency department room.      DISCLAIMER: This note was prepared with [employer] voice recognition transcription software. Garbled syntax, mangled pronouns, and other bizarre constructions may be attributed to that software system.

## 2023-12-22 RX ORDER — NAPROXEN 500 MG/1
500 TABLET ORAL 2 TIMES DAILY
Qty: 60 TABLET | Refills: 2 | Status: SHIPPED | OUTPATIENT
Start: 2023-12-22 | End: 2024-03-21

## 2023-12-22 NOTE — ED PROVIDER NOTES
Encounter Date: 12/21/2023       History     Chief Complaint   Patient presents with    Back Pain     PAIN > 6 MOS, RAD DOWN LEFT LEG     This is a 33-year-old female with chronic low back pain, presenting with back pain exacerbation.  Patient has been dealing with this pack pain for some time.  The pain is in her bilateral low back and radiates down her left leg.  She has been dealing with left leg numbness as well.  She was under the care of a spine surgeon.  She has 1 more week of physical therapy to complete a 6 week course of physical therapy have which time her insurance will approve an MRI.  She says the pain has been more severe since this morning and she has not been able to walk or get around because the pain is so severe.  She denies any trauma.  She denies any groin numbness or urinary/bowel incontinence.  She reports no improvement with her tramadol and Norco at home.    The history is provided by the patient.     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     Past Medical History:   Diagnosis Date    Anxiety     Back pain     Bipolar disorder 2004    bipolar 2    Chronic bronchitis     Depression     Diabetes mellitus     GERD (gastroesophageal reflux disease)     HPV (human papilloma virus) infection     Insomnia     Migraines     Non-proliferative diabetic retinopathy, mild, both eyes 09/25/2023    Obese body habitus     Ovarian cyst     Pneumonia     PONV (postoperative nausea and vomiting)      Past Surgical History:   Procedure Laterality Date    APPENDECTOMY      ARTHROSCOPY OF ANKLE Right 05/04/2022    Procedure: ARTHROSCOPY, ANKLE;  Surgeon: Bimal Mccormick DPM;  Location: [facility];  Service: Podiatry;  Laterality: Right;  CURT EXTERNAL ANKLE DISTRACTOR    CHOLECYSTECTOMY      DILATION AND CURETTAGE OF UTERUS      HYSTERECTOMY  2017    total, ovarian cysts, torsion, [place]; hyst per Dr JESUS Manriquez     LAPAROSCOPIC APPENDECTOMY N/A 08/18/2021    Procedure: APPENDECTOMY, LAPAROSCOPIC;  Surgeon: Osiel Ramirez MD;  Location: [facility];  Service: General;  Laterality: N/A;    OOPHORECTOMY      opherectom Left 2015    salpingo-opherectomy    REPAIR OF LIGAMENT OF ANKLE Right 06/23/2021    Procedure: REPAIR OF ANTERIOR TALOFIBULAR LIGAMENT CALCANEOFIBULAR LIGAMENT;  Surgeon: Bimal Mccormick DPM;  Location: [facility] OR;  Service: Podiatry;  Laterality: Right;  WITH ARTHREX INTERNAL BRACE    REPAIR OF LIGAMENT OF ANKLE Right 4/27/2023    Procedure: REPAIR, LIGAMENT, ANKLE;  Surgeon: Aryan Poole DPM;  Location: [facility] OR;  Service: Podiatry;  Laterality: Right;    REPAIR OF TENDON OF LOWER EXTREMITY Right 4/27/2023    Procedure: REPAIR, TENDON, LOWER EXTREMITY;  Surgeon: Aryan Poole DPM;  Location: [facility] OR;  Service: Podiatry;  Laterality: Right;  arthrex    SURGICAL REMOVAL OF BONE SPUR Right 06/23/2021    Procedure: EXCISION OS TRIGONUM;  Surgeon: Bimal Mccormick DPM;  Location: [facility] OR;  Service: Podiatry;  Laterality: Right;    WISDOM TOOTH EXTRACTION       Family History   Adopted: Yes   Problem Relation Age of Onset    No Known Problems Sister     No Known Problems Sister     No Known Problems Sister      Social History     Tobacco Use    Smoking status: Every Day     Current packs/day: 1.50     Average packs/day: 1.5 packs/day for 18.0 years (27.0 ttl pk-yrs)     Types: Cigarettes     Passive exposure: Current    Smokeless tobacco: Never    Tobacco comments:     4/25/23--pt instructed no smoking 24hours before sx, pt voiced understanding.   Substance Use Topics    Alcohol use: Not Currently     Comment: rare    Drug use: Not Currently     Comment: twice     Review of Systems   Musculoskeletal:  Positive for back pain.   Neurological:  Positive for numbness.   All other systems reviewed and are negative.      Physical Exam     Initial Vitals [12/21/23 1734]   BP Pulse Resp Temp SpO2   (!) 150/91 91 16 98.5 °F  (36.9 °C) 96 %      MAP       --         Physical Exam    Nursing note and vitals reviewed.  Constitutional: She appears well-developed and well-nourished. She is not diaphoretic. No distress.   HENT:   Head: Normocephalic.   Eyes: Conjunctivae are normal.   Neck: Neck supple.   Normal range of motion.  Cardiovascular:  Normal rate.           Pulmonary/Chest: No respiratory distress.   Abdominal: She exhibits no distension.   Musculoskeletal:         General: No edema.      Cervical back: Normal range of motion and neck supple.      Comments: Diffuse low back tenderness to palpation     Neurological: She is alert. GCS eye subscore is 4. GCS verbal subscore is 5. GCS motor subscore is 6.   Decreased left leg range of motion secondary to pain.  Left upper leg is numb.  Normal sensation of the right leg and groin.   Skin: Skin is warm and dry.   Psychiatric: She has a normal mood and affect.         ED Course   Procedures  Labs Reviewed - No data to display       Imaging Results    None          Medications   HYDROmorphone injection 1 mg (1 mg Intramuscular Given 12/21/23 2003)   ketorolac injection 60 mg (60 mg Intramuscular Given 12/21/23 2003)   tiZANidine tablet 6 mg (6 mg Oral Given 12/21/23 2045)   diphenhydrAMINE injection 25 mg (25 mg Intramuscular Given 12/21/23 2111)     Medical Decision Making  33-year-old with atraumatic, chronic low back pain exacerbation.  She has no acute neurologic deficits to suggest cauda equina syndrome.  I do not think she needs further emergent diagnostic evaluation.  She was treated with IM Dilaudid and Toradol and oral Zanaflex in the ED. Upon re-evaluation she states her pain is much improved.  She feels much better and is comfortable going home.  She will continue her current outpatient management.    Risk  Prescription drug management.                                      Clinical Impression:  Final diagnoses:  [M54.50, G89.29] Acute exacerbation of chronic low back pain  (Primary)          ED Disposition Condition    Discharge Stable          ED Prescriptions    None       Follow-up Information       Follow up With Specialties Details Why Contact Info    Your physician                 Arturo Cook MD  12/21/23 3598

## 2023-12-27 ENCOUNTER — PATIENT MESSAGE (OUTPATIENT)
Dept: ORTHOPEDICS | Facility: CLINIC | Age: 33
End: 2023-12-27

## 2023-12-27 ENCOUNTER — CLINICAL SUPPORT (OUTPATIENT)
Dept: REHABILITATION | Facility: HOSPITAL | Age: 33
End: 2023-12-27
Payer: MEDICAID

## 2023-12-27 DIAGNOSIS — M25.60 DECREASED RANGE OF MOTION: ICD-10-CM

## 2023-12-27 DIAGNOSIS — M62.89 MUSCLE TIGHTNESS: ICD-10-CM

## 2023-12-27 DIAGNOSIS — R53.1 DECREASED STRENGTH: Primary | ICD-10-CM

## 2023-12-27 DIAGNOSIS — R26.89 DECREASED FUNCTIONAL MOBILITY: ICD-10-CM

## 2023-12-27 PROCEDURE — 97110 THERAPEUTIC EXERCISES: CPT | Mod: PN

## 2023-12-27 NOTE — PROGRESS NOTES
[facility]   Physical Therapy Treatment Note      Name: Loretta Lea  [facility] Number: [id number]    Therapy Diagnosis:   Encounter Diagnoses   Name Primary?    Decreased strength Yes    Muscle tightness     Decreased functional mobility     Decreased range of motion        Physician: Karthik Farias MD    Visit Date: 12/27/2023  Physician: Karthik Farias MD             Physician Orders: PT Eval and Treat  Medical Diagnosis from Referral: M54.16 (ICD-10-CM) - Lumbar radiculopathy M70.62 (ICD-10-CM) - Greater trochanteric bursitis of left hip M43.16 (ICD-10-CM) - Spondylolisthesis of lumbar region   Evaluation Date: 10/24/2023  Authorization Period Expiration:  11/27/2023  Plan of Care Expiration: 1/30/2024                Progress Update: 11/24/2023                      FOTO: 1 / 3   Visit # / Visits authorized: 4/ 8  PTA Visit #: 0/5                          PRECAUTIONS: Standard Precautions and Orthopedic: Lumbar anterolisthesis      Time In: 1010  Time Out: 1020  (Pnt stopped therapy due to significant increase in pain)  Total Billable Time: 10 minutes      Subjective     Patient reports: that she is still in pain had to go to E.R. due to pain the day.  Is not able to do  much today   She was not compliant with home exercise program.  Response to previous treatment: None  Functional change: None    Pain: 6/10  Location: left hip  and low back      Objective      Objective Measures updated at progress report unless specified.     Treatment     Loretta received the treatments listed below:      +++All charges filed per Medicaid Guidelines+++    therapeutic exercises to develop strength, endurance, ROM, flexibility, posture, and core stabilization for 8 minutes including:    Lower trunk rotations 3x10    Not performed today:  Seated exercises:  Hamstring stretch 3 x 10 seconds bilateral   Seated Physioball rollout forward x 15 reps; lateral Left and Right x 10 reps     Supine  "exercises:  Bridging with ball squeezes x 10 reps   Clams green Theraband 3 x 10 reps    Double Knee To Chest with Physioball x 20 reps   Transverse abdominal sets with physioball 2 x 10 reps   Left IT band stretch with strap 3  x 10 seconds     Muscle energy technique/shotgun x 2 (Left push down/Right pull up)    Sidelying exercises:  Open books x 10 reps Right hip, x 5 reps Left hip (some discomfort lying on Left hip)    Standing exercises:  Lumbar extension over mat x 10 reps (decreased pain)  Hip Abduction x 10 reps (increased numbness)  Hip extension x 10 reps (increased numbness Left lower extremity),   Patient then did x 5 reps of lumbar extension which did not decrease her pain    Not performed today:  Gastroc stretch 3 x 30 seconds bilateral (not performed)  Step ups 6"  3 x 10 reps bilateral (not performed)  Hip 3 way 2 x 10 reps Bilateral  (not performed)    Loretta received the following supervised modalities after being cleared for contradictions: IFC Electrical Stimulation:  Loretta received IFC Electrical Stimulation for pain control applied to the Left hip/lower leg with cold pack. Pt received stimulation at a frequency of 13.5 mA for 0 minutes. Loretta tolderated treatment well without any adverse effects.         Patient Education and Home Exercises       Education provided:   -apply ice as needed for delayed muscle soreness  -Continue with Home exercise program daily     Written Home Exercises Provided: yes. Exercises were reviewed and Loretta was able to demonstrate them prior to the end of the session.  Loretta demonstrated good  understanding of the education provided. See Electronic Medical Record under Patient Instructions for exercises provided during therapy sessions    Assessment   Patient presents to PT with significant pain not allowing her to continue with therapy.  Patient is very guarded with her motions, getting out of chair, walking slow pace, Poor tolerance for any activity. She is " not able to tolerate therapy, and is not making any progress . She is to message her MD and try to get MRI  before she sees MD for follow up.     Loretta Is progressing well towards her goals.   Patient prognosis is Poor.     Patient will continue to benefit from skilled outpatient physical therapy to address the deficits listed in the problem list box on initial evaluation, provide pt/family education and to maximize pt's level of independence in the home and community environment.     Patient's spiritual, cultural and educational needs considered and pt agreeable to plan of care and goals.     Anticipated barriers to physical therapy: none    Goals:   SHORT TERM GOALS: 4 weeks 12/27/2023     Recent signs and systems trend is improving in order to progress towards LTG's. ongoing   Patient will be independent with HEP in order to further progress and return to maximal function. ongoing   Pain rating at Worst: 5/10 in order to progress towards increased independence with activity. ongoing   Patient will be able to correct postural deviations in sitting and standing, to decrease pain and promote postural awareness for injury prevention.  ongoing      LONG TERM GOALS: 8 weeks 12/27/2023     Patient will return to normal ADL, recreational, and work related activities with less pain and limitation.  ongoing   Patient will improve AROM to stated goals in order to return to maximal functional potential.  ongoing   Patient will improve Strength to stated goals of appropriate musculature in order to improve functional independence.  ongoing   Pain Rating at Best: 1/10 to improve Quality of Life.  ongoing   Patient will meet predicted functional outcome (FOTO) score: 80% to increase self-worth & perceived functional ability. ongoing   Patient will have met/partially met personal goal of being able to walk without pain. ongoing            PLAN   Plan of care Certification: 10/24/2023 to 1/30/2024     Outpatient Physical  Therapy 2 times weekly for 6 weeks to include any combination of the following interventions: virtual visits, dry needling, modalities, electrical stimulation (IFC, Pre-Mod, Attended with Functional Dry Needling), Manual Therapy, Moist Heat/ Ice, Neuromuscular Re-ed, Patient Education, Self Care, Therapeutic Exercise, Functional Training, and Therapeutic Activites       Roshan Gustafson, PT

## 2024-01-05 ENCOUNTER — HOSPITAL ENCOUNTER (EMERGENCY)
Facility: HOSPITAL | Age: 34
Discharge: HOME | End: 2024-01-06
Attending: EMERGENCY MEDICINE
Payer: MEDICAID

## 2024-01-05 VITALS
BODY MASS INDEX: 33.74 KG/M2 | WEIGHT: 215 LBS | SYSTOLIC BLOOD PRESSURE: 139 MMHG | TEMPERATURE: 99 F | HEIGHT: 67 IN | DIASTOLIC BLOOD PRESSURE: 83 MMHG | OXYGEN SATURATION: 96 % | HEART RATE: 88 BPM | RESPIRATION RATE: 16 BRPM

## 2024-01-05 DIAGNOSIS — G89.29 ACUTE EXACERBATION OF CHRONIC LOW BACK PAIN: Primary | ICD-10-CM

## 2024-01-05 DIAGNOSIS — M54.50 ACUTE EXACERBATION OF CHRONIC LOW BACK PAIN: Primary | ICD-10-CM

## 2024-01-05 LAB
BILIRUB UR QL STRIP: NEGATIVE
CLARITY UR: CLEAR
COLOR UR: YELLOW
GLUCOSE SERPL-MCNC: 141 MG/DL (ref 70–110)
GLUCOSE UR QL STRIP: NEGATIVE
HGB UR QL STRIP: NEGATIVE
KETONES UR QL STRIP: NEGATIVE
LEUKOCYTE ESTERASE UR QL STRIP: NEGATIVE
NITRITE UR QL STRIP: NEGATIVE
PH UR STRIP: 6 [PH] (ref 5–8)
PROT UR QL STRIP: NEGATIVE
SP GR UR STRIP: 1.01 (ref 1–1.03)
URN SPEC COLLECT METH UR: NORMAL
UROBILINOGEN UR STRIP-ACNC: NEGATIVE EU/DL

## 2024-01-05 PROCEDURE — 81003 URINALYSIS AUTO W/O SCOPE: CPT | Performed by: EMERGENCY MEDICINE

## 2024-01-05 PROCEDURE — 63600175 PHARM REV CODE 636 W HCPCS

## 2024-01-05 PROCEDURE — 82962 GLUCOSE BLOOD TEST: CPT

## 2024-01-05 PROCEDURE — 25000003 PHARM REV CODE 250

## 2024-01-05 PROCEDURE — 99284 EMERGENCY DEPT VISIT MOD MDM: CPT

## 2024-01-05 PROCEDURE — 96372 THER/PROPH/DIAG INJ SC/IM: CPT

## 2024-01-05 RX ORDER — LIDOCAINE 50 MG/G
1 PATCH TOPICAL
Status: DISCONTINUED | OUTPATIENT
Start: 2024-01-05 | End: 2024-01-06 | Stop reason: HOSPADM

## 2024-01-05 RX ORDER — HYDROMORPHONE HYDROCHLORIDE 1 MG/ML
1 INJECTION, SOLUTION INTRAMUSCULAR; INTRAVENOUS; SUBCUTANEOUS
Status: COMPLETED | OUTPATIENT
Start: 2024-01-05 | End: 2024-01-05

## 2024-01-05 RX ORDER — DIPHENHYDRAMINE HCL 25 MG
50 CAPSULE ORAL
Status: COMPLETED | OUTPATIENT
Start: 2024-01-05 | End: 2024-01-05

## 2024-01-05 RX ORDER — TIZANIDINE 4 MG/1
4 TABLET ORAL
Status: COMPLETED | OUTPATIENT
Start: 2024-01-05 | End: 2024-01-05

## 2024-01-05 RX ORDER — KETOROLAC TROMETHAMINE 30 MG/ML
30 INJECTION, SOLUTION INTRAMUSCULAR; INTRAVENOUS
Status: COMPLETED | OUTPATIENT
Start: 2024-01-05 | End: 2024-01-05

## 2024-01-05 RX ADMIN — HYDROMORPHONE HYDROCHLORIDE 1 MG: 1 INJECTION, SOLUTION INTRAMUSCULAR; INTRAVENOUS; SUBCUTANEOUS at 11:01

## 2024-01-05 RX ADMIN — LIDOCAINE 1 PATCH: 50 PATCH TOPICAL at 09:01

## 2024-01-05 RX ADMIN — HYDROMORPHONE HYDROCHLORIDE 1 MG: 1 INJECTION, SOLUTION INTRAMUSCULAR; INTRAVENOUS; SUBCUTANEOUS at 09:01

## 2024-01-05 RX ADMIN — TIZANIDINE 4 MG: 4 TABLET ORAL at 09:01

## 2024-01-05 RX ADMIN — KETOROLAC TROMETHAMINE 30 MG: 30 INJECTION, SOLUTION INTRAMUSCULAR; INTRAVENOUS at 09:01

## 2024-01-05 RX ADMIN — DIPHENHYDRAMINE HYDROCHLORIDE 50 MG: 25 CAPSULE ORAL at 09:01

## 2024-01-05 NOTE — Clinical Note
"Loretta Vasquez" Venu was seen and treated in our emergency department on 1/5/2024.  She may return to work on 01/10/2024.       If you have any questions or concerns, please don't hesitate to call.      Makayla Car NP"

## 2024-01-05 NOTE — FIRST PROVIDER EVALUATION
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 female, presented to the ED with c/o left hip and back pain feels like it is locked up and she can't put weight on her leg. Seen 3 times in the Ed for the same problem. Appt with spine specialist next week. Denies urinary or bowel incontinence.     All ED beds are full at present; patient notified of this status.  Patient seen and medically screened by Nurse Practitioner via teletriage. Orders initiated at triage to expedite care.  Patient is stable to return to the waiting room and will be placed in an ED bed when available.  Care will be transferred to an alternate provider when patient has been placed in an Exam Room from the [facility] for physical exam, additional orders, and disposition.  3:52 PM Rissa Bach DNP, FNP-C        ORDERS  Labs Reviewed - No data to display    ED Orders (720h ago, onward)      None              Virtual Visit Note: The provider triage portion of this emergency department evaluation and documentation was performed via [employer], a HIPAA-compliant telemedicine application, in concert with a tele-presenter in the room. A face to face patient evaluation with one of my colleagues will occur once the patient is placed in an emergency department room.      DISCLAIMER: This note was prepared with [employer] voice recognition transcription software. Garbled syntax, mangled pronouns, and other bizarre constructions may be attributed to that " software system.

## 2024-01-06 RX ORDER — OXYCODONE AND ACETAMINOPHEN 5; 325 MG/1; MG/1
1 TABLET ORAL EVERY 4 HOURS PRN
Qty: 18 TABLET | Refills: 0 | Status: SHIPPED | OUTPATIENT
Start: 2024-01-06 | End: 2024-01-09

## 2024-01-06 NOTE — ED PROVIDER NOTES
"Encounter Date: 1/5/2024       History     Chief Complaint   Patient presents with    Back Pain     Lower, states she has "slipped disc"     Patient is a 33 y.o. female with past medical history of chronic back pain, diabetes, depression, anxiety who presents to ED via self for concern for left low back pain which began this morning.  Patient has had multiple ED visits recently for her back pain.  Patient reports she has been seeing Dr. Cassidy and doing physical therapy for her back.  Patient reports she was now completed 6 weeks of physical therapy and can have an outpatient MRI but has to see orthopedics on 01/08 for reassessment prior to obtaining the MRI.  Patient reports she currently takes Norco and tramadol at home for her back pain and ankle pain.  Patient reports she awoke this morning with severe pain and was not able to get up and walk.  Patient reports numbness and weakness of the left leg.  Patient reports she normally lives at a 3-4 pain level but this pain is much worse.  Patient initially tearful on exam.  Patient denies saddle anesthesia or change in bowel habits.  Patient denies fever.  Patient denies chest pain or shortness of breath.  Patient denies any new injuries or falls.  Patient was awake and alert in moderate distress due to pain.        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     Past Medical History:   Diagnosis Date    Anxiety     Back pain     Bipolar disorder 2004    bipolar 2    Chronic bronchitis     Depression     Diabetes mellitus     GERD (gastroesophageal reflux disease)     HPV (human papilloma virus) infection     Insomnia     Migraines     Non-proliferative diabetic retinopathy, mild, both eyes 09/25/2023    Obese body habitus     Ovarian cyst     Pneumonia     PONV (postoperative nausea and vomiting)      Past Surgical History:   Procedure Laterality Date    APPENDECTOMY      " ARTHROSCOPY OF ANKLE Right 05/04/2022    Procedure: ARTHROSCOPY, ANKLE;  Surgeon: Bimal Mccormick DPM;  Location: [facility] OR;  Service: Podiatry;  Laterality: Right;  CURT EXTERNAL ANKLE DISTRACTOR    CHOLECYSTECTOMY      DILATION AND CURETTAGE OF UTERUS      HYSTERECTOMY  2017    total, ovarian cysts, torsion, Berrault; hyst per Dr JESUS Manriquez    LAPAROSCOPIC APPENDECTOMY N/A 08/18/2021    Procedure: APPENDECTOMY, LAPAROSCOPIC;  Surgeon: Osiel Ramirez MD;  Location: [facility];  Service: General;  Laterality: N/A;    OOPHORECTOMY      opherectom Left 2015    salpingo-opherectomy    REPAIR OF LIGAMENT OF ANKLE Right 06/23/2021    Procedure: REPAIR OF ANTERIOR TALOFIBULAR LIGAMENT CALCANEOFIBULAR LIGAMENT;  Surgeon: Bimal Mccormick DPM;  Location: [facility] OR;  Service: Podiatry;  Laterality: Right;  WITH ARTHREX INTERNAL BRACE    REPAIR OF LIGAMENT OF ANKLE Right 4/27/2023    Procedure: REPAIR, LIGAMENT, ANKLE;  Surgeon: Aryan Poole DPM;  Location: [facility] OR;  Service: Podiatry;  Laterality: Right;    REPAIR OF TENDON OF LOWER EXTREMITY Right 4/27/2023    Procedure: REPAIR, TENDON, LOWER EXTREMITY;  Surgeon: Aryan Poole DPM;  Location: [facility] OR;  Service: Podiatry;  Laterality: Right;  arthrex    SURGICAL REMOVAL OF BONE SPUR Right 06/23/2021    Procedure: EXCISION OS TRIGONUM;  Surgeon: Bimal Mccormick DPM;  Location: [facility] OR;  Service: Podiatry;  Laterality: Right;    WISDOM TOOTH EXTRACTION       Family History   Adopted: Yes   Problem Relation Age of Onset    No Known Problems Sister     No Known Problems Sister     No Known Problems Sister      Social History     Tobacco Use    Smoking status: Every Day     Current packs/day: 1.50     Average packs/day: 1.5 packs/day for 18.0 years (27.0 ttl pk-yrs)     Types: Cigarettes     Passive exposure: Current    Smokeless tobacco: Never    Tobacco comments:     4/25/23--pt instructed no smoking 24hours before sx, pt voiced understanding.   Substance Use Topics     Alcohol use: Not Currently     Comment: rare    Drug use: Not Currently     Comment: twice     Review of Systems   Constitutional: Negative.  Negative for fever.   HENT: Negative.     Respiratory: Negative.  Negative for cough and shortness of breath.    Cardiovascular: Negative.  Negative for chest pain.   Gastrointestinal: Negative.    Genitourinary: Negative.  Negative for difficulty urinating and dysuria.   Musculoskeletal:  Positive for back pain. Negative for joint swelling, neck pain and neck stiffness.   Skin:  Negative for color change, pallor and rash.   Neurological: Negative.  Negative for weakness.   Hematological:  Does not bruise/bleed easily.   Psychiatric/Behavioral: Negative.         Physical Exam     Initial Vitals [01/05/24 1520]   BP Pulse Resp Temp SpO2   (!) 143/84 95 20 98.7 °F (37.1 °C) 95 %      MAP       --         Physical Exam    Nursing note and vitals reviewed.  Constitutional: She appears well-developed and well-nourished. She is not diaphoretic. No distress.   HENT:   Head: Normocephalic and atraumatic.   Right Ear: External ear normal.   Left Ear: External ear normal.   Nose: Nose normal.   Eyes: Conjunctivae and EOM are normal.   Neck:   Normal range of motion.  Cardiovascular:  Normal rate, regular rhythm, normal heart sounds and intact distal pulses.     Exam reveals no gallop and no friction rub.       No murmur heard.  Pulmonary/Chest: Breath sounds normal. No respiratory distress. She has no wheezes. She has no rhonchi. She has no rales. She exhibits no tenderness.   Musculoskeletal:      Cervical back: Normal and normal range of motion.      Thoracic back: Normal.      Lumbar back: Tenderness and bony tenderness present. No swelling, edema, deformity, signs of trauma or lacerations. Decreased range of motion.        Back:       Left foot: Normal pulse.     Neurological: She is alert and oriented to person, place, and time. She has normal strength. GCS score is 15. GCS eye  subscore is 4. GCS verbal subscore is 5. GCS motor subscore is 6.   Skin: Skin is warm and dry. Capillary refill takes less than 2 seconds.   Psychiatric: She has a normal mood and affect. Her behavior is normal. Judgment and thought content normal.         ED Course   Procedures  Labs Reviewed   POCT GLUCOSE - Abnormal; Notable for the following components:       Result Value    POC Glucose 141 (*)     All other components within normal limits   URINALYSIS, REFLEX TO URINE CULTURE    Narrative:     Specimen Source->Urine   POCT GLUCOSE MONITORING CONTINUOUS          Imaging Results              X-Ray Lumbar Spine 5 View (Final result)  Result time 01/05/24 18:09:03   Procedure changed from X-Ray Lumbar Spine Ap And Lateral     Final result by Jacquelyn Coto DO (01/05/24 18:09:03)                   Narrative:    LUMBAR SPINE: 1/5/2024 6:08 PM CST    TECHNIQUE:  AP, lateral, right and left lateral oblique, coned down views of the lumbar spine were obtained.    COMPARISON:  10/10/2024    HISTORY:  33 years  old Female with BACK PAIN.    FINDINGS:  Five non-rib bearing vertebrae are seen. There is anterolisthesis of L4-L5 by 9-10 mm with widening abnormal bilateral pars defects. This is similar to prior imaging. The vertebral body heights appear to be maintained.  Mild multilevel  intervertebral disc space narrowing is seen. There is no evidence of acute fracture or subluxation. Both sacroiliac joints appear normal.    IMPRESSION:  There is no evidence of an acute fracture or subluxation is seen in the lumbar spine.    There are similar anterolisthesis of L4 over L5.    Electronically signed by:  Jacquelyn Coto DO  01/05/2024 06:09 PM CST Workstation: [id number]                                     Medications   LIDOcaine 5 % patch 1 patch (1 patch Transdermal Patch Applied 1/5/24 2110)   ketorolac injection 30 mg (30 mg Intramuscular Given 1/5/24 2109)   tiZANidine tablet 4 mg (4 mg Oral Given 1/5/24 2110)    HYDROmorphone injection 1 mg (1 mg Intramuscular Given 1/5/24 2109)   diphenhydrAMINE capsule 50 mg (50 mg Oral Given 1/5/24 2137)   HYDROmorphone injection 1 mg (1 mg Intramuscular Given 1/5/24 2325)     Medical Decision Making  MDM    Patient presents for emergent evaluation of acute flare of chronic back pain that poses a possible threat to life and/or bodily function.    Differential diagnosis included but not limited to fracture, sprain, cauda equina, infection, musculoskeletal pain, nerve impingement, spinal cord compression.  In the ED patient found to have acute clear lung sounds bilaterally with no increased work of breathing.  Patient has pain to palpation of lumbar spine and left lower back.  There has no signs of injury or bruising or edema noted to the lower back.  Patient reports numbness in her left leg that it was worse since this morning.  Patient has decreased strength of her left extremity.  Patient has normal distal pulses cap refill and sensation.  Patient denies saddle anesthesia or bowel or bladder changes.  Patient denies fever.  Patient recently seen in the ED and was given IM Dilaudid, Toradol, and oral Zanaflex which improved her acute flare of chronic back pain.    Patient had x-ray of the lumbar spine in the ED today.  X-ray significant for IMPRESSION:  There is no evidence of an acute fracture or subluxation is seen in the lumbar spine. There are similar anterolisthesis of L4 over L5.  Which is unchanged from previous imaging.    Patient given IM Dilaudid, Toradol, and oral Zanaflex.  Patient reports itchiness after IM Dilaudid and given oral Benadryl.  Patient denies rash or swelling or difficulty breathing.  Benadryl relieve patient's symptoms of itchiness.  On reassessment patient reports her pain went from a 9/10 to a 6/10.  Patient reports improvement of numbness of the left extremity.  Patient able to stand up on reassessment which she was not able to do on initial assessment.   Patient has a improve strength of the left lower extremity on reassessment.  Patient reports she is unsure if she is ready to go home yet stating that she normally lives at 3-4/10 pain level and she is worried she will start hurting worse when she goes home.  Discussed with my attending who recommended additional dose of IM Dilaudid and reassessment of patient's pain.  Patient given an additional 1 mg Dilaudid IM and on 2nd reassessment patient reports improvement in pain to a 4/10.  Patient able to get in and out of the bed and able to lift up her left lower extremity and straighten and bend the leg without much difficulty.  Patient reports improvement of the numbness of her left leg.  Patient reports she feels comfortable going home.  Discussed with patient the importance of keeping her appointment on Monday 1/8 with Dr. Cassidy and getting her outpatient MRI as soon as possible.  Patient states understanding.  Discussed with the patient to discontinue her tramadol and Norco and that I would prescribe her Percocet to try to control her pain at home until she sees Dr. Cassidy on Monday.  Discussed with the patient that she can not take tramadol and Norco and Percocet together.  Patient states understanding.      Discharge MDM  I discussed the patient presentation, X-rays findings with my attending Dr. Denson.    Patient was managed in the ED with IM and oral medications as he was topical lidocaine patch.    The response to treatment was improvement of pain active patient's baseline chronic pain.  No acute concerning findings worrisome for cauda equina or need for emergent MRI or CT scan.  Patient was scheduled orthopedic appointment on Monday 1/8.    Patient was discharged in stable condition with close follow up.  Detailed return precautions discussed to return to the ED for saddle anesthesia, change in bowel habits, fever, worsening pain, inability to walk, worsening muscle weakness, or any new or worsening  concerns.  Patient states understanding.      NP uses Epic and voice recognition software prone to occasional and minor errors that may persist in the medical record.     Amount and/or Complexity of Data Reviewed  External Data Reviewed: radiology and notes.     Details: Previous ED visits for acute exacerbation of chronic back pain  Labs: ordered.     Details: Glucose 141  Normal UA  Radiology: ordered and independent interpretation performed.    Risk  OTC drugs.  Prescription drug management.                                  Attending Note: I discussed the patient's care with Advanced Practice Clinician.  I reviewed their note and agree with the history, physical, assessment, diagnosis, treatment, all procedures performed, xray and EKG interpretations and discharge plan provided by the Advanced Practice Clinician. My overall impression is acute on chronic low back pain.  Patient had no red flag symptoms for back pain at this time no fever no change in bowel habits such as here retention or stool incontinence no urinary incontinence or retention.  She was able to stand and ambulate.  She already had paresthesias present.  She was established with Dr. Cassidy and having an MRI and being evaluated on Monday.  Her pain was controlled here she was discharged in  The patient has been instructed to follow up with their physician or the one provided as well as specific return precautions.   Kacie Denson M.D. 1/6/2024 1:24 AM      Clinical Impression:  Final diagnoses:  [M54.50, G89.29] Acute exacerbation of chronic low back pain (Primary)          ED Disposition Condition    Discharge Stable          ED Prescriptions       Medication Sig Dispense Start Date End Date Auth. Provider    oxyCODONE-acetaminophen (PERCOCET) 5-325 mg per tablet Take 1 tablet by mouth every 4 (four) hours as needed for Pain (Every 4-6 hours for severe pain). 18 tablet 1/6/2024 1/9/2024 Makayla Car NP          Follow-up Information        Follow up With Specialties Details Why Contact Info Additional Information    Gurjit Cassidy MD Orthopedic Surgery, Surgery Schedule an appointment as soon as possible for a visit  For recheck/continuing care [address]  [address]  [place] [facility] [address]  [phone]       Formerly [facility] - Emergency Dept Emergency Medicine  If symptoms worsen [address] [place] [id number] [phone] 1st floor             Power, Makayla VALVERDE NP  01/06/24 0051       Kacie Denson MD  01/06/24 0126

## 2024-01-06 NOTE — DISCHARGE INSTRUCTIONS
Stop taking the tramadol and Norco.  You can start taking the Percocet to help with your acute exacerbation of your back pain.  Please keep your appointment on Monday to see Dr. Cassidy and have your MRI scheduled.  Please return to the ED for changes in her bowel habits, numbness and tingling in between your legs, fever, worsening severe pain, inability to get up, worsening muscle weakness, or any new or worsening concerns.    Do not drive, swim, climb to height, take a bath, operate heavy machinery, drink alcohol, or take potentially sedating medications, sign any legal documents, or make any important decisions for 24 hours if you have received any pain medications, sedatives, or mood altering drugs during her ER visit or within 24 hours of taking them if they have been prescribed to you.

## 2024-01-07 ENCOUNTER — HOSPITAL ENCOUNTER (EMERGENCY)
Facility: HOSPITAL | Age: 34
Discharge: HOME | End: 2024-01-07
Attending: EMERGENCY MEDICINE
Payer: MEDICAID

## 2024-01-07 VITALS
RESPIRATION RATE: 18 BRPM | OXYGEN SATURATION: 99 % | TEMPERATURE: 98 F | SYSTOLIC BLOOD PRESSURE: 150 MMHG | WEIGHT: 293 LBS | DIASTOLIC BLOOD PRESSURE: 88 MMHG | BODY MASS INDEX: 49.34 KG/M2 | HEART RATE: 94 BPM

## 2024-01-07 DIAGNOSIS — G89.29 CHRONIC RIGHT-SIDED LOW BACK PAIN WITH RIGHT-SIDED SCIATICA: Primary | ICD-10-CM

## 2024-01-07 DIAGNOSIS — M54.41 CHRONIC RIGHT-SIDED LOW BACK PAIN WITH RIGHT-SIDED SCIATICA: Primary | ICD-10-CM

## 2024-01-07 PROCEDURE — 99284 EMERGENCY DEPT VISIT MOD MDM: CPT

## 2024-01-07 PROCEDURE — 63600175 PHARM REV CODE 636 W HCPCS: Performed by: EMERGENCY MEDICINE

## 2024-01-07 PROCEDURE — 96372 THER/PROPH/DIAG INJ SC/IM: CPT | Performed by: EMERGENCY MEDICINE

## 2024-01-07 RX ORDER — CYCLOBENZAPRINE HCL 10 MG
10 TABLET ORAL 3 TIMES DAILY PRN
Qty: 12 TABLET | Refills: 0 | Status: SHIPPED | OUTPATIENT
Start: 2024-01-07 | End: 2024-01-12 | Stop reason: SDUPTHER

## 2024-01-07 RX ORDER — ORPHENADRINE CITRATE 30 MG/ML
60 INJECTION INTRAMUSCULAR; INTRAVENOUS
Status: COMPLETED | OUTPATIENT
Start: 2024-01-07 | End: 2024-01-07

## 2024-01-07 RX ADMIN — ORPHENADRINE CITRATE 60 MG: 60 INJECTION INTRAMUSCULAR; INTRAVENOUS at 02:01

## 2024-01-07 NOTE — ED PROVIDER NOTES
Encounter Date: 1/7/2024       History     Chief Complaint   Patient presents with    Back Pain     C/o lower back pain. Was seen here last night.      Patient presents emergency department for re-evaluation after prolonged emergency department visit last night for chronic low back pain with exacerbation patient has an appointment on Monday with her orthopedist for repeat MRI for further evaluation of her Wen disc disease she has been experiencing sciatica to the right side she denies any bowel bladder incontinence no urinary retention or numbness is reported she denies any new injury states that she feels she is continues to have significant spasms they did not give her any muscle relaxants upon discharge she states they did give her a shot of muscle relaxants while she was in the hospital        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     Past Medical History:   Diagnosis Date    Anxiety     Back pain     Bipolar disorder 2004    bipolar 2    Chronic bronchitis     Depression     Diabetes mellitus     GERD (gastroesophageal reflux disease)     HPV (human papilloma virus) infection     Insomnia     Migraines     Non-proliferative diabetic retinopathy, mild, both eyes 09/25/2023    Obese body habitus     Ovarian cyst     Pneumonia     PONV (postoperative nausea and vomiting)      Past Surgical History:   Procedure Laterality Date    APPENDECTOMY      ARTHROSCOPY OF ANKLE Right 05/04/2022    Procedure: ARTHROSCOPY, ANKLE;  Surgeon: Bimal Mccormick DPM;  Location: [facility] OR;  Service: Podiatry;  Laterality: Right;  CURT EXTERNAL ANKLE DISTRACTOR    CHOLECYSTECTOMY      DILATION AND CURETTAGE OF UTERUS      HYSTERECTOMY  2017    total, ovarian cysts, torsion, Berrault; hyst per Dr JESUS Manriquez    LAPAROSCOPIC APPENDECTOMY N/A 08/18/2021    Procedure: APPENDECTOMY, LAPAROSCOPIC;  Surgeon: Osiel Ramirez MD;  Location: [facility] OR;   Service: General;  Laterality: N/A;    OOPHORECTOMY      opherectom Left 2015    salpingo-opherectomy    REPAIR OF LIGAMENT OF ANKLE Right 06/23/2021    Procedure: REPAIR OF ANTERIOR TALOFIBULAR LIGAMENT CALCANEOFIBULAR LIGAMENT;  Surgeon: Bimal Mccormick DPM;  Location: [facility] OR;  Service: Podiatry;  Laterality: Right;  WITH ARTHREX INTERNAL BRACE    REPAIR OF LIGAMENT OF ANKLE Right 4/27/2023    Procedure: REPAIR, LIGAMENT, ANKLE;  Surgeon: Aryan Poole DPM;  Location: [facility] OR;  Service: Podiatry;  Laterality: Right;    REPAIR OF TENDON OF LOWER EXTREMITY Right 4/27/2023    Procedure: REPAIR, TENDON, LOWER EXTREMITY;  Surgeon: Aryan Poole DPM;  Location: [facility] OR;  Service: Podiatry;  Laterality: Right;  arthrex    SURGICAL REMOVAL OF BONE SPUR Right 06/23/2021    Procedure: EXCISION OS TRIGONUM;  Surgeon: Bimal Mccormick DPM;  Location: [facility] OR;  Service: Podiatry;  Laterality: Right;    WISDOM TOOTH EXTRACTION       Family History   Adopted: Yes   Problem Relation Age of Onset    No Known Problems Sister     No Known Problems Sister     No Known Problems Sister      Social History     Tobacco Use    Smoking status: Every Day     Current packs/day: 1.50     Average packs/day: 1.5 packs/day for 18.0 years (27.0 ttl pk-yrs)     Types: Cigarettes     Passive exposure: Current    Smokeless tobacco: Never    Tobacco comments:     4/25/23--pt instructed no smoking 24hours before sx, pt voiced understanding.   Substance Use Topics    Alcohol use: Not Currently     Comment: rare    Drug use: Not Currently     Comment: twice     Review of Systems   All other systems reviewed and are negative.      Physical Exam     Initial Vitals [01/07/24 0004]   BP Pulse Resp Temp SpO2   (!) 150/88 94 18 98.2 °F (36.8 °C) 99 %      MAP       --         Physical Exam    Constitutional: She appears well-developed and well-nourished. No distress.   HENT:   Head: Normocephalic and atraumatic.   Right Ear: External ear normal.    Left Ear: External ear normal.   Mouth/Throat: Oropharynx is clear and moist.   Eyes: Conjunctivae and EOM are normal. Pupils are equal, round, and reactive to light.   Neck: Neck supple.   Normal range of motion.  Cardiovascular:  Normal rate, regular rhythm, normal heart sounds and intact distal pulses.           Pulmonary/Chest: Breath sounds normal. No respiratory distress.   Abdominal: Abdomen is soft. Bowel sounds are normal. There is no abdominal tenderness.   Musculoskeletal:         General: Tenderness present. No edema. Normal range of motion.      Cervical back: Normal range of motion and neck supple.      Comments: Right paraspinal tenderness and spasms noted     Neurological: She is alert and oriented to person, place, and time. She has normal reflexes. GCS score is 15. GCS eye subscore is 4. GCS verbal subscore is 5. GCS motor subscore is 6.   Skin: Skin is warm and dry. Capillary refill takes less than 2 seconds. No rash noted.   Psychiatric: She has a normal mood and affect. Her behavior is normal.         ED Course   Procedures  Labs Reviewed - No data to display       Imaging Results    None          Medications   orphenadrine injection 60 mg (has no administration in time range)     Medical Decision Making  I reviewed patient's workup from last night I have given her dose of Norflex in the emergency department there is no evidence of cauda equina at this time will release with prescription for Flexeril recommend she continue her current regimen as prescribed return to the ER for any worsened symptoms or new symptoms keep follow-up with orthopedist on Monday                                      Clinical Impression:  Final diagnoses:  [M54.41, G89.29] Chronic right-sided low back pain with right-sided sciatica (Primary)          ED Disposition Condition    Discharge Stable          ED Prescriptions       Medication Sig Dispense Start Date End Date Auth. Provider    cyclobenzaprine (FLEXERIL) 10 MG  tablet Take 1 tablet (10 mg total) by mouth 3 (three) times daily as needed for Muscle spasms. 12 tablet 1/7/2024 1/17/2024 Juarez Sigala MD          Follow-up Information    None          Juarez Sigala MD  01/07/24 0208

## 2024-01-08 ENCOUNTER — OFFICE VISIT (OUTPATIENT)
Dept: ORTHOPEDICS | Facility: CLINIC | Age: 34
End: 2024-01-08
Payer: MEDICAID

## 2024-01-08 VITALS — HEIGHT: 67 IN | WEIGHT: 293 LBS | BODY MASS INDEX: 45.99 KG/M2

## 2024-01-08 DIAGNOSIS — M43.16 SPONDYLOLISTHESIS OF LUMBAR REGION: ICD-10-CM

## 2024-01-08 DIAGNOSIS — M51.36 DISC DEGENERATION, LUMBAR: ICD-10-CM

## 2024-01-08 DIAGNOSIS — M48.062 LUMBAR STENOSIS WITH NEUROGENIC CLAUDICATION: ICD-10-CM

## 2024-01-08 DIAGNOSIS — M54.16 LUMBAR RADICULOPATHY, ACUTE: Primary | ICD-10-CM

## 2024-01-08 PROCEDURE — 3008F BODY MASS INDEX DOCD: CPT | Mod: CPTII,S$GLB,, | Performed by: ORTHOPAEDIC SURGERY

## 2024-01-08 PROCEDURE — 99213 OFFICE O/P EST LOW 20 MIN: CPT | Mod: S$GLB,,, | Performed by: ORTHOPAEDIC SURGERY

## 2024-01-08 PROCEDURE — 1160F RVW MEDS BY RX/DR IN RCRD: CPT | Mod: CPTII,S$GLB,, | Performed by: ORTHOPAEDIC SURGERY

## 2024-01-08 PROCEDURE — 1159F MED LIST DOCD IN RCRD: CPT | Mod: CPTII,S$GLB,, | Performed by: ORTHOPAEDIC SURGERY

## 2024-01-08 NOTE — PROGRESS NOTES
Subjective:       Patient ID: Loretta Lea is a 33 y.o. female.    Chief Complaint: Pain of the Spine (Bl lumbar pain recent MRI denied /Chief Complaint: Pain of the Lumbar Spine (BL lumbar pain and numbness that starts midline lumbar to her buttocks down BL legs. Left is worse than right is start to become worse Sometimes to knees, sometimes to feet. Describes the pain as constant and severe. States that she has been to the ER twice with in the last week due to inablility to walk or move.)      History of Present Illness    Prior to meeting with the patient I reviewed the medical chart in [facility]. This included reviewing the previous progress notes from our office, review of the patient's last appointment with their primary care provider, review of any visits to the emergency room, and review of any pain management appointments or procedures.   This patient returns for follow-up with continued symptoms in her back radiating down her left leg to the foot it starting on the right side but does not go much past the hip she has had to go to the emergency room because of severe pain on December 20 1st of January 5th, and January 7.  She has completed 6 weeks of physical therapy without any significant resolution of her symptoms.  Physical therapy started on October 24, 2023  Physical therapy ended on December 27, 2023    Actual physical therapy date are October 24, 2023, October 30, 2023, November 15, 2023, January 19, 2023, and December 27, 2023.     physical therapy has been unsuccessful    She was also almost admitted to the hospital for pain control because of severity of the pain causing her to have roughly 3 emergency room visits in the last 2 and half weeks.    Current Medications  Current Outpatient Medications   Medication Sig Dispense Refill    amoxicillin-clavulanate 875-125mg (AUGMENTIN) 875-125 mg per tablet Take 1 tablet by mouth 2 (two) times daily. 20 tablet 0    blood sugar diagnostic Strp Test  blood sugar 4 (four) times daily before meals and nightly. 200 each 5    blood-glucose meter (ACCU-CHEK GUIDE GLUCOSE METER) Misc Use to test blood sugar 1 each 0    ciprofloxacin-dexAMETHasone 0.3-0.1% (CIPRODEX) 0.3-0.1 % DrpS Place 4 drops into both ears 2 (two) times daily. (Patient not taking: Reported on 11/21/2023) 7.5 mL 0    clotrimazole (LOTRIMIN) 1 % cream Apply topically 2 (two) times daily. for 7 days 60 g 0    cyclobenzaprine (FLEXERIL) 10 MG tablet Take 1 tablet (10 mg total) by mouth 3 (three) times daily as needed for Muscle spasms. 12 tablet 0    gabapentin (NEURONTIN) 300 MG capsule TAKE ONE CAPSULE BY MOUTH EVERY EVENING 30 capsule 2    HYDROcodone-acetaminophen (NORCO)  mg per tablet Take 1 tablet by mouth every 6 (six) hours as needed for Pain. 28 tablet 0    hydrOXYzine pamoate (VISTARIL) 25 MG Cap Take 1 capsule (25 mg total) by mouth every 4 (four) hours as needed (itching). (Patient not taking: Reported on 11/21/2023) 12 capsule 0    lancets (ACCU-CHEK SOFTCLIX LANCETS) Misc Test blood sugar 4 (four) times daily before meals and nightly. 200 each 5    metFORMIN (GLUCOPHAGE-XR) 500 MG ER 24hr tablet Take 2 tablets (1,000 mg total) by mouth 2 (two) times daily with meals. 120 tablet 2    naproxen (NAPROSYN) 500 MG tablet Take 1 tablet (500 mg total) by mouth 2 (two) times daily. 60 tablet 2    ondansetron (ZOFRAN-ODT) 4 MG TbDL Take 2 tablets (8 mg total) by mouth every 8 (eight) hours as needed (Nausea). 30 tablet 2    oxyCODONE-acetaminophen (PERCOCET) 5-325 mg per tablet Take 1 tablet by mouth every 4 (four) hours as needed for Pain (Every 4-6 hours for severe pain). 18 tablet 0    pyridoxine, vitamin B6, (B-6) 25 MG Tab Take 25 mg by mouth once daily.      risperiDONE (RISPERDAL) 1 MG tablet Take 1 tablet (1 mg total) by mouth 2 (two) times daily. 60 tablet 2    semaglutide (OZEMPIC) 1 mg/dose (4 mg/3 mL) Inject 1 mg into the skin every 7 days. 3 mL 2    sertraline (ZOLOFT) 100 MG  tablet Take 1.5 tablets (150 mg total) by mouth once daily. 135 tablet 1    sumatriptan (IMITREX) 100 MG tablet Take 1 tab PO at first sign of migraine. If not effective, repeat dose in 2 hours. Do not take more than 200mg in 24 hours. 12 tablet 1    traZODone (DESYREL) 100 MG tablet Take 1-2 tabs PO qHS prn insomnia. 60 tablet 2     No current facility-administered medications for this visit.       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       Past Medical History  Past Medical History:   Diagnosis Date    Anxiety     Back pain     Bipolar disorder 2004    bipolar 2    Chronic bronchitis     Depression     Diabetes mellitus     GERD (gastroesophageal reflux disease)     HPV (human papilloma virus) infection     Insomnia     Migraines     Non-proliferative diabetic retinopathy, mild, both eyes 09/25/2023    Obese body habitus     Ovarian cyst     Pneumonia     PONV (postoperative nausea and vomiting)        Surgical History  Past Surgical History:   Procedure Laterality Date    APPENDECTOMY      ARTHROSCOPY OF ANKLE Right 05/04/2022    Procedure: ARTHROSCOPY, ANKLE;  Surgeon: Bimal Mccormick DPM;  Location: [facility] OR;  Service: Podiatry;  Laterality: Right;  [employer] EXTERNAL ANKLE DISTRACTOR    CHOLECYSTECTOMY      DILATION AND CURETTAGE OF UTERUS      HYSTERECTOMY  2017    total, ovarian cysts, torsion, [place]; hyst per Dr JESUS Manriquez    LAPAROSCOPIC APPENDECTOMY N/A 08/18/2021    Procedure: APPENDECTOMY, LAPAROSCOPIC;  Surgeon: Osiel Ramirez MD;  Location: [facility] OR;  Service: General;  Laterality: N/A;    OOPHORECTOMY      opherectom Left 2015    salpingo-opherectomy    REPAIR OF LIGAMENT OF ANKLE Right 06/23/2021    Procedure: REPAIR OF ANTERIOR TALOFIBULAR LIGAMENT CALCANEOFIBULAR LIGAMENT;  Surgeon: Bimal Mccormick DPM;  Location: [facility] OR;  Service: Podiatry;  Laterality: Right;  WITH ARTHREX INTERNAL  BRACE    REPAIR OF LIGAMENT OF ANKLE Right 4/27/2023    Procedure: REPAIR, LIGAMENT, ANKLE;  Surgeon: Aryan Poole DPM;  Location: [facility] OR;  Service: Podiatry;  Laterality: Right;    REPAIR OF TENDON OF LOWER EXTREMITY Right 4/27/2023    Procedure: REPAIR, TENDON, LOWER EXTREMITY;  Surgeon: Aryan Poole DPM;  Location: [facility] OR;  Service: Podiatry;  Laterality: Right;  arthrex    SURGICAL REMOVAL OF BONE SPUR Right 06/23/2021    Procedure: EXCISION OS TRIGONUM;  Surgeon: Bimal Mccormick DPM;  Location: [facility] OR;  Service: Podiatry;  Laterality: Right;    WISDOM TOOTH EXTRACTION         Family History:   Family History   Adopted: Yes   Problem Relation Age of Onset    No Known Problems Sister     No Known Problems Sister     No Known Problems Sister        Social History:   Social History     Socioeconomic History    Marital status:     Number of children: 0   Tobacco Use    Smoking status: Every Day     Current packs/day: 1.50     Average packs/day: 1.5 packs/day for 18.0 years (27.0 ttl pk-yrs)     Types: Cigarettes     Passive exposure: Current    Smokeless tobacco: Never    Tobacco comments:     4/25/23--pt instructed no smoking 24hours before sx, pt voiced understanding.   Substance and Sexual Activity    Alcohol use: Not Currently     Comment: rare    Drug use: Not Currently     Comment: twice    Sexual activity: Yes     Partners: Male     Birth control/protection: OCP, None     Social Determinants of Health     Stress: Stress Concern Present (10/20/2020)    [employer] - Occupational Stress Questionnaire     Feeling of Stress : Very much       Hospitalization/Major Diagnostic Procedure:     Review of Systems     General/Constitutional:  Chills denies. Fatigue denies. Fever denies. Weight gain denies. Weight loss denies.    Respiratory:  Shortness of breath denies.    Cardiovascular:  Chest pain denies.    Gastrointestinal:  Constipation denies. Diarrhea denies. Nausea  denies. Vomiting denies.     Hematology:  Easy bruising denies. Prolonged bleeding denies.     Genitourinary:  Frequent urination denies. Pain in lower back denies. Painful urination denies.     Musculoskeletal:  See HPI for details    Skin:  Rash denies.    Neurologic:  Dizziness denies. Gait abnormalities denies. Seizures denies. Tingling/Numbess denies.    Psychiatric:  Anxiety denies. Depressed mood denies.     Objective:   Vital Signs: There were no vitals filed for this visit.     Physical Exam      General Examination:     Constitutional: The patient is alert and oriented to lace person and time. Mood is pleasant.     Head/Face: Normal facial features normal eyebrows    Eyes: Normal extraocular motion bilaterally    Lungs: Respirations are equal and unlabored    Gait is coordinated.    Cardiovascular: There are no swelling or varicosities present.    Lymphatic: Negative for adenopathy    Skin: Normal    Neurological: Level of consciousness normal. Oriented to place person and time and situation    Psychiatric: Oriented to time place person and situation       Patient stand erect and has difficulty arising from a seated position.  walks with an antalgic gait bilateral paraspinous muscle spasm L4-S1 range of motion moderate restricted.  Straight leg raising positive on the left side subjective numbness L4 nerve root distribution on the left.  Motor exam demonstrates grade 4 5 weakness and left anterior tibial and left extensor hallucis longus muscle groups.  Reflexes are symmetrical with hypoactive.  XRAY Report/ Interpretation:  Prior lumbar x-rays reviewed degenerative spondylolisthesis at L4-5 noted moderate disc space narrowing L4-5 and L5-S1 levels.         Assessment:       1. Lumbar radiculopathy, acute    2. Spondylolisthesis of lumbar region    3. Disc degeneration, lumbar    4. Lumbar stenosis with neurogenic claudication        Plan:       Loretta was seen today for pain.    Diagnoses and all orders  for this visit:    Lumbar radiculopathy, acute    Spondylolisthesis of lumbar region    Disc degeneration, lumbar    Lumbar stenosis with neurogenic claudication         No follow-ups on file.    I think we have met all the criteria for this patient to be approved for an MRI.  She has had 6 weeks of physical therapy.  We have included the dates of physical therapy.  She has not responded to physical therapy.  Pain is progressive increasing.  She has objective findings on physical examination.  She has objective findings on x-ray.  The MRI is essential at this point for continued care.      Treatment options were discussed with regards to the nature of the medical condition. Conservative pain intervention and surgical options were discussed in detail. The probability of success of each separate treatment option was discussed. The patient expressed a clear understanding of the treatment options. With regards to surgery, the procedure risk, benefits, complications, and outcomes were discussed. No guarantees were given with regards to surgical outcome.   The risk of complications, morbidity, and mortality of patient management decisions have been made at the time of this visit. These are associated with the patient's problems, diagnostic procedures and treatment options. This includes the possible management options selected and those considered but not selected by the patient after shared medical decision making we discussed with the patient.     This note was created using Dragon voice recognition software that occasionally misinterpreted phrases or words.

## 2024-01-11 ENCOUNTER — PATIENT MESSAGE (OUTPATIENT)
Dept: ORTHOPEDICS | Facility: CLINIC | Age: 34
End: 2024-01-11

## 2024-01-11 DIAGNOSIS — M54.16 LUMBAR RADICULOPATHY: Primary | ICD-10-CM

## 2024-01-11 DIAGNOSIS — M48.062 LUMBAR STENOSIS WITH NEUROGENIC CLAUDICATION: ICD-10-CM

## 2024-01-12 RX ORDER — OXYCODONE AND ACETAMINOPHEN 5; 325 MG/1; MG/1
1 TABLET ORAL EVERY 6 HOURS PRN
Qty: 28 TABLET | Refills: 0 | Status: SHIPPED | OUTPATIENT
Start: 2024-01-12 | End: 2024-01-19

## 2024-01-12 RX ORDER — CYCLOBENZAPRINE HCL 10 MG
10 TABLET ORAL 3 TIMES DAILY PRN
Qty: 90 TABLET | Refills: 0 | Status: SHIPPED | OUTPATIENT
Start: 2024-01-12 | End: 2024-02-11

## 2024-01-12 NOTE — TELEPHONE ENCOUNTER
Requesting refill on percocet 5 and flexeril. Seen in ER 1/7, and seen Dr Cassidy 1/8. MRI Lumbar spine is pending insurance auth.

## 2024-01-15 ENCOUNTER — PATIENT MESSAGE (OUTPATIENT)
Dept: ORTHOPEDICS | Facility: CLINIC | Age: 34
End: 2024-01-15

## 2024-01-16 ENCOUNTER — PATIENT MESSAGE (OUTPATIENT)
Dept: ORTHOPEDICS | Facility: CLINIC | Age: 34
End: 2024-01-16

## 2024-01-17 ENCOUNTER — PATIENT MESSAGE (OUTPATIENT)
Dept: REHABILITATION | Facility: HOSPITAL | Age: 34
End: 2024-01-17
Payer: MEDICAID

## 2024-01-17 ENCOUNTER — PATIENT MESSAGE (OUTPATIENT)
Dept: ORTHOPEDICS | Facility: CLINIC | Age: 34
End: 2024-01-17

## 2024-01-22 ENCOUNTER — CLINICAL SUPPORT (OUTPATIENT)
Dept: REHABILITATION | Facility: HOSPITAL | Age: 34
End: 2024-01-22
Payer: MEDICAID

## 2024-01-22 DIAGNOSIS — R26.89 DECREASED FUNCTIONAL MOBILITY: ICD-10-CM

## 2024-01-22 DIAGNOSIS — M62.89 MUSCLE TIGHTNESS: ICD-10-CM

## 2024-01-22 DIAGNOSIS — M25.60 DECREASED RANGE OF MOTION: ICD-10-CM

## 2024-01-22 DIAGNOSIS — R53.1 DECREASED STRENGTH: Primary | ICD-10-CM

## 2024-01-22 PROCEDURE — 97110 THERAPEUTIC EXERCISES: CPT | Mod: PN

## 2024-01-22 NOTE — PROGRESS NOTES
[facility]   Physical Therapy Treatment Note      Name: Loretta Lea  [facility] Number: [id number]    Therapy Diagnosis:   Encounter Diagnoses   Name Primary?    Decreased strength Yes    Muscle tightness     Decreased functional mobility     Decreased range of motion        Physician: Karthik Farias MD    Visit Date: 1/22/2024  Physician: Karthik Farias MD             Physician Orders: PT Eval and Treat  Medical Diagnosis from Referral: M54.16 (ICD-10-CM) - Lumbar radiculopathy M70.62 (ICD-10-CM) - Greater trochanteric bursitis of left hip M43.16 (ICD-10-CM) - Spondylolisthesis of lumbar region   Evaluation Date: 10/24/2023  Authorization Period Expiration:  11/27/2023  Plan of Care Expiration: 1/30/2024                Progress Update: 11/24/2023                      FOTO: 1 / 3   Visit # / Visits authorized: 4/ 8  PTA Visit #: 0/5                          PRECAUTIONS: Standard Precautions and Orthopedic: Lumbar anterolisthesis      Time In: 1050  (Pnt arrived early)  Time Out: 1020  (Pnt stopped therapy due to significant increase in pain)  Total Billable Time: 30 minutes      Subjective     Patient reports: that she is in some pain today.  States that any movement causes an increase in pain.    She was not compliant with home exercise program.  Response to previous treatment: None  Functional change: None    Pain: 5/10  Location: left hip  and low back      Objective      Objective Measures updated at progress report unless specified.     Treatment     Loretta received the treatments listed below:      +++All charges filed per Medicaid Guidelines+++    therapeutic exercises to develop strength, endurance, ROM, flexibility, posture, and core stabilization for 8 minutes including:    Nu Step x 5 mins     Seated exercises:  Hamstring stretch 3 x 10 seconds bilateral   Seated Physioball rollout forward x 15 reps; lateral Left and Right x 10 reps     Supine exercises:    Lower trunk  "rotations x10  Bridges x 10 reps - np  Clams red Theraband 2 x 10 reps    Double Knee To Chest with Physioball x 15 reps   Transverse abdominal sets with physioball 2 x 10 reps - np  SAQs  x 10 B    Sidelying exercises:  Open books x 10 reps Right hip, x 5 reps Left hip (some discomfort lying on Left hip)    Standing exercises:  Lumbar extension over mat x 10 reps (decreased pain)   Hip Abduction x 10 reps  (L only due to inability to stand on L)  Hip extension x 10 reps - (L only due to inability to stand on L)    Not performed today:  Gastroc stretch 3 x 30 seconds bilateral (not performed)  Step ups 6"  3 x 10 reps bilateral (not performed)  Hip 3 way 2 x 10 reps Bilateral  (not performed)      Patient Education and Home Exercises       Education provided:   -apply ice as needed for delayed muscle soreness  -Continue with Home exercise program daily     Written Home Exercises Provided: yes. Exercises were reviewed and Loretta was able to demonstrate them prior to the end of the session.  Loretta demonstrated good  understanding of the education provided. See Electronic Medical Record under Patient Instructions for exercises provided during therapy sessions    Assessment   Patient presents to PT with significant pain not allowing her to continue with therapy.  Patient is very guarded with her motions, getting out of chair, walking slow pace.  Displays overall weakness L > R with an increase in pain down L lower extremity.  Will continue to progress with therapy.      Loretta Is progressing well towards her goals.   Patient prognosis is Poor.     Patient will continue to benefit from skilled outpatient physical therapy to address the deficits listed in the problem list box on initial evaluation, provide pt/family education and to maximize pt's level of independence in the home and community environment.     Patient's spiritual, cultural and educational needs considered and pt agreeable to plan of care and goals.   "   Anticipated barriers to physical therapy: none    Goals:   SHORT TERM GOALS: 4 weeks 1/22/2024     Recent signs and systems trend is improving in order to progress towards LTG's. ongoing   Patient will be independent with HEP in order to further progress and return to maximal function. ongoing   Pain rating at Worst: 5/10 in order to progress towards increased independence with activity. ongoing   Patient will be able to correct postural deviations in sitting and standing, to decrease pain and promote postural awareness for injury prevention.  ongoing      LONG TERM GOALS: 8 weeks 1/22/2024     Patient will return to normal ADL, recreational, and work related activities with less pain and limitation.  ongoing   Patient will improve AROM to stated goals in order to return to maximal functional potential.  ongoing   Patient will improve Strength to stated goals of appropriate musculature in order to improve functional independence.  ongoing   Pain Rating at Best: 1/10 to improve Quality of Life.  ongoing   Patient will meet predicted functional outcome (FOTO) score: 80% to increase self-worth & perceived functional ability. ongoing   Patient will have met/partially met personal goal of being able to walk without pain. ongoing            PLAN   Plan of care Certification: 10/24/2023 to 1/30/2024     Outpatient Physical Therapy 2 times weekly for 6 weeks to include any combination of the following interventions: virtual visits, dry needling, modalities, electrical stimulation (IFC, Pre-Mod, Attended with Functional Dry Needling), Manual Therapy, Moist Heat/ Ice, Neuromuscular Re-ed, Patient Education, Self Care, Therapeutic Exercise, Functional Training, and Therapeutic Activites       Shimon Byrd, PT

## 2024-01-24 ENCOUNTER — CLINICAL SUPPORT (OUTPATIENT)
Dept: REHABILITATION | Facility: HOSPITAL | Age: 34
End: 2024-01-24
Payer: MEDICAID

## 2024-01-24 DIAGNOSIS — R53.1 DECREASED STRENGTH: Primary | ICD-10-CM

## 2024-01-24 DIAGNOSIS — M62.89 MUSCLE TIGHTNESS: ICD-10-CM

## 2024-01-24 DIAGNOSIS — R26.89 DECREASED FUNCTIONAL MOBILITY: ICD-10-CM

## 2024-01-24 DIAGNOSIS — M25.60 DECREASED RANGE OF MOTION: ICD-10-CM

## 2024-01-24 PROCEDURE — 97110 THERAPEUTIC EXERCISES: CPT | Mod: PN

## 2024-01-24 NOTE — PROGRESS NOTES
[facility]   Physical Therapy Treatment Note      Name: Loretta Lea  [facility] Number: [id number]    Therapy Diagnosis:   Encounter Diagnoses   Name Primary?    Decreased strength Yes    Muscle tightness     Decreased functional mobility     Decreased range of motion      Physician: Karthik Farias MD    Visit Date: 1/24/2024  Physician: Karthik Farias MD             Physician Orders: PT Eval and Treat  Medical Diagnosis from Referral: M54.16 (ICD-10-CM) - Lumbar radiculopathy M70.62 (ICD-10-CM) - Greater trochanteric bursitis of left hip M43.16 (ICD-10-CM) - Spondylolisthesis of lumbar region   Evaluation Date: 10/24/2023  Authorization Period Expiration:  11/27/2023  Plan of Care Expiration: 1/30/2024                Progress Update: 11/24/2023                      FOTO: 1 / 3   Visit # / Visits authorized: 4/ 8  PTA Visit #: 0/5                          PRECAUTIONS: Standard Precautions and Orthopedic: Lumbar anterolisthesis      Time In:  0900  Time Out: 0930  (Pnt stopped therapy due to significant increase in pain)  Total Billable Time: 30 minutes      Subjective     Patient reports: that pain is about the same as last visit.   States that she has a f/u with Dr Cassidy on Monday to discuss further options.  She was not compliant with home exercise program.  Response to previous treatment: None  Functional change: None    Pain: 5/10  Location: left hip  and low back      Objective      Objective Measures updated at progress report unless specified.     Treatment     Loretta received the treatments listed below:      +++All charges filed per Medicaid Guidelines+++    therapeutic exercises to develop strength, endurance, ROM, flexibility, posture, and core stabilization for 8 minutes including:    Nu Step x 5 mins     Seated exercises:  Hamstring stretch 3 x 10 seconds bilateral   Seated Physioball rollout forward x 15 reps; lateral Left and Right x 10 reps     Supine  "exercises:    Lower trunk rotations x10  Bridges x 10 reps - np  Clams red Theraband 2 x 10 reps    Double Knee To Chest with Physioball x 15 reps   Transverse abdominal sets with physioball 2 x 10 reps   SAQs  x 10 B    Sidelying exercises:  Open books x 10 reps Right hip, x 5 reps Left hip (some discomfort lying on Left hip)    Standing exercises:  Lumbar extension over mat x 10 reps (decreased pain)   Hip Abduction x 10 reps    x 5 on R  Hip extension x 10 reps   x 5 on R    Not performed today:  Gastroc stretch 3 x 30 seconds bilateral (not performed)  Step ups 6"  3 x 10 reps bilateral (not performed)  Hip 3 way 2 x 10 reps Bilateral  (not performed)      Patient Education and Home Exercises       Education provided:   -apply ice as needed for delayed muscle soreness  -Continue with Home exercise program daily     Written Home Exercises Provided: yes. Exercises were reviewed and Loretta was able to demonstrate them prior to the end of the session.  Loretta demonstrated good  understanding of the education provided. See Electronic Medical Record under Patient Instructions for exercises provided during therapy sessions    Assessment   Patient presents to PT with significant pain not allowing her to continue with therapy.  Patient is very guarded with her motions, getting out of chair, walking slow pace.  Displays overall weakness L > R with an increase in pain down L lower extremity.  Pnt displays increased numbness down L posterior thigh with therex.   Will continue to progress with therapy.      Loretta Is progressing well towards her goals.   Patient prognosis is Poor.     Patient will continue to benefit from skilled outpatient physical therapy to address the deficits listed in the problem list box on initial evaluation, provide pt/family education and to maximize pt's level of independence in the home and community environment.     Patient's spiritual, cultural and educational needs considered and pt " agreeable to plan of care and goals.     Anticipated barriers to physical therapy: none    Goals:   SHORT TERM GOALS: 4 weeks 1/24/2024     Recent signs and systems trend is improving in order to progress towards LTG's. ongoing   Patient will be independent with HEP in order to further progress and return to maximal function. ongoing   Pain rating at Worst: 5/10 in order to progress towards increased independence with activity. ongoing   Patient will be able to correct postural deviations in sitting and standing, to decrease pain and promote postural awareness for injury prevention.  ongoing      LONG TERM GOALS: 8 weeks 1/24/2024     Patient will return to normal ADL, recreational, and work related activities with less pain and limitation.  ongoing   Patient will improve AROM to stated goals in order to return to maximal functional potential.  ongoing   Patient will improve Strength to stated goals of appropriate musculature in order to improve functional independence.  ongoing   Pain Rating at Best: 1/10 to improve Quality of Life.  ongoing   Patient will meet predicted functional outcome (FOTO) score: 80% to increase self-worth & perceived functional ability. ongoing   Patient will have met/partially met personal goal of being able to walk without pain. ongoing            PLAN   Plan of care Certification: 10/24/2023 to 1/30/2024     Outpatient Physical Therapy 2 times weekly for 6 weeks to include any combination of the following interventions: virtual visits, dry needling, modalities, electrical stimulation (IFC, Pre-Mod, Attended with Functional Dry Needling), Manual Therapy, Moist Heat/ Ice, Neuromuscular Re-ed, Patient Education, Self Care, Therapeutic Exercise, Functional Training, and Therapeutic Activites       Shimon Byrd, PT

## 2024-01-25 ENCOUNTER — PATIENT MESSAGE (OUTPATIENT)
Dept: ORTHOPEDICS | Facility: CLINIC | Age: 34
End: 2024-01-25

## 2024-01-25 DIAGNOSIS — M48.062 LUMBAR STENOSIS WITH NEUROGENIC CLAUDICATION: Primary | ICD-10-CM

## 2024-01-25 RX ORDER — OXYCODONE AND ACETAMINOPHEN 5; 325 MG/1; MG/1
1 TABLET ORAL EVERY 6 HOURS PRN
Qty: 28 TABLET | Refills: 0 | Status: SHIPPED | OUTPATIENT
Start: 2024-01-25 | End: 2024-02-01

## 2024-01-29 ENCOUNTER — CLINICAL SUPPORT (OUTPATIENT)
Dept: REHABILITATION | Facility: HOSPITAL | Age: 34
End: 2024-01-29
Payer: MEDICAID

## 2024-01-29 ENCOUNTER — OFFICE VISIT (OUTPATIENT)
Dept: ORTHOPEDICS | Facility: CLINIC | Age: 34
End: 2024-01-29
Payer: MEDICAID

## 2024-01-29 VITALS — WEIGHT: 293 LBS | BODY MASS INDEX: 45.99 KG/M2 | HEIGHT: 67 IN

## 2024-01-29 DIAGNOSIS — R53.1 DECREASED STRENGTH: Primary | ICD-10-CM

## 2024-01-29 DIAGNOSIS — M25.60 DECREASED RANGE OF MOTION: ICD-10-CM

## 2024-01-29 DIAGNOSIS — M54.16 LUMBAR RADICULOPATHY: Primary | ICD-10-CM

## 2024-01-29 DIAGNOSIS — M62.89 MUSCLE TIGHTNESS: ICD-10-CM

## 2024-01-29 DIAGNOSIS — R26.89 DECREASED FUNCTIONAL MOBILITY: ICD-10-CM

## 2024-01-29 PROCEDURE — 99213 OFFICE O/P EST LOW 20 MIN: CPT | Mod: S$GLB,,, | Performed by: ORTHOPAEDIC SURGERY

## 2024-01-29 PROCEDURE — 3008F BODY MASS INDEX DOCD: CPT | Mod: CPTII,S$GLB,, | Performed by: ORTHOPAEDIC SURGERY

## 2024-01-29 PROCEDURE — 1160F RVW MEDS BY RX/DR IN RCRD: CPT | Mod: CPTII,S$GLB,, | Performed by: ORTHOPAEDIC SURGERY

## 2024-01-29 PROCEDURE — 97110 THERAPEUTIC EXERCISES: CPT | Mod: PN

## 2024-01-29 PROCEDURE — 1159F MED LIST DOCD IN RCRD: CPT | Mod: CPTII,S$GLB,, | Performed by: ORTHOPAEDIC SURGERY

## 2024-01-29 RX ORDER — OXYCODONE AND ACETAMINOPHEN 7.5; 325 MG/1; MG/1
1 TABLET ORAL EVERY 6 HOURS PRN
Qty: 28 TABLET | Refills: 0 | Status: SHIPPED | OUTPATIENT
Start: 2025-02-05 | End: 2024-03-26 | Stop reason: DRUGHIGH

## 2024-01-29 NOTE — PROGRESS NOTES
[facility]   Physical Therapy Treatment Note      Name: Loretta Lea  Clinic Number: [id number]    Therapy Diagnosis:   Encounter Diagnoses   Name Primary?    Decreased strength Yes    Muscle tightness     Decreased functional mobility     Decreased range of motion      Physician: Karthik Farias MD    Visit Date: 1/29/2024  Physician: Karthik Farias MD             Physician Orders: PT Eval and Treat  Medical Diagnosis from Referral: M54.16 (ICD-10-CM) - Lumbar radiculopathy M70.62 (ICD-10-CM) - Greater trochanteric bursitis of left hip M43.16 (ICD-10-CM) - Spondylolisthesis of lumbar region   Evaluation Date: 10/24/2023  Authorization Period Expiration:  11/27/2023  Plan of Care Expiration: 1/30/2024                Progress Update: 11/24/2023                      FOTO: 1 / 3   Visit # / Visits authorized: 4/ 8  PTA Visit #: 0/5                          PRECAUTIONS: Standard Precautions and Orthopedic: Lumbar anterolisthesis      Time In:  1057  (Pnt arrived early)  Time Out: 1127  (Pnt stopped therapy due to significant increase in pain)  Total Billable Time: 30 minutes      Subjective     Patient reports: that pain is about the same as last visit.  States that she is unable to do anything after each therapy visit.   States that she has a f/u with Dr Cassidy today to discuss further options.  She was compliant with home exercise program.  Response to previous treatment: None  Functional change: None    Pain: 6/10  Location: left hip  and low back      Objective      Objective Measures updated at progress report unless specified.     Treatment     Loretta received the treatments listed below:      +++All charges filed per Medicaid Guidelines+++    therapeutic exercises to develop strength, endurance, ROM, flexibility, posture, and core stabilization for 8 minutes including:    Nu Step x 5 mins     Seated exercises:  Hamstring stretch 3 x 10 seconds bilateral   Seated Physioball  "rollout forward x 15 reps; lateral Left and Right x 10 reps     Supine exercises:    Lower trunk rotations x10  Bridges x 10 reps - np  Clams red Theraband 2 x 10 reps    Double Knee To Chest with Physioball x 15 reps   Transverse abdominal sets with physioball 2 x 10 reps   SAQs  x 10 B    Sidelying exercises:  Open books x 10 reps Right hip, x 5 reps Left hip (some discomfort lying on Left hip)    Standing exercises:  Lumbar extension over mat x 10 reps (decreased pain)   Hip Abduction x 10 reps    x 5 on R  Hip extension x 10 reps   x 5 on R    Not performed today:  Gastroc stretch 3 x 30 seconds bilateral (not performed)  Step ups 6"  3 x 10 reps bilateral (not performed)  Hip 3 way 2 x 10 reps Bilateral  (not performed)      Patient Education and Home Exercises       Education provided:   -apply ice as needed for delayed muscle soreness  -Continue with Home exercise program daily     Written Home Exercises Provided: yes. Exercises were reviewed and Loretta was able to demonstrate them prior to the end of the session.  Loretta demonstrated good  understanding of the education provided. See Electronic Medical Record under Patient Instructions for exercises provided during therapy sessions    Assessment   Patient presents to PT with significant pain not allowing her to continue with therapy.  Patient is very guarded with her motions, getting out of chair, walking slow pace.  Displays overall weakness L > R with an increase in pain down L lower extremity.  Pnt displays increased numbness down L posterior thigh with therex.   Will continue to progress with therapy.      Loretta Is progressing well towards her goals.   Patient prognosis is Poor.     Patient will continue to benefit from skilled outpatient physical therapy to address the deficits listed in the problem list box on initial evaluation, provide pt/family education and to maximize pt's level of independence in the home and community environment. "     Patient's spiritual, cultural and educational needs considered and pt agreeable to plan of care and goals.     Anticipated barriers to physical therapy: none    Goals:   SHORT TERM GOALS: 4 weeks 1/29/2024     Recent signs and systems trend is improving in order to progress towards LTG's. ongoing   Patient will be independent with HEP in order to further progress and return to maximal function. ongoing   Pain rating at Worst: 5/10 in order to progress towards increased independence with activity. ongoing   Patient will be able to correct postural deviations in sitting and standing, to decrease pain and promote postural awareness for injury prevention.  ongoing      LONG TERM GOALS: 8 weeks 1/29/2024     Patient will return to normal ADL, recreational, and work related activities with less pain and limitation.  ongoing   Patient will improve AROM to stated goals in order to return to maximal functional potential.  ongoing   Patient will improve Strength to stated goals of appropriate musculature in order to improve functional independence.  ongoing   Pain Rating at Best: 1/10 to improve Quality of Life.  ongoing   Patient will meet predicted functional outcome (FOTO) score: 80% to increase self-worth & perceived functional ability. ongoing   Patient will have met/partially met personal goal of being able to walk without pain. ongoing            PLAN   Plan of care Certification: 10/24/2023 to 1/30/2024     Outpatient Physical Therapy 2 times weekly for 6 weeks to include any combination of the following interventions: virtual visits, dry needling, modalities, electrical stimulation (IFC, Pre-Mod, Attended with Functional Dry Needling), Manual Therapy, Moist Heat/ Ice, Neuromuscular Re-ed, Patient Education, Self Care, Therapeutic Exercise, Functional Training, and Therapeutic Activites       Shimon Byrd, PT

## 2024-01-29 NOTE — PROGRESS NOTES
Subjective:       Patient ID: Loretta Lea is a 33 y.o. female.    Chief Complaint: Pain of the Lumbar Spine (Lumbar MRI denied, lumbar pain is constant pain down left leg to foot, goes down right eery so often, numbness to left leg)      History of Present Illness    Prior to meeting with the patient I reviewed the medical chart in [facility]. This included reviewing the previous progress notes from our office, review of the patient's last appointment with their primary care provider, review of any visits to the emergency room, and review of any pain management appointments or procedures.   Patient returns for reassessment of chronic back pain with radiation to both legs left much worse than right.  She had undergone several visits of physical therapy between October and December however the insurance company refused her MRI because he felt that her office visits were inconsistent.  She is now resumed physical therapy twice weekly this is her 2nd week of physical therapy on a consistent basis she is taking oxycodone and claims her pain is severe and constant no bowel or bladder incontinence.  She brought to my attention that she can only do about 30 minutes of physical therapy before the pain severely worsens in her leg    Current Medications  Current Outpatient Medications   Medication Sig Dispense Refill    amoxicillin-clavulanate 875-125mg (AUGMENTIN) 875-125 mg per tablet Take 1 tablet by mouth 2 (two) times daily. 20 tablet 0    blood sugar diagnostic Strp Test blood sugar 4 (four) times daily before meals and nightly. 200 each 5    blood-glucose meter (ACCU-CHEK GUIDE GLUCOSE METER) [facility] Use to test blood sugar 1 each 0    ciprofloxacin-dexAMETHasone 0.3-0.1% (CIPRODEX) 0.3-0.1 % DrpS Place 4 drops into both ears 2 (two) times daily. 7.5 mL 0    clotrimazole (LOTRIMIN) 1 % cream Apply topically 2 (two) times daily. for 7 days 60 g 0    cyclobenzaprine (FLEXERIL) 10 MG tablet Take 1 tablet (10 mg total) by  mouth 3 (three) times daily as needed for Muscle spasms. 90 tablet 0    gabapentin (NEURONTIN) 300 MG capsule TAKE ONE CAPSULE BY MOUTH EVERY EVENING 30 capsule 2    HYDROcodone-acetaminophen (NORCO)  mg per tablet Take 1 tablet by mouth every 6 (six) hours as needed for Pain. 28 tablet 0    hydrOXYzine pamoate (VISTARIL) 25 MG Cap Take 1 capsule (25 mg total) by mouth every 4 (four) hours as needed (itching). 12 capsule 0    lancets (ACCU-CHEK SOFTCLIX LANCETS) Misc Test blood sugar 4 (four) times daily before meals and nightly. 200 each 5    metFORMIN (GLUCOPHAGE-XR) 500 MG ER 24hr tablet Take 2 tablets (1,000 mg total) by mouth 2 (two) times daily with meals. 120 tablet 2    naproxen (NAPROSYN) 500 MG tablet Take 1 tablet (500 mg total) by mouth 2 (two) times daily. 60 tablet 2    ondansetron (ZOFRAN-ODT) 4 MG TbDL Take 2 tablets (8 mg total) by mouth every 8 (eight) hours as needed (Nausea). 30 tablet 2    oxyCODONE-acetaminophen (PERCOCET) 5-325 mg per tablet Take 1 tablet by mouth every 6 (six) hours as needed for Pain. 28 tablet 0    pyridoxine, vitamin B6, (B-6) 25 MG Tab Take 25 mg by mouth once daily.      risperiDONE (RISPERDAL) 1 MG tablet Take 1 tablet (1 mg total) by mouth 2 (two) times daily. 60 tablet 2    semaglutide (OZEMPIC) 1 mg/dose (4 mg/3 mL) Inject 1 mg into the skin every 7 days. 3 mL 2    sertraline (ZOLOFT) 100 MG tablet Take 1.5 tablets (150 mg total) by mouth once daily. 135 tablet 1    sumatriptan (IMITREX) 100 MG tablet Take 1 tab PO at first sign of migraine. If not effective, repeat dose in 2 hours. Do not take more than 200mg in 24 hours. 12 tablet 1    traZODone (DESYREL) 100 MG tablet Take 1-2 tabs PO qHS prn insomnia. 60 tablet 2     No current facility-administered medications for this visit.       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       Past Medical History  Past  Medical History:   Diagnosis Date    Anxiety     Back pain     Bipolar disorder 2004    bipolar 2    Chronic bronchitis     Depression     Diabetes mellitus     GERD (gastroesophageal reflux disease)     HPV (human papilloma virus) infection     Insomnia     Migraines     Non-proliferative diabetic retinopathy, mild, both eyes 09/25/2023    Obese body habitus     Ovarian cyst     Pneumonia     PONV (postoperative nausea and vomiting)        Surgical History  Past Surgical History:   Procedure Laterality Date    APPENDECTOMY      ARTHROSCOPY OF ANKLE Right 05/04/2022    Procedure: ARTHROSCOPY, ANKLE;  Surgeon: Bimal Mccormick DPM;  Location: [facility];  Service: Podiatry;  Laterality: Right;  CURT EXTERNAL ANKLE DISTRACTOR    CHOLECYSTECTOMY      DILATION AND CURETTAGE OF UTERUS      HYSTERECTOMY  2017    total, ovarian cysts, torsion, [place]; hyst per Dr JESUS Manriquez    LAPAROSCOPIC APPENDECTOMY N/A 08/18/2021    Procedure: APPENDECTOMY, LAPAROSCOPIC;  Surgeon: Osiel Ramirez MD;  Location: [facility];  Service: General;  Laterality: N/A;    OOPHORECTOMY      opherectom Left 2015    salpingo-opherectomy    REPAIR OF LIGAMENT OF ANKLE Right 06/23/2021    Procedure: REPAIR OF ANTERIOR TALOFIBULAR LIGAMENT CALCANEOFIBULAR LIGAMENT;  Surgeon: Bimal Mccormick DPM;  Location: [facility];  Service: Podiatry;  Laterality: Right;  WITH ARTHREX INTERNAL BRACE    REPAIR OF LIGAMENT OF ANKLE Right 4/27/2023    Procedure: REPAIR, LIGAMENT, ANKLE;  Surgeon: Aryan Poole DPM;  Location: [facility];  Service: Podiatry;  Laterality: Right;    REPAIR OF TENDON OF LOWER EXTREMITY Right 4/27/2023    Procedure: REPAIR, TENDON, LOWER EXTREMITY;  Surgeon: Aryan Poole DPM;  Location: [facility] OR;  Service: Podiatry;  Laterality: Right;  arthrex    SURGICAL REMOVAL OF BONE SPUR Right 06/23/2021    Procedure: EXCISION OS TRIGONUM;  Surgeon: Bimal Mccormick DPM;  Location: [facility] OR;  Service: Podiatry;  Laterality: Right;    WISDOM TOOTH  EXTRACTION         Family History:   Family History   Adopted: Yes   Problem Relation Age of Onset    No Known Problems Sister     No Known Problems Sister     No Known Problems Sister        Social History:   Social History     Socioeconomic History    Marital status:     Number of children: 0   Tobacco Use    Smoking status: Every Day     Current packs/day: 1.50     Average packs/day: 1.5 packs/day for 18.0 years (27.0 ttl pk-yrs)     Types: Cigarettes     Passive exposure: Current    Smokeless tobacco: Never    Tobacco comments:     4/25/23--pt instructed no smoking 24hours before sx, pt voiced understanding.   Substance and Sexual Activity    Alcohol use: Not Currently     Comment: rare    Drug use: Not Currently     Comment: twice    Sexual activity: Yes     Partners: Male     Birth control/protection: OCP, None     Social Determinants of Health     Stress: Stress Concern Present (10/20/2020)    [employer] [place] [employer] - Occupational Stress Questionnaire     Feeling of Stress : Very much       Hospitalization/Major Diagnostic Procedure:     Review of Systems     General/Constitutional:  Chills denies. Fatigue denies. Fever denies. Weight gain denies. Weight loss denies.    Respiratory:  Shortness of breath denies.    Cardiovascular:  Chest pain denies.    Gastrointestinal:  Constipation denies. Diarrhea denies. Nausea denies. Vomiting denies.     Hematology:  Easy bruising denies. Prolonged bleeding denies.     Genitourinary:  Frequent urination denies. Pain in lower back denies. Painful urination denies.     Musculoskeletal:  See HPI for details    Skin:  Rash denies.    Neurologic:  Dizziness denies. Gait abnormalities denies. Seizures denies. Tingling/Numbess denies.    Psychiatric:  Anxiety denies. Depressed mood denies.     Objective:   Vital Signs: There were no vitals filed for this visit.     Physical Exam      General Examination:     Constitutional: The patient is alert and  oriented to lace person and time. Mood is pleasant.     Head/Face: Normal facial features normal eyebrows    Eyes: Normal extraocular motion bilaterally    Lungs: Respirations are equal and unlabored    Gait is coordinated.    Cardiovascular: There are no swelling or varicosities present.    Lymphatic: Negative for adenopathy    Skin: Normal    Neurological: Level of consciousness normal. Oriented to place person and time and situation    Psychiatric: Oriented to time place person and situation         Patient has difficulty arising from a seated position she has pain when she attempts to stand upright.  Active range of motion extension -10 degrees forward flexion 30° left bending 10° right bending 0° all movements limited by pain.  Patient can stand erect and has difficulty arising from a seated position.  walks with an antalgic gait bilateral paraspinous muscle spasm L4-S1 range of motion moderate restricted.  Straight leg raising positive on the left side subjective numbness L4 nerve root distribution on the left.  Motor exam demonstrates grade 4 5 weakness and left anterior tibial and left extensor hallucis longus muscle groups.  Reflexes are symmetrical with hypoactive.     XRAY Report/ Interpretation:  Results of prior x-rays were reviewed      Assessment:       1. Lumbar radiculopathy        Plan:       Loretta was seen today for pain.    Diagnoses and all orders for this visit:    Lumbar radiculopathy         No follow-ups on file.    Patient has objective findings of radiculopathy however she has not met all the criteria to have an MRI because she did not consistently go to physical therapy for 6 weeks I have explained this to and now she is resumed physical therapy she is in her 2nd week going twice weekly she is going to complete the 6 weeks of physical therapy and then return if not improved consider MRI we make note that she claims that she can only do about 30 minutes of physical therapy before the  pain severely worsens.  Treatment options were discussed with regards to the nature of the medical condition. Conservative pain intervention and surgical options were discussed in detail. The probability of success of each separate treatment option was discussed. The patient expressed a clear understanding of the treatment options. With regards to surgery, the procedure risk, benefits, complications, and outcomes were discussed. No guarantees were given with regards to surgical outcome.   The risk of complications, morbidity, and mortality of patient management decisions have been made at the time of this visit. These are associated with the patient's problems, diagnostic procedures and treatment options. This includes the possible management options selected and those considered but not selected by the patient after shared medical decision making we discussed with the patient.     This note was created using Dragon voice recognition software that occasionally misinterpreted phrases or words.

## 2024-02-01 ENCOUNTER — CLINICAL SUPPORT (OUTPATIENT)
Dept: REHABILITATION | Facility: HOSPITAL | Age: 34
End: 2024-02-01
Payer: MEDICAID

## 2024-02-01 DIAGNOSIS — M25.60 DECREASED RANGE OF MOTION: ICD-10-CM

## 2024-02-01 DIAGNOSIS — M62.89 MUSCLE TIGHTNESS: ICD-10-CM

## 2024-02-01 DIAGNOSIS — R53.1 DECREASED STRENGTH: Primary | ICD-10-CM

## 2024-02-01 DIAGNOSIS — R26.89 DECREASED FUNCTIONAL MOBILITY: ICD-10-CM

## 2024-02-01 PROCEDURE — 97110 THERAPEUTIC EXERCISES: CPT | Mod: PN

## 2024-02-01 NOTE — PROGRESS NOTES
[facility] OUTPATIENT THERAPY AND WELLNESS   Physical Therapy Treatment Note      Name: Loretta Lea  Clinic Number: [id number]    Therapy Diagnosis:   Encounter Diagnoses   Name Primary?    Decreased strength Yes    Muscle tightness     Decreased functional mobility     Decreased range of motion      Physician: Karthik Farias MD    Visit Date: 2/1/2024  Physician: Karthik Farias MD             Physician Orders: PT Eval and Treat  Medical Diagnosis from Referral: M54.16 (ICD-10-CM) - Lumbar radiculopathy M70.62 (ICD-10-CM) - Greater trochanteric bursitis of left hip M43.16 (ICD-10-CM) - Spondylolisthesis of lumbar region   Evaluation Date: 10/24/2023  Authorization Period Expiration:  11/27/2023  Plan of Care Expiration: 1/30/2024                Progress Update: 11/24/2023                      FOTO: 1 / 3   Visit # / Visits authorized: 4/ 8  PTA Visit #: 0/5                          PRECAUTIONS: Standard Precautions and Orthopedic: Lumbar anterolisthesis      Time In:  0945   (Pnt arrived early)  Time Out: 1015  Total Billable Time: 30 minutes      Subjective     Patient reports: that pain is about the same as last visit.  States that she had her f/u with her referring provider and was advised to continue therapy for 4 more weeks.    She was compliant with home exercise program.  Response to previous treatment: None  Functional change: None    Pain: 6/10  Location: left hip  and low back      Objective      Objective Measures updated at progress report unless specified.     Treatment     Loretta received the treatments listed below:      +++All charges filed per Medicaid Guidelines+++    therapeutic exercises to develop strength, endurance, ROM, flexibility, posture, and core stabilization for 8 minutes including:    Nu Step x 5 mins     Seated exercises:  Hamstring stretch 3 x 10 seconds bilateral   Seated Physioball rollout forward x 15 reps; lateral Left and Right x 10 reps     Supine exercises:    Lower  "trunk rotations x10  Bridges x 10 reps - np  Clams red Theraband 2 x 10 reps    Double Knee To Chest with Physioball x 15 reps   Transverse abdominal sets with physioball 2 x 10 reps   SAQs  x 10 B    Sidelying exercises:  Open books x 10 reps Right hip, x 5 reps Left hip (some discomfort lying on Left hip)    NOT PERFORMED:  Standing exercises:  Lumbar extension over mat x 10 reps (decreased pain)   Hip Abduction x 10 reps    x 5 on R  Hip extension x 10 reps   x 5 on R    Not performed today:  Gastroc stretch 3 x 30 seconds bilateral (not performed)  Step ups 6"  3 x 10 reps bilateral (not performed)  Hip 3 way 2 x 10 reps Bilateral  (not performed)      Patient Education and Home Exercises       Education provided:   -apply ice as needed for delayed muscle soreness  -Continue with Home exercise program daily     Written Home Exercises Provided: yes. Exercises were reviewed and Loretta was able to demonstrate them prior to the end of the session.  Loretta demonstrated good  understanding of the education provided. See Electronic Medical Record under Patient Instructions for exercises provided during therapy sessions    Assessment   Patient presents to PT with significant pain not allowing her to complete more than 30 minutes of therapy.  Patient is very guarded with her motions, getting out of chair, walking slow pace.  Displays overall weakness L > R with an increase in pain down L lower extremity.  Pnt displays increased numbness down L posterior thigh with therex.   Will continue to progress with therapy.      Loretta Is progressing well towards her goals.   Patient prognosis is Poor.     Patient will continue to benefit from skilled outpatient physical therapy to address the deficits listed in the problem list box on initial evaluation, provide pt/family education and to maximize pt's level of independence in the home and community environment.     Patient's spiritual, cultural and educational needs considered " and pt agreeable to plan of care and goals.     Anticipated barriers to physical therapy: none    Goals:   SHORT TERM GOALS: 4 weeks 2/1/2024     Recent signs and systems trend is improving in order to progress towards LTG's. ongoing   Patient will be independent with HEP in order to further progress and return to maximal function. ongoing   Pain rating at Worst: 5/10 in order to progress towards increased independence with activity. ongoing   Patient will be able to correct postural deviations in sitting and standing, to decrease pain and promote postural awareness for injury prevention.  ongoing      LONG TERM GOALS: 8 weeks 2/1/2024     Patient will return to normal ADL, recreational, and work related activities with less pain and limitation.  ongoing   Patient will improve AROM to stated goals in order to return to maximal functional potential.  ongoing   Patient will improve Strength to stated goals of appropriate musculature in order to improve functional independence.  ongoing   Pain Rating at Best: 1/10 to improve Quality of Life.  ongoing   Patient will meet predicted functional outcome (FOTO) score: 80% to increase self-worth & perceived functional ability. ongoing   Patient will have met/partially met personal goal of being able to walk without pain. ongoing            PLAN   Plan of care Certification: 10/24/2023 to 1/30/2024     Outpatient Physical Therapy 2 times weekly for 6 weeks to include any combination of the following interventions: virtual visits, dry needling, modalities, electrical stimulation (IFC, Pre-Mod, Attended with Functional Dry Needling), Manual Therapy, Moist Heat/ Ice, Neuromuscular Re-ed, Patient Education, Self Care, Therapeutic Exercise, Functional Training, and Therapeutic Activites       Shimon Byrd, PT

## 2024-02-05 ENCOUNTER — CLINICAL SUPPORT (OUTPATIENT)
Dept: REHABILITATION | Facility: HOSPITAL | Age: 34
End: 2024-02-05
Payer: MEDICAID

## 2024-02-05 DIAGNOSIS — M25.60 DECREASED RANGE OF MOTION: ICD-10-CM

## 2024-02-05 DIAGNOSIS — R26.89 DECREASED FUNCTIONAL MOBILITY: ICD-10-CM

## 2024-02-05 DIAGNOSIS — R53.1 DECREASED STRENGTH: Primary | ICD-10-CM

## 2024-02-05 DIAGNOSIS — M62.89 MUSCLE TIGHTNESS: ICD-10-CM

## 2024-02-05 PROCEDURE — 97110 THERAPEUTIC EXERCISES: CPT | Mod: PN

## 2024-02-05 NOTE — PROGRESS NOTES
[facility]   Physical Therapy Treatment Note      Name: Loretta Lea  [facility] Number: [id number]    Therapy Diagnosis:   Encounter Diagnoses   Name Primary?    Decreased strength Yes    Muscle tightness     Decreased functional mobility     Decreased range of motion      Physician: Karthik Farias MD    Visit Date: 2/5/2024  Physician: Karthik Farias MD             Physician Orders: PT Eval and Treat  Medical Diagnosis from Referral: M54.16 (ICD-10-CM) - Lumbar radiculopathy M70.62 (ICD-10-CM) - Greater trochanteric bursitis of left hip M43.16 (ICD-10-CM) - Spondylolisthesis of lumbar region   Evaluation Date: 10/24/2023  Authorization Period Expiration:  11/27/2023  Plan of Care Expiration: 1/30/2024                Progress Update: 11/24/2023                      FOTO: 1 / 3   Visit # / Visits authorized: 5/12  PTA Visit #: 0/5                          PRECAUTIONS: Standard Precautions and Orthopedic: Lumbar anterolisthesis      Time In:  1345   (Pnt arrived early)  Time Out: 1414 (discontinued to pain)  Total Billable Time: 29 minutes      Subjective     Patient reports: that pain is about the same as last visit.    She was compliant with home exercise program.  Response to previous treatment: None  Functional change: None    Pain: 6/10  Location: left hip  and low back      Objective      Objective Measures updated at progress report unless specified.     Treatment     Loretta received the treatments listed below:      +++All charges filed per Medicaid Guidelines+++    therapeutic exercises to develop strength, endurance, ROM, flexibility, posture, and core stabilization for 29 minutes including:    Nu Step x 10 mins     Seated exercises:  Hamstring stretch 3 x 10 seconds bilateral   Seated Physioball rollout forward x 15 reps; lateral Left and Right x 10 reps     Supine exercises:    Lower trunk rotations x10  Bridges x 10 reps - np  Clams red Theraband 2 x 10 reps    Double  "Knee To Chest with Physioball x 15 reps   Transverse abdominal sets with physioball 2 x 10 reps   SAQs  x 10 B    Sidelying exercises:  Open books x 10 reps Right hip, x 5 reps Left hip (some discomfort lying on Left hip)    NOT PERFORMED:  Standing exercises:  Lumbar extension over mat x 10 reps (decreased pain)   Hip Abduction x 10 reps    x 5 on R  Hip extension x 10 reps   x 5 on R    Not performed today:  Gastroc stretch 3 x 30 seconds bilateral (not performed)  Step ups 6"  3 x 10 reps bilateral (not performed)  Hip 3 way 2 x 10 reps Bilateral  (not performed)      Patient Education and Home Exercises       Education provided:   -apply ice as needed for delayed muscle soreness  -Continue with Home exercise program daily     Written Home Exercises Provided: yes. Exercises were reviewed and Loretta was able to demonstrate them prior to the end of the session.  Loretta demonstrated good  understanding of the education provided. See Electronic Medical Record under Patient Instructions for exercises provided during therapy sessions    Assessment   Patient presents to PT with significant pain not allowing her to complete more than 30 minutes of therapy.  Patient is very guarded with her motions, getting out of chair, walking slow pace.  Displays overall weakness L > R with an increase in pain down L lower extremity.  Pnt displays increased numbness down L posterior thigh with therex.   Will continue to progress with therapy.      Loretta Is progressing well towards her goals.   Patient prognosis is Poor.     Patient will continue to benefit from skilled outpatient physical therapy to address the deficits listed in the problem list box on initial evaluation, provide pt/family education and to maximize pt's level of independence in the home and community environment.     Patient's spiritual, cultural and educational needs considered and pt agreeable to plan of care and goals.     Anticipated barriers to physical " therapy: none    Goals:   SHORT TERM GOALS: 4 weeks 2/5/2024     Recent signs and systems trend is improving in order to progress towards LTG's. ongoing   Patient will be independent with HEP in order to further progress and return to maximal function. ongoing   Pain rating at Worst: 5/10 in order to progress towards increased independence with activity. ongoing   Patient will be able to correct postural deviations in sitting and standing, to decrease pain and promote postural awareness for injury prevention.  ongoing      LONG TERM GOALS: 8 weeks 2/5/2024     Patient will return to normal ADL, recreational, and work related activities with less pain and limitation.  ongoing   Patient will improve AROM to stated goals in order to return to maximal functional potential.  ongoing   Patient will improve Strength to stated goals of appropriate musculature in order to improve functional independence.  ongoing   Pain Rating at Best: 1/10 to improve Quality of Life.  ongoing   Patient will meet predicted functional outcome (FOTO) score: 80% to increase self-worth & perceived functional ability. ongoing   Patient will have met/partially met personal goal of being able to walk without pain. ongoing            PLAN   Plan of care Certification: 10/24/2023 to 1/30/2024     Outpatient Physical Therapy 2 times weekly for 6 weeks to include any combination of the following interventions: virtual visits, dry needling, modalities, electrical stimulation (IFC, Pre-Mod, Attended with Functional Dry Needling), Manual Therapy, Moist Heat/ Ice, Neuromuscular Re-ed, Patient Education, Self Care, Therapeutic Exercise, Functional Training, and Therapeutic Activites       Shimon Byrd, PT

## 2024-02-07 ENCOUNTER — CLINICAL SUPPORT (OUTPATIENT)
Dept: REHABILITATION | Facility: HOSPITAL | Age: 34
End: 2024-02-07
Payer: MEDICAID

## 2024-02-07 DIAGNOSIS — M62.89 MUSCLE TIGHTNESS: ICD-10-CM

## 2024-02-07 DIAGNOSIS — R26.89 DECREASED FUNCTIONAL MOBILITY: ICD-10-CM

## 2024-02-07 DIAGNOSIS — M25.60 DECREASED RANGE OF MOTION: ICD-10-CM

## 2024-02-07 DIAGNOSIS — R53.1 DECREASED STRENGTH: Primary | ICD-10-CM

## 2024-02-07 PROCEDURE — 97110 THERAPEUTIC EXERCISES: CPT | Mod: PN

## 2024-02-07 NOTE — PROGRESS NOTES
[facility] OUTPATIENT THERAPY AND WELLNESS   Physical Therapy Treatment Note      Name: Loretta Lea  [facility] Number: [id number]    Therapy Diagnosis:   Encounter Diagnoses   Name Primary?    Decreased strength Yes    Muscle tightness     Decreased functional mobility     Decreased range of motion      Physician: Karthik Farias MD    Visit Date: 2/7/2024  Physician: Karthik Farias MD             Physician Orders: PT Eval and Treat  Medical Diagnosis from Referral: M54.16 (ICD-10-CM) - Lumbar radiculopathy M70.62 (ICD-10-CM) - Greater trochanteric bursitis of left hip M43.16 (ICD-10-CM) - Spondylolisthesis of lumbar region   Evaluation Date: 10/24/2023  Authorization Period Expiration:  11/27/2023  Plan of Care Expiration: 1/30/2024                Progress Update: 11/24/2023                      FOTO: 1 / 3   Visit # / Visits authorized: 6/12  PTA Visit #: 0/5                          PRECAUTIONS: Standard Precautions and Orthopedic: Lumbar anterolisthesis      Time In:  1045   (Pnt arrived early)  Time Out: 1100 (discontinued to pain)  Total Billable Time: 15 minutes      Subjective     Patient reports: that she is in some pain today.   States that she was in a lot of pain after last session.  States that she has had more pain since starting therapy.    She was compliant with home exercise program.  Response to previous treatment: None  Functional change: None    Pain: 6/10  Location: left hip  and low back      Objective      Objective Measures updated at progress report unless specified.     Treatment     Loretta received the treatments listed below:      +++All charges filed per Medicaid Guidelines+++    therapeutic exercises to develop strength, endurance, ROM, flexibility, posture, and core stabilization for 15 minutes including:    Nu Step x 10 mins     Seated exercises:  Hamstring stretch 3 x 10 seconds bilateral   Seated Physioball rollout forward x 15 reps; lateral Left and Right x 10 reps     Supine  "exercises:    Lower trunk rotations x10  Bridges x 10 reps - np  Clams red Theraband 2 x 10 reps    Double Knee To Chest with Physioball x 15 reps   Transverse abdominal sets with physioball 2 x 10 reps   SAQs  x 10 B    Sidelying exercises:  Open books x 10 reps Right hip, x 5 reps Left hip (some discomfort lying on Left hip)    NOT PERFORMED:  Standing exercises:  Lumbar extension over mat x 10 reps (decreased pain)   Hip Abduction x 10 reps    x 5 on R  Hip extension x 10 reps   x 5 on R    Not performed today:  Gastroc stretch 3 x 30 seconds bilateral (not performed)  Step ups 6"  3 x 10 reps bilateral (not performed)  Hip 3 way 2 x 10 reps Bilateral  (not performed)      Patient Education and Home Exercises       Education provided:   -apply ice as needed for delayed muscle soreness  -Continue with Home exercise program daily     Written Home Exercises Provided: yes. Exercises were reviewed and Loretta was able to demonstrate them prior to the end of the session.  Loretta demonstrated good  understanding of the education provided. See Electronic Medical Record under Patient Instructions for exercises provided during therapy sessions    Assessment   Patient presents to PT with significant pain not allowing her to complete more than 15 minutes of therapy.   Patient visibly upset due to increase in pain.  Patient is very guarded with her motions, getting out of chair, walking slow pace.  Displays overall weakness L > R with an increase in pain down L lower extremity.  Pnt displays increased numbness down L posterior thigh with therex.       Loretta Is progressing well towards her goals.   Patient prognosis is Poor.     Patient will continue to benefit from skilled outpatient physical therapy to address the deficits listed in the problem list box on initial evaluation, provide pt/family education and to maximize pt's level of independence in the home and community environment.     Patient's spiritual, cultural and " educational needs considered and pt agreeable to plan of care and goals.     Anticipated barriers to physical therapy: none    Goals:   SHORT TERM GOALS: 4 weeks 2/7/2024     Recent signs and systems trend is improving in order to progress towards LTG's. ongoing   Patient will be independent with HEP in order to further progress and return to maximal function. ongoing   Pain rating at Worst: 5/10 in order to progress towards increased independence with activity. ongoing   Patient will be able to correct postural deviations in sitting and standing, to decrease pain and promote postural awareness for injury prevention.  ongoing      LONG TERM GOALS: 8 weeks 2/7/2024     Patient will return to normal ADL, recreational, and work related activities with less pain and limitation.  ongoing   Patient will improve AROM to stated goals in order to return to maximal functional potential.  ongoing   Patient will improve Strength to stated goals of appropriate musculature in order to improve functional independence.  ongoing   Pain Rating at Best: 1/10 to improve Quality of Life.  ongoing   Patient will meet predicted functional outcome (FOTO) score: 80% to increase self-worth & perceived functional ability. ongoing   Patient will have met/partially met personal goal of being able to walk without pain. ongoing            PLAN   Plan of care Certification: 10/24/2023 to 1/30/2024     Outpatient Physical Therapy 2 times weekly for 6 weeks to include any combination of the following interventions: virtual visits, dry needling, modalities, electrical stimulation (IFC, Pre-Mod, Attended with Functional Dry Needling), Manual Therapy, Moist Heat/ Ice, Neuromuscular Re-ed, Patient Education, Self Care, Therapeutic Exercise, Functional Training, and Therapeutic Activites       Shimon Byrd, PT

## 2024-02-09 ENCOUNTER — PATIENT MESSAGE (OUTPATIENT)
Dept: ORTHOPEDICS | Facility: CLINIC | Age: 34
End: 2024-02-09

## 2024-02-19 ENCOUNTER — CLINICAL SUPPORT (OUTPATIENT)
Dept: REHABILITATION | Facility: HOSPITAL | Age: 34
End: 2024-02-19
Payer: MEDICAID

## 2024-02-19 DIAGNOSIS — M25.60 DECREASED RANGE OF MOTION: ICD-10-CM

## 2024-02-19 DIAGNOSIS — M62.89 MUSCLE TIGHTNESS: ICD-10-CM

## 2024-02-19 DIAGNOSIS — R53.1 DECREASED STRENGTH: Primary | ICD-10-CM

## 2024-02-19 DIAGNOSIS — R26.89 DECREASED FUNCTIONAL MOBILITY: ICD-10-CM

## 2024-02-19 PROCEDURE — 97110 THERAPEUTIC EXERCISES: CPT | Mod: PN

## 2024-02-19 NOTE — ED NOTES
"Presents to ER with c/o "abscess" on left labia. Advised that she noticed it yesterday. Patient said "it just popped right before you came in here". Family at bedside. AAOx4  " Patient has been added to the Medication Management and Talk Therapy wait list without a referral.    Insurance: [facility]  Insurance Type:    Commercial [x]   Medicaid []   [facility] (if applicable)   Medicare []  Location Preference: [place]  Provider Preference: female  Virtual: Yes [] No [x]  Were outside resources sent: Yes [] No [x]

## 2024-02-19 NOTE — PROGRESS NOTES
[facility]PAT[facility] THERAP[facility] AND [facility]LL[facility]   Physical Therapy Treatment Note      Name: Loretta Lea  Clinic Number: [id number]    Therapy Diagnosis:   Encounter Diagnoses   Name Primary?    Decreased strength Yes    Muscle tightness     Decreased functional mobility     Decreased range of motion      Physician: Karthik Farias MD    Visit Date: 2/19/2024  Physician: Karthik Farias MD             Physician Orders: PT Eval and Treat  Medical Diagnosis from Referral: M54.16 (ICD-10-CM) - Lumbar radiculopathy M70.62 (ICD-10-CM) - Greater trochanteric bursitis of left hip M43.16 (ICD-10-CM) - Spondylolisthesis of lumbar region   Evaluation Date: 10/24/2023  Authorization Period Expiration:  11/27/2023  Plan of Care Expiration: 1/30/2024                Progress Update: 11/24/2023                      FOTO: 1 / 3   Visit # / Visits authorized: 6/12  PTA Visit #: 0/5                          PRECAUTIONS: Standard Precautions and Orthopedic: Lumbar anterolisthesis      Time In:  1037   (Pnt arrived early)  Time Out: 1107 (discontinued to pain)  Total Billable Time: 30 minutes      Subjective     Patient reports: that she is in some pain today.   States that she hasn't had her pain medicine in the past 2 weeks.  She was compliant with home exercise program.  Response to previous treatment: None  Functional change: None    Pain: 7/10  Location: left hip  and low back      Objective      Objective Measures updated at progress report unless specified.     Treatment     Loretta received the treatments listed below:      +++All charges filed per Medicaid Guidelines+++    therapeutic exercises to develop strength, endurance, ROM, flexibility, posture, and core stabilization for 30 minutes including:    Nu Step x 10 mins     Seated exercises:  Hamstring stretch 3 x 10 seconds bilateral   Seated Physioball rollout forward x 15 reps; lateral Left and Right x 10 reps     Supine exercises:    Lower trunk rotations x10  Bridges x 10  "reps - np  Hip adduction with ball 3x10  Clams red Theraband 2 x 10 reps    Double Knee To Chest with Physioball x 15 reps   Transverse abdominal sets with physioball 2 x 10 reps   SAQs  x 10 B    Sidelying exercises:  Open books x 10 reps Right hip, x 5 reps Left hip (some discomfort lying on Left hip)    NOT PERFORMED:  Standing exercises:  Lumbar extension over mat x 10 reps (decreased pain)   Hip Abduction x 10 reps    x 5 on R  Hip extension x 10 reps   x 5 on R    Not performed today:  Gastroc stretch 3 x 30 seconds bilateral (not performed)  Step ups 6"  3 x 10 reps bilateral (not performed)  Hip 3 way 2 x 10 reps Bilateral  (not performed)      Patient Education and Home Exercises       Education provided:   -apply ice as needed for delayed muscle soreness  -Continue with Home exercise program daily     Written Home Exercises Provided: yes. Exercises were reviewed and Loretta was able to demonstrate them prior to the end of the session.  Loretta demonstrated good  understanding of the education provided. See Electronic Medical Record under Patient Instructions for exercises provided during therapy sessions    Assessment   Patient presents to PT with significant pain not allowing her to complete more than 30 minutes of therapy.  Patient is very guarded with her motions, getting out of chair, walking slow pace.  Displays overall weakness L > R with an increase in pain down L lower extremity.  Pnt displays increased numbness down L posterior thigh with therex.       Loretta Is progressing well towards her goals.   Patient prognosis is Poor.     Patient will continue to benefit from skilled outpatient physical therapy to address the deficits listed in the problem list box on initial evaluation, provide pt/family education and to maximize pt's level of independence in the home and community environment.     Patient's spiritual, cultural and educational needs considered and pt agreeable to plan of care and " goals.     Anticipated barriers to physical therapy: none    Goals:   SHORT TERM GOALS: 4 weeks 2/19/2024     Recent signs and systems trend is improving in order to progress towards LTG's. ongoing   Patient will be independent with HEP in order to further progress and return to maximal function. ongoing   Pain rating at Worst: 5/10 in order to progress towards increased independence with activity. ongoing   Patient will be able to correct postural deviations in sitting and standing, to decrease pain and promote postural awareness for injury prevention.  ongoing      LONG TERM GOALS: 8 weeks 2/19/2024     Patient will return to normal ADL, recreational, and work related activities with less pain and limitation.  ongoing   Patient will improve AROM to stated goals in order to return to maximal functional potential.  ongoing   Patient will improve Strength to stated goals of appropriate musculature in order to improve functional independence.  ongoing   Pain Rating at Best: 1/10 to improve Quality of Life.  ongoing   Patient will meet predicted functional outcome (FOTO) score: 80% to increase self-worth & perceived functional ability. ongoing   Patient will have met/partially met personal goal of being able to walk without pain. ongoing            PLAN   Plan of care Certification: 10/24/2023 to 1/30/2024     Outpatient Physical Therapy 2 times weekly for 6 weeks to include any combination of the following interventions: virtual visits, dry needling, modalities, electrical stimulation (IFC, Pre-Mod, Attended with Functional Dry Needling), Manual Therapy, Moist Heat/ Ice, Neuromuscular Re-ed, Patient Education, Self Care, Therapeutic Exercise, Functional Training, and Therapeutic Activites       Shimon Byrd, PT

## 2024-02-21 ENCOUNTER — CLINICAL SUPPORT (OUTPATIENT)
Dept: REHABILITATION | Facility: HOSPITAL | Age: 34
End: 2024-02-21
Payer: MEDICAID

## 2024-02-21 DIAGNOSIS — M62.89 MUSCLE TIGHTNESS: ICD-10-CM

## 2024-02-21 DIAGNOSIS — M25.60 DECREASED RANGE OF MOTION: ICD-10-CM

## 2024-02-21 DIAGNOSIS — R26.89 DECREASED FUNCTIONAL MOBILITY: ICD-10-CM

## 2024-02-21 DIAGNOSIS — R53.1 DECREASED STRENGTH: Primary | ICD-10-CM

## 2024-02-21 PROCEDURE — 97110 THERAPEUTIC EXERCISES: CPT | Mod: PN

## 2024-02-21 NOTE — PROGRESS NOTES
[facility]   Physical Therapy Treatment Note      Name: Loretta Lea  Clinic Number: [id number]    Therapy Diagnosis:   Encounter Diagnoses   Name Primary?    Decreased strength Yes    Muscle tightness     Decreased functional mobility     Decreased range of motion      Physician: Karthik Farias MD    Visit Date: 2/21/2024  Physician: Karthik Farias MD             Physician Orders: PT Eval and Treat  Medical Diagnosis from Referral: M54.16 (ICD-10-CM) - Lumbar radiculopathy M70.62 (ICD-10-CM) - Greater trochanteric bursitis of left hip M43.16 (ICD-10-CM) - Spondylolisthesis of lumbar region   Evaluation Date: 10/24/2023  Authorization Period Expiration:  11/27/2023  Plan of Care Expiration: 1/30/2024                Progress Update: 11/24/2023                      FOTO: 1 / 3   Visit # / Visits authorized: 8/12  PTA Visit #: 0/5                          PRECAUTIONS: Standard Precautions and Orthopedic: Lumbar anterolisthesis      Time In:  1037   (Pnt arrived early)  Time Out: 1107 (discontinued to pain)  Total Billable Time: 30 minutes      Subjective     Patient reports: that she is having a bad day.  States that she does not think therapy is helping at all.  She was compliant with home exercise program.  Response to previous treatment: None  Functional change: None    Pain: 7/10  Location: left hip  and low back      Objective      Objective Measures updated at progress report unless specified.     Treatment     Loretta received the treatments listed below:      +++All charges filed per Medicaid Guidelines+++    therapeutic exercises to develop strength, endurance, ROM, flexibility, posture, and core stabilization for 30 minutes including:    Nu Step x 10 mins     Seated exercises:  Hamstring stretch 3 x 10 seconds bilateral   Seated Physioball rollout forward x 15 reps; lateral Left and Right x 10 reps     Supine exercises:    Lower trunk rotations x10  Bridges x 10 reps -  "np  Hip adduction with ball 3x10  Clams red Theraband 2 x 10 reps    Double Knee To Chest with Physioball x 15 reps   Transverse abdominal sets with physioball 2 x 10 reps   SAQs  x 10 B    Sidelying exercises:  Open books x 10 reps Right hip, x 5 reps Left hip (some discomfort lying on Left hip)    NOT PERFORMED:  Standing exercises:  Lumbar extension over mat x 10 reps (decreased pain)   Hip Abduction x 10 reps    x 5 on R  Hip extension x 10 reps   x 5 on R    Not performed today:  Gastroc stretch 3 x 30 seconds bilateral (not performed)  Step ups 6"  3 x 10 reps bilateral (not performed)  Hip 3 way 2 x 10 reps Bilateral  (not performed)      Patient Education and Home Exercises       Education provided:   -apply ice as needed for delayed muscle soreness  -Continue with Home exercise program daily     Written Home Exercises Provided: yes. Exercises were reviewed and Loretta was able to demonstrate them prior to the end of the session.  Loretta demonstrated good  understanding of the education provided. See Electronic Medical Record under Patient Instructions for exercises provided during therapy sessions    Assessment   Patient presents to PT with significant pain not allowing her to complete more than 30 minutes of therapy.  Patient is very guarded with her motions, getting out of chair, walking slow pace.  Displays overall weakness L > R with an increase in pain down L lower extremity.  Pnt displays increased numbness down L posterior thigh with therex.       Loretta Is progressing well towards her goals.   Patient prognosis is Poor.     Patient will continue to benefit from skilled outpatient physical therapy to address the deficits listed in the problem list box on initial evaluation, provide pt/family education and to maximize pt's level of independence in the home and community environment.     Patient's spiritual, cultural and educational needs considered and pt agreeable to plan of care and goals.   "   Anticipated barriers to physical therapy: none    Goals:   SHORT TERM GOALS: 4 weeks 2/21/2024     Recent signs and systems trend is improving in order to progress towards LTG's. ongoing   Patient will be independent with HEP in order to further progress and return to maximal function. ongoing   Pain rating at Worst: 5/10 in order to progress towards increased independence with activity. ongoing   Patient will be able to correct postural deviations in sitting and standing, to decrease pain and promote postural awareness for injury prevention.  ongoing      LONG TERM GOALS: 8 weeks 2/21/2024     Patient will return to normal ADL, recreational, and work related activities with less pain and limitation.  ongoing   Patient will improve AROM to stated goals in order to return to maximal functional potential.  ongoing   Patient will improve Strength to stated goals of appropriate musculature in order to improve functional independence.  ongoing   Pain Rating at Best: 1/10 to improve Quality of Life.  ongoing   Patient will meet predicted functional outcome (FOTO) score: 80% to increase self-worth & perceived functional ability. ongoing   Patient will have met/partially met personal goal of being able to walk without pain. ongoing            PLAN   Plan of care Certification: 10/24/2023 to 1/30/2024     Outpatient Physical Therapy 2 times weekly for 6 weeks to include any combination of the following interventions: virtual visits, dry needling, modalities, electrical stimulation (IFC, Pre-Mod, Attended with Functional Dry Needling), Manual Therapy, Moist Heat/ Ice, Neuromuscular Re-ed, Patient Education, Self Care, Therapeutic Exercise, Functional Training, and Therapeutic Activites       Shimon Byrd, PT

## 2024-02-26 ENCOUNTER — CLINICAL SUPPORT (OUTPATIENT)
Dept: REHABILITATION | Facility: HOSPITAL | Age: 34
End: 2024-02-26
Payer: MEDICAID

## 2024-02-26 DIAGNOSIS — R26.89 DECREASED FUNCTIONAL MOBILITY: ICD-10-CM

## 2024-02-26 DIAGNOSIS — M25.60 DECREASED RANGE OF MOTION: ICD-10-CM

## 2024-02-26 DIAGNOSIS — M62.89 MUSCLE TIGHTNESS: ICD-10-CM

## 2024-02-26 DIAGNOSIS — R53.1 DECREASED STRENGTH: Primary | ICD-10-CM

## 2024-02-26 PROCEDURE — 97110 THERAPEUTIC EXERCISES: CPT | Mod: PN

## 2024-02-26 NOTE — PROGRESS NOTES
[facility]   Physical Therapy Treatment Note      Name: Loretta Lea  Clinic Number: [id number]    Therapy Diagnosis:   Encounter Diagnoses   Name Primary?    Decreased strength Yes    Muscle tightness     Decreased functional mobility     Decreased range of motion      Physician: Karthik Farias MD    Visit Date: 2/26/2024  Physician: Karthik Farias MD             Physician Orders: PT Eval and Treat  Medical Diagnosis from Referral: M54.16 (ICD-10-CM) - Lumbar radiculopathy M70.62 (ICD-10-CM) - Greater trochanteric bursitis of left hip M43.16 (ICD-10-CM) - Spondylolisthesis of lumbar region   Evaluation Date: 10/24/2023  Authorization Period Expiration:  11/27/2023  Plan of Care Expiration: 1/30/2024                Progress Update: 11/24/2023                      FOTO: 1 / 3   Visit # / Visits authorized: 9/12  PTA Visit #: 0/5                          PRECAUTIONS: Standard Precautions and Orthopedic: Lumbar anterolisthesis      Time In:  1040   (Pnt arrived early)  Time Out: 1110 (discontinued to pain)  Total Billable Time: 30 minutes      Subjective     Patient reports: that she is the same as last visit.  States that she is starting to have pain down the R leg now.  She was compliant with home exercise program.  Response to previous treatment: None  Functional change: None    Pain: 7/10  Location: left hip  and low back      Objective      Objective Measures updated at progress report unless specified.     Treatment     Loretta received the treatments listed below:      +++All charges filed per Medicaid Guidelines+++    therapeutic exercises to develop strength, endurance, ROM, flexibility, posture, and core stabilization for 30 minutes including:    Nu Step x 5 mins - discontinue due to significant pain today     Seated exercises:  Hamstring stretch 3 x 10 seconds bilateral   Seated Physioball rollout forward x 15 reps; lateral Left and Right x 10 reps     Supine  "exercises:    Lower trunk rotations x12  Bridges x 10 reps - np  Hip adduction with ball 3x10  Clams red Theraband 2 x 10 reps    Double Knee To Chest with Physioball x 15 reps   Transverse abdominal sets with physioball 2 x 10 reps   SAQs  x 10 B    Sidelying exercises:  Open books x 10 reps Right hip, x 5 reps Left hip (some discomfort lying on Left hip)    NOT PERFORMED:  Standing exercises:  Lumbar extension over mat x 10 reps (decreased pain)   Hip Abduction x 10 reps    x 5 on R  Hip extension x 10 reps   x 5 on R    Not performed today:  Gastroc stretch 3 x 30 seconds bilateral (not performed)  Step ups 6"  3 x 10 reps bilateral (not performed)  Hip 3 way 2 x 10 reps Bilateral  (not performed)      Patient Education and Home Exercises       Education provided:   -apply ice as needed for delayed muscle soreness  -Continue with Home exercise program daily     Written Home Exercises Provided: yes. Exercises were reviewed and Loretta was able to demonstrate them prior to the end of the session.  Loretta demonstrated good  understanding of the education provided. See Electronic Medical Record under Patient Instructions for exercises provided during therapy sessions    Assessment   Patient presents to PT with significant pain not allowing her to complete more than 30 minutes of therapy.  Patient is very guarded with her motions, getting out of chair, walking slow pace.  Displays overall weakness L > R with an increase in pain down L lower extremity.  Pnt displays increased numbness down L posterior thigh with therex.       Loretta Is progressing well towards her goals.   Patient prognosis is Poor.     Patient will continue to benefit from skilled outpatient physical therapy to address the deficits listed in the problem list box on initial evaluation, provide pt/family education and to maximize pt's level of independence in the home and community environment.     Patient's spiritual, cultural and educational needs " considered and pt agreeable to plan of care and goals.     Anticipated barriers to physical therapy: none    Goals:   SHORT TERM GOALS: 4 weeks 2/26/2024     Recent signs and systems trend is improving in order to progress towards LTG's. ongoing   Patient will be independent with HEP in order to further progress and return to maximal function. ongoing   Pain rating at Worst: 5/10 in order to progress towards increased independence with activity. ongoing   Patient will be able to correct postural deviations in sitting and standing, to decrease pain and promote postural awareness for injury prevention.  ongoing      LONG TERM GOALS: 8 weeks 2/26/2024     Patient will return to normal ADL, recreational, and work related activities with less pain and limitation.  ongoing   Patient will improve AROM to stated goals in order to return to maximal functional potential.  ongoing   Patient will improve Strength to stated goals of appropriate musculature in order to improve functional independence.  ongoing   Pain Rating at Best: 1/10 to improve Quality of Life.  ongoing   Patient will meet predicted functional outcome (FOTO) score: 80% to increase self-worth & perceived functional ability. ongoing   Patient will have met/partially met personal goal of being able to walk without pain. ongoing            PLAN   Plan of care Certification: 10/24/2023 to 1/30/2024     Outpatient Physical Therapy 2 times weekly for 6 weeks to include any combination of the following interventions: virtual visits, dry needling, modalities, electrical stimulation (IFC, Pre-Mod, Attended with Functional Dry Needling), Manual Therapy, Moist Heat/ Ice, Neuromuscular Re-ed, Patient Education, Self Care, Therapeutic Exercise, Functional Training, and Therapeutic Activites       Shimon Byrd, PT

## 2024-02-28 ENCOUNTER — CLINICAL SUPPORT (OUTPATIENT)
Dept: REHABILITATION | Facility: HOSPITAL | Age: 34
End: 2024-02-28
Payer: MEDICAID

## 2024-02-28 DIAGNOSIS — R53.1 DECREASED STRENGTH: Primary | ICD-10-CM

## 2024-02-28 DIAGNOSIS — R26.89 DECREASED FUNCTIONAL MOBILITY: ICD-10-CM

## 2024-02-28 DIAGNOSIS — M62.89 MUSCLE TIGHTNESS: ICD-10-CM

## 2024-02-28 DIAGNOSIS — M25.60 DECREASED RANGE OF MOTION: ICD-10-CM

## 2024-02-28 PROCEDURE — 97110 THERAPEUTIC EXERCISES: CPT | Mod: PN

## 2024-02-28 NOTE — PROGRESS NOTES
[facility]   Physical Therapy Treatment and Discharge Note      Name: Loretta Lea  Clinic Number: [id number]    Therapy Diagnosis:   Encounter Diagnoses   Name Primary?    Decreased strength Yes    Muscle tightness     Decreased functional mobility     Decreased range of motion      Physician: Karthik Farias MD    Visit Date: 2/28/2024  Physician: Karthik Farias MD             Physician Orders: PT Eval and Treat  Medical Diagnosis from Referral: M54.16 (ICD-10-CM) - Lumbar radiculopathy M70.62 (ICD-10-CM) - Greater trochanteric bursitis of left hip M43.16 (ICD-10-CM) - Spondylolisthesis of lumbar region   Evaluation Date: 10/24/2023  Authorization Period Expiration:  11/27/2023  Plan of Care Expiration: 1/30/2024                Progress Update: 11/24/2023                      FOTO: 1 / 3   Visit # / Visits authorized: 10/12  PTA Visit #: 0/5                          PRECAUTIONS: Standard Precautions and Orthopedic: Lumbar anterolisthesis      Time In:  1035   (Pnt arrived early)  Time Out: 1105 (discontinued to pain)  Total Billable Time: 30 minutes      Subjective     Patient reports: that she doesn't feel like PT has helped much with her pain.  States that she is about the same since starting PT, if not worse.  States that pain is now radiating to both sides.  She was compliant with home exercise program.  Response to previous treatment: None  Functional change: None    Pain: 5/10  Location: left hip  and low back      Objective      Objective Measures updated at progress report unless specified.     Treatment     Loretta received the treatments listed below:      +++All charges filed per Medicaid Guidelines+++    therapeutic exercises to develop strength, endurance, ROM, flexibility, posture, and core stabilization for 30 minutes including:    Nu Step x 5 mins - discontinue due to significant pain today     Seated exercises:  Hamstring stretch 3 x 10 seconds bilateral   Seated  "Physioball rollout forward x 15 reps; lateral Left and Right x 10 reps     Supine exercises:    Lower trunk rotations x15  Bridges x 10 reps - np  Hip adduction with ball x 12  Clams red Theraband 2 x 10 reps    Double Knee To Chest with Physioball x 15 reps   Transverse abdominal sets with physioball 2 x 10 reps   SAQs  x 10 B    Sidelying exercises:  Open books x 10 reps Right hip, x 5 reps Left hip (some discomfort lying on Left hip)    NOT PERFORMED:  Standing exercises:  Lumbar extension over mat x 10 reps (decreased pain)   Hip Abduction x 10 reps    x 5 on R  Hip extension x 10 reps   x 5 on R    Not performed today:  Gastroc stretch 3 x 30 seconds bilateral (not performed)  Step ups 6"  3 x 10 reps bilateral (not performed)  Hip 3 way 2 x 10 reps Bilateral  (not performed)      Patient Education and Home Exercises       Education provided:   -apply ice as needed for delayed muscle soreness  -Continue with Home exercise program daily     Written Home Exercises Provided: yes. Exercises were reviewed and Loretta was able to demonstrate them prior to the end of the session.  Loretta demonstrated good  understanding of the education provided. See Electronic Medical Record under Patient Instructions for exercises provided during therapy sessions    Assessment   Patient presents to PT with significant pain not allowing her to complete more than 30 minutes of therapy.  Patient is very guarded with her motions, getting out of chair, walking slow pace.  Displays overall weakness L > R with an increase in pain down L lower extremity.  Pnt displays increased numbness down L posterior thigh with therex.   Pnt encouraged to seek additional treatment options from referring provider due to not improving with physical therapy.    Loretta Is progressing well towards her goals.   Patient prognosis is Poor.     Patient is being discharged from physical therapy and will seek additional guidance from referring " provider.  Patient's spiritual, cultural and educational needs considered and pt agreeable to plan of care and goals.     Anticipated barriers to physical therapy: none    Goals:   SHORT TERM GOALS: 4 weeks 2/28/2024     Recent signs and systems trend is improving in order to progress towards LTG's. Not met   Patient will be independent with HEP in order to further progress and return to maximal function. Not met   Pain rating at Worst: 5/10 in order to progress towards increased independence with activity. Not met   Patient will be able to correct postural deviations in sitting and standing, to decrease pain and promote postural awareness for injury prevention.  Not met      LONG TERM GOALS: 8 weeks 2/28/2024     Patient will return to normal ADL, recreational, and work related activities with less pain and limitation.  Not met   Patient will improve AROM to stated goals in order to return to maximal functional potential.  Not met   Patient will improve Strength to stated goals of appropriate musculature in order to improve functional independence.  Not met   Pain Rating at Best: 1/10 to improve Quality of Life.  Not met   Patient will meet predicted functional outcome (FOTO) score: 80% to increase self-worth & perceived functional ability. Not met   Patient will have met/partially met personal goal of being able to walk without pain. Not met            PLAN   Pnt is being discharged from physical therapy.      Shimon Byrd, PT

## 2024-03-04 ENCOUNTER — OFFICE VISIT (OUTPATIENT)
Dept: ORTHOPEDICS | Facility: CLINIC | Age: 34
End: 2024-03-04
Payer: MEDICAID

## 2024-03-04 VITALS — BODY MASS INDEX: 45.99 KG/M2 | HEIGHT: 67 IN | WEIGHT: 293 LBS

## 2024-03-04 DIAGNOSIS — M54.16 LUMBAR RADICULOPATHY: Primary | ICD-10-CM

## 2024-03-04 DIAGNOSIS — M43.16 SPONDYLOLISTHESIS OF LUMBAR REGION: ICD-10-CM

## 2024-03-04 DIAGNOSIS — M51.36 DISC DEGENERATION, LUMBAR: ICD-10-CM

## 2024-03-04 DIAGNOSIS — M48.062 LUMBAR STENOSIS WITH NEUROGENIC CLAUDICATION: ICD-10-CM

## 2024-03-04 PROCEDURE — 1159F MED LIST DOCD IN RCRD: CPT | Mod: CPTII,S$GLB,, | Performed by: ORTHOPAEDIC SURGERY

## 2024-03-04 PROCEDURE — 3008F BODY MASS INDEX DOCD: CPT | Mod: CPTII,S$GLB,, | Performed by: ORTHOPAEDIC SURGERY

## 2024-03-04 PROCEDURE — 99213 OFFICE O/P EST LOW 20 MIN: CPT | Mod: S$GLB,,, | Performed by: ORTHOPAEDIC SURGERY

## 2024-03-04 PROCEDURE — 1160F RVW MEDS BY RX/DR IN RCRD: CPT | Mod: CPTII,S$GLB,, | Performed by: ORTHOPAEDIC SURGERY

## 2024-03-04 RX ORDER — OXYCODONE AND ACETAMINOPHEN 10; 325 MG/1; MG/1
1 TABLET ORAL EVERY 6 HOURS PRN
Qty: 28 TABLET | Refills: 0 | Status: SHIPPED | OUTPATIENT
Start: 2024-03-04 | End: 2024-03-13 | Stop reason: SDUPTHER

## 2024-03-04 RX ORDER — OXYCODONE AND ACETAMINOPHEN 7.5; 325 MG/1; MG/1
1 TABLET ORAL EVERY 6 HOURS PRN
Qty: 28 TABLET | Refills: 0 | Status: CANCELLED | OUTPATIENT
Start: 2025-02-05 | End: 2025-02-12

## 2024-03-04 NOTE — PROGRESS NOTES
Subjective:       Patient ID: Loretta Lea is a 33 y.o. female.    Chief Complaint: Pain of the Spine (Patient is here for a 6wk F/up lumbar pain & PT status states her pain is worse than last visit. Needs refills on meds/)      History of Present Illness    Prior to meeting with the patient I reviewed the medical chart in [facility]. This included reviewing the previous progress notes from our office, review of the patient's last appointment with their primary care provider, review of any visits to the emergency room, and review of any pain management appointments or procedures.   Follow-up visit for severe radiculopathy left leg unrelieved since last visit.  No bowel or bladder incontinence.  Has completed 6 weeks physical therapy with no improvement of her symptoms.  Pain even at rest.  No bowel or bladder incontinence.  Only rare occasional symptoms in the right leg.  Left leg symptoms go down to the foot.    Current Medications  Current Outpatient Medications   Medication Sig Dispense Refill    blood sugar diagnostic Strp Test blood sugar 4 (four) times daily before meals and nightly. 200 each 5    blood-glucose meter (ACCU-CHEK GUIDE GLUCOSE METER) [facility] Use to test blood sugar 1 each 0    ciprofloxacin-dexAMETHasone 0.3-0.1% (CIPRODEX) 0.3-0.1 % DrpS Place 4 drops into both ears 2 (two) times daily. 7.5 mL 0    clotrimazole (LOTRIMIN) 1 % cream Apply topically 2 (two) times daily. for 7 days 60 g 0    gabapentin (NEURONTIN) 300 MG capsule TAKE ONE CAPSULE BY MOUTH EVERY EVENING 30 capsule 2    HYDROcodone-acetaminophen (NORCO)  mg per tablet Take 1 tablet by mouth every 6 (six) hours as needed for Pain. 28 tablet 0    hydrOXYzine pamoate (VISTARIL) 25 MG Cap Take 1 capsule (25 mg total) by mouth every 4 (four) hours as needed (itching). 12 capsule 0    lancets (ACCU-CHEK SOFTCLIX LANCETS) Misc Test blood sugar 4 (four) times daily before meals and nightly. 200 each 5    metFORMIN (GLUCOPHAGE-XR) 500  MG ER 24hr tablet Take 2 tablets (1,000 mg total) by mouth 2 (two) times daily with meals. 120 tablet 2    naproxen (NAPROSYN) 500 MG tablet Take 1 tablet (500 mg total) by mouth 2 (two) times daily. 60 tablet 2    ondansetron (ZOFRAN-ODT) 4 MG TbDL Take 2 tablets (8 mg total) by mouth every 8 (eight) hours as needed (Nausea). 30 tablet 2    [START ON 2/5/2025] oxyCODONE-acetaminophen (PERCOCET) 7.5-325 mg per tablet Take 1 tablet by mouth every 6 (six) hours as needed for Pain. 28 tablet 0    pyridoxine, vitamin B6, (B-6) 25 MG Tab Take 25 mg by mouth once daily.      risperiDONE (RISPERDAL) 1 MG tablet Take 1 tablet (1 mg total) by mouth 2 (two) times daily. 60 tablet 2    semaglutide (OZEMPIC) 1 mg/dose (4 mg/3 mL) Inject 1 mg into the skin every 7 days. 3 mL 2    sertraline (ZOLOFT) 100 MG tablet Take 1.5 tablets (150 mg total) by mouth once daily. 135 tablet 1    sumatriptan (IMITREX) 100 MG tablet Take 1 tab PO at first sign of migraine. If not effective, repeat dose in 2 hours. Do not take more than 200mg in 24 hours. 12 tablet 1    traZODone (DESYREL) 100 MG tablet Take 1-2 tabs PO qHS prn insomnia. 60 tablet 2    amoxicillin-clavulanate 875-125mg (AUGMENTIN) 875-125 mg per tablet Take 1 tablet by mouth 2 (two) times daily. (Patient not taking: Reported on 3/4/2024) 20 tablet 0     No current facility-administered medications for this visit.       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       Past Medical History  Past Medical History:   Diagnosis Date    Anxiety     Back pain     Bipolar disorder 2004    bipolar 2    Chronic bronchitis     Depression     Diabetes mellitus     GERD (gastroesophageal reflux disease)     HPV (human papilloma virus) infection     Insomnia     Migraines     Non-proliferative diabetic retinopathy, mild, both eyes 09/25/2023    Obese body habitus     Ovarian cyst     Pneumonia      PONV (postoperative nausea and vomiting)        Surgical History  Past Surgical History:   Procedure Laterality Date    APPENDECTOMY      ARTHROSCOPY OF ANKLE Right 05/04/2022    Procedure: ARTHROSCOPY, ANKLE;  Surgeon: Bimal Mccormick DPM;  Location: [facility];  Service: Podiatry;  Laterality: Right;  CURT EXTERNAL ANKLE DISTRACTOR    CHOLECYSTECTOMY      DILATION AND CURETTAGE OF UTERUS      HYSTERECTOMY  2017    total, ovarian cysts, torsion, [place]; hyst per Dr JESUS Manriquez    LAPAROSCOPIC APPENDECTOMY N/A 08/18/2021    Procedure: APPENDECTOMY, LAPAROSCOPIC;  Surgeon: Osiel Ramirez MD;  Location: [facility];  Service: General;  Laterality: N/A;    OOPHORECTOMY      opherectom Left 2015    salpingo-opherectomy    REPAIR OF LIGAMENT OF ANKLE Right 06/23/2021    Procedure: REPAIR OF ANTERIOR TALOFIBULAR LIGAMENT CALCANEOFIBULAR LIGAMENT;  Surgeon: Bimal Mccormick DPM;  Location: [facility];  Service: Podiatry;  Laterality: Right;  WITH ARTHREX INTERNAL BRACE    REPAIR OF LIGAMENT OF ANKLE Right 4/27/2023    Procedure: REPAIR, LIGAMENT, ANKLE;  Surgeon: Aryan Poole DPM;  Location: [facility];  Service: Podiatry;  Laterality: Right;    REPAIR OF TENDON OF LOWER EXTREMITY Right 4/27/2023    Procedure: REPAIR, TENDON, LOWER EXTREMITY;  Surgeon: Aryan Poole DPM;  Location: [facility];  Service: Podiatry;  Laterality: Right;  arthrex    SURGICAL REMOVAL OF BONE SPUR Right 06/23/2021    Procedure: EXCISION OS TRIGONUM;  Surgeon: Bimal Mccormick DPM;  Location: [facility];  Service: Podiatry;  Laterality: Right;    WISDOM TOOTH EXTRACTION         Family History:   Family History   Adopted: Yes   Problem Relation Age of Onset    No Known Problems Sister     No Known Problems Sister     No Known Problems Sister        Social History:   Social History     Socioeconomic History    Marital status:     Number of children: 0   Tobacco Use    Smoking status: Every Day     Current packs/day: 1.50     Average packs/day: 1.5  packs/day for 18.0 years (27.0 ttl pk-yrs)     Types: Cigarettes     Passive exposure: Current    Smokeless tobacco: Never    Tobacco comments:     4/25/23--pt instructed no smoking 24hours before sx, pt voiced understanding.   Substance and Sexual Activity    Alcohol use: Not Currently     Comment: rare    Drug use: Not Currently     Comment: twice    Sexual activity: Yes     Partners: Male     Birth control/protection: OCP, None     Social Determinants of Health     Stress: Stress Concern Present (10/20/2020)    [facility] [employer] - Occupational Stress Questionnaire     Feeling of Stress : Very much       Hospitalization/Major Diagnostic Procedure:     Review of Systems     General/Constitutional:  Chills denies. Fatigue denies. Fever denies. Weight gain denies. Weight loss denies.    Respiratory:  Shortness of breath denies.    Cardiovascular:  Chest pain denies.    Gastrointestinal:  Constipation denies. Diarrhea denies. Nausea denies. Vomiting denies.     Hematology:  Easy bruising denies. Prolonged bleeding denies.     Genitourinary:  Frequent urination denies. Pain in lower back denies. Painful urination denies.     Musculoskeletal:  See HPI for details    Skin:  Rash denies.    Neurologic:  Dizziness denies. Gait abnormalities denies. Seizures denies. Tingling/Numbess denies.    Psychiatric:  Anxiety denies. Depressed mood denies.     Objective:   Vital Signs: There were no vitals filed for this visit.     Physical Exam      General Examination:     Constitutional: The patient is alert and oriented to lace person and time. Mood is pleasant.     Head/Face: Normal facial features normal eyebrows    Eyes: Normal extraocular motion bilaterally    Lungs: Respirations are equal and unlabored    Gait is coordinated.    Cardiovascular: There are no swelling or varicosities present.    Lymphatic: Negative for adenopathy    Skin: Normal    Neurological: Level of consciousness normal. Oriented to  place person and time and situation    Psychiatric: Oriented to time place person and situation    Physical exam unchanged from last visit.  Obvious neurologic deficit and radicular symptoms left lower extremity.     Patient has difficulty arising from a seated position she has pain when she attempts to stand upright.  Active range of motion extension -10 degrees forward flexion 30° left bending 10° right bending 0° all movements limited by pain.  Patient can stand erect and has difficulty arising from a seated position.  walks with an antalgic gait bilateral paraspinous muscle spasm L4-S1 range of motion moderate restricted.  Straight leg raising positive on the left side subjective numbness L4 nerve root distribution on the left.  Motor exam demonstrates grade 4 5 weakness and left anterior tibial and left extensor hallucis longus muscle groups.  Reflexes are symmetrical with hypoactive.      XRAY Report/ Interpretation:  Prior diagnostic studies reviewed      Assessment:       1. Lumbar radiculopathy    2. Lumbar stenosis with neurogenic claudication    3. Spondylolisthesis of lumbar region    4. Disc degeneration, lumbar        Plan:       Loretta was seen today for pain.    Diagnoses and all orders for this visit:    Lumbar radiculopathy    Lumbar stenosis with neurogenic claudication    Spondylolisthesis of lumbar region    Disc degeneration, lumbar         No follow-ups on file.    Patient has completed 6 weeks of physical therapy without any improvement in her symptoms.  We are proceeding to recommend at this time an MRI study of the lumbar spine.  She has objective physical findings.  Prescription given for pain medication return immediately after MRI.  The risk associated with the spinal condition and an worsening of the condition was discussed with the patient expressed a thorough understanding.  The patient was warned about the possible development of cauda equina syndrome and its symptoms which include  but are not limited to bowel or bladder dysfunction sexual dysfunction increasing pain increasing extremity numbness or weakness and saddle anesthesia.  The patient was advised to either contact me immediately or to go to the nearest hospital emergency room if symptoms of cauda equina syndrome with to develop.  The patient expressed an understanding of these instructions.    Treatment options were discussed with regards to the nature of the medical condition. Conservative pain intervention and surgical options were discussed in detail. The probability of success of each separate treatment option was discussed. The patient expressed a clear understanding of the treatment options. With regards to surgery, the procedure risk, benefits, complications, and outcomes were discussed. No guarantees were given with regards to surgical outcome.   The risk of complications, morbidity, and mortality of patient management decisions have been made at the time of this visit. These are associated with the patient's problems, diagnostic procedures and treatment options. This includes the possible management options selected and those considered but not selected by the patient after shared medical decision making we discussed with the patient.     This note was created using Dragon voice recognition software that occasionally misinterpreted phrases or words.

## 2024-03-07 ENCOUNTER — HOSPITAL ENCOUNTER (OUTPATIENT)
Dept: RADIOLOGY | Facility: HOSPITAL | Age: 34
Discharge: HOME | End: 2024-03-07
Attending: ORTHOPAEDIC SURGERY
Payer: MEDICAID

## 2024-03-07 DIAGNOSIS — M48.062 LUMBAR STENOSIS WITH NEUROGENIC CLAUDICATION: ICD-10-CM

## 2024-03-07 DIAGNOSIS — M43.16 SPONDYLOLISTHESIS OF LUMBAR REGION: ICD-10-CM

## 2024-03-07 DIAGNOSIS — M54.16 LUMBAR RADICULOPATHY: ICD-10-CM

## 2024-03-07 PROCEDURE — 72148 MRI LUMBAR SPINE W/O DYE: CPT | Mod: TC

## 2024-03-13 ENCOUNTER — OFFICE VISIT (OUTPATIENT)
Dept: ORTHOPEDICS | Facility: CLINIC | Age: 34
End: 2024-03-13
Payer: MEDICAID

## 2024-03-13 VITALS — BODY MASS INDEX: 45.99 KG/M2 | WEIGHT: 293 LBS | HEIGHT: 67 IN

## 2024-03-13 DIAGNOSIS — M43.06 SPONDYLOLYSIS OF LUMBAR REGION: ICD-10-CM

## 2024-03-13 DIAGNOSIS — M51.16 LUMBAR DISC HERNIATION WITH RADICULOPATHY: Primary | ICD-10-CM

## 2024-03-13 DIAGNOSIS — M43.16 SPONDYLOLISTHESIS OF LUMBAR REGION: ICD-10-CM

## 2024-03-13 DIAGNOSIS — M51.36 DEGENERATIVE LUMBAR DISC: ICD-10-CM

## 2024-03-13 PROCEDURE — 1160F RVW MEDS BY RX/DR IN RCRD: CPT | Mod: CPTII,S$GLB,, | Performed by: ORTHOPAEDIC SURGERY

## 2024-03-13 PROCEDURE — 99213 OFFICE O/P EST LOW 20 MIN: CPT | Mod: S$GLB,,, | Performed by: ORTHOPAEDIC SURGERY

## 2024-03-13 PROCEDURE — 3008F BODY MASS INDEX DOCD: CPT | Mod: CPTII,S$GLB,, | Performed by: ORTHOPAEDIC SURGERY

## 2024-03-13 PROCEDURE — 1159F MED LIST DOCD IN RCRD: CPT | Mod: CPTII,S$GLB,, | Performed by: ORTHOPAEDIC SURGERY

## 2024-03-13 RX ORDER — OXYCODONE AND ACETAMINOPHEN 10; 325 MG/1; MG/1
1 TABLET ORAL EVERY 6 HOURS PRN
Qty: 28 TABLET | Refills: 0 | Status: SHIPPED | OUTPATIENT
Start: 2024-03-13 | End: 2024-03-20

## 2024-03-13 RX ORDER — OXYCODONE AND ACETAMINOPHEN 10; 325 MG/1; MG/1
1 TABLET ORAL EVERY 6 HOURS PRN
Qty: 28 TABLET | Refills: 0 | Status: SHIPPED | OUTPATIENT
Start: 2024-03-13 | End: 2024-03-13

## 2024-03-13 NOTE — PROGRESS NOTES
Subjective:       Patient ID: Loretta Lea is a 33 y.o. female.    Chief Complaint: Pain of the Lower Back (MRI results )      History of Present Illness    Prior to meeting with the patient I reviewed the medical chart in [facility]. This included reviewing the previous progress notes from our office, review of the patient's last appointment with their primary care provider, review of any visits to the emergency room, and review of any pain management appointments or procedures.   Patient returns follow-up with continued back pain though a little bit improved with medication left leg still says numb occasionally some symptoms on the right side we are going to discuss the results of her MRI today her symptoms have been present for over 6 months she tried physical therapy did not help    Current Medications  Current Outpatient Medications   Medication Sig Dispense Refill    blood sugar diagnostic Strp Test blood sugar 4 (four) times daily before meals and nightly. 200 each 5    blood-glucose meter (ACCU-CHEK GUIDE GLUCOSE METER) Misc Use to test blood sugar 1 each 0    ciprofloxacin-dexAMETHasone 0.3-0.1% (CIPRODEX) 0.3-0.1 % DrpS Place 4 drops into both ears 2 (two) times daily. 7.5 mL 0    clotrimazole (LOTRIMIN) 1 % cream Apply topically 2 (two) times daily. for 7 days 60 g 0    gabapentin (NEURONTIN) 300 MG capsule TAKE ONE CAPSULE BY MOUTH EVERY EVENING 30 capsule 2    hydrOXYzine pamoate (VISTARIL) 25 MG Cap Take 1 capsule (25 mg total) by mouth every 4 (four) hours as needed (itching). 12 capsule 0    lancets (ACCU-CHEK SOFTCLIX LANCETS) Misc Test blood sugar 4 (four) times daily before meals and nightly. 200 each 5    metFORMIN (GLUCOPHAGE-XR) 500 MG ER 24hr tablet Take 2 tablets (1,000 mg total) by mouth 2 (two) times daily with meals. 120 tablet 2    naproxen (NAPROSYN) 500 MG tablet Take 1 tablet (500 mg total) by mouth 2 (two) times daily. 60 tablet 2    ondansetron (ZOFRAN-ODT) 4 MG TbDL Take 2  tablets (8 mg total) by mouth every 8 (eight) hours as needed (Nausea). 30 tablet 2    [START ON 2/5/2025] oxyCODONE-acetaminophen (PERCOCET) 7.5-325 mg per tablet Take 1 tablet by mouth every 6 (six) hours as needed for Pain. 28 tablet 0    pyridoxine, vitamin B6, (B-6) 25 MG Tab Take 25 mg by mouth once daily.      risperiDONE (RISPERDAL) 1 MG tablet Take 1 tablet (1 mg total) by mouth 2 (two) times daily. 60 tablet 2    semaglutide (OZEMPIC) 1 mg/dose (4 mg/3 mL) Inject 1 mg into the skin every 7 days. 3 mL 2    sertraline (ZOLOFT) 100 MG tablet Take 1.5 tablets (150 mg total) by mouth once daily. 135 tablet 1    sumatriptan (IMITREX) 100 MG tablet Take 1 tab PO at first sign of migraine. If not effective, repeat dose in 2 hours. Do not take more than 200mg in 24 hours. 12 tablet 1    traZODone (DESYREL) 100 MG tablet Take 1-2 tabs PO qHS prn insomnia. 60 tablet 2    amoxicillin-clavulanate 875-125mg (AUGMENTIN) 875-125 mg per tablet Take 1 tablet by mouth 2 (two) times daily. (Patient not taking: Reported on 3/4/2024) 20 tablet 0    oxyCODONE-acetaminophen (PERCOCET)  mg per tablet Take 1 tablet by mouth every 6 (six) hours as needed for Pain. 28 tablet 0     No current facility-administered medications for this visit.       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       Past Medical History  Past Medical History:   Diagnosis Date    Anxiety     Back pain     Bipolar disorder 2004    bipolar 2    Chronic bronchitis     Depression     Diabetes mellitus     GERD (gastroesophageal reflux disease)     HPV (human papilloma virus) infection     Insomnia     Migraines     Non-proliferative diabetic retinopathy, mild, both eyes 09/25/2023    Obese body habitus     Ovarian cyst     Pneumonia     PONV (postoperative nausea and vomiting)        Surgical History  Past Surgical History:   Procedure Laterality Date     APPENDECTOMY      ARTHROSCOPY OF ANKLE Right 05/04/2022    Procedure: ARTHROSCOPY, ANKLE;  Surgeon: Bimal Mccormick DPM;  Location: [facility] OR;  Service: Podiatry;  Laterality: Right;  CURT EXTERNAL ANKLE DISTRACTOR    CHOLECYSTECTOMY      DILATION AND CURETTAGE OF UTERUS      HYSTERECTOMY  2017    total, ovarian cysts, torsion, [place]; hyst per Dr JESUS Manriquez    LAPAROSCOPIC APPENDECTOMY N/A 08/18/2021    Procedure: APPENDECTOMY, LAPAROSCOPIC;  Surgeon: Osiel Ramirez MD;  Location: [facility];  Service: General;  Laterality: N/A;    OOPHORECTOMY      opherectom Left 2015    salpingo-opherectomy    REPAIR OF LIGAMENT OF ANKLE Right 06/23/2021    Procedure: REPAIR OF ANTERIOR TALOFIBULAR LIGAMENT CALCANEOFIBULAR LIGAMENT;  Surgeon: Bimal Mccormick DPM;  Location: [facility] OR;  Service: Podiatry;  Laterality: Right;  WITH ARTHREX INTERNAL BRACE    REPAIR OF LIGAMENT OF ANKLE Right 4/27/2023    Procedure: REPAIR, LIGAMENT, ANKLE;  Surgeon: Aryan Poole DPM;  Location: [facility] OR;  Service: Podiatry;  Laterality: Right;    REPAIR OF TENDON OF LOWER EXTREMITY Right 4/27/2023    Procedure: REPAIR, TENDON, LOWER EXTREMITY;  Surgeon: Aryan Poole DPM;  Location: [facility] OR;  Service: Podiatry;  Laterality: Right;  arthrex    SURGICAL REMOVAL OF BONE SPUR Right 06/23/2021    Procedure: EXCISION OS TRIGONUM;  Surgeon: Bimal Mccormick DPM;  Location: [facility];  Service: Podiatry;  Laterality: Right;    WISDOM TOOTH EXTRACTION         Family History:   Family History   Adopted: Yes   Problem Relation Age of Onset    No Known Problems Sister     No Known Problems Sister     No Known Problems Sister        Social History:   Social History     Socioeconomic History    Marital status:     Number of children: 0   Tobacco Use    Smoking status: Every Day     Current packs/day: 1.50     Average packs/day: 1.5 packs/day for 18.0 years (27.0 ttl pk-yrs)     Types: Cigarettes     Passive exposure: Current    Smokeless tobacco:  Never    Tobacco comments:     4/25/23--pt instructed no smoking 24hours before sx, pt voiced understanding.   Substance and Sexual Activity    Alcohol use: Not Currently     Comment: rare    Drug use: Not Currently     Comment: twice    Sexual activity: Yes     Partners: Male     Birth control/protection: OCP, None     Social Determinants of Health     Stress: Stress Concern Present (10/20/2020)    [employer] [facility] [employer] - Occupational Stress Questionnaire     Feeling of Stress : Very much       Hospitalization/Major Diagnostic Procedure:     Review of Systems     General/Constitutional:  Chills denies. Fatigue denies. Fever denies. Weight gain denies. Weight loss denies.    Respiratory:  Shortness of breath denies.    Cardiovascular:  Chest pain denies.    Gastrointestinal:  Constipation denies. Diarrhea denies. Nausea denies. Vomiting denies.     Hematology:  Easy bruising denies. Prolonged bleeding denies.     Genitourinary:  Frequent urination denies. Pain in lower back denies. Painful urination denies.     Musculoskeletal:  See HPI for details    Skin:  Rash denies.    Neurologic:  Dizziness denies. Gait abnormalities denies. Seizures denies. Tingling/Numbess denies.    Psychiatric:  Anxiety denies. Depressed mood denies.     Objective:   Vital Signs: There were no vitals filed for this visit.     Physical Exam      General Examination:     Constitutional: The patient is alert and oriented to lace person and time. Mood is pleasant.     Head/Face: Normal facial features normal eyebrows    Eyes: Normal extraocular motion bilaterally    Lungs: Respirations are equal and unlabored    Gait is coordinated.    Cardiovascular: There are no swelling or varicosities present.    Lymphatic: Negative for adenopathy    Skin: Normal    Neurological: Level of consciousness normal. Oriented to place person and time and situation    Psychiatric: Oriented to time place person and situation    Difficulty arising  from a seated position is noted bilateral paraspinous muscle spasm L4-S1 range of motion is markedly restricted due to pain in the seated position straight leg raising is positive on the left side subjective numbness in L4 and L5 nerve root distribution on the left grade 4 5 muscle weakness knee left extensor hallucis longus.  Subjective numbness bilaterally at the S1 nerve root distribution.  S1 left Achilles reflex absent Achilles reflex on the right 1+.  XRAY Report/ Interpretation:  AP and lateral x-rays of lumbar spine will performed today. Indications low back pain. Findings:  X-rays of lumbar spine shows grade 2 spondylolisthesis with pars interarticularis defects and spondylolysis bilaterally at the L4-5 level.    MRI study was reviewed interesting in a supine position undergoing the MRI to spondylolisthesis all but reduces.  Findings of lumbar MRI were personally reviewed including the images has loss of signal intensity in disc degeneration with annular tears at L4-5 and L5-S1.  There is central and foraminal stenosis bilaterally at L4-5 and L5-S1.  Please see report for details.  Gurjit Cassidy MD on 3/8/2024 10:30     MRI Lumbar Spine Without Contrast  Order: [id number]  Status: Edited Result - FINAL       Visible to patient: Yes (seen)       Next appt: None       Dx: Lumbar radiculopathy; Spondylolisthes...    0 Result Notes  Details    Reading Physician Reading Date Result Priority   Bart Mayers MD  [phone] 3/7/2024      Narrative & Impression         ADDENDUM #1         Not mentioned previously, small posterior outer annular tear is also noted at the L5-S1 intervertebral disc.     Electronically signed by:  Bart Mayers MD  03/07/2024 10:16 AM [facility] Workstation: [id number]         ORIGINAL REPORT         MRI lumbar spine without contrast     CLINICAL DATA: Lumbar radiculopathy, bilateral leg pain, numbness     FINDINGS: Multiplanar noncontrast imaging was performed. Comparison is  made to radiographs from January 5, 2024.     Bilateral L4 spondylolysis is noted. 5 mm anterolisthesis of L4 is noted. This is decreased compared to January 5, however the difference may be related to changes in position. Alignment at all of the levels is normal. There is no evidence of acute fracture or osseous destructive lesion. Signal within the conus medullaris is within normal limits.     T11-12: No significant abnormalities.     T12-L1: No significant abnormalities.     L1-2: Mild right far lateral broad-based disc bulge. No central canal or significant foraminal stenosis.     L2-3: No significant abnormalities.     L3-4: Minor broad-based disc bulge and facet hypertrophy, without significant central canal or foraminal stenosis.     L4-5: Grade 1 anterolisthesis and mild broad-based disc protrusion combine with mild facet hypertrophy to result in mild spinal stenosis, with mild to moderate bilateral foraminal stenosis but no definite nerve root impingement. Left paracentral Outer annular tear is noted.     L5-S1: Small midline disc protrusion results in mild spinal stenosis. There is mild bilateral facet hypertrophy without significant foraminal stenosis.     IMPRESSION:  1. Bilateral L4 spondylolysis with grade 1 spondylolisthesis.  2. Changes of lumbar degenerative disc/facet disease as above, without high-grade spinal stenosis or definite nerve root impingement.     Electronically signed by:  Bart Mayers MD  03/07/2024 10:10 AM CST Workstation: [id number]             Specimen Collected: 03/07/24 06:50 CST Last Resulted: 03/07/24 10:16 CST       Order Details        View Encounter        Lab and Collection Details        Routing        Result History - Result Edited                   Assessment:       1. Lumbar disc herniation with radiculopathy    2. Degenerative lumbar disc    3. Spondylolisthesis of lumbar region    4. Spondylolysis of lumbar region        Plan:       Loretta was seen today for  pain.    Diagnoses and all orders for this visit:    Lumbar disc herniation with radiculopathy  Comments:  L4-5 and L5-S1  Orders:  -     oxyCODONE-acetaminophen (PERCOCET)  mg per tablet; Take 1 tablet by mouth every 6 (six) hours as needed for Pain.    Degenerative lumbar disc  -     oxyCODONE-acetaminophen (PERCOCET)  mg per tablet; Take 1 tablet by mouth every 6 (six) hours as needed for Pain.    Spondylolisthesis of lumbar region  Comments:  L4-5  Orders:  -     oxyCODONE-acetaminophen (PERCOCET)  mg per tablet; Take 1 tablet by mouth every 6 (six) hours as needed for Pain.    Spondylolysis of lumbar region  Comments:  L4-5    Other orders  -     Discontinue: oxyCODONE-acetaminophen (PERCOCET)  mg per tablet; Take 1 tablet by mouth every 6 (six) hours as needed for Pain.         Follow up for Surgery.    Treatment options were discussed patient like to proceed with surgical intervention .  We discussed the possibility of epidural steroid injections and realistic expectations of such.    Surgery has been discussed surgery would consist of a lateral interbody fusion at the L4-5 level with this system Dr. Krzysztof Arroyo to serve as co-surgeon for the lateral procedure I believe that this would be the best way of reducing the spondylolisthesis and fixating it at the same time.  Following that surgery at the same sitting we would do laminectomies of L4 and L5 with nerve root decompressions of L4-5 and L5-S1 finally we would also add posterior segmental instrumentation from L4 to the sacrum with autogenous bone graft    The spondylolisthesis clearly is dynamic has a good changes from an x-ray and a standing position to supine MRI where nearly all but reduces and therefore a fusion is necessary in my opinion.    The technical aspects of the surgery were discussed in detail with the patient today. The patient was able to verbalize an understanding. The procedure risk benefits alternatives and  possible complications of the surgical procedure was discussed including expected outcomes. No guarantees were given regards to outcomes. Consent forms were will be signed at a later date. All questions regarding the surgery itself were answered. The patient wishes to proceed with surgery and will be scheduled. The patient may require preoperative medical clearance.      Treatment options were discussed with regards to the nature of the medical condition. Conservative pain intervention and surgical options were discussed in detail. The probability of success of each separate treatment option was discussed. The patient expressed a clear understanding of the treatment options. With regards to surgery, the procedure risk, benefits, complications, and outcomes were discussed. No guarantees were given with regards to surgical outcome.   The risk of complications, morbidity, and mortality of patient management decisions have been made at the time of this visit. These are associated with the patient's problems, diagnostic procedures and treatment options. This includes the possible management options selected and those considered but not selected by the patient after shared medical decision making we discussed with the patient.     This note was created using Dragon voice recognition software that occasionally misinterpreted phrases or words.

## 2024-03-19 ENCOUNTER — PATIENT MESSAGE (OUTPATIENT)
Dept: ORTHOPEDICS | Facility: CLINIC | Age: 34
End: 2024-03-19

## 2024-03-26 ENCOUNTER — PATIENT MESSAGE (OUTPATIENT)
Dept: ORTHOPEDICS | Facility: CLINIC | Age: 34
End: 2024-03-26

## 2024-03-26 DIAGNOSIS — M51.36 DEGENERATIVE LUMBAR DISC: ICD-10-CM

## 2024-03-26 DIAGNOSIS — M51.16 LUMBAR DISC HERNIATION WITH RADICULOPATHY: Primary | ICD-10-CM

## 2024-03-26 DIAGNOSIS — M43.16 SPONDYLOLISTHESIS OF LUMBAR REGION: ICD-10-CM

## 2024-03-26 RX ORDER — OXYCODONE AND ACETAMINOPHEN 10; 325 MG/1; MG/1
1 TABLET ORAL EVERY 6 HOURS PRN
Qty: 28 TABLET | Refills: 0 | Status: SHIPPED | OUTPATIENT
Start: 2024-03-26 | End: 2024-04-05 | Stop reason: SDUPTHER

## 2024-03-26 NOTE — TELEPHONE ENCOUNTER
Roula is working on the surgery with Dr Arroyo's office. Patient requesting pain medication refill

## 2024-04-05 DIAGNOSIS — M43.16 SPONDYLOLISTHESIS OF LUMBAR REGION: ICD-10-CM

## 2024-04-05 DIAGNOSIS — M51.16 LUMBAR DISC HERNIATION WITH RADICULOPATHY: ICD-10-CM

## 2024-04-05 DIAGNOSIS — M51.36 DEGENERATIVE LUMBAR DISC: ICD-10-CM

## 2024-04-05 RX ORDER — OXYCODONE AND ACETAMINOPHEN 10; 325 MG/1; MG/1
1 TABLET ORAL EVERY 6 HOURS PRN
Qty: 28 TABLET | Refills: 0 | Status: SHIPPED | OUTPATIENT
Start: 2024-04-05 | End: 2024-04-15 | Stop reason: SDUPTHER

## 2024-04-08 DIAGNOSIS — M51.36 DISC DEGENERATION, LUMBAR: ICD-10-CM

## 2024-04-08 DIAGNOSIS — M48.061 LUMBAR FORAMINAL STENOSIS: Primary | ICD-10-CM

## 2024-04-08 DIAGNOSIS — M43.16 SPONDYLOLISTHESIS OF LUMBAR REGION: ICD-10-CM

## 2024-04-08 DIAGNOSIS — M51.36 DDD (DEGENERATIVE DISC DISEASE), LUMBAR: ICD-10-CM

## 2024-04-08 RX ORDER — CEFAZOLIN SODIUM 2 G/50ML
2 SOLUTION INTRAVENOUS
Status: CANCELLED | OUTPATIENT
Start: 2024-04-08

## 2024-04-13 ENCOUNTER — PATIENT MESSAGE (OUTPATIENT)
Dept: ORTHOPEDICS | Facility: CLINIC | Age: 34
End: 2024-04-13

## 2024-04-13 DIAGNOSIS — M51.36 DEGENERATIVE LUMBAR DISC: ICD-10-CM

## 2024-04-13 DIAGNOSIS — M51.16 LUMBAR DISC HERNIATION WITH RADICULOPATHY: ICD-10-CM

## 2024-04-13 DIAGNOSIS — M43.16 SPONDYLOLISTHESIS OF LUMBAR REGION: ICD-10-CM

## 2024-04-15 RX ORDER — OXYCODONE AND ACETAMINOPHEN 10; 325 MG/1; MG/1
1 TABLET ORAL EVERY 6 HOURS PRN
Qty: 28 TABLET | Refills: 0 | Status: ON HOLD | OUTPATIENT
Start: 2024-04-15 | End: 2024-04-23 | Stop reason: HOSPADM

## 2024-04-16 ENCOUNTER — HOSPITAL ENCOUNTER (OUTPATIENT)
Dept: RADIOLOGY | Facility: HOSPITAL | Age: 34
Discharge: HOME | End: 2024-04-16
Attending: ORTHOPAEDIC SURGERY
Payer: MEDICAID

## 2024-04-16 ENCOUNTER — HOSPITAL ENCOUNTER (OUTPATIENT)
Dept: PREADMISSION TESTING | Facility: HOSPITAL | Age: 34
Discharge: HOME | End: 2024-04-16
Attending: ORTHOPAEDIC SURGERY
Payer: MEDICAID

## 2024-04-16 DIAGNOSIS — M48.061 LUMBAR FORAMINAL STENOSIS: ICD-10-CM

## 2024-04-16 DIAGNOSIS — M51.36 DISC DEGENERATION, LUMBAR: ICD-10-CM

## 2024-04-16 DIAGNOSIS — M43.16 SPONDYLOLISTHESIS OF LUMBAR REGION: ICD-10-CM

## 2024-04-16 DIAGNOSIS — Z01.818 PRE-OPERATIVE EXAM: ICD-10-CM

## 2024-04-16 DIAGNOSIS — Z01.818 PREOP TESTING: Primary | ICD-10-CM

## 2024-04-16 LAB
ABO + RH BLD: NORMAL
ANION GAP SERPL CALC-SCNC: 13 MMOL/L (ref 8–16)
BASOPHILS # BLD AUTO: 0.08 K/UL (ref 0–0.2)
BASOPHILS NFR BLD: 0.7 % (ref 0–1.9)
BILIRUB UR QL STRIP: NEGATIVE
BLD GP AB SCN CELLS X3 SERPL QL: NORMAL
BUN SERPL-MCNC: 6 MG/DL (ref 6–20)
CALCIUM SERPL-MCNC: 9.3 MG/DL (ref 8.7–10.5)
CHLORIDE SERPL-SCNC: 101 MMOL/L (ref 95–110)
CLARITY UR: CLEAR
CO2 SERPL-SCNC: 22 MMOL/L (ref 23–29)
COLOR UR: YELLOW
CREAT SERPL-MCNC: 0.8 MG/DL (ref 0.5–1.4)
DIFFERENTIAL METHOD BLD: ABNORMAL
EOSINOPHIL # BLD AUTO: 0.1 K/UL (ref 0–0.5)
EOSINOPHIL NFR BLD: 0.9 % (ref 0–8)
ERYTHROCYTE [DISTWIDTH] IN BLOOD BY AUTOMATED COUNT: 14.5 % (ref 11.5–14.5)
EST. GFR  (NO RACE VARIABLE): >60 ML/MIN/1.73 M^2
GLUCOSE SERPL-MCNC: 202 MG/DL (ref 70–110)
GLUCOSE UR QL STRIP: ABNORMAL
HCT VFR BLD AUTO: 47.8 % (ref 37–48.5)
HGB BLD-MCNC: 15.4 G/DL (ref 12–16)
HGB UR QL STRIP: NEGATIVE
IMM GRANULOCYTES # BLD AUTO: 0.02 K/UL (ref 0–0.04)
IMM GRANULOCYTES NFR BLD AUTO: 0.2 % (ref 0–0.5)
KETONES UR QL STRIP: NEGATIVE
LEUKOCYTE ESTERASE UR QL STRIP: NEGATIVE
LYMPHOCYTES # BLD AUTO: 3.4 K/UL (ref 1–4.8)
LYMPHOCYTES NFR BLD: 30.4 % (ref 18–48)
MCH RBC QN AUTO: 27 PG (ref 27–31)
MCHC RBC AUTO-ENTMCNC: 32.2 G/DL (ref 32–36)
MCV RBC AUTO: 84 FL (ref 82–98)
MONOCYTES # BLD AUTO: 0.6 K/UL (ref 0.3–1)
MONOCYTES NFR BLD: 5.4 % (ref 4–15)
NEUTROPHILS # BLD AUTO: 6.9 K/UL (ref 1.8–7.7)
NEUTROPHILS NFR BLD: 62.4 % (ref 38–73)
NITRITE UR QL STRIP: NEGATIVE
NRBC BLD-RTO: 0 /100 WBC
PH UR STRIP: 5 [PH] (ref 5–8)
PLATELET # BLD AUTO: 370 K/UL (ref 150–450)
PMV BLD AUTO: 9.5 FL (ref 9.2–12.9)
POTASSIUM SERPL-SCNC: 4.1 MMOL/L (ref 3.5–5.1)
PROT UR QL STRIP: NEGATIVE
RBC # BLD AUTO: 5.7 M/UL (ref 4–5.4)
SODIUM SERPL-SCNC: 136 MMOL/L (ref 136–145)
SP GR UR STRIP: 1.02 (ref 1–1.03)
SPECIMEN OUTDATE: NORMAL
URN SPEC COLLECT METH UR: ABNORMAL
UROBILINOGEN UR STRIP-ACNC: NEGATIVE EU/DL
WBC # BLD AUTO: 11.04 K/UL (ref 3.9–12.7)

## 2024-04-16 PROCEDURE — 93010 ELECTROCARDIOGRAM REPORT: CPT | Mod: ,,, | Performed by: GENERAL PRACTICE

## 2024-04-16 PROCEDURE — 71046 X-RAY EXAM CHEST 2 VIEWS: CPT | Mod: 26,,, | Performed by: RADIOLOGY

## 2024-04-16 PROCEDURE — 71046 X-RAY EXAM CHEST 2 VIEWS: CPT | Mod: TC

## 2024-04-16 PROCEDURE — 86850 RBC ANTIBODY SCREEN: CPT | Performed by: ORTHOPAEDIC SURGERY

## 2024-04-16 PROCEDURE — 81003 URINALYSIS AUTO W/O SCOPE: CPT | Performed by: ORTHOPAEDIC SURGERY

## 2024-04-16 PROCEDURE — 80048 BASIC METABOLIC PNL TOTAL CA: CPT | Performed by: ORTHOPAEDIC SURGERY

## 2024-04-16 PROCEDURE — 36415 COLL VENOUS BLD VENIPUNCTURE: CPT | Performed by: ORTHOPAEDIC SURGERY

## 2024-04-16 PROCEDURE — 93005 ELECTROCARDIOGRAM TRACING: CPT

## 2024-04-16 PROCEDURE — 85025 COMPLETE CBC W/AUTO DIFF WBC: CPT | Performed by: ORTHOPAEDIC SURGERY

## 2024-04-16 NOTE — DISCHARGE INSTRUCTIONS
To confirm, Your doctor has instructed you that surgery is scheduled for: 4/23/24 with Dr. Cassidy    Please report to [facility], Registration the morning of surgery. You must check-in and receive a wristband before going to your procedure.  [address] [place] [address] [place] [address]    Pre-Op will call the afternoon prior to surgery between 1:00 and 6:00 PM with the final arrival time.  Phone number: [phone]    PLEASE NOTE:  The surgery schedule has many variables which may affect the time of your surgery case.  Family members should be available if your surgery time changes.  Plan to be here the day of your procedure between 4-6 hours.    MEDICATIONS:  TAKE ONLY THESE MEDICATIONS WITH A SMALL SIP OF WATER THE MORNING OF YOUR PROCEDURE:  SEE LIST      DO NOT TAKE THESE MEDICATIONS 5-7 DAYS PRIOR to your procedure or per your surgeon's request:   ASPIRIN, ALEVE, ADVIL, IBUPROFEN, FISH OIL VITAMIN E, HERBALS  (May take Tylenol)    ONLY if you are prescribed any types of blood thinners such as:  Aspirin, Coumadin, Plavix, Pradaxa, Xarelto, Aggrenox, Effient, Eliquis, Savasya, Brilinta, or any other, ask your surgeon whether you should stop taking them and how long before surgery you should stop.  You may also need to verify with the prescribing physician if it is ok to stop your medication.      INSTRUCTIONS IMPORTANT!!  Do not eat or drink anything between midnight and the time of your procedure- this includes gum, mints, and candy.  Do not smoke or drink alcoholic beverages 24 hours prior to your procedure.  Shower the night before AND the morning of your procedure with a Chlorhexidine wash such as Hibiclens or Dial antibacterial soap from the neck down.  Do not get it on your face or in your eyes.  You may use your own shampoo and face wash. This helps your skin to be as bacteria free as possible.    If you wear contact lenses, dentures, hearing aids or glasses, bring a container to put them  in during surgery and give to a family member for safe keeping.  Please leave all jewelry, piercing's and valuables at home. You must remove your false eyelashes prior to surgery.    DO NOT remove hair from the surgery site.  Do not shave the incision site unless you are given specific instructions to do so.    ONLY if you have been diagnosed with sleep apnea please bring your C-PAP machine.  ONLY if you wear home oxygen please bring your portable oxygen tank the day of your procedure.  ONLY if you have a history of OPEN HEART SURGERY you will need a clearance from your Cardiologist per Anesthesia.      ONLY for patients requiring bowel prep, written instructions will be given by your doctor's office.  ONLY if you have a neuro stimulator, please bring the controller with you the morning of surgery  ONLY if a type and screen test is needed before surgery, please return:  If your doctor has scheduled you for an overnight stay, bring a small overnight bag with any personal items you need.  Make arrangements in advance for transportation home by a responsible adult. You can not go home in an uber or a cab per hospital policy.  It is not safe to drive a vehicle during the 24 hours after anesthesia.          All  facilities and properties are tobacco free.  Smoking is NOT allowed.   If you have any questions about these instructions, call Pre-Op Admit  Nursing at [phone] or the Pre-Op Day Surgery Unit at [phone].

## 2024-04-21 LAB
OHS QRS DURATION: 88 MS
OHS QTC CALCULATION: 446 MS

## 2024-04-22 ENCOUNTER — ANESTHESIA EVENT (OUTPATIENT)
Dept: SURGERY | Facility: HOSPITAL | Age: 34
DRG: 454 | End: 2024-04-22
Payer: MEDICAID

## 2024-04-23 ENCOUNTER — HOSPITAL ENCOUNTER (INPATIENT)
Facility: HOSPITAL | Age: 34
LOS: 5 days | Discharge: HOME HEALTH | DRG: 454 | End: 2024-04-28
Attending: ORTHOPAEDIC SURGERY | Admitting: ORTHOPAEDIC SURGERY
Payer: MEDICAID

## 2024-04-23 ENCOUNTER — ANESTHESIA (OUTPATIENT)
Dept: SURGERY | Facility: HOSPITAL | Age: 34
DRG: 454 | End: 2024-04-23
Payer: MEDICAID

## 2024-04-23 DIAGNOSIS — M43.16 SPONDYLOLISTHESIS OF LUMBAR REGION: ICD-10-CM

## 2024-04-23 DIAGNOSIS — Z03.89 RULED OUT FOR MYOCARDIAL INFARCTION: ICD-10-CM

## 2024-04-23 DIAGNOSIS — M51.36 DDD (DEGENERATIVE DISC DISEASE), LUMBAR: Primary | ICD-10-CM

## 2024-04-23 DIAGNOSIS — M51.36 DISC DEGENERATION, LUMBAR: ICD-10-CM

## 2024-04-23 DIAGNOSIS — M48.061 LUMBAR FORAMINAL STENOSIS: ICD-10-CM

## 2024-04-23 DIAGNOSIS — R07.9 CHEST PAIN: ICD-10-CM

## 2024-04-23 PROBLEM — Z98.1 S/P LUMBAR SPINAL FUSION: Status: ACTIVE | Noted: 2024-04-23

## 2024-04-23 LAB
ANION GAP SERPL CALC-SCNC: 11 MMOL/L (ref 8–16)
BASOPHILS # BLD AUTO: 0.05 K/UL (ref 0–0.2)
BASOPHILS NFR BLD: 0.3 % (ref 0–1.9)
BUN SERPL-MCNC: 8 MG/DL (ref 6–20)
CALCIUM SERPL-MCNC: 8.3 MG/DL (ref 8.7–10.5)
CHLORIDE SERPL-SCNC: 101 MMOL/L (ref 95–110)
CO2 SERPL-SCNC: 22 MMOL/L (ref 23–29)
CREAT SERPL-MCNC: 0.7 MG/DL (ref 0.5–1.4)
DIFFERENTIAL METHOD BLD: ABNORMAL
EOSINOPHIL # BLD AUTO: 0 K/UL (ref 0–0.5)
EOSINOPHIL NFR BLD: 0.1 % (ref 0–8)
ERYTHROCYTE [DISTWIDTH] IN BLOOD BY AUTOMATED COUNT: 14.3 % (ref 11.5–14.5)
EST. GFR  (NO RACE VARIABLE): >60 ML/MIN/1.73 M^2
GLUCOSE SERPL-MCNC: 229 MG/DL (ref 70–110)
HCT VFR BLD AUTO: 44.6 % (ref 37–48.5)
HGB BLD-MCNC: 14.4 G/DL (ref 12–16)
IMM GRANULOCYTES # BLD AUTO: 0.14 K/UL (ref 0–0.04)
IMM GRANULOCYTES NFR BLD AUTO: 0.7 % (ref 0–0.5)
LYMPHOCYTES # BLD AUTO: 1.4 K/UL (ref 1–4.8)
LYMPHOCYTES NFR BLD: 7.3 % (ref 18–48)
MCH RBC QN AUTO: 27.8 PG (ref 27–31)
MCHC RBC AUTO-ENTMCNC: 32.3 G/DL (ref 32–36)
MCV RBC AUTO: 86 FL (ref 82–98)
MONOCYTES # BLD AUTO: 0.4 K/UL (ref 0.3–1)
MONOCYTES NFR BLD: 2.3 % (ref 4–15)
NEUTROPHILS # BLD AUTO: 17 K/UL (ref 1.8–7.7)
NEUTROPHILS NFR BLD: 89.3 % (ref 38–73)
NRBC BLD-RTO: 0 /100 WBC
PLATELET # BLD AUTO: 344 K/UL (ref 150–450)
PMV BLD AUTO: 9.6 FL (ref 9.2–12.9)
POCT GLUCOSE: 216 MG/DL (ref 70–110)
POTASSIUM SERPL-SCNC: 5.7 MMOL/L (ref 3.5–5.1)
RBC # BLD AUTO: 5.18 M/UL (ref 4–5.4)
SODIUM SERPL-SCNC: 134 MMOL/L (ref 136–145)
WBC # BLD AUTO: 18.98 K/UL (ref 3.9–12.7)

## 2024-04-23 PROCEDURE — 63600175 PHARM REV CODE 636 W HCPCS: Performed by: NURSE ANESTHETIST, CERTIFIED REGISTERED

## 2024-04-23 PROCEDURE — 61783 SCAN PROC SPINAL: CPT | Mod: 59,,, | Performed by: ORTHOPAEDIC SURGERY

## 2024-04-23 PROCEDURE — 25000003 PHARM REV CODE 250

## 2024-04-23 PROCEDURE — 22614 ARTHRD PST TQ 1NTRSPC EA ADD: CPT | Mod: ,,, | Performed by: ORTHOPAEDIC SURGERY

## 2024-04-23 PROCEDURE — 0SG0071 FUSION OF LUMBAR VERTEBRAL JOINT WITH AUTOLOGOUS TISSUE SUBSTITUTE, POSTERIOR APPROACH, POSTERIOR COLUMN, OPEN APPROACH: ICD-10-PCS | Performed by: ORTHOPAEDIC SURGERY

## 2024-04-23 PROCEDURE — 63600175 PHARM REV CODE 636 W HCPCS: Performed by: ANESTHESIOLOGY

## 2024-04-23 PROCEDURE — 63600175 PHARM REV CODE 636 W HCPCS: Performed by: PHYSICIAN ASSISTANT

## 2024-04-23 PROCEDURE — 25000003 PHARM REV CODE 250: Performed by: NURSE ANESTHETIST, CERTIFIED REGISTERED

## 2024-04-23 PROCEDURE — 01NB0ZZ RELEASE LUMBAR NERVE, OPEN APPROACH: ICD-10-PCS | Performed by: ORTHOPAEDIC SURGERY

## 2024-04-23 PROCEDURE — 85025 COMPLETE CBC W/AUTO DIFF WBC: CPT | Performed by: PHYSICIAN ASSISTANT

## 2024-04-23 PROCEDURE — 11000001 HC ACUTE MED/SURG PRIVATE ROOM

## 2024-04-23 PROCEDURE — 0SB20ZZ EXCISION OF LUMBAR VERTEBRAL DISC, OPEN APPROACH: ICD-10-PCS | Performed by: SURGERY

## 2024-04-23 PROCEDURE — 0SG3071 FUSION OF LUMBOSACRAL JOINT WITH AUTOLOGOUS TISSUE SUBSTITUTE, POSTERIOR APPROACH, POSTERIOR COLUMN, OPEN APPROACH: ICD-10-PCS | Performed by: ORTHOPAEDIC SURGERY

## 2024-04-23 PROCEDURE — C1729 CATH, DRAINAGE: HCPCS | Performed by: ORTHOPAEDIC SURGERY

## 2024-04-23 PROCEDURE — 71000039 HC RECOVERY, EACH ADD'L HOUR: Performed by: ORTHOPAEDIC SURGERY

## 2024-04-23 PROCEDURE — 27000221 HC OXYGEN, UP TO 24 HOURS

## 2024-04-23 PROCEDURE — 25000003 PHARM REV CODE 250: Performed by: ANESTHESIOLOGY

## 2024-04-23 PROCEDURE — C9113 INJ PANTOPRAZOLE SODIUM, VIA: HCPCS

## 2024-04-23 PROCEDURE — 36000711: Performed by: ORTHOPAEDIC SURGERY

## 2024-04-23 PROCEDURE — D9220A PRA ANESTHESIA: Mod: CRNA,,, | Performed by: NURSE ANESTHETIST, CERTIFIED REGISTERED

## 2024-04-23 PROCEDURE — 36000710: Performed by: ORTHOPAEDIC SURGERY

## 2024-04-23 PROCEDURE — 94761 N-INVAS EAR/PLS OXIMETRY MLT: CPT

## 2024-04-23 PROCEDURE — 71000033 HC RECOVERY, INTIAL HOUR: Performed by: ORTHOPAEDIC SURGERY

## 2024-04-23 PROCEDURE — 25000003 PHARM REV CODE 250: Performed by: ORTHOPAEDIC SURGERY

## 2024-04-23 PROCEDURE — 22612 ARTHRD PST TQ 1NTRSPC LUMBAR: CPT | Mod: ,,, | Performed by: ORTHOPAEDIC SURGERY

## 2024-04-23 PROCEDURE — 80048 BASIC METABOLIC PNL TOTAL CA: CPT | Performed by: PHYSICIAN ASSISTANT

## 2024-04-23 PROCEDURE — 94799 UNLISTED PULMONARY SVC/PX: CPT | Mod: XB

## 2024-04-23 PROCEDURE — 22558 ARTHRD ANT NTRBD MIN DSC LUM: CPT | Mod: 62,,, | Performed by: ORTHOPAEDIC SURGERY

## 2024-04-23 PROCEDURE — 36415 COLL VENOUS BLD VENIPUNCTURE: CPT | Performed by: PHYSICIAN ASSISTANT

## 2024-04-23 PROCEDURE — 0SG00A0 FUSION OF LUMBAR VERTEBRAL JOINT WITH INTERBODY FUSION DEVICE, ANTERIOR APPROACH, ANTERIOR COLUMN, OPEN APPROACH: ICD-10-PCS | Performed by: ORTHOPAEDIC SURGERY

## 2024-04-23 PROCEDURE — 22842 INSERT SPINE FIXATION DEVICE: CPT | Mod: ,,, | Performed by: ORTHOPAEDIC SURGERY

## 2024-04-23 PROCEDURE — 37000008 HC ANESTHESIA 1ST 15 MINUTES: Performed by: ORTHOPAEDIC SURGERY

## 2024-04-23 PROCEDURE — C1734 ORTH/DEVIC/DRUG BN/BN,TIS/BN: HCPCS | Performed by: ORTHOPAEDIC SURGERY

## 2024-04-23 PROCEDURE — 37000009 HC ANESTHESIA EA ADD 15 MINS: Performed by: ORTHOPAEDIC SURGERY

## 2024-04-23 PROCEDURE — D9220A PRA ANESTHESIA: Mod: ANES,,, | Performed by: ANESTHESIOLOGY

## 2024-04-23 PROCEDURE — 22853 INSJ BIOMECHANICAL DEVICE: CPT | Mod: ,,, | Performed by: ORTHOPAEDIC SURGERY

## 2024-04-23 PROCEDURE — 63600175 PHARM REV CODE 636 W HCPCS

## 2024-04-23 PROCEDURE — C1713 ANCHOR/SCREW BN/BN,TIS/BN: HCPCS | Performed by: ORTHOPAEDIC SURGERY

## 2024-04-23 PROCEDURE — 99406 BEHAV CHNG SMOKING 3-10 MIN: CPT

## 2024-04-23 PROCEDURE — 27201423 OPTIME MED/SURG SUP & DEVICES STERILE SUPPLY: Performed by: ORTHOPAEDIC SURGERY

## 2024-04-23 PROCEDURE — 63600175 PHARM REV CODE 636 W HCPCS: Performed by: ORTHOPAEDIC SURGERY

## 2024-04-23 PROCEDURE — C9290 INJ, BUPIVACAINE LIPOSOME: HCPCS | Performed by: ORTHOPAEDIC SURGERY

## 2024-04-23 PROCEDURE — 99900035 HC TECH TIME PER 15 MIN (STAT)

## 2024-04-23 PROCEDURE — 25000003 PHARM REV CODE 250: Performed by: PHYSICIAN ASSISTANT

## 2024-04-23 PROCEDURE — 63047 LAM FACETEC & FORAMOT LUMBAR: CPT | Mod: ,,, | Performed by: ORTHOPAEDIC SURGERY

## 2024-04-23 DEVICE — IMPLANTABLE DEVICE: Type: IMPLANTABLE DEVICE | Site: SPINE LUMBAR | Status: FUNCTIONAL

## 2024-04-23 DEVICE — ROD HORIZON CURV 5.5CCM 70MM: Type: IMPLANTABLE DEVICE | Site: SPINE LUMBAR | Status: FUNCTIONAL

## 2024-04-23 DEVICE — KIT BONE GRAFT INFUSE LG 8MM: Type: IMPLANTABLE DEVICE | Site: SPINE LUMBAR | Status: FUNCTIONAL

## 2024-04-23 DEVICE — ALLOGRAFT REVITA 4X6CM: Type: IMPLANTABLE DEVICE | Site: SPINE LUMBAR | Status: FUNCTIONAL

## 2024-04-23 DEVICE — SET SCREW HORIZON SOLERA 5.5-6: Type: IMPLANTABLE DEVICE | Site: SPINE LUMBAR | Status: FUNCTIONAL

## 2024-04-23 RX ORDER — HYDROMORPHONE HYDROCHLORIDE 1 MG/ML
1 INJECTION, SOLUTION INTRAMUSCULAR; INTRAVENOUS; SUBCUTANEOUS
Status: DISCONTINUED | OUTPATIENT
Start: 2024-04-23 | End: 2024-04-26

## 2024-04-23 RX ORDER — DEXAMETHASONE SODIUM PHOSPHATE 4 MG/ML
INJECTION, SOLUTION INTRA-ARTICULAR; INTRALESIONAL; INTRAMUSCULAR; INTRAVENOUS; SOFT TISSUE
Status: DISCONTINUED | OUTPATIENT
Start: 2024-04-23 | End: 2024-04-23

## 2024-04-23 RX ORDER — PROPOFOL 10 MG/ML
VIAL (ML) INTRAVENOUS CONTINUOUS PRN
Status: DISCONTINUED | OUTPATIENT
Start: 2024-04-23 | End: 2024-04-23

## 2024-04-23 RX ORDER — BISACODYL 10 MG/1
10 SUPPOSITORY RECTAL DAILY
Status: DISCONTINUED | OUTPATIENT
Start: 2024-04-24 | End: 2024-04-28 | Stop reason: HOSPADM

## 2024-04-23 RX ORDER — ONDANSETRON HYDROCHLORIDE 2 MG/ML
8 INJECTION, SOLUTION INTRAVENOUS EVERY 6 HOURS PRN
Status: DISCONTINUED | OUTPATIENT
Start: 2024-04-23 | End: 2024-04-28 | Stop reason: HOSPADM

## 2024-04-23 RX ORDER — DEXMEDETOMIDINE HYDROCHLORIDE 100 UG/ML
INJECTION, SOLUTION INTRAVENOUS
Status: DISCONTINUED | OUTPATIENT
Start: 2024-04-23 | End: 2024-04-23

## 2024-04-23 RX ORDER — PROPOFOL 10 MG/ML
VIAL (ML) INTRAVENOUS
Status: DISCONTINUED | OUTPATIENT
Start: 2024-04-23 | End: 2024-04-23

## 2024-04-23 RX ORDER — OXYCODONE HYDROCHLORIDE 5 MG/1
5 TABLET ORAL EVERY 4 HOURS PRN
Status: DISCONTINUED | OUTPATIENT
Start: 2024-04-23 | End: 2024-04-26

## 2024-04-23 RX ORDER — ONDANSETRON 4 MG/1
8 TABLET, ORALLY DISINTEGRATING ORAL EVERY 8 HOURS PRN
Status: DISCONTINUED | OUTPATIENT
Start: 2024-04-23 | End: 2024-04-23 | Stop reason: SDUPTHER

## 2024-04-23 RX ORDER — OXYCODONE HYDROCHLORIDE 5 MG/1
5 TABLET ORAL ONCE AS NEEDED
Status: COMPLETED | OUTPATIENT
Start: 2024-04-23 | End: 2024-04-23

## 2024-04-23 RX ORDER — LANOLIN ALCOHOL/MO/W.PET/CERES
800 CREAM (GRAM) TOPICAL
Status: DISCONTINUED | OUTPATIENT
Start: 2024-04-23 | End: 2024-04-28 | Stop reason: HOSPADM

## 2024-04-23 RX ORDER — AMOXICILLIN 250 MG
2 CAPSULE ORAL NIGHTLY PRN
Status: DISCONTINUED | OUTPATIENT
Start: 2024-04-23 | End: 2024-04-28 | Stop reason: HOSPADM

## 2024-04-23 RX ORDER — ACETAMINOPHEN 10 MG/ML
INJECTION, SOLUTION INTRAVENOUS
Status: DISCONTINUED | OUTPATIENT
Start: 2024-04-23 | End: 2024-04-23

## 2024-04-23 RX ORDER — SCOLOPAMINE TRANSDERMAL SYSTEM 1 MG/1
1 PATCH, EXTENDED RELEASE TRANSDERMAL
Status: DISCONTINUED | OUTPATIENT
Start: 2024-04-23 | End: 2024-04-28 | Stop reason: HOSPADM

## 2024-04-23 RX ORDER — DIPHENHYDRAMINE HCL 25 MG
50 CAPSULE ORAL EVERY 6 HOURS PRN
Status: DISCONTINUED | OUTPATIENT
Start: 2024-04-23 | End: 2024-04-28 | Stop reason: HOSPADM

## 2024-04-23 RX ORDER — ALUMINUM HYDROXIDE, MAGNESIUM HYDROXIDE, AND SIMETHICONE 1200; 120; 1200 MG/30ML; MG/30ML; MG/30ML
30 SUSPENSION ORAL EVERY 4 HOURS PRN
Status: DISCONTINUED | OUTPATIENT
Start: 2024-04-23 | End: 2024-04-28 | Stop reason: HOSPADM

## 2024-04-23 RX ORDER — OXYCODONE HYDROCHLORIDE 5 MG/1
15 TABLET ORAL EVERY 4 HOURS PRN
Qty: 42 TABLET | Refills: 0 | Status: SHIPPED | OUTPATIENT
Start: 2024-04-23 | End: 2024-04-24

## 2024-04-23 RX ORDER — KETAMINE HYDROCHLORIDE 100 MG/ML
INJECTION, SOLUTION INTRAMUSCULAR; INTRAVENOUS
Status: DISCONTINUED | OUTPATIENT
Start: 2024-04-23 | End: 2024-04-23

## 2024-04-23 RX ORDER — MIDAZOLAM HYDROCHLORIDE 1 MG/ML
INJECTION INTRAMUSCULAR; INTRAVENOUS
Status: DISCONTINUED | OUTPATIENT
Start: 2024-04-23 | End: 2024-04-23

## 2024-04-23 RX ORDER — SIMETHICONE 80 MG
1 TABLET,CHEWABLE ORAL 4 TIMES DAILY PRN
Status: DISCONTINUED | OUTPATIENT
Start: 2024-04-23 | End: 2024-04-28 | Stop reason: HOSPADM

## 2024-04-23 RX ORDER — SUMATRIPTAN 50 MG/1
100 TABLET, FILM COATED ORAL
Status: DISCONTINUED | OUTPATIENT
Start: 2024-04-23 | End: 2024-04-28 | Stop reason: HOSPADM

## 2024-04-23 RX ORDER — HYDROMORPHONE HCL IN 0.9% NACL 6 MG/30 ML
PATIENT CONTROLLED ANALGESIA SYRINGE INTRAVENOUS CONTINUOUS
Status: DISCONTINUED | OUTPATIENT
Start: 2024-04-23 | End: 2024-04-24

## 2024-04-23 RX ORDER — ONDANSETRON HYDROCHLORIDE 2 MG/ML
INJECTION, SOLUTION INTRAMUSCULAR; INTRAVENOUS
Status: DISCONTINUED | OUTPATIENT
Start: 2024-04-23 | End: 2024-04-23

## 2024-04-23 RX ORDER — OXYCODONE HYDROCHLORIDE 5 MG/1
10 TABLET ORAL EVERY 6 HOURS PRN
Status: DISCONTINUED | OUTPATIENT
Start: 2024-04-23 | End: 2024-04-23 | Stop reason: SDUPTHER

## 2024-04-23 RX ORDER — IBUPROFEN 200 MG
24 TABLET ORAL
Status: DISCONTINUED | OUTPATIENT
Start: 2024-04-23 | End: 2024-04-28 | Stop reason: HOSPADM

## 2024-04-23 RX ORDER — SODIUM CHLORIDE, SODIUM LACTATE, POTASSIUM CHLORIDE, CALCIUM CHLORIDE 600; 310; 30; 20 MG/100ML; MG/100ML; MG/100ML; MG/100ML
INJECTION, SOLUTION INTRAVENOUS CONTINUOUS
Status: DISCONTINUED | OUTPATIENT
Start: 2024-04-23 | End: 2024-04-23

## 2024-04-23 RX ORDER — IPRATROPIUM BROMIDE AND ALBUTEROL SULFATE 2.5; .5 MG/3ML; MG/3ML
3 SOLUTION RESPIRATORY (INHALATION) EVERY 6 HOURS PRN
Status: DISCONTINUED | OUTPATIENT
Start: 2024-04-23 | End: 2024-04-28 | Stop reason: HOSPADM

## 2024-04-23 RX ORDER — PANTOPRAZOLE SODIUM 40 MG/10ML
40 INJECTION, POWDER, LYOPHILIZED, FOR SOLUTION INTRAVENOUS DAILY
Status: DISCONTINUED | OUTPATIENT
Start: 2024-04-23 | End: 2024-04-27

## 2024-04-23 RX ORDER — SERTRALINE HYDROCHLORIDE 50 MG/1
150 TABLET, FILM COATED ORAL NIGHTLY
Status: DISCONTINUED | OUTPATIENT
Start: 2024-04-23 | End: 2024-04-28 | Stop reason: HOSPADM

## 2024-04-23 RX ORDER — DOCUSATE SODIUM 100 MG/1
100 CAPSULE, LIQUID FILLED ORAL DAILY
Status: DISCONTINUED | OUTPATIENT
Start: 2024-04-24 | End: 2024-04-28 | Stop reason: HOSPADM

## 2024-04-23 RX ORDER — SUCCINYLCHOLINE CHLORIDE 20 MG/ML
INJECTION INTRAMUSCULAR; INTRAVENOUS
Status: DISCONTINUED | OUTPATIENT
Start: 2024-04-23 | End: 2024-04-23

## 2024-04-23 RX ORDER — ACETAMINOPHEN 325 MG/1
650 TABLET ORAL EVERY 6 HOURS PRN
Status: DISCONTINUED | OUTPATIENT
Start: 2024-04-24 | End: 2024-04-28 | Stop reason: HOSPADM

## 2024-04-23 RX ORDER — TRAZODONE HYDROCHLORIDE 50 MG/1
200 TABLET ORAL NIGHTLY
Status: DISCONTINUED | OUTPATIENT
Start: 2024-04-23 | End: 2024-04-28 | Stop reason: HOSPADM

## 2024-04-23 RX ORDER — OXYCODONE HYDROCHLORIDE 5 MG/1
15 TABLET ORAL EVERY 4 HOURS PRN
Status: DISCONTINUED | OUTPATIENT
Start: 2024-04-23 | End: 2024-04-26

## 2024-04-23 RX ORDER — RISPERIDONE 0.25 MG/1
1 TABLET ORAL 2 TIMES DAILY
Status: DISCONTINUED | OUTPATIENT
Start: 2024-04-23 | End: 2024-04-28 | Stop reason: HOSPADM

## 2024-04-23 RX ORDER — SCOLOPAMINE TRANSDERMAL SYSTEM 1 MG/1
1 PATCH, EXTENDED RELEASE TRANSDERMAL
Status: DISCONTINUED | OUTPATIENT
Start: 2024-04-23 | End: 2024-04-23 | Stop reason: SDUPTHER

## 2024-04-23 RX ORDER — PROCHLORPERAZINE EDISYLATE 5 MG/ML
5 INJECTION INTRAMUSCULAR; INTRAVENOUS EVERY 6 HOURS PRN
Status: DISCONTINUED | OUTPATIENT
Start: 2024-04-23 | End: 2024-04-28 | Stop reason: HOSPADM

## 2024-04-23 RX ORDER — SODIUM CHLORIDE, SODIUM LACTATE, POTASSIUM CHLORIDE, CALCIUM CHLORIDE 600; 310; 30; 20 MG/100ML; MG/100ML; MG/100ML; MG/100ML
INJECTION, SOLUTION INTRAVENOUS CONTINUOUS
Status: DISCONTINUED | OUTPATIENT
Start: 2024-04-23 | End: 2024-04-24

## 2024-04-23 RX ORDER — IBUPROFEN 200 MG
16 TABLET ORAL
Status: DISCONTINUED | OUTPATIENT
Start: 2024-04-23 | End: 2024-04-28 | Stop reason: HOSPADM

## 2024-04-23 RX ORDER — OXYCODONE HYDROCHLORIDE 15 MG/1
15 TABLET ORAL EVERY 4 HOURS PRN
Qty: 42 TABLET | Refills: 0 | Status: SHIPPED | OUTPATIENT
Start: 2024-04-24 | End: 2024-04-24

## 2024-04-23 RX ORDER — MUPIROCIN 20 MG/G
OINTMENT TOPICAL 2 TIMES DAILY
Status: DISCONTINUED | OUTPATIENT
Start: 2024-04-23 | End: 2024-04-28 | Stop reason: HOSPADM

## 2024-04-23 RX ORDER — PROCHLORPERAZINE EDISYLATE 5 MG/ML
5 INJECTION INTRAMUSCULAR; INTRAVENOUS EVERY 6 HOURS PRN
Status: DISCONTINUED | OUTPATIENT
Start: 2024-04-23 | End: 2024-04-23 | Stop reason: SDUPTHER

## 2024-04-23 RX ORDER — LIDOCAINE HYDROCHLORIDE 20 MG/ML
INJECTION INTRAVENOUS
Status: DISCONTINUED | OUTPATIENT
Start: 2024-04-23 | End: 2024-04-23

## 2024-04-23 RX ORDER — LIDOCAINE HYDROCHLORIDE 10 MG/ML
1 INJECTION, SOLUTION EPIDURAL; INFILTRATION; INTRACAUDAL; PERINEURAL ONCE
Status: DISCONTINUED | OUTPATIENT
Start: 2024-04-23 | End: 2024-04-23 | Stop reason: HOSPADM

## 2024-04-23 RX ORDER — ACETAMINOPHEN 325 MG/1
650 TABLET ORAL EVERY 4 HOURS PRN
Status: DISCONTINUED | OUTPATIENT
Start: 2024-04-23 | End: 2024-04-28 | Stop reason: HOSPADM

## 2024-04-23 RX ORDER — IBUPROFEN 200 MG
1 TABLET ORAL DAILY PRN
Status: COMPLETED | OUTPATIENT
Start: 2024-04-23 | End: 2024-04-25

## 2024-04-23 RX ORDER — NALOXONE HCL 0.4 MG/ML
0.02 VIAL (ML) INJECTION
Status: DISCONTINUED | OUTPATIENT
Start: 2024-04-23 | End: 2024-04-28 | Stop reason: HOSPADM

## 2024-04-23 RX ORDER — SODIUM CHLORIDE 0.9 % (FLUSH) 0.9 %
10 SYRINGE (ML) INJECTION EVERY 12 HOURS PRN
Status: DISCONTINUED | OUTPATIENT
Start: 2024-04-23 | End: 2024-04-28 | Stop reason: HOSPADM

## 2024-04-23 RX ORDER — HYDROMORPHONE HCL 2 MG/ML
VIAL (ML) INJECTION
Status: DISCONTINUED | OUTPATIENT
Start: 2024-04-23 | End: 2024-04-23

## 2024-04-23 RX ORDER — BUPIVACAINE HYDROCHLORIDE 5 MG/ML
INJECTION, SOLUTION EPIDURAL; INTRACAUDAL
Status: DISCONTINUED | OUTPATIENT
Start: 2024-04-23 | End: 2024-04-23 | Stop reason: HOSPADM

## 2024-04-23 RX ORDER — GLUCAGON 1 MG
1 KIT INJECTION
Status: DISCONTINUED | OUTPATIENT
Start: 2024-04-23 | End: 2024-04-28 | Stop reason: HOSPADM

## 2024-04-23 RX ORDER — OXYCODONE HYDROCHLORIDE 10 MG/1
10 TABLET ORAL ONCE
Status: COMPLETED | OUTPATIENT
Start: 2024-04-23 | End: 2024-04-23

## 2024-04-23 RX ORDER — ROCURONIUM BROMIDE 10 MG/ML
INJECTION, SOLUTION INTRAVENOUS
Status: DISCONTINUED | OUTPATIENT
Start: 2024-04-23 | End: 2024-04-23

## 2024-04-23 RX ORDER — OXYCODONE HYDROCHLORIDE 5 MG/1
10 TABLET ORAL EVERY 4 HOURS PRN
Status: DISCONTINUED | OUTPATIENT
Start: 2024-04-23 | End: 2024-04-26

## 2024-04-23 RX ORDER — FENTANYL CITRATE 50 UG/ML
25 INJECTION, SOLUTION INTRAMUSCULAR; INTRAVENOUS EVERY 5 MIN PRN
Status: COMPLETED | OUTPATIENT
Start: 2024-04-23 | End: 2024-04-23

## 2024-04-23 RX ORDER — ALUMINUM HYDROXIDE, MAGNESIUM HYDROXIDE, AND SIMETHICONE 1200; 120; 1200 MG/30ML; MG/30ML; MG/30ML
30 SUSPENSION ORAL 4 TIMES DAILY PRN
Status: DISCONTINUED | OUTPATIENT
Start: 2024-04-23 | End: 2024-04-23 | Stop reason: SDUPTHER

## 2024-04-23 RX ORDER — METOCLOPRAMIDE HYDROCHLORIDE 5 MG/ML
10 INJECTION INTRAMUSCULAR; INTRAVENOUS EVERY 10 MIN PRN
Status: DISCONTINUED | OUTPATIENT
Start: 2024-04-23 | End: 2024-04-23 | Stop reason: HOSPADM

## 2024-04-23 RX ORDER — PYRIDOXINE HCL (VITAMIN B6) 25 MG
25 TABLET ORAL DAILY
Status: DISCONTINUED | OUTPATIENT
Start: 2024-04-24 | End: 2024-04-28 | Stop reason: HOSPADM

## 2024-04-23 RX ORDER — AMOXICILLIN 250 MG
1 CAPSULE ORAL 2 TIMES DAILY
Status: DISCONTINUED | OUTPATIENT
Start: 2024-04-23 | End: 2024-04-28 | Stop reason: HOSPADM

## 2024-04-23 RX ORDER — METOCLOPRAMIDE HYDROCHLORIDE 5 MG/ML
10 INJECTION INTRAMUSCULAR; INTRAVENOUS EVERY 8 HOURS
Status: COMPLETED | OUTPATIENT
Start: 2024-04-23 | End: 2024-04-24

## 2024-04-23 RX ORDER — METFORMIN HYDROCHLORIDE 500 MG/1
1000 TABLET, EXTENDED RELEASE ORAL 2 TIMES DAILY WITH MEALS
Status: DISCONTINUED | OUTPATIENT
Start: 2024-04-23 | End: 2024-04-28 | Stop reason: HOSPADM

## 2024-04-23 RX ORDER — HYDROXYZINE PAMOATE 25 MG/1
25 CAPSULE ORAL EVERY 4 HOURS PRN
Status: DISCONTINUED | OUTPATIENT
Start: 2024-04-23 | End: 2024-04-28 | Stop reason: HOSPADM

## 2024-04-23 RX ORDER — SERTRALINE HYDROCHLORIDE 50 MG/1
150 TABLET, FILM COATED ORAL DAILY
Status: DISCONTINUED | OUTPATIENT
Start: 2024-04-24 | End: 2024-04-23

## 2024-04-23 RX ORDER — INSULIN ASPART 100 [IU]/ML
0-5 INJECTION, SOLUTION INTRAVENOUS; SUBCUTANEOUS
Status: DISCONTINUED | OUTPATIENT
Start: 2024-04-23 | End: 2024-04-28 | Stop reason: HOSPADM

## 2024-04-23 RX ORDER — OXYCODONE HYDROCHLORIDE 5 MG/1
5 TABLET ORAL EVERY 6 HOURS PRN
Status: DISCONTINUED | OUTPATIENT
Start: 2024-04-23 | End: 2024-04-23 | Stop reason: SDUPTHER

## 2024-04-23 RX ORDER — FENTANYL CITRATE 50 UG/ML
INJECTION, SOLUTION INTRAMUSCULAR; INTRAVENOUS
Status: DISCONTINUED | OUTPATIENT
Start: 2024-04-23 | End: 2024-04-23

## 2024-04-23 RX ORDER — GABAPENTIN 300 MG/1
300 CAPSULE ORAL NIGHTLY
Status: DISCONTINUED | OUTPATIENT
Start: 2024-04-23 | End: 2024-04-28 | Stop reason: HOSPADM

## 2024-04-23 RX ORDER — ONDANSETRON HYDROCHLORIDE 2 MG/ML
8 INJECTION, SOLUTION INTRAVENOUS EVERY 8 HOURS PRN
Status: DISCONTINUED | OUTPATIENT
Start: 2024-04-23 | End: 2024-04-23 | Stop reason: SDUPTHER

## 2024-04-23 RX ORDER — HYDROMORPHONE HYDROCHLORIDE 2 MG/ML
2 INJECTION, SOLUTION INTRAMUSCULAR; INTRAVENOUS; SUBCUTANEOUS
Status: DISCONTINUED | OUTPATIENT
Start: 2024-04-23 | End: 2024-04-26

## 2024-04-23 RX ADMIN — HYDROMORPHONE HYDROCHLORIDE 0.5 MG: 2 INJECTION, SOLUTION INTRAMUSCULAR; INTRAVENOUS; SUBCUTANEOUS at 05:04

## 2024-04-23 RX ADMIN — SODIUM CHLORIDE, SODIUM GLUCONATE, SODIUM ACETATE, POTASSIUM CHLORIDE AND MAGNESIUM CHLORIDE: 526; 502; 368; 37; 30 INJECTION, SOLUTION INTRAVENOUS at 02:04

## 2024-04-23 RX ADMIN — FENTANYL CITRATE 25 MCG: 50 INJECTION, SOLUTION INTRAMUSCULAR; INTRAVENOUS at 07:04

## 2024-04-23 RX ADMIN — ONDANSETRON 4 MG: 2 INJECTION INTRAMUSCULAR; INTRAVENOUS at 05:04

## 2024-04-23 RX ADMIN — SODIUM CHLORIDE, SODIUM GLUCONATE, SODIUM ACETATE, POTASSIUM CHLORIDE AND MAGNESIUM CHLORIDE: 526; 502; 368; 37; 30 INJECTION, SOLUTION INTRAVENOUS at 05:04

## 2024-04-23 RX ADMIN — PROPOFOL 200 MG: 10 INJECTION, EMULSION INTRAVENOUS at 01:04

## 2024-04-23 RX ADMIN — TRAZODONE HYDROCHLORIDE 100 MG: 50 TABLET ORAL at 09:04

## 2024-04-23 RX ADMIN — SCOPALAMINE 1 PATCH: 1 PATCH, EXTENDED RELEASE TRANSDERMAL at 09:04

## 2024-04-23 RX ADMIN — SODIUM CHLORIDE, POTASSIUM CHLORIDE, SODIUM LACTATE AND CALCIUM CHLORIDE: 600; 310; 30; 20 INJECTION, SOLUTION INTRAVENOUS at 08:04

## 2024-04-23 RX ADMIN — SENNOSIDES AND DOCUSATE SODIUM 1 TABLET: 8.6; 5 TABLET ORAL at 09:04

## 2024-04-23 RX ADMIN — RISPERIDONE 1 MG: 0.25 TABLET, FILM COATED ORAL at 09:04

## 2024-04-23 RX ADMIN — PHENYLEPHRINE HYDROCHLORIDE 0.19 MCG/KG/MIN: 10 INJECTION INTRAVENOUS at 02:04

## 2024-04-23 RX ADMIN — CEFAZOLIN 3 G: 2 INJECTION, POWDER, FOR SOLUTION INTRAMUSCULAR; INTRAVENOUS at 02:04

## 2024-04-23 RX ADMIN — KETAMINE HYDROCHLORIDE 30 MG: 100 INJECTION, SOLUTION, CONCENTRATE INTRAMUSCULAR; INTRAVENOUS at 02:04

## 2024-04-23 RX ADMIN — OXYCODONE HYDROCHLORIDE 10 MG: 10 TABLET ORAL at 10:04

## 2024-04-23 RX ADMIN — PROPOFOL 50 MCG/KG/MIN: 10 INJECTION, EMULSION INTRAVENOUS at 02:04

## 2024-04-23 RX ADMIN — METOCLOPRAMIDE 10 MG: 5 INJECTION, SOLUTION INTRAMUSCULAR; INTRAVENOUS at 09:04

## 2024-04-23 RX ADMIN — Medication: at 07:04

## 2024-04-23 RX ADMIN — SUCCINYLCHOLINE CHLORIDE 200 MG: 20 INJECTION, SOLUTION INTRAMUSCULAR; INTRAVENOUS at 01:04

## 2024-04-23 RX ADMIN — GABAPENTIN 300 MG: 300 CAPSULE ORAL at 09:04

## 2024-04-23 RX ADMIN — ACETAMINOPHEN 1000 MG: 10 INJECTION, SOLUTION INTRAVENOUS at 03:04

## 2024-04-23 RX ADMIN — DEXTROSE MONOHYDRATE 3 G: 50 INJECTION, SOLUTION INTRAVENOUS at 10:04

## 2024-04-23 RX ADMIN — FENTANYL CITRATE 100 MCG: 0.05 INJECTION, SOLUTION INTRAMUSCULAR; INTRAVENOUS at 01:04

## 2024-04-23 RX ADMIN — OXYCODONE 5 MG: 5 TABLET ORAL at 07:04

## 2024-04-23 RX ADMIN — FENTANYL CITRATE 50 MCG: 0.05 INJECTION, SOLUTION INTRAMUSCULAR; INTRAVENOUS at 03:04

## 2024-04-23 RX ADMIN — MUPIROCIN 1 G: 20 OINTMENT TOPICAL at 09:04

## 2024-04-23 RX ADMIN — ROCURONIUM BROMIDE 40 MG: 10 INJECTION, SOLUTION INTRAVENOUS at 02:04

## 2024-04-23 RX ADMIN — DEXMEDETOMIDINE 20 MCG: 200 INJECTION, SOLUTION INTRAVENOUS at 01:04

## 2024-04-23 RX ADMIN — LIDOCAINE HYDROCHLORIDE 100 MG: 20 INJECTION, SOLUTION INTRAVENOUS at 01:04

## 2024-04-23 RX ADMIN — PANTOPRAZOLE SODIUM 40 MG: 40 INJECTION, POWDER, LYOPHILIZED, FOR SOLUTION INTRAVENOUS at 09:04

## 2024-04-23 RX ADMIN — HYDROMORPHONE HYDROCHLORIDE 0.5 MG: 2 INJECTION, SOLUTION INTRAMUSCULAR; INTRAVENOUS; SUBCUTANEOUS at 04:04

## 2024-04-23 RX ADMIN — KETAMINE HYDROCHLORIDE 30 MG: 100 INJECTION, SOLUTION, CONCENTRATE INTRAMUSCULAR; INTRAVENOUS at 03:04

## 2024-04-23 RX ADMIN — METFORMIN HYDROCHLORIDE 1000 MG: 500 TABLET, EXTENDED RELEASE ORAL at 09:04

## 2024-04-23 RX ADMIN — ROCURONIUM BROMIDE 10 MG: 10 INJECTION, SOLUTION INTRAVENOUS at 01:04

## 2024-04-23 RX ADMIN — ROCURONIUM BROMIDE 30 MG: 10 INJECTION, SOLUTION INTRAVENOUS at 02:04

## 2024-04-23 RX ADMIN — KETAMINE HYDROCHLORIDE 20 MG: 100 INJECTION, SOLUTION, CONCENTRATE INTRAMUSCULAR; INTRAVENOUS at 02:04

## 2024-04-23 RX ADMIN — Medication: at 11:04

## 2024-04-23 RX ADMIN — SERTRALINE HYDROCHLORIDE 150 MG: 50 TABLET ORAL at 10:04

## 2024-04-23 RX ADMIN — MIDAZOLAM HYDROCHLORIDE 4 MG: 1 INJECTION, SOLUTION INTRAMUSCULAR; INTRAVENOUS at 01:04

## 2024-04-23 RX ADMIN — DEXAMETHASONE SODIUM PHOSPHATE 4 MG: 4 INJECTION, SOLUTION INTRA-ARTICULAR; INTRALESIONAL; INTRAMUSCULAR; INTRAVENOUS; SOFT TISSUE at 02:04

## 2024-04-23 RX ADMIN — SODIUM CHLORIDE, SODIUM GLUCONATE, SODIUM ACETATE, POTASSIUM CHLORIDE AND MAGNESIUM CHLORIDE: 526; 502; 368; 37; 30 INJECTION, SOLUTION INTRAVENOUS at 09:04

## 2024-04-23 RX ADMIN — FENTANYL CITRATE 50 MCG: 0.05 INJECTION, SOLUTION INTRAMUSCULAR; INTRAVENOUS at 02:04

## 2024-04-23 RX ADMIN — SODIUM CHLORIDE, SODIUM GLUCONATE, SODIUM ACETATE, POTASSIUM CHLORIDE AND MAGNESIUM CHLORIDE: 526; 502; 368; 37; 30 INJECTION, SOLUTION INTRAVENOUS at 04:04

## 2024-04-23 RX ADMIN — ONDANSETRON 4 MG: 2 INJECTION INTRAMUSCULAR; INTRAVENOUS at 02:04

## 2024-04-23 NOTE — H&P
CC/Indication for Procedure: 33 y.o. female with Lumbar foraminal stenosis [M48.061]  Spondylolisthesis of lumbar region [M43.16]  Disc degeneration, lumbar [M51.36]  DDD (degenerative disc disease), lumbar [M51.36].    Patient scheduled for FUSION, SPINE, LUMBAR, ALIF L4-5  NH ARTHRODESIS, COMBINED TECHN, SNGL INTERSPACE, LUMBAR [13453] (LAMINECTOMY, SPINE, LUMBAR, WITH FUSION POSTERIOR L4-5, L5-S1)  NH LAMINEC/FACETECT/FORAMIN,LUMBAR 1 SEG [25714]  NH LAMINECT/FACETECT/FORAMINOT, EA ADDTL VERTEBRAL SEGM [71951]  NH ARTHRODESIS, POST/POSTLAT, SNGL INTERSPACE, LUMBAR [19483]  NH ARTHRODESIS, COMBINED TECHN, SNGL INTERSPACE, LUMBAR [38522]  NH POSTERIOR SEGMENTAL INSTRUMENTATION 3-6 VRT SEG [14105]  NH INSERT BIOMECH DEV W/INTERBODY ARTHRODESIS, EA CONTIGUOUS DEFECT [44117]  NH ARTHRODESIS ANT INTERBODY MIN DISCECTOMY,LUMBAR [64353]  NH ALLOGRAFT FOR SPINE SURGERY ONLY MORSELIZED [20930]  NH AUTOGRAFT SPINE SURGERY LOCAL FROM SAME INCISION [20936].    Past Medical History:   Diagnosis Date    Anxiety     Back pain     Bipolar disorder 2004    bipolar 2    Chronic bronchitis     Depression     Diabetes mellitus     GERD (gastroesophageal reflux disease)     HPV (human papilloma virus) infection     Insomnia     Migraines     Non-proliferative diabetic retinopathy, mild, both eyes 09/25/2023    Obese body habitus     Ovarian cyst     Pneumonia     PONV (postoperative nausea and vomiting)      Past Surgical History:   Procedure Laterality Date    APPENDECTOMY      ARTHROSCOPY OF ANKLE Right 05/04/2022    Procedure: ARTHROSCOPY, ANKLE;  Surgeon: Bimal Mccormick DPM;  Location: [facility];  Service: Podiatry;  Laterality: Right;  CURT EXTERNAL ANKLE DISTRACTOR    CHOLECYSTECTOMY      DILATION AND CURETTAGE OF UTERUS      HYSTERECTOMY  2017    total, ovarian cysts, torsion, [place]; hyst per Dr JESUS Manriquez    LAPAROSCOPIC APPENDECTOMY N/A 08/18/2021    Procedure: APPENDECTOMY, LAPAROSCOPIC;  Surgeon: Osiel Ramirez MD;   Location: [facility];  Service: General;  Laterality: N/A;    OOPHORECTOMY      opherectom Left 2015    salpingo-opherectomy    REPAIR OF LIGAMENT OF ANKLE Right 06/23/2021    Procedure: REPAIR OF ANTERIOR TALOFIBULAR LIGAMENT CALCANEOFIBULAR LIGAMENT;  Surgeon: Bimal Mccormick DPM;  Location: [facility] [place];  Service: Podiatry;  Laterality: Right;  WITH ARTHREX INTERNAL BRACE    REPAIR OF LIGAMENT OF ANKLE Right 4/27/2023    Procedure: REPAIR, LIGAMENT, ANKLE;  Surgeon: Aryan Poole DPM;  Location: [facility] [place];  Service: Podiatry;  Laterality: Right;    REPAIR OF TENDON OF LOWER EXTREMITY Right 4/27/2023    Procedure: REPAIR, TENDON, LOWER EXTREMITY;  Surgeon: Aryan Poole DPM;  Location: [facility] [place] [facility];  Service: Podiatry;  Laterality: Right;  arthrex    SURGICAL REMOVAL OF BONE SPUR Right 06/23/2021    Procedure: EXCISION OS TRIGONUM;  Surgeon: Bimal Mccormick DPM;  Location: [facility];  Service: Podiatry;  Laterality: Right;    WISDOM TOOTH EXTRACTION       Family History   Adopted: Yes   Problem Relation Name Age of Onset    No Known Problems Sister      No Known Problems Sister      No Known Problems Sister       Social History     Socioeconomic History    Marital status:     Number of children: 0   Tobacco Use    Smoking status: Every Day     Current packs/day: 1.50     Average packs/day: 1.5 packs/day for 18.0 years (27.0 ttl pk-yrs)     Types: Cigarettes     Passive exposure: Current    Smokeless tobacco: Never    Tobacco comments:     4/25/23--pt instructed no smoking 24hours before sx, pt voiced understanding.   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         Current Facility-Administered Medications:     ceFAZolin 2 g in dextrose 5 % in water (D5W) 50 mL IVPB (MB+), 2 g, Intravenous, On Call Procedure, Gurjit Cassidy MD    electrolyte-S (ISOLYTE), , Intravenous, Continuous, Teofilo Tee MD, Last Rate: 10 mL/hr at 04/23/24 0945, New Bag at 04/23/24 0945    lactated ringers infusion, , Intravenous, Continuous, Teofilo Tee MD    LIDOcaine (PF) 10 mg/ml (1%) injection 10 mg, 1 mL, Intradermal, Once, Teofilo Tee MD    scopolamine 1.3-1.5 mg (1 mg over 3 days) 1 patch, 1 patch, Transdermal, Q3 Days, Teofilo Tee MD, 1 patch at 04/23/24 0946    ROS:    Denies chest pain or palpitations  Denies shortness of breath  Denies fevers or chills  Denies chest pain  Denies abdominal pain    PE:    General Appearance: Well nourished  Orientation: Oriented to time, place, person  Mental Status: Alert  Heart: RRR  Lungs: CTA  Abdomen: Soft and non-tender    Anesthesia/Surgery risks, benefits and alternative options discussed and understood by patient/family.    This note was created using Dragon voice recognition software that occasionally misinterpreted phrases or words.

## 2024-04-23 NOTE — ASSESSMENT & PLAN NOTE
POD 0 s/p 0 with Dr. Cassidy  Continue to follow neurosurgery recommendations.  Needs aggressive incentive spirometry.  Follow hemoglobin and hematocrit closely.  Pain control with IV narcotics and antiemetics as needed.  Physical therapy as per Orthopedics protocol with fall precautions.  SCD's for DVT prophylaxis

## 2024-04-23 NOTE — ASSESSMENT & PLAN NOTE
Assistance with smoking cessation was offered, including:  [x]  Medications  []  Counseling  []  Printed Information on Smoking Cessation  []  Referral to a Smoking Cessation Program    Patient was counseled regarding smoking for 3-10 minutes.   Prn nicotine patch ordered

## 2024-04-23 NOTE — OP NOTE
[facility]  Orthopedic  Operative Note    SUMMARY     Date of Procedure: 4/23/2024     Procedure:   1. Anterior lumbar interbody fusion L4-5 CPT code 74669-20  2. Insertion interbody device L4-5 CPT code 20467  3. Use of morselized allograft for anterior fusion consisting of infuse bone morphogenic protein CPT code 17083  4.  L4 laminectomy  CPT code 6 3047  5. Posterior segmental instrumentation L4-S1 with Medtronic titanium dmitriy and screws CPT code 08961  6. Posterolateral fusion L4-5 CPT code 47716   7. Posterolateral fusion L5-S1 CPT code 01209  8. Harvesting local autograft through same incision for posterior spinal fusion CPT code 07303  9. Computer assisted navigation for insertion of pedicle screws CPT code 15332    Surgeons and Role:     * Gurjit Cassidy MD - Primary     * Krzysztof Arroyo MD - Co-Surgeon for anterior lumbar interbody fusion CPT code 67147-5 2    Assisting Surgeon:  Krzysztof Chung for anterior and posterior procedures    Pre-Operative Diagnosis: Lumbar foraminal stenosis [M48.061]  Spondylolisthesis of lumbar region [M43.16]  Disc degeneration, lumbar [M51.36]    Post-Operative Diagnosis: Post-Op Diagnosis Codes:     * Lumbar foraminal stenosis [M48.061]     * Spondylolisthesis of lumbar region [M43.16]     * Disc degeneration, lumbar [M51.36]    Anesthesia: General    Procedure in General:  The patient was brought to the operating room while supine was intubated.  A catheter was placed in the bladder she was turned to the left lateral decubitus position with the left side superior.  Visualization of the spine was confirmed with fluoroscopy prior to prepping and draping.  The left flank area was cleansed with alcohol and prepped with ChloraPrep solution and draped in the usual fashion.  Dr. Arroyo who will dictate his portion of procedure then made a left flank incision and a retroperitoneal approach to the psoas muscle on the left side.  With the  help of fluoroscopy we identified the L4-5 disc space and midportion.  We did a psoas splitting approach to the lateral aspect of the disc space.  Under direct visualization with self held retractors in place we incised the disc annulus and began removing disc material with disc Guillermina as well as pituitary rongeur and curettes.  A 14 mm trial was inserted and gave a good fit.  Infuse bone morphogenic protein was prepared in routine fashion.  A Medtronic 14 mm Lester still interbody device was brought onto the field and filled with bone morphogenic protein.  Under direct visualization the interbody device containing bone morphogenic protein was impacted into the L4-5 disc space.  Position looked satisfactory.  Dr. Arroyo then closed the muscle layers the subcutaneous tissues and staples were used on the skin.  Sterile dressings were applied to the left flank.  This completed the participation of Dr. Krzysztof Arroyo.    The patient was briefly placed on the stretcher transverse rolls placed on operating table she then was returned back to the operating table now in the prone position.  Back was cleansed with alcohol and prepped with ChloraPrep solution and draped in usual fashion.  An incision was made from L3-S1 and paraspinous muscles elevated from the midline.  The mobile posterior arch of the L4 level was identified due to the lateral spondylolytic defects.  We identified the transverse processes of L4 and L5 bilaterally as well as the sacral ala areas.  Using a Kerrison rongeur a complete laminectomy of L4 was performed.  Foraminotomies of the exiting L4 and traversing L5 nerve roots was accomplished.  All bone graft harvested during the decompression was saved to later be used for the spinal fusion.  The reference arc was placed on L3 and the O arm navigation device brought onto the operative field.  We visualized the pedicles of L4-L5 and S1.  An intraoperative CT scan was then performed and interpreted.  We  placed pedicle screws into L4-L5 and S1 bilaterally using computer assisted navigation and normal anatomic landmarks.  We then performed intraoperative EMG pedicle screw testing of all 6 screws in all levels tested were satisfactory.  We covered the area of decompression were multiple small pieces of Gelfoam.  The bone graft was morselized.  It was placed in the posterolateral gutter from the transverse process of L4 to the sacral ala after decorticating the sacral ala areas bilaterally.  Two separate Medtronic 70 mm rods were used to connect the 3 screws on each side and locking screws were inserted and tightened the proper torque.  The construct was visualized with fluoroscopy and looked satisfactory.  Lumbodorsal fascia was closed subcutaneous tissues were closed in stay under steel staples were placed on the skin after bringing out a drain from the subcutaneous tissues.  Sterile dressings were applied the patient was turned supine and extubated.            Complications: None    Estimated Blood Loss (EBL):            Implants:   Implant Name Type Inv. Item Serial No.  Lot No. LRB No. Used Action   LOG [id number] - MEDTRONIC CAPSTONE PEEK INTERBODY - 1           SPACER CLY[employer]DALE 6DEG 14X45 - [id number]  SPACER CLYDESDALE 6DEG 14X45  [employer] [facility] [id number]  1 Implanted   KIT BONE GRAFT INFUSE LG 8MM - [id number]  KIT BONE GRAFT INFUSE LG 8MM  [employer] [facility] [id number]  1 Implanted   ALLOGRAFT REVITA 4X6CM - [id number]  ALLOGRAFT REVITA 4X6CM [id number] [employer]   1 Implanted       Specimens:   Specimen (24h ago, onward)      None                    Condition: Good    Disposition: PACU - hemodynamically stable.    Attestation: I was present and scrubbed for the entire procedure.

## 2024-04-23 NOTE — ANESTHESIA PREPROCEDURE EVALUATION
04/23/2024  Loretta Lea is a 33 y.o., female.      Pre-op Assessment    I have reviewed the Patient Summary Reports.     I have reviewed the Nursing Notes. I have reviewed the NPO Status.   I have reviewed the Medications.     Review of Systems  Social:  Smoker       Cardiovascular:  Cardiovascular Normal                  ECG has been reviewed.                          Pulmonary:  Denies Pneumonia                      Hepatic/GI:     GERD             Musculoskeletal:         Spine Disorders: lumbar            Neurological:      Headaches                                 Endocrine:  Diabetes         Morbid Obesity / BMI > 40  Psych:  Psychiatric History anxiety                 Physical Exam  General: Well nourished    Airway:  Mallampati: II   Neck ROM: Normal ROM    Dental:  Intact    Chest/Lungs:  Clear to auscultation, Normal Respiratory Rate    Heart:  Rate: Normal  Rhythm: Regular Rhythm        Anesthesia Plan  Type of Anesthesia, risks & benefits discussed:    Anesthesia Type: Gen ETT  Intra-op Monitoring Plan: Standard ASA Monitors  Post Op Pain Control Plan: multimodal analgesia and IV/PO Opioids PRN  Induction:  IV and Inhalation  Informed Consent: Informed consent signed with the Patient and all parties understand the risks and agree with anesthesia plan.  All questions answered.   ASA Score: 3    Ready For Surgery From Anesthesia Perspective.     .

## 2024-04-23 NOTE — TRANSFER OF CARE
Anesthesia Transfer of Care Note    Patient: Loretta Lea    Procedure(s) Performed: Procedure(s) (LRB):  FUSION, SPINE, LUMBAR, ALIF (Bilateral)  LAMINECTOMY, SPINE, LUMBAR, WITH FUSION (Bilateral)    Patient location: PACU    Anesthesia Type: general    Transport from OR: Transported from OR on 2-3 L/min O2 by NC with adequate spontaneous ventilation    Post pain: adequate analgesia    Post assessment: no apparent anesthetic complications and tolerated procedure well    Post vital signs: stable    Level of consciousness: sedated and responds to stimulation    Nausea/Vomiting: no nausea/vomiting    Complications: none    Transfer of care protocol was followed      Last vitals: Visit Vitals  BP (!) 141/81   Pulse 83   Temp 36.4 °C (97.5 °F) (Skin)   Resp 16   LMP 04/15/2017 (Approximate)   SpO2 97%   Breastfeeding No

## 2024-04-23 NOTE — ANESTHESIA PROCEDURE NOTES
Intubation    Date/Time: 4/23/2024 1:49 PM    Performed by: Richard Manriquez CRNA  Authorized by: Ralph Rivers MD    Intubation:     Induction:  Intravenous    Intubated:  Postinduction    Mask Ventilation:  Easy with oral airway    Attempts:  1    Attempted By:  CRNA    Method of Intubation:  Video laryngoscopy    Blade:  Carson 3    Laryngeal View Grade: Grade I - full view of cords      Difficult Airway Encountered?: No      Complications:  None    Airway Device:  Oral endotracheal tube    Airway Device Size:  7.5    Style/Cuff Inflation:  Cuffed (inflated to minimal occlusive pressure)    Inflation Amount (mL):  5    Tube secured:  22    Secured at:  The lips    Placement Verified By:  Capnometry    Complicating Factors:  Obesity and short neck    Findings Post-Intubation:  BS equal bilateral

## 2024-04-23 NOTE — PROGRESS NOTES
Pt prepared for surgery. Pt resting in bed, with family/friend at bedside. Family signed up for text messaging. Belongings kept by  IS teaching performed. Fall risk armband placed. Allergy armband placed. SCDs on.

## 2024-04-23 NOTE — HPI
Loretta Lea is a 33 year old female with a previous medical history of that includes but is not limited to DMII, GERD, lumbar disc degeneration, chronic back pain, anxiety and bipolar disorder  who is POD 0 with Dr. Cassidy for an anterior lumbar interbody fusion. The patient has been in the care of Dr. Cassidy since 1/8/24 for lower back pain radiating down bilateral legs with associated numbness left more so than right. The patient performed six months of physical therapy and opioid medication with minimal relief. Patient had follow-up visit with Dr. Cassidy on 3/14/24 to discuss Mri results performed 3/7/24 showing lumbar disc degeneration and Spondylolisthesis of lumbar region. Options were discussed and patient elected to have surgery. Patient admitted by hospital medicine for medical management.

## 2024-04-23 NOTE — CONSULTS
GENERAL SURGERY CONSULT      Date of consult 04/23/2024      I was asked to see this 33-year-old female by the Orthopedic Service.  Patient was evaluated for back pain and after workup and evaluation the Orthopedic Service felt the patient would benefit from a diskectomy anteriorly at L4-L5.  I was asked to see the patient to explain the general surgical portion and risks of exposing the spine anteriorly.    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, smokes 2 packs per day    Review of systems-diabetes, polycystic ovaries, smoker, reflux, lumbar disc disease, morbid obesity with BMI greater than 40, anxiety    Family history-patient is adopted but she does remember a history of diabetes    34-year-old markedly obese female in no acute distress  HEENT the sclerae are nonicteric conjunctivae are pink   Neck is supple  Cardiovascular no gallop or rub  Abdomen is soft markedly obese no masses guarding rebound or rigidity    The general surgical portion risks of exposing the spine anteriorly are explained including but not limited to scar, unequal abdomen with bulge, hernia, incisional numbness, damage to internal organs or blood vessels, nerve damage, warmer left leg, hip weakness, thigh groin and labial numbness, pneumonia, blood clots, heart attack, and others.  Questions answered.  She understands and consents

## 2024-04-23 NOTE — ASSESSMENT & PLAN NOTE
"Patient's FSGs are controlled on current medication regimen.  Last A1c reviewed-   Lab Results   Component Value Date    HGBA1C 9.7 (H) 02/08/2023     Most recent fingerstick glucose reviewed- No results for input(s): "POCTGLUCOSE" in the last 24 hours.  Current correctional scale  Low  Maintain anti-hyperglycemic dose as follows-   Antihyperglycemics (From admission, onward)      None          Hold Oral hypoglycemics while patient is in the hospital.  "

## 2024-04-23 NOTE — BRIEF OP NOTE
[facility]  Brief Operative Note    SUMMARY     Surgery Date: 4/23/2024     Surgeons and Role:     * Gurjit Cassidy MD - Primary     * Krzysztof Arroyo MD - Co-Surgeon for anterior lumbar interbody fusion    Assisting Surgeon:  Krzysztof Chung    Pre-op Diagnosis:  Lumbar foraminal stenosis [M48.061]  Spondylolisthesis of lumbar region [M43.16]  Disc degeneration, lumbar [M51.36]    Post-op Diagnosis:  Post-Op Diagnosis Codes:     * Lumbar foraminal stenosis [M48.061]     * Spondylolisthesis of lumbar region [M43.16]     * Disc degeneration, lumbar [M51.36]    Procedure(s) (LRB):  FUSION, SPINE, LUMBAR, ALIF (Bilateral)  LAMINECTOMY, SPINE, LUMBAR, WITH FUSION (Bilateral)    Anesthesia: General    Implants:  Implant Name Type Inv. Item Serial No.  Lot No. LRB No. Used Action   SPACER CLYDESDALE 6DEG 14X45 - [id number]  SPACER CLYDESDALE 6DEG 14X45  [employer] [facility] [id number]  1 Implanted   KIT BONE GRAFT INFUSE LG 8MM - [id number]  KIT BONE GRAFT INFUSE LG 8MM  [employer] [facility] [id number]  1 Implanted   ALLOGRAFT REVITA 4X6CM - [id number]  ALLOGRAFT REVITA 4X6CM [id number] [employer]   1 Implanted       Operative Findings:  Lytic spondylolisthesis with instability, disc degeneration and foraminal stenosis    Estimated Blood Loss: 500 mL    Estimated Blood Loss has been documented.         Specimens:  L4-5 disc  Specimen (24h ago, onward)      None            [id number]

## 2024-04-23 NOTE — OP NOTE
GENERAL SURGERY BRIEF OP NOTE    Date of dictation and operation 04/23/2024     Pre-op diagnosis- lumbosacral disc disease  L4-L5   Postop diagnosis - same with morbid obesity  Operation- left retroperitoneal exploration with anterior diskectomy and lateral cage fusion at L4-L5   General surgeon- Dina    Orthopedic surgeon-Ange   Anesthesia- general   Drains- none   Findings- as per Orthopedics   Estimated blood loss 50cc   Complications- none   Tolerated procedure and is to now undergo posterior spinal surgery by Orthopedics as well     OPERATIVE DICTATION     Date of dictation and operation 04/23/2024        The patient brought to the operating room given a general endotracheal anesthetic.   Intravenous antibiotics were given.  She was placed with the left side up and padding was provided by anesthesia.  The entire back flank on the left abdomen and chest were prepped and draped into a sterile field.  Using fluoroscopy the lumbosacral spine is identified and then a curvilinear incision is made overlying the L4-L5 disc space.  It is deepened through marked obese subcutaneous tissue down to the oblique muscles.  The external oblique internal oblique and transverse  abdominis are opened in the course of their fibers and the retroperitoneum was entered.  Dissection was carried in the retroperitoneum mobilizing all structures medially.  All vascular and neural structures identified and preserved.  We identified the psoas muscle and then within the confines of the psoas the L4-L5 disc spaces dissected free.  Again all vascular neural structures are preserved.  A needle was placed in L4-L5 and under fluoroscopy Dr. Cassidy confirms that this is indeed L4-L5.  Dr. Vines will dictate his portion in which she removes the disc at L4-L5 and performs a lateral cage fusion at this level.  After this has been accomplished hemostasis appears adequate.  All structures appear patent and intact without injury.  The transverse  abdominis internal oblique and external oblique are individually reapproximated with interrupted 1. Vicryl sutures.  The subcutaneous tissues were closed in layers.  Skin was closed with skin clips.  Sponge lap instrument counts were correct x2.  Patient tolerated the procedure and is to now undergo posterior surgery by the Orthopedic Department.  Estimated blood loss 50-75 cc

## 2024-04-24 ENCOUNTER — TELEPHONE (OUTPATIENT)
Dept: ORTHOPEDICS | Facility: CLINIC | Age: 34
End: 2024-04-24

## 2024-04-24 DIAGNOSIS — Z98.1 S/P LUMBAR SPINAL FUSION: Primary | ICD-10-CM

## 2024-04-24 DIAGNOSIS — Z98.1 S/P SPINAL FUSION: Primary | ICD-10-CM

## 2024-04-24 LAB
ALBUMIN SERPL BCP-MCNC: 3.1 G/DL (ref 3.5–5.2)
ALP SERPL-CCNC: 114 U/L (ref 55–135)
ALT SERPL W/O P-5'-P-CCNC: 101 U/L (ref 10–44)
ANION GAP SERPL CALC-SCNC: 8 MMOL/L (ref 8–16)
AST SERPL-CCNC: 98 U/L (ref 10–40)
BASOPHILS # BLD AUTO: 0.05 K/UL (ref 0–0.2)
BASOPHILS NFR BLD: 0.3 % (ref 0–1.9)
BILIRUB SERPL-MCNC: 0.4 MG/DL (ref 0.1–1)
BUN SERPL-MCNC: 7 MG/DL (ref 6–20)
CALCIUM SERPL-MCNC: 8.6 MG/DL (ref 8.7–10.5)
CHLORIDE SERPL-SCNC: 101 MMOL/L (ref 95–110)
CO2 SERPL-SCNC: 26 MMOL/L (ref 23–29)
CREAT SERPL-MCNC: 0.7 MG/DL (ref 0.5–1.4)
DIFFERENTIAL METHOD BLD: ABNORMAL
EOSINOPHIL # BLD AUTO: 0 K/UL (ref 0–0.5)
EOSINOPHIL NFR BLD: 0.1 % (ref 0–8)
ERYTHROCYTE [DISTWIDTH] IN BLOOD BY AUTOMATED COUNT: 14.2 % (ref 11.5–14.5)
EST. GFR  (NO RACE VARIABLE): >60 ML/MIN/1.73 M^2
GLUCOSE SERPL-MCNC: 196 MG/DL (ref 70–110)
HCT VFR BLD AUTO: 39.6 % (ref 37–48.5)
HGB BLD-MCNC: 12.6 G/DL (ref 12–16)
IMM GRANULOCYTES # BLD AUTO: 0.09 K/UL (ref 0–0.04)
IMM GRANULOCYTES NFR BLD AUTO: 0.5 % (ref 0–0.5)
LYMPHOCYTES # BLD AUTO: 3.3 K/UL (ref 1–4.8)
LYMPHOCYTES NFR BLD: 17.1 % (ref 18–48)
MCH RBC QN AUTO: 27.6 PG (ref 27–31)
MCHC RBC AUTO-ENTMCNC: 31.8 G/DL (ref 32–36)
MCV RBC AUTO: 87 FL (ref 82–98)
MONOCYTES # BLD AUTO: 1.2 K/UL (ref 0.3–1)
MONOCYTES NFR BLD: 6.2 % (ref 4–15)
NEUTROPHILS # BLD AUTO: 14.6 K/UL (ref 1.8–7.7)
NEUTROPHILS NFR BLD: 75.8 % (ref 38–73)
NRBC BLD-RTO: 0 /100 WBC
PLATELET # BLD AUTO: 340 K/UL (ref 150–450)
PMV BLD AUTO: 9.4 FL (ref 9.2–12.9)
POCT GLUCOSE: 178 MG/DL (ref 70–110)
POCT GLUCOSE: 185 MG/DL (ref 70–110)
POCT GLUCOSE: 218 MG/DL (ref 70–110)
POCT GLUCOSE: 231 MG/DL (ref 70–110)
POTASSIUM SERPL-SCNC: 4.4 MMOL/L (ref 3.5–5.1)
PROT SERPL-MCNC: 6.8 G/DL (ref 6–8.4)
RBC # BLD AUTO: 4.57 M/UL (ref 4–5.4)
SODIUM SERPL-SCNC: 135 MMOL/L (ref 136–145)
WBC # BLD AUTO: 19.23 K/UL (ref 3.9–12.7)

## 2024-04-24 PROCEDURE — 25000003 PHARM REV CODE 250: Performed by: PHYSICIAN ASSISTANT

## 2024-04-24 PROCEDURE — 97530 THERAPEUTIC ACTIVITIES: CPT

## 2024-04-24 PROCEDURE — 94761 N-INVAS EAR/PLS OXIMETRY MLT: CPT

## 2024-04-24 PROCEDURE — 94799 UNLISTED PULMONARY SVC/PX: CPT

## 2024-04-24 PROCEDURE — 99900035 HC TECH TIME PER 15 MIN (STAT)

## 2024-04-24 PROCEDURE — 97165 OT EVAL LOW COMPLEX 30 MIN: CPT

## 2024-04-24 PROCEDURE — S4991 NICOTINE PATCH NONLEGEND: HCPCS

## 2024-04-24 PROCEDURE — 80053 COMPREHEN METABOLIC PANEL: CPT

## 2024-04-24 PROCEDURE — 25000003 PHARM REV CODE 250: Performed by: NURSE PRACTITIONER

## 2024-04-24 PROCEDURE — 99900031 HC PATIENT EDUCATION (STAT)

## 2024-04-24 PROCEDURE — 11000001 HC ACUTE MED/SURG PRIVATE ROOM

## 2024-04-24 PROCEDURE — 97162 PT EVAL MOD COMPLEX 30 MIN: CPT

## 2024-04-24 PROCEDURE — 27000221 HC OXYGEN, UP TO 24 HOURS

## 2024-04-24 PROCEDURE — 63600175 PHARM REV CODE 636 W HCPCS

## 2024-04-24 PROCEDURE — 63600175 PHARM REV CODE 636 W HCPCS: Performed by: PHYSICIAN ASSISTANT

## 2024-04-24 PROCEDURE — 25000003 PHARM REV CODE 250: Performed by: ORTHOPAEDIC SURGERY

## 2024-04-24 PROCEDURE — 25500020 PHARM REV CODE 255

## 2024-04-24 PROCEDURE — C9113 INJ PANTOPRAZOLE SODIUM, VIA: HCPCS

## 2024-04-24 PROCEDURE — 25000003 PHARM REV CODE 250

## 2024-04-24 PROCEDURE — 97535 SELF CARE MNGMENT TRAINING: CPT

## 2024-04-24 PROCEDURE — 85025 COMPLETE CBC W/AUTO DIFF WBC: CPT

## 2024-04-24 PROCEDURE — 36415 COLL VENOUS BLD VENIPUNCTURE: CPT

## 2024-04-24 PROCEDURE — 63600175 PHARM REV CODE 636 W HCPCS: Performed by: NURSE PRACTITIONER

## 2024-04-24 RX ORDER — CYCLOBENZAPRINE HCL 5 MG
10 TABLET ORAL EVERY 8 HOURS PRN
Status: DISCONTINUED | OUTPATIENT
Start: 2024-04-24 | End: 2024-04-28 | Stop reason: HOSPADM

## 2024-04-24 RX ORDER — OXYCODONE HYDROCHLORIDE 15 MG/1
15 TABLET ORAL EVERY 4 HOURS PRN
Qty: 42 TABLET | Refills: 0 | OUTPATIENT
Start: 2024-04-24 | End: 2024-04-28

## 2024-04-24 RX ORDER — METOPROLOL TARTRATE 1 MG/ML
2.5 INJECTION, SOLUTION INTRAVENOUS ONCE
Status: COMPLETED | OUTPATIENT
Start: 2024-04-24 | End: 2024-04-24

## 2024-04-24 RX ADMIN — SERTRALINE HYDROCHLORIDE 150 MG: 50 TABLET ORAL at 08:04

## 2024-04-24 RX ADMIN — Medication 1 PATCH: at 12:04

## 2024-04-24 RX ADMIN — GABAPENTIN 300 MG: 300 CAPSULE ORAL at 08:04

## 2024-04-24 RX ADMIN — DOCUSATE SODIUM 100 MG: 100 CAPSULE, LIQUID FILLED ORAL at 08:04

## 2024-04-24 RX ADMIN — METOCLOPRAMIDE 10 MG: 5 INJECTION, SOLUTION INTRAMUSCULAR; INTRAVENOUS at 02:04

## 2024-04-24 RX ADMIN — IOHEXOL 100 ML: 350 INJECTION, SOLUTION INTRAVENOUS at 03:04

## 2024-04-24 RX ADMIN — SIMETHICONE 80 MG: 80 TABLET, CHEWABLE ORAL at 08:04

## 2024-04-24 RX ADMIN — OXYCODONE 15 MG: 5 TABLET ORAL at 11:04

## 2024-04-24 RX ADMIN — INSULIN ASPART 1 UNITS: 100 INJECTION, SOLUTION INTRAVENOUS; SUBCUTANEOUS at 08:04

## 2024-04-24 RX ADMIN — DEXTROSE MONOHYDRATE 3 G: 50 INJECTION, SOLUTION INTRAVENOUS at 05:04

## 2024-04-24 RX ADMIN — SENNOSIDES AND DOCUSATE SODIUM 1 TABLET: 8.6; 5 TABLET ORAL at 08:04

## 2024-04-24 RX ADMIN — ONDANSETRON 8 MG: 2 INJECTION INTRAMUSCULAR; INTRAVENOUS at 05:04

## 2024-04-24 RX ADMIN — METOCLOPRAMIDE 10 MG: 5 INJECTION, SOLUTION INTRAMUSCULAR; INTRAVENOUS at 05:04

## 2024-04-24 RX ADMIN — HYDROMORPHONE HYDROCHLORIDE 2 MG: 2 INJECTION, SOLUTION INTRAMUSCULAR; INTRAVENOUS; SUBCUTANEOUS at 10:04

## 2024-04-24 RX ADMIN — TRAZODONE HYDROCHLORIDE 100 MG: 50 TABLET ORAL at 08:04

## 2024-04-24 RX ADMIN — MUPIROCIN 1 G: 20 OINTMENT TOPICAL at 08:04

## 2024-04-24 RX ADMIN — HYDROMORPHONE HYDROCHLORIDE 2 MG: 2 INJECTION, SOLUTION INTRAMUSCULAR; INTRAVENOUS; SUBCUTANEOUS at 08:04

## 2024-04-24 RX ADMIN — DEXTROSE MONOHYDRATE 3 G: 50 INJECTION, SOLUTION INTRAVENOUS at 02:04

## 2024-04-24 RX ADMIN — RISPERIDONE 1 MG: 0.25 TABLET, FILM COATED ORAL at 08:04

## 2024-04-24 RX ADMIN — METFORMIN HYDROCHLORIDE 1000 MG: 500 TABLET, EXTENDED RELEASE ORAL at 05:04

## 2024-04-24 RX ADMIN — OXYCODONE 15 MG: 5 TABLET ORAL at 07:04

## 2024-04-24 RX ADMIN — HYDROMORPHONE HYDROCHLORIDE 2 MG: 2 INJECTION, SOLUTION INTRAMUSCULAR; INTRAVENOUS; SUBCUTANEOUS at 02:04

## 2024-04-24 RX ADMIN — METFORMIN HYDROCHLORIDE 1000 MG: 500 TABLET, EXTENDED RELEASE ORAL at 08:04

## 2024-04-24 RX ADMIN — PANTOPRAZOLE SODIUM 40 MG: 40 INJECTION, POWDER, LYOPHILIZED, FOR SOLUTION INTRAVENOUS at 08:04

## 2024-04-24 RX ADMIN — CYCLOBENZAPRINE HYDROCHLORIDE 10 MG: 5 TABLET, FILM COATED ORAL at 12:04

## 2024-04-24 RX ADMIN — Medication 25 MG: at 08:04

## 2024-04-24 RX ADMIN — SODIUM CHLORIDE, POTASSIUM CHLORIDE, SODIUM LACTATE AND CALCIUM CHLORIDE 500 ML: 600; 310; 30; 20 INJECTION, SOLUTION INTRAVENOUS at 01:04

## 2024-04-24 RX ADMIN — SODIUM CHLORIDE, POTASSIUM CHLORIDE, SODIUM LACTATE AND CALCIUM CHLORIDE: 600; 310; 30; 20 INJECTION, SOLUTION INTRAVENOUS at 02:04

## 2024-04-24 RX ADMIN — INSULIN ASPART 2 UNITS: 100 INJECTION, SOLUTION INTRAVENOUS; SUBCUTANEOUS at 12:04

## 2024-04-24 RX ADMIN — METOPROLOL TARTRATE 2.5 MG: 1 INJECTION, SOLUTION INTRAVENOUS at 02:04

## 2024-04-24 NOTE — CARE UPDATE
04/24/24 0850   Patient Assessment/Suction   Level of Consciousness (AVPU) alert   Respiratory Effort Normal;Unlabored   Expansion/Accessory Muscles/Retractions no use of accessory muscles;no retractions;expansion symmetric   Rhythm/Pattern, Respiratory unlabored   Cough Frequency no cough   PRE-TX-O2   Device (Oxygen Therapy) room air   SpO2 96 %   Pulse Oximetry Type Intermittent   $ Pulse Oximetry - Multiple Charge Pulse Oximetry - Multiple   Pulse (!) 111   Resp 20   Positioning HOB elevated 30 degrees   Aerosol Therapy   $ Aerosol Therapy Charges PRN treatment not required   Incentive Spirometer   $ Incentive Spirometer Charges done with encouragement   Incentive Spirometer Predicted Level (mL) 2100   Administration (IS) mouthpiece utilized   Number of Repetitions (IS) 7   Level Incentive Spirometer (mL) 1500   Patient Tolerance (IS) good;no adverse signs/symptoms present   Education   $ Education Bronchodilator;15 min

## 2024-04-24 NOTE — PROGRESS NOTES
[facility]  Progress Note    Patient Name: Loretta Lea  MRN: [id number]  Patient Class: IP- Inpatient   Admission Date: 4/23/2024  Length of Stay: 1 days  Attending Physician: Gurjit Cassidy MD  Primary Care Provider: Magalie Euceda MD        Subjective:     Principal Problem:S/P lumbar spinal fusion        HPI:  Loretta Lea is a 33 year old female with a previous medical history of that includes but is not limited to DMII, GERD, lumbar disc degeneration, chronic back pain, anxiety and bipolar disorder  who is POD 0 with Dr. Cassidy for an anterior lumbar interbody fusion. The patient has been in the care of Dr. Cassidy since 1/8/24 for lower back pain radiating down bilateral legs with associated numbness left more so than right. The patient performed six months of physical therapy and opioid medication with minimal relief. Patient had follow-up visit with Dr. Cassidy on 3/14/24 to discuss Mri results performed 3/7/24 showing lumbar disc degeneration and Spondylolisthesis of lumbar region. Options were discussed and patient elected to have surgery. Patient admitted by hospital medicine for medical management.     Overview/Hospital Course:  No notes on file    Interval History:     Review of Systems   Musculoskeletal:  Positive for back pain.   All other systems reviewed and are negative.    Objective:     Vital Signs (Most Recent):  Temp: 98.6 °F (37 °C) (04/24/24 0735)  Pulse: (!) 111 (04/24/24 0850)  Resp: 20 (04/24/24 0850)  BP: 131/72 (04/24/24 0735)  SpO2: 95 % (04/24/24 0910) Vital Signs (24h Range):  Temp:  [98 °F (36.7 °C)-99.7 °F (37.6 °C)] 98.6 °F (37 °C)  Pulse:  [] 111  Resp:  [10-20] 20  SpO2:  [84 %-100 %] 95 %  BP: (122-167)/(58-86) 131/72     Weight: (!) 142.9 kg (315 lb)  Body mass index is 49.34 kg/m².    Intake/Output Summary (Last 24 hours) at 4/24/2024 0931  Last data filed at 4/24/2024 0745  Gross per 24 hour    Intake 6860.18 ml   Output 4620 ml   Net 2240.18 ml         Physical Exam  Vitals reviewed.   Constitutional:       Appearance: She is obese.   HENT:      Head: Normocephalic and atraumatic.      Mouth/Throat:      Mouth: Mucous membranes are dry.      Pharynx: Oropharynx is clear.   Eyes:      Extraocular Movements: Extraocular movements intact.      Pupils: Pupils are equal, round, and reactive to light.   Cardiovascular:      Rate and Rhythm: Normal rate and regular rhythm.      Pulses: Normal pulses.      Heart sounds: Normal heart sounds.   Pulmonary:      Effort: Pulmonary effort is normal.      Breath sounds: Normal breath sounds.   Abdominal:      General: Bowel sounds are decreased. There is distension.      Palpations: Abdomen is soft.      Tenderness: There is generalized abdominal tenderness.          Comments: [place] is a clean/dry/intact bandage over surgical dressing  Accordian drain leading from clean dry intact bandage with moderate amount of blood   Genitourinary:     Comments: Claros cath present draining clear yellow urine  Musculoskeletal:         General: Normal range of motion.      Cervical back: Normal range of motion and neck supple.      Right lower leg: No edema.      Left lower leg: No edema.   Skin:     General: Skin is warm and dry.      Capillary Refill: Capillary refill takes less than 2 seconds.   Neurological:      General: No focal deficit present.      Mental Status: She is alert and oriented to person, place, and time. Mental status is at baseline.   Psychiatric:         Mood and Affect: Mood normal.         Behavior: Behavior normal.         Thought Content: Thought content normal.         Judgment: Judgment normal.             Significant Labs: All pertinent labs within the past 24 hours have been reviewed.  CBC:   Recent Labs   Lab 04/23/24 1908 04/24/24  0426   WBC 18.98* 19.23*   HGB 14.4 12.6   HCT 44.6 39.6    340     CMP:   Recent Labs   Lab 04/23/24 1908  04/24/24  0426   * 135*   K 5.7* 4.4    101   CO2 22* 26   * 196*   BUN 8 7   CREATININE 0.7 0.7   CALCIUM 8.3* 8.6*   PROT  --  6.8   ALBUMIN  --  3.1*   BILITOT  --  0.4   ALKPHOS  --  114   AST  --  98*   ALT  --  101*   ANIONGAP 11 8       Significant Imaging:   CTA chest:   1. No pulmonary embolus to the proximal segmental level, allowing for exam limitations.  2.  Mosaic attenuation in the lungs, a nonspecific finding with considerations to include air-trapping, mild pulmonary edema, pneumonitis, bronchiolitis.  3. Query hepatic steatosis.    Chest x-ray: Pending    Assessment/Plan:      * S/P lumbar spinal fusion  POD 1 s/p 0 with Dr. Cassidy  Continue to follow neurosurgery recommendations.  Needs aggressive incentive spirometry.  Follow hemoglobin and hematocrit closely.  Pain control with IV narcotics and antiemetics as needed.  Physical therapy as per Orthopedics protocol with fall precautions.  SCD's for DVT prophylaxis        Uncontrolled type 2 diabetes mellitus with hyperglycemia  Patient's FSGs are controlled on current medication regimen.  Last A1c reviewed-   Lab Results   Component Value Date    HGBA1C 9.7 (H) 02/08/2023     Most recent fingerstick glucose reviewed-   Recent Labs   Lab 04/23/24 2123 04/24/24  0748   POCTGLUCOSE 216* 178*     Current correctional scale  Low  Maintain anti-hyperglycemic dose as follows-   Antihyperglycemics (From admission, onward)      Start     Stop Route Frequency Ordered    04/23/24 2045  metFORMIN ER 24hr tablet 1,000 mg         -- Oral 2 times daily with meals 04/23/24 2042 04/23/24 2041  insulin aspart U-100 pen 0-5 Units         -- SubQ Before meals & nightly PRN 04/23/24 2042          Hold Oral hypoglycemics while patient is in the hospital.    Cigarette smoker  Assistance with smoking cessation was offered, including:  [x]  Medications  []  Counseling  []  Printed Information on Smoking Cessation  []  Referral to a Smoking Cessation  Program    Patient was counseled regarding smoking for 3-10 minutes.   Prn nicotine patch ordered        VTE Risk Mitigation (From admission, onward)           Ordered     IP VTE LOW RISK PATIENT  Once         04/23/24 0907     Place ZAKI hose  Until discontinued         04/23/24 0907     Place sequential compression device  Until discontinued         04/23/24 0907                    Discharge Planning   PEDRO: 4/25/2024     Code Status: Full Code   Is the patient medically ready for discharge?:     Reason for patient still in hospital (select all that apply): {HMREASONPATIENTINHOSP:00346}                     Jagruti Moncada MD  Department of Hospital Medicine   [facility]/Surg

## 2024-04-24 NOTE — PLAN OF CARE
Goals to be met by: 5/22/24     Patient will increase functional independence with ADLs by performing:    UE Dressing with Modified [place].  LE Dressing with Modified [place].  Grooming while standing at sink with Modified [place].  Toileting from bedside commode with Modified [place] for hygiene and clothing management.   Bathing from  shower chair/bench with Modified [place].  Toilet transfer to bedside commode with Modified [place].  Increased strength and functional activity tolerance for ADL's/IADL's

## 2024-04-24 NOTE — ASSESSMENT & PLAN NOTE
Smoking cessation counseling performed. Dangers of cigarette smoking were reviewed with patient in detail and patient was encouraged to quit. Nicotine replacement options were discussed for > 10 minutes.  Nicoderm 21 mg daily ordered.

## 2024-04-24 NOTE — PLAN OF CARE
[facility] at [facility] approved BSC but pt not eligible for RW, received HD RW in 2021.    Delivered BSC and pt signed for deliver.  Pt acknowledged receiving RW but no longer has it.  Pt doesn't want to purchase RW.     04/24/24 1547   Post-Acute Status   Post-Acute Authorization E   E Status Set-up Complete/Auth obtained

## 2024-04-24 NOTE — ANESTHESIA POSTPROCEDURE EVALUATION
Anesthesia Post Evaluation    Patient: Loretta Lea    Procedure(s) Performed: Procedure(s) (LRB):  FUSION, SPINE, LUMBAR, ALIF (Bilateral)  LAMINECTOMY, SPINE, LUMBAR, WITH FUSION (Bilateral)    Final Anesthesia Type: general      Patient location during evaluation: PACU  Patient participation: Yes- Able to Participate  Level of consciousness: sedated and awake  Post-procedure vital signs: reviewed and stable  Pain management: adequate  Airway patency: patent    PONV status at discharge: No PONV  Anesthetic complications: no      Cardiovascular status: hypertensive, blood pressure returned to baseline and hemodynamically stable  Respiratory status: spontaneous ventilation  Hydration status: euvolemic  Follow-up not needed.              Vitals Value Taken Time   /71 04/23/24 1915   Temp 37.6 °C (99.7 °F) 04/23/24 1800   Pulse 106 04/23/24 1916   Resp 19 04/23/24 1916   SpO2 95 % 04/23/24 1916   Vitals shown include unfiled device data.      No case tracking events are documented in the log.      Pain/Luz Maria Score: Pain Rating Prior to Med Admin: 7 (4/23/2024 10:29 AM)  Luz Maria Score: 8 (4/23/2024  7:00 PM)

## 2024-04-24 NOTE — PT/OT/SLP PROGRESS
Physical Therapy Treatment    Patient Name:  Loretta Lea   MRN:  [id number]    Recommendations:     Discharge Recommendations: Moderate Intensity Therapy  Discharge Equipment Recommendations: walker, rolling  Barriers to discharge: Decreased caregiver support    Assessment:     Loretta Lea is a 33 y.o. female admitted with a medical diagnosis of S/P lumbar spinal fusion.  She presents with the following impairments/functional limitations: weakness, impaired endurance, impaired functional mobility, gait instability, impaired balance, decreased lower extremity function, pain, decreased ROM, impaired cardiopulmonary response to activity, orthopedic precautions .    Pt seen BID today. Pt tolerated OOB chair x 1 hour and requiring assist x2 to stand and transfer back to bed with small stesp with RW. Assist x2 to return supine- screaming. Repositioned HOB  Pt to benefit from continued therapies.    Rehab Prognosis: Fair; patient would benefit from acute skilled PT services to address these deficits and reach maximum level of function.    Recent Surgery: Procedure(s) (LRB):  FUSION, SPINE, LUMBAR, ALIF (Bilateral)  LAMINECTOMY, SPINE, LUMBAR, WITH FUSION (Bilateral) 1 Day Post-Op    Plan:     During this hospitalization, patient to be seen BID to address the identified rehab impairments via gait training, therapeutic activities, therapeutic exercises, neuromuscular re-education and progress toward the following goals:    Plan of Care Expires:  05/30/24    Subjective     Chief Complaint: stated of being in pain and uncomfortable  Pt unable to eat lunch  Patient/Family Comments/goals: get well  Pain/Comfort:  Pain Rating 1: 10/10  Location 1: back  Pain Addressed 1: Pre-medicate for activity, Reposition, Distraction, Cessation of Activity, Nurse notified      Objective:     Communicated with nurse Nguyen prior to session.  Patient found up in chair with telemetry, pulse ox (continuous), peripheral IV,  oxygen, bed alarm upon PT entry to room.     General Precautions: Standard, fall  Orthopedic Precautions: spinal precautions  Braces:  (has TLSO brace at bedside)  Respiratory Status: Nasal cannula, flow 2 L/min     Functional Mobility:  Bed Mobility:     Scooting: maximal assistance and of 2 persons  Sit to Supine: maximal assistance and of 2 persons  Transfers:     Sit to Stand:  moderate assistance, maximal assistance, and of 2 persons with rolling walker      AM-PAC 6 CLICK MOBILITY  Turning over in bed (including adjusting bedclothes, sheets and blankets)?: 2  Sitting down on and standing up from a chair with arms (e.g., wheelchair, bedside commode, etc.): 2  Moving from lying on back to sitting on the side of the bed?: 2  Moving to and from a bed to a chair (including a wheelchair)?: 2  Need to walk in hospital room?: 1  Climbing 3-5 steps with a railing?: 1  Basic Mobility Total Score: 10       Treatment & Education:  Patient was educated on the importance of OOB activity and functional mobility to negate negative effects of prolonged bed rest during hospitalization, safe transfers and ambulation, and D/C planning   Repositioned in bed with all needs within reach and SCD on    Patient left HOB elevated with all lines intact, call button in reach, bed alarm on, nurse Wendy notified, and spouse present..    GOALS:   Multidisciplinary Problems       Physical Therapy Goals          Problem: Physical Therapy    Goal Priority Disciplines Outcome Goal Variances Interventions   Physical Therapy Goal     PT, PT/OT Progressing     Description: Goals to be met by: 2024     Patient will increase functional independence with mobility by performin. Supine to sit with MInimal Assistance  2. Sit to stand transfer with Minimal Assistance  3. Bed to chair transfer with Minimal Assistance using Rolling Walker  4. Gait  x 150 feet with Minimal Assistance using Rolling Walker.   5. Lower extremity exercise program x20  reps                       Time Tracking:     PT Received On: 04/24/24  PT Start Time: 1326     PT Stop Time: 1345  PT Total Time (min): 19 min     Billable Minutes: Therapeutic Activity 19    Treatment Type: Treatment  PT/PTA: PT     Number of PTA visits since last PT visit: 0     04/24/2024

## 2024-04-24 NOTE — PLAN OF CARE
Problem: Physical Therapy  Goal: Physical Therapy Goal  Description: Goals to be met by: 2024     Patient will increase functional independence with mobility by performin. Supine to sit with MInimal Assistance  2. Sit to stand transfer with Minimal Assistance  3. Bed to chair transfer with Minimal Assistance using Rolling Walker  4. Gait  x 150 feet with Minimal Assistance using Rolling Walker.   5. Lower extremity exercise program x20 reps  Outcome: Progressing   PT eval and treat. Pt c/o pain and spasms. Assist x2 to sit EOB and to stand with RW. Few steps to commode then to chair. Mod intensity

## 2024-04-24 NOTE — PLAN OF CARE
04/23/24 6477   Patient Assessment/Suction   Level of Consciousness (AVPU) alert   Respiratory Effort Normal;Unlabored   Expansion/Accessory Muscles/Retractions expansion symmetric   All Lung Fields Breath Sounds diminished   Rhythm/Pattern, Respiratory pattern regular   Cough Frequency infrequent   Cough Type nonproductive   PRE-TX-O2   Device (Oxygen Therapy) nasal cannula   $ Is the patient on Low Flow Oxygen? Yes   Flow (L/min) (Oxygen Therapy) 2   SpO2 99 %   Pulse Oximetry Type Intermittent   Resp 18   ETCO2   $ ETCO2 Usage Currently wearing   ETCO2 (mmHg) 48 mmHg   ETCO2 Device Type Bedside Monitor   Aerosol Therapy   $ Aerosol Therapy Charges PRN treatment not required   Incentive Spirometer   $ Incentive Spirometer Charges done with encouragement   Administration (IS) instruction provided, follow-up   Number of Repetitions (IS) 10   Level Incentive Spirometer (mL) 1500   Patient Tolerance (IS) no adverse signs/symptoms present   Tobacco Cessation Intervention   Do you use any type of tobacco product? Yes   Are you interested in quitting use of tobacco products? Thinking about quitting   Are you interested in Nicotine Replacement for symptom relief? No   $ Smoking Cessation Charges Smoking Cessation - Intermediate (Non-CTTS)   Respiratory Evaluation   $ Care Plan Tech Time 15 min   Evaluation For New Orders   Admitting Diagnosis DDD   Pulmonary Diagnosis chronic bronchitis   Current Surgeries s/p lumbar fusion   Home Oxygen   Has Home Oxygen? No   Home Aerosol, MDI, DPI, and Other Treatments/Therapies   Home Respiratory Therapy Per Patient/Review of Chart No   Oxygen Care Plan   Oxygen Care Plan Per Protocol   SPO2 Goal (%) 92% non-cardiac   Rationale SpO2 is <92% (Non-cardiac Pt.)   Bronchodilator Care Plan   Bronchodilator Care Plan Aerosol   Aerosol Meds w/ frequency Albuterol - Ipratropium (DUO-NEB) 0.5mg-3mg(2.5ml) 3ml Nebulizer Solution2.5mg PRN   Atelectasis Care Plan   Atelectasis Care Plan  Incentive Spiromentry   Frequency TID   I.S. Goal (ml) 2120 ml   I.S. Minimum (ml) 1060 ml   Rationale Post-op abdominal   Airway Clearance Care Plan   Rationale No rationale found

## 2024-04-24 NOTE — SUBJECTIVE & OBJECTIVE
Interval History:  Complaining of significant back spasms.  Currently on intravenous narcotic infusion.  Agreeable to receive Nicoderm.  Starting to utilize incentive spirometer.  Family member present at bedside.  Sluggish bowel sounds present.  Diet is being downgraded to full liquids for now.    Review of Systems   Musculoskeletal:  Positive for back pain.   All other systems reviewed and are negative.    Objective:     Vital Signs (Most Recent):  Temp: 98.6 °F (37 °C) (04/24/24 0735)  Pulse: (!) 111 (04/24/24 0850)  Resp: 20 (04/24/24 0850)  BP: 131/72 (04/24/24 0735)  SpO2: 95 % (04/24/24 0910) Vital Signs (24h Range):  Temp:  [98 °F (36.7 °C)-99.7 °F (37.6 °C)] 98.6 °F (37 °C)  Pulse:  [] 111  Resp:  [10-20] 20  SpO2:  [84 %-100 %] 95 %  BP: (122-167)/(58-86) 131/72     Weight: (!) 142.9 kg (315 lb)  Body mass index is 49.34 kg/m².    Intake/Output Summary (Last 24 hours) at 4/24/2024 0931  Last data filed at 4/24/2024 0745  Gross per 24 hour   Intake 6860.18 ml   Output 4620 ml   Net 2240.18 ml         Physical Exam  Vitals reviewed.   Constitutional:       Appearance: She is obese.   HENT:      Head: Normocephalic and atraumatic.      Mouth/Throat:      Mouth: Mucous membranes are dry.      Pharynx: Oropharynx is clear.   Eyes:      Extraocular Movements: Extraocular movements intact.      Pupils: Pupils are equal, round, and reactive to light.   Cardiovascular:      Rate and Rhythm: Normal rate and regular rhythm.      Pulses: Normal pulses.      Heart sounds: Normal heart sounds.   Pulmonary:      Effort: Pulmonary effort is normal.      Breath sounds: Normal breath sounds.   Abdominal:      General: Bowel sounds are decreased. There is distension.      Palpations: Abdomen is soft.      Tenderness: There is generalized abdominal tenderness.          Comments: [place] is a clean/dry/intact bandage over surgical dressing  Accordian drain leading from clean dry intact bandage with moderate amount of  blood   Genitourinary:     Comments: Claros cath present draining clear yellow urine  Musculoskeletal:         General: Normal range of motion.      Cervical back: Normal range of motion and neck supple.      Right lower leg: No edema.      Left lower leg: No edema.   Skin:     General: Skin is warm and dry.      Capillary Refill: Capillary refill takes less than 2 seconds.   Neurological:      General: No focal deficit present.      Mental Status: She is alert and oriented to person, place, and time. Mental status is at baseline.   Psychiatric:         Mood and Affect: Mood normal.         Behavior: Behavior normal.         Thought Content: Thought content normal.         Judgment: Judgment normal.             Significant Labs: All pertinent labs within the past 24 hours have been reviewed.  CBC:   Recent Labs   Lab 04/23/24  1908 04/24/24  0426   WBC 18.98* 19.23*   HGB 14.4 12.6   HCT 44.6 39.6    340     CMP:   Recent Labs   Lab 04/23/24 1908 04/24/24  0426   * 135*   K 5.7* 4.4    101   CO2 22* 26   * 196*   BUN 8 7   CREATININE 0.7 0.7   CALCIUM 8.3* 8.6*   PROT  --  6.8   ALBUMIN  --  3.1*   BILITOT  --  0.4   ALKPHOS  --  114   AST  --  98*   ALT  --  101*   ANIONGAP 11 8       Significant Imaging:   CTA chest:   1. No pulmonary embolus to the proximal segmental level, allowing for exam limitations.  2.  Mosaic attenuation in the lungs, a nonspecific finding with considerations to include air-trapping, mild pulmonary edema, pneumonitis, bronchiolitis.  3. Query hepatic steatosis.    Chest x-ray: Hypoventilatory change.

## 2024-04-24 NOTE — PLAN OF CARE
Problem: Adult Inpatient Plan of Care  Goal: Plan of Care Review  Outcome: Progressing  Goal: Patient-Specific Goal (Individualized)  Outcome: Progressing  Goal: Optimal Comfort and Wellbeing  Outcome: Progressing     Problem: Diabetes Comorbidity  Goal: Blood Glucose Level Within Targeted Range  Outcome: Progressing     Problem: Infection  Goal: Absence of Infection Signs and Symptoms  Outcome: Progressing     Problem: Pain Acute  Goal: Optimal Pain Control and Function  Outcome: Progressing     Problem: Fall Injury Risk  Goal: Absence of Fall and Fall-Related Injury  Outcome: Progressing     Problem: Spinal Surgery  Goal: Optimal Coping with Surgery  Outcome: Progressing  Goal: Absence of Infection Signs and Symptoms  Outcome: Progressing  Goal: Optimal Neurologic Function  Outcome: Progressing  Goal: Anesthesia/Sedation Recovery  Outcome: Progressing  Goal: Optimal Pain Control and Function  Outcome: Progressing  Goal: Nausea and Vomiting Relief  Outcome: Progressing  Goal: Effective Oxygenation and Ventilation  Outcome: Progressing

## 2024-04-24 NOTE — ASSESSMENT & PLAN NOTE
Impression:  Stable orthopedically.  Continues workup with the hospitalist.  Plan:  We will start ambulation if no contraindication per hospitalist.  Discontinue drain and Claros catheter today.

## 2024-04-24 NOTE — NURSING
Patient admits to room 315 from PACU via patient bed. On admission to the floor, patient is awake and alert, vocalizing significant pain. IVF infusing and Dilaudid PCA pump in place. Patient's spouse is present on patient's admission. Fall and safety precautions are immediately initiated and reviewed with an understanding vocalized. Reviewed facility policies as well as POC at this time. Information is obtained from patient and patient's spouse. Indwelling bejarano catheter in place, draining clear yellow urine.

## 2024-04-24 NOTE — ASSESSMENT & PLAN NOTE
POD 1   Continue to follow neurosurgery recommendations.  Needs aggressive incentive spirometry.  Follow hemoglobin and hematocrit closely.  Pain control with PO narcotics and antiemetics as needed.  Physical therapy as per Orthopedics protocol with fall precautions.  SCD's for DVT prophylaxis

## 2024-04-24 NOTE — HOSPITAL COURSE
Alert and oriented this morning.  Sitting up in bedside chair.  No respiratory distress. Does complain of some mild nausea when taking oxycodone though she has taken this in the past.  This has been switched to Dilaudid.  Nausea being managed with Zofran.

## 2024-04-24 NOTE — NURSING
Upon obtain VS at approximately 0025, patient noted and appeared to have been resting. Eyes were closed with easy and unlabored respirations evident. Patient noted to being snoring. Upon obtaining VS, patient does not awaken to voice on attempt. While patient continues to not respond to voice, attempt to shake patient's arm to awaken was made. After some time of shaking the patient's arm, patient does awaken. Upon waking up, patient does become, almost immediately, fully awake. Patient is oriented and does deny acute complaints, other than incisional pain. Noted elevated HR and while patient resting, oxygen saturation noted to be 88%-89%. Patient remains tachycardic when awake, but oxygen saturations does return to normal/stable reading. At 0043, contact made with Hospital Medicine provider, ARACELI Schuler, in which above noted information is provided with pertinent, relevant, and qualifying patient history being reviewed. Upon contacting, orders are received and initiated. Bolus started, telemetry and continuous pulse ox is initiated. Bolus continues to infuse and at 0218, ARACELI Schuler is provided update that patient remains sustaining in 120's for HR. Notified no other s/s reported by patient other than continued incisional pain. Upon contacting, orders are noted by provider. Ordered Metoprolol is administered with noticeable decrease in HR; 107 via telemetry at 0250. Patient is taken with radiology staff at 0251 for ordered CTA. Patient continues to deny SOB and/or chest pain. Patient returns from CTA and VS obtained. ARACELI Schuler is notified at 0337 that HR appears to be maintaining between 100-110 now. After some time with HR in the low 100's, noted via cardiac monitoring to increase into 120's again. Contact with provider again at 0608 in which results/impression of CTA results is given and reported increasing HR, back to the 120's. Upon contacting provider, ARACELI Schuler, no further orders are obtained.

## 2024-04-24 NOTE — PROGRESS NOTES
[facility]/Surg  Orthopedics  Progress Note    Patient Name: Loretta Lea  MRN: [id number]  Admission Date: 4/23/2024  Hospital Length of Stay: 1 days  Attending Provider: Gurjit Cassidy MD  Primary Care Provider: Magalie Euceda MD  Follow-up For: Procedure(s) (LRB):  FUSION, SPINE, LUMBAR, ALIF (Bilateral)  LAMINECTOMY, SPINE, LUMBAR, WITH FUSION (Bilateral)    Post-Operative Day: 1 Day Post-Op  Subjective:     Principal Problem:S/P lumbar spinal fusion    Principal Orthopedic Problem:       Interval History:  Stat CT scan of the chest was negative for pulmonary embolus.  Patient is alert and oriented this morning expected low back pain.    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       Current Facility-Administered Medications   Medication Dose Route Frequency Provider Last Rate Last Admin    acetaminophen tablet 650 mg  650 mg Oral Q4H PRN Anitha Rosario, JAMESON        acetaminophen tablet 650 mg  650 mg Oral Q6H PRN Krzysztof Chung PA        albuterol-ipratropium 2.5 mg-0.5 mg/3 mL nebulizer solution 3 mL  3 mL Nebulization Q6H PRN Anitha Rosario NP        aluminum-magnesium hydroxide-simethicone 200-200-20 mg/5 mL suspension 30 mL  30 mL Oral Q4H PRN Krzysztof Chung PA        bisacodyL suppository 10 mg  10 mg Rectal Daily Krzysztof Chung PA        ceFAZolin (ANCEF) 3 g in dextrose 5 % (D5W) 100 mL IVPB  3 g Intravenous Q8H Krzysztof Chung PA   Stopped at 04/24/24 0612    dextrose 10% bolus 125 mL 125 mL  12.5 g Intravenous PRN Anitha Rosario NP        dextrose 10% bolus 250 mL 250 mL  25 g Intravenous PRN Anitha Rosraio, NP        diphenhydrAMINE capsule 50 mg  50 mg Oral Q6H PRN Krzysztof Chung PA        docusate sodium capsule 100 mg  100 mg Oral Daily Krzysztof Chung PA        gabapentin capsule 300 mg  300 mg Oral QHS Krzysztof Chung  GALILEA AMEZQUITA   300 mg at 04/23/24 2144    glucagon (human recombinant) injection 1 mg  1 mg Intramuscular PRN Anitha Rosario NP        glucose chewable tablet 16 g  16 g Oral PRN Anitha Rosario NP        glucose chewable tablet 24 g  24 g Oral PRN Anitha Rosario NP        HYDROmorphone (PF) injection 2 mg  2 mg Intravenous Q3H PRN Krzysztof Chung PA        HYDROmorphone injection 1 mg  1 mg Intravenous Q3H PRN Krzysztof Chung PA        HYDROmorphone PCA syringe 6 mg/30 mL (0.2 mg/mL) NS   Intravenous Continuous Ralph Rivers MD   New Syringe/Bag at 04/23/24 2313    hydrOXYzine pamoate capsule 25 mg  25 mg Oral Q4H PRN Krzysztof Chung PA        insulin aspart U-100 pen 0-5 Units  0-5 Units Subcutaneous QID (AC + HS) PRN Anitha Rosario NP        lactated ringers infusion   Intravenous Continuous Krzysztof Chung  mL/hr at 04/24/24 0243 New Bag at 04/24/24 0243    magnesium oxide tablet 800 mg  800 mg Oral PRN Anitha Rosario NP        magnesium oxide tablet 800 mg  800 mg Oral PRN Anitha Rosario NP        metFORMIN ER 24hr tablet 1,000 mg  1,000 mg Oral BID WM Krzysztof Chung PA   1,000 mg at 04/23/24 2144    metoclopramide injection 10 mg  10 mg Intravenous Q8H Kryzsztof Chung PA   10 mg at 04/24/24 0539    mupirocin 2 % ointment   Nasal BID Gurjit Cassidy MD   1 g at 04/23/24 2146    naloxone 0.4 mg/mL injection 0.02 mg  0.02 mg Intravenous PRN Ralph Rivers MD        nicotine 21 mg/24 hr 1 patch  1 patch Transdermal Daily PRN Anitha Rosario NP        ondansetron injection 8 mg  8 mg Intravenous Q6H PRN Krzysztof Chung PA        oxyCODONE immediate release tablet 10 mg  10 mg Oral Q4H PRN Beninato, Darius., PA        oxyCODONE immediate release tablet 15 mg  15 mg Oral Q4H PRN Krzysztof Chung, PA        oxyCODONE immediate release tablet 5 mg  5 mg Oral Q4H PRN Krzysztof Chung, PA        pantoprazole injection 40 mg  40 mg  "Intravenous Daily Anitha Rosario NP   40 mg at 04/23/24 2146    potassium bicarbonate disintegrating tablet 35 mEq  35 mEq Oral PRN Anitha Rosario NP        potassium bicarbonate disintegrating tablet 50 mEq  50 mEq Oral PRN Anitha Rosario NP        potassium bicarbonate disintegrating tablet 60 mEq  60 mEq Oral PRN Anitha Rosario NP        prochlorperazine injection Soln 5 mg  5 mg Intravenous Q6H PRN Krzysztof Chung PA        pyridoxine (vitamin B6) tablet 25 mg  25 mg Oral Daily Krzysztof Chung PA        risperiDONE tablet 1 mg  1 mg Oral BID Krzysztof Chung PA   1 mg at 04/23/24 2144    scopolamine 1.3-1.5 mg (1 mg over 3 days) 1 patch  1 patch Transdermal Q3 Days Teofilo Tee MD   1 patch at 04/23/24 0946    senna-docusate 8.6-50 mg per tablet 1 tablet  1 tablet Oral BID Anitha Rosario NP   1 tablet at 04/23/24 2144    senna-docusate 8.6-50 mg per tablet 2 tablet  2 tablet Oral Nightly PRN Krzysztof Chung PA        sertraline tablet 150 mg  150 mg Oral QHS Krzysztof Chung PA   150 mg at 04/23/24 2224    simethicone chewable tablet 80 mg  1 tablet Oral QID PRN Anitha Rosario NP        sodium chloride 0.9% flush 10 mL  10 mL Intravenous Q12H PRN Anitha Rosario NP        sumatriptan tablet 100 mg  100 mg Oral Q2H PRN Krzysztof Chung PA        traZODone tablet 200 mg  200 mg Oral QHS Krzysztof Chung PA   100 mg at 04/23/24 2145     Objective:     Vital Signs (Most Recent):  Temp: 98.8 °F (37.1 °C) (04/24/24 0323)  Pulse: 103 (04/24/24 0323)  Resp: 19 (04/24/24 0739)  BP: 128/76 (04/24/24 0323)  SpO2: (!) 93 % (04/24/24 0400) Vital Signs (24h Range):  Temp:  [97.5 °F (36.4 °C)-99.7 °F (37.6 °C)] 98.8 °F (37.1 °C)  Pulse:  [] 103  Resp:  [10-20] 19  SpO2:  [90 %-100 %] 93 %  BP: (122-167)/(58-91) 128/76     Weight: (!) 142.9 kg (315 lb)  Height: 5' 7" (170.2 cm)  Body mass index is 49.34 kg/m².      Intake/Output Summary (Last 24 hours) " at 4/24/2024 0743  Last data filed at 4/24/2024 0543  Gross per 24 hour   Intake 4480 ml   Output 4620 ml   Net -140 ml        General    Nursing note and vitals reviewed.  Constitutional: She is oriented to person, place, and time.   Pulmonary/Chest: Effort normal. No respiratory distress.   Abdominal: Soft. She exhibits no distension.   Neurological: She is alert and oriented to person, place, and time.   Psychiatric: She has a normal mood and affect. Her behavior is normal.                Significant Labs: All pertinent labs within the past 24 hours have been reviewed.    Significant Imaging: I have reviewed all pertinent imaging results/findings.  CT scan  Assessment/Plan:     * S/P lumbar spinal fusion  Impression:  Stable orthopedically.  Continues workup with the hospitalist.  Plan:  We will start ambulation if no contraindication per hospitalist.  Discontinue drain and Claros catheter today.          Gurjit Cassidy MD  Orthopedics  [facility]/Surg

## 2024-04-24 NOTE — PLAN OF CARE
VSS. NAD. Resp E/U. Calm and cooperative. Speech appropriate. Tolerating clear liquids. Denies nausea. Pain controlled. IV patent and infusing.No swelling or redness. Dressing to Lt flank CDI. Accordion drain intact and draining. Claros intact and draining. PCA hand off performed. Family notified of patient status. All safety measures taken. Bed in low position. Bedrails up x2. Bed locked. Cleared by Anesthesia.

## 2024-04-24 NOTE — ASSESSMENT & PLAN NOTE
Patient's FSGs are controlled on current medication regimen.  Last A1c reviewed-   Lab Results   Component Value Date    HGBA1C 9.7 (H) 02/08/2023     Most recent fingerstick glucose reviewed-   Recent Labs   Lab 04/23/24 2123 04/24/24  0748   POCTGLUCOSE 216* 178*     Current correctional scale  Low  Maintain anti-hyperglycemic dose as follows-   Antihyperglycemics (From admission, onward)    Start     Stop Route Frequency Ordered    04/23/24 2045  metFORMIN ER 24hr tablet 1,000 mg         -- Oral 2 times daily with meals 04/23/24 2042 04/23/24 2041  insulin aspart U-100 pen 0-5 Units         -- SubQ Before meals & nightly PRN 04/23/24 2042        Hold Oral hypoglycemics while patient is in the hospital.

## 2024-04-24 NOTE — PROGRESS NOTES
04/24/24 0900   Vital Signs   SpO2 (!) 84 %   Device (Oxygen Therapy) room air     Notified per monitor tech, Pt O2sats 83-84%. Pt denies SOB. O2 2 NC liters placed. Sats now 95%. Updated primary RN Wendy.

## 2024-04-24 NOTE — NURSING
Nurses Note -- 4 Eyes      4/23/2024   8:48 PM      Skin assessed during: Admit      [x] No Altered Skin Integrity Present - no further altered skin integrity noted beyond surgical sites.    [x]Prevention Measures Documented      [] Yes- Altered Skin Integrity Present or Discovered   [] LDA Added if Not in Epic (Describe Wound)   [] New Altered Skin Integrity was Present on Admit and Documented in LDA   [] Wound Image Taken    Wound Care Consulted? No    Attending Nurse:  Pernell Renee RN     Second RN/Staff Member:   Anne Brooks RN,

## 2024-04-24 NOTE — PLAN OF CARE
[facility] - Med/Surg  Initial Discharge Assessment       Primary Care Provider: Magalie Euceda MD    Admission Diagnosis: Lumbar foraminal stenosis [M48.061]  Spondylolisthesis of lumbar region [M43.16]  Disc degeneration, lumbar [M51.36]  DDD (degenerative disc disease), lumbar [M51.36]    Admission Date: 4/23/2024  Expected Discharge Date: 4/25/2024    Met with pt and completed discharge assessment, verified pharmacy and information on facesheet.  No HH, dialysis or coumadin. Pt has TLSO brace.  Isauro Camacho will drive pt home.  HH, RW and BSC have been ordered for pt.    Transition of Care Barriers: None    Payor: MEDICAID / Plan: [employer] [facility] / Product Type: Managed Medicaid /     Extended Emergency Contact Information  Primary Emergency Contact: Isauro Funk  Address: [address] [place] [address]           [place] [address] [facility]  Home Phone: [phone]  Mobile Phone: [phone]  Relation: Spouse    Discharge Plan A: Home  Discharge Plan B: Home    [id number] - [place] - [address] AT [facility] [address]  [address] [place] [address]  Phone: [phone] Fax: [phone]      Initial Assessment (most recent)       Adult Discharge Assessment - 04/24/24 0951          Discharge Assessment    Assessment Type Discharge Planning Assessment     Confirmed/corrected address, phone number and insurance Yes     Confirmed Demographics Correct on Facesheet     Source of Information patient     Communicated PEDRO with patient/caregiver No     People in Home significant other     Do you expect to return to your current living situation? Yes     Prior to hospitilization cognitive status: Alert/Oriented     Current cognitive status: Alert/Oriented     Walking or Climbing Stairs Difficulty no     Dressing/Bathing Difficulty no     Home Accessibility not wheelchair accessible;stairs to enter home     Number of Stairs,  Main Entrance three     Home Layout Able to live on 1st floor     Equipment Currently Used at Home --   TLSO brace    Readmission within 30 days? No     Patient currently being followed by outpatient case management? No     Do you currently have service(s) that help you manage your care at home? No     Do you take prescription medications? Yes     Do you have prescription coverage? Yes     Do you have any problems affording any of your prescribed medications? No     Is the patient taking medications as prescribed? yes     Who is going to help you get home at discharge? alfred Box     How do you get to doctors appointments? family or friend will provide     Are you on dialysis? No     Do you take coumadin? No     Discharge Plan A Home     Discharge Plan B Home     DME Needed Upon Discharge  bedside commode;walker, rolling     Discharge Plan discussed with: Patient     Transition of Care Barriers None

## 2024-04-24 NOTE — PT/OT/SLP EVAL
Occupational Therapy   Evaluation    Name: Loretta Lea  MRN: [id number]  Admitting Diagnosis: S/P lumbar spinal fusion  Recent Surgery: Procedure(s) (LRB):  FUSION, SPINE, LUMBAR, ALIF (Bilateral)  LAMINECTOMY, SPINE, LUMBAR, WITH FUSION (Bilateral) 1 Day Post-Op    Recommendations:     Discharge Recommendations:  (Per CM note, pt requested Outpatient therapy)  Discharge Equipment Recommendations:  bedside commode, bath bench, other (see comments) (OT provided education in use of tub transfer bench and AE for lower body ADL's. Pt verbalized understanding and will obtain additional AE on own if desired.)  Patient has a mobility limitation that significantly impairs their ability to participate in one or more mobility related activities of daily living, including toileting. This deficit can be resolved by using a bedside commode. Patient demonstrates mobility limitations that will cause them to be confined to one room at home without bathroom access. Using a bedside commode will greatly improve the patient's ability to participate in MRADLs.    Barriers to discharge:       Assessment:     Loretta Lea is a 33 y.o. female with a medical diagnosis of S/P lumbar spinal fusion.  She presents with the following performance deficits affecting function: weakness, impaired endurance, impaired self care skills, impaired functional mobility, gait instability, impaired balance, decreased lower extremity function, pain, decreased ROM, orthopedic precautions.      Rehab Prognosis: Good; patient would benefit from acute skilled OT services to address these deficits and reach maximum level of function.       Plan:     Patient to be seen 5 x/week to address the above listed problems via self-care/home management, therapeutic activities, therapeutic exercises  Plan of Care Expires: 05/22/24  Plan of Care Reviewed with: patient, spouse    Subjective     Chief Complaint: pain/spasms-Nurse Wendy aware: Repositioning  provided  Patient/Family Comments/goals: To get better and go home    Occupational Profile:  Living Environment: Pt lives with SO and roommate in mobile home with 3 DOMINIK. Pt has a tub/shower with grab bar and standard toilet. OT provided education in use of tub transfer bench as needed. 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  Previous level of function: Required assistance with ADL's for past few months.  Roles and Routines: SO  Equipment Used at Home: none  Assistance upon Discharge: SO    Pain/Comfort:  Pain Rating 1: 6/10 (screams with what she says are spasms; Nurse Nguyen aware)  Location 1: lumbar spine  Pain Addressed 1: Nurse notified, Reposition  Pain Rating Post-Intervention 1: 6/10    Patients cultural, spiritual, Druze conflicts given the current situation:      Objective:     Communicated with: Nurse Nguyen prior to session.  Patient found HOB elevated with bed alarm, peripheral IV, telemetry, pulse ox (continuous), oxygen upon OT entry to room.    General Precautions: Standard, fall  Orthopedic Precautions: spinal precautions  Braces: TLSO  Respiratory Status: Nasal cannula, flow 2 L/min    Occupational Performance:    Bed Mobility:    Patient completed Scooting/Bridging with maximal assistance and 2 persons      Activities of Daily Living:  Feeding:  independence    Grooming: stand by assistance set up with HOB elevated  Bathing: maximal assistance    Upper Body Dressing: minimum assistance    Lower Body Dressing: maximal assistance    Toileting: maximal assistance      Cognitive/Visual Perceptual:  Pt alert and oriented    Physical Exam:  Upper Extremity Strength:    -       Right Upper Extremity: WFL  -       Left Upper Extremity: WFL    AMPAC 6 Click ADL:  AMPAC Total Score: 15    Treatment & Education:  OT provided education in role of OT. Patient verbalized understanding and participated in OT.  OT provided instruction in home safety and spinal precautions with  ADL/IADL including review of home set up and DME/AE. Patient verbalized understanding. Further training indicated.  OT provided education in calling for assist. Patient verbalized understanding.        Patient left HOB elevated with all lines intact, call button in reach, bed alarm on, Nurse Wendy notified, and SO present    GOALS:   Multidisciplinary Problems       Occupational Therapy Goals          Problem: Occupational Therapy    Goal Priority Disciplines Outcome Interventions   Occupational Therapy Goal     OT, PT/OT     Description: Goals to be met by: 5/22/24     Patient will increase functional independence with ADLs by performing:    UE Dressing with Modified [place].  LE Dressing with Modified [place].  Grooming while standing at sink with Modified [place].  Toileting from bedside commode with Modified [place] for hygiene and clothing management.   Bathing from  shower chair/bench with Modified [place].  Toilet transfer to bedside commode with Modified [place].  Increased strength and functional activity tolerance for ADL's/IADL's                         History:     Past Medical History:   Diagnosis Date    Anxiety     Back pain     Bipolar disorder 2004    bipolar 2    Chronic bronchitis     Depression     Diabetes mellitus     GERD (gastroesophageal reflux disease)     HPV (human papilloma virus) infection     Insomnia     Migraines     Non-proliferative diabetic retinopathy, mild, both eyes 09/25/2023    Obese body habitus     Ovarian cyst     Pneumonia     PONV (postoperative nausea and vomiting)          Past Surgical History:   Procedure Laterality Date    ANTERIOR LUMBAR INTERBODY FUSION (ALIF) Bilateral 4/23/2024    Procedure: FUSION, SPINE, LUMBAR, ALIF;  Surgeon: Gurjit Cassidy MD;  Location: [facility];  Service: Orthopedics;  Laterality: Bilateral;    APPENDECTOMY      ARTHROSCOPY OF ANKLE Right 05/04/2022    Procedure: ARTHROSCOPY, ANKLE;  Surgeon: Bimal MERCADO  RUSTY Mccormick;  Location: [facility];  Service: Podiatry;  Laterality: Right;  CURT EXTERNAL ANKLE DISTRACTOR    CHOLECYSTECTOMY      DILATION AND CURETTAGE OF UTERUS      HYSTERECTOMY  2017    total, ovarian cysts, torsion, [place]; hyst per Dr JESUS Manriquez    LAPAROSCOPIC APPENDECTOMY N/A 08/18/2021    Procedure: APPENDECTOMY, LAPAROSCOPIC;  Surgeon: Osiel Ramirez MD;  Location: [facility];  Service: General;  Laterality: N/A;    LUMBAR LAMINECTOMY WITH FUSION Bilateral 4/23/2024    Procedure: LAMINECTOMY, SPINE, LUMBAR, WITH FUSION;  Surgeon: Gurjit Cassidy MD;  Location: [facility];  Service: Orthopedics;  Laterality: Bilateral;    OOPHORECTOMY      opherectom Left 2015    salpingo-opherectomy    REPAIR OF LIGAMENT OF ANKLE Right 06/23/2021    Procedure: REPAIR OF ANTERIOR TALOFIBULAR LIGAMENT CALCANEOFIBULAR LIGAMENT;  Surgeon: Bimal Mccormick DPM;  Location: [facility];  Service: Podiatry;  Laterality: Right;  WITH ARTHREX INTERNAL BRACE    REPAIR OF LIGAMENT OF ANKLE Right 4/27/2023    Procedure: REPAIR, LIGAMENT, ANKLE;  Surgeon: Aryan Poole DPM;  Location: [facility];  Service: Podiatry;  Laterality: Right;    REPAIR OF TENDON OF LOWER EXTREMITY Right 4/27/2023    Procedure: REPAIR, TENDON, LOWER EXTREMITY;  Surgeon: Aryan Poole DPM;  Location: [facility];  Service: Podiatry;  Laterality: Right;  arthrex    SURGICAL REMOVAL OF BONE SPUR Right 06/23/2021    Procedure: EXCISION OS TRIGONUM;  Surgeon: Bimal Mccormick DPM;  Location: [facility];  Service: Podiatry;  Laterality: Right;    WISDOM TOOTH EXTRACTION         Time Tracking:     OT Date of Treatment: 04/24/24  OT Start Time: 1107  OT Stop Time: 1138  OT Total Time (min): 31 min    Billable Minutes:Evaluation 8  Self Care/Home Management 23 4/24/2024

## 2024-04-24 NOTE — PROGRESS NOTES
[facility] Medicine  Progress Note    Patient Name: Loretta Lea  MRN: [id number]  Patient Class: IP- Inpatient   Admission Date: 4/23/2024  Length of Stay: 1 days  Attending Physician: Gurjit Cassidy MD  Primary Care Provider: Magalie Euceda MD        Subjective:     Principal Problem:S/P lumbar spinal fusion        HPI:  Loretta Lea is a 33 year old female with a previous medical history of that includes but is not limited to DMII, GERD, lumbar disc degeneration, chronic back pain, anxiety and bipolar disorder  who is POD 0 with Dr. Cassidy for an anterior lumbar interbody fusion. The patient has been in the care of Dr. Cassidy since 1/8/24 for lower back pain radiating down bilateral legs with associated numbness left more so than right. The patient performed six months of physical therapy and opioid medication with minimal relief. Patient had follow-up visit with Dr. Cassidy on 3/14/24 to discuss Mri results performed 3/7/24 showing lumbar disc degeneration and Spondylolisthesis of lumbar region. Options were discussed and patient elected to have surgery. Patient admitted by hospital medicine for medical management.     Overview/Hospital Course:  No notes on file    Interval History:  Complaining of significant back spasms.  Currently on intravenous narcotic infusion.  Agreeable to receive Nicoderm.  Starting to utilize incentive spirometer.  Family member present at bedside.  Sluggish bowel sounds present.  Diet is being downgraded to full liquids for now.    Review of Systems   Musculoskeletal:  Positive for back pain.   All other systems reviewed and are negative.    Objective:     Vital Signs (Most Recent):  Temp: 98.6 °F (37 °C) (04/24/24 0735)  Pulse: (!) 111 (04/24/24 0850)  Resp: 20 (04/24/24 0850)  BP: 131/72 (04/24/24 0735)  SpO2: 95 % (04/24/24 0910) Vital Signs (24h Range):  Temp:  [98 °F (36.7 °C)-99.7 °F (37.6 °C)] 98.6 °F (37  °C)  Pulse:  [] 111  Resp:  [10-20] 20  SpO2:  [84 %-100 %] 95 %  BP: (122-167)/(58-86) 131/72     Weight: (!) 142.9 kg (315 lb)  Body mass index is 49.34 kg/m².    Intake/Output Summary (Last 24 hours) at 4/24/2024 0931  Last data filed at 4/24/2024 0745  Gross per 24 hour   Intake 6860.18 ml   Output 4620 ml   Net 2240.18 ml         Physical Exam  Vitals reviewed.   Constitutional:       Appearance: She is obese.   HENT:      Head: Normocephalic and atraumatic.      Mouth/Throat:      Mouth: Mucous membranes are dry.      Pharynx: Oropharynx is clear.   Eyes:      Extraocular Movements: Extraocular movements intact.      Pupils: Pupils are equal, round, and reactive to light.   Cardiovascular:      Rate and Rhythm: Normal rate and regular rhythm.      Pulses: Normal pulses.      Heart sounds: Normal heart sounds.   Pulmonary:      Effort: Pulmonary effort is normal.      Breath sounds: Normal breath sounds.   Abdominal:      General: Bowel sounds are decreased. There is distension.      Palpations: Abdomen is soft.      Tenderness: There is generalized abdominal tenderness.          Comments: Red [place] is a clean/dry/intact bandage over surgical dressing  Accordian drain leading from clean dry intact bandage with moderate amount of blood   Genitourinary:     Comments: Claros cath present draining clear yellow urine  Musculoskeletal:         General: Normal range of motion.      Cervical back: Normal range of motion and neck supple.      Right lower leg: No edema.      Left lower leg: No edema.   Skin:     General: Skin is warm and dry.      Capillary Refill: Capillary refill takes less than 2 seconds.   Neurological:      General: No focal deficit present.      Mental Status: She is alert and oriented to person, place, and time. Mental status is at baseline.   Psychiatric:         Mood and Affect: Mood normal.         Behavior: Behavior normal.         Thought Content: Thought content normal.          Judgment: Judgment normal.             Significant Labs: All pertinent labs within the past 24 hours have been reviewed.  CBC:   Recent Labs   Lab 04/23/24 1908 04/24/24  0426   WBC 18.98* 19.23*   HGB 14.4 12.6   HCT 44.6 39.6    340     CMP:   Recent Labs   Lab 04/23/24 1908 04/24/24  0426   * 135*   K 5.7* 4.4    101   CO2 22* 26   * 196*   BUN 8 7   CREATININE 0.7 0.7   CALCIUM 8.3* 8.6*   PROT  --  6.8   ALBUMIN  --  3.1*   BILITOT  --  0.4   ALKPHOS  --  114   AST  --  98*   ALT  --  101*   ANIONGAP 11 8       Significant Imaging:   CTA chest:   1. No pulmonary embolus to the proximal segmental level, allowing for exam limitations.  2.  Mosaic attenuation in the lungs, a nonspecific finding with considerations to include air-trapping, mild pulmonary edema, pneumonitis, bronchiolitis.  3. Query hepatic steatosis.    Chest x-ray: Hypoventilatory change.     Assessment/Plan:      * S/P lumbar spinal fusion  POD 1   Continue to follow neurosurgery recommendations.  Needs aggressive incentive spirometry.  Follow hemoglobin and hematocrit closely.  Pain control with PO narcotics and antiemetics as needed.  Physical therapy as per Orthopedics protocol with fall precautions.  SCD's for DVT prophylaxis        Uncontrolled type 2 diabetes mellitus with hyperglycemia  Patient's FSGs are controlled on current medication regimen.  Last A1c reviewed-   Lab Results   Component Value Date    HGBA1C 9.7 (H) 02/08/2023     Most recent fingerstick glucose reviewed-   Recent Labs   Lab 04/23/24 2123 04/24/24  0748   POCTGLUCOSE 216* 178*     Current correctional scale  Low  Maintain anti-hyperglycemic dose as follows-   Antihyperglycemics (From admission, onward)      Start     Stop Route Frequency Ordered    04/23/24 2045  metFORMIN ER 24hr tablet 1,000 mg         -- Oral 2 times daily with meals 04/23/24 2042 04/23/24 2041  insulin aspart U-100 pen 0-5 Units         -- SubQ Before meals & nightly  PRN 04/23/24 2042          Hold Oral hypoglycemics while patient is in the hospital.    Cigarette smoker  Smoking cessation counseling performed. Dangers of cigarette smoking were reviewed with patient in detail and patient was encouraged to quit. Nicotine replacement options were discussed for > 10 minutes.  Nicoderm 21 mg daily ordered.      Chest x-ray results reviewed.  Patient encouraged to aggressively continue use of incentive spirometer.  Discussed with patient's significant other at bedside.  Patient encouraged to work with PT and OT.  VTE Risk Mitigation (From admission, onward)           Ordered     IP VTE LOW RISK PATIENT  Once         04/23/24 0907     Place ZAKI hose  Until discontinued         04/23/24 0907     Place sequential compression device  Until discontinued         04/23/24 0907                    Discharge Planning   PEDRO: 4/25/2024     Code Status: Full Code   Is the patient medically ready for discharge?:     Reason for patient still in hospital (select all that apply): Patient trending condition and Consult recommendations  Discharge Plan A: Home                  Jagruti Moncada MD  Department of Hospital Medicine   [facility]/Surg

## 2024-04-24 NOTE — HPI
Postoperative day 4.  Following lumbar laminectomy and spinal fusion.  Postoperative low back pain continues and we are having some mild difficulty managing her pain with combination of opiate medication and Toradol.  She has transitioned from oxycodone to Dilaudid.  She is experiencing better pain control and less nausea.

## 2024-04-24 NOTE — H&P
[facility] Medicine  History & Physical    Patient Name: Loretta Lea  MRN: [id number]  Patient Class: IP- Inpatient  Admission Date: 4/23/2024  Attending Physician: Gurjit Cassidy MD   Primary Care Provider: Magalie Euceda MD         Patient information was obtained from patient, past medical records, and ER records.     Subjective:     Principal Problem:S/P lumbar spinal fusion    Chief Complaint: No chief complaint on file.       HPI: Loretta Lea is a 33 year old female with a previous medical history of that includes but is not limited to DMII, GERD, lumbar disc degeneration, chronic back pain, anxiety and bipolar disorder  who is POD 0 with Dr. Cassidy for an anterior lumbar interbody fusion. The patient has been in the care of Dr. Cassidy since 1/8/24 for lower back pain radiating down bilateral legs with associated numbness left more so than right. The patient performed six months of physical therapy and opioid medication with minimal relief. Patient had follow-up visit with Dr. Cassidy on 3/14/24 to discuss Mri results performed 3/7/24 showing lumbar disc degeneration and Spondylolisthesis of lumbar region. Options were discussed and patient elected to have surgery. Patient admitted by hospital medicine for medical management.     Past Medical History:   Diagnosis Date    Anxiety     Back pain     Bipolar disorder 2004    bipolar 2    Chronic bronchitis     Depression     Diabetes mellitus     GERD (gastroesophageal reflux disease)     HPV (human papilloma virus) infection     Insomnia     Migraines     Non-proliferative diabetic retinopathy, mild, both eyes 09/25/2023    Obese body habitus     Ovarian cyst     Pneumonia     PONV (postoperative nausea and vomiting)        Past Surgical History:   Procedure Laterality Date    APPENDECTOMY      ARTHROSCOPY OF ANKLE Right 05/04/2022    Procedure: ARTHROSCOPY, ANKLE;  Surgeon: Bimal Mccormick,  RUSTY;  Location: [facility] OR;  Service: Podiatry;  Laterality: Right;  CURT EXTERNAL ANKLE DISTRACTOR    CHOLECYSTECTOMY      DILATION AND CURETTAGE OF UTERUS      HYSTERECTOMY  2017    total, ovarian cysts, torsion, [place]; hyst per Dr JESUS Manriquez    LAPAROSCOPIC APPENDECTOMY N/A 08/18/2021    Procedure: APPENDECTOMY, LAPAROSCOPIC;  Surgeon: Osile Ramirez MD;  Location: [facility];  Service: General;  Laterality: N/A;    OOPHORECTOMY      opherectom Left 2015    salpingo-opherectomy    REPAIR OF LIGAMENT OF ANKLE Right 06/23/2021    Procedure: REPAIR OF ANTERIOR TALOFIBULAR LIGAMENT CALCANEOFIBULAR LIGAMENT;  Surgeon: Bimal Mccormick DPM;  Location: [facility] OR;  Service: Podiatry;  Laterality: Right;  WITH ARTHREX INTERNAL BRACE    REPAIR OF LIGAMENT OF ANKLE Right 4/27/2023    Procedure: REPAIR, LIGAMENT, ANKLE;  Surgeon: Aryan Poole DPM;  Location: [facility] OR;  Service: Podiatry;  Laterality: Right;    REPAIR OF TENDON OF LOWER EXTREMITY Right 4/27/2023    Procedure: REPAIR, TENDON, LOWER EXTREMITY;  Surgeon: Aryan Poole DPM;  Location: [facility] OR;  Service: Podiatry;  Laterality: Right;  arthrex    SURGICAL REMOVAL OF BONE SPUR Right 06/23/2021    Procedure: EXCISION OS TRIGONUM;  Surgeon: Bimal Mccormick DPM;  Location: [facility] OR;  Service: Podiatry;  Laterality: Right;    WISDOM TOOTH EXTRACTION         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       Current Facility-Administered Medications   Medication Dose Route Frequency Provider Last Rate Last Admin    acetaminophen tablet 650 mg  650 mg Oral Q4H PRN Anitha Rosario NP        [START ON 4/24/2024] acetaminophen tablet 650 mg  650 mg Oral Q6H PRN Krzysztof Chung PA        albuterol-ipratropium 2.5 mg-0.5 mg/3 mL nebulizer solution 3 mL  3 mL Nebulization Q6H PRN Anitha Rosario NP        aluminum-magnesium hydroxide-simethicone 200-200-20 mg/5 mL suspension 30  mL  30 mL Oral Q4H PRN Krzysztof Chung PA        [START ON 4/24/2024] bisacodyL suppository 10 mg  10 mg Rectal Daily Krzysztof Chung PA        ceFAZolin (ANCEF) 3 g in dextrose 5 % (D5W) 100 mL IVPB  3 g Intravenous Q8H Krzysztof Chung PA        dextrose 10% bolus 125 mL 125 mL  12.5 g Intravenous PRN Anitha Rosario NP        dextrose 10% bolus 250 mL 250 mL  25 g Intravenous PRN Anitha Rosario NP        diphenhydrAMINE capsule 50 mg  50 mg Oral Q6H PRN Krzysztof Chung PA        [START ON 4/24/2024] docusate sodium capsule 100 mg  100 mg Oral Daily Krzysztof Chung PA        gabapentin capsule 300 mg  300 mg Oral QHS Krzysztof Chung PA        glucagon (human recombinant) injection 1 mg  1 mg Intramuscular PRN Anitha Rosario NP        glucose chewable tablet 16 g  16 g Oral PRN Anitha Rosario NP        glucose chewable tablet 24 g  24 g Oral PRN Anitha Rosario NP        HYDROmorphone (PF) injection 2 mg  2 mg Intravenous Q3H PRN Krzysztof Chung PA        HYDROmorphone injection 1 mg  1 mg Intravenous Q3H PRN Krzysztof Chung PA        HYDROmorphone PCA syringe 6 mg/30 mL (0.2 mg/mL) NS   Intravenous Continuous Ralph Rivers MD   New Syringe/Bag at 04/23/24 1958    hydrOXYzine pamoate capsule 25 mg  25 mg Oral Q4H PRN Krzysztof Chung PA        insulin aspart U-100 pen 0-5 Units  0-5 Units Subcutaneous QID (AC + HS) PRN Anitha Rsoario NP        lactated ringers infusion   Intravenous Continuous Krzysztof Chung  mL/hr at 04/23/24 2006 New Bag at 04/23/24 2006    magnesium oxide tablet 800 mg  800 mg Oral PRN Anitha Rosario NP        magnesium oxide tablet 800 mg  800 mg Oral PRN Anitha Rosario NP        metFORMIN ER 24hr tablet 1,000 mg  1,000 mg Oral BID WM Krzysztof Chung PA        metoclopramide injection 10 mg  10 mg Intravenous Q8H Krzysztof Chung PA        mupirocin 2 % ointment   Nasal BID Gurjit Cassidy  MD SUMIT        naloxone 0.4 mg/mL injection 0.02 mg  0.02 mg Intravenous PRN Ralph Rivers MD        nicotine 21 mg/24 hr 1 patch  1 patch Transdermal Daily PRN Anitha Rosario NP        ondansetron injection 8 mg  8 mg Intravenous Q6H PRN Krzysztof Chung PA        oxyCODONE immediate release tablet 10 mg  10 mg Oral Q4H PRN Krzysztof Chung PA        oxyCODONE immediate release tablet 15 mg  15 mg Oral Q4H PRN Krzysztof Chung PA        oxyCODONE immediate release tablet 5 mg  5 mg Oral Q4H PRN Krzysztof Chung PA        pantoprazole injection 40 mg  40 mg Intravenous Daily Anitha Rosario, NP        potassium bicarbonate disintegrating tablet 35 mEq  35 mEq Oral PRN Anitha Rosario, NP        potassium bicarbonate disintegrating tablet 50 mEq  50 mEq Oral PRN Anitha Rosario NP        potassium bicarbonate disintegrating tablet 60 mEq  60 mEq Oral PRN Anitha Rosario NP        prochlorperazine injection Soln 5 mg  5 mg Intravenous Q6H PRN Krzysztof Chung PA        [START ON 4/24/2024] pyridoxine (vitamin B6) tablet 25 mg  25 mg Oral Daily Krzysztof Chung PA        risperiDONE tablet 1 mg  1 mg Oral BID Krzysztof Chung PA        scopolamine 1.3-1.5 mg (1 mg over 3 days) 1 patch  1 patch Transdermal Q3 Days Teofilo Tee MD   1 patch at 04/23/24 0946    senna-docusate 8.6-50 mg per tablet 1 tablet  1 tablet Oral BID Anitha Rosario, NP        senna-docusate 8.6-50 mg per tablet 2 tablet  2 tablet Oral Nightly PRN Krzysztof Chung PA        [START ON 4/24/2024] sertraline tablet 150 mg  150 mg Oral Daily Krzysztof Chung PA        simethicone chewable tablet 80 mg  1 tablet Oral QID PRN Anitha Rosario, NP        sodium chloride 0.9% flush 10 mL  10 mL Intravenous Q12H PRN Anitha Rosario, NP        sumatriptan tablet 100 mg  100 mg Oral Q2H PRN Krzysztof Chung PA        traZODone tablet 200 mg  200 mg Oral QHS Krzysztof Chung, PA          Family History       Problem Relation (Age of Onset)    No Known Problems Sister, Sister, Sister          Tobacco Use    Smoking status: Every Day     Current packs/day: 1.50     Average packs/day: 1.5 packs/day for 18.0 years (27.0 ttl pk-yrs)     Types: Cigarettes     Passive exposure: Current    Smokeless tobacco: Never    Tobacco comments:     4/25/23--pt instructed no smoking 24hours before sx, pt voiced understanding.   Substance and Sexual Activity    Alcohol use: Not Currently     Comment: rare    Drug use: Not Currently     Comment: twice    Sexual activity: Yes     Partners: Male     Birth control/protection: OCP, None     Review of Systems   Musculoskeletal:  Positive for back pain.   All other systems reviewed and are negative.    Objective:     Vital Signs (Most Recent):  Temp: 98 °F (36.7 °C) (04/23/24 2036)  Pulse: 105 (04/23/24 2036)  Resp: 20 (04/23/24 2036)  BP: 134/79 (04/23/24 2036)  SpO2: 100 % (04/23/24 2036) Vital Signs (24h Range):  Temp:  [97.5 °F (36.4 °C)-99.7 °F (37.6 °C)] 98 °F (36.7 °C)  Pulse:  [] 105  Resp:  [10-20] 20  SpO2:  [90 %-100 %] 100 %  BP: (122-167)/(58-91) 134/79        Body mass index is 49.34 kg/m².     Physical Exam  Vitals reviewed.   Constitutional:       Appearance: She is obese.   HENT:      Head: Normocephalic and atraumatic.      Mouth/Throat:      Mouth: Mucous membranes are dry.      Pharynx: Oropharynx is clear.   Eyes:      Extraocular Movements: Extraocular movements intact.      Pupils: Pupils are equal, round, and reactive to light.   Cardiovascular:      Rate and Rhythm: Normal rate and regular rhythm.      Pulses: Normal pulses.      Heart sounds: Normal heart sounds.   Pulmonary:      Effort: Pulmonary effort is normal.      Breath sounds: Normal breath sounds.   Abdominal:      General: Bowel sounds are decreased. There is distension.      Palpations: Abdomen is soft.      Tenderness: There is generalized abdominal tenderness.           "Comments: Red [place] is a clean/dry/intact bandage over surgical dressing  Accordian drain leading from clean dry intact bandage with moderate amount of blood   Genitourinary:     Comments: Claros cath present draining clear yellow urine  Musculoskeletal:         General: Normal range of motion.      Cervical back: Normal range of motion and neck supple.      Right lower leg: No edema.      Left lower leg: No edema.   Skin:     General: Skin is warm and dry.      Capillary Refill: Capillary refill takes less than 2 seconds.   Neurological:      General: No focal deficit present.      Mental Status: She is alert and oriented to person, place, and time. Mental status is at baseline.   Psychiatric:         Mood and Affect: Mood normal.         Behavior: Behavior normal.         Thought Content: Thought content normal.         Judgment: Judgment normal.              CRANIAL NERVES     CN III, IV, VI   Pupils are equal, round, and reactive to light.       Significant Labs: All pertinent labs within the past 24 hours have been reviewed.  Pre-op labs reviewed    Significant Imaging: I have reviewed all pertinent imaging results/findings within the past 24 hours. None.  Assessment/Plan:     * S/P lumbar spinal fusion  POD 0 s/p 0 with Dr. Cassidy  Continue to follow neurosurgery recommendations.  Needs aggressive incentive spirometry.  Follow hemoglobin and hematocrit closely.  Pain control with IV narcotics and antiemetics as needed.  Physical therapy as per Orthopedics protocol with fall precautions.  SCD's for DVT prophylaxis        Uncontrolled type 2 diabetes mellitus with hyperglycemia  Patient's FSGs are controlled on current medication regimen.  Last A1c reviewed-   Lab Results   Component Value Date    HGBA1C 9.7 (H) 02/08/2023     Most recent fingerstick glucose reviewed- No results for input(s): "POCTGLUCOSE" in the last 24 hours.  Current correctional scale  Low  Maintain anti-hyperglycemic dose as follows- "   Antihyperglycemics (From admission, onward)      None          Hold Oral hypoglycemics while patient is in the hospital.    Cigarette smoker  Assistance with smoking cessation was offered, including:  [x]  Medications  []  Counseling  []  Printed Information on Smoking Cessation  []  Referral to a Smoking Cessation Program    Patient was counseled regarding smoking for 3-10 minutes.   Prn nicotine patch ordered        VTE Risk Mitigation (From admission, onward)           Ordered     IP VTE LOW RISK PATIENT  Once         04/23/24 0907     Place ZAKI hose  Until discontinued         04/23/24 0907     Place sequential compression device  Until discontinued         04/23/24 0907                               Pt prepared for surgery. Pt resting in bed, with family/friend at bedside. Family signed up for text messaging. Belongings kept by  IS teaching performed. Fall risk armband placed. Allergy armband placed. SCDs on.     Anitha Rosario NP  Department of Hospital Medicine  [facility]/Surg

## 2024-04-24 NOTE — SUBJECTIVE & OBJECTIVE
Principal Problem:S/P lumbar spinal fusion    Principal Orthopedic Problem:       Interval History:  Stat CT scan of the chest was negative for pulmonary embolus.  Patient is alert and oriented this morning expected low back pain.    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       Current Facility-Administered Medications   Medication Dose Route Frequency Provider Last Rate Last Admin    acetaminophen tablet 650 mg  650 mg Oral Q4H PRN Anitha Rosario, JAMESON        acetaminophen tablet 650 mg  650 mg Oral Q6H PRN Krzysztof Chung PA        albuterol-ipratropium 2.5 mg-0.5 mg/3 mL nebulizer solution 3 mL  3 mL Nebulization Q6H PRN Anitha Rosario NP        aluminum-magnesium hydroxide-simethicone 200-200-20 mg/5 mL suspension 30 mL  30 mL Oral Q4H PRN Krzysztof Chung PA        bisacodyL suppository 10 mg  10 mg Rectal Daily Krzysztof Chung PA        ceFAZolin (ANCEF) 3 g in dextrose 5 % (D5W) 100 mL IVPB  3 g Intravenous Q8H Krzysztof Chung PA   Stopped at 04/24/24 0612    dextrose 10% bolus 125 mL 125 mL  12.5 g Intravenous PRN Anitha Rosario NP        dextrose 10% bolus 250 mL 250 mL  25 g Intravenous PRN Anitha Rosario NP        diphenhydrAMINE capsule 50 mg  50 mg Oral Q6H PRN Krzysztof Chung PA        docusate sodium capsule 100 mg  100 mg Oral Daily Krzysztof Chung PA        gabapentin capsule 300 mg  300 mg Oral QHS Krzysztof Chung PA   300 mg at 04/23/24 2144    glucagon (human recombinant) injection 1 mg  1 mg Intramuscular PRN Anitha Rosario NP        glucose chewable tablet 16 g  16 g Oral PRN Anitha Rosario NP        glucose chewable tablet 24 g  24 g Oral PRN Anitha Rosario, JAMESON        HYDROmorphone (PF) injection 2 mg  2 mg Intravenous Q3H PRN Krzysztof Chung PA        HYDROmorphone injection 1 mg  1 mg Intravenous Q3H PRN Krzysztof Chung PA         HYDROmorphone PCA syringe 6 mg/30 mL (0.2 mg/mL) NS   Intravenous Continuous Ralph Rivers MD   New Syringe/Bag at 04/23/24 2313    hydrOXYzine pamoate capsule 25 mg  25 mg Oral Q4H PRN Krzysztof Chung PA        insulin aspart U-100 pen 0-5 Units  0-5 Units Subcutaneous QID (AC + HS) PRN Anitha Rosario NP        lactated ringers infusion   Intravenous Continuous Krzysztof Chugn  mL/hr at 04/24/24 0243 New Bag at 04/24/24 0243    magnesium oxide tablet 800 mg  800 mg Oral PRN Anitha Rosario NP        magnesium oxide tablet 800 mg  800 mg Oral PRN Anitha Rosario NP        metFORMIN ER 24hr tablet 1,000 mg  1,000 mg Oral BID WM Krzysztof Chung PA   1,000 mg at 04/23/24 2144    metoclopramide injection 10 mg  10 mg Intravenous Q8H Krzysztof Chung PA   10 mg at 04/24/24 0539    mupirocin 2 % ointment   Nasal BID Gurjit Cassidy MD   1 g at 04/23/24 2146    naloxone 0.4 mg/mL injection 0.02 mg  0.02 mg Intravenous PRN Ralph Rivers MD        nicotine 21 mg/24 hr 1 patch  1 patch Transdermal Daily PRN Anitha Rosario NP        ondansetron injection 8 mg  8 mg Intravenous Q6H PRN Krzysztof Chung PA        oxyCODONE immediate release tablet 10 mg  10 mg Oral Q4H PRN Krzysztof Chung PA        oxyCODONE immediate release tablet 15 mg  15 mg Oral Q4H PRN Krzysztof Chung PA        oxyCODONE immediate release tablet 5 mg  5 mg Oral Q4H PRN Krzysztof Chung PA        pantoprazole injection 40 mg  40 mg Intravenous Daily Anitha Rosario NP   40 mg at 04/23/24 2146    potassium bicarbonate disintegrating tablet 35 mEq  35 mEq Oral PRN Anitha Rosario NP        potassium bicarbonate disintegrating tablet 50 mEq  50 mEq Oral PRN Tommaseo, Anitha N, NP        potassium bicarbonate disintegrating tablet 60 mEq  60 mEq Oral PRN Anitha Rosario N, NP        prochlorperazine injection Soln 5 mg  5 mg Intravenous Q6H PRN Krzysztof Chung, PA         "pyridoxine (vitamin B6) tablet 25 mg  25 mg Oral Daily Krzysztof Chung PA        risperiDONE tablet 1 mg  1 mg Oral BID Krzysztof Chung PA   1 mg at 04/23/24 2144    scopolamine 1.3-1.5 mg (1 mg over 3 days) 1 patch  1 patch Transdermal Q3 Days Teofilo Tee MD   1 patch at 04/23/24 0946    senna-docusate 8.6-50 mg per tablet 1 tablet  1 tablet Oral BID Anitha Rosario NP   1 tablet at 04/23/24 2144    senna-docusate 8.6-50 mg per tablet 2 tablet  2 tablet Oral Nightly PRN Krzysztof Chung PA        sertraline tablet 150 mg  150 mg Oral QHS Krzysztof Chung PA   150 mg at 04/23/24 2224    simethicone chewable tablet 80 mg  1 tablet Oral QID PRN Anitha Rosario NP        sodium chloride 0.9% flush 10 mL  10 mL Intravenous Q12H PRN Anitha Rosario NP        sumatriptan tablet 100 mg  100 mg Oral Q2H PRN Krzysztof Chung PA        traZODone tablet 200 mg  200 mg Oral QHS Krzysztof Chung PA   100 mg at 04/23/24 2145     Objective:     Vital Signs (Most Recent):  Temp: 98.8 °F (37.1 °C) (04/24/24 0323)  Pulse: 103 (04/24/24 0323)  Resp: 19 (04/24/24 0739)  BP: 128/76 (04/24/24 0323)  SpO2: (!) 93 % (04/24/24 0400) Vital Signs (24h Range):  Temp:  [97.5 °F (36.4 °C)-99.7 °F (37.6 °C)] 98.8 °F (37.1 °C)  Pulse:  [] 103  Resp:  [10-20] 19  SpO2:  [90 %-100 %] 93 %  BP: (122-167)/(58-91) 128/76     Weight: (!) 142.9 kg (315 lb)  Height: 5' 7" (170.2 cm)  Body mass index is 49.34 kg/m².      Intake/Output Summary (Last 24 hours) at 4/24/2024 0743  Last data filed at 4/24/2024 0543  Gross per 24 hour   Intake 4480 ml   Output 4620 ml   Net -140 ml        General    Nursing note and vitals reviewed.  Constitutional: She is oriented to person, place, and time.   Pulmonary/Chest: Effort normal. No respiratory distress.   Abdominal: Soft. She exhibits no distension.   Neurological: She is alert and oriented to person, place, and time.   Psychiatric: She has a normal mood and " affect. Her behavior is normal.                Significant Labs: All pertinent labs within the past 24 hours have been reviewed.    Significant Imaging: I have reviewed all pertinent imaging results/findings.  CT scan

## 2024-04-24 NOTE — PT/OT/SLP EVAL
Physical Therapy Evaluation    Patient Name:  Loretta Lea   MRN:  [id number]    Recommendations:     Discharge Recommendations: Moderate Intensity Therapy   Discharge Equipment Recommendations: walker, rolling   Barriers to discharge: Decreased caregiver support    Assessment:     Loretta Lea is a 33 y.o. female admitted with a medical diagnosis of S/P lumbar spinal fusion.  She presents with the following impairments/functional limitations: weakness, impaired endurance, impaired functional mobility, gait instability, impaired balance, decreased lower extremity function, pain, decreased ROM, impaired cardiopulmonary response to activity, orthopedic precautions .    Pt seen supine in bed, alert with spouse present. Pt anxious with c/o pain/spasms in legs. Pt requesting bathroom use. Pt requiring mod/max assist x2 to roll to R side of bed and to sit EOB. Education on log rolling. Pt requiring extra time as she screams once spasms sets in- nurse Nguyen stated is waiting orders for muscle relaxants. Pt stood with RW assist x2 and few steps to commode- pt unsuccessful with voiding- pt transferred to chair and made comfortable.  Pt slow to mobilize and limited due to spasms.  Pt will benefit from mod intensity therapies if slow to progress.    Rehab Prognosis: Fair; patient would benefit from acute skilled PT services to address these deficits and reach maximum level of function.    Recent Surgery: Procedure(s) (LRB):  FUSION, SPINE, LUMBAR, ALIF (Bilateral)  LAMINECTOMY, SPINE, LUMBAR, WITH FUSION (Bilateral) 1 Day Post-Op    Plan:     During this hospitalization, patient to be seen BID to address the identified rehab impairments via gait training, therapeutic activities, therapeutic exercises, neuromuscular re-education and progress toward the following goals:    Plan of Care Expires:  05/30/24    Subjective   Pt stated that she has 3 steps to enter home. Spouse and nephew to assist  Chief Complaint:  spasms,pain and uncomfortable  Patient/Family Comments/goals: get well  Pain/Comfort:  Pain Rating 1: 10/10  Location 1: back  Pain Addressed 1: Pre-medicate for activity, Reposition, Distraction, Cessation of Activity, Nurse notified    Patients cultural, spiritual, Mandaeism conflicts given the current situation:      Living Environment:  Home with spouse  Prior to admission, patients level of function was ambulatory.  Equipment used at home: none.  DME owned (not currently used): none.  Upon discharge, patient will have assistance from family.    Objective:     Communicated with nurse Nguyen prior to session.  Patient found HOB elevated with telemetry, pulse ox (continuous), peripheral IV, oxygen, bed alarm  upon PT entry to room.    General Precautions: Standard, fall  Orthopedic Precautions:spinal precautions   Braces:  (has TLSO brace at bedside)  Respiratory Status: Nasal cannula, flow 2 L/min    Exams:  Postural Exam:  Patient presented with the following abnormalities:    -       Rounded shoulders  -       Forward head  -       BMI 49.34  RLE ROM: WFL  RLE Strength: Deficits: 3+/5  LLE ROM: WFL except limited by pain  LLE Strength: Deficits: 3/5    Functional Mobility:  Bed Mobility:     Rolling Right: maximal assistance  Scooting: maximal assistance  Supine to Sit: maximal assistance and of 2 persons  Transfers:     Sit to Stand:  moderate assistance and of 2 persons with rolling walker  Bed to Chair: moderate assistance and of 2 persons with  rolling walker  using  Stand Pivot  Toilet Transfer: moderate assistance and of 2 persons with  rolling walker  using  Stand Pivot and few small steps      AM-PAC 6 CLICK MOBILITY  Total Score:10       Treatment & Education:  Patient was educated on the importance of OOB activity and functional mobility to negate negative effects of prolonged bed rest during hospitalization, safe transfers and ambulation, and D/C planning   Thera ex to LE's  Educated on log  rolling  Commode use in an attempt to void but unsuccessful  OOB chair    Patient left up in chair with all lines intact, call button in reach, chair alarm on, nurse Wendy notified, and spouse present.    GOALS:   Multidisciplinary Problems       Physical Therapy Goals          Problem: Physical Therapy    Goal Priority Disciplines Outcome Goal Variances Interventions   Physical Therapy Goal     PT, PT/OT Progressing     Description: Goals to be met by: 2024     Patient will increase functional independence with mobility by performin. Supine to sit with MInimal Assistance  2. Sit to stand transfer with Minimal Assistance  3. Bed to chair transfer with Minimal Assistance using Rolling Walker  4. Gait  x 150 feet with Minimal Assistance using Rolling Walker.   5. Lower extremity exercise program x20 reps                       History:     Past Medical History:   Diagnosis Date    Anxiety     Back pain     Bipolar disorder     bipolar 2    Chronic bronchitis     Depression     Diabetes mellitus     GERD (gastroesophageal reflux disease)     HPV (human papilloma virus) infection     Insomnia     Migraines     Non-proliferative diabetic retinopathy, mild, both eyes 2023    Obese body habitus     Ovarian cyst     Pneumonia     PONV (postoperative nausea and vomiting)        Past Surgical History:   Procedure Laterality Date    ANTERIOR LUMBAR INTERBODY FUSION (ALIF) Bilateral 2024    Procedure: FUSION, SPINE, LUMBAR, ALIF;  Surgeon: Gurjit Cassidy MD;  Location: [facility] OR;  Service: Orthopedics;  Laterality: Bilateral;    APPENDECTOMY      ARTHROSCOPY OF ANKLE Right 2022    Procedure: ARTHROSCOPY, ANKLE;  Surgeon: Bimal Mccormick DPM;  Location: [facility] OR;  Service: Podiatry;  Laterality: Right;  CURT EXTERNAL ANKLE DISTRACTOR    CHOLECYSTECTOMY      DILATION AND CURETTAGE OF UTERUS      HYSTERECTOMY  2017    total, ovarian cysts, torsion, Berrault; hyst per Dr JESUS Manriquez    LAPAROSCOPIC  APPENDECTOMY N/A 08/18/2021    Procedure: APPENDECTOMY, LAPAROSCOPIC;  Surgeon: Osiel Ramirez MD;  Location: [facility];  Service: General;  Laterality: N/A;    LUMBAR LAMINECTOMY WITH FUSION Bilateral 4/23/2024    Procedure: LAMINECTOMY, SPINE, LUMBAR, WITH FUSION;  Surgeon: Gurjit Cassidy MD;  Location: [facility];  Service: Orthopedics;  Laterality: Bilateral;    OOPHORECTOMY      opherectom Left 2015    salpingo-opherectomy    REPAIR OF LIGAMENT OF ANKLE Right 06/23/2021    Procedure: REPAIR OF ANTERIOR TALOFIBULAR LIGAMENT CALCANEOFIBULAR LIGAMENT;  Surgeon: Bimal Mccormick DPM;  Location: [facility];  Service: Podiatry;  Laterality: Right;  WITH ARTHREX INTERNAL BRACE    REPAIR OF LIGAMENT OF ANKLE Right 4/27/2023    Procedure: REPAIR, LIGAMENT, ANKLE;  Surgeon: Aryan Poole DPM;  Location: [facility];  Service: Podiatry;  Laterality: Right;    REPAIR OF TENDON OF LOWER EXTREMITY Right 4/27/2023    Procedure: REPAIR, TENDON, LOWER EXTREMITY;  Surgeon: Aryan Poole DPM;  Location: [facility];  Service: Podiatry;  Laterality: Right;  arthrex    SURGICAL REMOVAL OF BONE SPUR Right 06/23/2021    Procedure: EXCISION OS TRIGONUM;  Surgeon: Bimal Mccormick DPM;  Location: [facility];  Service: Podiatry;  Laterality: Right;    WISDOM TOOTH EXTRACTION         Time Tracking:     PT Received On: 04/24/24  PT Start Time: 1155     PT Stop Time: 1238  PT Total Time (min): 43 min     Billable Minutes: Evaluation 13 and Therapeutic Activity 30      04/24/2024

## 2024-04-24 NOTE — PLAN OF CARE
Met with pt and she prefers outpt PT/OT at [facility] on [address], instead of HH.  Message sent to GALILEA Blankenship and ambulatory order placed.    Pending approval for MICHAEL and BSRADHA.     04/24/24 0956   Post-Acute Status   Post-Acute Authorization Home Health;HME   HME Status Pending payor review   Discharge Plan   Discharge Plan A Home   Discharge Plan B Home

## 2024-04-24 NOTE — SUBJECTIVE & OBJECTIVE
Past Medical History:   Diagnosis Date    Anxiety     Back pain     Bipolar disorder 2004    bipolar 2    Chronic bronchitis     Depression     Diabetes mellitus     GERD (gastroesophageal reflux disease)     HPV (human papilloma virus) infection     Insomnia     Migraines     Non-proliferative diabetic retinopathy, mild, both eyes 09/25/2023    Obese body habitus     Ovarian cyst     Pneumonia     PONV (postoperative nausea and vomiting)        Past Surgical History:   Procedure Laterality Date    APPENDECTOMY      ARTHROSCOPY OF ANKLE Right 05/04/2022    Procedure: ARTHROSCOPY, ANKLE;  Surgeon: Bimal Mccormick DPM;  Location: [facility];  Service: Podiatry;  Laterality: Right;  CURT EXTERNAL ANKLE DISTRACTOR    CHOLECYSTECTOMY      DILATION AND CURETTAGE OF UTERUS      HYSTERECTOMY  2017    total, ovarian cysts, torsion, [place]ra[place]; hyst per Dr JESUS Manriquez    LAPAROSCOPIC APPENDECTOMY N/A 08/18/2021    Procedure: APPENDECTOMY, LAPAROSCOPIC;  Surgeon: Osiel Ramirez MD;  Location: [facility];  Service: General;  Laterality: N/A;    OOPHORECTOMY      opherectom Left 2015    salpingo-opherectomy    REPAIR OF LIGAMENT OF ANKLE Right 06/23/2021    Procedure: REPAIR OF ANTERIOR TALOFIBULAR LIGAMENT CALCANEOFIBULAR LIGAMENT;  Surgeon: Bimal Mccormick DPM;  Location: [facility];  Service: Podiatry;  Laterality: Right;  WITH ARTHREX INTERNAL BRACE    REPAIR OF LIGAMENT OF ANKLE Right 4/27/2023    Procedure: REPAIR, LIGAMENT, ANKLE;  Surgeon: Aryan Poole DPM;  Location: [facility];  Service: Podiatry;  Laterality: Right;    REPAIR OF TENDON OF LOWER EXTREMITY Right 4/27/2023    Procedure: REPAIR, TENDON, LOWER EXTREMITY;  Surgeon: Aryan Poole DPM;  Location: [facility] OR;  Service: Podiatry;  Laterality: Right;  arthrex    SURGICAL REMOVAL OF BONE SPUR Right 06/23/2021    Procedure: EXCISION OS TRIGONUM;  Surgeon: Bimal Mccormick DPM;  Location: [facility] OR;  Service: Podiatry;  Laterality: Right;    WISDOM TOOTH EXTRACTION          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       Current Facility-Administered Medications   Medication Dose Route Frequency Provider Last Rate Last Admin    acetaminophen tablet 650 mg  650 mg Oral Q4H PRN Anitha Rosario NP        [START ON 4/24/2024] acetaminophen tablet 650 mg  650 mg Oral Q6H PRN Krzysztof Chung PA        albuterol-ipratropium 2.5 mg-0.5 mg/3 mL nebulizer solution 3 mL  3 mL Nebulization Q6H PRN Anitha Rosario NP        aluminum-magnesium hydroxide-simethicone 200-200-20 mg/5 mL suspension 30 mL  30 mL Oral Q4H PRN Krzysztof Chung PA        [START ON 4/24/2024] bisacodyL suppository 10 mg  10 mg Rectal Daily Krzysztof Chung PA        ceFAZolin (ANCEF) 3 g in dextrose 5 % (D5W) 100 mL IVPB  3 g Intravenous Q8H Krzysztof Chung PA        dextrose 10% bolus 125 mL 125 mL  12.5 g Intravenous PRN Anitha Rosario NP        dextrose 10% bolus 250 mL 250 mL  25 g Intravenous PRN Anitha Rosario NP        diphenhydrAMINE capsule 50 mg  50 mg Oral Q6H PRN Krzysztof Chung PA        [START ON 4/24/2024] docusate sodium capsule 100 mg  100 mg Oral Daily Krzysztof Chung PA        gabapentin capsule 300 mg  300 mg Oral QHS Krzysztof Chung PA        glucagon (human recombinant) injection 1 mg  1 mg Intramuscular PRN Anitha Rosario NP        glucose chewable tablet 16 g  16 g Oral PRN Anitha Rosario NP        glucose chewable tablet 24 g  24 g Oral PRN Anitha Rosario NP        HYDROmorphone (PF) injection 2 mg  2 mg Intravenous Q3H PRN Krzysztof Chung PA        HYDROmorphone injection 1 mg  1 mg Intravenous Q3H PRN Krzysztof Chung PA        HYDROmorphone PCA syringe 6 mg/30 mL (0.2 mg/mL) NS   Intravenous Continuous Ralph Rivers MD   New Syringe/Bag at 04/23/24 1958    hydrOXYzine pamoate capsule 25 mg  25 mg Oral Q4H PRN Krzysztof Chung  GALILEA AMEZQUITA        insulin aspart U-100 pen 0-5 Units  0-5 Units Subcutaneous QID (AC + HS) PRN Anitha Rosario NP        lactated ringers infusion   Intravenous Continuous Krzysztof Chung  mL/hr at 04/23/24 2006 New Bag at 04/23/24 2006    magnesium oxide tablet 800 mg  800 mg Oral PRN Anitha Rosario, NP        magnesium oxide tablet 800 mg  800 mg Oral PRN Anitha Rosario NP        metFORMIN ER 24hr tablet 1,000 mg  1,000 mg Oral BID WM Krzysztof Chung PA        metoclopramide injection 10 mg  10 mg Intravenous Q8H Krzysztof Chung PA        mupirocin 2 % ointment   Nasal BID Gurjit Cassidy MD        naloxone 0.4 mg/mL injection 0.02 mg  0.02 mg Intravenous PRN Ralph Rivers MD        nicotine 21 mg/24 hr 1 patch  1 patch Transdermal Daily PRN Anitha Rosario NP        ondansetron injection 8 mg  8 mg Intravenous Q6H PRN Krzysztof Chung PA        oxyCODONE immediate release tablet 10 mg  10 mg Oral Q4H PRN Krzysztof Chung PA        oxyCODONE immediate release tablet 15 mg  15 mg Oral Q4H PRN Krzysztof Chung PA        oxyCODONE immediate release tablet 5 mg  5 mg Oral Q4H PRN Krzysztof Chung PA        pantoprazole injection 40 mg  40 mg Intravenous Daily TomAnitha dumont, NP        potassium bicarbonate disintegrating tablet 35 mEq  35 mEq Oral PRN Anitha Rosario, NP        potassium bicarbonate disintegrating tablet 50 mEq  50 mEq Oral PRN Anitha Rosario, NP        potassium bicarbonate disintegrating tablet 60 mEq  60 mEq Oral PRN Anitha Rosario, JAMESON        prochlorperazine injection Soln 5 mg  5 mg Intravenous Q6H PRN Krzysztof Chung PA        [START ON 4/24/2024] pyridoxine (vitamin B6) tablet 25 mg  25 mg Oral Daily Krzysztof Chung PA        risperiDONE tablet 1 mg  1 mg Oral BID Krzysztof Chung PA        scopolamine 1.3-1.5 mg (1 mg over 3 days) 1 patch  1 patch Transdermal Q3 Days Teofilo Tee MD   1 patch at  04/23/24 0946    senna-docusate 8.6-50 mg per tablet 1 tablet  1 tablet Oral BID Anitha Rosario NP        senna-docusate 8.6-50 mg per tablet 2 tablet  2 tablet Oral Nightly PRN Krzysztof Chung PA        [START ON 4/24/2024] sertraline tablet 150 mg  150 mg Oral Daily Krzysztof Chung PA        simethicone chewable tablet 80 mg  1 tablet Oral QID PRN Anitha Rosario NP        sodium chloride 0.9% flush 10 mL  10 mL Intravenous Q12H PRN Anitha Rosario NP        sumatriptan tablet 100 mg  100 mg Oral Q2H PRN Krzysztof Chung PA        traZODone tablet 200 mg  200 mg Oral QHS Krzysztof Chung PA         Family History       Problem Relation (Age of Onset)    No Known Problems Sister, Sister, Sister          Tobacco Use    Smoking status: Every Day     Current packs/day: 1.50     Average packs/day: 1.5 packs/day for 18.0 years (27.0 ttl pk-yrs)     Types: Cigarettes     Passive exposure: Current    Smokeless tobacco: Never    Tobacco comments:     4/25/23--pt instructed no smoking 24hours before sx, pt voiced understanding.   Substance and Sexual Activity    Alcohol use: Not Currently     Comment: rare    Drug use: Not Currently     Comment: twice    Sexual activity: Yes     Partners: Male     Birth control/protection: OCP, None     Review of Systems   Musculoskeletal:  Positive for back pain.   All other systems reviewed and are negative.    Objective:     Vital Signs (Most Recent):  Temp: 98 °F (36.7 °C) (04/23/24 2036)  Pulse: 105 (04/23/24 2036)  Resp: 20 (04/23/24 2036)  BP: 134/79 (04/23/24 2036)  SpO2: 100 % (04/23/24 2036) Vital Signs (24h Range):  Temp:  [97.5 °F (36.4 °C)-99.7 °F (37.6 °C)] 98 °F (36.7 °C)  Pulse:  [] 105  Resp:  [10-20] 20  SpO2:  [90 %-100 %] 100 %  BP: (122-167)/(58-91) 134/79        Body mass index is 49.34 kg/m².     Physical Exam  Vitals reviewed.   Constitutional:       Appearance: She is obese.   HENT:      Head: Normocephalic and atraumatic.       Mouth/Throat:      Mouth: Mucous membranes are dry.      Pharynx: Oropharynx is clear.   Eyes:      Extraocular Movements: Extraocular movements intact.      Pupils: Pupils are equal, round, and reactive to light.   Cardiovascular:      Rate and Rhythm: Normal rate and regular rhythm.      Pulses: Normal pulses.      Heart sounds: Normal heart sounds.   Pulmonary:      Effort: Pulmonary effort is normal.      Breath sounds: Normal breath sounds.   Abdominal:      General: Bowel sounds are decreased. There is distension.      Palpations: Abdomen is soft.      Tenderness: There is generalized abdominal tenderness.          Comments: Red [place] is a clean/dry/intact bandage over surgical dressing  Accordian drain leading from clean dry intact bandage with moderate amount of blood   Genitourinary:     Comments: Claros cath present draining clear yellow urine  Musculoskeletal:         General: Normal range of motion.      Cervical back: Normal range of motion and neck supple.      Right lower leg: No edema.      Left lower leg: No edema.   Skin:     General: Skin is warm and dry.      Capillary Refill: Capillary refill takes less than 2 seconds.   Neurological:      General: No focal deficit present.      Mental Status: She is alert and oriented to person, place, and time. Mental status is at baseline.   Psychiatric:         Mood and Affect: Mood normal.         Behavior: Behavior normal.         Thought Content: Thought content normal.         Judgment: Judgment normal.              CRANIAL NERVES     CN III, IV, VI   Pupils are equal, round, and reactive to light.       Significant Labs: All pertinent labs within the past 24 hours have been reviewed.  Pre-op labs reviewed    Significant Imaging: I have reviewed all pertinent imaging results/findings within the past 24 hours. None.

## 2024-04-24 NOTE — PLAN OF CARE
Problem: Adult Inpatient Plan of Care  Goal: Plan of Care Review  Outcome: Progressing     Problem: Adult Inpatient Plan of Care  Goal: Optimal Comfort and Wellbeing  Outcome: Progressing     Problem: Diabetes Comorbidity  Goal: Blood Glucose Level Within Targeted Range  Outcome: Progressing     Problem: Wound  Goal: Optimal Coping  Outcome: Progressing

## 2024-04-25 LAB
ALBUMIN SERPL BCP-MCNC: 3 G/DL (ref 3.5–5.2)
ALP SERPL-CCNC: 109 U/L (ref 55–135)
ALT SERPL W/O P-5'-P-CCNC: 57 U/L (ref 10–44)
ANION GAP SERPL CALC-SCNC: 10 MMOL/L (ref 8–16)
AST SERPL-CCNC: 37 U/L (ref 10–40)
BASOPHILS # BLD AUTO: 0.05 K/UL (ref 0–0.2)
BASOPHILS NFR BLD: 0.3 % (ref 0–1.9)
BILIRUB SERPL-MCNC: 0.9 MG/DL (ref 0.1–1)
BUN SERPL-MCNC: 4 MG/DL (ref 6–20)
CALCIUM SERPL-MCNC: 8.5 MG/DL (ref 8.7–10.5)
CHLORIDE SERPL-SCNC: 96 MMOL/L (ref 95–110)
CO2 SERPL-SCNC: 30 MMOL/L (ref 23–29)
CREAT SERPL-MCNC: 0.6 MG/DL (ref 0.5–1.4)
DIFFERENTIAL METHOD BLD: ABNORMAL
EOSINOPHIL # BLD AUTO: 0 K/UL (ref 0–0.5)
EOSINOPHIL NFR BLD: 0.1 % (ref 0–8)
ERYTHROCYTE [DISTWIDTH] IN BLOOD BY AUTOMATED COUNT: 14.4 % (ref 11.5–14.5)
EST. GFR  (NO RACE VARIABLE): >60 ML/MIN/1.73 M^2
GLUCOSE SERPL-MCNC: 229 MG/DL (ref 70–110)
HCT VFR BLD AUTO: 39.7 % (ref 37–48.5)
HGB BLD-MCNC: 12.2 G/DL (ref 12–16)
IMM GRANULOCYTES # BLD AUTO: 0.08 K/UL (ref 0–0.04)
IMM GRANULOCYTES NFR BLD AUTO: 0.5 % (ref 0–0.5)
LYMPHOCYTES # BLD AUTO: 2 K/UL (ref 1–4.8)
LYMPHOCYTES NFR BLD: 11.1 % (ref 18–48)
MCH RBC QN AUTO: 27.1 PG (ref 27–31)
MCHC RBC AUTO-ENTMCNC: 30.7 G/DL (ref 32–36)
MCV RBC AUTO: 88 FL (ref 82–98)
MONOCYTES # BLD AUTO: 1.7 K/UL (ref 0.3–1)
MONOCYTES NFR BLD: 9.6 % (ref 4–15)
NEUTROPHILS # BLD AUTO: 13.8 K/UL (ref 1.8–7.7)
NEUTROPHILS NFR BLD: 78.4 % (ref 38–73)
NRBC BLD-RTO: 0 /100 WBC
PLATELET # BLD AUTO: 274 K/UL (ref 150–450)
PMV BLD AUTO: 9.5 FL (ref 9.2–12.9)
POCT GLUCOSE: 149 MG/DL (ref 70–110)
POCT GLUCOSE: 165 MG/DL (ref 70–110)
POCT GLUCOSE: 261 MG/DL (ref 70–110)
POTASSIUM SERPL-SCNC: 4.4 MMOL/L (ref 3.5–5.1)
PROT SERPL-MCNC: 6.5 G/DL (ref 6–8.4)
RBC # BLD AUTO: 4.51 M/UL (ref 4–5.4)
SODIUM SERPL-SCNC: 136 MMOL/L (ref 136–145)
TROPONIN I SERPL DL<=0.01 NG/ML-MCNC: <0.006 NG/ML (ref 0–0.03)
TSH SERPL DL<=0.005 MIU/L-ACNC: 0.64 UIU/ML (ref 0.4–4)
WBC # BLD AUTO: 17.58 K/UL (ref 3.9–12.7)

## 2024-04-25 PROCEDURE — 94761 N-INVAS EAR/PLS OXIMETRY MLT: CPT

## 2024-04-25 PROCEDURE — 85025 COMPLETE CBC W/AUTO DIFF WBC: CPT

## 2024-04-25 PROCEDURE — 99900035 HC TECH TIME PER 15 MIN (STAT)

## 2024-04-25 PROCEDURE — 84443 ASSAY THYROID STIM HORMONE: CPT | Performed by: INTERNAL MEDICINE

## 2024-04-25 PROCEDURE — 97535 SELF CARE MNGMENT TRAINING: CPT

## 2024-04-25 PROCEDURE — 93010 ELECTROCARDIOGRAM REPORT: CPT | Mod: ,,, | Performed by: INTERNAL MEDICINE

## 2024-04-25 PROCEDURE — 25000003 PHARM REV CODE 250

## 2024-04-25 PROCEDURE — 94799 UNLISTED PULMONARY SVC/PX: CPT

## 2024-04-25 PROCEDURE — 25000003 PHARM REV CODE 250: Performed by: PHYSICIAN ASSISTANT

## 2024-04-25 PROCEDURE — 97116 GAIT TRAINING THERAPY: CPT

## 2024-04-25 PROCEDURE — 93005 ELECTROCARDIOGRAM TRACING: CPT

## 2024-04-25 PROCEDURE — 63600175 PHARM REV CODE 636 W HCPCS

## 2024-04-25 PROCEDURE — 84484 ASSAY OF TROPONIN QUANT: CPT | Performed by: INTERNAL MEDICINE

## 2024-04-25 PROCEDURE — 25000003 PHARM REV CODE 250: Performed by: ORTHOPAEDIC SURGERY

## 2024-04-25 PROCEDURE — 63600175 PHARM REV CODE 636 W HCPCS: Performed by: PHYSICIAN ASSISTANT

## 2024-04-25 PROCEDURE — 11000001 HC ACUTE MED/SURG PRIVATE ROOM

## 2024-04-25 PROCEDURE — C9113 INJ PANTOPRAZOLE SODIUM, VIA: HCPCS

## 2024-04-25 PROCEDURE — 97530 THERAPEUTIC ACTIVITIES: CPT

## 2024-04-25 PROCEDURE — 27000221 HC OXYGEN, UP TO 24 HOURS

## 2024-04-25 PROCEDURE — 80053 COMPREHEN METABOLIC PANEL: CPT

## 2024-04-25 PROCEDURE — 36415 COLL VENOUS BLD VENIPUNCTURE: CPT | Performed by: INTERNAL MEDICINE

## 2024-04-25 RX ADMIN — ONDANSETRON 8 MG: 2 INJECTION INTRAMUSCULAR; INTRAVENOUS at 04:04

## 2024-04-25 RX ADMIN — DOCUSATE SODIUM 100 MG: 100 CAPSULE, LIQUID FILLED ORAL at 05:04

## 2024-04-25 RX ADMIN — INSULIN ASPART 3 UNITS: 100 INJECTION, SOLUTION INTRAVENOUS; SUBCUTANEOUS at 12:04

## 2024-04-25 RX ADMIN — MUPIROCIN 1 G: 20 OINTMENT TOPICAL at 09:04

## 2024-04-25 RX ADMIN — HYDROMORPHONE HYDROCHLORIDE 2 MG: 2 INJECTION, SOLUTION INTRAMUSCULAR; INTRAVENOUS; SUBCUTANEOUS at 01:04

## 2024-04-25 RX ADMIN — PANTOPRAZOLE SODIUM 40 MG: 40 INJECTION, POWDER, LYOPHILIZED, FOR SOLUTION INTRAVENOUS at 08:04

## 2024-04-25 RX ADMIN — OXYCODONE 15 MG: 5 TABLET ORAL at 03:04

## 2024-04-25 RX ADMIN — HYDROMORPHONE HYDROCHLORIDE 2 MG: 2 INJECTION, SOLUTION INTRAMUSCULAR; INTRAVENOUS; SUBCUTANEOUS at 04:04

## 2024-04-25 RX ADMIN — PROCHLORPERAZINE EDISYLATE 5 MG: 5 INJECTION INTRAMUSCULAR; INTRAVENOUS at 06:04

## 2024-04-25 RX ADMIN — ONDANSETRON 8 MG: 2 INJECTION INTRAMUSCULAR; INTRAVENOUS at 09:04

## 2024-04-25 RX ADMIN — HYDROMORPHONE HYDROCHLORIDE 2 MG: 2 INJECTION, SOLUTION INTRAMUSCULAR; INTRAVENOUS; SUBCUTANEOUS at 07:04

## 2024-04-25 RX ADMIN — HYDROMORPHONE HYDROCHLORIDE 2 MG: 2 INJECTION, SOLUTION INTRAMUSCULAR; INTRAVENOUS; SUBCUTANEOUS at 12:04

## 2024-04-25 RX ADMIN — RISPERIDONE 1 MG: 0.25 TABLET, FILM COATED ORAL at 09:04

## 2024-04-25 RX ADMIN — SERTRALINE HYDROCHLORIDE 150 MG: 50 TABLET ORAL at 09:04

## 2024-04-25 RX ADMIN — PROCHLORPERAZINE EDISYLATE 5 MG: 5 INJECTION INTRAMUSCULAR; INTRAVENOUS at 12:04

## 2024-04-25 RX ADMIN — SENNOSIDES AND DOCUSATE SODIUM 1 TABLET: 8.6; 5 TABLET ORAL at 09:04

## 2024-04-25 RX ADMIN — ONDANSETRON 8 MG: 2 INJECTION INTRAMUSCULAR; INTRAVENOUS at 10:04

## 2024-04-25 RX ADMIN — GABAPENTIN 300 MG: 300 CAPSULE ORAL at 09:04

## 2024-04-25 RX ADMIN — HYDROMORPHONE HYDROCHLORIDE 2 MG: 2 INJECTION, SOLUTION INTRAMUSCULAR; INTRAVENOUS; SUBCUTANEOUS at 11:04

## 2024-04-25 RX ADMIN — METFORMIN HYDROCHLORIDE 1000 MG: 500 TABLET, EXTENDED RELEASE ORAL at 05:04

## 2024-04-25 RX ADMIN — TRAZODONE HYDROCHLORIDE 100 MG: 50 TABLET ORAL at 09:04

## 2024-04-25 NOTE — PT/OT/SLP PROGRESS
Occupational Therapy   Treatment    Name: Loretta Lea  MRN: [id number]  Admitting Diagnosis:  S/P lumbar spinal fusion  2 Days Post-Op    Recommendations:     Discharge Recommendations:  (Moderate vs Low intensity-Progress made today.)  Discharge Equipment Recommendations:  bedside commode, bath bench, other (see comments) (OT provided education in use of tub transfer bench and AE for lower body ADL's. Pt verbalized understanding and will obtain additional AE on own if desired.)  Barriers to discharge:       Assessment:     Loretta Lea is a 33 y.o. female with a medical diagnosis of S/P lumbar spinal fusion.  She presents with the following performance deficits affecting function are weakness, impaired endurance, impaired self care skills, impaired functional mobility, gait instability, impaired balance, decreased lower extremity function, pain, decreased ROM, impaired cardiopulmonary response to activity, orthopedic precautions. Pt up in chair when OT arrived.    Rehab Prognosis:  Good; patient would benefit from acute skilled OT services to address these deficits and reach maximum level of function.       Plan:     Patient to be seen 5 x/week to address the above listed problems via self-care/home management, therapeutic activities, therapeutic exercises  Plan of Care Expires: 05/22/24  Plan of Care Reviewed with: patient    Subjective     Chief Complaint: SOB  Patient/Family Comments/goals: Concerned about oxygen  Pain/Comfort:  Pain Rating 1:  (Did not rate. Reports she continues with spasms in back.)    Objective:     Communicated with: Charge Nurse prior to session.  Patient found up in chair with telemetry, peripheral IV, oxygen, pulse ox (continuous) upon OT entry to room.    General Precautions: Standard, fall    Orthopedic Precautions:spinal precautions  Braces:  (has a TLSO)  Respiratory Status: Nasal cannula, flow 2 L/min     Occupational Performance:       Functional  Mobility/Transfers:  Patient completed Sit <> Stand Transfer with minimum assistance  with  rolling walker   Patient completed transfer to/from BSC with minimum assistance with  rolling walker and cues for safe/effective technique.      Activities of Daily Living:  Toileting: minimum assistance with transfers with RW. No hygiene required as pt was unable to have a BM.      [facility] 6 Click ADL:      Treatment & Education:  OT provided education in role of OT. Patient verbalized understanding and participated in OT.  Safety with use of BSC. Pt verbalized understanding.  OT provided education in calling for assist. Patient verbalized understanding.      Patient left up in chair with all lines intact, call button in reach, and Nurse Gail notified    GOALS:   Multidisciplinary Problems       Occupational Therapy Goals          Problem: Occupational Therapy    Goal Priority Disciplines Outcome Interventions   Occupational Therapy Goal     OT, PT/OT Progressing    Description: Goals to be met by: 5/22/24     Patient will increase functional independence with ADLs by performing:    UE Dressing with Modified [place].  LE Dressing with Modified [place].  Grooming while standing at sink with Modified [place].  Toileting from bedside commode with Modified [place] for hygiene and clothing management.   Bathing from  shower chair/bench with Modified [place].  Toilet transfer to bedside commode with Modified [place].  Increased strength and functional activity tolerance for ADL's/IADL's                         Time Tracking:     OT Date of Treatment: 04/25/24  OT Start Time: 0919  OT Stop Time: 0947  OT Total Time (min): 28 min    Billable Minutes:Self Care/Home Management 28    OT/JOSÉ LUIS: OT          4/25/2024

## 2024-04-25 NOTE — CARE UPDATE
04/25/24 0916   Patient Assessment/Suction   Level of Consciousness (AVPU) alert   Respiratory Effort Unlabored;Normal   Expansion/Accessory Muscles/Retractions no use of accessory muscles;no retractions;expansion symmetric   All Lung Fields Breath Sounds Anterior:;Lateral:;diminished   Rhythm/Pattern, Respiratory unlabored;depth regular;pattern regular;no shortness of breath reported   Cough Frequency infrequent   Cough Type dry;no productive sputum   PRE-TX-O2   Device (Oxygen Therapy) nasal cannula   $ Is the patient on Low Flow Oxygen? Yes   Flow (L/min) (Oxygen Therapy) 4  (decreased to 3 LPM)   SpO2 97 %   Pulse Oximetry Type Intermittent   $ Pulse Oximetry - Multiple Charge Pulse Oximetry - Multiple   Pulse (!) 127   Resp 20   Aerosol Therapy   $ Aerosol Therapy Charges PRN treatment not required   Respiratory Treatment Status (SVN) PRN treatment not required   Incentive Spirometer   $ Incentive Spirometer Charges done with encouragement   Incentive Spirometer Predicted Level (mL) 2100   Administration (IS) mouthpiece utilized   Number of Repetitions (IS) 8   Level Incentive Spirometer (mL) 1750   Patient Tolerance (IS) good;no adverse signs/symptoms present

## 2024-04-25 NOTE — PROGRESS NOTES
[facility]/Surg  Orthopedics  Progress Note    Patient Name: Loretta Lea  MRN: [id number]  Admission Date: 4/23/2024  Hospital Length of Stay: 2 days  Attending Provider: Gurjit Cassidy MD  Primary Care Provider: Magalie Euceda MD  Follow-up For: Procedure(s) (LRB):  FUSION, SPINE, LUMBAR, ALIF (Bilateral)  LAMINECTOMY, SPINE, LUMBAR, WITH FUSION (Bilateral)    Post-Operative Day: 2 Days Post-Op  Subjective:     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       Current Facility-Administered Medications   Medication Dose Route Frequency Provider Last Rate Last Admin    acetaminophen tablet 650 mg  650 mg Oral Q4H PRN Anitha Rosario, JAMESON        acetaminophen tablet 650 mg  650 mg Oral Q6H PRN Krzysztof Chung PA        albuterol-ipratropium 2.5 mg-0.5 mg/3 mL nebulizer solution 3 mL  3 mL Nebulization Q6H PRN Anitha Rosario NP        aluminum-magnesium hydroxide-simethicone 200-200-20 mg/5 mL suspension 30 mL  30 mL Oral Q4H PRN Krzysztof Chung PA        bisacodyL suppository 10 mg  10 mg Rectal Daily Krzysztof Chung PA        cyclobenzaprine tablet 10 mg  10 mg Oral Q8H PRN Gurjit Cassidy MD   10 mg at 04/24/24 1209    dextrose 10% bolus 125 mL 125 mL  12.5 g Intravenous PRN Anitha Rosario, NP        dextrose 10% bolus 250 mL 250 mL  25 g Intravenous PRN Anitha Rosario, NP        diphenhydrAMINE capsule 50 mg  50 mg Oral Q6H PRN Krzysztof Chung PA        docusate sodium capsule 100 mg  100 mg Oral Daily Krzysztof Chung PA   100 mg at 04/24/24 0836    gabapentin capsule 300 mg  300 mg Oral QHS Krzysztof Chung PA   300 mg at 04/24/24 2038     glucagon (human recombinant) injection 1 mg  1 mg Intramuscular PRN Anitha Rosario, NP        glucose chewable tablet 16 g  16 g Oral PRN Anitha Rosario NP        glucose chewable tablet 24 g  24 g Oral PRN Anitha Rosario NP        HYDROmorphone (PF) injection 2 mg  2 mg Intravenous Q3H PRN Krzysztof Chung PA   2 mg at 04/25/24 0154    HYDROmorphone injection 1 mg  1 mg Intravenous Q3H PRN Krzysztof Chung PA        hydrOXYzine pamoate capsule 25 mg  25 mg Oral Q4H PRN Krzysztof Chung PA        insulin aspart U-100 pen 0-5 Units  0-5 Units Subcutaneous QID (AC + HS) PRN Anitha Rosario NP   1 Units at 04/24/24 2054    magnesium oxide tablet 800 mg  800 mg Oral PRN Anitha Rosario NP        magnesium oxide tablet 800 mg  800 mg Oral PRN Anitha Rosario NP        metFORMIN ER 24hr tablet 1,000 mg  1,000 mg Oral BID WM Krzysztof Chung PA   1,000 mg at 04/24/24 1731    mupirocin 2 % ointment   Nasal BID Gurjit Cassidy MD   1 g at 04/24/24 2038    naloxone 0.4 mg/mL injection 0.02 mg  0.02 mg Intravenous PRN Ralph Rivers MD        nicotine 21 mg/24 hr 1 patch  1 patch Transdermal Daily PRN Anitha Rosario NP   1 patch at 04/24/24 1201    ondansetron injection 8 mg  8 mg Intravenous Q6H PRN Krzysztof Chung PA   8 mg at 04/25/24 1015    oxyCODONE immediate release tablet 10 mg  10 mg Oral Q4H PRN Krzysztof Chung PA        oxyCODONE immediate release tablet 15 mg  15 mg Oral Q4H PRN Krzysztof Chung PA   15 mg at 04/25/24 0357    oxyCODONE immediate release tablet 5 mg  5 mg Oral Q4H PRN Krzysztof Chung PA        pantoprazole injection 40 mg  40 mg Intravenous Daily Anitha Rosario NP   40 mg at 04/25/24 0818    potassium bicarbonate disintegrating tablet 35 mEq  35 mEq Oral PRN Tommaseo, Anitha N, NP        potassium bicarbonate disintegrating tablet 50 mEq  50 mEq Oral PRN Tommaseo, Anitha N, NP        potassium bicarbonate disintegrating  "tablet 60 mEq  60 mEq Oral PRN Anitha Rosario NP        prochlorperazine injection Soln 5 mg  5 mg Intravenous Q6H PRN Krzysztof Chung PA   5 mg at 04/25/24 0628    pyridoxine (vitamin B6) tablet 25 mg  25 mg Oral Daily Krzysztof Chung PA   25 mg at 04/24/24 0835    risperiDONE tablet 1 mg  1 mg Oral BID Krzysztof Chung PA   1 mg at 04/24/24 2038    scopolamine 1.3-1.5 mg (1 mg over 3 days) 1 patch  1 patch Transdermal Q3 Days Teofilo Tee MD   1 patch at 04/23/24 0946    senna-docusate 8.6-50 mg per tablet 1 tablet  1 tablet Oral BID Anitha Rosario NP   1 tablet at 04/24/24 2039    senna-docusate 8.6-50 mg per tablet 2 tablet  2 tablet Oral Nightly PRN Krzysztof Chung PA        sertraline tablet 150 mg  150 mg Oral QHS Krzysztof Chung PA   150 mg at 04/24/24 2038    simethicone chewable tablet 80 mg  1 tablet Oral QID PRN Anitha Rosario NP   80 mg at 04/24/24 0841    sodium chloride 0.9% flush 10 mL  10 mL Intravenous Q12H PRN Anitha Rosario NP        sumatriptan tablet 100 mg  100 mg Oral Q2H PRN Krzysztof Chung PA        traZODone tablet 200 mg  200 mg Oral QHS Krzysztof Chung PA   100 mg at 04/24/24 2037     Objective:     Vital Signs (Most Recent):  Temp: 98.4 °F (36.9 °C) (04/25/24 0724)  Pulse: (!) 127 (04/25/24 0916)  Resp: 20 (04/25/24 0916)  BP: 126/66 (04/25/24 0724)  SpO2: 97 % (04/25/24 0916) Vital Signs (24h Range):  Temp:  [97.3 °F (36.3 °C)-98.7 °F (37.1 °C)] 98.4 °F (36.9 °C)  Pulse:  [] 127  Resp:  [15-20] 20  SpO2:  [93 %-97 %] 97 %  BP: (126-149)/(66-87) 126/66     Weight: (!) 144.3 kg (318 lb 2 oz)  Height: 5' 7" (170.2 cm)  Body mass index is 49.83 kg/m².      Intake/Output Summary (Last 24 hours) at 4/25/2024 1049  Last data filed at 4/25/2024 0859  Gross per 24 hour   Intake 1291.16 ml   Output 3000 ml   Net -1708.84 ml        General    Vitals reviewed.  Constitutional: She is oriented to person, place, and time. "   Pulmonary/Chest: Effort normal.   Abdominal: Soft.   Neurological: She is alert and oriented to person, place, and time.   Psychiatric: She has a normal mood and affect. Her behavior is normal.         Back (L-Spine & T-Spine) / Neck (C-Spine) Exam     Comments:  Neurovascular status grossly intact both lower extremities         Significant Labs: All pertinent labs within the past 24 hours have been reviewed.    Significant Imaging: None  Assessment/Plan:     * S/P lumbar spinal fusion  Impression:  Stable orthopedically.  Continues workup with the hospitalist.  Plan:  We will start ambulation if no contraindication per hospitalist.  Attempts to manage nausea associated with pain medications with Zofran.  Continue combination oxycodone and Toradol.  If necessary will change pain medication to Dilaudid by mouth          Gurjit Cassidy MD  Orthopedics  [facility]/Surg

## 2024-04-25 NOTE — PROGRESS NOTES
[facility] Medicine  Progress Note    Patient Name: Loretta Lea  MRN: [id number]  Patient Class: IP- Inpatient   Admission Date: 4/23/2024  Length of Stay: 2 days  Attending Physician: Gurjit Cassidy MD  Primary Care Provider: Magalie Euceda MD        Subjective:     Principal Problem:S/P lumbar spinal fusion        HPI:  Loretta Lea is a 33 year old female with a previous medical history of that includes but is not limited to DMII, GERD, lumbar disc degeneration, chronic back pain, anxiety and bipolar disorder  who is POD 0 with Dr. Cassidy for an anterior lumbar interbody fusion. The patient has been in the care of Dr. Cassidy since 1/8/24 for lower back pain radiating down bilateral legs with associated numbness left more so than right. The patient performed six months of physical therapy and opioid medication with minimal relief. Patient had follow-up visit with Dr. Cassidy on 3/14/24 to discuss Mri results performed 3/7/24 showing lumbar disc degeneration and Spondylolisthesis of lumbar region. Options were discussed and patient elected to have surgery. Patient admitted by hospital medicine for medical management.     Overview/Hospital Course:  No notes on file    Interval History:  Complaining of significant back spasms.  Currently on intravenous narcotic infusion.  Agreeable to receive Nicoderm.  Starting to utilize incentive spirometer.  Family member present at bedside.  Sluggish bowel sounds present.  Diet is being downgraded to full liquids for now.    Review of Systems   Musculoskeletal:  Positive for back pain.   All other systems reviewed and are negative.    Objective:     Vital Signs (Most Recent):  Temp: 98.4 °F (36.9 °C) (04/25/24 0724)  Pulse: (!) 127 (04/25/24 0916)  Resp: 20 (04/25/24 0916)  BP: 126/66 (04/25/24 0724)  SpO2: 97 % (04/25/24 0916) Vital Signs (24h Range):  Temp:  [97.3 °F (36.3 °C)-98.7 °F (37.1 °C)] 98.4 °F (36.9  °C)  Pulse:  [] 127  Resp:  [15-20] 20  SpO2:  [93 %-97 %] 97 %  BP: (126-149)/(66-87) 126/66     Weight: (!) 144.3 kg (318 lb 2 oz)  Body mass index is 49.83 kg/m².    Intake/Output Summary (Last 24 hours) at 4/25/2024 0928  Last data filed at 4/25/2024 0859  Gross per 24 hour   Intake 1291.16 ml   Output 3000 ml   Net -1708.84 ml         Physical Exam  Vitals reviewed.   Constitutional:       Appearance: She is obese.   HENT:      Head: Normocephalic and atraumatic.      Mouth/Throat:      Mouth: Mucous membranes are dry.      Pharynx: Oropharynx is clear.   Eyes:      Extraocular Movements: Extraocular movements intact.      Pupils: Pupils are equal, round, and reactive to light.   Cardiovascular:      Rate and Rhythm: Normal rate and regular rhythm.      Pulses: Normal pulses.      Heart sounds: Normal heart sounds.   Pulmonary:      Effort: Pulmonary effort is normal.      Breath sounds: Normal breath sounds.   Abdominal:      General: Bowel sounds are decreased. There is distension.      Palpations: Abdomen is soft.      Tenderness: There is generalized abdominal tenderness.          Comments: [place] [employer] is a clean/dry/intact bandage over surgical dressing  Accordian drain leading from clean dry intact bandage with moderate amount of blood   Genitourinary:     Comments: Claros cath present draining clear yellow urine  Musculoskeletal:         General: Normal range of motion.      Cervical back: Normal range of motion and neck supple.      Right lower leg: No edema.      Left lower leg: No edema.   Skin:     General: Skin is warm and dry.      Capillary Refill: Capillary refill takes less than 2 seconds.   Neurological:      General: No focal deficit present.      Mental Status: She is alert and oriented to person, place, and time. Mental status is at baseline.   Psychiatric:         Mood and Affect: Mood normal.         Behavior: Behavior normal.         Thought Content: Thought content normal.          Judgment: Judgment normal.             Significant Labs: All pertinent labs within the past 24 hours have been reviewed.  CBC:   Recent Labs   Lab 04/23/24  1908 04/24/24  0426 04/25/24  0442   WBC 18.98* 19.23* 17.58*   HGB 14.4 12.6 12.2   HCT 44.6 39.6 39.7    340 274     CMP:   Recent Labs   Lab 04/23/24  1908 04/24/24  0426 04/25/24  0442   * 135* 136   K 5.7* 4.4 4.4    101 96   CO2 22* 26 30*   * 196* 229*   BUN 8 7 4*   CREATININE 0.7 0.7 0.6   CALCIUM 8.3* 8.6* 8.5*   PROT  --  6.8 6.5   ALBUMIN  --  3.1* 3.0*   BILITOT  --  0.4 0.9   ALKPHOS  --  114 109   AST  --  98* 37   ALT  --  101* 57*   ANIONGAP 11 8 10       Significant Imaging:   CTA chest:   1. No pulmonary embolus to the proximal segmental level, allowing for exam limitations.  2.  Mosaic attenuation in the lungs, a nonspecific finding with considerations to include air-trapping, mild pulmonary edema, pneumonitis, bronchiolitis.  3. Query hepatic steatosis.    Chest x-ray: Hypoventilatory change.       Assessment/Plan:      * S/P lumbar spinal fusion  POD 1   Continue to follow neurosurgery recommendations.  Needs aggressive incentive spirometry.  Follow hemoglobin and hematocrit closely.  Pain control with PO narcotics and antiemetics as needed.  Physical therapy as per Orthopedics protocol with fall precautions.  SCD's for DVT prophylaxis        Uncontrolled type 2 diabetes mellitus with hyperglycemia  Patient's FSGs are controlled on current medication regimen.  Last A1c reviewed-   Lab Results   Component Value Date    HGBA1C 9.7 (H) 02/08/2023     Most recent fingerstick glucose reviewed-   Recent Labs   Lab 04/23/24 2123 04/24/24  0748   POCTGLUCOSE 216* 178*     Current correctional scale  Low  Maintain anti-hyperglycemic dose as follows-   Antihyperglycemics (From admission, onward)      Start     Stop Route Frequency Ordered    04/23/24 2045  metFORMIN ER 24hr tablet 1,000 mg         -- Oral 2 times daily with  meals 04/23/24 2042 04/23/24 2041  insulin aspart U-100 pen 0-5 Units         -- SubQ Before meals & nightly PRN 04/23/24 2042          Hold Oral hypoglycemics while patient is in the hospital.    Cigarette smoker  Smoking cessation counseling performed. Dangers of cigarette smoking were reviewed with patient in detail and patient was encouraged to quit. Nicotine replacement options were discussed for > 10 minutes.  Nicoderm 21 mg daily ordered.        VTE Risk Mitigation (From admission, onward)           Ordered     IP VTE LOW RISK PATIENT  Once         04/23/24 0907     Place ZAKI hose  Until discontinued         04/23/24 0907     Place sequential compression device  Until discontinued         04/23/24 0907                    Discharge Planning   PEDRO: 4/27/2024     Code Status: Full Code   Is the patient medically ready for discharge?:     Reason for patient still in hospital (select all that apply): {HMREASONPATIENTINHOSP:80326}  Discharge Plan A: Home                  Jagruti Moncada MD  Department of Hospital Medicine   [facility]/Surg

## 2024-04-25 NOTE — PLAN OF CARE
Problem: Occupational Therapy  Goal: Occupational Therapy Goal  Description: Goals to be met by: 5/22/24     Patient will increase functional independence with ADLs by performing:    UE Dressing with Modified [place].  LE Dressing with Modified [place].  Grooming while standing at sink with Modified [place].  Toileting from bedside commode with Modified [place] for hygiene and clothing management.   Bathing from  shower chair/bench with Modified [place].  Toilet transfer to bedside commode with Modified [place].  Increased strength and functional activity tolerance for ADL's/IADL's    Outcome: Progressing  Min assist with RW and cues for safe technique/spinal precautions with use of BSC. Toileting hygiene was not required as pt was unable to have a BM.   Continue with POC

## 2024-04-25 NOTE — PT/OT/SLP PROGRESS
Physical Therapy Treatment    Patient Name:  Loretta Lea   MRN:  [id number]    Recommendations:     Discharge Recommendations: Moderate Intensity Therapy  Discharge Equipment Recommendations: walker, rolling  Barriers to discharge: None    Assessment:     Loretta Lea is a 33 y.o. female admitted with a medical diagnosis of S/P lumbar spinal fusion.  She presents with the following impairments/functional limitations: weakness, impaired endurance, impaired functional mobility, gait instability, impaired balance, decreased lower extremity function, pain, decreased ROM, impaired cardiopulmonary response to activity, orthopedic precautions .    Pt seen BID today. Pt asleep but easily awakened- spouse now at bedside. Pt requiring min assist for mobility and ambulated to bathroom with unsuccessful voiding. Pt then progressed to hallway 200ft min assist with RW. And OOb chair..    Rehab Prognosis: Fair; patient would benefit from acute skilled PT services to address these deficits and reach maximum level of function.    Recent Surgery: Procedure(s) (LRB):  FUSION, SPINE, LUMBAR, ALIF (Bilateral)  LAMINECTOMY, SPINE, LUMBAR, WITH FUSION (Bilateral) 2 Days Post-Op    Plan:     During this hospitalization, patient to be seen BID to address the identified rehab impairments via gait training, therapeutic activities, therapeutic exercises, neuromuscular re-education and progress toward the following goals:    Plan of Care Expires:  05/30/24    Subjective   Pt attempted to void in am and pm but unable  Stated is feeling better  Chief Complaint: none  Patient/Family Comments/goals: none  Pain/Comfort:  Pain Rating 1:  (not rated)  Location 1: back  Pain Addressed 1: Pre-medicate for activity, Reposition, Distraction, Cessation of Activity      Objective:     Communicated with nurse Reynolds prior to session.  Patient found HOB elevated with telemetry, oxygen, pulse ox (continuous) upon PT entry to room.     General  Precautions: Standard, fall  Orthopedic Precautions: spinal precautions  Braces:  (has TLSO brace at bedside)  Respiratory Status: Nasal cannula, flow 2 L/min     Functional Mobility:  Bed Mobility:     Rolling Left:  minimum assistance  Scooting: minimum assistance  Supine to Sit: minimum assistance  Transfers:     Sit to Stand:  minimum assistance with rolling walker  Bed to Chair: minimum assistance with  rolling walker  using  Stand Pivot  Toilet Transfer: minimum assistance with  rolling walker  using  Stand Pivot  Gait: 200ft with RW min assist and another person following with chair      AM-PAC 6 CLICK MOBILITY  Turning over in bed (including adjusting bedclothes, sheets and blankets)?: 3  Sitting down on and standing up from a chair with arms (e.g., wheelchair, bedside commode, etc.): 3  Moving from lying on back to sitting on the side of the bed?: 3  Moving to and from a bed to a chair (including a wheelchair)?: 3  Need to walk in hospital room?: 3  Climbing 3-5 steps with a railing?: 1  Basic Mobility Total Score: 16       Treatment & Education:  Patient was educated on the importance of OOB activity and functional mobility to negate negative effects of prolonged bed rest during hospitalization, safe transfers and ambulation, and D/C planning   Pt educated on log rolling    Patient left up in chair with all lines intact, call button in reach, chair alarm on, and nurse Gail notified..    GOALS:   Multidisciplinary Problems       Physical Therapy Goals          Problem: Physical Therapy    Goal Priority Disciplines Outcome Goal Variances Interventions   Physical Therapy Goal     PT, PT/OT Progressing     Description: Goals to be met by: 2024     Patient will increase functional independence with mobility by performin. Supine to sit with MInimal Assistance  2. Sit to stand transfer with Minimal Assistance  3. Bed to chair transfer with Minimal Assistance using Rolling Walker  4. Gait  x 150 feet  with Minimal Assistance using Rolling Walker.   5. Lower extremity exercise program x20 reps                       Time Tracking:     PT Received On: 04/25/24  PT Start Time: 1518     PT Stop Time: 1542  PT Total Time (min): 24 min     Billable Minutes: Gait Training 14 and Therapeutic Activity 10    Treatment Type: Treatment  PT/PTA: PT     Number of PTA visits since last PT visit: 0     04/25/2024

## 2024-04-25 NOTE — SUBJECTIVE & OBJECTIVE
Interval History:  Sitting in recliner, still with significant pain and nausea.   Sluggish bowel sounds present.  Diet is being downgraded to full liquids for now.    Review of Systems   Musculoskeletal:  Positive for back pain.   All other systems reviewed and are negative.    Objective:     Vital Signs (Most Recent):  Temp: 98.4 °F (36.9 °C) (04/25/24 0724)  Pulse: (!) 127 (04/25/24 0916)  Resp: 20 (04/25/24 0916)  BP: 126/66 (04/25/24 0724)  SpO2: 97 % (04/25/24 0916) Vital Signs (24h Range):  Temp:  [97.3 °F (36.3 °C)-98.7 °F (37.1 °C)] 98.4 °F (36.9 °C)  Pulse:  [] 127  Resp:  [15-20] 20  SpO2:  [93 %-97 %] 97 %  BP: (126-149)/(66-87) 126/66     Weight: (!) 144.3 kg (318 lb 2 oz)  Body mass index is 49.83 kg/m².    Intake/Output Summary (Last 24 hours) at 4/25/2024 0928  Last data filed at 4/25/2024 0859  Gross per 24 hour   Intake 1291.16 ml   Output 3000 ml   Net -1708.84 ml         Physical Exam  Vitals reviewed.   Constitutional:       Appearance: She is obese.   HENT:      Head: Normocephalic and atraumatic.      Mouth/Throat:      Mouth: Mucous membranes are dry.      Pharynx: Oropharynx is clear.   Eyes:      Extraocular Movements: Extraocular movements intact.      Pupils: Pupils are equal, round, and reactive to light.   Cardiovascular:      Rate and Rhythm: Normal rate and regular rhythm.      Pulses: Normal pulses.      Heart sounds: Normal heart sounds.   Pulmonary:      Effort: Pulmonary effort is normal.      Breath sounds: Normal breath sounds.   Abdominal:      General: Bowel sounds are decreased. There is distension.      Palpations: Abdomen is soft.      Tenderness: There is generalized abdominal tenderness.          Comments: Red [place] is a clean/dry/intact bandage over surgical dressing  Accordian drain leading from clean dry intact bandage with moderate amount of blood   Genitourinary:     Comments: Claros cath present draining clear yellow urine  Musculoskeletal:         General:  Normal range of motion.      Cervical back: Normal range of motion and neck supple.      Right lower leg: No edema.      Left lower leg: No edema.   Skin:     General: Skin is warm and dry.      Capillary Refill: Capillary refill takes less than 2 seconds.   Neurological:      General: No focal deficit present.      Mental Status: She is alert and oriented to person, place, and time. Mental status is at baseline.   Psychiatric:         Mood and Affect: Mood normal.         Behavior: Behavior normal.         Thought Content: Thought content normal.         Judgment: Judgment normal.             Significant Labs: All pertinent labs within the past 24 hours have been reviewed.  CBC:   Recent Labs   Lab 04/23/24 1908 04/24/24  0426 04/25/24  0442   WBC 18.98* 19.23* 17.58*   HGB 14.4 12.6 12.2   HCT 44.6 39.6 39.7    340 274     CMP:   Recent Labs   Lab 04/23/24 1908 04/24/24 0426 04/25/24  0442   * 135* 136   K 5.7* 4.4 4.4    101 96   CO2 22* 26 30*   * 196* 229*   BUN 8 7 4*   CREATININE 0.7 0.7 0.6   CALCIUM 8.3* 8.6* 8.5*   PROT  --  6.8 6.5   ALBUMIN  --  3.1* 3.0*   BILITOT  --  0.4 0.9   ALKPHOS  --  114 109   AST  --  98* 37   ALT  --  101* 57*   ANIONGAP 11 8 10       Significant Imaging:   CTA chest:   1. No pulmonary embolus to the proximal segmental level, allowing for exam limitations.  2.  Mosaic attenuation in the lungs, a nonspecific finding with considerations to include air-trapping, mild pulmonary edema, pneumonitis, bronchiolitis.  3. Query hepatic steatosis.    Chest x-ray: Hypoventilatory change.

## 2024-04-25 NOTE — PROGRESS NOTES
[facility] Medicine  Progress Note    Patient Name: Loretta Lea  MRN: [id number]  Patient Class: IP- Inpatient   Admission Date: 4/23/2024  Length of Stay: 2 days  Attending Physician: Gurjit Cassidy MD  Primary Care Provider: Magalie Euceda MD        Subjective:     Principal Problem:S/P lumbar spinal fusion        HPI:  Loretta Lea is a 33 year old female with a previous medical history of that includes but is not limited to DMII, GERD, lumbar disc degeneration, chronic back pain, anxiety and bipolar disorder  who is POD 0 with Dr. Cassidy for an anterior lumbar interbody fusion. The patient has been in the care of Dr. Cassidy since 1/8/24 for lower back pain radiating down bilateral legs with associated numbness left more so than right. The patient performed six months of physical therapy and opioid medication with minimal relief. Patient had follow-up visit with Dr. Cassidy on 3/14/24 to discuss Mri results performed 3/7/24 showing lumbar disc degeneration and Spondylolisthesis of lumbar region. Options were discussed and patient elected to have surgery. Patient admitted by hospital medicine for medical management.     Overview/Hospital Course:  No notes on file    Interval History:  Sitting in recliner, still with significant pain and nausea.   Sluggish bowel sounds present.  Diet is being downgraded to full liquids for now.    Review of Systems   Musculoskeletal:  Positive for back pain.   All other systems reviewed and are negative.    Objective:     Vital Signs (Most Recent):  Temp: 98.4 °F (36.9 °C) (04/25/24 0724)  Pulse: (!) 127 (04/25/24 0916)  Resp: 20 (04/25/24 0916)  BP: 126/66 (04/25/24 0724)  SpO2: 97 % (04/25/24 0916) Vital Signs (24h Range):  Temp:  [97.3 °F (36.3 °C)-98.7 °F (37.1 °C)] 98.4 °F (36.9 °C)  Pulse:  [] 127  Resp:  [15-20] 20  SpO2:  [93 %-97 %] 97 %  BP: (126-149)/(66-87) 126/66     Weight: (!) 144.3 kg (318 lb 2  oz)  Body mass index is 49.83 kg/m².    Intake/Output Summary (Last 24 hours) at 4/25/2024 0928  Last data filed at 4/25/2024 0859  Gross per 24 hour   Intake 1291.16 ml   Output 3000 ml   Net -1708.84 ml         Physical Exam  Vitals reviewed.   Constitutional:       Appearance: She is obese.   HENT:      Head: Normocephalic and atraumatic.      Mouth/Throat:      Mouth: Mucous membranes are dry.      Pharynx: Oropharynx is clear.   Eyes:      Extraocular Movements: Extraocular movements intact.      Pupils: Pupils are equal, round, and reactive to light.   Cardiovascular:      Rate and Rhythm: Normal rate and regular rhythm.      Pulses: Normal pulses.      Heart sounds: Normal heart sounds.   Pulmonary:      Effort: Pulmonary effort is normal.      Breath sounds: Normal breath sounds.   Abdominal:      General: Bowel sounds are decreased. There is distension.      Palpations: Abdomen is soft.      Tenderness: There is generalized abdominal tenderness.          Comments: Red [place] is a clean/dry/intact bandage over surgical dressing  Accordian drain leading from clean dry intact bandage with moderate amount of blood   Genitourinary:     Comments: Claros cath present draining clear yellow urine  Musculoskeletal:         General: Normal range of motion.      Cervical back: Normal range of motion and neck supple.      Right lower leg: No edema.      Left lower leg: No edema.   Skin:     General: Skin is warm and dry.      Capillary Refill: Capillary refill takes less than 2 seconds.   Neurological:      General: No focal deficit present.      Mental Status: She is alert and oriented to person, place, and time. Mental status is at baseline.   Psychiatric:         Mood and Affect: Mood normal.         Behavior: Behavior normal.         Thought Content: Thought content normal.         Judgment: Judgment normal.             Significant Labs: All pertinent labs within the past 24 hours have been reviewed.  CBC:   Recent  Labs   Lab 04/23/24  1908 04/24/24  0426 04/25/24  0442   WBC 18.98* 19.23* 17.58*   HGB 14.4 12.6 12.2   HCT 44.6 39.6 39.7    340 274     CMP:   Recent Labs   Lab 04/23/24  1908 04/24/24  0426 04/25/24  0442   * 135* 136   K 5.7* 4.4 4.4    101 96   CO2 22* 26 30*   * 196* 229*   BUN 8 7 4*   CREATININE 0.7 0.7 0.6   CALCIUM 8.3* 8.6* 8.5*   PROT  --  6.8 6.5   ALBUMIN  --  3.1* 3.0*   BILITOT  --  0.4 0.9   ALKPHOS  --  114 109   AST  --  98* 37   ALT  --  101* 57*   ANIONGAP 11 8 10       Significant Imaging:   CTA chest:   1. No pulmonary embolus to the proximal segmental level, allowing for exam limitations.  2.  Mosaic attenuation in the lungs, a nonspecific finding with considerations to include air-trapping, mild pulmonary edema, pneumonitis, bronchiolitis.  3. Query hepatic steatosis.    Chest x-ray: Hypoventilatory change.       Assessment/Plan:      * S/P lumbar spinal fusion  POD 1   Continue to follow neurosurgery recommendations.  Needs aggressive incentive spirometry.  Follow hemoglobin and hematocrit closely.  Pain control with PO narcotics and antiemetics as needed.  Physical therapy as per Orthopedics protocol with fall precautions.  SCD's for DVT prophylaxis        Uncontrolled type 2 diabetes mellitus with hyperglycemia  Patient's FSGs are controlled on current medication regimen.  Last A1c reviewed-   Lab Results   Component Value Date    HGBA1C 9.7 (H) 02/08/2023     Most recent fingerstick glucose reviewed-   Recent Labs   Lab 04/23/24 2123 04/24/24  0748   POCTGLUCOSE 216* 178*     Current correctional scale  Low  Maintain anti-hyperglycemic dose as follows-   Antihyperglycemics (From admission, onward)      Start     Stop Route Frequency Ordered    04/23/24 2045  metFORMIN ER 24hr tablet 1,000 mg         -- Oral 2 times daily with meals 04/23/24 2042 04/23/24 2041  insulin aspart U-100 pen 0-5 Units         -- SubQ Before meals & nightly PRN 04/23/24 2042           Hold Oral hypoglycemics while patient is in the hospital.    Cigarette smoker  Smoking cessation counseling performed. Dangers of cigarette smoking were reviewed with patient in detail and patient was encouraged to quit. Nicotine replacement options were discussed for > 10 minutes.  Nicoderm 21 mg daily ordered.      Encourage patient to continue aggressive incentive spirometry.  Sinus tachycardia improving.  EKG reviewed.  TSH and troponin level within normal limits.  VTE Risk Mitigation (From admission, onward)           Ordered     IP VTE LOW RISK PATIENT  Once         04/23/24 0907     Place ZAKI hose  Until discontinued         04/23/24 0907     Place sequential compression device  Until discontinued         04/23/24 0907                    Discharge Planning   PEDRO: 4/27/2024     Code Status: Full Code   Is the patient medically ready for discharge?:     Reason for patient still in hospital (select all that apply): Patient trending condition and Consult recommendations  Discharge Plan A: Home                  Jagruti Moncada MD  Department of Hospital Medicine   [facility]/Surg

## 2024-04-25 NOTE — SUBJECTIVE & OBJECTIVE
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       Current Facility-Administered Medications   Medication Dose Route Frequency Provider Last Rate Last Admin    acetaminophen tablet 650 mg  650 mg Oral Q4H PRN Anitha Rosario, JAMESON        acetaminophen tablet 650 mg  650 mg Oral Q6H PRN Krzysztof Chung PA        albuterol-ipratropium 2.5 mg-0.5 mg/3 mL nebulizer solution 3 mL  3 mL Nebulization Q6H PRN Anitha Rosario NP        aluminum-magnesium hydroxide-simethicone 200-200-20 mg/5 mL suspension 30 mL  30 mL Oral Q4H PRN Krzysztof Chung PA        bisacodyL suppository 10 mg  10 mg Rectal Daily Krzysztof Chung PA        cyclobenzaprine tablet 10 mg  10 mg Oral Q8H PRN Gurjit Cassidy MD   10 mg at 04/24/24 1209    dextrose 10% bolus 125 mL 125 mL  12.5 g Intravenous PRN Anitha Rosario NP        dextrose 10% bolus 250 mL 250 mL  25 g Intravenous PRN Anitha Rosario NP        diphenhydrAMINE capsule 50 mg  50 mg Oral Q6H PRN Krzysztof Chung PA        docusate sodium capsule 100 mg  100 mg Oral Daily Krzysztof Chung PA   100 mg at 04/24/24 0836    gabapentin capsule 300 mg  300 mg Oral QHS Krzysztof Chung PA   300 mg at 04/24/24 2038    glucagon (human recombinant) injection 1 mg  1 mg Intramuscular PRN Anitha Rosario NP        glucose chewable tablet 16 g  16 g Oral PRN Anitha Rosario NP        glucose chewable tablet 24 g  24 g Oral PRN Anitha Rosario, NP        HYDROmorphone (PF) injection 2 mg  2 mg Intravenous Q3H PRN Krzysztof Chung PA   2 mg at 04/25/24 0154    HYDROmorphone injection 1 mg  1 mg Intravenous Q3H PRN Krzysztof Chung PA        hydrOXYzine  pamoate capsule 25 mg  25 mg Oral Q4H PRN Krzysztof Chung PA        insulin aspart U-100 pen 0-5 Units  0-5 Units Subcutaneous QID (AC + HS) PRN Anitha Rosario NP   1 Units at 04/24/24 2054    magnesium oxide tablet 800 mg  800 mg Oral PRN Anitha Rosario, NP        magnesium oxide tablet 800 mg  800 mg Oral PRN Anitha Rosario NP        metFORMIN ER 24hr tablet 1,000 mg  1,000 mg Oral BID WM Krzysztof Chung PA   1,000 mg at 04/24/24 1731    mupirocin 2 % ointment   Nasal BID Gurjit Cassidy MD   1 g at 04/24/24 2038    naloxone 0.4 mg/mL injection 0.02 mg  0.02 mg Intravenous PRN Ralph Rivers MD        nicotine 21 mg/24 hr 1 patch  1 patch Transdermal Daily PRN Anitha Rosario NP   1 patch at 04/24/24 1201    ondansetron injection 8 mg  8 mg Intravenous Q6H PRN Krzysztof Chung PA   8 mg at 04/25/24 1015    oxyCODONE immediate release tablet 10 mg  10 mg Oral Q4H PRN Krzysztof Chung PA        oxyCODONE immediate release tablet 15 mg  15 mg Oral Q4H PRN Krzysztof Chung PA   15 mg at 04/25/24 0357    oxyCODONE immediate release tablet 5 mg  5 mg Oral Q4H PRN Krzysztof Chung PA        pantoprazole injection 40 mg  40 mg Intravenous Daily Anitha Rosario NP   40 mg at 04/25/24 0818    potassium bicarbonate disintegrating tablet 35 mEq  35 mEq Oral PRN Anitha Rosario, NP        potassium bicarbonate disintegrating tablet 50 mEq  50 mEq Oral PRN Anitha Rosario NP        potassium bicarbonate disintegrating tablet 60 mEq  60 mEq Oral PRN Anitha Rosario NP        prochlorperazine injection Soln 5 mg  5 mg Intravenous Q6H PRN Krzysztof Chung PA   5 mg at 04/25/24 0628    pyridoxine (vitamin B6) tablet 25 mg  25 mg Oral Daily Krzysztof Chung PA   25 mg at 04/24/24 0835    risperiDONE tablet 1 mg  1 mg Oral BID Krzysztof Chung PA   1 mg at 04/24/24 2038    scopolamine 1.3-1.5 mg (1 mg over 3 days) 1 patch  1 patch Transdermal Q3 Days  "Teofilo Tee MD   1 patch at 04/23/24 0946    senna-docusate 8.6-50 mg per tablet 1 tablet  1 tablet Oral BID Anitha Rosario NP   1 tablet at 04/24/24 2039    senna-docusate 8.6-50 mg per tablet 2 tablet  2 tablet Oral Nightly PRN Krzysztof Chung PA        sertraline tablet 150 mg  150 mg Oral QHS Krzysztof Chung PA   150 mg at 04/24/24 2038    simethicone chewable tablet 80 mg  1 tablet Oral QID PRN Anitha Rosario NP   80 mg at 04/24/24 0841    sodium chloride 0.9% flush 10 mL  10 mL Intravenous Q12H PRN Anitha Rosario NP        sumatriptan tablet 100 mg  100 mg Oral Q2H PRN Krzysztof Chung PA        traZODone tablet 200 mg  200 mg Oral QHS Krzysztof Chung PA   100 mg at 04/24/24 2037     Objective:     Vital Signs (Most Recent):  Temp: 98.4 °F (36.9 °C) (04/25/24 0724)  Pulse: (!) 127 (04/25/24 0916)  Resp: 20 (04/25/24 0916)  BP: 126/66 (04/25/24 0724)  SpO2: 97 % (04/25/24 0916) Vital Signs (24h Range):  Temp:  [97.3 °F (36.3 °C)-98.7 °F (37.1 °C)] 98.4 °F (36.9 °C)  Pulse:  [] 127  Resp:  [15-20] 20  SpO2:  [93 %-97 %] 97 %  BP: (126-149)/(66-87) 126/66     Weight: (!) 144.3 kg (318 lb 2 oz)  Height: 5' 7" (170.2 cm)  Body mass index is 49.83 kg/m².      Intake/Output Summary (Last 24 hours) at 4/25/2024 1049  Last data filed at 4/25/2024 0859  Gross per 24 hour   Intake 1291.16 ml   Output 3000 ml   Net -1708.84 ml        General    Vitals reviewed.  Constitutional: She is oriented to person, place, and time.   Pulmonary/Chest: Effort normal.   Abdominal: Soft.   Neurological: She is alert and oriented to person, place, and time.   Psychiatric: She has a normal mood and affect. Her behavior is normal.         Back (L-Spine & T-Spine) / Neck (C-Spine) Exam     Comments:  Neurovascular status grossly intact both lower extremities         Significant Labs: All pertinent labs within the past 24 hours have been reviewed.    Significant Imaging: None  "

## 2024-04-25 NOTE — PLAN OF CARE
Problem: Physical Therapy  Goal: Physical Therapy Goal  Description: Goals to be met by: 2024     Patient will increase functional independence with mobility by performin. Supine to sit with MInimal Assistance  2. Sit to stand transfer with Minimal Assistance  3. Bed to chair transfer with Minimal Assistance using Rolling Walker  4. Gait  x 150 feet with Minimal Assistance using Rolling Walker.   5. Lower extremity exercise program x20 reps  Outcome: Progressing   Pt ambulated 15ft with RW min assist and chair following.

## 2024-04-25 NOTE — PT/OT/SLP PROGRESS
Physical Therapy Treatment    Patient Name:  Loretta Lea   MRN:  [id number]    Recommendations:     Discharge Recommendations: Moderate Intensity Therapy  Discharge Equipment Recommendations: walker, rolling  Barriers to discharge: Decreased caregiver support    Assessment:     Loretta Lea is a 33 y.o. female admitted with a medical diagnosis of S/P lumbar spinal fusion.  She presents with the following impairments/functional limitations: weakness, impaired endurance, impaired functional mobility, gait instability, impaired balance, decreased lower extremity function, pain, decreased ROM, impaired cardiopulmonary response to activity, orthopedic precautions .    Pt seen up in chair- wanting to use commode. Pt requiring min/mod assist with VC for rocking technique- unsuccessful. Pt then progressed to gait training with RW 15ft min assist and another person following with chair. Pt c/o lightheadedness and returned back to chair. No c/o spasms today and no screaming.    Rehab Prognosis: Fair; patient would benefit from acute skilled PT services to address these deficits and reach maximum level of function.    Recent Surgery: Procedure(s) (LRB):  FUSION, SPINE, LUMBAR, ALIF (Bilateral)  LAMINECTOMY, SPINE, LUMBAR, WITH FUSION (Bilateral) 2 Days Post-Op    Plan:     During this hospitalization, patient to be seen BID to address the identified rehab impairments via gait training, therapeutic activities, therapeutic exercises, neuromuscular re-education and progress toward the following goals:    Plan of Care Expires:  05/30/24    Subjective   Pt stated spouse went back to work today  Chief Complaint: needing to use commode- unsuccessful  Patient/Family Comments/goals: get well  Pain/Comfort:  Pain Rating 1:  (not rated)  Location 1: back  Pain Addressed 1: Pre-medicate for activity, Reposition, Distraction, Cessation of Activity      Objective:     Communicated with nurse Reynolds prior to session.   Patient found up in chair with telemetry, oxygen, pulse ox (continuous) upon PT entry to room.     General Precautions: Standard, fall  Orthopedic Precautions: spinal precautions  Braces:  (has TLSO brace at bedside)  Respiratory Status: Nasal cannula, flow 2 L/min     Functional Mobility:  Transfers:     Sit to Stand:  minimum assistance and moderate assistance with rolling walker  Toilet Transfer: minimum assistance and moderate assistance with  rolling walker  using  Stand Pivot  Gait: 15ft with RW min assist and another person following with chair      AM-PAC 6 CLICK MOBILITY  Turning over in bed (including adjusting bedclothes, sheets and blankets)?: 3  Sitting down on and standing up from a chair with arms (e.g., wheelchair, bedside commode, etc.): 3  Moving from lying on back to sitting on the side of the bed?: 3  Moving to and from a bed to a chair (including a wheelchair)?: 2  Need to walk in hospital room?: 2  Climbing 3-5 steps with a railing?: 1  Basic Mobility Total Score: 14       Treatment & Education:  Patient was educated on the importance of OOB activity and functional mobility to negate negative effects of prolonged bed rest during hospitalization, safe transfers and ambulation, and D/C planning   Back to chair post PT and repositioned  Encouraged ankle pumping exercises      Patient left up in chair with all lines intact, call button in reach, chair alarm on, and nurse Gail present..    GOALS:   Multidisciplinary Problems       Physical Therapy Goals          Problem: Physical Therapy    Goal Priority Disciplines Outcome Goal Variances Interventions   Physical Therapy Goal     PT, PT/OT Progressing     Description: Goals to be met by: 2024     Patient will increase functional independence with mobility by performin. Supine to sit with MInimal Assistance  2. Sit to stand transfer with Minimal Assistance  3. Bed to chair transfer with Minimal Assistance using Rolling Walker  4. Gait   x 150 feet with Minimal Assistance using Rolling Walker.   5. Lower extremity exercise program x20 reps                       Time Tracking:     PT Received On: 04/25/24  PT Start Time: 1110     PT Stop Time: 1123  PT Total Time (min): 13 min     Billable Minutes: Gait Training 13    Treatment Type: Treatment  PT/PTA: PT     Number of PTA visits since last PT visit: 0     04/25/2024

## 2024-04-25 NOTE — PLAN OF CARE
Plan of care reviewed with patient and spouse, verbalized understanding. Cardiac monitoring in place. IV intact and patent, no s/s infection or infiltration noted to site. Meds given per MAR. Up to BSC with x2 assist. Pain managed with PRN medication. IS at bedside and encouraged use. NAD noted. Purposeful q2hr rounding done. Safety maintained with side rails up x3, bed wheels locked, bed in lowest position, bed alarm set, slip resistant socks maintained, and call light with in reach of patient. Patient educated to call for assistance when needed, verbalized understanding. Patient remains free from falls. No further needs expressed at this time. Will continue to monitor.     Problem: Adult Inpatient Plan of Care  Goal: Plan of Care Review  Outcome: Progressing  Goal: Patient-Specific Goal (Individualized)  Outcome: Progressing  Goal: Absence of Hospital-Acquired Illness or Injury  Outcome: Progressing  Goal: Optimal Comfort and Wellbeing  Outcome: Progressing  Goal: Readiness for Transition of Care  Outcome: Progressing     Problem: Diabetes Comorbidity  Goal: Blood Glucose Level Within Targeted Range  Outcome: Progressing     Problem: Bariatric Environmental Safety  Goal: Safety Maintained with Care  Outcome: Progressing     Problem: Infection  Goal: Absence of Infection Signs and Symptoms  Outcome: Progressing     Problem: Wound  Goal: Optimal Coping  Outcome: Progressing  Goal: Optimal Functional Ability  Outcome: Progressing  Goal: Absence of Infection Signs and Symptoms  Outcome: Progressing  Goal: Improved Oral Intake  Outcome: Progressing  Goal: Optimal Pain Control and Function  Outcome: Progressing  Goal: Skin Health and Integrity  Outcome: Progressing  Goal: Optimal Wound Healing  Outcome: Progressing     Problem: Pain Acute  Goal: Optimal Pain Control and Function  Outcome: Progressing     Problem: Fall Injury Risk  Goal: Absence of Fall and Fall-Related Injury  Outcome: Progressing     Problem: Spinal  Surgery  Goal: Optimal Coping with Surgery  Outcome: Progressing  Goal: Absence of Bleeding  Outcome: Progressing  Goal: Effective Bowel Elimination  Outcome: Progressing  Goal: Fluid and Electrolyte Balance  Outcome: Progressing  Goal: Optimal Functional Ability  Outcome: Progressing  Goal: Absence of Infection Signs and Symptoms  Outcome: Progressing  Goal: Optimal Neurologic Function  Outcome: Progressing  Goal: Anesthesia/Sedation Recovery  Outcome: Progressing  Goal: Optimal Pain Control and Function  Outcome: Progressing  Goal: Nausea and Vomiting Relief  Outcome: Progressing  Goal: Effective Urinary Elimination  Outcome: Progressing  Goal: Effective Oxygenation and Ventilation  Outcome: Progressing     Problem: Violence Risk or Actual  Goal: Anger and Impulse Control  Outcome: Progressing

## 2024-04-26 ENCOUNTER — TELEPHONE (OUTPATIENT)
Dept: ORTHOPEDICS | Facility: CLINIC | Age: 34
End: 2024-04-26

## 2024-04-26 DIAGNOSIS — Z98.1 S/P LUMBAR SPINAL FUSION: Primary | ICD-10-CM

## 2024-04-26 LAB
ALBUMIN SERPL BCP-MCNC: 2.6 G/DL (ref 3.5–5.2)
ALP SERPL-CCNC: 95 U/L (ref 55–135)
ALT SERPL W/O P-5'-P-CCNC: 51 U/L (ref 10–44)
ANION GAP SERPL CALC-SCNC: 8 MMOL/L (ref 8–16)
AST SERPL-CCNC: 44 U/L (ref 10–40)
BASOPHILS # BLD AUTO: 0.04 K/UL (ref 0–0.2)
BASOPHILS NFR BLD: 0.3 % (ref 0–1.9)
BILIRUB SERPL-MCNC: 0.7 MG/DL (ref 0.1–1)
BUN SERPL-MCNC: 4 MG/DL (ref 6–20)
CALCIUM SERPL-MCNC: 8.7 MG/DL (ref 8.7–10.5)
CHLORIDE SERPL-SCNC: 96 MMOL/L (ref 95–110)
CO2 SERPL-SCNC: 34 MMOL/L (ref 23–29)
CREAT SERPL-MCNC: 0.6 MG/DL (ref 0.5–1.4)
DIFFERENTIAL METHOD BLD: ABNORMAL
EOSINOPHIL # BLD AUTO: 0 K/UL (ref 0–0.5)
EOSINOPHIL NFR BLD: 0.3 % (ref 0–8)
ERYTHROCYTE [DISTWIDTH] IN BLOOD BY AUTOMATED COUNT: 14 % (ref 11.5–14.5)
EST. GFR  (NO RACE VARIABLE): >60 ML/MIN/1.73 M^2
GLUCOSE SERPL-MCNC: 170 MG/DL (ref 70–110)
HCT VFR BLD AUTO: 37.3 % (ref 37–48.5)
HGB BLD-MCNC: 11.5 G/DL (ref 12–16)
IMM GRANULOCYTES # BLD AUTO: 0.06 K/UL (ref 0–0.04)
IMM GRANULOCYTES NFR BLD AUTO: 0.4 % (ref 0–0.5)
LYMPHOCYTES # BLD AUTO: 2.3 K/UL (ref 1–4.8)
LYMPHOCYTES NFR BLD: 14.6 % (ref 18–48)
MCH RBC QN AUTO: 27.4 PG (ref 27–31)
MCHC RBC AUTO-ENTMCNC: 30.8 G/DL (ref 32–36)
MCV RBC AUTO: 89 FL (ref 82–98)
MONOCYTES # BLD AUTO: 1.2 K/UL (ref 0.3–1)
MONOCYTES NFR BLD: 7.4 % (ref 4–15)
NEUTROPHILS # BLD AUTO: 12.2 K/UL (ref 1.8–7.7)
NEUTROPHILS NFR BLD: 77 % (ref 38–73)
NRBC BLD-RTO: 0 /100 WBC
PLATELET # BLD AUTO: 258 K/UL (ref 150–450)
PMV BLD AUTO: 9.4 FL (ref 9.2–12.9)
POCT GLUCOSE: 121 MG/DL (ref 70–110)
POCT GLUCOSE: 122 MG/DL (ref 70–110)
POCT GLUCOSE: 139 MG/DL (ref 70–110)
POCT GLUCOSE: 158 MG/DL (ref 70–110)
POTASSIUM SERPL-SCNC: 4 MMOL/L (ref 3.5–5.1)
PROT SERPL-MCNC: 6.6 G/DL (ref 6–8.4)
RBC # BLD AUTO: 4.2 M/UL (ref 4–5.4)
SODIUM SERPL-SCNC: 138 MMOL/L (ref 136–145)
WBC # BLD AUTO: 15.87 K/UL (ref 3.9–12.7)

## 2024-04-26 PROCEDURE — 97116 GAIT TRAINING THERAPY: CPT

## 2024-04-26 PROCEDURE — 11000001 HC ACUTE MED/SURG PRIVATE ROOM

## 2024-04-26 PROCEDURE — 99900031 HC PATIENT EDUCATION (STAT)

## 2024-04-26 PROCEDURE — 63600175 PHARM REV CODE 636 W HCPCS

## 2024-04-26 PROCEDURE — 25000003 PHARM REV CODE 250: Performed by: ORTHOPAEDIC SURGERY

## 2024-04-26 PROCEDURE — 25000003 PHARM REV CODE 250: Performed by: PHYSICIAN ASSISTANT

## 2024-04-26 PROCEDURE — 25000003 PHARM REV CODE 250: Performed by: ANESTHESIOLOGY

## 2024-04-26 PROCEDURE — 85025 COMPLETE CBC W/AUTO DIFF WBC: CPT

## 2024-04-26 PROCEDURE — 94761 N-INVAS EAR/PLS OXIMETRY MLT: CPT

## 2024-04-26 PROCEDURE — 99900035 HC TECH TIME PER 15 MIN (STAT)

## 2024-04-26 PROCEDURE — 36415 COLL VENOUS BLD VENIPUNCTURE: CPT

## 2024-04-26 PROCEDURE — C9113 INJ PANTOPRAZOLE SODIUM, VIA: HCPCS

## 2024-04-26 PROCEDURE — 94799 UNLISTED PULMONARY SVC/PX: CPT

## 2024-04-26 PROCEDURE — 25000003 PHARM REV CODE 250

## 2024-04-26 PROCEDURE — 80053 COMPREHEN METABOLIC PANEL: CPT

## 2024-04-26 PROCEDURE — 63600175 PHARM REV CODE 636 W HCPCS: Performed by: PHYSICIAN ASSISTANT

## 2024-04-26 PROCEDURE — 27000221 HC OXYGEN, UP TO 24 HOURS

## 2024-04-26 RX ORDER — HYDROMORPHONE HYDROCHLORIDE 2 MG/1
2 TABLET ORAL EVERY 4 HOURS PRN
Qty: 42 TABLET | Refills: 0 | Status: SHIPPED | OUTPATIENT
Start: 2024-04-26 | End: 2024-05-03

## 2024-04-26 RX ORDER — HYDROMORPHONE HYDROCHLORIDE 2 MG/1
2 TABLET ORAL EVERY 4 HOURS PRN
Status: DISCONTINUED | OUTPATIENT
Start: 2024-04-26 | End: 2024-04-28 | Stop reason: HOSPADM

## 2024-04-26 RX ORDER — DIAZEPAM 5 MG/1
5 TABLET ORAL ONCE
Status: COMPLETED | OUTPATIENT
Start: 2024-04-27 | End: 2024-04-26

## 2024-04-26 RX ADMIN — HYDROMORPHONE HYDROCHLORIDE 2 MG: 2 INJECTION, SOLUTION INTRAMUSCULAR; INTRAVENOUS; SUBCUTANEOUS at 03:04

## 2024-04-26 RX ADMIN — DIAZEPAM 5 MG: 5 TABLET ORAL at 11:04

## 2024-04-26 RX ADMIN — SENNOSIDES AND DOCUSATE SODIUM 1 TABLET: 8.6; 5 TABLET ORAL at 08:04

## 2024-04-26 RX ADMIN — HYDROMORPHONE HYDROCHLORIDE 2 MG: 2 INJECTION, SOLUTION INTRAMUSCULAR; INTRAVENOUS; SUBCUTANEOUS at 06:04

## 2024-04-26 RX ADMIN — CYCLOBENZAPRINE HYDROCHLORIDE 10 MG: 5 TABLET, FILM COATED ORAL at 04:04

## 2024-04-26 RX ADMIN — HYDROMORPHONE HYDROCHLORIDE 2 MG: 2 TABLET ORAL at 06:04

## 2024-04-26 RX ADMIN — MUPIROCIN 1 G: 20 OINTMENT TOPICAL at 09:04

## 2024-04-26 RX ADMIN — PROCHLORPERAZINE EDISYLATE 5 MG: 5 INJECTION INTRAMUSCULAR; INTRAVENOUS at 05:04

## 2024-04-26 RX ADMIN — GABAPENTIN 300 MG: 300 CAPSULE ORAL at 09:04

## 2024-04-26 RX ADMIN — MUPIROCIN 1 G: 20 OINTMENT TOPICAL at 08:04

## 2024-04-26 RX ADMIN — HYDROMORPHONE HYDROCHLORIDE 2 MG: 2 TABLET ORAL at 10:04

## 2024-04-26 RX ADMIN — DOCUSATE SODIUM 100 MG: 100 CAPSULE, LIQUID FILLED ORAL at 08:04

## 2024-04-26 RX ADMIN — TRAZODONE HYDROCHLORIDE 200 MG: 50 TABLET ORAL at 09:04

## 2024-04-26 RX ADMIN — HYDROMORPHONE HYDROCHLORIDE 2 MG: 2 TABLET ORAL at 02:04

## 2024-04-26 RX ADMIN — SENNOSIDES AND DOCUSATE SODIUM 1 TABLET: 8.6; 5 TABLET ORAL at 09:04

## 2024-04-26 RX ADMIN — ONDANSETRON 8 MG: 2 INJECTION INTRAMUSCULAR; INTRAVENOUS at 03:04

## 2024-04-26 RX ADMIN — RISPERIDONE 1 MG: 0.25 TABLET, FILM COATED ORAL at 08:04

## 2024-04-26 RX ADMIN — METFORMIN HYDROCHLORIDE 1000 MG: 500 TABLET, EXTENDED RELEASE ORAL at 04:04

## 2024-04-26 RX ADMIN — METFORMIN HYDROCHLORIDE 1000 MG: 500 TABLET, EXTENDED RELEASE ORAL at 08:04

## 2024-04-26 RX ADMIN — CYCLOBENZAPRINE HYDROCHLORIDE 10 MG: 5 TABLET, FILM COATED ORAL at 08:04

## 2024-04-26 RX ADMIN — SERTRALINE HYDROCHLORIDE 150 MG: 50 TABLET ORAL at 09:04

## 2024-04-26 RX ADMIN — RISPERIDONE 1 MG: 0.25 TABLET, FILM COATED ORAL at 09:04

## 2024-04-26 RX ADMIN — PANTOPRAZOLE SODIUM 40 MG: 40 INJECTION, POWDER, LYOPHILIZED, FOR SOLUTION INTRAVENOUS at 09:04

## 2024-04-26 RX ADMIN — SCOPALAMINE 1 PATCH: 1 PATCH, EXTENDED RELEASE TRANSDERMAL at 08:04

## 2024-04-26 RX ADMIN — ONDANSETRON 8 MG: 2 INJECTION INTRAMUSCULAR; INTRAVENOUS at 09:04

## 2024-04-26 RX ADMIN — HYDROMORPHONE HYDROCHLORIDE 2 MG: 2 TABLET ORAL at 09:04

## 2024-04-26 NOTE — SUBJECTIVE & OBJECTIVE
Principal Problem:S/P lumbar spinal fusion    Principal Orthopedic Problem:  Status post lumbar fusion.    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       Current Facility-Administered Medications   Medication Dose Route Frequency Provider Last Rate Last Admin    acetaminophen tablet 650 mg  650 mg Oral Q4H PRN Anitha Rosario NP        acetaminophen tablet 650 mg  650 mg Oral Q6H PRN Krzysztof Chung PA        albuterol-ipratropium 2.5 mg-0.5 mg/3 mL nebulizer solution 3 mL  3 mL Nebulization Q6H PRN Anitha Rosario NP        aluminum-magnesium hydroxide-simethicone 200-200-20 mg/5 mL suspension 30 mL  30 mL Oral Q4H PRN Krzysztof Chung PA        bisacodyL suppository 10 mg  10 mg Rectal Daily Krzysztof Chung PA        cyclobenzaprine tablet 10 mg  10 mg Oral Q8H PRN Gurjit Cassidy MD   10 mg at 04/24/24 1209    dextrose 10% bolus 125 mL 125 mL  12.5 g Intravenous PRN Anitha Rosario NP        dextrose 10% bolus 250 mL 250 mL  25 g Intravenous PRN Anitha Rosario NP        diphenhydrAMINE capsule 50 mg  50 mg Oral Q6H PRN Krzysztof Chung PA        docusate sodium capsule 100 mg  100 mg Oral Daily Krzysztof Chung PA   100 mg at 04/25/24 1704    gabapentin capsule 300 mg  300 mg Oral QHS Krzysztof Chung PA   300 mg at 04/25/24 2118    glucagon (human recombinant) injection 1 mg  1 mg Intramuscular PRN Anitha Rosario NP        glucose chewable tablet 16 g  16 g Oral PRN Anitha Rosario NP        glucose chewable tablet 24 g  24 g Oral PRN Anitha Rosario NP        HYDROmorphone (PF) injection 2 mg  2 mg Intravenous Q3H PRN Krzysztof Chung PA   2 mg at 04/26/24 0611    HYDROmorphone injection 1 mg  1 mg Intravenous Q3H PRN Krzysztof Chung PA        hydrOXYzine pamoate capsule 25 mg  25 mg Oral Q4H PRN Krzysztof Chung, PA         insulin aspart U-100 pen 0-5 Units  0-5 Units Subcutaneous QID (AC + HS) PRN Anitha Rosario NP   3 Units at 04/25/24 1244    magnesium oxide tablet 800 mg  800 mg Oral PRN Anitha Rosario, NP        magnesium oxide tablet 800 mg  800 mg Oral PRN Anitha Rosario, NP        metFORMIN ER 24hr tablet 1,000 mg  1,000 mg Oral BID WM Krzysztof Chung PA   1,000 mg at 04/25/24 1704    mupirocin 2 % ointment   Nasal BID Gurjit Cassidy MD   1 g at 04/25/24 2119    naloxone 0.4 mg/mL injection 0.02 mg  0.02 mg Intravenous PRN Ralph Rivers MD        ondansetron injection 8 mg  8 mg Intravenous Q6H PRN Krzysztof Chung PA   8 mg at 04/26/24 0319    oxyCODONE immediate release tablet 10 mg  10 mg Oral Q4H PRN Krzysztof Chung PA        oxyCODONE immediate release tablet 15 mg  15 mg Oral Q4H PRN Krzysztof Chung PA   15 mg at 04/25/24 0357    oxyCODONE immediate release tablet 5 mg  5 mg Oral Q4H PRN Krzysztof Chung PA        pantoprazole injection 40 mg  40 mg Intravenous Daily Anitha Rosario, NP   40 mg at 04/25/24 0818    potassium bicarbonate disintegrating tablet 35 mEq  35 mEq Oral PRN Anitha Rosario, NP        potassium bicarbonate disintegrating tablet 50 mEq  50 mEq Oral PRN Anitha Rosario, NP        potassium bicarbonate disintegrating tablet 60 mEq  60 mEq Oral PRN Anitha Rosario, NP        prochlorperazine injection Soln 5 mg  5 mg Intravenous Q6H PRN Krzysztof Chung PA   5 mg at 04/26/24 0549    pyridoxine (vitamin B6) tablet 25 mg  25 mg Oral Daily Krzysztof Chung PA   25 mg at 04/24/24 0835    risperiDONE tablet 1 mg  1 mg Oral BID Krzysztof Chung PA   1 mg at 04/25/24 2119    scopolamine 1.3-1.5 mg (1 mg over 3 days) 1 patch  1 patch Transdermal Q3 Days Teofilo Tee MD   1 patch at 04/23/24 0946    senna-docusate 8.6-50 mg per tablet 1 tablet  1 tablet Oral BID Anitha Rosario, NP   1 tablet at 04/25/24 2118    senna-docusate 8.6-50 mg  "per tablet 2 tablet  2 tablet Oral Nightly PRN Krzysztof Chung PA        sertraline tablet 150 mg  150 mg Oral QHS Krzysztof Chung PA   150 mg at 04/25/24 2118    simethicone chewable tablet 80 mg  1 tablet Oral QID PRN Anitha Rosario NP   80 mg at 04/24/24 0841    sodium chloride 0.9% flush 10 mL  10 mL Intravenous Q12H PRN Anitha Rosario NP        sumatriptan tablet 100 mg  100 mg Oral Q2H PRN Krzysztof Chung PA        traZODone tablet 200 mg  200 mg Oral QHS Krzysztof Chung PA   100 mg at 04/25/24 2119     Objective:     Vital Signs (Most Recent):  Temp: 99 °F (37.2 °C) (04/26/24 0312)  Pulse: (!) 121 (04/26/24 0659)  Resp: 18 (04/26/24 0659)  BP: 123/66 (04/26/24 0312)  SpO2: 98 % (04/26/24 0659) Vital Signs (24h Range):  Temp:  [98.2 °F (36.8 °C)-99.2 °F (37.3 °C)] 99 °F (37.2 °C)  Pulse:  [] 121  Resp:  [16-20] 18  SpO2:  [92 %-98 %] 98 %  BP: (116-137)/(59-89) 123/66     Weight: (!) 144.3 kg (318 lb 2 oz)  Height: 5' 7" (170.2 cm)  Body mass index is 49.83 kg/m².      Intake/Output Summary (Last 24 hours) at 4/26/2024 0703  Last data filed at 4/25/2024 1800  Gross per 24 hour   Intake 480 ml   Output 1000 ml   Net -520 ml        General    Constitutional: She is oriented to person, place, and time. She appears well-developed and well-nourished.   Pulmonary/Chest: Effort normal.   Neurological: She is alert and oriented to person, place, and time.   Psychiatric: She has a normal mood and affect. Her behavior is normal. Judgment and thought content normal.         Back (L-Spine & T-Spine) / Neck (C-Spine) Exam     Tenderness Right paramedian tenderness of the Lower L-Spine. Left paramedian tenderness of the Lower L-Spine.     Spinal Sensation   Right Side Sensation  L-Spine Level: normal  Left Side Sensation  L-Spine Level: normal    Comments:  Surgical dressing is clean dry and intact.  No surrounding erythema or ecchymosis.  Patient is neurovascularly intact throughout " bilateral lower extremities.  Bilateral EHL is intact.  She can dorsiflex and plantar flex bilateral ankles.  Bilateral calves soft and nontender.      Muscle Strength   Right Lower Extremity   Anterior tibial:  5/5   Gastrocsoleus:  5/5   EHL:  5/5  Left Lower Extremity   Anterior tibial:  5/5   Gastrocsoleus:  5/5   EHL:  5/5       Significant Labs: None    Significant Imaging: None

## 2024-04-26 NOTE — SUBJECTIVE & OBJECTIVE
Interval History:  Patient reports improving pain and nausea today.  Having flatus but still not feeling up to eating as of yet.  No bowel movement reported.  Starting to work with physical therapy.  Looking forward to get discharged by tomorrow.  Patient again reminded to aggressively use incentive spirometer.    Review of Systems   Musculoskeletal:  Positive for back pain.   All other systems reviewed and are negative.    Objective:     Vital Signs (Most Recent):  Temp: 99.3 °F (37.4 °C) (04/26/24 0744)  Pulse: (!) 114 (04/26/24 0747)  Resp: 18 (04/26/24 0744)  BP: (!) 117/57 (04/26/24 0744)  SpO2: (!) 93 % (04/26/24 0747) Vital Signs (24h Range):  Temp:  [98.2 °F (36.8 °C)-99.3 °F (37.4 °C)] 99.3 °F (37.4 °C)  Pulse:  [] 114  Resp:  [16-18] 18  SpO2:  [92 %-98 %] 93 %  BP: (116-137)/(57-89) 117/57     Weight: (!) 144.3 kg (318 lb 2 oz)  Body mass index is 49.83 kg/m².    Intake/Output Summary (Last 24 hours) at 4/26/2024 0919  Last data filed at 4/25/2024 1800  Gross per 24 hour   Intake 480 ml   Output --   Net 480 ml         Physical Exam  Vitals reviewed.   Constitutional:       Appearance: She is obese.   HENT:      Head: Normocephalic and atraumatic.      Mouth/Throat:      Mouth: Mucous membranes are dry.      Pharynx: Oropharynx is clear.   Eyes:      Extraocular Movements: Extraocular movements intact.      Pupils: Pupils are equal, round, and reactive to light.   Cardiovascular:      Rate and Rhythm: Normal rate and regular rhythm.      Pulses: Normal pulses.      Heart sounds: Normal heart sounds.   Pulmonary:      Effort: Pulmonary effort is normal.      Breath sounds: Normal breath sounds.   Abdominal:      General: Bowel sounds are decreased. There is distension.      Palpations: Abdomen is soft.      Tenderness: There is generalized abdominal tenderness.          Comments: [place] is a clean/dry/intact bandage over surgical dressing  Accordian drain leading from clean dry intact bandage  with moderate amount of blood   Genitourinary:     Comments: [place] cath present draining clear yellow urine  Musculoskeletal:         General: Normal range of motion.      Cervical back: Normal range of motion and neck supple.      Right lower leg: No edema.      Left lower leg: No edema.   Skin:     General: Skin is warm and dry.      Capillary Refill: Capillary refill takes less than 2 seconds.   Neurological:      General: No focal deficit present.      Mental Status: She is alert and oriented to person, place, and time. Mental status is at baseline.   Psychiatric:         Mood and Affect: Mood normal.         Behavior: Behavior normal.         Thought Content: Thought content normal.         Judgment: Judgment normal.             Significant Labs: All pertinent labs within the past 24 hours have been reviewed.  CBC:   Recent Labs   Lab 04/25/24  0442 04/26/24 0418   WBC 17.58* 15.87*   HGB 12.2 11.5*   HCT 39.7 37.3    258     CMP:   Recent Labs   Lab 04/25/24  0442 04/26/24  0418    138   K 4.4 4.0   CL 96 96   CO2 30* 34*   * 170*   BUN 4* 4*   CREATININE 0.6 0.6   CALCIUM 8.5* 8.7   PROT 6.5 6.6   ALBUMIN 3.0* 2.6*   BILITOT 0.9 0.7   ALKPHOS 109 95   AST 37 44*   ALT 57* 51*   ANIONGAP 10 8       Significant Imaging:   CTA chest:   1. No pulmonary embolus to the proximal segmental level, allowing for exam limitations.  2.  Mosaic attenuation in the lungs, a nonspecific finding with considerations to include air-trapping, mild pulmonary edema, pneumonitis, bronchiolitis.  3. Query hepatic steatosis.    Chest x-ray: Hypoventilatory change.

## 2024-04-26 NOTE — ASSESSMENT & PLAN NOTE
Impression:  Stable orthopedically.  Continues workup with the hospitalist. D/C per Hospitalist.  Plan:  We will start ambulation if no contraindication per hospitalist.  Attempts to manage nausea associated with pain medications with Zofran.  She has been transitioned from oxycodone to Dilaudid.

## 2024-04-26 NOTE — PT/OT/SLP PROGRESS
Physical Therapy Treatment    Patient Name:  Loretta Lea   MRN:  [id number]    Recommendations:     Discharge Recommendations: Low Intensity Therapy  Discharge Equipment Recommendations: walker, rolling  Barriers to discharge: None    Assessment:     Loretta Lea is a 33 y.o. female admitted with a medical diagnosis of S/P lumbar spinal fusion.  She presents with the following impairments/functional limitations: weakness, impaired endurance, impaired functional mobility, gait instability, impaired balance, decreased lower extremity function, pain, decreased ROM, impaired cardiopulmonary response to activity, orthopedic precautions .    Pt seen asleep and easily awakened- spouse at bedside. Pt requiring min assist for mobility and ambulated at hallways 250ft with TLSO min assist with RW with no seated rest.    Rehab Prognosis: Fair; patient would benefit from acute skilled PT services to address these deficits and reach maximum level of function.    Recent Surgery: Procedure(s) (LRB):  FUSION, SPINE, LUMBAR, ALIF (Bilateral)  LAMINECTOMY, SPINE, LUMBAR, WITH FUSION (Bilateral) 3 Days Post-Op    Plan:     During this hospitalization, patient to be seen BID to address the identified rehab impairments via gait training, therapeutic activities, therapeutic exercises, neuromuscular re-education and progress toward the following goals:    Plan of Care Expires:  05/30/24    Subjective   Stated is feeling better  Chief Complaint: none  Patient/Family Comments/goals: get well  Pain/Comfort:         Objective:     Communicated with nurse Tony prior to session.  Patient found right sidelying with telemetry, oxygen, pulse ox (continuous) upon PT entry to room.     General Precautions: Standard, fall  Orthopedic Precautions: spinal precautions  Braces: TLSO  Respiratory Status: Nasal cannula, flow 2 L/min     Functional Mobility:  Bed Mobility:     Scooting: minimum assistance  Supine to Sit: minimum  assistance  Sit to Supine: minimum assistance  Transfers:     Sit to Stand:  minimum assistance with rolling walker  Gait: 250ft with RW with TLSO  min assist      AM-PAC 6 CLICK MOBILITY          Treatment & Education:Patient was educated on the importance of OOB activity and functional mobility to negate negative effects of prolonged bed rest during hospitalization, safe transfers and ambulation, and D/C planning   Back to bed post PT and repositioned      Patient left right sidelying with all lines intact, call button in reach, bed alarm on, and spouse present..    GOALS:   Multidisciplinary Problems       Physical Therapy Goals          Problem: Physical Therapy    Goal Priority Disciplines Outcome Goal Variances Interventions   Physical Therapy Goal     PT, PT/OT Progressing     Description: Goals to be met by: 2024     Patient will increase functional independence with mobility by performin. Supine to sit with MInimal Assistance  2. Sit to stand transfer with Minimal Assistance  3. Bed to chair transfer with Minimal Assistance using Rolling Walker  4. Gait  x 150 feet with Minimal Assistance using Rolling Walker.   5. Lower extremity exercise program x20 reps                       Time Tracking:     PT Received On: 24  PT Start Time: 1524     PT Stop Time: 1542  PT Total Time (min): 18 min     Billable Minutes: Gait Training 18    Treatment Type: Treatment  PT/PTA: PT     Number of PTA visits since last PT visit: 0     2024

## 2024-04-26 NOTE — PLAN OF CARE
Plan of care reviewed with patient, verbalized understanding. Tolerating full liquid diet. No nausea or emesis noted during afternoon. Passing flatus. Ambulated in issa, up in chair throughout shift. Pain controlled with IV prn medication. Dressing changed to back and L side. Staples intact. Call light within reach.

## 2024-04-26 NOTE — PLAN OF CARE
POC/Meds reviewed, pt verbalized understanding. Vitals stable.  Afebrile.   Tele In place. Prn medication given. Accuchecks monitored. Dressign C/D/I.  Up with x1 assist. Repositions self. Hourly/Q2hr rounding performed, safety maintained. Bed in lowest position, wheels locked, SR up x2, call light in easy reach. No  complaints at this time. Will continue to monitor.

## 2024-04-26 NOTE — HOSPITAL COURSE
Postoperatively patient was managed on medicine-surgery floor.  Postop leukocytosis and excessive surgical pain required intravenous narcotic infusion.  Patient noted to have significant atelectasis and postop nausea.  Patient was extensively counseled regarding importance of aggressive incentive spirometry.  Patient was provided supportive care.  Patient working with PT and OT.  Liver enzymes improved postoperatively.  Pain control improved over the days post surgery.  She was cleared by orthopedist and discharged home.  Outpatient PT and OT ordered on discharge.

## 2024-04-26 NOTE — CARE UPDATE
04/26/24 0659   Patient Assessment/Suction   Level of Consciousness (AVPU) alert   Respiratory Effort Normal;Unlabored   Expansion/Accessory Muscles/Retractions no use of accessory muscles;no retractions;expansion symmetric   All Lung Fields Breath Sounds Posterior:;Lateral:;clear   Rhythm/Pattern, Respiratory unlabored;pattern regular;depth regular;no shortness of breath reported   Cough Frequency no cough   PRE-TX-O2   Device (Oxygen Therapy) nasal cannula   $ Is the patient on Low Flow Oxygen? Yes   Flow (L/min) (Oxygen Therapy) 2  (decreased to 1 LPM)   SpO2 98 %   Pulse Oximetry Type Continuous   $ Pulse Oximetry - Multiple Charge Pulse Oximetry - Multiple   Pulse (!) 121   Resp 18   Positioning Right side   Aerosol Therapy   $ Aerosol Therapy Charges PRN treatment not required   Incentive Spirometer   $ Incentive Spirometer Charges done with encouragement   Incentive Spirometer Predicted Level (mL) 2100   Administration (IS) mouthpiece utilized   Number of Repetitions (IS) 10   Level Incentive Spirometer (mL) 1500   Patient Tolerance (IS) good   Education   $ Education Bronchodilator;15 min

## 2024-04-26 NOTE — PLAN OF CARE
Problem: Physical Therapy  Goal: Physical Therapy Goal  Description: Goals to be met by: 2024     Patient will increase functional independence with mobility by performin. Supine to sit with MInimal Assistance  2. Sit to stand transfer with Minimal Assistance  3. Bed to chair transfer with Minimal Assistance using Rolling Walker  4. Gait  x 150 feet with Minimal Assistance using Rolling Walker.   5. Lower extremity exercise program x20 reps  Outcome: Progressing   Pt ambulated 100ft with RW and another person following with chair. C/o burning L lateral thigh. TLSO on

## 2024-04-26 NOTE — CARE UPDATE
04/25/24 1916   Patient Assessment/Suction   Level of Consciousness (AVPU) alert   Respiratory Effort Unlabored   Expansion/Accessory Muscles/Retractions no use of accessory muscles;no retractions   PRE-TX-O2   Device (Oxygen Therapy) nasal cannula   $ Is the patient on Low Flow Oxygen? Yes   Flow (L/min) (Oxygen Therapy) 2   SpO2 96 %   Pulse Oximetry Type Intermittent   $ Pulse Oximetry - Multiple Charge Pulse Oximetry - Multiple   Pulse (!) 116   Resp 17   Aerosol Therapy   $ Aerosol Therapy Charges PRN treatment not required   Incentive Spirometer   $ Incentive Spirometer Charges done with encouragement;proper technique demonstrated   Number of Repetitions (IS) 8   Level Incentive Spirometer (mL) 1500   Patient Tolerance (IS) no adverse signs/symptoms present;good

## 2024-04-26 NOTE — NURSING
Pt states that she does not like to take the full dose of the trazodone 200mg, pt states she only wants to take 100mg. Sanjeev Fairbanks NP notified. Told to give partial dose and to document pt refusing half of the ordered dose.

## 2024-04-26 NOTE — NURSING
Pt refusing oral pain medication, reports it makes her nauseous. Per report from day shift RN  providers aware and plan to switch pt to oral dilaudid and to continue to give IV dilaudid.

## 2024-04-26 NOTE — PT/OT/SLP PROGRESS
Physical Therapy Treatment    Patient Name:  Loretta Lea   MRN:  [id number]    Recommendations:     Discharge Recommendations: Low Intensity Therapy  Discharge Equipment Recommendations: walker, rolling  Barriers to discharge: None    Assessment:     Loretta Lea is a 33 y.o. female admitted with a medical diagnosis of S/P lumbar spinal fusion.  She presents with the following impairments/functional limitations: weakness, impaired endurance, impaired functional mobility, gait instability, impaired balance, decreased lower extremity function, pain, decreased ROM, impaired cardiopulmonary response to activity, orthopedic precautions .    Pt seen up in chair and agreeable to PT. TLSO donned. Pt ambulated 100ft with RW min assist with another person following with chair. Pt c/o burning L lateral thigh and lightheadedness. Pt returned to room and TLSO removed- back to bed min assist.  Low intensity therapies.    Rehab Prognosis: Fair; patient would benefit from acute skilled PT services to address these deficits and reach maximum level of function.    Recent Surgery: Procedure(s) (LRB):  FUSION, SPINE, LUMBAR, ALIF (Bilateral)  LAMINECTOMY, SPINE, LUMBAR, WITH FUSION (Bilateral) 3 Days Post-Op    Plan:     During this hospitalization, patient to be seen BID to address the identified rehab impairments via gait training, therapeutic activities, therapeutic exercises, neuromuscular re-education and progress toward the following goals:    Plan of Care Expires:  05/30/24    Subjective   Stated that they have a room mate who is a CNA and could help caring for pt  Chief Complaint: lightheadedness and L lateral thigh burning  Patient/Family Comments/goals: get well  Pain/Comfort:         Objective:     Communicated with nurse Tony prior to session.  Patient found up in chair with telemetry, oxygen, pulse ox (continuous) upon PT entry to room.     General Precautions: Standard, fall  Orthopedic Precautions:  spinal precautions  Braces: TLSO  Respiratory Status: 2 liters O2     Functional Mobility:  Bed Mobility:     Sit to Supine: minimum assistance  Transfers:     Sit to Stand:  minimum assistance with rolling walker  Gait: 100ft with RW min assist and another person following with chair  pt able to assist with donning and doffing of TLSO      AM-PAC 6 CLICK MOBILITY          Treatment & Education:  Patient was educated on the importance of OOB activity and functional mobility to negate negative effects of prolonged bed rest during hospitalization, safe transfers and ambulation, and D/C planning   Pt educated on log rolling and isometrics    Patient left right sidelying with all lines intact, call button in reach, and bed alarm on..    GOALS:   Multidisciplinary Problems       Physical Therapy Goals          Problem: Physical Therapy    Goal Priority Disciplines Outcome Goal Variances Interventions   Physical Therapy Goal     PT, PT/OT Progressing     Description: Goals to be met by: 2024     Patient will increase functional independence with mobility by performin. Supine to sit with MInimal Assistance  2. Sit to stand transfer with Minimal Assistance  3. Bed to chair transfer with Minimal Assistance using Rolling Walker  4. Gait  x 150 feet with Minimal Assistance using Rolling Walker.   5. Lower extremity exercise program x20 reps                       Time Tracking:     PT Received On: 24  PT Start Time: 1020     PT Stop Time: 1039  PT Total Time (min): 19 min     Billable Minutes: Gait Training 19    Treatment Type: Treatment  PT/PTA: PT     Number of PTA visits since last PT visit: 0     2024

## 2024-04-26 NOTE — PROGRESS NOTES
[facility]/Surg  Orthopedics  Progress Note    Patient Name: Loretta Lea  MRN: [id number]  Admission Date: 4/23/2024  Hospital Length of Stay: 3 days  Attending Provider: Gurjit Cassidy MD  Primary Care Provider: Magalie Euceda MD  Follow-up For: Procedure(s) (LRB):  FUSION, SPINE, LUMBAR, ALIF (Bilateral)  LAMINECTOMY, SPINE, LUMBAR, WITH FUSION (Bilateral)    Post-Operative Day: 3 Days Post-Op  Subjective:     Principal Problem:S/P lumbar spinal fusion    Principal Orthopedic Problem:  Status post lumbar fusion.    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       Current Facility-Administered Medications   Medication Dose Route Frequency Provider Last Rate Last Admin    acetaminophen tablet 650 mg  650 mg Oral Q4H PRN Anitha Rosario NP        acetaminophen tablet 650 mg  650 mg Oral Q6H PRN Krzysztof Chung PA        albuterol-ipratropium 2.5 mg-0.5 mg/3 mL nebulizer solution 3 mL  3 mL Nebulization Q6H PRN Anitha Rosario NP        aluminum-magnesium hydroxide-simethicone 200-200-20 mg/5 mL suspension 30 mL  30 mL Oral Q4H PRN Krzysztof Chung PA        bisacodyL suppository 10 mg  10 mg Rectal Daily Krzysztof Chnug PA        cyclobenzaprine tablet 10 mg  10 mg Oral Q8H PRN Gurjit Cassidy MD   10 mg at 04/24/24 1209    dextrose 10% bolus 125 mL 125 mL  12.5 g Intravenous PRN Anitha Rosario NP        dextrose 10% bolus 250 mL 250 mL  25 g Intravenous PRN Anitha Rosario, NP        diphenhydrAMINE capsule 50 mg  50 mg Oral Q6H PRN Krzysztof Chung PA        docusate sodium capsule 100 mg  100 mg Oral Daily Krzysztof Chung PA   100 mg at 04/25/24 1704    gabapentin capsule 300 mg  300 mg Oral QHS Krzysztof Chung PA   300 mg at 04/25/24 2118    glucagon (human recombinant) injection 1 mg  1 mg Intramuscular PRN Marlene  Anitha PARSONS, NP        glucose chewable tablet 16 g  16 g Oral PRN Anitha Rosario, NP        glucose chewable tablet 24 g  24 g Oral PRN Anitha Rosario, JAMESON        HYDROmorphone (PF) injection 2 mg  2 mg Intravenous Q3H PRN Krzysztof Chung PA   2 mg at 04/26/24 0611    HYDROmorphone injection 1 mg  1 mg Intravenous Q3H PRN Krzysztof Chung PA        hydrOXYzine pamoate capsule 25 mg  25 mg Oral Q4H PRN Krzysztof Chung PA        insulin aspart U-100 pen 0-5 Units  0-5 Units Subcutaneous QID (AC + HS) PRN Anitha Rosario NP   3 Units at 04/25/24 1244    magnesium oxide tablet 800 mg  800 mg Oral PRN Anitha Rosario NP        magnesium oxide tablet 800 mg  800 mg Oral PRN Anitha Rosario NP        metFORMIN ER 24hr tablet 1,000 mg  1,000 mg Oral BID WM Krzysztof Chung PA   1,000 mg at 04/25/24 1704    mupirocin 2 % ointment   Nasal BID Gurjit Cassidy MD   1 g at 04/25/24 2119    naloxone 0.4 mg/mL injection 0.02 mg  0.02 mg Intravenous PRN Ralph Rivers MD        ondansetron injection 8 mg  8 mg Intravenous Q6H PRN Krzysztof Chung PA   8 mg at 04/26/24 0319    oxyCODONE immediate release tablet 10 mg  10 mg Oral Q4H PRN Krzysztof Chung PA        oxyCODONE immediate release tablet 15 mg  15 mg Oral Q4H PRN Krzysztof Chung PA   15 mg at 04/25/24 0357    oxyCODONE immediate release tablet 5 mg  5 mg Oral Q4H PRN Krzysztof Chung PA        pantoprazole injection 40 mg  40 mg Intravenous Daily Anitha Rosario NP   40 mg at 04/25/24 0818    potassium bicarbonate disintegrating tablet 35 mEq  35 mEq Oral PRN Anitha Rosario NP        potassium bicarbonate disintegrating tablet 50 mEq  50 mEq Oral PRN Anitha Rosario NP        potassium bicarbonate disintegrating tablet 60 mEq  60 mEq Oral PRN Anitha Rosario, JAMESON        prochlorperazine injection Soln 5 mg  5 mg Intravenous Q6H PRN Krzysztof Chung PA   5 mg at 04/26/24 0549    pyridoxine (vitamin  "B6) tablet 25 mg  25 mg Oral Daily Krzysztof Chung PA   25 mg at 04/24/24 0835    risperiDONE tablet 1 mg  1 mg Oral BID Krzysztof Chung PA   1 mg at 04/25/24 2119    scopolamine 1.3-1.5 mg (1 mg over 3 days) 1 patch  1 patch Transdermal Q3 Days Teofilo Tee MD   1 patch at 04/23/24 0946    senna-docusate 8.6-50 mg per tablet 1 tablet  1 tablet Oral BID Anitha Rosario NP   1 tablet at 04/25/24 2118    senna-docusate 8.6-50 mg per tablet 2 tablet  2 tablet Oral Nightly PRN Krzysztof Chung PA        sertraline tablet 150 mg  150 mg Oral QHS Krzysztof Chung PA   150 mg at 04/25/24 2118    simethicone chewable tablet 80 mg  1 tablet Oral QID PRN Anitha Rosario NP   80 mg at 04/24/24 0841    sodium chloride 0.9% flush 10 mL  10 mL Intravenous Q12H PRN Anitha Rosario NP        sumatriptan tablet 100 mg  100 mg Oral Q2H PRN Krzysztof Chung PA        traZODone tablet 200 mg  200 mg Oral QHS Krzysztof Chung PA   100 mg at 04/25/24 2119     Objective:     Vital Signs (Most Recent):  Temp: 99 °F (37.2 °C) (04/26/24 0312)  Pulse: (!) 121 (04/26/24 0659)  Resp: 18 (04/26/24 0659)  BP: 123/66 (04/26/24 0312)  SpO2: 98 % (04/26/24 0659) Vital Signs (24h Range):  Temp:  [98.2 °F (36.8 °C)-99.2 °F (37.3 °C)] 99 °F (37.2 °C)  Pulse:  [] 121  Resp:  [16-20] 18  SpO2:  [92 %-98 %] 98 %  BP: (116-137)/(59-89) 123/66     Weight: (!) 144.3 kg (318 lb 2 oz)  Height: 5' 7" (170.2 cm)  Body mass index is 49.83 kg/m².      Intake/Output Summary (Last 24 hours) at 4/26/2024 0703  Last data filed at 4/25/2024 1800  Gross per 24 hour   Intake 480 ml   Output 1000 ml   Net -520 ml        General    Constitutional: She is oriented to person, place, and time. She appears well-developed and well-nourished.   Pulmonary/Chest: Effort normal.   Neurological: She is alert and oriented to person, place, and time.   Psychiatric: She has a normal mood and affect. Her behavior is normal. Judgment " and thought content normal.         Back (L-Spine & T-Spine) / Neck (C-Spine) Exam     Tenderness Right paramedian tenderness of the Lower L-Spine. Left paramedian tenderness of the Lower L-Spine.     Spinal Sensation   Right Side Sensation  L-Spine Level: normal  Left Side Sensation  L-Spine Level: normal    Comments:  Surgical dressing is clean dry and intact.  No surrounding erythema or ecchymosis.  Patient is neurovascularly intact throughout bilateral lower extremities.  Bilateral EHL is intact.  She can dorsiflex and plantar flex bilateral ankles.  Bilateral calves soft and nontender.      Muscle Strength   Right Lower Extremity   Anterior tibial:  5/5   Gastrocsoleus:  5/5   EHL:  5/5  Left Lower Extremity   Anterior tibial:  5/5   Gastrocsoleus:  5/5   EHL:  5/5       Significant Labs: None    Significant Imaging: None  Assessment/Plan:     * S/P lumbar spinal fusion  Impression:  Stable orthopedically.  Continues workup with the hospitalist. D/C per Hospitalist.  Plan:  We will start ambulation if no contraindication per hospitalist.  Attempts to manage nausea associated with pain medications with Zofran.  She has been transitioned from oxycodone to Dilaudid.            Aidan Alvares PA-C  Orthopedics  [facility]/Surg

## 2024-04-26 NOTE — PROGRESS NOTES
[facility] Medicine  Progress Note    Patient Name: Loretta Lea  MRN: [id number]  Patient Class: IP- Inpatient   Admission Date: 4/23/2024  Length of Stay: 3 days  Attending Physician: Gurjit Cassidy MD  Primary Care Provider: Magalie Euceda MD        Subjective:     Principal Problem:S/P lumbar spinal fusion        HPI:  Loretta Lea is a 33 year old female with a previous medical history of that includes but is not limited to DMII, GERD, lumbar disc degeneration, chronic back pain, anxiety and bipolar disorder  who is POD 0 with Dr. Cassidy for an anterior lumbar interbody fusion. The patient has been in the care of Dr. Cassidy since 1/8/24 for lower back pain radiating down bilateral legs with associated numbness left more so than right. The patient performed six months of physical therapy and opioid medication with minimal relief. Patient had follow-up visit with Dr. Cassidy on 3/14/24 to discuss Mri results performed 3/7/24 showing lumbar disc degeneration and Spondylolisthesis of lumbar region. Options were discussed and patient elected to have surgery. Patient admitted by hospital medicine for medical management.     Overview/Hospital Course:  No notes on file    Interval History:  Sitting in recliner, still with significant pain and nausea.   Sluggish bowel sounds present.  Diet is being downgraded to full liquids for now.    Review of Systems   Musculoskeletal:  Positive for back pain.   All other systems reviewed and are negative.    Objective:     Vital Signs (Most Recent):  Temp: 99.3 °F (37.4 °C) (04/26/24 0744)  Pulse: (!) 114 (04/26/24 0747)  Resp: 18 (04/26/24 0744)  BP: (!) 117/57 (04/26/24 0744)  SpO2: (!) 93 % (04/26/24 0747) Vital Signs (24h Range):  Temp:  [98.2 °F (36.8 °C)-99.3 °F (37.4 °C)] 99.3 °F (37.4 °C)  Pulse:  [] 114  Resp:  [16-18] 18  SpO2:  [92 %-98 %] 93 %  BP: (116-137)/(57-89) 117/57     Weight: (!) 144.3 kg (318  lb 2 oz)  Body mass index is 49.83 kg/m².    Intake/Output Summary (Last 24 hours) at 4/26/2024 0919  Last data filed at 4/25/2024 1800  Gross per 24 hour   Intake 480 ml   Output --   Net 480 ml         Physical Exam  Vitals reviewed.   Constitutional:       Appearance: She is obese.   HENT:      Head: Normocephalic and atraumatic.      Mouth/Throat:      Mouth: Mucous membranes are dry.      Pharynx: Oropharynx is clear.   Eyes:      Extraocular Movements: Extraocular movements intact.      Pupils: Pupils are equal, round, and reactive to light.   Cardiovascular:      Rate and Rhythm: Normal rate and regular rhythm.      Pulses: Normal pulses.      Heart sounds: Normal heart sounds.   Pulmonary:      Effort: Pulmonary effort is normal.      Breath sounds: Normal breath sounds.   Abdominal:      General: Bowel sounds are decreased. There is distension.      Palpations: Abdomen is soft.      Tenderness: There is generalized abdominal tenderness.          Comments: Red Jamul is a clean/dry/intact bandage over surgical dressing  Accordian drain leading from clean dry intact bandage with moderate amount of blood   Genitourinary:     Comments: Claros cath present draining clear yellow urine  Musculoskeletal:         General: Normal range of motion.      Cervical back: Normal range of motion and neck supple.      Right lower leg: No edema.      Left lower leg: No edema.   Skin:     General: Skin is warm and dry.      Capillary Refill: Capillary refill takes less than 2 seconds.   Neurological:      General: No focal deficit present.      Mental Status: She is alert and oriented to person, place, and time. Mental status is at baseline.   Psychiatric:         Mood and Affect: Mood normal.         Behavior: Behavior normal.         Thought Content: Thought content normal.         Judgment: Judgment normal.             Significant Labs: All pertinent labs within the past 24 hours have been reviewed.  CBC:   Recent Labs   Lab  04/25/24 0442 04/26/24 0418   WBC 17.58* 15.87*   HGB 12.2 11.5*   HCT 39.7 37.3    258     CMP:   Recent Labs   Lab 04/25/24 0442 04/26/24 0418    138   K 4.4 4.0   CL 96 96   CO2 30* 34*   * 170*   BUN 4* 4*   CREATININE 0.6 0.6   CALCIUM 8.5* 8.7   PROT 6.5 6.6   ALBUMIN 3.0* 2.6*   BILITOT 0.9 0.7   ALKPHOS 109 95   AST 37 44*   ALT 57* 51*   ANIONGAP 10 8       Significant Imaging:   CTA chest:   1. No pulmonary embolus to the proximal segmental level, allowing for exam limitations.  2.  Mosaic attenuation in the lungs, a nonspecific finding with considerations to include air-trapping, mild pulmonary edema, pneumonitis, bronchiolitis.  3. Query hepatic steatosis.    Chest x-ray: Hypoventilatory change.       Assessment/Plan:      * S/P lumbar spinal fusion  POD 1   Continue to follow neurosurgery recommendations.  Needs aggressive incentive spirometry.  Follow hemoglobin and hematocrit closely.  Pain control with PO narcotics and antiemetics as needed.  Physical therapy as per Orthopedics protocol with fall precautions.  SCD's for DVT prophylaxis        Uncontrolled type 2 diabetes mellitus with hyperglycemia  Patient's FSGs are controlled on current medication regimen.  Last A1c reviewed-   Lab Results   Component Value Date    HGBA1C 9.7 (H) 02/08/2023     Most recent fingerstick glucose reviewed-   Recent Labs   Lab 04/23/24 2123 04/24/24  0748   POCTGLUCOSE 216* 178*     Current correctional scale  Low  Maintain anti-hyperglycemic dose as follows-   Antihyperglycemics (From admission, onward)      Start     Stop Route Frequency Ordered    04/23/24 2045  metFORMIN ER 24hr tablet 1,000 mg         -- Oral 2 times daily with meals 04/23/24 2042 04/23/24 2041  insulin aspart U-100 pen 0-5 Units         -- SubQ Before meals & nightly PRN 04/23/24 2042          Hold Oral hypoglycemics while patient is in the hospital.    Cigarette smoker  Smoking cessation counseling performed. Dangers  of cigarette smoking were reviewed with patient in detail and patient was encouraged to quit. Nicotine replacement options were discussed for > 10 minutes.  Nicoderm 21 mg daily ordered.        VTE Risk Mitigation (From admission, onward)           Ordered     IP VTE LOW RISK PATIENT  Once         04/23/24 0907     Place ZAKI hose  Until discontinued         04/23/24 0907     Place sequential compression device  Until discontinued         04/23/24 0907                    Discharge Planning   PEDRO: 4/27/2024     Code Status: Full Code   Is the patient medically ready for discharge?:     Reason for patient still in hospital (select all that apply): {HMREASONPATIENTINHOSP:59853}  Discharge Plan A: Home                  Jagruit Moncada MD  Department of Hospital Medicine   [facility]/Surg

## 2024-04-26 NOTE — TELEPHONE ENCOUNTER
----- Message from Kacie Joshi sent at 4/26/2024  8:14 AM CDT -----  Regarding: POST OP PT - Pt in Hospital  Good morning,     I have contacted this patient about scheduling her Post-Op Physical Therapy.  She stated that she is currently still in the hospital and unsure of a discharge date. Please advise.        Thanks,     Kacie Joshi  ____________________________  Access Navigator  [facility]   p: [phone]    f: [phone]  [email or web address]

## 2024-04-26 NOTE — PROGRESS NOTES
[facility] Medicine  Progress Note    Patient Name: Loretta Lea  MRN: [id number]  Patient Class: IP- Inpatient   Admission Date: 4/23/2024  Length of Stay: 3 days  Attending Physician: Gurjit Cassidy MD  Primary Care Provider: Magalie Euceda MD        Subjective:     Principal Problem:S/P lumbar spinal fusion        HPI:  Loretta Lea is a 33 year old female with a previous medical history of that includes but is not limited to DMII, GERD, lumbar disc degeneration, chronic back pain, anxiety and bipolar disorder  who is POD 0 with Dr. Cassidy for an anterior lumbar interbody fusion. The patient has been in the care of Dr. Cassidy since 1/8/24 for lower back pain radiating down bilateral legs with associated numbness left more so than right. The patient performed six months of physical therapy and opioid medication with minimal relief. Patient had follow-up visit with Dr. Cassidy on 3/14/24 to discuss Mri results performed 3/7/24 showing lumbar disc degeneration and Spondylolisthesis of lumbar region. Options were discussed and patient elected to have surgery. Patient admitted by hospital medicine for medical management.     Overview/Hospital Course:  No notes on file    Interval History:  Patient reports improving pain and nausea today.  Having flatus but still not feeling up to eating as of yet.  No bowel movement reported.  Starting to work with physical therapy.  Looking forward to get discharged by tomorrow.  Patient again reminded to aggressively use incentive spirometer.    Review of Systems   Musculoskeletal:  Positive for back pain.   All other systems reviewed and are negative.    Objective:     Vital Signs (Most Recent):  Temp: 99.3 °F (37.4 °C) (04/26/24 0744)  Pulse: (!) 114 (04/26/24 0747)  Resp: 18 (04/26/24 0744)  BP: (!) 117/57 (04/26/24 0744)  SpO2: (!) 93 % (04/26/24 0747) Vital Signs (24h Range):  Temp:  [98.2 °F (36.8 °C)-99.3 °F (37.4  °C)] 99.3 °F (37.4 °C)  Pulse:  [] 114  Resp:  [16-18] 18  SpO2:  [92 %-98 %] 93 %  BP: (116-137)/(57-89) 117/57     Weight: (!) 144.3 kg (318 lb 2 oz)  Body mass index is 49.83 kg/m².    Intake/Output Summary (Last 24 hours) at 4/26/2024 0919  Last data filed at 4/25/2024 1800  Gross per 24 hour   Intake 480 ml   Output --   Net 480 ml         Physical Exam  Vitals reviewed.   Constitutional:       Appearance: She is obese.   HENT:      Head: Normocephalic and atraumatic.      Mouth/Throat:      Mouth: Mucous membranes are dry.      Pharynx: Oropharynx is clear.   Eyes:      Extraocular Movements: Extraocular movements intact.      Pupils: Pupils are equal, round, and reactive to light.   Cardiovascular:      Rate and Rhythm: Normal rate and regular rhythm.      Pulses: Normal pulses.      Heart sounds: Normal heart sounds.   Pulmonary:      Effort: Pulmonary effort is normal.      Breath sounds: Normal breath sounds.   Abdominal:      General: Bowel sounds are decreased. There is distension.      Palpations: Abdomen is soft.      Tenderness: There is generalized abdominal tenderness.          Comments: Red Koi is a clean/dry/intact bandage over surgical dressing  Accordian drain leading from clean dry intact bandage with moderate amount of blood   Genitourinary:     Comments: Claros cath present draining clear yellow urine  Musculoskeletal:         General: Normal range of motion.      Cervical back: Normal range of motion and neck supple.      Right lower leg: No edema.      Left lower leg: No edema.   Skin:     General: Skin is warm and dry.      Capillary Refill: Capillary refill takes less than 2 seconds.   Neurological:      General: No focal deficit present.      Mental Status: She is alert and oriented to person, place, and time. Mental status is at baseline.   Psychiatric:         Mood and Affect: Mood normal.         Behavior: Behavior normal.         Thought Content: Thought content normal.          Judgment: Judgment normal.             Significant Labs: All pertinent labs within the past 24 hours have been reviewed.  CBC:   Recent Labs   Lab 04/25/24 0442 04/26/24  0418   WBC 17.58* 15.87*   HGB 12.2 11.5*   HCT 39.7 37.3    258     CMP:   Recent Labs   Lab 04/25/24 0442 04/26/24  0418    138   K 4.4 4.0   CL 96 96   CO2 30* 34*   * 170*   BUN 4* 4*   CREATININE 0.6 0.6   CALCIUM 8.5* 8.7   PROT 6.5 6.6   ALBUMIN 3.0* 2.6*   BILITOT 0.9 0.7   ALKPHOS 109 95   AST 37 44*   ALT 57* 51*   ANIONGAP 10 8       Significant Imaging:   CTA chest:   1. No pulmonary embolus to the proximal segmental level, allowing for exam limitations.  2.  Mosaic attenuation in the lungs, a nonspecific finding with considerations to include air-trapping, mild pulmonary edema, pneumonitis, bronchiolitis.  3. Query hepatic steatosis.    Chest x-ray: Hypoventilatory change.       Assessment/Plan:      * S/P lumbar spinal fusion  POD 1   Continue to follow neurosurgery recommendations.  Needs aggressive incentive spirometry.  Follow hemoglobin and hematocrit closely.  Pain control with PO narcotics and antiemetics as needed.  Physical therapy as per Orthopedics protocol with fall precautions.  SCD's for DVT prophylaxis        Uncontrolled type 2 diabetes mellitus with hyperglycemia  Patient's FSGs are controlled on current medication regimen.  Last A1c reviewed-   Lab Results   Component Value Date    HGBA1C 9.7 (H) 02/08/2023     Most recent fingerstick glucose reviewed-   Recent Labs   Lab 04/23/24 2123 04/24/24  0748   POCTGLUCOSE 216* 178*     Current correctional scale  Low  Maintain anti-hyperglycemic dose as follows-   Antihyperglycemics (From admission, onward)      Start     Stop Route Frequency Ordered    04/23/24 2045  metFORMIN ER 24hr tablet 1,000 mg         -- Oral 2 times daily with meals 04/23/24 2042 04/23/24 2041  insulin aspart U-100 pen 0-5 Units         -- SubQ Before meals & nightly PRN  04/23/24 2042          Hold Oral hypoglycemics while patient is in the hospital.    Cigarette smoker  Smoking cessation counseling performed. Dangers of cigarette smoking were reviewed with patient in detail and patient was encouraged to quit. Nicotine replacement options were discussed for > 10 minutes.  Nicoderm 21 mg daily ordered.      Discussed with , likely DC home tomorrow with home health and home physical therapy.  Patient needs aggressive incentive spirometry.  Will need surveillance LFTs in 1 week.  VTE Risk Mitigation (From admission, onward)           Ordered     IP VTE LOW RISK PATIENT  Once         04/23/24 0907     Place ZAKI hose  Until discontinued         04/23/24 0907     Place sequential compression device  Until discontinued         04/23/24 0907                    Discharge Planning   PEDRO: 4/27/2024     Code Status: Full Code   Is the patient medically ready for discharge?:     Reason for patient still in hospital (select all that apply): Patient trending condition and Consult recommendations  Discharge Plan A: Home                  aJgruti Moncada MD  Department of Hospital Medicine   [facility]/Surg

## 2024-04-27 ENCOUNTER — PATIENT MESSAGE (OUTPATIENT)
Dept: ADMINISTRATIVE | Facility: OTHER | Age: 34
End: 2024-04-27
Payer: MEDICAID

## 2024-04-27 LAB
ANION GAP SERPL CALC-SCNC: 11 MMOL/L (ref 8–16)
BASOPHILS # BLD AUTO: 0.05 K/UL (ref 0–0.2)
BASOPHILS NFR BLD: 0.4 % (ref 0–1.9)
BUN SERPL-MCNC: 5 MG/DL (ref 6–20)
CALCIUM SERPL-MCNC: 8.6 MG/DL (ref 8.7–10.5)
CHLORIDE SERPL-SCNC: 95 MMOL/L (ref 95–110)
CO2 SERPL-SCNC: 31 MMOL/L (ref 23–29)
CREAT SERPL-MCNC: 0.6 MG/DL (ref 0.5–1.4)
DIFFERENTIAL METHOD BLD: ABNORMAL
EOSINOPHIL # BLD AUTO: 0.1 K/UL (ref 0–0.5)
EOSINOPHIL NFR BLD: 0.6 % (ref 0–8)
ERYTHROCYTE [DISTWIDTH] IN BLOOD BY AUTOMATED COUNT: 13.9 % (ref 11.5–14.5)
EST. GFR  (NO RACE VARIABLE): >60 ML/MIN/1.73 M^2
GLUCOSE SERPL-MCNC: 149 MG/DL (ref 70–110)
HCT VFR BLD AUTO: 35.3 % (ref 37–48.5)
HGB BLD-MCNC: 11.1 G/DL (ref 12–16)
IMM GRANULOCYTES # BLD AUTO: 0.05 K/UL (ref 0–0.04)
IMM GRANULOCYTES NFR BLD AUTO: 0.4 % (ref 0–0.5)
LYMPHOCYTES # BLD AUTO: 2.4 K/UL (ref 1–4.8)
LYMPHOCYTES NFR BLD: 19.5 % (ref 18–48)
MCH RBC QN AUTO: 27.5 PG (ref 27–31)
MCHC RBC AUTO-ENTMCNC: 31.4 G/DL (ref 32–36)
MCV RBC AUTO: 87 FL (ref 82–98)
MONOCYTES # BLD AUTO: 0.8 K/UL (ref 0.3–1)
MONOCYTES NFR BLD: 6 % (ref 4–15)
NEUTROPHILS # BLD AUTO: 9.2 K/UL (ref 1.8–7.7)
NEUTROPHILS NFR BLD: 73.1 % (ref 38–73)
NRBC BLD-RTO: 0 /100 WBC
PLATELET # BLD AUTO: 284 K/UL (ref 150–450)
PMV BLD AUTO: 9.5 FL (ref 9.2–12.9)
POCT GLUCOSE: 107 MG/DL (ref 70–110)
POCT GLUCOSE: 123 MG/DL (ref 70–110)
POCT GLUCOSE: 123 MG/DL (ref 70–110)
POCT GLUCOSE: 125 MG/DL (ref 70–110)
POTASSIUM SERPL-SCNC: 3.6 MMOL/L (ref 3.5–5.1)
RBC # BLD AUTO: 4.04 M/UL (ref 4–5.4)
SODIUM SERPL-SCNC: 137 MMOL/L (ref 136–145)
WBC # BLD AUTO: 12.53 K/UL (ref 3.9–12.7)

## 2024-04-27 PROCEDURE — 80048 BASIC METABOLIC PNL TOTAL CA: CPT | Performed by: PHYSICIAN ASSISTANT

## 2024-04-27 PROCEDURE — 63600175 PHARM REV CODE 636 W HCPCS: Performed by: HOSPITALIST

## 2024-04-27 PROCEDURE — 25000003 PHARM REV CODE 250: Performed by: ORTHOPAEDIC SURGERY

## 2024-04-27 PROCEDURE — 25000003 PHARM REV CODE 250: Performed by: PHYSICIAN ASSISTANT

## 2024-04-27 PROCEDURE — 25000003 PHARM REV CODE 250

## 2024-04-27 PROCEDURE — 99900035 HC TECH TIME PER 15 MIN (STAT)

## 2024-04-27 PROCEDURE — 97116 GAIT TRAINING THERAPY: CPT

## 2024-04-27 PROCEDURE — 11000001 HC ACUTE MED/SURG PRIVATE ROOM

## 2024-04-27 PROCEDURE — 94799 UNLISTED PULMONARY SVC/PX: CPT | Mod: XB

## 2024-04-27 PROCEDURE — 94761 N-INVAS EAR/PLS OXIMETRY MLT: CPT

## 2024-04-27 PROCEDURE — 27000221 HC OXYGEN, UP TO 24 HOURS

## 2024-04-27 PROCEDURE — 97530 THERAPEUTIC ACTIVITIES: CPT

## 2024-04-27 PROCEDURE — 85025 COMPLETE CBC W/AUTO DIFF WBC: CPT | Performed by: PHYSICIAN ASSISTANT

## 2024-04-27 PROCEDURE — 36415 COLL VENOUS BLD VENIPUNCTURE: CPT | Performed by: PHYSICIAN ASSISTANT

## 2024-04-27 PROCEDURE — 97535 SELF CARE MNGMENT TRAINING: CPT | Mod: CO

## 2024-04-27 RX ORDER — HYDROMORPHONE HYDROCHLORIDE 1 MG/ML
1 INJECTION, SOLUTION INTRAMUSCULAR; INTRAVENOUS; SUBCUTANEOUS EVERY 6 HOURS PRN
Status: DISCONTINUED | OUTPATIENT
Start: 2024-04-27 | End: 2024-04-28 | Stop reason: HOSPADM

## 2024-04-27 RX ORDER — PANTOPRAZOLE SODIUM 40 MG/1
40 TABLET, DELAYED RELEASE ORAL DAILY
Status: DISCONTINUED | OUTPATIENT
Start: 2024-04-27 | End: 2024-04-28 | Stop reason: HOSPADM

## 2024-04-27 RX ADMIN — HYDROMORPHONE HYDROCHLORIDE 1 MG: 1 INJECTION, SOLUTION INTRAMUSCULAR; INTRAVENOUS; SUBCUTANEOUS at 07:04

## 2024-04-27 RX ADMIN — HYDROMORPHONE HYDROCHLORIDE 2 MG: 2 TABLET ORAL at 04:04

## 2024-04-27 RX ADMIN — HYDROMORPHONE HYDROCHLORIDE 1 MG: 1 INJECTION, SOLUTION INTRAMUSCULAR; INTRAVENOUS; SUBCUTANEOUS at 11:04

## 2024-04-27 RX ADMIN — SERTRALINE HYDROCHLORIDE 150 MG: 50 TABLET ORAL at 09:04

## 2024-04-27 RX ADMIN — MUPIROCIN 1 G: 20 OINTMENT TOPICAL at 09:04

## 2024-04-27 RX ADMIN — HYDROMORPHONE HYDROCHLORIDE 2 MG: 2 TABLET ORAL at 08:04

## 2024-04-27 RX ADMIN — DOCUSATE SODIUM 100 MG: 100 CAPSULE, LIQUID FILLED ORAL at 08:04

## 2024-04-27 RX ADMIN — CYCLOBENZAPRINE HYDROCHLORIDE 10 MG: 5 TABLET, FILM COATED ORAL at 04:04

## 2024-04-27 RX ADMIN — HYDROMORPHONE HYDROCHLORIDE 2 MG: 2 TABLET ORAL at 10:04

## 2024-04-27 RX ADMIN — HYDROMORPHONE HYDROCHLORIDE 2 MG: 2 TABLET ORAL at 06:04

## 2024-04-27 RX ADMIN — METFORMIN HYDROCHLORIDE 1000 MG: 500 TABLET, EXTENDED RELEASE ORAL at 06:04

## 2024-04-27 RX ADMIN — TRAZODONE HYDROCHLORIDE 200 MG: 50 TABLET ORAL at 09:04

## 2024-04-27 RX ADMIN — RISPERIDONE 1 MG: 0.25 TABLET, FILM COATED ORAL at 08:04

## 2024-04-27 RX ADMIN — MUPIROCIN 1 G: 20 OINTMENT TOPICAL at 08:04

## 2024-04-27 RX ADMIN — HYDROMORPHONE HYDROCHLORIDE 2 MG: 2 TABLET ORAL at 02:04

## 2024-04-27 RX ADMIN — METFORMIN HYDROCHLORIDE 1000 MG: 500 TABLET, EXTENDED RELEASE ORAL at 08:04

## 2024-04-27 RX ADMIN — BISACODYL 10 MG: 10 SUPPOSITORY RECTAL at 08:04

## 2024-04-27 RX ADMIN — Medication 25 MG: at 08:04

## 2024-04-27 RX ADMIN — SENNOSIDES AND DOCUSATE SODIUM 1 TABLET: 8.6; 5 TABLET ORAL at 08:04

## 2024-04-27 RX ADMIN — DIPHENHYDRAMINE HYDROCHLORIDE 50 MG: 25 CAPSULE ORAL at 09:04

## 2024-04-27 RX ADMIN — GABAPENTIN 300 MG: 300 CAPSULE ORAL at 09:04

## 2024-04-27 RX ADMIN — CYCLOBENZAPRINE HYDROCHLORIDE 10 MG: 5 TABLET, FILM COATED ORAL at 11:04

## 2024-04-27 RX ADMIN — RISPERIDONE 1 MG: 0.25 TABLET, FILM COATED ORAL at 09:04

## 2024-04-27 RX ADMIN — PANTOPRAZOLE SODIUM 40 MG: 40 TABLET, DELAYED RELEASE ORAL at 08:04

## 2024-04-27 RX ADMIN — CYCLOBENZAPRINE HYDROCHLORIDE 10 MG: 5 TABLET, FILM COATED ORAL at 02:04

## 2024-04-27 NOTE — SUBJECTIVE & OBJECTIVE
Interval History:  Patient reports pain is worse today.  Does not feel ready to go home.  Remains tachycardic which is likely related to the uncontrolled pain.  Added IV Dilaudid for breakthrough pain.    Review of Systems   Musculoskeletal:  Positive for back pain.   All other systems reviewed and are negative.    Objective:     Vital Signs (Most Recent):  Temp: 98.6 °F (37 °C) (04/27/24 1125)  Pulse: (!) 112 (04/27/24 1125)  Resp: 13 (04/27/24 1125)  BP: (!) 132/94 (04/27/24 1125)  SpO2: 98 % (04/27/24 1125) Vital Signs (24h Range):  Temp:  [98 °F (36.7 °C)-98.6 °F (37 °C)] 98.6 °F (37 °C)  Pulse:  [102-120] 112  Resp:  [13-21] 13  SpO2:  [94 %-98 %] 98 %  BP: (120-134)/(62-94) 132/94     Weight: (!) 144.3 kg (318 lb 2 oz)  Body mass index is 49.83 kg/m².    Intake/Output Summary (Last 24 hours) at 4/27/2024 1232  Last data filed at 4/27/2024 0906  Gross per 24 hour   Intake 520 ml   Output --   Net 520 ml         Physical Exam  Constitutional:       General: She is not in acute distress.     Appearance: She is well-developed.      Comments: Appears uncomfortable   HENT:      Head: Normocephalic and atraumatic.   Eyes:      Pupils: Pupils are equal, round, and reactive to light.   Cardiovascular:      Rate and Rhythm: Regular rhythm. Tachycardia present.      Heart sounds: No murmur heard.  Pulmonary:      Effort: Pulmonary effort is normal. No respiratory distress.      Breath sounds: Normal breath sounds. No wheezing or rales.   Abdominal:      General: Bowel sounds are normal. There is no distension.      Palpations: Abdomen is soft.      Tenderness: There is no abdominal tenderness.   Musculoskeletal:         General: Normal range of motion.   Skin:     General: Skin is warm and dry.      Findings: No rash.   Neurological:      Mental Status: She is alert and oriented to person, place, and time.      Cranial Nerves: No cranial nerve deficit.   Psychiatric:         Behavior: Behavior normal.              Significant Labs: All pertinent labs within the past 24 hours have been reviewed.  CBC:   Recent Labs   Lab 04/26/24  0418 04/27/24 0458   WBC 15.87* 12.53   HGB 11.5* 11.1*   HCT 37.3 35.3*    284     CMP:   Recent Labs   Lab 04/26/24  0418 04/27/24 0458    137   K 4.0 3.6   CL 96 95   CO2 34* 31*   * 149*   BUN 4* 5*   CREATININE 0.6 0.6   CALCIUM 8.7 8.6*   PROT 6.6  --    ALBUMIN 2.6*  --    BILITOT 0.7  --    ALKPHOS 95  --    AST 44*  --    ALT 51*  --    ANIONGAP 8 11       Significant Imaging:   CTA chest:   1. No pulmonary embolus to the proximal segmental level, allowing for exam limitations.  2.  Mosaic attenuation in the lungs, a nonspecific finding with considerations to include air-trapping, mild pulmonary edema, pneumonitis, bronchiolitis.  3. Query hepatic steatosis.    Chest x-ray: Hypoventilatory change.

## 2024-04-27 NOTE — PT/OT/SLP PROGRESS
Occupational Therapy   Treatment    Name: Loretta Lea  MRN: [id number]  Admitting Diagnosis:  S/P lumbar spinal fusion  4 Days Post-Op    Recommendations:     Discharge Recommendations: Low Intensity Therapy  Discharge Equipment Recommendations:  bedside commode, bath bench, other (see comments) (OT provided education in use of tub transfer bench and AE for lower body ADL's. Pt verbalized understanding and will obtain additional AE on own if desired.)  Barriers to discharge:  None    Assessment:     Loretta Lea is a 33 y.o. female with a medical diagnosis of S/P lumbar spinal fusion.  She presents with awareness of having spinal precautions but difficult to remember them all and appreciative of handout today outlining them. She is using precautions of no spinal rotation already [place] [facility] and understands she will need assist to complete BM hygiene due to this precaution. Pt is happy to have return of sensation in BLE which she did not have prior to sx. She is motivated and will comply with spinal precautions for 6 wks indicated on the calendar date she made in her mobile device. Performance deficits affecting function are weakness, impaired endurance, impaired self care skills, impaired functional mobility, gait instability, impaired balance, pain, decreased ROM, impaired skin, edema, impaired cardiopulmonary response to activity.     Rehab Prognosis:  Good; patient would benefit from acute skilled OT services to address these deficits and reach maximum level of function.       Plan:     Patient to be seen 5 x/week to address the above listed problems via self-care/home management, therapeutic activities, therapeutic exercises  Plan of Care Expires: 05/22/24  Plan of Care Reviewed with: patient    Subjective     Chief Complaint: urgent diarrhea 2° enema  Patient/Family Comments/goals: home  Pain/Comfort:  Pain Rating 1: other (see comments) (no c/o)    Objective:     Communicated  with: Dilan sorto prior to session.  Patient found right sidelying with oxygen, pulse ox (continuous), peripheral IV, telemetry upon OT entry to room.    General Precautions: Standard, fall    Orthopedic Precautions:spinal precautions  Braces: TLSO  Respiratory Status: Nasal cannula, flow 2 L/min     Occupational Performance:     Bed Mobility:    Patient completed Scooting/Bridging with modified independence and with side rail  Patient completed Supine to Sit with modified independence and with side rail  Patient completed Sit to Supine with modified independence and with side rail     Functional Mobility/Transfers:  Bed<>BSC with CGA and one minor LOB during turning that was counteracted with Monalisa from [facility].  Functional Mobility: GOOD-    Activities of Daily Living:  Toileting: moderate assistance for BM hygiene 2° spinal precautions of no rotating. Pt without LB garments donned but would need assist with them if they were donned.      [facility] 6 Click ADL: 20    Treatment & Education:  -Spinal precautions reviewed with handout and used with BSC transfer and toileting this session. Pt will have physical assist at home 24/7 for maintenance of spinal mrecautions x 6 weeks. PRATHER ed for spinal precautions preventing infections when followed exactly. Pt v/u and agreeable.    Patient left right sidelying with all lines intact, call button in reach, and nurseDilan notified    GOALS:   Multidisciplinary Problems       Occupational Therapy Goals          Problem: Occupational Therapy    Goal Priority Disciplines Outcome Interventions   Occupational Therapy Goal     OT, PT/OT Progressing    Description: Goals to be met by: 5/22/24     Patient will increase functional independence with ADLs by performing:    UE Dressing with Modified [place].  LE Dressing with Modified [place].  Grooming while standing at sink with Modified [place].  Toileting from bedside commode with Modified [place] for hygiene and  clothing management.   Bathing from  shower chair/bench with Modified [place].  Toilet transfer to bedside commode with Modified [place].  Increased strength and functional activity tolerance for ADL's/IADL's                         Time Tracking:     OT Date of Treatment: 04/27/24  OT Start Time: 1324  OT Stop Time: 1342  OT Total Time (min): 18 min    Billable Minutes:Self Care/Home Management 18 min    OT/JOSÉ LUIS: JOSÉ LUIS     Number of JOSÉ LUIS visits since last OT visit: 1    4/27/2024

## 2024-04-27 NOTE — SUBJECTIVE & OBJECTIVE
Principal Problem:S/P lumbar spinal fusion    Principal Orthopedic Problem:  Status post lumbar spinal fusion.    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       Current Facility-Administered Medications   Medication Dose Route Frequency Provider Last Rate Last Admin    acetaminophen tablet 650 mg  650 mg Oral Q4H PRN Anitha Rosario NP        acetaminophen tablet 650 mg  650 mg Oral Q6H PRN Krzysztof Chung PA        albuterol-ipratropium 2.5 mg-0.5 mg/3 mL nebulizer solution 3 mL  3 mL Nebulization Q6H PRN Anitha Rosario NP        aluminum-magnesium hydroxide-simethicone 200-200-20 mg/5 mL suspension 30 mL  30 mL Oral Q4H PRN Krzysztof Chung PA        bisacodyL suppository 10 mg  10 mg Rectal Daily Krzysztof Chung PA   10 mg at 04/27/24 0847    cyclobenzaprine tablet 10 mg  10 mg Oral Q8H PRN Gurjit Cassidy MD   10 mg at 04/27/24 0434    dextrose 10% bolus 125 mL 125 mL  12.5 g Intravenous PRN Anitha Rosario NP        dextrose 10% bolus 250 mL 250 mL  25 g Intravenous PRN Anitha Rosario NP        diphenhydrAMINE capsule 50 mg  50 mg Oral Q6H PRN Krzysztof Chung PA        docusate sodium capsule 100 mg  100 mg Oral Daily Krzysztof Chung PA   100 mg at 04/27/24 0848    gabapentin capsule 300 mg  300 mg Oral QHS Krzysztof Chung PA   300 mg at 04/26/24 2106    glucagon (human recombinant) injection 1 mg  1 mg Intramuscular PRN Anitha Rosario, NP        glucose chewable tablet 16 g  16 g Oral PRN Anitha Rosario, NP        glucose chewable tablet 24 g  24 g Oral PRN Anitha Rosario, JAMESON        HYDROmorphone injection 1 mg  1 mg Intravenous Q6H PRN Briana Kessler MD        HYDROmorphone tablet 2 mg  2 mg Oral Q4H PRN Aidan Alvares PA-C   2 mg at 04/27/24 0848    hydrOXYzine pamoate capsule 25 mg  25 mg Oral Q4H PRN Krzysztof Chung, PA         insulin aspart U-100 pen 0-5 Units  0-5 Units Subcutaneous QID (AC + HS) PRN Anitha Rosario NP   3 Units at 04/25/24 1244    magnesium oxide tablet 800 mg  800 mg Oral PRN Anitha Rosario, NP        magnesium oxide tablet 800 mg  800 mg Oral PRN Anitha Rosario NP        metFORMIN ER 24hr tablet 1,000 mg  1,000 mg Oral BID WM Krzysztof Chung PA   1,000 mg at 04/27/24 0847    mupirocin 2 % ointment   Nasal BID Gurjit Cassidy MD   1 g at 04/27/24 0849    naloxone 0.4 mg/mL injection 0.02 mg  0.02 mg Intravenous PRN Ralph Rivers MD        ondansetron injection 8 mg  8 mg Intravenous Q6H PRN Krzysztof Chung PA   8 mg at 04/26/24 2126    pantoprazole EC tablet 40 mg  40 mg Oral Daily Gurjit Cassidy MD   40 mg at 04/27/24 0848    potassium bicarbonate disintegrating tablet 35 mEq  35 mEq Oral PRN Anitha Rosario, NP        potassium bicarbonate disintegrating tablet 50 mEq  50 mEq Oral PRN Anitha Rosario NP        potassium bicarbonate disintegrating tablet 60 mEq  60 mEq Oral PRN Anitha Rosario NP        prochlorperazine injection Soln 5 mg  5 mg Intravenous Q6H PRN Krzysztof Chung PA   5 mg at 04/26/24 0549    pyridoxine (vitamin B6) tablet 25 mg  25 mg Oral Daily Krzysztof Chung PA   25 mg at 04/27/24 0848    risperiDONE tablet 1 mg  1 mg Oral BID Krzysztof Chung PA   1 mg at 04/27/24 0847    scopolamine 1.3-1.5 mg (1 mg over 3 days) 1 patch  1 patch Transdermal Q3 Days Teofilo Tee MD   1 patch at 04/26/24 0851    senna-docusate 8.6-50 mg per tablet 1 tablet  1 tablet Oral BID Anitha Rosario NP   1 tablet at 04/27/24 0847    senna-docusate 8.6-50 mg per tablet 2 tablet  2 tablet Oral Nightly PRN Krzysztof Chung PA        sertraline tablet 150 mg  150 mg Oral QHS Krzysztof Chung PA   150 mg at 04/26/24 2106    simethicone chewable tablet 80 mg  1 tablet Oral QID PRN Anitha Rosario NP   80 mg at 04/24/24 0841    sodium chloride  "0.9% flush 10 mL  10 mL Intravenous Q12H PRN Anitha Rosario, JAMESON        sumatriptan tablet 100 mg  100 mg Oral Q2H PRN Krzysztof Chung PA        traZODone tablet 200 mg  200 mg Oral QHS Krzysztof Chung PA   200 mg at 04/26/24 2106     Objective:     Vital Signs (Most Recent):  Temp: 98.5 °F (36.9 °C) (04/27/24 0731)  Pulse: 102 (04/27/24 0731)  Resp: 17 (04/27/24 0848)  BP: 133/86 (04/27/24 0731)  SpO2: 96 % (04/27/24 0731) Vital Signs (24h Range):  Temp:  [98 °F (36.7 °C)-98.5 °F (36.9 °C)] 98.5 °F (36.9 °C)  Pulse:  [] 102  Resp:  [15-21] 17  SpO2:  [94 %-96 %] 96 %  BP: (120-134)/(60-87) 133/86     Weight: (!) 144.3 kg (318 lb 2 oz)  Height: 5' 7" (170.2 cm)  Body mass index is 49.83 kg/m².      Intake/Output Summary (Last 24 hours) at 4/27/2024 1101  Last data filed at 4/27/2024 0906  Gross per 24 hour   Intake 520 ml   Output 550 ml   Net -30 ml        General    Vitals reviewed.  Constitutional: She is oriented to person, place, and time. She appears well-developed and well-nourished.   Pulmonary/Chest: Effort normal.   Neurological: She is alert and oriented to person, place, and time.   Psychiatric: She has a normal mood and affect. Her behavior is normal. Judgment and thought content normal.         Back (L-Spine & T-Spine) / Neck (C-Spine) Exam     Tenderness Right paramedian tenderness of the Lower L-Spine. Left paramedian tenderness of the Lower L-Spine.     Spinal Sensation   Right Side Sensation  L-Spine Level: normal  Left Side Sensation  L-Spine Level: normal    Comments:  Skin to lower back clean dry and intact.  No erythema or ecchymosis.  Posterior lumbar incision healing well without wound dehiscence or drainage.  Staples in place.  Neurovascularly intact throughout bilateral lower extremities.  Bilateral calves soft and nontender.  Has the expected postoperative lumbar discomfort.  Reports she is able to transfer independently.      Muscle Strength   Right Lower Extremity "   Anterior tibial:  5/5   Gastrocsoleus:  5/5   EHL:  5/5  Left Lower Extremity   Anterior tibial:  5/5   Gastrocsoleus:  5/5   EHL:  5/5       Significant Labs: None    Significant Imaging: None

## 2024-04-27 NOTE — PROGRESS NOTES
[facility]/Surg  Orthopedics  Progress Note    Patient Name: Loretta Lea  MRN: [id number]  Admission Date: 4/23/2024  Hospital Length of Stay: 4 days  Attending Provider: Briana Kessler MD  Primary Care Provider: Magalie Euceda MD  Follow-up For: Procedure(s) (LRB):  FUSION, SPINE, LUMBAR, ALIF (Bilateral)  LAMINECTOMY, SPINE, LUMBAR, WITH FUSION (Bilateral)    Post-Operative Day: 4 Days Post-Op  Subjective:     Principal Problem:S/P lumbar spinal fusion    Principal Orthopedic Problem:  Status post lumbar spinal fusion.    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       Current Facility-Administered Medications   Medication Dose Route Frequency Provider Last Rate Last Admin    acetaminophen tablet 650 mg  650 mg Oral Q4H PRN Anitha Rosario, JAMESON        acetaminophen tablet 650 mg  650 mg Oral Q6H PRN Krzysztof Chung PA        albuterol-ipratropium 2.5 mg-0.5 mg/3 mL nebulizer solution 3 mL  3 mL Nebulization Q6H PRN Anitha Rosario NP        aluminum-magnesium hydroxide-simethicone 200-200-20 mg/5 mL suspension 30 mL  30 mL Oral Q4H PRN Krzysztof Chung PA        bisacodyL suppository 10 mg  10 mg Rectal Daily Krzysztof Chung PA   10 mg at 04/27/24 0847    cyclobenzaprine tablet 10 mg  10 mg Oral Q8H PRN Gurjit Cassidy MD   10 mg at 04/27/24 0434    dextrose 10% bolus 125 mL 125 mL  12.5 g Intravenous PRN Anitha Rosario NP        dextrose 10% bolus 250 mL 250 mL  25 g Intravenous PRN Anitha Rosario, NP        diphenhydrAMINE capsule 50 mg  50 mg Oral Q6H PRN Krzysztof Chung PA        docusate sodium capsule 100 mg  100 mg Oral Daily Krzysztof Chung PA   100 mg at 04/27/24 0848    gabapentin capsule 300 mg  300 mg Oral QHS Krzysztof Chung PA   300 mg at 04/26/24 2106    glucagon (human recombinant) injection 1 mg  1 mg  Intramuscular PRN Anitha Rosario, NP        glucose chewable tablet 16 g  16 g Oral PRN ZekeseAnitha givens N, NP        glucose chewable tablet 24 g  24 g Oral PRN Anitha Rosario, NP        HYDROmorphone injection 1 mg  1 mg Intravenous Q6H PRN Briana Kessler MD        HYDROmorphone tablet 2 mg  2 mg Oral Q4H PRN Aidan Alvares PA-C   2 mg at 04/27/24 0848    hydrOXYzine pamoate capsule 25 mg  25 mg Oral Q4H PRN Krzysztof Chung PA        insulin aspart U-100 pen 0-5 Units  0-5 Units Subcutaneous QID (AC + HS) PRN Anitha Rosario NP   3 Units at 04/25/24 1244    magnesium oxide tablet 800 mg  800 mg Oral PRN Anitha Rosario, NP        magnesium oxide tablet 800 mg  800 mg Oral PRN Anitha Rosario NP        metFORMIN ER 24hr tablet 1,000 mg  1,000 mg Oral BID WM Krzysztof Chung PA   1,000 mg at 04/27/24 0847    mupirocin 2 % ointment   Nasal BID Gurjit Cassidy MD   1 g at 04/27/24 0849    naloxone 0.4 mg/mL injection 0.02 mg  0.02 mg Intravenous PRN Ralph Rivers MD        ondansetron injection 8 mg  8 mg Intravenous Q6H PRN Krzysztof Chung PA   8 mg at 04/26/24 2126    pantoprazole EC tablet 40 mg  40 mg Oral Daily Gurjit Cassidy MD   40 mg at 04/27/24 0848    potassium bicarbonate disintegrating tablet 35 mEq  35 mEq Oral PRN Anitha Rosario, NP        potassium bicarbonate disintegrating tablet 50 mEq  50 mEq Oral PRN Anitha Rosario, NP        potassium bicarbonate disintegrating tablet 60 mEq  60 mEq Oral PRN Anitha Rosario, NP        prochlorperazine injection Soln 5 mg  5 mg Intravenous Q6H PRN Krzysztof Chung PA   5 mg at 04/26/24 0549    pyridoxine (vitamin B6) tablet 25 mg  25 mg Oral Daily Krzysztof Chung PA   25 mg at 04/27/24 0848    risperiDONE tablet 1 mg  1 mg Oral BID Krzysztof Chung PA   1 mg at 04/27/24 0847    scopolamine 1.3-1.5 mg (1 mg over 3 days) 1 patch  1 patch Transdermal Q3 Days Teofilo Tee MD   1 patch at  "04/26/24 0851    senna-docusate 8.6-50 mg per tablet 1 tablet  1 tablet Oral BID Anitha Rosario NP   1 tablet at 04/27/24 0847    senna-docusate 8.6-50 mg per tablet 2 tablet  2 tablet Oral Nightly PRN Krzysztof Chung PA        sertraline tablet 150 mg  150 mg Oral QHS Krzysztof Chung PA   150 mg at 04/26/24 2106    simethicone chewable tablet 80 mg  1 tablet Oral QID PRN Anitha Rosario NP   80 mg at 04/24/24 0841    sodium chloride 0.9% flush 10 mL  10 mL Intravenous Q12H PRN Anitha Rosario NP        sumatriptan tablet 100 mg  100 mg Oral Q2H PRN Krzysztof Chung PA        traZODone tablet 200 mg  200 mg Oral QHS Krzysztof Chung PA   200 mg at 04/26/24 2106     Objective:     Vital Signs (Most Recent):  Temp: 98.5 °F (36.9 °C) (04/27/24 0731)  Pulse: 102 (04/27/24 0731)  Resp: 17 (04/27/24 0848)  BP: 133/86 (04/27/24 0731)  SpO2: 96 % (04/27/24 0731) Vital Signs (24h Range):  Temp:  [98 °F (36.7 °C)-98.5 °F (36.9 °C)] 98.5 °F (36.9 °C)  Pulse:  [] 102  Resp:  [15-21] 17  SpO2:  [94 %-96 %] 96 %  BP: (120-134)/(60-87) 133/86     Weight: (!) 144.3 kg (318 lb 2 oz)  Height: 5' 7" (170.2 cm)  Body mass index is 49.83 kg/m².      Intake/Output Summary (Last 24 hours) at 4/27/2024 1101  Last data filed at 4/27/2024 0906  Gross per 24 hour   Intake 520 ml   Output 550 ml   Net -30 ml        General    Vitals reviewed.  Constitutional: She is oriented to person, place, and time. She appears well-developed and well-nourished.   Pulmonary/Chest: Effort normal.   Neurological: She is alert and oriented to person, place, and time.   Psychiatric: She has a normal mood and affect. Her behavior is normal. Judgment and thought content normal.         Back (L-Spine & T-Spine) / Neck (C-Spine) Exam     Tenderness Right paramedian tenderness of the Lower L-Spine. Left paramedian tenderness of the Lower L-Spine.     Spinal Sensation   Right Side Sensation  L-Spine Level: normal  Left Side " Sensation  L-Spine Level: normal    Comments:  Skin to lower back clean dry and intact.  No erythema or ecchymosis.  Posterior lumbar incision healing well without wound dehiscence or drainage.  Staples in place.  Neurovascularly intact throughout bilateral lower extremities.  Bilateral calves soft and nontender.  Has the expected postoperative lumbar discomfort.  Reports she is able to transfer independently.      Muscle Strength   Right Lower Extremity   Anterior tibial:  5/5   Gastrocsoleus:  5/5   EHL:  5/5  Left Lower Extremity   Anterior tibial:  5/5   Gastrocsoleus:  5/5   EHL:  5/5       Significant Labs: None    Significant Imaging: None  Assessment/Plan:     * S/P lumbar spinal fusion  1. Postop day 4 lumbar spinal fusion.      Impression:  Stable orthopedically for discharge home.  D/C per Hospitalist.  Plan:  We will start ambulation if no contraindication per hospitalist.  Attempts to manage nausea associated with pain medications with Zofran.  She has been transitioned from oxycodone to Dilaudid.  Postoperative prescription for Dilaudid sent electronically to her pharmacy on record.  Follow up with Dr. Cassidy as outpatient at 2 weeks postop for wound check and staple removal.          Aidan Alvares PA-C  Orthopedics  [facility]/Surg

## 2024-04-27 NOTE — ASSESSMENT & PLAN NOTE
Patient's FSGs are controlled on current medication regimen.  Last A1c reviewed-   Lab Results   Component Value Date    HGBA1C 9.7 (H) 02/08/2023     Most recent fingerstick glucose reviewed-   Recent Labs   Lab 04/26/24  1659 04/26/24  2111 04/27/24  0758 04/27/24  1141   POCTGLUCOSE 121* 122* 125* 123*       Current correctional scale  Low  Maintain anti-hyperglycemic dose as follows-   Antihyperglycemics (From admission, onward)    Start     Stop Route Frequency Ordered    04/23/24 2045  metFORMIN ER 24hr tablet 1,000 mg         -- Oral 2 times daily with meals 04/23/24 2042 04/23/24 2041  insulin aspart U-100 pen 0-5 Units         -- SubQ Before meals & nightly PRN 04/23/24 2042        Hold Oral hypoglycemics while patient is in the hospital.

## 2024-04-27 NOTE — ASSESSMENT & PLAN NOTE
Continue to follow orthopedist recommendations.  Needs aggressive incentive spirometry.  Follow hemoglobin and hematocrit closely.  Pain control with PO narcotics and antiemetics as needed.  Added IV Dilaudid for breakthrough pain  Physical therapy as per Orthopedics protocol with fall precautions.  SCD's for DVT prophylaxis

## 2024-04-27 NOTE — PT/OT/SLP PROGRESS
Physical Therapy Treatment    Patient Name:  Loretta Lea   MRN:  [id number]    Recommendations:     Discharge Recommendations: Low Intensity Therapy  Discharge Equipment Recommendations: walker, rolling  Barriers to discharge: None    Assessment:     Loretta Lea is a 33 y.o. female admitted with a medical diagnosis of S/P lumbar spinal fusion.  She presents with the following impairments/functional limitations: weakness, impaired endurance, impaired self care skills, impaired functional mobility, impaired balance, gait instability, decreased lower extremity function, pain, impaired cardiopulmonary response to activity; pt has very good motivation for Tx; able to amb 280 ft today with a RW and TLSO, O2 per NC @ 2L/min; she attempted to walk more but she felt fatigued and requested to sit in the chair that was following her while ambulating; pt then requested to go transfer back to bed, with min A.    Rehab Prognosis: Good; patient would benefit from acute skilled PT services to address these deficits and reach maximum level of function.    Recent Surgery: Procedure(s) (LRB):  FUSION, SPINE, LUMBAR, ALIF (Bilateral)  LAMINECTOMY, SPINE, LUMBAR, WITH FUSION (Bilateral) 4 Days Post-Op    Plan:     During this hospitalization, patient to be seen BID (QD on Sat & Sun) to address the identified rehab impairments via gait training, therapeutic activities, therapeutic exercises and progress toward the following goals:    Plan of Care Expires:  05/30/24    Subjective     Chief Complaint: LBP  Patient/Family Comments/goals: wants to work with PT & ambulate with a RW & TLSO  Pain/Comfort:  Pain Rating 1: 6/10  Location - Side 1: Bilateral  Location - Orientation 1: generalized  Location 1: back  Pain Addressed 1: Pre-medicate for activity, Reposition, Distraction  Pain Rating Post-Intervention 1: 6/10      Objective:     Communicated with RN prior to session.  Patient found up in chair with oxygen, pulse ox  (continuous), peripheral IV, telemetry upon PT entry to room.     General Precautions: Standard, fall  Orthopedic Precautions: spinal precautions  Braces: TLSO  Respiratory Status: Nasal cannula, flow 2 L/min     Functional Mobility:  Bed Mobility:     Rolling Left:  supervision  Sit to Supine: contact guard assistance  Transfers:     Sit to Stand:  contact guard assistance with rolling walker  Bed to Chair: contact guard assistance with  rolling walker  using  Stand Pivot  Gait: 280 ft with a RW & TLSO, with O2 per NC, SBA  Balance: Sit: G/G; Stand: G/F    Treatment & Education:  Functional mobility training as above; pt re-educated on safety precautions and mobility techniques, as well as the role and benefits of PT and mobilization.      Patient left HOB elevated with all lines intact, call button in reach, bed alarm on, and RN notified..    GOALS:   Multidisciplinary Problems       Physical Therapy Goals          Problem: Physical Therapy    Goal Priority Disciplines Outcome Goal Variances Interventions   Physical Therapy Goal     PT, PT/OT Progressing     Description: Goals to be met by: 2024     Patient will increase functional independence with mobility by performin. Supine to sit with MInimal Assistance  2. Sit to stand transfer with Minimal Assistance  3. Bed to chair transfer with Minimal Assistance using Rolling Walker  4. Gait  x 150 feet with Minimal Assistance using Rolling Walker.   5. Lower extremity exercise program x20 reps                       Time Tracking:     PT Received On: 24  PT Start Time: 1210     PT Stop Time: 1234  PT Total Time (min): 24 min     Billable Minutes: Gait Training 12 and Therapeutic Activity 12    Treatment Type: Treatment  PT/PTA: PT     Number of PTA visits since last PT visit: 0     2024

## 2024-04-27 NOTE — PLAN OF CARE
POC reviewed VSS afebrile remains sinus tach with exertion blood glucose monitored Tele monitored pain managed with PRN po and IV medication Surgical Dressing CDI up with SBA Diet increased to Regular consistency tolerated well

## 2024-04-27 NOTE — PROGRESS NOTES
[facility] Medicine  Progress Note    Patient Name: Loretta Lea  MRN: [id number]  Patient Class: IP- Inpatient   Admission Date: 4/23/2024  Length of Stay: 4 days  Attending Physician: Briana Kessler MD  Primary Care Provider: Magalie Euceda MD        Subjective:     Principal Problem:S/P lumbar spinal fusion        HPI:  Loretta Lea is a 33 year old female with a previous medical history of that includes but is not limited to DMII, GERD, lumbar disc degeneration, chronic back pain, anxiety and bipolar disorder  who is POD 0 with Dr. Cassidy for an anterior lumbar interbody fusion. The patient has been in the care of Dr. Cassidy since 1/8/24 for lower back pain radiating down bilateral legs with associated numbness left more so than right. The patient performed six months of physical therapy and opioid medication with minimal relief. Patient had follow-up visit with Dr. Cassidy on 3/14/24 to discuss Mri results performed 3/7/24 showing lumbar disc degeneration and Spondylolisthesis of lumbar region. Options were discussed and patient elected to have surgery. Patient admitted by hospital medicine for medical management.     Overview/Hospital Course:  Postoperatively patient was managed on medicine-surgery floor.  Postop leukocytosis and excessive surgical pain required intravenous narcotic infusion.  Patient noted to have significant atelectasis and postop nausea.  Patient was extensively counseled regarding importance of aggressive incentive spirometry.  Patient was provided supportive care.  Patient working with PT and OT.  Liver enzymes are normalizing postoperatively.    Interval History:  Patient reports pain is worse today.  Does not feel ready to go home.  Remains tachycardic which is likely related to the uncontrolled pain.  Added IV Dilaudid for breakthrough pain.    Review of Systems   Musculoskeletal:  Positive for back pain.   All other  systems reviewed and are negative.    Objective:     Vital Signs (Most Recent):  Temp: 98.6 °F (37 °C) (04/27/24 1125)  Pulse: (!) 112 (04/27/24 1125)  Resp: 13 (04/27/24 1125)  BP: (!) 132/94 (04/27/24 1125)  SpO2: 98 % (04/27/24 1125) Vital Signs (24h Range):  Temp:  [98 °F (36.7 °C)-98.6 °F (37 °C)] 98.6 °F (37 °C)  Pulse:  [102-120] 112  Resp:  [13-21] 13  SpO2:  [94 %-98 %] 98 %  BP: (120-134)/(62-94) 132/94     Weight: (!) 144.3 kg (318 lb 2 oz)  Body mass index is 49.83 kg/m².    Intake/Output Summary (Last 24 hours) at 4/27/2024 1232  Last data filed at 4/27/2024 0906  Gross per 24 hour   Intake 520 ml   Output --   Net 520 ml         Physical Exam  Constitutional:       General: She is not in acute distress.     Appearance: She is well-developed.      Comments: Appears uncomfortable   HENT:      Head: Normocephalic and atraumatic.   Eyes:      Pupils: Pupils are equal, round, and reactive to light.   Cardiovascular:      Rate and Rhythm: Regular rhythm. Tachycardia present.      Heart sounds: No murmur heard.  Pulmonary:      Effort: Pulmonary effort is normal. No respiratory distress.      Breath sounds: Normal breath sounds. No wheezing or rales.   Abdominal:      General: Bowel sounds are normal. There is no distension.      Palpations: Abdomen is soft.      Tenderness: There is no abdominal tenderness.   Musculoskeletal:         General: Normal range of motion.   Skin:     General: Skin is warm and dry.      Findings: No rash.   Neurological:      Mental Status: She is alert and oriented to person, place, and time.      Cranial Nerves: No cranial nerve deficit.   Psychiatric:         Behavior: Behavior normal.             Significant Labs: All pertinent labs within the past 24 hours have been reviewed.  CBC:   Recent Labs   Lab 04/26/24  0418 04/27/24  0458   WBC 15.87* 12.53   HGB 11.5* 11.1*   HCT 37.3 35.3*    284     CMP:   Recent Labs   Lab 04/26/24  0418 04/27/24  0458    137   K 4.0  3.6   CL 96 95   CO2 34* 31*   * 149*   BUN 4* 5*   CREATININE 0.6 0.6   CALCIUM 8.7 8.6*   PROT 6.6  --    ALBUMIN 2.6*  --    BILITOT 0.7  --    ALKPHOS 95  --    AST 44*  --    ALT 51*  --    ANIONGAP 8 11       Significant Imaging:   CTA chest:   1. No pulmonary embolus to the proximal segmental level, allowing for exam limitations.  2.  Mosaic attenuation in the lungs, a nonspecific finding with considerations to include air-trapping, mild pulmonary edema, pneumonitis, bronchiolitis.  3. Query hepatic steatosis.    Chest x-ray: Hypoventilatory change.       Assessment/Plan:      * S/P lumbar spinal fusion  Continue to follow orthopedist recommendations.  Needs aggressive incentive spirometry.  Follow hemoglobin and hematocrit closely.  Pain control with PO narcotics and antiemetics as needed.  Added IV Dilaudid for breakthrough pain  Physical therapy as per Orthopedics protocol with fall precautions.  SCD's for DVT prophylaxis        Uncontrolled type 2 diabetes mellitus with hyperglycemia  Patient's FSGs are controlled on current medication regimen.  Last A1c reviewed-   Lab Results   Component Value Date    HGBA1C 9.7 (H) 02/08/2023     Most recent fingerstick glucose reviewed-   Recent Labs   Lab 04/26/24  1659 04/26/24  2111 04/27/24  0758 04/27/24  1141   POCTGLUCOSE 121* 122* 125* 123*       Current correctional scale  Low  Maintain anti-hyperglycemic dose as follows-   Antihyperglycemics (From admission, onward)      Start     Stop Route Frequency Ordered    04/23/24 2045  metFORMIN ER 24hr tablet 1,000 mg         -- Oral 2 times daily with meals 04/23/24 2042 04/23/24 2041  insulin aspart U-100 pen 0-5 Units         -- SubQ Before meals & nightly PRN 04/23/24 2042          Hold Oral hypoglycemics while patient is in the hospital.    Cigarette smoker  Smoking cessation counseling performed. Dangers of cigarette smoking were reviewed with patient in detail and patient was encouraged to quit.  Nicotine replacement options were discussed for > 10 minutes.  Nicoderm 21 mg daily ordered.        VTE Risk Mitigation (From admission, onward)           Ordered     IP VTE LOW RISK PATIENT  Once         04/23/24 0907     Place ZAKI hose  Until discontinued         04/23/24 0907     Place sequential compression device  Until discontinued         04/23/24 0907                    Discharge Planning   PEDRO: 4/27/2024     Code Status: Full Code   Is the patient medically ready for discharge?:     Reason for patient still in hospital (select all that apply): Patient trending condition and Treatment  Discharge Plan A: Home                  Briana Kessler MD  Department of Hospital Medicine   [facility]/Surg

## 2024-04-27 NOTE — PLAN OF CARE
Problem: Physical Therapy  Goal: Physical Therapy Goal  Description: Goals to be met by: 2024     Patient will increase functional independence with mobility by performin. Supine to sit with MInimal Assistance  2. Sit to stand transfer with Minimal Assistance  3. Bed to chair transfer with Minimal Assistance using Rolling Walker  4. Gait  x 150 feet with Minimal Assistance using Rolling Walker.   5. Lower extremity exercise program x20 reps  Outcome: Progressing   PT for functional mob training and/ or ex; pt educated on safety precautions

## 2024-04-27 NOTE — PROGRESS NOTES
Pharmacist Intervention IV to PO Note    Loretta Lea is a 33 y.o. female being treated with IV medication pantoprazole    Patient Data:    Vital Signs (Most Recent):  Temp: 98 °F (36.7 °C) (04/26/24 2320)  Pulse: 109 (04/26/24 2320)  Resp: 20 (04/26/24 2320)  BP: 128/77 (04/26/24 2320)  SpO2: (!) 94 % (04/26/24 2320) Vital Signs (72h Range):  Temp:  [97.3 °F (36.3 °C)-99.3 °F (37.4 °C)]   Pulse:  []   Resp:  [15-21]   BP: (116-149)/(57-89)   SpO2:  [84 %-98 %]      CBC:  Recent Labs   Lab 04/24/24 0426 04/25/24 0442 04/26/24  0418   WBC 19.23* 17.58* 15.87*   RBC 4.57 4.51 4.20   HGB 12.6 12.2 11.5*   HCT 39.6 39.7 37.3    274 258   MCV 87 88 89   MCH 27.6 27.1 27.4   MCHC 31.8* 30.7* 30.8*     CMP:     Recent Labs   Lab 04/24/24 0426 04/25/24 0442 04/26/24  0418   * 229* 170*   CALCIUM 8.6* 8.5* 8.7   ALBUMIN 3.1* 3.0* 2.6*   PROT 6.8 6.5 6.6   * 136 138   K 4.4 4.4 4.0   CO2 26 30* 34*    96 96   BUN 7 4* 4*   CREATININE 0.7 0.6 0.6   ALKPHOS 114 109 95   * 57* 51*   AST 98* 37 44*   BILITOT 0.4 0.9 0.7       Dietary Orders:  Diet Orders            Diet Full Liquid Consistent Carbohydrate: Full Liquid starting at 04/25 1101            Based on the following criteria, this patient qualifies for intravenous to oral conversion:  [x] The patients gastrointestinal tract is functioning (tolerating medications via oral or enteral route for 24 hours and tolerating food or enteral feeds for 24 hours).  [x] The patient is hemodynamically stable for 24 hours (heart rate <100 beats per minute, systolic blood pressure >99 mm Hg, and respiratory rate <20 breaths per minute).  [x] The patient shows clinical improvement (afebrile for at least 24 hours and white blood cell count downtrending or normalized). Additionally, the patient must be non-neutropenic (absolute neutrophil count >500 cells/mm3).  [x] For antimicrobials, the patient has received IV therapy for at least 24  hours.    IV medication Pantoprazole 40mg will be changed to oral medication Pantoprazole 40mg    Pharmacist's Name: Aby Barrios  Pharmacist's Extension: [id number]

## 2024-04-27 NOTE — ASSESSMENT & PLAN NOTE
1. Postop day 4 lumbar spinal fusion.      Impression:  Stable orthopedically for discharge home.  D/C per Hospitalist.  Plan:  We will start ambulation if no contraindication per hospitalist.  Attempts to manage nausea associated with pain medications with Zofran.  She has been transitioned from oxycodone to Dilaudid.  Postoperative prescription for Dilaudid sent electronically to her pharmacy on record.  Follow up with Dr. Cassidy as outpatient at 2 weeks postop for wound check and staple removal.

## 2024-04-27 NOTE — CARE UPDATE
04/26/24 1920   Patient Assessment/Suction   Level of Consciousness (AVPU) alert   Respiratory Effort Shallow;Unlabored   Expansion/Accessory Muscles/Retractions no use of accessory muscles;no retractions   PRE-TX-O2   Device (Oxygen Therapy) nasal cannula   $ Is the patient on Low Flow Oxygen? Yes   Flow (L/min) (Oxygen Therapy) 1.5   SpO2 96 %   Pulse Oximetry Type Continuous  (pt is on cont tele monitor with POX)   $ Pulse Oximetry - Multiple Charge Pulse Oximetry - Multiple   Pulse 107   Resp (!) 21   Aerosol Therapy   $ Aerosol Therapy Charges Refused   Incentive Spirometer   $ Incentive Spirometer Charges done with encouragement;proper technique demonstrated   Administration (IS) proper technique demonstrated   Number of Repetitions (IS) 10   Level Incentive Spirometer (mL) 1500   Patient Tolerance (IS) good;no adverse signs/symptoms present

## 2024-04-28 ENCOUNTER — PATIENT MESSAGE (OUTPATIENT)
Dept: ADMINISTRATIVE | Facility: OTHER | Age: 34
End: 2024-04-28
Payer: MEDICAID

## 2024-04-28 VITALS
DIASTOLIC BLOOD PRESSURE: 61 MMHG | TEMPERATURE: 100 F | SYSTOLIC BLOOD PRESSURE: 119 MMHG | RESPIRATION RATE: 18 BRPM | BODY MASS INDEX: 45.99 KG/M2 | HEART RATE: 105 BPM | OXYGEN SATURATION: 98 % | HEIGHT: 67 IN | WEIGHT: 293 LBS

## 2024-04-28 LAB
ANION GAP SERPL CALC-SCNC: 11 MMOL/L (ref 8–16)
BASOPHILS # BLD AUTO: 0.07 K/UL (ref 0–0.2)
BASOPHILS NFR BLD: 0.6 % (ref 0–1.9)
BUN SERPL-MCNC: 6 MG/DL (ref 6–20)
CALCIUM SERPL-MCNC: 8.9 MG/DL (ref 8.7–10.5)
CHLORIDE SERPL-SCNC: 95 MMOL/L (ref 95–110)
CO2 SERPL-SCNC: 31 MMOL/L (ref 23–29)
CREAT SERPL-MCNC: 0.7 MG/DL (ref 0.5–1.4)
DIFFERENTIAL METHOD BLD: ABNORMAL
EOSINOPHIL # BLD AUTO: 0.2 K/UL (ref 0–0.5)
EOSINOPHIL NFR BLD: 1.3 % (ref 0–8)
ERYTHROCYTE [DISTWIDTH] IN BLOOD BY AUTOMATED COUNT: 14 % (ref 11.5–14.5)
EST. GFR  (NO RACE VARIABLE): >60 ML/MIN/1.73 M^2
GLUCOSE SERPL-MCNC: 213 MG/DL (ref 70–110)
HCT VFR BLD AUTO: 36.8 % (ref 37–48.5)
HGB BLD-MCNC: 11.5 G/DL (ref 12–16)
IMM GRANULOCYTES # BLD AUTO: 0.07 K/UL (ref 0–0.04)
IMM GRANULOCYTES NFR BLD AUTO: 0.6 % (ref 0–0.5)
LYMPHOCYTES # BLD AUTO: 2.3 K/UL (ref 1–4.8)
LYMPHOCYTES NFR BLD: 19.5 % (ref 18–48)
MCH RBC QN AUTO: 27.3 PG (ref 27–31)
MCHC RBC AUTO-ENTMCNC: 31.3 G/DL (ref 32–36)
MCV RBC AUTO: 87 FL (ref 82–98)
MONOCYTES # BLD AUTO: 0.7 K/UL (ref 0.3–1)
MONOCYTES NFR BLD: 5.7 % (ref 4–15)
NEUTROPHILS # BLD AUTO: 8.6 K/UL (ref 1.8–7.7)
NEUTROPHILS NFR BLD: 72.3 % (ref 38–73)
NRBC BLD-RTO: 0 /100 WBC
PLATELET # BLD AUTO: 346 K/UL (ref 150–450)
PMV BLD AUTO: 9.1 FL (ref 9.2–12.9)
POCT GLUCOSE: 116 MG/DL (ref 70–110)
POCT GLUCOSE: 136 MG/DL (ref 70–110)
POTASSIUM SERPL-SCNC: 3.7 MMOL/L (ref 3.5–5.1)
RBC # BLD AUTO: 4.21 M/UL (ref 4–5.4)
SODIUM SERPL-SCNC: 137 MMOL/L (ref 136–145)
WBC # BLD AUTO: 11.85 K/UL (ref 3.9–12.7)

## 2024-04-28 PROCEDURE — 25000003 PHARM REV CODE 250: Performed by: PHYSICIAN ASSISTANT

## 2024-04-28 PROCEDURE — 94799 UNLISTED PULMONARY SVC/PX: CPT | Mod: XB

## 2024-04-28 PROCEDURE — 99900035 HC TECH TIME PER 15 MIN (STAT)

## 2024-04-28 PROCEDURE — 97530 THERAPEUTIC ACTIVITIES: CPT

## 2024-04-28 PROCEDURE — 25000003 PHARM REV CODE 250

## 2024-04-28 PROCEDURE — 63600175 PHARM REV CODE 636 W HCPCS: Performed by: HOSPITALIST

## 2024-04-28 PROCEDURE — 25000003 PHARM REV CODE 250: Performed by: ORTHOPAEDIC SURGERY

## 2024-04-28 PROCEDURE — 36415 COLL VENOUS BLD VENIPUNCTURE: CPT | Performed by: PHYSICIAN ASSISTANT

## 2024-04-28 PROCEDURE — 97116 GAIT TRAINING THERAPY: CPT

## 2024-04-28 PROCEDURE — 27000221 HC OXYGEN, UP TO 24 HOURS

## 2024-04-28 PROCEDURE — 85025 COMPLETE CBC W/AUTO DIFF WBC: CPT | Performed by: PHYSICIAN ASSISTANT

## 2024-04-28 PROCEDURE — 63600175 PHARM REV CODE 636 W HCPCS: Performed by: PHYSICIAN ASSISTANT

## 2024-04-28 PROCEDURE — 94761 N-INVAS EAR/PLS OXIMETRY MLT: CPT

## 2024-04-28 PROCEDURE — 80048 BASIC METABOLIC PNL TOTAL CA: CPT | Performed by: PHYSICIAN ASSISTANT

## 2024-04-28 RX ADMIN — PANTOPRAZOLE SODIUM 40 MG: 40 TABLET, DELAYED RELEASE ORAL at 09:04

## 2024-04-28 RX ADMIN — MUPIROCIN 1 G: 20 OINTMENT TOPICAL at 09:04

## 2024-04-28 RX ADMIN — RISPERIDONE 1 MG: 0.25 TABLET, FILM COATED ORAL at 09:04

## 2024-04-28 RX ADMIN — HYDROMORPHONE HYDROCHLORIDE 1 MG: 1 INJECTION, SOLUTION INTRAMUSCULAR; INTRAVENOUS; SUBCUTANEOUS at 02:04

## 2024-04-28 RX ADMIN — ONDANSETRON 8 MG: 2 INJECTION INTRAMUSCULAR; INTRAVENOUS at 06:04

## 2024-04-28 RX ADMIN — HYDROMORPHONE HYDROCHLORIDE 2 MG: 2 TABLET ORAL at 06:04

## 2024-04-28 RX ADMIN — POTASSIUM BICARBONATE 50 MEQ: 977.5 TABLET, EFFERVESCENT ORAL at 06:04

## 2024-04-28 RX ADMIN — HYDROMORPHONE HYDROCHLORIDE 1 MG: 1 INJECTION, SOLUTION INTRAMUSCULAR; INTRAVENOUS; SUBCUTANEOUS at 09:04

## 2024-04-28 RX ADMIN — METFORMIN HYDROCHLORIDE 1000 MG: 500 TABLET, EXTENDED RELEASE ORAL at 09:04

## 2024-04-28 RX ADMIN — Medication 25 MG: at 09:04

## 2024-04-28 RX ADMIN — HYDROMORPHONE HYDROCHLORIDE 2 MG: 2 TABLET ORAL at 11:04

## 2024-04-28 NOTE — PLAN OF CARE
Received consult for home health . Referral faxed to [facility] for review. CM will follow up with [place] on Monday regarding acceptance and insurance auth     Patient cleared for discharge from case management standpoint.       04/28/24 0850   Final Note   Assessment Type Final Discharge Note   Anticipated Discharge Disposition Home-Health   Post-Acute Status   Post-Acute Authorization Home Health   Home Health Status Pending Payor Medical Review   Discharge Delays None known at this time

## 2024-04-28 NOTE — PT/OT/SLP PROGRESS
Physical Therapy Treatment    Patient Name:  Loretta Lea   MRN:  [id number]    Recommendations:     Discharge Recommendations: Low Intensity Therapy  Discharge Equipment Recommendations: walker, rolling  Barriers to discharge: None    Assessment:     Loretta Lea is a 33 y.o. female admitted with a medical diagnosis of S/P lumbar spinal fusion.  She presents with the following impairments/functional limitations: weakness, gait instability, decreased lower extremity function; pt has good motivation for Tx, states she is happy to walk with PT, amb x 250 ft with no RW, SBA with good safety awareness.    Rehab Prognosis: Good; patient would benefit from acute skilled PT services to address these deficits and reach maximum level of function.    Recent Surgery: Procedure(s) (LRB):  FUSION, SPINE, LUMBAR, ALIF (Bilateral)  LAMINECTOMY, SPINE, LUMBAR, WITH FUSION (Bilateral) 5 Days Post-Op    Plan:     During this hospitalization, patient to be seen BID to address the identified rehab impairments via therapeutic activities, therapeutic exercises and progress toward the following goals:    Plan of Care Expires:  05/30/24    Subjective     Chief Complaint: min LBP  Patient/Family Comments/goals: wants to go home soon  Pain/Comfort:  Pain Rating 1: 3/10  Location - Orientation 1: generalized  Location 1: back  Pain Addressed 1: Reposition, Distraction  Pain Rating Post-Intervention 1: 2/10      Objective:     Communicated with RN prior to session.  Patient found up in chair with peripheral IV, telemetry upon PT entry to room.     General Precautions: Standard, fall  Orthopedic Precautions: spinal precautions  Braces: TLSO  Respiratory Status: Room air     Functional Mobility:  Transfers:     Sit to Stand:  stand by assistance with no AD  Gait: 250 ft no AD, SBA, slow-paced, steady, good safety awareness      AM-PAC 6 CLICK MOBILITY          Treatment & Education:  Functional mobility training as above; pt  educated on safety precautions and mobility techniques, as well as the role and benefits of PT and mobilization.      Patient left up in chair with all lines intact, call button in reach, and chair alarm on..    GOALS:   Multidisciplinary Problems       Physical Therapy Goals          Problem: Physical Therapy    Goal Priority Disciplines Outcome Goal Variances Interventions   Physical Therapy Goal     PT, PT/OT Progressing     Description: Goals to be met by: 2024     Patient will increase functional independence with mobility by performin. Supine to sit with MInimal Assistance  2. Sit to stand transfer with Minimal Assistance  3. Bed to chair transfer with Minimal Assistance using Rolling Walker  4. Gait  x 150 feet with Minimal Assistance using Rolling Walker.   5. Lower extremity exercise program x20 reps                       Time Tracking:     PT Received On: 24  PT Start Time: 1243     PT Stop Time: 1306  PT Total Time (min): 23 min     Billable Minutes: Gait Training 12 and Therapeutic Activity 11    Treatment Type: Treatment  PT/PTA: PT     Number of PTA visits since last PT visit: 0     2024

## 2024-04-28 NOTE — CARE UPDATE
04/27/24 1947   Patient Assessment/Suction   Level of Consciousness (AVPU) alert   Respiratory Effort Unlabored   Expansion/Accessory Muscles/Retractions no retractions;no use of accessory muscles   PRE-TX-O2   Device (Oxygen Therapy) nasal cannula   $ Is the patient on Low Flow Oxygen? Yes   Flow (L/min) (Oxygen Therapy) (S)  1  (decreased from 1.5)   SpO2 96 %   Pulse Oximetry Type Continuous  (pt is on cont tele pox)   $ Pulse Oximetry - Multiple Charge Pulse Oximetry - Multiple   Pulse 90   Resp 18   Aerosol Therapy   $ Aerosol Therapy Charges PRN treatment not required   Incentive Spirometer   $ Incentive Spirometer Charges done with encouragement;proper technique demonstrated   Administration (IS) proper technique demonstrated   Number of Repetitions (IS) 5   Level Incentive Spirometer (mL) 1000   Patient Tolerance (IS) no adverse signs/symptoms present;good

## 2024-04-28 NOTE — NURSING
AVS reviewed with patient, all questions answered, no needs voiced at this time. PIV removed with catheter intact, bleeding controlled, dressing applied. Telemetry monitor removed and returned to monitor room. Discharged via wheelchair at discharge ramp to care of .

## 2024-04-28 NOTE — PLAN OF CARE
04/28/24 1021   Final Note   Assessment Type Final Discharge Note   Anticipated Discharge Disposition Home   Post-Acute Status   Discharge Delays None known at this time     Order placed for outpatient PT/OT. Business office will arrange in am. Home Health order will be cancelled.     Patient cleared for discharge from case management standpoint.

## 2024-04-29 LAB
OHS QRS DURATION: 88 MS
OHS QTC CALCULATION: 480 MS

## 2024-04-29 NOTE — DISCHARGE SUMMARY
[facility] Medicine  Discharge Summary      Patient Name: Loretta Lea  MRN: [id number]  NAVEEN: [id number]  Patient Class: IP- Inpatient  Admission Date: 4/23/2024  Hospital Length of Stay: 5 days  Discharge Date and Time: 4/28/2024  2:15 PM  Attending Physician: No att. providers found   Discharging Provider: Briana Kessler MD  Primary Care Provider: Magalie Euceda MD    Primary Care Team: Networked reference to record PCT     HPI:   Loretta Lea is a 33 year old female with a previous medical history of that includes but is not limited to DMII, GERD, lumbar disc degeneration, chronic back pain, anxiety and bipolar disorder  who is POD 0 with Dr. Cassidy for an anterior lumbar interbody fusion. The patient has been in the care of Dr. Cassidy since 1/8/24 for lower back pain radiating down bilateral legs with associated numbness left more so than right. The patient performed six months of physical therapy and opioid medication with minimal relief. Patient had follow-up visit with Dr. Cassidy on 3/14/24 to discuss Mri results performed 3/7/24 showing lumbar disc degeneration and Spondylolisthesis of lumbar region. Options were discussed and patient elected to have surgery. Patient admitted by hospital medicine for medical management.     Procedure(s) (LRB):  FUSION, SPINE, LUMBAR, ALIF (Bilateral)  LAMINECTOMY, SPINE, LUMBAR, WITH FUSION (Bilateral)      Hospital Course:   Postoperatively patient was managed on medicine-surgery floor.  Postop leukocytosis and excessive surgical pain required intravenous narcotic infusion.  Patient noted to have significant atelectasis and postop nausea.  Patient was extensively counseled regarding importance of aggressive incentive spirometry.  Patient was provided supportive care.  Patient working with PT and OT.  Liver enzymes improved postoperatively.  Pain control improved over the days post surgery.  She was cleared by  "orthopedist and discharged home.  Outpatient PT and OT ordered on discharge.     Goals of Care Treatment Preferences:  Code Status: Full Code      Consults:   Consults (From admission, onward)          Status Ordering Provider     Inpatient consult to Social Work  Once        Provider:  (Not yet assigned)    Completed CATRACHITO MCLEAN            No new Assessment & Plan notes have been filed under this hospital service since the last note was generated.  Service: Hospital Medicine    Final Active Diagnoses:    Diagnosis Date Noted POA    PRINCIPAL PROBLEM:  S/P lumbar spinal fusion [Z98.1] 04/23/2024 Not Applicable    Uncontrolled type 2 diabetes mellitus with hyperglycemia [E11.65] 03/31/2023 Yes    Cigarette smoker [F17.210] 04/09/2021 Yes      Problems Resolved During this Admission:       Discharged Condition: good    Disposition: Home-Health Care [facility] [place] [facility]    Follow Up:   Follow-up Information       Gurjit Cassidy MD Follow up.    Specialties: Orthopedic Surgery, Surgery  Contact information:  [address] [address]  [phone]                           Patient Instructions:      WALKER FOR HOME USE     Order Specific Question Answer Comments   Type of Walker: Adult (5'4"-6'6")    With wheels? Yes    Height: 5' 7" (1.702 m)    Weight: 142.9 kg (315 lb)    Length of need (1-99 months): 99    Please check all that apply: Patient is unable to safely ambulate without equipment.    Please check all that apply: Patient's condition impairs ambulation.      COMMODE FOR HOME USE     Order Specific Question Answer Comments   Type: Standard    Height: 5' 7" (1.702 m)    Weight: 142.9 kg (315 lb)    Length of need (1-99 months): 99      Ambulatory referral/consult to Physical/Occupational Therapy   Standing Status: Future   Referral Priority: Routine Referral Type: Physical Medicine   Referral Reason: Specialty Services Required   Number of Visits Requested: 1     Notify your health care provider " if you experience any of the following:  temperature >100.4     Notify your health care provider if you experience any of the following:  persistent nausea and vomiting or diarrhea     Notify your health care provider if you experience any of the following:  redness, tenderness, or signs of infection (pain, swelling, redness, odor or green/yellow discharge around incision site)     Notify your health care provider if you experience any of the following:  persistent dizziness, light-headedness, or visual disturbances     Notify your health care provider if you experience any of the following:  increased confusion or weakness     Notify your health care provider if you experience any of the following:  severe uncontrolled pain     Activity as tolerated       Significant Diagnostic Studies: Labs: CMP   Recent Labs   Lab 04/28/24  0505      K 3.7   CL 95   CO2 31*   *   BUN 6   CREATININE 0.7   CALCIUM 8.9   ANIONGAP 11    and CBC   Recent Labs   Lab 04/28/24  0505   WBC 11.85   HGB 11.5*   HCT 36.8*          Pending Diagnostic Studies:       Procedure Component Value Units Date/Time    EKG 12-lead [[id number]]     Order Status: Sent Lab Status: No result            Medications:  Reconciled Home Medications:      Medication List        START taking these medications      HYDROmorphone 2 MG tablet  Commonly known as: DILAUDID  Take 1 tablet (2 mg total) by mouth every 4 (four) hours as needed for Pain.            CONTINUE taking these medications      ACCU-CHEK GUIDE ME GLUCOSE MTR Misc  Generic drug: blood-glucose meter  Use to test blood sugar     blood sugar diagnostic Strp  Test blood sugar 4 (four) times daily before meals and nightly.     clotrimazole 1 % cream  Commonly known as: LOTRIMIN  Apply topically 2 (two) times daily. for 7 days     gabapentin 300 MG capsule  Commonly known as: NEURONTIN  TAKE ONE CAPSULE BY MOUTH EVERY EVENING     lancets Misc  Commonly known as: ACCU-CHEK SOFTCLIX  LANCETS  Test blood sugar 4 (four) times daily before meals and nightly.     metFORMIN 500 MG ER 24hr tablet  Commonly known as: GLUCOPHAGE-XR  Take 2 tablets (1,000 mg total) by mouth 2 (two) times daily with meals.     ondansetron 4 MG Tbdl  Commonly known as: ZOFRAN-ODT  Take 2 tablets (8 mg total) by mouth every 8 (eight) hours as needed (Nausea).     OZEMPIC 1 mg/dose (4 mg/3 mL)  Generic drug: semaglutide  Inject 1 mg into the skin every 7 days.     pyridoxine (vitamin B6) 25 MG Tab  Commonly known as: B-6  Take 25 mg by mouth once daily.     risperiDONE 1 MG tablet  Commonly known as: RisperDAL  Take 1 tablet (1 mg total) by mouth 2 (two) times daily.     sertraline 100 MG tablet  Commonly known as: ZOLOFT  Take 1.5 tablets (150 mg total) by mouth once daily.     sumatriptan 100 MG tablet  Commonly known as: IMITREX  Take 1 tab PO at first sign of migraine. If not effective, repeat dose in 2 hours. Do not take more than 200mg in 24 hours.     traZODone 100 MG tablet  Commonly known as: DESYREL  Take 1-2 tabs PO qHS prn insomnia.            STOP taking these medications      amoxicillin-clavulanate 875-125mg 875-125 mg per tablet  Commonly known as: AUGMENTIN     ciprofloxacin-dexAMETHasone 0.3-0.1% 0.3-0.1 % Drps  Commonly known as: CIPRODEX     oxyCODONE-acetaminophen  mg per tablet  Commonly known as: PERCOCET            ASK your doctor about these medications      hydrOXYzine pamoate 25 MG Cap  Commonly known as: VistariL  Take 1 capsule (25 mg total) by mouth every 4 (four) hours as needed (itching).              Indwelling Lines/Drains at time of discharge:   Lines/Drains/Airways       None                   Time spent on the discharge of patient: 35 minutes         Briana Kessler MD  Department of Hospital Medicine  [facility]/Surg

## 2024-05-01 ENCOUNTER — PATIENT MESSAGE (OUTPATIENT)
Dept: ORTHOPEDICS | Facility: CLINIC | Age: 34
End: 2024-05-01

## 2024-05-01 ENCOUNTER — CLINICAL SUPPORT (OUTPATIENT)
Dept: REHABILITATION | Facility: HOSPITAL | Age: 34
End: 2024-05-01
Attending: ORTHOPAEDIC SURGERY
Payer: MEDICAID

## 2024-05-01 DIAGNOSIS — Z98.1 S/P LUMBAR SPINAL FUSION: Primary | ICD-10-CM

## 2024-05-01 DIAGNOSIS — R26.9 GAIT ABNORMALITY: ICD-10-CM

## 2024-05-01 DIAGNOSIS — R29.898 IMPAIRED FLEXIBILITY OF LOWER EXTREMITY: ICD-10-CM

## 2024-05-01 DIAGNOSIS — R29.898 DECREASED STRENGTH OF LOWER EXTREMITY: ICD-10-CM

## 2024-05-01 PROCEDURE — 97110 THERAPEUTIC EXERCISES: CPT | Mod: PN | Performed by: PHYSICAL THERAPIST

## 2024-05-01 PROCEDURE — 97161 PT EVAL LOW COMPLEX 20 MIN: CPT | Mod: PN | Performed by: PHYSICAL THERAPIST

## 2024-05-02 ENCOUNTER — PATIENT MESSAGE (OUTPATIENT)
Dept: ORTHOPEDICS | Facility: CLINIC | Age: 34
End: 2024-05-02

## 2024-05-02 NOTE — PLAN OF CARE
"[facility]   Physical Therapy Initial Evaluation     Date: 5/1/2024   Name: Loretta Lea  Clinic Number: [id number]    Therapy Diagnosis:   Encounter Diagnoses   Name Primary?    S/P lumbar spinal fusion Yes    Decreased strength of lower extremity     Impaired flexibility of lower extremity     Gait abnormality      Physician: Gurjit Cassidy MD    Physician Orders: PT Eval and Treat   Medical Diagnosis from Referral: S/P lumbar spinal fusion   Evaluation Date: 5/1/2024  Authorization Period Expiration: 4/24/2025  Plan of Care Expiration: 6/28/2024  Visit # / Visits authorized: 1/ 1   (POC 1/12)   FOTO Due visit 5  FOTO Due visit 10    Precautions: Standard and S/P Lumbar Fusion  Insurance: Payor: MEDICAID / Plan: [employer] [facility]) / Product Type: Managed Medicaid /     Time In: 1600  Time Out: 1700  Total Appointment Time (timed & untimed codes): 60 minutes      SUBJECTIVE   Date of onset: Date of surgery: 4/23/2024    History of current condition - Loretta reports: a chronic history of lumbar pain. She reports she had pain with transferring from sit to stand. She also had pain with prolonged standing and walking. She attending physical therapy without resolution of symptoms. She underwent an anterior lumbar interbody fusion L4-5, posterolateral fusion L4-5 & posterolateral fusion L5-S1 on 4/23/2024. She presents to outpatient PT ambulating FWB without assistive device and wearing lumbar post-operative corset. She states she los her walker and her insurance will not cover a new device. She notes jacki with transferring out of her recliner. She also reports pain with laying supine.    Falls: 0    Imaging, MRI studies: "1. Bilateral L4 spondylolysis with grade 1 spondylolisthesis.  2. Changes of lumbar degenerative disc/facet disease as above, without high-grade spinal stenosis or definite nerve root impingement."    Prior Therapy: PT  Social History:  " lives with their spouse  Occupation:  at [employer]  Prior Level of Function: Independent  Current Level of Function: Decreased ability to perform ADL and limited tolerance to standing and walking.    Pain:  Current 4/10, worst 8/10, best 4/10   Location: bilateral lumbar and posterior hips    Description: Aching and Constant  Aggravating Factors: Standing, Laying, Bending, Walking, and Getting out of bed/chair  Easing Factors: pain medication and rest    Patients goals: Decrease pain and increase tolerance to ambulation     Medical History:   Past Medical History:   Diagnosis Date    Anxiety     Back pain     Bipolar disorder 2004    bipolar 2    Chronic bronchitis     Depression     Diabetes mellitus     GERD (gastroesophageal reflux disease)     HPV (human papilloma virus) infection     Insomnia     Migraines     Non-proliferative diabetic retinopathy, mild, both eyes 09/25/2023    Obese body habitus     Ovarian cyst     Pneumonia     PONV (postoperative nausea and vomiting)        Surgical History:   Loretta Lea  has a past surgical history that includes Cholecystectomy; Dilation and curettage of uterus; Oophorectomy; opherectom (Left, 2015); Hysterectomy (2017); Repair of ligament of ankle (Right, 06/23/2021); Surgical removal of bone spur (Right, 06/23/2021); Laparoscopic appendectomy (N/A, 08/18/2021); Cutler tooth extraction; Arthroscopy of ankle (Right, 05/04/2022); Appendectomy; Repair of tendon of lower extremity (Right, 4/27/2023); Repair of ligament of ankle (Right, 4/27/2023); Anterior lumbar interbody fusion (ALIF) (Bilateral, 4/23/2024); and Lumbar laminectomy with fusion (Bilateral, 4/23/2024).    Medications:   Loretta has a current medication list which includes the following prescription(s): blood sugar diagnostic, blood-glucose meter, clotrimazole, gabapentin, hydromorphone, hydroxyzine pamoate, lancets, metformin, ondansetron, pyridoxine (vitamin b6), risperidone, ozempic,  sertraline, sumatriptan, and trazodone.    Allergies:   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          OBJECTIVE     Observation: The patient presents wearing a lumbar corset  Posture: Decreased lumbar lordosis and forward head in standing   Palpation: Severe point tenderness noted with palpation of the piriformis bilaterally  Sensation: Intact      Lumbar AROM: ROM-   Response to repeated movements   Flexion Not tested    Extension Not tested   Right side bending Not tested   Left side bending Not tested     L/E MMT Left Right   Hip Flexion 3/5. 4/5.   Hip Extension 3+/5. 4/5.   Hip Abduction 3+/5. 4/5.   Hip Adduction 4/5. 4/5.   Hip IR 3+/5. 4/5.   Hip ER 3+/5. 4/5.   Knee Flexion 4+/5. 4+/5.   Knee Extension 4+/5. 4+/5.   Ankle DF 4+/5. 4+/5.   Ankle PF 4+/5. 4+/5.   Ankle Inversion 4+/5. 4+/5.   Ankle Eversion 4+/5. 4+/5.       Flexibility Left Right   Hamstrings 40 degrees 50 degrees   Achilles 0 degrees 0 degrees       Gait Without AD   Analysis Wide base of support, slow pace       Limitation/Restriction for FOTO  Survey    Therapist reviewed FOTO scores for Loretta Lea on 5/1/2024.   FOTO documents entered into [employer] - see Media section.    Intake Score: 9%         TREATMENT     Total Treatment time (time-based codes) separate from Evaluation: 30 minutes     Loretta received the treatments listed below:      THERAPEUTIC EXERCISES to develop strength and flexibility for 30 minutes including :     Seated Hamstring stretch 3 x 30 sec B  Seated modified piriformis stretch 3 x 30 sec B  Seated Gastroc-soleus stretch 3 x 30 sec B   Seated adductor squeeze 3 x 10  Seated hip abduction 3 x 10 Yellow theraband   Seated external rotation with ball 3 x 10  Seated hip internal rotation with Yellow theraband 3 x 10  Standing hip abduction 3 x 10 B  Standing hip extension 3 x 10  Standing marching 3 x 10 B    PATIENT  EDUCATION AND HOME EXERCISES     Education provided:   - Importance of regular physical activity    Written Home Exercises Provided: yes. Exercises were reviewed and Loretta was able to demonstrate them prior to the end of the session.  Loretta demonstrated good  understanding of the education provided. See EMR under Patient Instructions for exercises provided during therapy sessions.    ASSESSMENT     Loretta is a 33 y.o. female referred to outpatient Physical Therapy with a medical diagnosis of S/P lumbar spinal fusion . Patient presents with :    Lumbar and hip pain  Decreased lower extremity strength  Decreased lower extremity flexibility  Decreased ability to perform ADL and limited tolerance to standing and walking.    Patient prognosis is Fair.   Patientt will benefit from skilled outpatient Physical Therapy to address the deficits stated above and in the chart below, provide patient /family education, and to maximize patientt's level of independence.     Plan of care discussed with patient: Yes  Patient's spiritual, cultural and educational needs considered and patient is agreeable to the plan of care and goals as stated below:     Anticipated Barriers for therapy: none    Medical Necessity is demonstrated by the following  History  Co-morbidities and personal factors that may impact the plan of care [x] LOW: no personal factors / co-morbidities  [] MODERATE: 1-2 personal factors / co-morbidities  [] HIGH: 3+ personal factors / co-morbidities    Moderate / High Support Documentation:      Examination  Body Structures and Functions, activity limitations and participation restrictions that may impact the plan of care [x] LOW: addressing 1-2 elements  [] MODERATE: 3+ elements  [] HIGH: 4+ elements (please support below)    Moderate / High Support Documentation:      Clinical Presentation [x] LOW: stable  [] MODERATE: Evolving  [] HIGH: Unstable     Decision Making/ Complexity Score: low         Goals:  SHORT  TERM GOALS:  3 weeks  Progress Date met   The patient will begin a written HEP [] Met  [] Not Met  [] Progressing     Increase hamstring flexibility to 60* [] Met  [] Not Met  [] Progressing     Decrease soft tissue tenderness to moderate [] Met  [] Not Met  [] Progressing     The patient will be able to ascend/descend 10 step/stairs without onset of symptoms.   [] Met  [] Not Met  [] Progressing        LONG TERM GOALS: 6 weeks  Progress Date met   The patient will be independent with his HEP for maintenance [] Met  [] Not Met  [] Progressing     Increase hamstring flexibility to 70* [] Met  [] Not Met  [] Progressing     Increase FOTO score to 31% [] Met  [] Not Met  [] Progressing     Decrease soft tissue tenderness to mild [] Met  [] Not Met  [] Progressing     Increase lower extremity strength to 4+/5 [] Met  [] Not Met  [] Progressing     The patient will be able to ascend/descend 20 step/stairs without onset of symptoms.   [] Met  [] Not Met  [] Progressing         PLAN   Plan of care Certification: 5/1/2024 to 6/28/2024.    Outpatient Physical Therapy 2 times weekly for 6 weeks to include the following interventions: Manual Therapy, Neuromuscular Re-ed, Patient Education, Therapeutic Activities, and Therapeutic Exercise.     Lalo Miles, PT      I CERTIFY THE NEED FOR THESE SERVICES FURNISHED UNDER THIS PLAN OF TREATMENT AND WHILE UNDER MY CARE   Physician's comments:     Physician's Signature: ___________________________________________________

## 2024-05-03 DIAGNOSIS — G57.81 NEURITIS OF RIGHT SURAL NERVE: ICD-10-CM

## 2024-05-03 DIAGNOSIS — F99 INSOMNIA DUE TO OTHER MENTAL DISORDER: ICD-10-CM

## 2024-05-03 DIAGNOSIS — Z98.1 S/P LUMBAR SPINAL FUSION: Primary | ICD-10-CM

## 2024-05-03 DIAGNOSIS — F51.05 INSOMNIA DUE TO OTHER MENTAL DISORDER: ICD-10-CM

## 2024-05-03 DIAGNOSIS — F31.77 BIPOLAR DISORDER, IN PARTIAL REMISSION, MOST RECENT EPISODE MIXED: ICD-10-CM

## 2024-05-03 RX ORDER — OXYCODONE AND ACETAMINOPHEN 10; 325 MG/1; MG/1
1 TABLET ORAL EVERY 4 HOURS PRN
Qty: 42 TABLET | Refills: 0 | Status: SHIPPED | OUTPATIENT
Start: 2024-05-03 | End: 2024-05-10

## 2024-05-06 RX ORDER — GABAPENTIN 300 MG/1
300 CAPSULE ORAL NIGHTLY
Qty: 30 CAPSULE | Refills: 2 | Status: SHIPPED | OUTPATIENT
Start: 2024-05-06

## 2024-05-06 RX ORDER — TRAZODONE HYDROCHLORIDE 100 MG/1
TABLET ORAL
Qty: 60 TABLET | Refills: 0 | Status: SHIPPED | OUTPATIENT
Start: 2024-05-06 | End: 2024-06-05

## 2024-05-06 RX ORDER — RISPERIDONE 1 MG/1
1 TABLET ORAL 2 TIMES DAILY
Qty: 60 TABLET | Refills: 0 | Status: SHIPPED | OUTPATIENT
Start: 2024-05-06 | End: 2024-06-05

## 2024-05-06 RX ORDER — SERTRALINE HYDROCHLORIDE 100 MG/1
150 TABLET, FILM COATED ORAL DAILY
Qty: 135 TABLET | Refills: 0 | Status: SHIPPED | OUTPATIENT
Start: 2024-05-06 | End: 2024-06-05 | Stop reason: SDUPTHER

## 2024-05-08 ENCOUNTER — HOSPITAL ENCOUNTER (EMERGENCY)
Facility: HOSPITAL | Age: 34
Discharge: HOME | End: 2024-05-09
Attending: EMERGENCY MEDICINE
Payer: MEDICAID

## 2024-05-08 VITALS
SYSTOLIC BLOOD PRESSURE: 117 MMHG | TEMPERATURE: 98 F | RESPIRATION RATE: 17 BRPM | BODY MASS INDEX: 45.99 KG/M2 | HEART RATE: 84 BPM | DIASTOLIC BLOOD PRESSURE: 75 MMHG | HEIGHT: 67 IN | WEIGHT: 293 LBS | OXYGEN SATURATION: 97 %

## 2024-05-08 DIAGNOSIS — R21 RASH AND NONSPECIFIC SKIN ERUPTION: Primary | ICD-10-CM

## 2024-05-08 PROCEDURE — 99284 EMERGENCY DEPT VISIT MOD MDM: CPT | Mod: 25

## 2024-05-08 PROCEDURE — 63600175 PHARM REV CODE 636 W HCPCS: Performed by: EMERGENCY MEDICINE

## 2024-05-08 PROCEDURE — 96372 THER/PROPH/DIAG INJ SC/IM: CPT | Performed by: EMERGENCY MEDICINE

## 2024-05-08 RX ORDER — METHYLPREDNISOLONE 4 MG/1
TABLET ORAL
Qty: 21 EACH | Refills: 0 | Status: SHIPPED | OUTPATIENT
Start: 2024-05-08 | End: 2024-05-29

## 2024-05-08 RX ORDER — DIPHENHYDRAMINE HCL 25 MG
25 CAPSULE ORAL EVERY 6 HOURS PRN
Qty: 20 CAPSULE | Refills: 0 | Status: SHIPPED | OUTPATIENT
Start: 2024-05-08

## 2024-05-08 RX ORDER — CETIRIZINE HYDROCHLORIDE 10 MG/1
10 TABLET ORAL DAILY PRN
Qty: 30 TABLET | Refills: 0 | Status: SHIPPED | OUTPATIENT
Start: 2024-05-08 | End: 2024-06-10

## 2024-05-08 RX ORDER — DEXAMETHASONE SODIUM PHOSPHATE 4 MG/ML
8 INJECTION, SOLUTION INTRA-ARTICULAR; INTRALESIONAL; INTRAMUSCULAR; INTRAVENOUS; SOFT TISSUE
Status: COMPLETED | OUTPATIENT
Start: 2024-05-08 | End: 2024-05-08

## 2024-05-08 RX ADMIN — DEXAMETHASONE SODIUM PHOSPHATE 8 MG: 4 INJECTION, SOLUTION INTRA-ARTICULAR; INTRALESIONAL; INTRAMUSCULAR; INTRAVENOUS; SOFT TISSUE at 11:05

## 2024-05-08 NOTE — FIRST PROVIDER EVALUATION
Emergency Department TeleTriage Encounter Note      CHIEF COMPLAINT    Chief Complaint   Patient presents with    Rash     Pt had back surgery 1 week ago, has been having a rash up her back and on her neck since release. Possibly due to meds, but pt is unsure. Pt states the rash feels like its burning.        VITAL SIGNS   Initial Vitals [05/08/24 1643]   BP Pulse Resp Temp SpO2   (!) 159/78 (!) 111 17 98.1 °F (36.7 °C) 96 %      MAP       --            ALLERGIES    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       PROVIDER TRIAGE NOTE  This is a teletriage evaluation of a 33 y.o. female presenting to the ED complaining of rash to neck and back for one week. Recent surgery. No relief after topical cream and PO benadryl. Denies fever.    Alert, no distress.     Initial orders will be placed and care will be transferred to an alternate provider when patient is roomed for a full evaluation. Any additional orders and the final disposition will be determined by that provider.         ORDERS  Labs Reviewed - No data to display    ED Orders (720h ago, onward)      None              Virtual Visit Note: The provider triage portion of this emergency department evaluation and documentation was performed via [employer] (formerly [employer]), a HIPAA-compliant telemedicine application, in concert with a tele-presenter in the room. A face to face patient evaluation with one of my colleagues will occur once the patient is placed in an emergency department room.      DISCLAIMER: This note was prepared with [employer] voice recognition transcription software. Garbled syntax, mangled pronouns, and other bizarre constructions may be attributed to that software system.

## 2024-05-09 NOTE — ED PROVIDER NOTES
Encounter Date: 5/8/2024       History     Chief Complaint   Patient presents with    Rash     Pt had back surgery 1 week ago, has been having a rash up her back and on her neck since release. Possibly due to meds, but pt is unsure. Pt states the rash feels like its burning.      Patient with PMH chronic back pain s/p lumbar spinal fusion 04/23/2024, bipolar disorder, type 2 diabetes presents to ED with concern for rash that began localized around her incision sites and has diffusely spread up her back, around her neck and in the last 24 hours around her anterior chest.  She denies any fever, shortness of breath.  She does states the rash is itching.  She is taking Benadryl without significant improvement.  She can not identify any new soaps, perfumes, detergents, etc..  She has been using sterile dressings to cover the incisions as previously instructed.  She has been taking oral Dilaudid which is a new medication for her, but states she discontinued it 2 days ago and is now been changed to oxycodone.  She does note that they currently do not have air conditioning and their house trailer and she has been using cold compresses to call her body as well as a box van.  She is unsure of the rash may be heat related.  She did try topical clotrimazole to the back of her neck but this seemed to make the rash worse so she did not use it again.  Due to have staples removed from her incision in 2 days.      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     Past Medical History:   Diagnosis Date    Anxiety     Back pain     Bipolar disorder 2004    bipolar 2    Chronic bronchitis     Depression     Diabetes mellitus     GERD (gastroesophageal reflux disease)     HPV (human papilloma virus) infection     Insomnia     Migraines     Non-proliferative diabetic retinopathy, mild, both eyes 09/25/2023    Obese body habitus     Ovarian cyst      Pneumonia     PONV (postoperative nausea and vomiting)      Past Surgical History:   Procedure Laterality Date    ANTERIOR LUMBAR INTERBODY FUSION (ALIF) Bilateral 4/23/2024    Procedure: FUSION, SPINE, LUMBAR, ALIF;  Surgeon: Gurjit Cassidy MD;  Location: [facility];  Service: Orthopedics;  Laterality: Bilateral;    APPENDECTOMY      ARTHROSCOPY OF ANKLE Right 05/04/2022    Procedure: ARTHROSCOPY, ANKLE;  Surgeon: Bimal Mccormick DPM;  Location: [facility];  Service: Podiatry;  Laterality: Right;  CURT EXTERNAL ANKLE DISTRACTOR    CHOLECYSTECTOMY      DILATION AND CURETTAGE OF UTERUS      HYSTERECTOMY  2017    total, ovarian cysts, torsion, [place]; hyst per Dr JESUS Manriquez    LAPAROSCOPIC APPENDECTOMY N/A 08/18/2021    Procedure: APPENDECTOMY, LAPAROSCOPIC;  Surgeon: Osiel Ramirez MD;  Location: [facility];  Service: General;  Laterality: N/A;    LUMBAR LAMINECTOMY WITH FUSION Bilateral 4/23/2024    Procedure: LAMINECTOMY, SPINE, LUMBAR, WITH FUSION;  Surgeon: Gurjit Cassidy MD;  Location: [facility];  Service: Orthopedics;  Laterality: Bilateral;    OOPHORECTOMY      opherectom Left 2015    salpingo-opherectomy    REPAIR OF LIGAMENT OF ANKLE Right 06/23/2021    Procedure: REPAIR OF ANTERIOR TALOFIBULAR LIGAMENT CALCANEOFIBULAR LIGAMENT;  Surgeon: Bimal Mccormick DPM;  Location: [facility];  Service: Podiatry;  Laterality: Right;  WITH ARTHREX INTERNAL BRACE    REPAIR OF LIGAMENT OF ANKLE Right 4/27/2023    Procedure: REPAIR, LIGAMENT, ANKLE;  Surgeon: Aryan Poole DPM;  Location: [facility];  Service: Podiatry;  Laterality: Right;    REPAIR OF TENDON OF LOWER EXTREMITY Right 4/27/2023    Procedure: REPAIR, TENDON, LOWER EXTREMITY;  Surgeon: Aryan Poole DPM;  Location: [facility];  Service: Podiatry;  Laterality: Right;  arthrex    SURGICAL REMOVAL OF BONE SPUR Right 06/23/2021    Procedure: EXCISION OS TRIGONUM;  Surgeon: Bimal Mccormick DPM;  Location: [facility];  Service: Podiatry;  Laterality: Right;    DENNY  TOOTH EXTRACTION       Family History   Adopted: Yes   Problem Relation Name Age of Onset    No Known Problems Sister      No Known Problems Sister      No Known Problems Sister       Social History     Tobacco Use    Smoking status: Every Day     Current packs/day: 1.50     Average packs/day: 1.5 packs/day for 18.0 years (27.0 ttl pk-yrs)     Types: Cigarettes     Passive exposure: Current    Smokeless tobacco: Never    Tobacco comments:     4/25/23--pt instructed no smoking 24hours before sx, pt voiced understanding.   Substance Use Topics    Alcohol use: Not Currently     Comment: rare    Drug use: Not Currently     Comment: twice     Review of Systems   Constitutional:  Negative for fever.   HENT:  Negative for sore throat.    Respiratory:  Negative for shortness of breath.    Cardiovascular:  Negative for chest pain.   Gastrointestinal:  Negative for nausea.   Genitourinary:  Negative for dysuria.   Musculoskeletal:  Positive for back pain (chronic, baseline).   Skin:  Positive for rash.   Neurological:  Negative for weakness.   Hematological:  Does not bruise/bleed easily.       Physical Exam     Initial Vitals [05/08/24 1643]   BP Pulse Resp Temp SpO2   (!) 159/78 (!) 111 17 98.1 °F (36.7 °C) 96 %      MAP       --         Physical Exam    Nursing note and vitals reviewed.  Constitutional: She appears well-developed and well-nourished. No distress.   Obese     HENT:   Head: Normocephalic and atraumatic.   Mouth/Throat: Oropharynx is clear and moist.   Eyes: EOM are normal. Pupils are equal, round, and reactive to light.   Neck: Neck supple.   Normal range of motion.  Cardiovascular:  Normal rate and regular rhythm.           Pulmonary/Chest: Breath sounds normal. No respiratory distress.   Abdominal: Abdomen is soft. There is no abdominal tenderness. There is no rebound and no guarding.   Musculoskeletal:         General: No tenderness or edema. Normal range of motion.      Cervical back: Normal range of  motion and neck supple.     Neurological: She is alert and oriented to person, place, and time. She has normal strength. No cranial nerve deficit. GCS score is 15. GCS eye subscore is 4. GCS verbal subscore is 5. GCS motor subscore is 6.   Skin: Skin is warm and dry. Capillary refill takes less than 2 seconds.   Incision site c/d/I, staples intact.  No significant surrounding erythema, induration, fluctuance.  No drainage noted.  There is diffuse erythematous rash to the back extending from buttocks to the level of the patient's neck.  There are fine raised red bumps extend along the anterior neck and upper chest wall to the bilateral axillary region and beneath both breasts.  No vesicular lesions noted.  No lesions to Palms or soles.  Oropharynx clear.  No lesions to the abdomen or extremities noted.   Psychiatric: She has a normal mood and affect. Thought content normal.         ED Course   Procedures  Labs Reviewed - No data to display       Imaging Results    None          Medications   dexAMETHasone injection 8 mg (8 mg Intramuscular Given 5/8/24 6030)     Medical Decision Making  Patient presents to ED as above.  Differential includes but not limited to allergic reaction, heat rash, contact dermatitis, cellulitis, intertrigo, sepsis.  Patient is afebrile with stable vitals.  No signs of systemic illness.  I favor the appearance and history is most consistent with a heat rash versus contact exposure.  She is nontoxic and does not appear to need additional workup at this time.  We will give IM injection of Decadron now and place on Medrol Dosepak.  She has a diabetic but states she carefully monitors her sugars at home and they are typically well controlled.  She will contact her physician if blood sugar begins to spike.  Has orthopedic follow-up in 2 days for staple removal.  She was also advised to continue antihistamines as needed.  Keep skin cool and dry.  She may return to the emergency room for any acutely  worsening symptoms or concerns.  I also advised her to follow up with her PCP for recheck and derm referral if not improving.    Risk  OTC drugs.  Prescription drug management.                                      Clinical Impression:  Final diagnoses:  [R21] Rash and nonspecific skin eruption (Primary)          ED Disposition Condition    Discharge Stable          ED Prescriptions       Medication Sig Dispense Start Date End Date Auth. Provider    methylPREDNISolone (MEDROL DOSEPACK) 4 mg tablet use as directed 21 each 5/8/2024 5/29/2024 Rosemary Drake MD    cetirizine (ZYRTEC) 10 MG tablet Take 1 tablet (10 mg total) by mouth daily as needed for Allergies. 30 tablet 5/8/2024 6/7/2024 Rosemary Drake MD    diphenhydrAMINE (BENADRYL) 25 mg capsule Take 1 capsule (25 mg total) by mouth every 6 (six) hours as needed for Itching or Allergies. 20 capsule 5/8/2024 -- Rosemayr Drake MD          Follow-up Information       Follow up With Specialties Details Why Contact Info Additional Information    Magalie Euceda MD Family Medicine Schedule an appointment as soon as possible for a visit   [address]  [address]  [place] [facility] [address]  [phone]       [facility] - Emergency Dept Emergency Medicine  As needed, If symptoms worsen [address] [facility] [address]  [phone] 1st floor             Rosemary Drake MD  05/09/24 0310

## 2024-05-10 ENCOUNTER — OFFICE VISIT (OUTPATIENT)
Dept: ORTHOPEDICS | Facility: CLINIC | Age: 34
End: 2024-05-10
Payer: MEDICAID

## 2024-05-10 VITALS
SYSTOLIC BLOOD PRESSURE: 140 MMHG | HEIGHT: 67 IN | BODY MASS INDEX: 45.99 KG/M2 | WEIGHT: 293 LBS | DIASTOLIC BLOOD PRESSURE: 80 MMHG

## 2024-05-10 DIAGNOSIS — Z98.1 S/P LUMBAR SPINAL FUSION: Primary | ICD-10-CM

## 2024-05-10 PROCEDURE — 3077F SYST BP >= 140 MM HG: CPT | Mod: CPTII,S$GLB,, | Performed by: PHYSICIAN ASSISTANT

## 2024-05-10 PROCEDURE — 1159F MED LIST DOCD IN RCRD: CPT | Mod: CPTII,S$GLB,, | Performed by: PHYSICIAN ASSISTANT

## 2024-05-10 PROCEDURE — 1160F RVW MEDS BY RX/DR IN RCRD: CPT | Mod: CPTII,S$GLB,, | Performed by: PHYSICIAN ASSISTANT

## 2024-05-10 PROCEDURE — 3079F DIAST BP 80-89 MM HG: CPT | Mod: CPTII,S$GLB,, | Performed by: PHYSICIAN ASSISTANT

## 2024-05-10 PROCEDURE — 99024 POSTOP FOLLOW-UP VISIT: CPT | Mod: S$GLB,,, | Performed by: PHYSICIAN ASSISTANT

## 2024-05-10 NOTE — PROGRESS NOTES
[facility] ORTHOPEDICS  [address]  [place] [address]  Phone: [phone]   [phone]    Patient's PCP:Magalie Euceda MD  Referring Provider: No ref. provider found    POST-OP Note:    Subjective:        Chief Complaint:   Chief Complaint   Patient presents with    Lumbar Spine - Post-op Evaluation     //XRAY// STAPLES, PO L45 ALIF, L4-5, L5-S1 PLF 4/23/24. Patient is doing better. Rash around neck and back into the chest. Went to ED, gave injection and rash is much better but still there. No pain on the legs, numbness down the left leg lateral aspect to knee        Past Medical History:   Diagnosis Date    Anxiety     Back pain     Bipolar disorder 2004    bipolar 2    Chronic bronchitis     Depression     Diabetes mellitus     GERD (gastroesophageal reflux disease)     HPV (human papilloma virus) infection     Insomnia     Migraines     Non-proliferative diabetic retinopathy, mild, both eyes 09/25/2023    Obese body habitus     Ovarian cyst     Pneumonia     PONV (postoperative nausea and vomiting)        Past Surgical History:   Procedure Laterality Date    ANTERIOR LUMBAR INTERBODY FUSION (ALIF) Bilateral 4/23/2024    Procedure: FUSION, SPINE, LUMBAR, ALIF;  Surgeon: Gurjit Cassidy MD;  Location: [facility] OR;  Service: Orthopedics;  Laterality: Bilateral;    APPENDECTOMY      ARTHROSCOPY OF ANKLE Right 05/04/2022    Procedure: ARTHROSCOPY, ANKLE;  Surgeon: Bimal Mccormick DPM;  Location: [facility] OR;  Service: Podiatry;  Laterality: Right;  CURT EXTERNAL ANKLE DISTRACTOR    CHOLECYSTECTOMY      DILATION AND CURETTAGE OF UTERUS      HYSTERECTOMY  2017    total, ovarian cysts, torsion, [place]; hyst per Dr JESUS Manriquez    LAPAROSCOPIC APPENDECTOMY N/A 08/18/2021    Procedure: APPENDECTOMY, LAPAROSCOPIC;  Surgeon: Osiel Ramirez MD;  Location: [facility] OR;  Service: General;  Laterality: N/A;    LUMBAR LAMINECTOMY WITH FUSION Bilateral 4/23/2024    Procedure: LAMINECTOMY, SPINE,  LUMBAR, WITH FUSION;  Surgeon: Gurjit Cassidy MD;  Location: [facility];  Service: Orthopedics;  Laterality: Bilateral;    OOPHORECTOMY      opherectom Left 2015    salpingo-opherectomy    REPAIR OF LIGAMENT OF ANKLE Right 06/23/2021    Procedure: REPAIR OF ANTERIOR TALOFIBULAR LIGAMENT CALCANEOFIBULAR LIGAMENT;  Surgeon: Bimal Mccormick DPM;  Location: [facility] OR;  Service: Podiatry;  Laterality: Right;  WITH ARTHREX INTERNAL BRACE    REPAIR OF LIGAMENT OF ANKLE Right 4/27/2023    Procedure: REPAIR, LIGAMENT, ANKLE;  Surgeon: Aryan Poole DPM;  Location: [facility] OR;  Service: Podiatry;  Laterality: Right;    REPAIR OF TENDON OF LOWER EXTREMITY Right 4/27/2023    Procedure: REPAIR, TENDON, LOWER EXTREMITY;  Surgeon: Aryan Poole DPM;  Location: [facility] OR;  Service: Podiatry;  Laterality: Right;  arthrex    SURGICAL REMOVAL OF BONE SPUR Right 06/23/2021    Procedure: EXCISION OS TRIGONUM;  Surgeon: Bimal Mccormick DPM;  Location: [facility] OR;  Service: Podiatry;  Laterality: Right;    WISDOM TOOTH EXTRACTION         Current Outpatient Medications   Medication Sig    blood sugar diagnostic Strp Test blood sugar 4 (four) times daily before meals and nightly.    blood-glucose meter (ACCU-CHEK GUIDE GLUCOSE METER) [facility] Use to test blood sugar    cetirizine (ZYRTEC) 10 MG tablet Take 1 tablet (10 mg total) by mouth daily as needed for Allergies.    diphenhydrAMINE (BENADRYL) 25 mg capsule Take 1 capsule (25 mg total) by mouth every 6 (six) hours as needed for Itching or Allergies.    gabapentin (NEURONTIN) 300 MG capsule Take 1 capsule (300 mg total) by mouth every evening.    hydrOXYzine pamoate (VISTARIL) 25 MG Cap Take 1 capsule (25 mg total) by mouth every 4 (four) hours as needed (itching).    lancets (ACCU-CHEK SOFTCLIX LANCETS) Misc Test blood sugar 4 (four) times daily before meals and nightly.    metFORMIN (GLUCOPHAGE-XR) 500 MG ER 24hr tablet Take 2 tablets (1,000 mg total) by mouth 2 (two) times daily with meals.     methylPREDNISolone (MEDROL DOSEPACK) 4 mg tablet use as directed    ondansetron (ZOFRAN-ODT) 4 MG TbDL Take 2 tablets (8 mg total) by mouth every 8 (eight) hours as needed (Nausea).    oxyCODONE-acetaminophen (PERCOCET)  mg per tablet Take 1 tablet by mouth every 4 (four) hours as needed for Pain.    pyridoxine, vitamin B6, (B-6) 25 MG Tab Take 25 mg by mouth once daily.    risperiDONE (RISPERDAL) 1 MG tablet Take 1 tablet (1 mg total) by mouth 2 (two) times daily.    semaglutide (OZEMPIC) 1 mg/dose (4 mg/3 mL) Inject 1 mg into the skin every 7 days.    sertraline (ZOLOFT) 100 MG tablet Take 1.5 tablets (150 mg total) by mouth once daily.    sumatriptan (IMITREX) 100 MG tablet Take 1 tab PO at first sign of migraine. If not effective, repeat dose in 2 hours. Do not take more than 200mg in 24 hours.    traZODone (DESYREL) 100 MG tablet Take 1-2 tabs PO qHS prn insomnia.    clotrimazole (LOTRIMIN) 1 % cream Apply topically 2 (two) times daily. for 7 days (Patient not taking: Reported on 5/10/2024)     No current facility-administered medications for this visit.       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       Family History   Adopted: Yes   Problem Relation Name Age of Onset    No Known Problems Sister      No Known Problems Sister      No Known Problems Sister         Social History     Socioeconomic History    Marital status:     Number of children: 0   Tobacco Use    Smoking status: Every Day     Current packs/day: 1.50     Average packs/day: 1.5 packs/day for 18.0 years (27.0 ttl pk-yrs)     Types: Cigarettes     Passive exposure: Current    Smokeless tobacco: Never    Tobacco comments:     4/25/23--pt instructed no smoking 24hours before sx, pt voiced understanding.   Substance and Sexual Activity    Alcohol use: Not Currently     Comment: rare    Drug use: Not Currently     Comment: twice    Sexual  activity: Yes     Partners: Male     Birth control/protection: OCP, None     Social Determinants of Health     Financial Resource Strain: High Risk (4/23/2024)    Overall Financial Resource Strain (CARDIA)     Difficulty of Paying Living Expenses: Very hard   Food Insecurity: Food Insecurity Present (4/23/2024)    Hunger Vital Sign     Worried About Running Out of Food in the Last Year: Often true     Ran Out of Food in the Last Year: Often true   Transportation Needs: No Transportation Needs (4/23/2024)    PRAPARE - Transportation     Lack of Transportation (Medical): No     Lack of Transportation (Non-Medical): No   Stress: Stress Concern Present (4/23/2024)    [employer] [place] [employer] - Occupational Stress Questionnaire     Feeling of Stress : Very much   Housing Stability: Low Risk  (4/23/2024)    Housing Stability Vital Sign     Unable to Pay for Housing in the Last Year: No     Homeless in the Last Year: No       History of present illness: 33 y.o. female returns to clinic today status post multilevel lumbar fusion April 23rd.  Here today for routine follow-up.    Date of Procedure: 4/23/2024      Procedure:   1. Anterior lumbar interbody fusion L4-5 CPT code 61860-38  2. Insertion interbody device L4-5 CPT code 29754  3. Use of morselized allograft for anterior fusion consisting of infuse bone morphogenic protein CPT code 96013  4.  L4 laminectomy  CPT code 6 3047  5. Posterior segmental instrumentation L4-S1 with Medtronic titanium dmitriy and screws CPT code 70860  6. Posterolateral fusion L4-5 CPT code 11765   7. Posterolateral fusion L5-S1 CPT code 85551  8. Harvesting local autograft through same incision for posterior spinal fusion CPT code 85901  9. Computer assisted navigation for insertion of pedicle screws CPT code 55341     Surgeons and Role:     * Gurjit Cassidy MD - Primary     * Krzysztof Arroyo MD - Co-Surgeon for anterior lumbar interbody fusion CPT code 37893-1 2      Assisting Surgeon:  Krzysztof Chung for anterior and posterior procedures    Review of Systems:    Musculoskeletal:  See HPI       Objective:        Physical Examination:    Vital Signs:   Vitals:    05/10/24 1121   BP: (!) 140/80       Body mass index is 49.34 kg/m².    This a well-developed, well nourished patient in no acute distress.  They are alert and oriented and cooperative to examination.        Examination of the lumbar spine and abdominal incisions.  Skin is dry and intact, no erythema or ecchymosis, no signs symptoms of infection, no evidence of wound failure dehiscence.  Surgical staples in the remainder of her surgical dressings were removed today without complication.  She does have some tenderness to palpation both in the lower back and the abdomen as to be expected.  She has good range of motion in the bilateral hips knees and feet and ankle.  Distally neurovascularly intact.    Pertinent New Results:     XRAY Report / Interpretation:  AP and lateral views lumbar spine taken today in the office demonstrate lumbar interbody devices at L4-5 with segmented instrumentation in bilateral pedicle screws at L4, L5 and S1 with connecting rods.  No evidence hardware failure or loosening.  Visualized soft tissues appear normal.       Assessment:       1. S/P lumbar spinal fusion      Plan:     S/P lumbar spinal fusion  -     X-Ray Lumbar Spine Ap And Lateral        Follow up in about 4 weeks (around 6/7/2024) for Re-check symptoms, Post-op, Mon/Wed with Dr. Cassidy.    Stable status post multilevel lumbar fusion about 2 weeks ago.  Patient's wounds are healing as expected.  She ambulates with a mildly antalgic gait.  No use of an assistive device.  She is standing erect.  We reviewed wound care, activity as tolerated.  Avoid any heavy lifting.  See her back in 1 month.    Wound Care review: on approximately day 3 after surgery, or 72 hours after your initial surgery date, you may begin cleaning your wounds  (AS LONG AS THERE IS NO DRAINAGE PRESENT).     Gentle cleansing with a mild soap and water is recommended. Pat the area dry, and then cover the wound with a clean, sterile dressing.  Do this at least once daily.  Do not apply any ointments creams or lotions to the wounds until told to do so.  Avoid submerging or immersing the wounds in water such as a sink, tub, or pool for at least 1 month after surgery.       KURTIS Franklin, PADedraC    This note was created using [employer] voice recognition software that occasionally misinterprets words or phrases.

## 2024-05-13 ENCOUNTER — PATIENT MESSAGE (OUTPATIENT)
Dept: ORTHOPEDICS | Facility: CLINIC | Age: 34
End: 2024-05-13

## 2024-05-13 DIAGNOSIS — Z98.1 S/P LUMBAR SPINAL FUSION: Primary | ICD-10-CM

## 2024-05-13 RX ORDER — OXYCODONE AND ACETAMINOPHEN 10; 325 MG/1; MG/1
1 TABLET ORAL EVERY 4 HOURS PRN
Qty: 28 TABLET | Refills: 0 | Status: CANCELLED | OUTPATIENT
Start: 2024-05-13

## 2024-05-13 RX ORDER — OXYCODONE AND ACETAMINOPHEN 7.5; 325 MG/1; MG/1
1 TABLET ORAL EVERY 6 HOURS PRN
Qty: 28 TABLET | Refills: 0 | Status: SHIPPED | OUTPATIENT
Start: 2024-05-13 | End: 2024-05-20

## 2024-05-13 NOTE — TELEPHONE ENCOUNTER
Patient is now 3 weeks status post multilevel lumbar fusion.  She has been on Percocet 10q4, we are going to taper her to 7.5 q.6.

## 2024-05-20 ENCOUNTER — CLINICAL SUPPORT (OUTPATIENT)
Dept: REHABILITATION | Facility: HOSPITAL | Age: 34
End: 2024-05-20
Payer: MEDICAID

## 2024-05-20 DIAGNOSIS — Z98.1 S/P LUMBAR SPINAL FUSION: ICD-10-CM

## 2024-05-20 DIAGNOSIS — R29.898 IMPAIRED FLEXIBILITY OF LOWER EXTREMITY: ICD-10-CM

## 2024-05-20 DIAGNOSIS — R26.9 GAIT ABNORMALITY: Primary | ICD-10-CM

## 2024-05-20 DIAGNOSIS — R29.898 DECREASED STRENGTH OF LOWER EXTREMITY: ICD-10-CM

## 2024-05-20 PROCEDURE — 97110 THERAPEUTIC EXERCISES: CPT | Mod: PN,CQ

## 2024-05-20 NOTE — PROGRESS NOTES
[facility]   Physical Therapy Treatment Note      Name: Loretta Lea  Clinic Number: [id number]    Therapy Diagnosis:   Encounter Diagnoses   Name Primary?    Gait abnormality Yes    Impaired flexibility of lower extremity     S/P lumbar spinal fusion     Decreased strength of lower extremity      Physician: Gurjit Cassidy MD    Visit Date: 5/20/2024    Physician Orders: PT Eval and Treat   Medical Diagnosis from Referral: S/P lumbar spinal fusion   Evaluation Date: 5/1/2024  Authorization Period Expiration: 4/24/2025  Plan of Care Expiration: 6/28/2024  Visit # / Visits authorized: 1/ 12   (POC 1/12)   FOTO Due visit 5  FOTO Due visit 10     Precautions: Standard and S/P Lumbar Fusion  Insurance: Payor: MEDICAID / Plan: [employer] [facility] / Product Type: Managed Medicaid /      Time In: 1100  Time Out: 1145  Total Appointment Time (timed & untimed codes): 45 minutes  PTA Visit #: 1/5       Subjective     Patient reports: she is doing well while on medication. Wants to get back to work at [employer].   She was compliant with home exercise program.  Response to previous treatment: no issues   Functional change: ongoing     Pain: 3/10  Location: bilateral back, L>R     Objective      Objective Measures updated at progress report unless specified.     Treatment     Date of surgery: 4/23/2024     Loretta received the treatments listed below:      therapeutic exercises to develop strength, endurance, ROM, flexibility, posture, and core stabilization for 45 minutes including:    Nustep x 4 mins   SLRs  2x10 Bilateral --np   TrA sets with PB 2x10     Supine heel slides  x 20 B  BKFOs red theraband 2x10 Bilateral      Hip adduction with ball  3x10  Glute squeezes with bolster x 20  (Progress to bridging)  LAQs  2x10 B   SAQs  2#  2x10 B     Standing gastroc stretch 3x30 secs Bilateral  Seated marches  x 20 Bilateral         Seated HR/TR   3x10  Scapular squeezes   3x10  Standing hip abduction with support x 20 Bilateral-- np  UBE 2/2       Patient Education and Home Exercises       Education provided:   - home exercises, activity tolerance     Written Home Exercises Provided: Patient instructed to cont prior HEP. Exercises were reviewed and Loretta was able to demonstrate them prior to the end of the session.  Loretta demonstrated good  understanding of the education provided. See Electronic Medical Record under Patient Instructions for exercises provided during therapy sessions    Assessment     Patient is doing very well in recovery with pain controlled via medication. Tolerated all exercises well with minor appropriate discomfort. More challenge with Left Lower Extremity strength. Continue to progress as tolerated.     Loretta Is progressing well towards her goals.   Patient prognosis is Fair.     Patient will continue to benefit from skilled outpatient physical therapy to address the deficits listed in the problem list box on initial evaluation, provide pt/family education and to maximize pt's level of independence in the home and community environment.     Patient's spiritual, cultural and educational needs considered and pt agreeable to plan of care and goals.     Anticipated barriers to physical therapy: none     Goals:   SHORT TERM GOALS:  3 weeks  Progress Date met   The patient will begin a written HEP [] Met  [] Not Met  [x] Progressing     Increase hamstring flexibility to 60* [] Met  [] Not Met  [x] Progressing     Decrease soft tissue tenderness to moderate [] Met  [] Not Met  [x] Progressing     The patient will be able to ascend/descend 10 step/stairs without onset of symptoms.    [] Met  [] Not Met  [x] Progressing        LONG TERM GOALS: 6 weeks  Progress Date met   The patient will be independent with his HEP for maintenance [] Met  [] Not Met  [x] Progressing     Increase hamstring flexibility to 70* [] Met  [] Not Met  [x] Progressing     Increase FOTO  score to 31% [] Met  [] Not Met  [x] Progressing     Decrease soft tissue tenderness to mild [] Met  [] Not Met  [x] Progressing     Increase lower extremity strength to 4+/5 [] Met  [] Not Met  [x] Progressing     The patient will be able to ascend/descend 20 step/stairs without onset of symptoms.    [] Met  [] Not Met  [x] Progressing          Plan     Continue per Plan of Care     Peggy Quintanilla, PTA

## 2024-05-22 ENCOUNTER — CLINICAL SUPPORT (OUTPATIENT)
Dept: REHABILITATION | Facility: HOSPITAL | Age: 34
End: 2024-05-22
Payer: MEDICAID

## 2024-05-22 DIAGNOSIS — Z98.1 S/P LUMBAR SPINAL FUSION: ICD-10-CM

## 2024-05-22 DIAGNOSIS — R26.9 GAIT ABNORMALITY: Primary | ICD-10-CM

## 2024-05-22 DIAGNOSIS — R29.898 DECREASED STRENGTH OF LOWER EXTREMITY: ICD-10-CM

## 2024-05-22 DIAGNOSIS — R29.898 IMPAIRED FLEXIBILITY OF LOWER EXTREMITY: ICD-10-CM

## 2024-05-22 PROCEDURE — 97110 THERAPEUTIC EXERCISES: CPT | Mod: PN

## 2024-05-22 NOTE — PROGRESS NOTES
[facility]   Physical Therapy Treatment Note      Name: Loretta Lea  Clinic Number: [id number]    Therapy Diagnosis:   Encounter Diagnoses   Name Primary?    Gait abnormality Yes    Impaired flexibility of lower extremity     S/P lumbar spinal fusion     Decreased strength of lower extremity      Physician: Gurjit Cassidy MD    Visit Date: 5/22/2024    Physician Orders: PT Eval and Treat   Medical Diagnosis from Referral: S/P lumbar spinal fusion   Evaluation Date: 5/1/2024  Authorization Period Expiration: 4/24/2025  Plan of Care Expiration: 6/28/2024  Visit # / Visits authorized: 1/ 12   (POC 1/12)   FOTO Due visit 5  FOTO Due visit 10     Precautions: Standard and S/P Lumbar Fusion  Insurance: Payor: MEDICAID / Plan: [employer] [facility] / Product Type: Managed Medicaid /      Time In: 1045  (Pnt arrived early)  Time Out: 1140  Total Appointment Time (timed & untimed codes): 55 minutes  PTA Visit #: 1/5       Subjective     Patient reports: that she is sore today from the previous session.  She was compliant with home exercise program.  Response to previous treatment: no issues   Functional change: ongoing     Pain: 5/10  Location: bilateral back, L>R     Objective      Objective Measures updated at progress report unless specified.     Treatment     Date of surgery: 4/23/2024     Loretta received the treatments listed below:      therapeutic exercises to develop strength, endurance, ROM, flexibility, posture, and core stabilization for 45 minutes including:    Recumbent bike x 6 mins  Nustep x 4 mins - np  SLRs  2x10 Bilateral   TrA sets with PB 2x10     Supine heel slides  x 20 B  BKFOs red theraband 2x10 Bilateral      Hip adduction with ball  3x10  Glute squeezes with bolster x 20  (Progress to bridging)  LAQs  2x10 B   SAQs  2#  2x10 B     Standing gastroc stretch 3x30 secs Bilateral -np  Seated marches  x 20 Bilateral         Seated HR/TR    3x10  Scapular squeezes  3x10  Standing hip abduction with support x 20 Bilateral-- np  UBE 2/2       Patient Education and Home Exercises       Education provided:   - home exercises, activity tolerance     Written Home Exercises Provided: Patient instructed to cont prior HEP. Exercises were reviewed and Loretta was able to demonstrate them prior to the end of the session.  Loretta demonstrated good  understanding of the education provided. See Electronic Medical Record under Patient Instructions for exercises provided during therapy sessions    Assessment     Patient is doing very well in recovery with pain controlled via medication. Tolerated all exercises well with minor appropriate discomfort. More challenge with Left Lower Extremity strength. Continue to progress as tolerated.     Loretta Is progressing well towards her goals.   Patient prognosis is Fair.     Patient will continue to benefit from skilled outpatient physical therapy to address the deficits listed in the problem list box on initial evaluation, provide pt/family education and to maximize pt's level of independence in the home and community environment.     Patient's spiritual, cultural and educational needs considered and pt agreeable to plan of care and goals.     Anticipated barriers to physical therapy: none     Goals:   SHORT TERM GOALS:  3 weeks  Progress Date met   The patient will begin a written HEP [] Met  [] Not Met  [x] Progressing     Increase hamstring flexibility to 60* [] Met  [] Not Met  [x] Progressing     Decrease soft tissue tenderness to moderate [] Met  [] Not Met  [x] Progressing     The patient will be able to ascend/descend 10 step/stairs without onset of symptoms.    [] Met  [] Not Met  [x] Progressing        LONG TERM GOALS: 6 weeks  Progress Date met   The patient will be independent with his HEP for maintenance [] Met  [] Not Met  [x] Progressing     Increase hamstring flexibility to 70* [] Met  [] Not Met  [x]  Progressing     Increase FOTO score to 31% [] Met  [] Not Met  [x] Progressing     Decrease soft tissue tenderness to mild [] Met  [] Not Met  [x] Progressing     Increase lower extremity strength to 4+/5 [] Met  [] Not Met  [x] Progressing     The patient will be able to ascend/descend 20 step/stairs without onset of symptoms.    [] Met  [] Not Met  [x] Progressing          Plan     Continue per Plan of Care     Shimon Byrd, PT

## 2024-05-26 ENCOUNTER — PATIENT MESSAGE (OUTPATIENT)
Dept: ORTHOPEDICS | Facility: CLINIC | Age: 34
End: 2024-05-26

## 2024-05-26 DIAGNOSIS — Z98.1 S/P LUMBAR SPINAL FUSION: Primary | ICD-10-CM

## 2024-05-28 RX ORDER — HYDROCODONE BITARTRATE AND ACETAMINOPHEN 7.5; 325 MG/1; MG/1
1 TABLET ORAL EVERY 8 HOURS PRN
Qty: 21 TABLET | Refills: 0 | Status: SHIPPED | OUTPATIENT
Start: 2024-05-28

## 2024-06-03 ENCOUNTER — CLINICAL SUPPORT (OUTPATIENT)
Dept: REHABILITATION | Facility: HOSPITAL | Age: 34
End: 2024-06-03
Payer: MEDICAID

## 2024-06-03 DIAGNOSIS — R26.9 GAIT ABNORMALITY: Primary | ICD-10-CM

## 2024-06-03 DIAGNOSIS — Z98.1 S/P LUMBAR SPINAL FUSION: ICD-10-CM

## 2024-06-03 DIAGNOSIS — R29.898 DECREASED STRENGTH OF LOWER EXTREMITY: ICD-10-CM

## 2024-06-03 DIAGNOSIS — R29.898 IMPAIRED FLEXIBILITY OF LOWER EXTREMITY: ICD-10-CM

## 2024-06-03 PROCEDURE — 97110 THERAPEUTIC EXERCISES: CPT | Mod: PN

## 2024-06-03 NOTE — PROGRESS NOTES
[facility]   Physical Therapy Treatment Note      Name: Loretta Lea  Clinic Number: [id number]    Therapy Diagnosis:   Encounter Diagnoses   Name Primary?    Gait abnormality Yes    Impaired flexibility of lower extremity     S/P lumbar spinal fusion     Decreased strength of lower extremity      Physician: Gurjit Cassidy MD    Visit Date: 6/3/2024    Physician Orders: PT Eval and Treat   Medical Diagnosis from Referral: S/P lumbar spinal fusion   Evaluation Date: 5/1/2024  Authorization Period Expiration: 4/24/2025  Plan of Care Expiration: 6/28/2024  Visit # / Visits authorized: 3/ 12   (POC 1/12)   FOTO Due visit 5  FOTO Due visit 10     Precautions: Standard and S/P Lumbar Fusion  Insurance: Payor: MEDICAID / [facility] / Product Type: Managed Medicaid /      Time In: 1040  (Pnt arrived early)  Time Out: 1133  Total Appointment Time (timed & untimed codes): 53 minutes  PTA Visit #: 1/5       Subjective     Patient reports: that she feels like she is having withdrawal symptoms from the pain meds.  States that the pain hasn't been that bad.    She was compliant with home exercise program.  Response to previous treatment: no issues   Functional change: ongoing     Pain: 4/10  Location: bilateral back, L>R     Objective      Objective Measures updated at progress report unless specified.     Treatment     Date of surgery: 4/23/2024     Loretta received the treatments listed below:      therapeutic exercises to develop strength, endurance, ROM, flexibility, posture, and core stabilization for 53 minutes including:    Nustep x 10 mins   SLRs  2x10 Bilateral   TrA sets with PB 2x10     Supine heel slides  x 20 B  BKFOs green theraband 2x10 Bilateral      Hip adduction with ball  3x10  Bridges 2x10  LAQs  2x10 B   SAQs  2#  2x10 B     Standing gastroc stretch 3x30 secs Bilateral   Seated marches  x 20  1#  Bilateral         Seated HR/TR   3x10  Scapular  squeezes  3x10  Standing hip abduction with support x 20 Bilateral  UBE 2/2       Patient Education and Home Exercises       Education provided:   - home exercises, activity tolerance     Written Home Exercises Provided: Patient instructed to cont prior HEP. Exercises were reviewed and Loretta was able to demonstrate them prior to the end of the session.  Loretta demonstrated good  understanding of the education provided. See Electronic Medical Record under Patient Instructions for exercises provided during therapy sessions    Assessment     Patient is doing very well in recovery with pain controlled via medication. Tolerated all exercises well with minor appropriate discomfort. More challenge with Left Lower Extremity strength. Continue to progress as tolerated.     Loretta Is progressing well towards her goals.   Patient prognosis is Fair.     Patient will continue to benefit from skilled outpatient physical therapy to address the deficits listed in the problem list box on initial evaluation, provide pt/family education and to maximize pt's level of independence in the home and community environment.     Patient's spiritual, cultural and educational needs considered and pt agreeable to plan of care and goals.     Anticipated barriers to physical therapy: none     Goals:   SHORT TERM GOALS:  3 weeks  Progress Date met   The patient will begin a written HEP [] Met  [] Not Met  [x] Progressing     Increase hamstring flexibility to 60* [] Met  [] Not Met  [x] Progressing     Decrease soft tissue tenderness to moderate [] Met  [] Not Met  [x] Progressing     The patient will be able to ascend/descend 10 step/stairs without onset of symptoms.    [] Met  [] Not Met  [x] Progressing        LONG TERM GOALS: 6 weeks  Progress Date met   The patient will be independent with his HEP for maintenance [] Met  [] Not Met  [x] Progressing     Increase hamstring flexibility to 70* [] Met  [] Not Met  [x] Progressing     Increase  FOTO score to 31% [] Met  [] Not Met  [x] Progressing     Decrease soft tissue tenderness to mild [] Met  [] Not Met  [x] Progressing     Increase lower extremity strength to 4+/5 [] Met  [] Not Met  [x] Progressing     The patient will be able to ascend/descend 20 step/stairs without onset of symptoms.    [] Met  [] Not Met  [x] Progressing          Plan     Continue per Plan of Care     Shimon Byrd, PT

## 2024-06-04 DIAGNOSIS — F51.05 INSOMNIA DUE TO OTHER MENTAL DISORDER: ICD-10-CM

## 2024-06-04 DIAGNOSIS — F99 INSOMNIA DUE TO OTHER MENTAL DISORDER: ICD-10-CM

## 2024-06-04 DIAGNOSIS — F31.77 BIPOLAR DISORDER, IN PARTIAL REMISSION, MOST RECENT EPISODE MIXED: ICD-10-CM

## 2024-06-05 ENCOUNTER — CLINICAL SUPPORT (OUTPATIENT)
Dept: REHABILITATION | Facility: HOSPITAL | Age: 34
End: 2024-06-05
Payer: MEDICAID

## 2024-06-05 ENCOUNTER — OFFICE VISIT (OUTPATIENT)
Dept: FAMILY MEDICINE | Facility: CLINIC | Age: 34
End: 2024-06-05
Payer: MEDICAID

## 2024-06-05 VITALS
WEIGHT: 293 LBS | SYSTOLIC BLOOD PRESSURE: 136 MMHG | HEIGHT: 67 IN | OXYGEN SATURATION: 96 % | DIASTOLIC BLOOD PRESSURE: 86 MMHG | HEART RATE: 63 BPM | BODY MASS INDEX: 45.99 KG/M2

## 2024-06-05 DIAGNOSIS — R29.898 DECREASED STRENGTH OF LOWER EXTREMITY: ICD-10-CM

## 2024-06-05 DIAGNOSIS — E11.65 UNCONTROLLED TYPE 2 DIABETES MELLITUS WITH HYPERGLYCEMIA: Primary | ICD-10-CM

## 2024-06-05 DIAGNOSIS — R03.0 PREHYPERTENSION: ICD-10-CM

## 2024-06-05 DIAGNOSIS — F31.77 BIPOLAR DISORDER, IN PARTIAL REMISSION, MOST RECENT EPISODE MIXED: ICD-10-CM

## 2024-06-05 DIAGNOSIS — E78.2 MIXED HYPERLIPIDEMIA: ICD-10-CM

## 2024-06-05 DIAGNOSIS — Z98.1 S/P LUMBAR SPINAL FUSION: ICD-10-CM

## 2024-06-05 DIAGNOSIS — E66.01 CLASS 3 SEVERE OBESITY DUE TO EXCESS CALORIES WITH SERIOUS COMORBIDITY AND BODY MASS INDEX (BMI) OF 45.0 TO 49.9 IN ADULT: ICD-10-CM

## 2024-06-05 DIAGNOSIS — F99 INSOMNIA DUE TO OTHER MENTAL DISORDER: ICD-10-CM

## 2024-06-05 DIAGNOSIS — R26.9 GAIT ABNORMALITY: Primary | ICD-10-CM

## 2024-06-05 DIAGNOSIS — R29.898 IMPAIRED FLEXIBILITY OF LOWER EXTREMITY: ICD-10-CM

## 2024-06-05 DIAGNOSIS — F51.05 INSOMNIA DUE TO OTHER MENTAL DISORDER: ICD-10-CM

## 2024-06-05 PROCEDURE — 99214 OFFICE O/P EST MOD 30 MIN: CPT | Mod: S$PBB,,, | Performed by: NURSE PRACTITIONER

## 2024-06-05 PROCEDURE — 1159F MED LIST DOCD IN RCRD: CPT | Mod: CPTII,,, | Performed by: NURSE PRACTITIONER

## 2024-06-05 PROCEDURE — 3075F SYST BP GE 130 - 139MM HG: CPT | Mod: CPTII,,, | Performed by: NURSE PRACTITIONER

## 2024-06-05 PROCEDURE — 3079F DIAST BP 80-89 MM HG: CPT | Mod: CPTII,,, | Performed by: NURSE PRACTITIONER

## 2024-06-05 PROCEDURE — 99999 PR PBB SHADOW E&M-EST. PATIENT-LVL III: CPT | Mod: PBBFAC,,, | Performed by: NURSE PRACTITIONER

## 2024-06-05 PROCEDURE — 97110 THERAPEUTIC EXERCISES: CPT | Mod: PN

## 2024-06-05 PROCEDURE — 4010F ACE/ARB THERAPY RXD/TAKEN: CPT | Mod: CPTII,,, | Performed by: NURSE PRACTITIONER

## 2024-06-05 PROCEDURE — 1160F RVW MEDS BY RX/DR IN RCRD: CPT | Mod: CPTII,,, | Performed by: NURSE PRACTITIONER

## 2024-06-05 PROCEDURE — 3008F BODY MASS INDEX DOCD: CPT | Mod: CPTII,,, | Performed by: NURSE PRACTITIONER

## 2024-06-05 PROCEDURE — 99213 OFFICE O/P EST LOW 20 MIN: CPT | Mod: PBBFAC,PN | Performed by: NURSE PRACTITIONER

## 2024-06-05 RX ORDER — TRAZODONE HYDROCHLORIDE 100 MG/1
TABLET ORAL
Qty: 60 TABLET | Refills: 5 | Status: SHIPPED | OUTPATIENT
Start: 2024-06-05 | End: 2024-06-05 | Stop reason: SDUPTHER

## 2024-06-05 RX ORDER — LOSARTAN POTASSIUM 25 MG/1
25 TABLET ORAL DAILY
Qty: 30 TABLET | Refills: 5 | Status: SHIPPED | OUTPATIENT
Start: 2024-06-05

## 2024-06-05 RX ORDER — RISPERIDONE 1 MG/1
1 TABLET ORAL 2 TIMES DAILY
Qty: 60 TABLET | Refills: 5 | Status: SHIPPED | OUTPATIENT
Start: 2024-06-05 | End: 2024-06-05 | Stop reason: SDUPTHER

## 2024-06-05 RX ORDER — TRAZODONE HYDROCHLORIDE 100 MG/1
TABLET ORAL
Qty: 60 TABLET | Refills: 5 | Status: SHIPPED | OUTPATIENT
Start: 2024-06-05

## 2024-06-05 RX ORDER — SEMAGLUTIDE 2.68 MG/ML
2 INJECTION, SOLUTION SUBCUTANEOUS
Qty: 9 ML | Refills: 0 | Status: SHIPPED | OUTPATIENT
Start: 2024-06-05 | End: 2024-06-13 | Stop reason: SDUPTHER

## 2024-06-05 RX ORDER — RISPERIDONE 1 MG/1
1 TABLET ORAL 2 TIMES DAILY
Qty: 60 TABLET | Refills: 5 | Status: SHIPPED | OUTPATIENT
Start: 2024-06-05

## 2024-06-05 RX ORDER — SERTRALINE HYDROCHLORIDE 100 MG/1
150 TABLET, FILM COATED ORAL DAILY
Qty: 135 TABLET | Refills: 0 | Status: SHIPPED | OUTPATIENT
Start: 2024-06-05

## 2024-06-05 RX ORDER — METFORMIN HYDROCHLORIDE 500 MG/1
1000 TABLET, EXTENDED RELEASE ORAL 2 TIMES DAILY WITH MEALS
Qty: 120 TABLET | Refills: 2 | Status: SHIPPED | OUTPATIENT
Start: 2024-06-05

## 2024-06-05 NOTE — PROGRESS NOTES
SUBJECTIVE:      Patient ID: Loretta Lea is a 33 y.o. female.    Chief Complaint: Medication Refill    33-year-old female presents to the clinic for follow-up. PMH significant for type 2 diabetes, anxiety, back pain, bipolar disorder, migraines, insomnia, GERD, and chronic bronchitis. She is a former patient of Dr. Yen. Last office visit Sept 2023, labs were ordered at that time, but not completed. She has cancelled or missed appointments.     She recovering from back surgery 6 weeks ago. She is s/p lumbar spinal fusion. Symptoms have improved after surgery. Still has mild numbness to the LLE. No longer feels like left leg will give out. No longer wearing back brace. She continues with PT.  Followed by Dr. Cassidy.     A1c 11 months ago 7.8%. She has not been checking her glucose. Diabetes is managed with Metformin 500 mg bid and Ozempic 1 mg weekly. She has been out of her diabetic medications x3 months. She has lost approx 8 lb since September. Denies vision changes, polyuria, polydipsia and polyphagia.    Lipids were uncontrolled 1 year ago. Due for repeat. She is not on a statin or cholesterol medication at this time.     Patient reports her mental health is stable. Currently taking Risperidone 1 mg bid and Zoloft 150 mg. She is out of both medications. Requesting refills. Denies SI.         Family History   Adopted: Yes   Problem Relation Name Age of Onset    No Known Problems Sister      No Known Problems Sister      No Known Problems Sister        Social History     Socioeconomic History    Marital status:     Number of children: 0   Tobacco Use    Smoking status: Every Day     Current packs/day: 1.50     Average packs/day: 1.5 packs/day for 18.0 years (27.0 ttl pk-yrs)     Types: Cigarettes     Passive exposure: Current    Smokeless tobacco: Never    Tobacco comments:     4/25/23--pt instructed no smoking 24hours before sx, pt voiced understanding.   Substance and Sexual Activity    Alcohol  use: Not Currently     Comment: rare    Drug use: Not Currently     Comment: twice    Sexual activity: Yes     Partners: Male     Birth control/protection: OCP, None     Social Determinants of Health     Financial Resource Strain: High Risk (4/23/2024)    Overall Financial Resource Strain (CARDIA)     Difficulty of Paying Living Expenses: Very hard   Food Insecurity: Food Insecurity Present (4/23/2024)    Hunger Vital Sign     Worried About Running Out of Food in the Last Year: Often true     Ran Out of Food in the Last Year: Often true   Transportation Needs: No Transportation Needs (4/23/2024)    PRAPARE - Transportation     Lack of Transportation (Medical): No     Lack of Transportation (Non-Medical): No   Stress: Stress Concern Present (4/23/2024)    [employer] [place] - Occupational Stress Questionnaire     Feeling of Stress : Very much   Housing Stability: Low Risk  (4/23/2024)    Housing Stability Vital Sign     Unable to Pay for Housing in the Last Year: No     Homeless in the Last Year: No     Current Outpatient Medications   Medication Sig Dispense Refill    cetirizine (ZYRTEC) 10 MG tablet Take 1 tablet (10 mg total) by mouth daily as needed for Allergies. 30 tablet 0    gabapentin (NEURONTIN) 300 MG capsule Take 1 capsule (300 mg total) by mouth every evening. 30 capsule 2    HYDROcodone-acetaminophen (NORCO) 7.5-325 mg per tablet Take 1 tablet by mouth every 8 (eight) hours as needed for Pain. 21 tablet 0    ondansetron (ZOFRAN-ODT) 4 MG TbDL Take 2 tablets (8 mg total) by mouth every 8 (eight) hours as needed (Nausea). 30 tablet 2    pyridoxine, vitamin B6, (B-6) 25 MG Tab Take 25 mg by mouth once daily.      sumatriptan (IMITREX) 100 MG tablet Take 1 tab PO at first sign of migraine. If not effective, repeat dose in 2 hours. Do not take more than 200mg in 24 hours. 12 tablet 1    blood sugar diagnostic Strp Test blood sugar 4 (four) times daily before meals and nightly. (Patient  not taking: Reported on 6/5/2024) 200 each 5    blood-glucose meter (ACCU-CHEK GUIDE GLUCOSE METER) Misc Use to test blood sugar (Patient not taking: Reported on 6/5/2024) 1 each 0    diphenhydrAMINE (BENADRYL) 25 mg capsule Take 1 capsule (25 mg total) by mouth every 6 (six) hours as needed for Itching or Allergies. (Patient not taking: Reported on 6/5/2024) 20 capsule 0    lancets (ACCU-CHEK SOFTCLIX LANCETS) Misc Test blood sugar 4 (four) times daily before meals and nightly. (Patient not taking: Reported on 6/5/2024) 200 each 5    losartan (COZAAR) 25 MG tablet Take 1 tablet (25 mg total) by mouth once daily. 30 tablet 5    metFORMIN (GLUCOPHAGE-XR) 500 MG ER 24hr tablet Take 2 tablets (1,000 mg total) by mouth 2 (two) times daily with meals. 120 tablet 2    risperiDONE (RISPERDAL) 1 MG tablet Take 1 tablet (1 mg total) by mouth 2 (two) times daily. 60 tablet 5    semaglutide (OZEMPIC) 2 mg/dose (8 mg/3 mL) PnIj Inject 2 mg into the skin every 7 days. 9 mL 0    sertraline (ZOLOFT) 100 MG tablet Take 1.5 tablets (150 mg total) by mouth once daily. 135 tablet 0    traZODone (DESYREL) 100 MG tablet TAKE 1 TO 2 TABLETS BY MOUTH EVERY NIGHT AT BEDTIME AS NEEDED FOR INSOMNIA 60 tablet 5     No current facility-administered medications for this visit.     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      Past Medical History:   Diagnosis Date    Anxiety     Back pain     Bipolar disorder 2004    bipolar 2    Chronic bronchitis     Depression     Diabetes mellitus     GERD (gastroesophageal reflux disease)     HPV (human papilloma virus) infection     Insomnia     Migraines     Non-proliferative diabetic retinopathy, mild, both eyes 09/25/2023    Obese body habitus     Ovarian cyst     Pneumonia     PONV (postoperative nausea and vomiting)      Past Surgical History:   Procedure Laterality Date    ANTERIOR LUMBAR INTERBODY FUSION (ALIF)  Bilateral 4/23/2024    Procedure: FUSION, SPINE, LUMBAR, ALIF;  Surgeon: Gurjit Cassidy MD;  Location: [facility] OR;  Service: Orthopedics;  Laterality: Bilateral;    APPENDECTOMY      ARTHROSCOPY OF ANKLE Right 05/04/2022    Procedure: ARTHROSCOPY, ANKLE;  Surgeon: Bimal Mccormick DPM;  Location: [facility] OR;  Service: Podiatry;  Laterality: Right;  CURT EXTERNAL ANKLE DISTRACTOR    CHOLECYSTECTOMY      DILATION AND CURETTAGE OF UTERUS      HYSTERECTOMY  2017    total, ovarian cysts, torsion, [place]; hyst per Dr JESUS Manriquez    LAPAROSCOPIC APPENDECTOMY N/A 08/18/2021    Procedure: APPENDECTOMY, LAPAROSCOPIC;  Surgeon: Osiel Ramirez MD;  Location: [facility];  Service: General;  Laterality: N/A;    LUMBAR LAMINECTOMY WITH FUSION Bilateral 4/23/2024    Procedure: LAMINECTOMY, SPINE, LUMBAR, WITH FUSION;  Surgeon: Gurjit Cassidy MD;  Location: [facility];  Service: Orthopedics;  Laterality: Bilateral;    OOPHORECTOMY      opherectom Left 2015    salpingo-opherectomy    REPAIR OF LIGAMENT OF ANKLE Right 06/23/2021    Procedure: REPAIR OF ANTERIOR TALOFIBULAR LIGAMENT CALCANEOFIBULAR LIGAMENT;  Surgeon: Bimal Mccormick DPM;  Location: [facility] OR;  Service: Podiatry;  Laterality: Right;  WITH ARTHREX INTERNAL BRACE    REPAIR OF LIGAMENT OF ANKLE Right 4/27/2023    Procedure: REPAIR, LIGAMENT, ANKLE;  Surgeon: Aryan Poole DPM;  Location: [facility] OR;  Service: Podiatry;  Laterality: Right;    REPAIR OF TENDON OF LOWER EXTREMITY Right 4/27/2023    Procedure: REPAIR, TENDON, LOWER EXTREMITY;  Surgeon: Aryan Poole DPM;  Location: [facility] OR;  Service: Podiatry;  Laterality: Right;  arthrex    SURGICAL REMOVAL OF BONE SPUR Right 06/23/2021    Procedure: EXCISION OS TRIGONUM;  Surgeon: Bimal Mccormick DPM;  Location: [facility] OR;  Service: Podiatry;  Laterality: Right;    WISDOM TOOTH EXTRACTION         Review of Systems   Constitutional:  Negative for activity change, appetite change, chills, diaphoresis, fatigue, fever and  "unexpected weight change.   HENT:  Negative for congestion, ear pain, sinus pressure, sore throat, trouble swallowing and voice change.    Eyes:  Negative for pain, discharge and visual disturbance.   Respiratory:  Negative for cough, chest tightness, shortness of breath and wheezing.    Cardiovascular:  Negative for chest pain and palpitations.   Gastrointestinal:  Negative for abdominal pain, constipation, diarrhea, nausea and vomiting.   Endocrine: Negative for polydipsia, polyphagia and polyuria.   Genitourinary:  Negative for difficulty urinating, flank pain, frequency and urgency.   Musculoskeletal:  Negative for back pain and joint swelling.   Skin:  Negative for color change and rash.   Neurological:  Negative for dizziness, seizures, syncope, weakness, numbness and headaches.   Hematological:  Negative for adenopathy.   Psychiatric/Behavioral:  Negative for dysphoric mood and sleep disturbance. The patient is not nervous/anxious.       OBJECTIVE:      Vitals:    06/05/24 0932   BP: 136/86   BP Location: Left arm   Patient Position: Sitting   BP Method: Large (Manual)   Pulse: 63   SpO2: 96%   Weight: (!) 139.3 kg (307 lb)   Height: 5' 7" (1.702 m)     Physical Exam  Vitals and nursing note reviewed.   Constitutional:       General: She is awake. She is not in acute distress.     Appearance: She is morbidly obese. She is not ill-appearing, toxic-appearing or diaphoretic.   HENT:      Head: Normocephalic and atraumatic.      Nose: Nose normal.   Eyes:      General: Lids are normal. Gaze aligned appropriately.      Conjunctiva/sclera: Conjunctivae normal.      Right eye: Right conjunctiva is not injected.      Left eye: Left conjunctiva is not injected.      Pupils: Pupils are equal, round, and reactive to light.   Cardiovascular:      Rate and Rhythm: Normal rate and regular rhythm.      Pulses: Normal pulses.           Dorsalis pedis pulses are 2+ on the right side and 2+ on the left side.        Posterior " tibial pulses are 2+ on the right side and 2+ on the left side.      Heart sounds: Normal heart sounds, S1 normal and S2 normal. No murmur heard.     No friction rub. No gallop.   Pulmonary:      Effort: Pulmonary effort is normal. No respiratory distress.      Breath sounds: Normal breath sounds. No stridor. No decreased breath sounds, wheezing, rhonchi or rales.   Chest:      Chest wall: No tenderness.   Musculoskeletal:      Cervical back: Neck supple.      Right lower leg: No edema.      Left lower leg: No edema.      Right foot: No deformity.      Left foot: No deformity.   Feet:      Right foot:      Protective Sensation: 10 sites tested.  10 sites sensed.      Skin integrity: No ulcer, blister, skin breakdown, erythema or callus.      Toenail Condition: Fungal disease present.     Left foot:      Protective Sensation: 10 sites tested.  10 sites sensed.      Skin integrity: No ulcer, blister, skin breakdown, erythema or callus.      Toenail Condition: Fungal disease present.  Lymphadenopathy:      Cervical: No cervical adenopathy.   Skin:     General: Skin is warm and dry.      Capillary Refill: Capillary refill takes less than 2 seconds.      Findings: No erythema or rash.   Neurological:      Mental Status: She is alert and oriented to person, place, and time. Mental status is at baseline.   Psychiatric:         Attention and Perception: Attention normal.         Mood and Affect: Mood normal.         Speech: Speech normal.         Behavior: Behavior normal. Behavior is cooperative.         Thought Content: Thought content normal.         Judgment: Judgment normal.        No results displayed because visit has over 200 results.      Hospital Outpatient Visit on 04/16/2024   Component Date Value Ref Range Status    Specimen UA 04/16/2024 Urine, Clean Catch   Final    Color, UA 04/16/2024 Yellow  Yellow, Straw, Raven Final    Appearance, UA 04/16/2024 Clear  Clear Final    pH, UA 04/16/2024 5.0  5.0 - 8.0 Final     Specific Gravity, UA 04/16/2024 1.025  1.005 - 1.030 Final    Protein, UA 04/16/2024 Negative  Negative Final    Comment: Recommend a 24 hour urine protein or a urine   protein/creatinine ratio if globulin induced proteinuria is  clinically suspected.      Glucose, UA 04/16/2024 Trace (A)  Negative Final    Ketones, UA 04/16/2024 Negative  Negative Final    Bilirubin (UA) 04/16/2024 Negative  Negative Final    Occult Blood UA 04/16/2024 Negative  Negative Final    Nitrite, UA 04/16/2024 Negative  Negative Final    Urobilinogen, UA 04/16/2024 Negative  <2.0 EU/dL Final    Leukocytes, UA 04/16/2024 Negative  Negative Final    WBC 04/16/2024 11.04  3.90 - 12.70 K/uL Final    RBC 04/16/2024 5.70 (H)  4.00 - 5.40 M/uL Final    Hemoglobin 04/16/2024 15.4  12.0 - 16.0 g/dL Final    Hematocrit 04/16/2024 47.8  37.0 - 48.5 % Final    MCV 04/16/2024 84  82 - 98 fL Final    MCH 04/16/2024 27.0  27.0 - 31.0 pg Final    MCHC 04/16/2024 32.2  32.0 - 36.0 g/dL Final    RDW 04/16/2024 14.5  11.5 - 14.5 % Final    Platelets 04/16/2024 370  150 - 450 K/uL Final    MPV 04/16/2024 9.5  9.2 - 12.9 fL Final    Immature Granulocytes 04/16/2024 0.2  0.0 - 0.5 % Final    Gran # (ANC) 04/16/2024 6.9  1.8 - 7.7 K/uL Final    Immature Grans (Abs) 04/16/2024 0.02  0.00 - 0.04 K/uL Final    Comment: Mild elevation in immature granulocytes is non specific and   can be seen in a variety of conditions including stress response,   acute inflammation, trauma and pregnancy. Correlation with other   laboratory and clinical findings is essential.      Lymph # 04/16/2024 3.4  1.0 - 4.8 K/uL Final    Mono # 04/16/2024 0.6  0.3 - 1.0 K/uL Final    Eos # 04/16/2024 0.1  0.0 - 0.5 K/uL Final    Baso # 04/16/2024 0.08  0.00 - 0.20 K/uL Final    nRBC 04/16/2024 0  0 /100 WBC Final    Gran % 04/16/2024 62.4  38.0 - 73.0 % Final    Lymph % 04/16/2024 30.4  18.0 - 48.0 % Final    Mono % 04/16/2024 5.4  4.0 - 15.0 % Final    Eosinophil % 04/16/2024 0.9  0.0 -  8.0 % Final    Basophil % 04/16/2024 0.7  0.0 - 1.9 % Final    Differential Method 04/16/2024 Automated   Final    Group & Rh 04/16/2024 A POS   Final    Indirect Eva 04/16/2024 NEG   Final    Specimen Outdate 04/16/2024 04/30/2024 23:59   Final    Sodium 04/16/2024 136  136 - 145 mmol/L Final    Potassium 04/16/2024 4.1  3.5 - 5.1 mmol/L Final    Chloride 04/16/2024 101  95 - 110 mmol/L Final    CO2 04/16/2024 22 (L)  23 - 29 mmol/L Final    Glucose 04/16/2024 202 (H)  70 - 110 mg/dL Final    BUN 04/16/2024 6  6 - 20 mg/dL Final    Creatinine 04/16/2024 0.8  0.5 - 1.4 mg/dL Final    Calcium 04/16/2024 9.3  8.7 - 10.5 mg/dL Final    Anion Gap 04/16/2024 13  8 - 16 mmol/L Final    eGFR 04/16/2024 >60  >60 mL/min/1.73 m^2 Final    QRS Duration 04/16/2024 88  ms Final    [facility] QTC Calculation 04/16/2024 446  ms Final   Admission on 01/05/2024, Discharged on 01/06/2024   Component Date Value Ref Range Status    Specimen UA 01/05/2024 Urine, Clean Catch   Final    Color, UA 01/05/2024 Yellow  Yellow, Straw, Raven Final    Appearance, UA 01/05/2024 Clear  Clear Final    pH, UA 01/05/2024 6.0  5.0 - 8.0 Final    Specific Gravity, UA 01/05/2024 1.010  1.005 - 1.030 Final    Protein, UA 01/05/2024 Negative  Negative Final    Comment: Recommend a 24 hour urine protein or a urine   protein/creatinine ratio if globulin induced proteinuria is  clinically suspected.      Glucose, UA 01/05/2024 Negative  Negative Final    Ketones, UA 01/05/2024 Negative  Negative Final    Bilirubin (UA) 01/05/2024 Negative  Negative Final    Occult Blood UA 01/05/2024 Negative  Negative Final    Nitrite, UA 01/05/2024 Negative  Negative Final    Urobilinogen, UA 01/05/2024 Negative  Negative EU/dL Final    Leukocytes, UA 01/05/2024 Negative  Negative Final    POC Glucose 01/05/2024 141 (H)  70 - 110 Final   Admission on 11/13/2023, Discharged on 11/13/2023   Component Date Value Ref Range Status    Specimen UA 11/13/2023 Urine, Clean Catch    Final    Color, UA 11/13/2023 Yellow  Yellow, Straw, Raven Final    Appearance, UA 11/13/2023 Clear  Clear Final    pH, UA 11/13/2023 7.0  5.0 - 8.0 Final    Specific Gravity, UA 11/13/2023 1.010  1.005 - 1.030 Final    Protein, UA 11/13/2023 Negative  Negative Final    Comment: Recommend a 24 hour urine protein or a urine   protein/creatinine ratio if globulin induced proteinuria is  clinically suspected.      Glucose, UA 11/13/2023 Negative  Negative Final    Ketones, UA 11/13/2023 Negative  Negative Final    Bilirubin (UA) 11/13/2023 Negative  Negative Final    Occult Blood UA 11/13/2023 Negative  Negative Final    Nitrite, UA 11/13/2023 Negative  Negative Final    Urobilinogen, UA 11/13/2023 Negative  Negative EU/dL Final    Leukocytes, UA 11/13/2023 Negative  Negative Final    POC Glucose 11/13/2023 132 (H)  70 - 110 Final   Patient Outreach on 09/27/2023   Component Date Value Ref Range Status    Left Eye DM Retinopathy 09/25/2023 Positive   Final    Right Eye DM Retinopathy 09/25/2023 Positive   Final   Admission on 06/19/2023, Discharged on 06/19/2023   Component Date Value Ref Range Status    POC Glucose 06/19/2023 148 (H)  70 - 110 Final   Office Visit on 06/13/2023   Component Date Value Ref Range Status    Hemoglobin A1C, POC 06/13/2023 7.8  % Final     Assessment:       1. Uncontrolled type 2 diabetes mellitus with hyperglycemia    2. Mixed hyperlipidemia    3. Prehypertension    4. Insomnia due to other mental disorder    5. Bipolar disorder, in partial remission, most recent episode mixed    6. Class 3 severe obesity due to excess calories with serious comorbidity and body mass index (BMI) of 45.0 to 49.9 in adult        Plan:       Uncontrolled type 2 diabetes mellitus with hyperglycemia  Medications refilled.  Compliance encouraged.  Ozempic increased to 2 mg weekly.  Continue metformin 1000 mg twice daily.  If A1c is markedly elevated will add Jardiance 10 mg.  Diet and exercise encouraged.  Limit  carbs and sugar intake.  Recommend glucose monitoring.  Fasting glucose goal is less than 130.  -     metFORMIN (GLUCOPHAGE-XR) 500 MG ER 24hr tablet; Take 2 tablets (1,000 mg total) by mouth 2 (two) times daily with meals.  Dispense: 120 tablet; Refill: 2  -     semaglutide (OZEMPIC) 2 mg/dose (8 mg/3 mL) PnIj; Inject 2 mg into the skin every 7 days.  Dispense: 9 mL; Refill: 0  -     Foot Exam Performed  -     losartan (COZAAR) 25 MG tablet; Take 1 tablet (25 mg total) by mouth once daily.  Dispense: 30 tablet; Refill: 5    Mixed hyperlipidemia  Lipid panel pending. Will likely start patient on Lipitor 20 mg. Limit red meat, butter, fried foods, cheese, and other foods that have a lot of saturated fat. Consume more: lean meats, fish, fruits, vegetables, whole grains, beans, lentils, and nuts.  Weight loss, and 30-45 min of cardiovascular exercise daily.    Prehypertension  Blood pressure is borderline today.  She does have uncontrolled diabetes.  Will start patient on losartan 25 mg. Reduce the amount of salt in your diet; Lose weight; Avoid drinking too much alcohol; Exercise at least 30 minutes per day most days of the week.      Insomnia due to other mental disorder  Restart trazodone.  -     traZODone (DESYREL) 100 MG tablet; TAKE 1 TO 2 TABLETS BY MOUTH EVERY NIGHT AT BEDTIME AS NEEDED FOR INSOMNIA  Dispense: 60 tablet; Refill: 5    Bipolar disorder, in partial remission, most recent episode mixed  Stable per patient, continue Zoloft and Risperdal.  No longer seeing Psychiatry.  -     sertraline (ZOLOFT) 100 MG tablet; Take 1.5 tablets (150 mg total) by mouth once daily.  Dispense: 135 tablet; Refill: 0  -     risperiDONE (RISPERDAL) 1 MG tablet; Take 1 tablet (1 mg total) by mouth 2 (two) times daily.  Dispense: 60 tablet; Refill: 5    Class 3 severe obesity due to excess calories with serious comorbidity and body mass index (BMI) of 45.0 to 49.9 in adult  Increase physical activity to help with weight loss.  Reducing sedentary time, independent of physical activity, has known cardiovascular, metabolic, and functional benefits in all adults. Any amount of exercise is better than being sedentary.    This note was created using [employer] voice recognition software that occasionally misinterprets phrases or words.     I spent a total of 26 minutes on the day of the visit.This includes face to face time and non-face to face time preparing to see the patient (eg, review of tests), obtaining and/or reviewing separately obtained history, documenting clinical information in the electronic or other health record, independently interpreting results and communicating results to the patient/family/caregiver, or care coordinator.    Follow up in about 1 month (around 7/5/2024) for lab review, check BP, and DM.          6/5/2024 JULIO CESAR Mullins, KORYP

## 2024-06-05 NOTE — PROGRESS NOTES
[facility]   Physical Therapy Treatment Note      Name: Loretta Lea  Clinic Number: [id number]    Therapy Diagnosis:   Encounter Diagnoses   Name Primary?    Gait abnormality Yes    Impaired flexibility of lower extremity     S/P lumbar spinal fusion     Decreased strength of lower extremity      Physician: Gurjit Cassidy MD    Visit Date: 6/5/2024    Physician Orders: PT Eval and Treat   Medical Diagnosis from Referral: S/P lumbar spinal fusion   Evaluation Date: 5/1/2024  Authorization Period Expiration: 4/24/2025  Plan of Care Expiration: 6/28/2024  Visit # / Visits authorized: 4/ 12   (POC 1/12)   FOTO Due visit 5  FOTO Due visit 10     Precautions: Standard and S/P Lumbar Fusion  Insurance: Payor: MEDICAID / [facility] / Product Type: Managed Medicaid /      Time In: 1025  (Pnt arrived early)  Time Out: 1118  Total Appointment Time (timed & untimed codes): 53 minutes  PTA Visit #: 1/5       Subjective     Patient reports: that she feels like she is having withdrawal symptoms from the pain meds.  States that the pain hasn't been that bad.    She was compliant with home exercise program.  Response to previous treatment: no issues   Functional change: ongoing     Pain: 4/10  Location: bilateral back, L>R     Objective      Objective Measures updated at progress report unless specified.     Treatment     Date of surgery: 4/23/2024     Loretta received the treatments listed below:      therapeutic exercises to develop strength, endurance, ROM, flexibility, posture, and core stabilization for 53 minutes including:    Nustep x 10 mins   SLRs  2x10 Bilateral   TrA sets with PB 2x10     Supine heel slides  x 20 B  BKFOs green theraband 2x10 Bilateral      Hip adduction with ball  3x10  Bridges 2x10  LAQs 1#  2x10 B   SAQs  2#  2x10 B     Standing gastroc stretch 3x30 secs Bilateral   Seated marches  x 20  1#  Bilateral         Seated HR/TR   3x10  Step  ups x 10  Scapular squeezes  3x10  Standing hip abduction with support x 20 Bilateral  UBE 2/2       Patient Education and Home Exercises       Education provided:   - home exercises, activity tolerance     Written Home Exercises Provided: Patient instructed to cont prior HEP. Exercises were reviewed and Loretta was able to demonstrate them prior to the end of the session.  Loretta demonstrated good  understanding of the education provided. See Electronic Medical Record under Patient Instructions for exercises provided during therapy sessions    Assessment     Patient is doing very well in recovery with pain controlled via medication. Tolerated all exercises well with minor appropriate discomfort. More challenge with Left Lower Extremity strength. Continue to progress as tolerated.     Loretta Is progressing well towards her goals.   Patient prognosis is Fair.     Patient will continue to benefit from skilled outpatient physical therapy to address the deficits listed in the problem list box on initial evaluation, provide pt/family education and to maximize pt's level of independence in the home and community environment.     Patient's spiritual, cultural and educational needs considered and pt agreeable to plan of care and goals.     Anticipated barriers to physical therapy: none     Goals:   SHORT TERM GOALS:  3 weeks  Progress Date met   The patient will begin a written HEP [] Met  [] Not Met  [x] Progressing     Increase hamstring flexibility to 60* [] Met  [] Not Met  [x] Progressing     Decrease soft tissue tenderness to moderate [] Met  [] Not Met  [x] Progressing     The patient will be able to ascend/descend 10 step/stairs without onset of symptoms.    [] Met  [] Not Met  [x] Progressing        LONG TERM GOALS: 6 weeks  Progress Date met   The patient will be independent with his HEP for maintenance [] Met  [] Not Met  [x] Progressing     Increase hamstring flexibility to 70* [] Met  [] Not Met  [x]  Progressing     Increase FOTO score to 31% [] Met  [] Not Met  [x] Progressing     Decrease soft tissue tenderness to mild [] Met  [] Not Met  [x] Progressing     Increase lower extremity strength to 4+/5 [] Met  [] Not Met  [x] Progressing     The patient will be able to ascend/descend 20 step/stairs without onset of symptoms.    [] Met  [] Not Met  [x] Progressing          Plan     Continue per Plan of Care     Shimon Byrd, PT

## 2024-06-06 ENCOUNTER — TELEPHONE (OUTPATIENT)
Dept: FAMILY MEDICINE | Facility: CLINIC | Age: 34
End: 2024-06-06
Payer: MEDICAID

## 2024-06-10 ENCOUNTER — OFFICE VISIT (OUTPATIENT)
Dept: ORTHOPEDICS | Facility: CLINIC | Age: 34
End: 2024-06-10
Payer: MEDICAID

## 2024-06-10 VITALS
SYSTOLIC BLOOD PRESSURE: 111 MMHG | HEART RATE: 99 BPM | BODY MASS INDEX: 45.99 KG/M2 | DIASTOLIC BLOOD PRESSURE: 74 MMHG | WEIGHT: 293 LBS | HEIGHT: 67 IN

## 2024-06-10 DIAGNOSIS — Z98.1 S/P LUMBAR SPINAL FUSION: Primary | ICD-10-CM

## 2024-06-10 PROCEDURE — 1159F MED LIST DOCD IN RCRD: CPT | Mod: CPTII,S$GLB,, | Performed by: ORTHOPAEDIC SURGERY

## 2024-06-10 PROCEDURE — 4010F ACE/ARB THERAPY RXD/TAKEN: CPT | Mod: CPTII,S$GLB,, | Performed by: ORTHOPAEDIC SURGERY

## 2024-06-10 PROCEDURE — 3074F SYST BP LT 130 MM HG: CPT | Mod: CPTII,S$GLB,, | Performed by: ORTHOPAEDIC SURGERY

## 2024-06-10 PROCEDURE — 99024 POSTOP FOLLOW-UP VISIT: CPT | Mod: S$GLB,,, | Performed by: ORTHOPAEDIC SURGERY

## 2024-06-10 PROCEDURE — 1160F RVW MEDS BY RX/DR IN RCRD: CPT | Mod: CPTII,S$GLB,, | Performed by: ORTHOPAEDIC SURGERY

## 2024-06-10 PROCEDURE — 3078F DIAST BP <80 MM HG: CPT | Mod: CPTII,S$GLB,, | Performed by: ORTHOPAEDIC SURGERY

## 2024-06-10 NOTE — LETTER
Wen 10, 2024      [facility]  [address]  [place] [address]  Phone: [phone]  Fax: [phone]       Patient: Loretta Lea   YOB: 1990  Date of Visit: 06/10/2024    To Whom It May Concern:    Loretta Lea  was at my clinic on 06/10/2024. The patient may return to work on 06/11/2024 with restrictions. No lifting greater then 10 pounds and allowed to sit when needed. If you have any questions or concerns, or if I can be of further assistance, please do not hesitate to contact me.    Sincerely,        Gurjit Cassidy MD

## 2024-06-10 NOTE — PROGRESS NOTES
Subjective:    Patient ID: Loretta Lea is a 33 y.o. female.    Chief Complaint: Post-op Evaluation of the Lumbar Spine (Patient is here for a 4 week PO f/up L4-5 ALIF, L4-5, L5-S1 PLF 4.23.24, states her pain is  a lot better, still has pain after therapy, like muscle soreness. Left Thigh numbness. )      History of Present Illness    Prior to meeting with the patient I reviewed the medical chart in [facility]. This included reviewing the previous progress notes from our office, review of the patient's last appointment with their primary care provider, review of any visits to the emergency room, and review of any pain management appointments or procedures.  Loretta comes in today for follow-up for her posterior lumbar fusion from L4-S1 with interbody placement at L4-5.  She is 6 weeks postop and doing very well.  All of her preoperative symptoms have essentially resolved.  She has some mild surgical incisional/muscle discomfort.  She is working with formal physical therapy.  She is very pleased with her postoperative result thus far.  She is interested in resuming at least a part-time work schedule.    Current Medications  Current Outpatient Medications   Medication Sig Dispense Refill    cetirizine (ZYRTEC) 10 MG tablet Take 1 tablet (10 mg total) by mouth daily as needed for Allergies. 30 tablet 0    gabapentin (NEURONTIN) 300 MG capsule Take 1 capsule (300 mg total) by mouth every evening. 30 capsule 2    HYDROcodone-acetaminophen (NORCO) 7.5-325 mg per tablet Take 1 tablet by mouth every 8 (eight) hours as needed for Pain. 21 tablet 0    losartan (COZAAR) 25 MG tablet Take 1 tablet (25 mg total) by mouth once daily. 30 tablet 5    metFORMIN (GLUCOPHAGE-XR) 500 MG ER 24hr tablet Take 2 tablets (1,000 mg total) by mouth 2 (two) times daily with meals. 120 tablet 2    ondansetron (ZOFRAN-ODT) 4 MG TbDL Take 2 tablets (8 mg total) by mouth every 8 (eight) hours as needed (Nausea). 30 tablet 2    pyridoxine, vitamin  B6, (B-6) 25 MG Tab Take 25 mg by mouth once daily.      risperiDONE (RISPERDAL) 1 MG tablet Take 1 tablet (1 mg total) by mouth 2 (two) times daily. 60 tablet 5    semaglutide (OZEMPIC) 2 mg/dose (8 mg/3 mL) PnIj Inject 2 mg into the skin every 7 days. 9 mL 0    sertraline (ZOLOFT) 100 MG tablet Take 1.5 tablets (150 mg total) by mouth once daily. 135 tablet 0    sumatriptan (IMITREX) 100 MG tablet Take 1 tab PO at first sign of migraine. If not effective, repeat dose in 2 hours. Do not take more than 200mg in 24 hours. 12 tablet 1    traZODone (DESYREL) 100 MG tablet TAKE 1 TO 2 TABLETS BY MOUTH EVERY NIGHT AT BEDTIME AS NEEDED FOR INSOMNIA 60 tablet 5    blood sugar diagnostic Strp Test blood sugar 4 (four) times daily before meals and nightly. (Patient not taking: Reported on 6/5/2024) 200 each 5    blood-glucose meter (ACCU-CHEK GUIDE GLUCOSE METER) Misc Use to test blood sugar (Patient not taking: Reported on 6/5/2024) 1 each 0    diphenhydrAMINE (BENADRYL) 25 mg capsule Take 1 capsule (25 mg total) by mouth every 6 (six) hours as needed for Itching or Allergies. (Patient not taking: Reported on 6/5/2024) 20 capsule 0    lancets (ACCU-CHEK SOFTCLIX LANCETS) Misc Test blood sugar 4 (four) times daily before meals and nightly. (Patient not taking: Reported on 6/5/2024) 200 each 5     No current facility-administered medications for this visit.       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       Past Medical History  Past Medical History:   Diagnosis Date    Anxiety     Back pain     Bipolar disorder 2004    bipolar 2    Chronic bronchitis     Depression     Diabetes mellitus     GERD (gastroesophageal reflux disease)     HPV (human papilloma virus) infection     Insomnia     Migraines     Non-proliferative diabetic retinopathy, mild, both eyes 09/25/2023    Obese body habitus     Ovarian cyst     Pneumonia      PONV (postoperative nausea and vomiting)        Surgical History  Past Surgical History:   Procedure Laterality Date    ANTERIOR LUMBAR INTERBODY FUSION (ALIF) Bilateral 4/23/2024    Procedure: FUSION, SPINE, LUMBAR, ALIF;  Surgeon: Gurjit Cassidy MD;  Location: [facility];  Service: Orthopedics;  Laterality: Bilateral;    APPENDECTOMY      ARTHROSCOPY OF ANKLE Right 05/04/2022    Procedure: ARTHROSCOPY, ANKLE;  Surgeon: Bimal Mccormick DPM;  Location: [facility];  Service: Podiatry;  Laterality: Right;  CURT EXTERNAL ANKLE DISTRACTOR    CHOLECYSTECTOMY      DILATION AND CURETTAGE OF UTERUS      HYSTERECTOMY  2017    total, ovarian cysts, torsion, [place]; hyst per Dr JESUS Manriquez    LAPAROSCOPIC APPENDECTOMY N/A 08/18/2021    Procedure: APPENDECTOMY, LAPAROSCOPIC;  Surgeon: Osiel Ramirez MD;  Location: [facility];  Service: General;  Laterality: N/A;    LUMBAR LAMINECTOMY WITH FUSION Bilateral 4/23/2024    Procedure: LAMINECTOMY, SPINE, LUMBAR, WITH FUSION;  Surgeon: Gurjit Cassidy MD;  Location: [facility];  Service: Orthopedics;  Laterality: Bilateral;    OOPHORECTOMY      opherectom Left 2015    salpingo-opherectomy    REPAIR OF LIGAMENT OF ANKLE Right 06/23/2021    Procedure: REPAIR OF ANTERIOR TALOFIBULAR LIGAMENT CALCANEOFIBULAR LIGAMENT;  Surgeon: Bimal Mccormick DPM;  Location: [facility];  Service: Podiatry;  Laterality: Right;  WITH ARTHREX INTERNAL BRACE    REPAIR OF LIGAMENT OF ANKLE Right 4/27/2023    Procedure: REPAIR, LIGAMENT, ANKLE;  Surgeon: Aryan Poole DPM;  Location: [facility];  Service: Podiatry;  Laterality: Right;    REPAIR OF TENDON OF LOWER EXTREMITY Right 4/27/2023    Procedure: REPAIR, TENDON, LOWER EXTREMITY;  Surgeon: Aryan Poole DPM;  Location: [facility];  Service: Podiatry;  Laterality: Right;  arthrex    SURGICAL REMOVAL OF BONE SPUR Right 06/23/2021    Procedure: EXCISION OS TRIGONUM;  Surgeon: Bimal Mccormick DPM;  Location: [facility];  Service: Podiatry;  Laterality: Right;     WISDOM TOOTH EXTRACTION         Family History:   Family History   Adopted: Yes   Problem Relation Name Age of Onset    No Known Problems Sister      No Known Problems Sister      No Known Problems Sister         Social History:   Social History     Socioeconomic History    Marital status:     Number of children: 0   Tobacco Use    Smoking status: Every Day     Current packs/day: 1.50     Average packs/day: 1.5 packs/day for 18.0 years (27.0 ttl pk-yrs)     Types: Cigarettes     Passive exposure: Current    Smokeless tobacco: Never    Tobacco comments:     4/25/23--pt instructed no smoking 24hours before sx, pt voiced understanding.   Substance and Sexual Activity    Alcohol use: Not Currently     Comment: rare    Drug use: Not Currently     Comment: twice    Sexual activity: Yes     Partners: Male     Birth control/protection: OCP, None     Social Determinants of Health     Financial Resource Strain: High Risk (4/23/2024)    Overall Financial Resource Strain (CARDIA)     Difficulty of Paying Living Expenses: Very hard   Food Insecurity: Food Insecurity Present (4/23/2024)    Hunger Vital Sign     Worried About Running Out of Food in the Last Year: Often true     Ran Out of Food in the Last Year: Often true   Transportation Needs: No Transportation Needs (4/23/2024)    PRAPARE - Transportation     Lack of Transportation (Medical): No     Lack of Transportation (Non-Medical): No   Stress: Stress Concern Present (4/23/2024)    [employer] - Occupational Stress Questionnaire     Feeling of Stress : Very much   Housing Stability: Low Risk  (4/23/2024)    Housing Stability Vital Sign     Unable to Pay for Housing in the Last Year: No     Homeless in the Last Year: No       Date of surgery:  April 23, 2024    Review of Systems     General/Constitutional: Chills denies. Fatigue denies. Fever denies. Weight gain denies. Weight loss denies.    Musculoskeletal: Comments: See HPI for  details    Skin: Rash denies.    Objective:   Vital Signs:   Vitals:    06/10/24 1503   BP: 111/74   Pulse: 99        Physical Exam    This a well-developed, well nourished patient in no acute distress.  They are alert and oriented and cooperative to examination.  Pt. walks without an antalgic gait.      General Examination:     Constitutional: The patient is alert and oriented to lace person and time. Mood is pleasant.     Head/Face: Normal facial features normal eyebrows    Eyes: Normal extraocular motion bilaterally    Lungs: Respirations are equal and unlabored    Gait is coordinated.    Cardiovascular: There are no swelling or varicosities present.    Lymphatic: Negative for adenopathy    Skin: Normal    Neurological: Level of consciousness normal. Oriented to place person and time and situation    Psychiatric: Oriented to time place person and situation    Lumbar exam: Skin throughout the lower back clean dry and intact.  No erythema or ecchymosis.  No signs or symptoms of infection.  Surgical incisions are well healed without wound dehiscence or drainage.  She is neurovascularly intact throughout bilateral lower extremities.  She can weightbear as tolerated on bilateral lower extremities.  She stands erect.  Bilateral calves soft and nontender.  She has a nonantalgic gait.  Nonantalgic sit to stand.  Negative straight leg raise bilaterally.      XRAY Report/ Interpretation:  Two views taken of the lumbar spine today:  AP and lateral views.  They reveal no acute fractures or dislocations.  Visualized soft tissues appear unremarkable.  She has posterior instrumentation fusing L4-S1 with interbody at L4-5 without evidence of hardware failure or subsidence.      Assessment:       1. S/P lumbar spinal fusion        Plan:       Loretta was seen today for post-op evaluation.    Diagnoses and all orders for this visit:    S/P lumbar spinal fusion  -     X-Ray Lumbar Spine Ap And Lateral         Follow up for 2 month  f/u - lumbar fusion 4/23/24.  This is to attest that the physician's assistant Hamzah Alvares  served in the capacity as a scribe for this patient's encounter.  This is also to verify that I have reviewed the patient's history and helped formulate the treatment plan and discussed it with the physician's assistant.  I have actively participated in the evaluation and treatment plan for this patient.  The visit plan and medical decision-making is as outlined below    Patient doing very well this stage postoperatively.  We will allow her to return to work on a part-time basis, 2 days a week as a  at [employer].  We will limit her to no lifting greater than 10 lb and allow her to take seated breaks as needed.  She would like to continue with physical therapy for core strengthening.  I believe that is amenable.  We will check her back in 2 months to see how she is doing with PT and her return to part-time work.    Treatment options were discussed with regards to the nature of the medical condition. Conservative pain intervention and surgical options were discussed in detail. The probability of success of each separate treatment option was discussed. The patient expressed a clear understanding of the treatment options. With regards to surgery, the procedure risk, benefits, complications, and outcomes were discussed. No guarantees were given with regards to surgical outcome.   The risk of complications, morbidity, and mortality of patient management decisions have been made at the time of this visit. These are associated with the patient's problems, diagnostic procedures and treatment options. This includes the possible management options selected and those considered but not selected by the patient after shared medical decision making we discussed with the patient.     This note was created using Dragon voice recognition software that occasionally misinterpreted phrases or words.

## 2024-06-13 ENCOUNTER — PATIENT MESSAGE (OUTPATIENT)
Dept: FAMILY MEDICINE | Facility: CLINIC | Age: 34
End: 2024-06-13
Payer: MEDICAID

## 2024-06-13 DIAGNOSIS — E11.65 UNCONTROLLED TYPE 2 DIABETES MELLITUS WITH HYPERGLYCEMIA: ICD-10-CM

## 2024-06-13 DIAGNOSIS — R11.0 NAUSEA: ICD-10-CM

## 2024-06-13 RX ORDER — SEMAGLUTIDE 2.68 MG/ML
2 INJECTION, SOLUTION SUBCUTANEOUS
Qty: 3 ML | Refills: 0 | Status: SHIPPED | OUTPATIENT
Start: 2024-06-13 | End: 2024-07-13

## 2024-06-13 RX ORDER — ONDANSETRON 4 MG/1
8 TABLET, ORALLY DISINTEGRATING ORAL EVERY 8 HOURS PRN
Qty: 30 TABLET | Refills: 2 | OUTPATIENT
Start: 2024-06-13

## 2024-06-14 ENCOUNTER — HOSPITAL ENCOUNTER (EMERGENCY)
Facility: HOSPITAL | Age: 34
Discharge: HOME | End: 2024-06-14
Attending: EMERGENCY MEDICINE
Payer: MEDICAID

## 2024-06-14 VITALS
BODY MASS INDEX: 45.99 KG/M2 | RESPIRATION RATE: 21 BRPM | TEMPERATURE: 99 F | DIASTOLIC BLOOD PRESSURE: 59 MMHG | OXYGEN SATURATION: 98 % | HEART RATE: 81 BPM | WEIGHT: 293 LBS | SYSTOLIC BLOOD PRESSURE: 100 MMHG | HEIGHT: 67 IN

## 2024-06-14 DIAGNOSIS — R11.2 NAUSEA AND VOMITING, UNSPECIFIED VOMITING TYPE: Primary | ICD-10-CM

## 2024-06-14 DIAGNOSIS — T50.905A MEDICATION SIDE EFFECT, INITIAL ENCOUNTER: ICD-10-CM

## 2024-06-14 LAB
ALBUMIN SERPL BCP-MCNC: 4.3 G/DL (ref 3.5–5.2)
ALP SERPL-CCNC: 126 U/L (ref 55–135)
ALT SERPL W/O P-5'-P-CCNC: 76 U/L (ref 10–44)
ANION GAP SERPL CALC-SCNC: 11 MMOL/L (ref 8–16)
AST SERPL-CCNC: 128 U/L (ref 10–40)
BACTERIA #/AREA URNS HPF: NORMAL /HPF
BASOPHILS # BLD AUTO: 0.07 K/UL (ref 0–0.2)
BASOPHILS NFR BLD: 0.5 % (ref 0–1.9)
BILIRUB SERPL-MCNC: 0.5 MG/DL (ref 0.1–1)
BILIRUB UR QL STRIP: NEGATIVE
BUN SERPL-MCNC: 10 MG/DL (ref 6–20)
CALCIUM SERPL-MCNC: 9.3 MG/DL (ref 8.7–10.5)
CAOX CRY URNS QL MICRO: NORMAL
CHLORIDE SERPL-SCNC: 98 MMOL/L (ref 95–110)
CK SERPL-CCNC: 34 U/L (ref 20–180)
CLARITY UR: ABNORMAL
CO2 SERPL-SCNC: 29 MMOL/L (ref 23–29)
COLOR UR: ABNORMAL
CREAT SERPL-MCNC: 0.7 MG/DL (ref 0.5–1.4)
DIFFERENTIAL METHOD BLD: ABNORMAL
EOSINOPHIL # BLD AUTO: 0.1 K/UL (ref 0–0.5)
EOSINOPHIL NFR BLD: 0.3 % (ref 0–8)
ERYTHROCYTE [DISTWIDTH] IN BLOOD BY AUTOMATED COUNT: 14.4 % (ref 11.5–14.5)
EST. GFR  (NO RACE VARIABLE): >60 ML/MIN/1.73 M^2
GLUCOSE SERPL-MCNC: 109 MG/DL (ref 70–110)
GLUCOSE SERPL-MCNC: 117 MG/DL (ref 70–110)
GLUCOSE UR QL STRIP: ABNORMAL
HCT VFR BLD AUTO: 45.8 % (ref 37–48.5)
HGB BLD-MCNC: 14.4 G/DL (ref 12–16)
HGB UR QL STRIP: NEGATIVE
HYALINE CASTS #/AREA URNS LPF: 1 /LPF
IMM GRANULOCYTES # BLD AUTO: 0.05 K/UL (ref 0–0.04)
IMM GRANULOCYTES NFR BLD AUTO: 0.3 % (ref 0–0.5)
KETONES UR QL STRIP: ABNORMAL
LEUKOCYTE ESTERASE UR QL STRIP: NEGATIVE
LIPASE SERPL-CCNC: 57 U/L (ref 4–60)
LYMPHOCYTES # BLD AUTO: 2.8 K/UL (ref 1–4.8)
LYMPHOCYTES NFR BLD: 19.5 % (ref 18–48)
MAGNESIUM SERPL-MCNC: 2.3 MG/DL (ref 1.6–2.6)
MCH RBC QN AUTO: 26.4 PG (ref 27–31)
MCHC RBC AUTO-ENTMCNC: 31.4 G/DL (ref 32–36)
MCV RBC AUTO: 84 FL (ref 82–98)
MICROSCOPIC COMMENT: NORMAL
MONOCYTES # BLD AUTO: 0.8 K/UL (ref 0.3–1)
MONOCYTES NFR BLD: 5.7 % (ref 4–15)
NEUTROPHILS # BLD AUTO: 10.6 K/UL (ref 1.8–7.7)
NEUTROPHILS NFR BLD: 73.7 % (ref 38–73)
NITRITE UR QL STRIP: NEGATIVE
NRBC BLD-RTO: 0 /100 WBC
PH UR STRIP: 6 [PH] (ref 5–8)
PLATELET # BLD AUTO: 414 K/UL (ref 150–450)
PMV BLD AUTO: 9.1 FL (ref 9.2–12.9)
POTASSIUM SERPL-SCNC: 4.2 MMOL/L (ref 3.5–5.1)
PROT SERPL-MCNC: 8.3 G/DL (ref 6–8.4)
PROT UR QL STRIP: ABNORMAL
RBC # BLD AUTO: 5.45 M/UL (ref 4–5.4)
RBC #/AREA URNS HPF: 2 /HPF (ref 0–4)
SODIUM SERPL-SCNC: 138 MMOL/L (ref 136–145)
SP GR UR STRIP: >1.03 (ref 1–1.03)
SQUAMOUS #/AREA URNS HPF: 2 /HPF
URN SPEC COLLECT METH UR: ABNORMAL
UROBILINOGEN UR STRIP-ACNC: ABNORMAL EU/DL
WBC # BLD AUTO: 14.38 K/UL (ref 3.9–12.7)
WBC #/AREA URNS HPF: 3 /HPF (ref 0–5)

## 2024-06-14 PROCEDURE — 63600175 PHARM REV CODE 636 W HCPCS: Performed by: NURSE PRACTITIONER

## 2024-06-14 PROCEDURE — 25000003 PHARM REV CODE 250: Performed by: EMERGENCY MEDICINE

## 2024-06-14 PROCEDURE — 85025 COMPLETE CBC W/AUTO DIFF WBC: CPT | Performed by: NURSE PRACTITIONER

## 2024-06-14 PROCEDURE — 96376 TX/PRO/DX INJ SAME DRUG ADON: CPT

## 2024-06-14 PROCEDURE — 96361 HYDRATE IV INFUSION ADD-ON: CPT

## 2024-06-14 PROCEDURE — 82962 GLUCOSE BLOOD TEST: CPT

## 2024-06-14 PROCEDURE — 36415 COLL VENOUS BLD VENIPUNCTURE: CPT | Performed by: NURSE PRACTITIONER

## 2024-06-14 PROCEDURE — 83735 ASSAY OF MAGNESIUM: CPT | Performed by: NURSE PRACTITIONER

## 2024-06-14 PROCEDURE — 25500020 PHARM REV CODE 255: Performed by: EMERGENCY MEDICINE

## 2024-06-14 PROCEDURE — 81001 URINALYSIS AUTO W/SCOPE: CPT | Performed by: NURSE PRACTITIONER

## 2024-06-14 PROCEDURE — 99285 EMERGENCY DEPT VISIT HI MDM: CPT | Mod: 25

## 2024-06-14 PROCEDURE — 63600175 PHARM REV CODE 636 W HCPCS: Performed by: EMERGENCY MEDICINE

## 2024-06-14 PROCEDURE — 25000003 PHARM REV CODE 250: Performed by: NURSE PRACTITIONER

## 2024-06-14 PROCEDURE — 96374 THER/PROPH/DIAG INJ IV PUSH: CPT

## 2024-06-14 PROCEDURE — 83690 ASSAY OF LIPASE: CPT | Performed by: NURSE PRACTITIONER

## 2024-06-14 PROCEDURE — 80053 COMPREHEN METABOLIC PANEL: CPT | Performed by: NURSE PRACTITIONER

## 2024-06-14 PROCEDURE — 82550 ASSAY OF CK (CPK): CPT | Performed by: NURSE PRACTITIONER

## 2024-06-14 RX ORDER — ONDANSETRON 4 MG/1
4 TABLET, ORALLY DISINTEGRATING ORAL EVERY 6 HOURS PRN
Qty: 9 TABLET | Refills: 0 | Status: SHIPPED | OUTPATIENT
Start: 2024-06-14 | End: 2024-06-14

## 2024-06-14 RX ORDER — ONDANSETRON HYDROCHLORIDE 2 MG/ML
8 INJECTION, SOLUTION INTRAVENOUS
Status: COMPLETED | OUTPATIENT
Start: 2024-06-14 | End: 2024-06-14

## 2024-06-14 RX ORDER — ONDANSETRON 4 MG/1
4 TABLET, ORALLY DISINTEGRATING ORAL EVERY 6 HOURS PRN
Qty: 9 TABLET | Refills: 0 | Status: SHIPPED | OUTPATIENT
Start: 2024-06-14

## 2024-06-14 RX ORDER — ONDANSETRON HYDROCHLORIDE 2 MG/ML
4 INJECTION, SOLUTION INTRAVENOUS
Status: COMPLETED | OUTPATIENT
Start: 2024-06-14 | End: 2024-06-14

## 2024-06-14 RX ADMIN — IOHEXOL 100 ML: 350 INJECTION, SOLUTION INTRAVENOUS at 01:06

## 2024-06-14 RX ADMIN — ONDANSETRON 8 MG: 2 INJECTION INTRAMUSCULAR; INTRAVENOUS at 11:06

## 2024-06-14 RX ADMIN — SODIUM CHLORIDE 1000 ML: 9 INJECTION, SOLUTION INTRAVENOUS at 11:06

## 2024-06-14 RX ADMIN — ONDANSETRON 4 MG: 2 INJECTION INTRAMUSCULAR; INTRAVENOUS at 02:06

## 2024-06-14 RX ADMIN — SODIUM CHLORIDE 1000 ML: 9 INJECTION, SOLUTION INTRAVENOUS at 02:06

## 2024-06-14 NOTE — FIRST PROVIDER EVALUATION
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  All efforts will be made to maintain patient's privacy during the evaluation.      This is a teletriage evaluation of a 33 y.o. female presenting to the ED with c/o abdominal pain, nausea, and vomiting for 3 days after taking Ozempic. Limited physical exam via telehealth: The patient is awake, alert, answering questions appropriately and is not in respiratory distress.  As the Teletriage provider, I performed an initial assessment and ordered appropriate labs and imaging studies, if any, to facilitate the patient's care once placed in the ED. Once a room is available, care and a full evaluation will be completed by an alternate ED provider.  Any additional orders and the final disposition will be determined by that provider.  All imaging and labs will not be followed-up by the Teletriage Team, including myself.      ORDERS  Labs Reviewed   CBC W/ AUTO DIFFERENTIAL   COMPREHENSIVE METABOLIC PANEL   LIPASE   URINALYSIS, REFLEX TO URINE CULTURE   POCT GLUCOSE   POCT GLUCOSE MONITORING CONTINUOUS       ED Orders (720h ago, onward)      Start Ordered     Status Ordering Provider    06/14/24 1045 06/14/24 1042  sodium chloride 0.9% bolus 1,000 mL 1,000 mL  Once         Ordered JENNIFER FERREIRA    06/14/24 1045 06/14/24 1042  ondansetron injection 8 mg  ED 1 Time         Ordered JENNIFER FERREIRA    06/14/24 1043 06/14/24  1042  Vital signs  Every 2 hours         Ordered JENNIFER FERREIRA    06/14/24 1042 06/14/24 1042  Diet NPO  Diet effective now         Ordered JENNIFER FERREIRA    06/14/24 1042 06/14/24 1042  Insert peripheral IV  Once         Ordered JENNIFER FERREIRA    06/14/24 1042 06/14/24 1042  CBC W/ AUTO DIFFERENTIAL  STAT         Ordered JENNIFER FERREIRA    06/14/24 1042 06/14/24 1042  Comp. Metabolic Panel  STAT         Ordered JENNIFER FERREIRA    06/14/24 1042 06/14/24 1042  Lipase  STAT         Ordered JENNIFER FERREIRA    06/14/24 1042 06/14/24 1042  Urinalysis, Reflex to Urine Culture Urine, Clean Catch  STAT         Ordered JENNIFER FERREIRA    06/14/24 1029 06/14/24 1029  POCT glucose  Once         Ordered TARA HENRY    06/14/24 1028 06/14/24 1028  POCT glucose  Once         Final result EMERGENCY, DEPT PHYSICIAN              Virtual Visit Note: The provider triage portion of this emergency department evaluation and documentation was performed via [employer], a HIPAA-compliant telemedicine application, in concert with a tele-presenter in the room. A face to face patient evaluation with one of my colleagues will occur once the patient is placed in an emergency department room.      DISCLAIMER: This note was prepared with Monitor voice recognition transcription software. Garbled syntax, mangled pronouns, and other bizarre constructions may be attributed to that software system.

## 2024-06-14 NOTE — DISCHARGE INSTRUCTIONS
Please read and follow discharge instructions and return precautions.  Rest, avoid any strenuous activity, over exertion or overheating.  Keep well hydrated.  Alternate water and Pedialyte for hydration.  Zofran as directed if needed for nausea or vomiting.  Eat small frequent meals as opposed to 3 meals.  Important to see your primary care provider in the next 1-2 days as well as Gastroenterology.  Return immediately if you develop new or worsening symptoms or if you have new problems or concerns.

## 2024-06-14 NOTE — ED PROVIDER NOTES
Encounter Date: 6/14/2024       History     Chief Complaint   Patient presents with    Vomiting     AFTER TAKING STARTING OZEMPIC BEFORE    Nausea    Abdominal Pain     33 y.o. female presenting to the ED with c/o abdominal pain, nausea, and vomiting for 3 days after taking Ozempic.  The patient reports she has had this happened in the past with Ozempic. She denies any fever, abdominal pain, GI bleeding or chest pain.  She has been able to keep down liquids at times.  She has intermittent crampy pain to the upper abdominal area but denies any severe pain at any point.  Denies any other problems or complaints.        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     Past Medical History:   Diagnosis Date    Anxiety     Back pain     Bipolar disorder 2004    bipolar 2    Chronic bronchitis     Depression     Diabetes mellitus     GERD (gastroesophageal reflux disease)     HPV (human papilloma virus) infection     Insomnia     Migraines     Non-proliferative diabetic retinopathy, mild, both eyes 09/25/2023    Obese body habitus     Ovarian cyst     Pneumonia     PONV (postoperative nausea and vomiting)      Past Surgical History:   Procedure Laterality Date    ANTERIOR LUMBAR INTERBODY FUSION (ALIF) Bilateral 4/23/2024    Procedure: FUSION, SPINE, LUMBAR, ALIF;  Surgeon: Gurjit Cassidy MD;  Location: [facility] OR;  Service: Orthopedics;  Laterality: Bilateral;    APPENDECTOMY      ARTHROSCOPY OF ANKLE Right 05/04/2022    Procedure: ARTHROSCOPY, ANKLE;  Surgeon: Bimal Mccormick DPM;  Location: [facility] OR;  Service: Podiatry;  Laterality: Right;  CURT EXTERNAL ANKLE DISTRACTOR    CHOLECYSTECTOMY      DILATION AND CURETTAGE OF UTERUS      HYSTERECTOMY  2017    total, ovarian cysts, torsion, [place]rault; hyst per Dr JESUS Manriquez    LAPAROSCOPIC APPENDECTOMY N/A 08/18/2021    Procedure: APPENDECTOMY, LAPAROSCOPIC;  Surgeon: Osiel Ramirez MD;   Location: [facility];  Service: General;  Laterality: N/A;    LUMBAR LAMINECTOMY WITH FUSION Bilateral 4/23/2024    Procedure: LAMINECTOMY, SPINE, LUMBAR, WITH FUSION;  Surgeon: Gurjit Cassidy MD;  Location: [facility];  Service: Orthopedics;  Laterality: Bilateral;    OOPHORECTOMY      opherectom Left 2015    salpingo-opherectomy    REPAIR OF LIGAMENT OF ANKLE Right 06/23/2021    Procedure: REPAIR OF ANTERIOR TALOFIBULAR LIGAMENT CALCANEOFIBULAR LIGAMENT;  Surgeon: Bimal Mccormick DPM;  Location: [facility] OR;  Service: Podiatry;  Laterality: Right;  WITH ARTHREX INTERNAL BRACE    REPAIR OF LIGAMENT OF ANKLE Right 4/27/2023    Procedure: REPAIR, LIGAMENT, ANKLE;  Surgeon: Aryan Poole DPM;  Location: [facility];  Service: Podiatry;  Laterality: Right;    REPAIR OF TENDON OF LOWER EXTREMITY Right 4/27/2023    Procedure: REPAIR, TENDON, LOWER EXTREMITY;  Surgeon: rAyan Poole DPM;  Location: [facility];  Service: Podiatry;  Laterality: Right;  arthrex    SURGICAL REMOVAL OF BONE SPUR Right 06/23/2021    Procedure: EXCISION OS TRIGONUM;  Surgeon: Bimal Mccormick DPM;  Location: [facility];  Service: Podiatry;  Laterality: Right;    WISDOM TOOTH EXTRACTION       Family History   Adopted: Yes   Problem Relation Name Age of Onset    No Known Problems Sister      No Known Problems Sister      No Known Problems Sister       Social History     Tobacco Use    Smoking status: Every Day     Current packs/day: 1.50     Average packs/day: 1.5 packs/day for 18.0 years (27.0 ttl pk-yrs)     Types: Cigarettes     Passive exposure: Current    Smokeless tobacco: Never    Tobacco comments:     4/25/23--pt instructed no smoking 24hours before sx, pt voiced understanding.   Substance Use Topics    Alcohol use: Not Currently     Comment: rare    Drug use: Not Currently     Comment: twice     Review of Systems   Constitutional: Negative.  Negative for activity change, appetite change, chills, fatigue and fever.   HENT: Negative.  Negative for  congestion, ear pain, rhinorrhea, sore throat and trouble swallowing.    Eyes:  Negative for photophobia, pain, redness and visual disturbance.   Respiratory: Negative.  Negative for cough, chest tightness, shortness of breath and wheezing.    Cardiovascular: Negative.  Negative for chest pain, palpitations and leg swelling.   Gastrointestinal:  Positive for abdominal pain, nausea and vomiting. Negative for abdominal distention, blood in stool and constipation.   Genitourinary: Negative.  Negative for decreased urine volume, difficulty urinating, dysuria, flank pain, frequency, pelvic pain and urgency.   Musculoskeletal: Negative.  Negative for arthralgias, back pain, myalgias, neck pain and neck stiffness.   Skin: Negative.  Negative for rash.   Neurological: Negative.  Negative for dizziness, syncope, facial asymmetry, speech difficulty, weakness, light-headedness, numbness and headaches.   Hematological:  Does not bruise/bleed easily.   Psychiatric/Behavioral: Negative.  Negative for confusion.    All other systems reviewed and are negative.      Physical Exam     Initial Vitals [06/14/24 1024]   BP Pulse Resp Temp SpO2   (!) 143/103 106 18 98.8 °F (37.1 °C) 97 %      MAP       --         Physical Exam    Nursing note and vitals reviewed.  Constitutional: She is not diaphoretic. She is active and cooperative.  Non-toxic appearance. She does not have a sickly appearance. She does not appear ill. No distress.   HENT:   Head: Normocephalic and atraumatic.   Right Ear: Tympanic membrane normal.   Left Ear: Tympanic membrane normal.   Nose: Nose normal.   Mouth/Throat: Uvula is midline, oropharynx is clear and moist and mucous membranes are normal. No oral lesions. No uvula swelling. No oropharyngeal exudate, posterior oropharyngeal edema or posterior oropharyngeal erythema.   Eyes: Conjunctivae, EOM and lids are normal. Pupils are equal, round, and reactive to light. No scleral icterus.   Neck: Trachea normal and  phonation normal. Neck supple. No thyroid mass and no thyromegaly present. No stridor present. No JVD present.   Normal range of motion.   Full passive range of motion without pain.     Cardiovascular:  Normal rate, regular rhythm, normal heart sounds, intact distal pulses and normal pulses.     Exam reveals no gallop, no distant heart sounds, no friction rub and no decreased pulses.       No murmur heard.  Pulmonary/Chest: Effort normal and breath sounds normal. No accessory muscle usage or stridor. No tachypnea. No respiratory distress. She has no wheezes. She has no rhonchi. She has no rales.   Abdominal: Abdomen is soft. Bowel sounds are normal. She exhibits no distension, no pulsatile midline mass and no mass. There is abdominal tenderness in the epigastric area.   No right CVA tenderness.  No left CVA tenderness. There is no rigidity, no rebound and no guarding. negative psoas sign and negative Rovsing's sign  Musculoskeletal:         General: No tenderness or edema. Normal range of motion.      Right hand: Normal. Normal range of motion. Normal strength. Normal sensation. Normal capillary refill. Normal pulse.      Left hand: Normal. Normal range of motion. Normal strength. Normal sensation. Normal capillary refill. Normal pulse.      Cervical back: Normal, full passive range of motion without pain, normal range of motion and neck supple. No edema, erythema, rigidity or bony tenderness. No pain with movement, spinous process tenderness or muscular tenderness. Normal range of motion.      Thoracic back: Normal. No bony tenderness. Normal range of motion.      Lumbar back: Normal. No bony tenderness. Normal range of motion.      Right foot: Normal. Normal range of motion and normal capillary refill. No tenderness or bony tenderness. Normal pulse.      Left foot: Normal. Normal range of motion and normal capillary refill. No tenderness or bony tenderness. Normal pulse.      Comments: Pulses are 2+ throughout,  cap refill is less than 2 sec throughout, extremities are nontender throughout with full range of motion. There is no spinal tenderness to palpation.     Neurological: She is alert and oriented to person, place, and time. She has normal strength. She displays normal reflexes. No cranial nerve deficit or sensory deficit. Gait normal.   No focal deficits.   Skin: Skin is warm, dry and intact. Capillary refill takes less than 2 seconds. No ecchymosis, no petechiae and no rash noted. No erythema. No pallor.   Psychiatric: She has a normal mood and affect. Her speech is normal and behavior is normal. Judgment and thought content normal. Cognition and memory are normal.         ED Course   Procedures  Labs Reviewed   CBC W/ AUTO DIFFERENTIAL - Abnormal; Notable for the following components:       Result Value    WBC 14.38 (*)     RBC 5.45 (*)     MCH 26.4 (*)     MCHC 31.4 (*)     MPV 9.1 (*)     Gran # (ANC) 10.6 (*)     Immature Grans (Abs) 0.05 (*)     Gran % 73.7 (*)     All other components within normal limits   COMPREHENSIVE METABOLIC PANEL - Abnormal; Notable for the following components:    Glucose 117 (*)      (*)     ALT 76 (*)     All other components within normal limits   URINALYSIS, REFLEX TO URINE CULTURE - Abnormal; Notable for the following components:    Appearance, UA Hazy (*)     Specific Gravity, UA >1.030 (*)     Protein, UA 1+ (*)     Glucose, UA Trace (*)     Ketones, UA Trace (*)     Urobilinogen, UA 2.0-3.0 (*)     All other components within normal limits    Narrative:     Specimen Source->Urine   LIPASE   URINALYSIS MICROSCOPIC    Narrative:     Specimen Source->Urine   MAGNESIUM   CK   MAGNESIUM   CK   POCT GLUCOSE          Imaging Results              CT Abdomen Pelvis With IV Contrast NO Oral Contrast (Final result)  Result time 06/14/24 13:25:29      Final result by Cecelia Schwartz MD (06/14/24 13:25:29)                   Impression:      No acute abdominal or pelvic  abnormality.    Fatty infiltration of the liver    Prior appendectomy and hysterectomy and cholecystectomy      Electronically signed by: Cecelia Schwartz  Date:    06/14/2024  Time:    13:25               Narrative:      CMS MANDATED QUALITY DATA - CT RADIATION - 436    All CT scans at this facility utilize dose modulation, iterative reconstruction, and/or weight based dosing when appropriate to reduce radiation dose to as low as reasonably achievable.    CLINICAL HISTORY:  ([id number])34 y/o  (1990) F    Bowel obstruction suspected;Epigastric pain;    TECHNIQUE:  (A#[id number], exam time 6/14/2024 13:19)    CT ABDOMEN PELVIS WITH IV CONTRAST [id number]    Axial CT images of the abdomen and pelvis were obtained from the dome of the diaphragm to the proximal thighs.    100 cc Omnipaque 350 IV contrast    COMPARISON:  08/02/2022    FINDINGS:  Lower Thorax:    The lung bases are clear. The heart is normal in size.    CT Abdomen:    Liver: The liver is hypodense compatible with fatty infiltration.  There is no focal mass or biliary duct dilatation.    Gallbladder: The gallbladder is absent    Spleen: Normal.    Pancreas: The pancreas is normal.    Adrenal Glands: Normal    Kidneys: The kidneys are normal in imaging appearance without hydronephrosis or hydroureter.    Vasculature: Normal.    Lymph nodes: No abdominal lymphadenopathy is seen.    Intraperitoneal structures: There is no ascites.    Bowel: The stomach is normal.  There are no thick-walled or dilated bowel loops.  The appendix is absent.    Abdominal wall: The abdominal wall and musculature are normal.    Musculoskeletal: Prior posterior fusion and decompression from L4-S1 with bilateral transpedicular screws and vertical rods.  There are no acute osseous abnormalities.    CT Pelvis:    Bladder: Normal.    Reproductive Organs: Uterus is absent.    Pelvic Lymph nodes: No pelvic lymphadenopathy or pelvic mass is identified.                                        Medications   sodium chloride 0.9% bolus 1,000 mL 1,000 mL (0 mLs Intravenous Stopped 6/14/24 1150)   ondansetron injection 8 mg (8 mg Intravenous Given 6/14/24 1100)   iohexoL (OMNIPAQUE 350) injection 100 mL (100 mLs Intravenous Given 6/14/24 1318)   sodium chloride 0.9% bolus 1,000 mL 1,000 mL (0 mLs Intravenous Stopped 6/14/24 1532)   ondansetron injection 4 mg (4 mg Intravenous Given 6/14/24 1451)     Medical Decision Making  The patient feels much better.  All diagnostic imaging has been reviewed.  CT scan was performed secondary to elevated white blood cell count and epigastric tenderness.  No evidence of acute intra-abdominal abnormalities.  Any and all incidental findings have been discussed with need for follow-up regarding these findings discussed.  The patient is tolerating oral fluids, she feels much better and feels ready to go home.  Symptomatic supportive care discussed, return precautions discussed in detail and importance of close outpatient evaluation reiterated.    Amount and/or Complexity of Data Reviewed  Radiology: ordered.    Risk  Prescription drug management.                                      Clinical Impression:  Final diagnoses:  [R11.2] Nausea and vomiting, unspecified vomiting type (Primary)  [T50.905A] Medication side effect, initial encounter          ED Disposition Condition    Discharge Stable          ED Prescriptions       Medication Sig Dispense Start Date End Date Auth. Provider    ondansetron (ZOFRAN-ODT) 4 MG TbDL  (Status: Discontinued) Take 1 tablet (4 mg total) by mouth every 6 (six) hours as needed (nausea or vomiting). 9 tablet 6/14/2024 6/14/2024 Jennifer Coles MD    ondansetron (ZOFRAN-ODT) 4 MG TbDL Take 1 tablet (4 mg total) by mouth every 6 (six) hours as needed (nausea or vomiting). 9 tablet 6/14/2024 -- Jennifer Coles MD          Follow-up Information       Follow up With Specialties Details Why Contact Info    Magalie Euceda MD Family Medicine  Schedule an appointment as soon as possible for a visit in 3 days  [address] [facility]  [address]  [facility] [address]  [phone]      BURTON Thacker MD Gastroenterology In 4 days Or a gastroenterologist of your choice or according to your insurance plan. [address]   [address]  [facility] [address]  [phone]               Jennifer Coles MD  06/20/24 7052

## 2024-07-06 ENCOUNTER — HOSPITAL ENCOUNTER (EMERGENCY)
Facility: HOSPITAL | Age: 34
Discharge: LEFT BEFORE DISCHARGE | End: 2024-07-06
Attending: STUDENT IN AN ORGANIZED HEALTH CARE EDUCATION/TRAINING PROGRAM
Payer: MEDICAID

## 2024-07-06 VITALS
TEMPERATURE: 99 F | RESPIRATION RATE: 16 BRPM | BODY MASS INDEX: 45.99 KG/M2 | HEIGHT: 67 IN | HEART RATE: 77 BPM | OXYGEN SATURATION: 97 % | SYSTOLIC BLOOD PRESSURE: 125 MMHG | WEIGHT: 293 LBS | DIASTOLIC BLOOD PRESSURE: 87 MMHG

## 2024-07-06 DIAGNOSIS — R00.2 PALPITATIONS: ICD-10-CM

## 2024-07-06 LAB
ALBUMIN SERPL BCP-MCNC: 3.6 G/DL (ref 3.5–5.2)
ALP SERPL-CCNC: 107 U/L (ref 55–135)
ALT SERPL W/O P-5'-P-CCNC: 50 U/L (ref 10–44)
ANION GAP SERPL CALC-SCNC: 9 MMOL/L (ref 8–16)
AST SERPL-CCNC: 51 U/L (ref 10–40)
BASOPHILS # BLD AUTO: 0.06 K/UL (ref 0–0.2)
BASOPHILS NFR BLD: 0.5 % (ref 0–1.9)
BILIRUB SERPL-MCNC: 0.4 MG/DL (ref 0.1–1)
BUN SERPL-MCNC: 7 MG/DL (ref 6–20)
CALCIUM SERPL-MCNC: 8.3 MG/DL (ref 8.7–10.5)
CHLORIDE SERPL-SCNC: 100 MMOL/L (ref 95–110)
CO2 SERPL-SCNC: 25 MMOL/L (ref 23–29)
CREAT SERPL-MCNC: 0.6 MG/DL (ref 0.5–1.4)
DIFFERENTIAL METHOD BLD: ABNORMAL
EOSINOPHIL # BLD AUTO: 0.1 K/UL (ref 0–0.5)
EOSINOPHIL NFR BLD: 0.7 % (ref 0–8)
ERYTHROCYTE [DISTWIDTH] IN BLOOD BY AUTOMATED COUNT: 14.1 % (ref 11.5–14.5)
EST. GFR  (NO RACE VARIABLE): >60 ML/MIN/1.73 M^2
GLUCOSE SERPL-MCNC: 105 MG/DL (ref 70–110)
GLUCOSE SERPL-MCNC: 89 MG/DL (ref 70–110)
HCT VFR BLD AUTO: 41.2 % (ref 37–48.5)
HGB BLD-MCNC: 12.9 G/DL (ref 12–16)
IMM GRANULOCYTES # BLD AUTO: 0.04 K/UL (ref 0–0.04)
IMM GRANULOCYTES NFR BLD AUTO: 0.3 % (ref 0–0.5)
LYMPHOCYTES # BLD AUTO: 3.7 K/UL (ref 1–4.8)
LYMPHOCYTES NFR BLD: 28.9 % (ref 18–48)
MCH RBC QN AUTO: 25.8 PG (ref 27–31)
MCHC RBC AUTO-ENTMCNC: 31.3 G/DL (ref 32–36)
MCV RBC AUTO: 82 FL (ref 82–98)
MONOCYTES # BLD AUTO: 0.6 K/UL (ref 0.3–1)
MONOCYTES NFR BLD: 5 % (ref 4–15)
NEUTROPHILS # BLD AUTO: 8.2 K/UL (ref 1.8–7.7)
NEUTROPHILS NFR BLD: 64.6 % (ref 38–73)
NRBC BLD-RTO: 0 /100 WBC
PLATELET # BLD AUTO: 312 K/UL (ref 150–450)
PMV BLD AUTO: 9 FL (ref 9.2–12.9)
POTASSIUM SERPL-SCNC: 4 MMOL/L (ref 3.5–5.1)
PROT SERPL-MCNC: 6.9 G/DL (ref 6–8.4)
RBC # BLD AUTO: 5 M/UL (ref 4–5.4)
SODIUM SERPL-SCNC: 134 MMOL/L (ref 136–145)
TSH SERPL DL<=0.005 MIU/L-ACNC: 1.25 UIU/ML (ref 0.34–5.6)
WBC # BLD AUTO: 12.68 K/UL (ref 3.9–12.7)

## 2024-07-06 PROCEDURE — 99284 EMERGENCY DEPT VISIT MOD MDM: CPT | Mod: 25

## 2024-07-06 PROCEDURE — 85025 COMPLETE CBC W/AUTO DIFF WBC: CPT

## 2024-07-06 PROCEDURE — 93010 ELECTROCARDIOGRAM REPORT: CPT | Mod: ,,, | Performed by: GENERAL PRACTICE

## 2024-07-06 PROCEDURE — 80053 COMPREHEN METABOLIC PANEL: CPT

## 2024-07-06 PROCEDURE — 84443 ASSAY THYROID STIM HORMONE: CPT

## 2024-07-06 PROCEDURE — 82962 GLUCOSE BLOOD TEST: CPT

## 2024-07-06 PROCEDURE — 25000003 PHARM REV CODE 250

## 2024-07-06 PROCEDURE — 93005 ELECTROCARDIOGRAM TRACING: CPT | Performed by: GENERAL PRACTICE

## 2024-07-06 RX ORDER — HYDROXYZINE PAMOATE 25 MG/1
25 CAPSULE ORAL
Status: COMPLETED | OUTPATIENT
Start: 2024-07-06 | End: 2024-07-06

## 2024-07-06 RX ADMIN — HYDROXYZINE PAMOATE 25 MG: 25 CAPSULE ORAL at 08:07

## 2024-07-07 LAB
OHS QRS DURATION: 90 MS
OHS QTC CALCULATION: 460 MS

## 2024-07-07 NOTE — ED PROVIDER NOTES
Encounter Date: 7/6/2024       History     Chief Complaint   Patient presents with    Panic Attack     Pt states she has hx of panic and anxiety attacks and has been having a lot going on and pretty sure she is having a panic attack. Pt has shaking and states this is normal with her attacks.      HPI    34-year-old female with past medical history of hypertension, diabetes, bipolar, depression, anxiety, presenting to emergency department today with chief complaint of anxiety.  She reports that she received a phone call from her mother she was stressed out, and anxious about family affairs which is what started her symptoms.  She describes feeling shaky, and has some chest pressure.  She has had similar panic attacks multiple times per year over the last several years.  Her symptoms have been starting to improve with time, she reports compliance with her home medications including Risperdal, Zoloft.  She denies any preceding symptoms, has not otherwise been feeling ill.    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     Past Medical History:   Diagnosis Date    Anxiety     Back pain     Bipolar disorder 2004    bipolar 2    Chronic bronchitis     Depression     Diabetes mellitus     GERD (gastroesophageal reflux disease)     HPV (human papilloma virus) infection     Insomnia     Migraines     Non-proliferative diabetic retinopathy, mild, both eyes 09/25/2023    Obese body habitus     Ovarian cyst     Pneumonia     PONV (postoperative nausea and vomiting)      Past Surgical History:   Procedure Laterality Date    ANTERIOR LUMBAR INTERBODY FUSION (ALIF) Bilateral 4/23/2024    Procedure: FUSION, SPINE, LUMBAR, ALIF;  Surgeon: Gurjit Cassidy MD;  Location: [facility];  Service: Orthopedics;  Laterality: Bilateral;    APPENDECTOMY      ARTHROSCOPY OF ANKLE Right 05/04/2022    Procedure: ARTHROSCOPY, ANKLE;  Surgeon: Bimal Mccormick,  RUSTY;  Location: [facility];  Service: Podiatry;  Laterality: Right;  CURT EXTERNAL ANKLE DISTRACTOR    CHOLECYSTECTOMY      DILATION AND CURETTAGE OF UTERUS      HYSTERECTOMY  2017    total, ovarian cysts, torsion, [place]; hyst per Dr JESUS Manriquez    LAPAROSCOPIC APPENDECTOMY N/A 08/18/2021    Procedure: APPENDECTOMY, LAPAROSCOPIC;  Surgeon: Osiel Ramirez MD;  Location: [facility];  Service: General;  Laterality: N/A;    LUMBAR LAMINECTOMY WITH FUSION Bilateral 4/23/2024    Procedure: LAMINECTOMY, SPINE, LUMBAR, WITH FUSION;  Surgeon: Gurjit Cassidy MD;  Location: [facility];  Service: Orthopedics;  Laterality: Bilateral;    OOPHORECTOMY      opherectom Left 2015    salpingo-opherectomy    REPAIR OF LIGAMENT OF ANKLE Right 06/23/2021    Procedure: REPAIR OF ANTERIOR TALOFIBULAR LIGAMENT CALCANEOFIBULAR LIGAMENT;  Surgeon: Bimal Mccormick DPM;  Location: [facility];  Service: Podiatry;  Laterality: Right;  WITH ARTHREX INTERNAL BRACE    REPAIR OF LIGAMENT OF ANKLE Right 4/27/2023    Procedure: REPAIR, LIGAMENT, ANKLE;  Surgeon: Aryan Poloe DPM;  Location: [facility];  Service: Podiatry;  Laterality: Right;    REPAIR OF TENDON OF LOWER EXTREMITY Right 4/27/2023    Procedure: REPAIR, TENDON, LOWER EXTREMITY;  Surgeon: Aryan Poole DPM;  Location: [facility];  Service: Podiatry;  Laterality: Right;  arthrex    SURGICAL REMOVAL OF BONE SPUR Right 06/23/2021    Procedure: EXCISION OS TRIGONUM;  Surgeon: Bimal Mccormick DPM;  Location: [facility];  Service: Podiatry;  Laterality: Right;    WISDOM TOOTH EXTRACTION       Family History   Adopted: Yes   Problem Relation Name Age of Onset    No Known Problems Sister      No Known Problems Sister      No Known Problems Sister       Social History     Tobacco Use    Smoking status: Every Day     Current packs/day: 1.50     Average packs/day: 1.5 packs/day for 18.0 years (27.0 ttl pk-yrs)     Types: Cigarettes     Passive exposure: Current    Smokeless tobacco: Never    Tobacco  comments:     4/25/23--pt instructed no smoking 24hours before sx, pt voiced understanding.   Substance Use Topics    Alcohol use: Not Currently     Comment: rare    Drug use: Not Currently     Comment: twice     Review of Systems  See HPI  Physical Exam     Initial Vitals [07/06/24 1823]   BP Pulse Resp Temp SpO2   (!) 159/97 101 20 98.5 °F (36.9 °C) 97 %      MAP       --         Physical Exam    Nursing note and vitals reviewed.  Constitutional: She appears well-developed and well-nourished. She is not diaphoretic. No distress.   HENT:   Head: Normocephalic and atraumatic.   Mouth/Throat: Oropharynx is clear and moist.   Eyes: Conjunctivae and EOM are normal. Pupils are equal, round, and reactive to light.   Neck: Neck supple.   Normal range of motion.  Cardiovascular:  Normal rate, regular rhythm, normal heart sounds and intact distal pulses.           Pulmonary/Chest: Breath sounds normal. No respiratory distress. She has no wheezes. She has no rhonchi. She has no rales.   Abdominal: Abdomen is soft. Bowel sounds are normal.   Musculoskeletal:         General: Normal range of motion.      Cervical back: Normal range of motion and neck supple.     Neurological: She is alert and oriented to person, place, and time. She has normal strength. No cranial nerve deficit.   No significant tremors noted, no tongue fasciculations noted   Skin: Skin is warm and dry.   Psychiatric:   Appears slightly anxious, however thought content is normal.          ED Course   Procedures  Labs Reviewed   CBC W/ AUTO DIFFERENTIAL - Abnormal; Notable for the following components:       Result Value    MCH 25.8 (*)     MCHC 31.3 (*)     MPV 9.0 (*)     Gran # (ANC) 8.2 (*)     All other components within normal limits   COMPREHENSIVE METABOLIC PANEL - Abnormal; Notable for the following components:    Sodium 134 (*)     Calcium 8.3 (*)     AST 51 (*)     ALT 50 (*)     All other components within normal limits   TSH   POCT GLUCOSE           Imaging Results    None          Medications   hydrOXYzine pamoate capsule 25 mg (25 mg Oral Given 7/6/24 2050)     Medical Decision Making  34-year-old female with past medical history of hypertension, diabetes, bipolar, depression, anxiety, presenting to emergency department today with chief complaint of anxiety.  She reports that she received a phone call from her mother she was stressed out, and anxious about family affairs which is what started her symptoms.  She describes feeling shaky, and has some chest pressure.  She has had similar panic attacks multiple times per year over the last several years.  Her symptoms have been starting to improve with time, she reports compliance with her home medications including Risperdal, Zoloft.  She denies any preceding symptoms, has not otherwise been feeling ill.    Vital signs are stable, physical exam as detailed, no focal neurological deficits noted.  No respiratory distress.    Differential includes:  Anxiety, panic attack, acute psychosis anemia, thyroid abnormality, electrolyte abnormality, arrhythmia, hypoglycemia, DKA, HHS.     We will obtain pregnancy test, basic labs including a TSH, primary care glucose, EKG.  We will give hydroxyzine.  We will reassess.     9:50pm - notified by nursing that patient had left the emergency department.  Her labs of heartburn unremarkable, no significant leukocytosis or anemia, no GERALDINE or significant electrolyte abnormality.  TSH was pending.     Seen with Dr. Court Edgar MD  [employer] EM PGY3     Amount and/or Complexity of Data Reviewed  Labs: ordered. Decision-making details documented in ED Course.    Risk  Prescription drug management.               ED Course as of 07/06/24 2151   Sat Jul 06, 2024 2102 CBC auto differential(!)  No significant anemia or leukocytosis present []   2102 POC Glucose: 105  Within normal limits []      ED Course User Index  [AH] Abdi Edgar MD                            Clinical Impression:  Final diagnoses:  [R00.2] Palpitations          ED Disposition Condition    Noamid                 Abdi Edgar MD  Resident  07/06/24 5413

## 2024-07-11 ENCOUNTER — DOCUMENTATION ONLY (OUTPATIENT)
Dept: ORTHOPEDICS | Facility: CLINIC | Age: 34
End: 2024-07-11
Payer: MEDICAID

## 2024-07-11 ENCOUNTER — HOSPITAL ENCOUNTER (INPATIENT)
Facility: HOSPITAL | Age: 34
LOS: 1 days | Discharge: HOME | DRG: 920 | End: 2024-07-12
Attending: EMERGENCY MEDICINE | Admitting: INTERNAL MEDICINE
Payer: MEDICAID

## 2024-07-11 DIAGNOSIS — L02.212 ABSCESS OF LOWER BACK: ICD-10-CM

## 2024-07-11 DIAGNOSIS — Z98.1 S/P LUMBAR SPINAL FUSION: ICD-10-CM

## 2024-07-11 DIAGNOSIS — R07.9 CHEST PAIN: ICD-10-CM

## 2024-07-11 DIAGNOSIS — M54.42 ACUTE BILATERAL LOW BACK PAIN WITH LEFT-SIDED SCIATICA: Primary | ICD-10-CM

## 2024-07-11 DIAGNOSIS — R20.2 NUMBNESS AND TINGLING OF LEFT LEG: ICD-10-CM

## 2024-07-11 DIAGNOSIS — R20.0 NUMBNESS AND TINGLING OF LEFT LEG: ICD-10-CM

## 2024-07-11 LAB
ALBUMIN SERPL BCP-MCNC: 4 G/DL (ref 3.5–5.2)
ALP SERPL-CCNC: 108 U/L (ref 55–135)
ALT SERPL W/O P-5'-P-CCNC: 53 U/L (ref 10–44)
ANION GAP SERPL CALC-SCNC: 9 MMOL/L (ref 8–16)
AST SERPL-CCNC: 48 U/L (ref 10–40)
BASOPHILS # BLD AUTO: 0.05 K/UL (ref 0–0.2)
BASOPHILS NFR BLD: 0.4 % (ref 0–1.9)
BILIRUB SERPL-MCNC: 0.4 MG/DL (ref 0.1–1)
BILIRUB UR QL STRIP: NEGATIVE
BUN SERPL-MCNC: 8 MG/DL (ref 6–20)
CALCIUM SERPL-MCNC: 9 MG/DL (ref 8.7–10.5)
CHLORIDE SERPL-SCNC: 102 MMOL/L (ref 95–110)
CLARITY UR: ABNORMAL
CO2 SERPL-SCNC: 26 MMOL/L (ref 23–29)
COLOR UR: YELLOW
CREAT SERPL-MCNC: 0.6 MG/DL (ref 0.5–1.4)
CREAT SERPL-MCNC: 0.7 MG/DL (ref 0.5–1.4)
CRP SERPL-MCNC: 1.6 MG/DL
DIFFERENTIAL METHOD BLD: ABNORMAL
EOSINOPHIL # BLD AUTO: 0.1 K/UL (ref 0–0.5)
EOSINOPHIL NFR BLD: 0.8 % (ref 0–8)
ERYTHROCYTE [DISTWIDTH] IN BLOOD BY AUTOMATED COUNT: 14.5 % (ref 11.5–14.5)
EST. GFR  (NO RACE VARIABLE): >60 ML/MIN/1.73 M^2
GLUCOSE SERPL-MCNC: 106 MG/DL (ref 70–110)
GLUCOSE UR QL STRIP: NEGATIVE
HCT VFR BLD AUTO: 44.5 % (ref 37–48.5)
HGB BLD-MCNC: 13.9 G/DL (ref 12–16)
HGB UR QL STRIP: NEGATIVE
IMM GRANULOCYTES # BLD AUTO: 0.04 K/UL (ref 0–0.04)
IMM GRANULOCYTES NFR BLD AUTO: 0.3 % (ref 0–0.5)
INR PPP: 1 (ref 0.8–1.2)
KETONES UR QL STRIP: NEGATIVE
LEUKOCYTE ESTERASE UR QL STRIP: NEGATIVE
LYMPHOCYTES # BLD AUTO: 3.8 K/UL (ref 1–4.8)
LYMPHOCYTES NFR BLD: 29.6 % (ref 18–48)
MCH RBC QN AUTO: 26.2 PG (ref 27–31)
MCHC RBC AUTO-ENTMCNC: 31.2 G/DL (ref 32–36)
MCV RBC AUTO: 84 FL (ref 82–98)
MONOCYTES # BLD AUTO: 0.7 K/UL (ref 0.3–1)
MONOCYTES NFR BLD: 5.7 % (ref 4–15)
NEUTROPHILS # BLD AUTO: 8.2 K/UL (ref 1.8–7.7)
NEUTROPHILS NFR BLD: 63.2 % (ref 38–73)
NITRITE UR QL STRIP: NEGATIVE
NRBC BLD-RTO: 0 /100 WBC
PH UR STRIP: 6 [PH] (ref 5–8)
PLATELET # BLD AUTO: 365 K/UL (ref 150–450)
PMV BLD AUTO: 8.8 FL (ref 9.2–12.9)
POTASSIUM SERPL-SCNC: 4.1 MMOL/L (ref 3.5–5.1)
PROT SERPL-MCNC: 8 G/DL (ref 6–8.4)
PROT UR QL STRIP: ABNORMAL
PROTHROMBIN TIME: 10.8 SEC (ref 9–12.5)
RBC # BLD AUTO: 5.31 M/UL (ref 4–5.4)
SAMPLE: NORMAL
SODIUM SERPL-SCNC: 137 MMOL/L (ref 136–145)
SP GR UR STRIP: 1.03 (ref 1–1.03)
URN SPEC COLLECT METH UR: ABNORMAL
UROBILINOGEN UR STRIP-ACNC: ABNORMAL EU/DL
WBC # BLD AUTO: 12.94 K/UL (ref 3.9–12.7)

## 2024-07-11 PROCEDURE — A9585 GADOBUTROL INJECTION: HCPCS | Performed by: EMERGENCY MEDICINE

## 2024-07-11 PROCEDURE — 85025 COMPLETE CBC W/AUTO DIFF WBC: CPT | Performed by: EMERGENCY MEDICINE

## 2024-07-11 PROCEDURE — 85610 PROTHROMBIN TIME: CPT | Performed by: EMERGENCY MEDICINE

## 2024-07-11 PROCEDURE — 63600175 PHARM REV CODE 636 W HCPCS: Performed by: EMERGENCY MEDICINE

## 2024-07-11 PROCEDURE — 12000002 HC ACUTE/MED SURGE SEMI-PRIVATE ROOM

## 2024-07-11 PROCEDURE — 82565 ASSAY OF CREATININE: CPT

## 2024-07-11 PROCEDURE — 96374 THER/PROPH/DIAG INJ IV PUSH: CPT

## 2024-07-11 PROCEDURE — 96376 TX/PRO/DX INJ SAME DRUG ADON: CPT

## 2024-07-11 PROCEDURE — 25500020 PHARM REV CODE 255: Performed by: EMERGENCY MEDICINE

## 2024-07-11 PROCEDURE — 80053 COMPREHEN METABOLIC PANEL: CPT | Performed by: EMERGENCY MEDICINE

## 2024-07-11 PROCEDURE — 86140 C-REACTIVE PROTEIN: CPT | Performed by: EMERGENCY MEDICINE

## 2024-07-11 PROCEDURE — 81003 URINALYSIS AUTO W/O SCOPE: CPT | Performed by: NURSE PRACTITIONER

## 2024-07-11 PROCEDURE — 99285 EMERGENCY DEPT VISIT HI MDM: CPT | Mod: 25

## 2024-07-11 RX ORDER — HYDROMORPHONE HYDROCHLORIDE 1 MG/ML
0.5 INJECTION, SOLUTION INTRAMUSCULAR; INTRAVENOUS; SUBCUTANEOUS
Status: COMPLETED | OUTPATIENT
Start: 2024-07-11 | End: 2024-07-11

## 2024-07-11 RX ORDER — HYDROMORPHONE HYDROCHLORIDE 1 MG/ML
1 INJECTION, SOLUTION INTRAMUSCULAR; INTRAVENOUS; SUBCUTANEOUS
Status: COMPLETED | OUTPATIENT
Start: 2024-07-11 | End: 2024-07-11

## 2024-07-11 RX ORDER — GADOBUTROL 604.72 MG/ML
13 INJECTION INTRAVENOUS
Status: COMPLETED | OUTPATIENT
Start: 2024-07-11 | End: 2024-07-11

## 2024-07-11 RX ADMIN — GADOBUTROL 13 ML: 604.72 INJECTION INTRAVENOUS at 10:07

## 2024-07-11 RX ADMIN — HYDROMORPHONE HYDROCHLORIDE 0.5 MG: 0.5 INJECTION, SOLUTION INTRAMUSCULAR; INTRAVENOUS; SUBCUTANEOUS at 10:07

## 2024-07-11 RX ADMIN — HYDROMORPHONE HYDROCHLORIDE 1 MG: 1 INJECTION, SOLUTION INTRAMUSCULAR; INTRAVENOUS; SUBCUTANEOUS at 09:07

## 2024-07-11 NOTE — FIRST PROVIDER EVALUATION
Emergency Department TeleTriage Encounter Note      CHIEF COMPLAINT    Chief Complaint   Patient presents with    Back Pain     Left sided back pain radiating to left hip and down left leg. Recent back surgery.       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  All efforts will be made to maintain patient's privacy during the evaluation.      This is a teletriage evaluation of a 34 y.o. female presenting to the ED with c/o sudden onset of lower back pain this AM; had back surgery 3 months ago. MD was out of the office; instructed to come to the ED.  Limited physical exam via telehealth: The patient is awake, alert, answering questions appropriately and is not in respiratory distress.  As the Teletriage provider, I performed an initial assessment and ordered appropriate labs and imaging studies, if any, to facilitate the patient's care once placed in the ED. Once a room is available, care and a full evaluation will be completed by an alternate ED provider.  Any additional orders and the final disposition will be determined by that provider.  All imaging and labs will not be followed-up by the Teletriage Team, including myself.          ORDERS  Labs Reviewed - No data to display    ED Orders (720h ago, onward)      Start Ordered     Status Ordering Provider    07/11/24 1715 07/11/24 1715  X-Ray Lumbar Spine Ap And Lateral  1 time imaging         Ordered JENNIFER FERREIRA    07/11/24 1715 07/11/24 1715  Urinalysis, Reflex to Urine Culture Urine, Clean Catch  STAT         Ordered JENNIFER FERREIRA              Virtual Visit Note: The provider triage portion of  this emergency department evaluation and documentation was performed via [email or web address], a HIPAA-compliant telemedicine application, in concert with a tele-presenter in the room. A face to face patient evaluation with one of my colleagues will occur once the patient is placed in an emergency department room.      DISCLAIMER: This note was prepared with [employer] voice recognition transcription software. Garbled syntax, mangled pronouns, and other bizarre constructions may be attributed to that software system.

## 2024-07-11 NOTE — LETTER
July 12, 2024         [address]  [facility] [address]  Phone: [phone]  Fax: [phone]       Patient: Loretta Lea   YOB: 1990  Date of Visit: 07/12/2024    To Whom It May Concern:    Kirti Lea  was at [facility] on 07/12/2024. The patient may not  return to work/school until cleared by Dr. Cassidy. If you have any questions or concerns, or if I can be of further assistance, please do not hesitate to contact me.    Sincerely,        Sofie Bradley, NP

## 2024-07-11 NOTE — PROGRESS NOTES
Patient is PO Lumbar fusion 04/23/24. Patient called at 16:20 to advise that she had fallen and was in significant pain. After speaking to , patient was advised to go to ED for evaluation. Patient verbalized understanding

## 2024-07-12 VITALS
BODY MASS INDEX: 45.99 KG/M2 | SYSTOLIC BLOOD PRESSURE: 115 MMHG | HEART RATE: 81 BPM | WEIGHT: 293 LBS | OXYGEN SATURATION: 95 % | HEIGHT: 67 IN | TEMPERATURE: 98 F | RESPIRATION RATE: 18 BRPM | DIASTOLIC BLOOD PRESSURE: 76 MMHG

## 2024-07-12 DIAGNOSIS — M54.16 LUMBAR RADICULOPATHY, ACUTE: Primary | ICD-10-CM

## 2024-07-12 PROBLEM — M46.20 SPINAL ABSCESS: Status: ACTIVE | Noted: 2024-07-12

## 2024-07-12 PROBLEM — F17.200 TOBACCO DEPENDENCY: Status: ACTIVE | Noted: 2024-07-12

## 2024-07-12 LAB
ALBUMIN SERPL BCP-MCNC: 3.7 G/DL (ref 3.5–5.2)
ALP SERPL-CCNC: 99 U/L (ref 55–135)
ALT SERPL W/O P-5'-P-CCNC: 51 U/L (ref 10–44)
ANION GAP SERPL CALC-SCNC: 8 MMOL/L (ref 8–16)
APTT PPP: 27.8 SEC (ref 21–32)
AST SERPL-CCNC: 50 U/L (ref 10–40)
BASOPHILS # BLD AUTO: 0.07 K/UL (ref 0–0.2)
BASOPHILS NFR BLD: 0.6 % (ref 0–1.9)
BILIRUB SERPL-MCNC: 0.4 MG/DL (ref 0.1–1)
BUN SERPL-MCNC: 9 MG/DL (ref 6–20)
CALCIUM SERPL-MCNC: 8.3 MG/DL (ref 8.7–10.5)
CHLORIDE SERPL-SCNC: 103 MMOL/L (ref 95–110)
CO2 SERPL-SCNC: 27 MMOL/L (ref 23–29)
CREAT SERPL-MCNC: 0.6 MG/DL (ref 0.5–1.4)
D DIMER PPP IA.FEU-MCNC: 0.28 MG/L FEU (ref 0–0.49)
DIFFERENTIAL METHOD BLD: ABNORMAL
EOSINOPHIL # BLD AUTO: 0.1 K/UL (ref 0–0.5)
EOSINOPHIL NFR BLD: 1 % (ref 0–8)
ERYTHROCYTE [DISTWIDTH] IN BLOOD BY AUTOMATED COUNT: 14.6 % (ref 11.5–14.5)
EST. GFR  (NO RACE VARIABLE): >60 ML/MIN/1.73 M^2
FIBRINOGEN PPP-MCNC: 415 MG/DL (ref 182–400)
GLUCOSE SERPL-MCNC: 103 MG/DL (ref 70–110)
GLUCOSE SERPL-MCNC: 94 MG/DL (ref 70–110)
HCT VFR BLD AUTO: 42.7 % (ref 37–48.5)
HGB BLD-MCNC: 13 G/DL (ref 12–16)
IMM GRANULOCYTES # BLD AUTO: 0.05 K/UL (ref 0–0.04)
IMM GRANULOCYTES NFR BLD AUTO: 0.4 % (ref 0–0.5)
LACTATE SERPL-SCNC: 1.3 MMOL/L (ref 0.5–1.9)
LACTATE SERPL-SCNC: 1.6 MMOL/L (ref 0.5–1.9)
LYMPHOCYTES # BLD AUTO: 4 K/UL (ref 1–4.8)
LYMPHOCYTES NFR BLD: 33.4 % (ref 18–48)
MAGNESIUM SERPL-MCNC: 2.1 MG/DL (ref 1.6–2.6)
MCH RBC QN AUTO: 25.6 PG (ref 27–31)
MCHC RBC AUTO-ENTMCNC: 30.4 G/DL (ref 32–36)
MCV RBC AUTO: 84 FL (ref 82–98)
MONOCYTES # BLD AUTO: 0.7 K/UL (ref 0.3–1)
MONOCYTES NFR BLD: 5.7 % (ref 4–15)
NEUTROPHILS # BLD AUTO: 7 K/UL (ref 1.8–7.7)
NEUTROPHILS NFR BLD: 58.9 % (ref 38–73)
NRBC BLD-RTO: 0 /100 WBC
PLATELET # BLD AUTO: 341 K/UL (ref 150–450)
PMV BLD AUTO: 8.7 FL (ref 9.2–12.9)
POTASSIUM SERPL-SCNC: 3.9 MMOL/L (ref 3.5–5.1)
PROT SERPL-MCNC: 7.4 G/DL (ref 6–8.4)
RBC # BLD AUTO: 5.07 M/UL (ref 4–5.4)
SODIUM SERPL-SCNC: 138 MMOL/L (ref 136–145)
WBC # BLD AUTO: 11.84 K/UL (ref 3.9–12.7)

## 2024-07-12 PROCEDURE — 87040 BLOOD CULTURE FOR BACTERIA: CPT | Mod: 59 | Performed by: EMERGENCY MEDICINE

## 2024-07-12 PROCEDURE — 85379 FIBRIN DEGRADATION QUANT: CPT | Performed by: INTERNAL MEDICINE

## 2024-07-12 PROCEDURE — 85384 FIBRINOGEN ACTIVITY: CPT | Performed by: INTERNAL MEDICINE

## 2024-07-12 PROCEDURE — 83605 ASSAY OF LACTIC ACID: CPT | Mod: 91 | Performed by: EMERGENCY MEDICINE

## 2024-07-12 PROCEDURE — 25000003 PHARM REV CODE 250: Performed by: PHYSICIAN ASSISTANT

## 2024-07-12 PROCEDURE — 63600175 PHARM REV CODE 636 W HCPCS: Performed by: EMERGENCY MEDICINE

## 2024-07-12 PROCEDURE — 83735 ASSAY OF MAGNESIUM: CPT | Performed by: INTERNAL MEDICINE

## 2024-07-12 PROCEDURE — 63600175 PHARM REV CODE 636 W HCPCS: Performed by: INTERNAL MEDICINE

## 2024-07-12 PROCEDURE — 85730 THROMBOPLASTIN TIME PARTIAL: CPT | Performed by: INTERNAL MEDICINE

## 2024-07-12 PROCEDURE — 96376 TX/PRO/DX INJ SAME DRUG ADON: CPT

## 2024-07-12 PROCEDURE — 63600175 PHARM REV CODE 636 W HCPCS: Performed by: NURSE PRACTITIONER

## 2024-07-12 PROCEDURE — 80053 COMPREHEN METABOLIC PANEL: CPT | Performed by: INTERNAL MEDICINE

## 2024-07-12 PROCEDURE — 25000003 PHARM REV CODE 250: Performed by: INTERNAL MEDICINE

## 2024-07-12 PROCEDURE — 36415 COLL VENOUS BLD VENIPUNCTURE: CPT | Performed by: INTERNAL MEDICINE

## 2024-07-12 PROCEDURE — 96375 TX/PRO/DX INJ NEW DRUG ADDON: CPT

## 2024-07-12 PROCEDURE — S4991 NICOTINE PATCH NONLEGEND: HCPCS | Performed by: INTERNAL MEDICINE

## 2024-07-12 PROCEDURE — 36415 COLL VENOUS BLD VENIPUNCTURE: CPT | Performed by: EMERGENCY MEDICINE

## 2024-07-12 PROCEDURE — 12000002 HC ACUTE/MED SURGE SEMI-PRIVATE ROOM

## 2024-07-12 PROCEDURE — 85025 COMPLETE CBC W/AUTO DIFF WBC: CPT | Performed by: INTERNAL MEDICINE

## 2024-07-12 PROCEDURE — 25000003 PHARM REV CODE 250: Performed by: EMERGENCY MEDICINE

## 2024-07-12 RX ORDER — DIPHENHYDRAMINE HYDROCHLORIDE 50 MG/ML
25 INJECTION INTRAMUSCULAR; INTRAVENOUS EVERY 6 HOURS PRN
Status: DISCONTINUED | OUTPATIENT
Start: 2024-07-12 | End: 2024-07-12 | Stop reason: HOSPADM

## 2024-07-12 RX ORDER — HYDROMORPHONE HYDROCHLORIDE 1 MG/ML
0.5 INJECTION, SOLUTION INTRAMUSCULAR; INTRAVENOUS; SUBCUTANEOUS
Status: COMPLETED | OUTPATIENT
Start: 2024-07-12 | End: 2024-07-12

## 2024-07-12 RX ORDER — GLUCAGON 1 MG
1 KIT INJECTION
Status: DISCONTINUED | OUTPATIENT
Start: 2024-07-12 | End: 2024-07-12 | Stop reason: HOSPADM

## 2024-07-12 RX ORDER — METHYLPREDNISOLONE 4 MG/1
TABLET ORAL
Qty: 21 EACH | Refills: 0 | Status: SHIPPED | OUTPATIENT
Start: 2024-07-12 | End: 2024-08-02

## 2024-07-12 RX ORDER — ONDANSETRON HYDROCHLORIDE 2 MG/ML
4 INJECTION, SOLUTION INTRAVENOUS
Status: COMPLETED | OUTPATIENT
Start: 2024-07-12 | End: 2024-07-12

## 2024-07-12 RX ORDER — LANOLIN ALCOHOL/MO/W.PET/CERES
800 CREAM (GRAM) TOPICAL
Status: DISCONTINUED | OUTPATIENT
Start: 2024-07-12 | End: 2024-07-12 | Stop reason: HOSPADM

## 2024-07-12 RX ORDER — GABAPENTIN 300 MG/1
300 CAPSULE ORAL 3 TIMES DAILY
Status: DISCONTINUED | OUTPATIENT
Start: 2024-07-12 | End: 2024-07-12 | Stop reason: HOSPADM

## 2024-07-12 RX ORDER — ACETAMINOPHEN 325 MG/1
650 TABLET ORAL EVERY 4 HOURS PRN
Status: DISCONTINUED | OUTPATIENT
Start: 2024-07-12 | End: 2024-07-12 | Stop reason: HOSPADM

## 2024-07-12 RX ORDER — HYDROCODONE BITARTRATE AND ACETAMINOPHEN 7.5; 325 MG/1; MG/1
1 TABLET ORAL EVERY 8 HOURS PRN
Qty: 21 TABLET | Refills: 0 | Status: SHIPPED | OUTPATIENT
Start: 2024-07-12 | End: 2024-07-17 | Stop reason: ALTCHOICE

## 2024-07-12 RX ORDER — DIPHENHYDRAMINE HYDROCHLORIDE 50 MG/ML
12.5 INJECTION INTRAMUSCULAR; INTRAVENOUS
Status: COMPLETED | OUTPATIENT
Start: 2024-07-12 | End: 2024-07-12

## 2024-07-12 RX ORDER — KETOROLAC TROMETHAMINE 30 MG/ML
15 INJECTION, SOLUTION INTRAMUSCULAR; INTRAVENOUS ONCE
Status: COMPLETED | OUTPATIENT
Start: 2024-07-12 | End: 2024-07-12

## 2024-07-12 RX ORDER — SODIUM,POTASSIUM PHOSPHATES 280-250MG
2 POWDER IN PACKET (EA) ORAL
Status: DISCONTINUED | OUTPATIENT
Start: 2024-07-12 | End: 2024-07-12 | Stop reason: HOSPADM

## 2024-07-12 RX ORDER — IBUPROFEN 200 MG
24 TABLET ORAL
Status: DISCONTINUED | OUTPATIENT
Start: 2024-07-12 | End: 2024-07-12 | Stop reason: HOSPADM

## 2024-07-12 RX ORDER — HYDROCODONE BITARTRATE AND ACETAMINOPHEN 5; 325 MG/1; MG/1
1 TABLET ORAL EVERY 6 HOURS PRN
Status: DISCONTINUED | OUTPATIENT
Start: 2024-07-12 | End: 2024-07-12 | Stop reason: HOSPADM

## 2024-07-12 RX ORDER — HYDROMORPHONE HYDROCHLORIDE 1 MG/ML
1 INJECTION, SOLUTION INTRAMUSCULAR; INTRAVENOUS; SUBCUTANEOUS EVERY 6 HOURS PRN
Status: DISCONTINUED | OUTPATIENT
Start: 2024-07-12 | End: 2024-07-12 | Stop reason: HOSPADM

## 2024-07-12 RX ORDER — HYDROMORPHONE HYDROCHLORIDE 1 MG/ML
1 INJECTION, SOLUTION INTRAMUSCULAR; INTRAVENOUS; SUBCUTANEOUS
Status: DISCONTINUED | OUTPATIENT
Start: 2024-07-12 | End: 2024-07-12

## 2024-07-12 RX ORDER — ACETAMINOPHEN 325 MG/1
650 TABLET ORAL EVERY 8 HOURS PRN
Status: DISCONTINUED | OUTPATIENT
Start: 2024-07-12 | End: 2024-07-12 | Stop reason: HOSPADM

## 2024-07-12 RX ORDER — GABAPENTIN 300 MG/1
300 CAPSULE ORAL 3 TIMES DAILY
Qty: 90 CAPSULE | Refills: 0 | Status: SHIPPED | OUTPATIENT
Start: 2024-07-12 | End: 2024-08-11

## 2024-07-12 RX ORDER — AMOXICILLIN 250 MG
1 CAPSULE ORAL DAILY
Status: DISCONTINUED | OUTPATIENT
Start: 2024-07-12 | End: 2024-07-12 | Stop reason: HOSPADM

## 2024-07-12 RX ORDER — METRONIDAZOLE 500 MG/100ML
500 INJECTION, SOLUTION INTRAVENOUS
Status: DISCONTINUED | OUTPATIENT
Start: 2024-07-12 | End: 2024-07-12

## 2024-07-12 RX ORDER — ONDANSETRON HYDROCHLORIDE 2 MG/ML
4 INJECTION, SOLUTION INTRAVENOUS EVERY 6 HOURS PRN
Status: DISCONTINUED | OUTPATIENT
Start: 2024-07-12 | End: 2024-07-12 | Stop reason: HOSPADM

## 2024-07-12 RX ORDER — ALUMINUM HYDROXIDE, MAGNESIUM HYDROXIDE, AND SIMETHICONE 1200; 120; 1200 MG/30ML; MG/30ML; MG/30ML
30 SUSPENSION ORAL 4 TIMES DAILY PRN
Status: DISCONTINUED | OUTPATIENT
Start: 2024-07-12 | End: 2024-07-12 | Stop reason: HOSPADM

## 2024-07-12 RX ORDER — NALOXONE HCL 0.4 MG/ML
0.02 VIAL (ML) INJECTION
Status: DISCONTINUED | OUTPATIENT
Start: 2024-07-12 | End: 2024-07-12 | Stop reason: HOSPADM

## 2024-07-12 RX ORDER — IBUPROFEN 200 MG
1 TABLET ORAL DAILY
Qty: 28 PATCH | Refills: 0 | Status: SHIPPED | OUTPATIENT
Start: 2024-07-12 | End: 2024-08-09

## 2024-07-12 RX ORDER — SODIUM CHLORIDE 0.9 % (FLUSH) 0.9 %
2 SYRINGE (ML) INJECTION EVERY 12 HOURS PRN
Status: DISCONTINUED | OUTPATIENT
Start: 2024-07-12 | End: 2024-07-12 | Stop reason: HOSPADM

## 2024-07-12 RX ORDER — IBUPROFEN 200 MG
16 TABLET ORAL
Status: DISCONTINUED | OUTPATIENT
Start: 2024-07-12 | End: 2024-07-12 | Stop reason: HOSPADM

## 2024-07-12 RX ORDER — TALC
6 POWDER (GRAM) TOPICAL NIGHTLY PRN
Status: DISCONTINUED | OUTPATIENT
Start: 2024-07-12 | End: 2024-07-12 | Stop reason: HOSPADM

## 2024-07-12 RX ORDER — DOXYCYCLINE 100 MG/1
100 CAPSULE ORAL 2 TIMES DAILY
Qty: 14 CAPSULE | Refills: 0 | Status: SHIPPED | OUTPATIENT
Start: 2024-07-12 | End: 2024-07-19

## 2024-07-12 RX ORDER — DICLOFENAC SODIUM 50 MG/1
50 TABLET, DELAYED RELEASE ORAL 2 TIMES DAILY
Qty: 14 TABLET | Refills: 0 | Status: SHIPPED | OUTPATIENT
Start: 2024-07-12 | End: 2024-07-19

## 2024-07-12 RX ORDER — INSULIN ASPART 100 [IU]/ML
0-10 INJECTION, SOLUTION INTRAVENOUS; SUBCUTANEOUS
Status: DISCONTINUED | OUTPATIENT
Start: 2024-07-12 | End: 2024-07-12 | Stop reason: HOSPADM

## 2024-07-12 RX ORDER — IBUPROFEN 200 MG
1 TABLET ORAL DAILY
Status: DISCONTINUED | OUTPATIENT
Start: 2024-07-12 | End: 2024-07-12 | Stop reason: HOSPADM

## 2024-07-12 RX ORDER — HEPARIN SODIUM 5000 [USP'U]/ML
5000 INJECTION, SOLUTION INTRAVENOUS; SUBCUTANEOUS EVERY 8 HOURS
Status: DISCONTINUED | OUTPATIENT
Start: 2024-07-12 | End: 2024-07-12 | Stop reason: HOSPADM

## 2024-07-12 RX ADMIN — SENNOSIDES AND DOCUSATE SODIUM 1 TABLET: 50; 8.6 TABLET ORAL at 08:07

## 2024-07-12 RX ADMIN — GABAPENTIN 300 MG: 300 CAPSULE ORAL at 09:07

## 2024-07-12 RX ADMIN — HYDROMORPHONE HYDROCHLORIDE 0.5 MG: 0.5 INJECTION, SOLUTION INTRAMUSCULAR; INTRAVENOUS; SUBCUTANEOUS at 01:07

## 2024-07-12 RX ADMIN — ONDANSETRON 4 MG: 2 INJECTION INTRAMUSCULAR; INTRAVENOUS at 12:07

## 2024-07-12 RX ADMIN — NICOTINE 1 PATCH: 14 PATCH, EXTENDED RELEASE TRANSDERMAL at 05:07

## 2024-07-12 RX ADMIN — DIPHENHYDRAMINE HYDROCHLORIDE 12.5 MG: 50 INJECTION INTRAMUSCULAR; INTRAVENOUS at 12:07

## 2024-07-12 RX ADMIN — KETOROLAC TROMETHAMINE 15 MG: 30 INJECTION, SOLUTION INTRAMUSCULAR; INTRAVENOUS at 09:07

## 2024-07-12 RX ADMIN — HYDROCODONE BITARTRATE AND ACETAMINOPHEN 1 TABLET: 5; 325 TABLET ORAL at 08:07

## 2024-07-12 RX ADMIN — DIPHENHYDRAMINE HYDROCHLORIDE 25 MG: 50 INJECTION INTRAMUSCULAR; INTRAVENOUS at 05:07

## 2024-07-12 RX ADMIN — ONDANSETRON 4 MG: 2 INJECTION INTRAMUSCULAR; INTRAVENOUS at 04:07

## 2024-07-12 RX ADMIN — DAPTOMYCIN 835 MG: 500 INJECTION, POWDER, LYOPHILIZED, FOR SOLUTION INTRAVENOUS at 04:07

## 2024-07-12 RX ADMIN — HYDROMORPHONE HYDROCHLORIDE 1 MG: 1 INJECTION, SOLUTION INTRAMUSCULAR; INTRAVENOUS; SUBCUTANEOUS at 04:07

## 2024-07-12 RX ADMIN — HEPARIN SODIUM 5000 UNITS: 5000 INJECTION, SOLUTION INTRAVENOUS; SUBCUTANEOUS at 05:07

## 2024-07-12 RX ADMIN — HYDROMORPHONE HYDROCHLORIDE 1 MG: 1 INJECTION, SOLUTION INTRAMUSCULAR; INTRAVENOUS; SUBCUTANEOUS at 02:07

## 2024-07-12 NOTE — ED PROVIDER NOTES
Encounter Date: 7/11/2024       History     Chief Complaint   Patient presents with    Back Pain     Left sided back pain radiating to left hip and down left leg. Recent back surgery.     Emergent evaluation of a 34-year-old female with history of morbid obesity, diabetes, migraines, GERD, ovarian cyst, anxiety depression bipolar disorder, and chronic low back pain who had anterior lumbar interbody fusion bilaterally with lumbar laminectomy and fusion on April 23, 2024 with Dr. Gurjit Cassidy who presents to the ER for acute midline low back pain radiating to the left hip and down the left leg.  She reports a new numbness of the left foot and lateral leg today.  Symptoms occurred while she was driving to work.  And she had inability to stand up in order to get out of her vehicle requiring assistance walking.  She reports no bowel or bladder incontinence or retention.  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     Past Medical History:   Diagnosis Date    Anxiety     Back pain     Bipolar disorder 2004    bipolar 2    Chronic bronchitis     Depression     Diabetes mellitus     GERD (gastroesophageal reflux disease)     HPV (human papilloma virus) infection     Insomnia     Migraines     Non-proliferative diabetic retinopathy, mild, both eyes 09/25/2023    Obese body habitus     Ovarian cyst     Pneumonia     PONV (postoperative nausea and vomiting)      Past Surgical History:   Procedure Laterality Date    ANTERIOR LUMBAR INTERBODY FUSION (ALIF) Bilateral 4/23/2024    Procedure: FUSION, SPINE, LUMBAR, ALIF;  Surgeon: Gurjit Cassidy MD;  Location: [facility];  Service: Orthopedics;  Laterality: Bilateral;    APPENDECTOMY      ARTHROSCOPY OF ANKLE Right 05/04/2022    Procedure: ARTHROSCOPY, ANKLE;  Surgeon: Bimal Mccormick,  RUSTY;  Location: [facility];  Service: Podiatry;  Laterality: Right;  CURT EXTERNAL ANKLE DISTRACTOR    CHOLECYSTECTOMY      DILATION AND CURETTAGE OF UTERUS      HYSTERECTOMY  2017    total, ovarian cysts, torsion, [place]; hyst per Dr JESUS Manriquez    LAPAROSCOPIC APPENDECTOMY N/A 08/18/2021    Procedure: APPENDECTOMY, LAPAROSCOPIC;  Surgeon: Osiel Ramirez MD;  Location: [facility];  Service: General;  Laterality: N/A;    LUMBAR LAMINECTOMY WITH FUSION Bilateral 4/23/2024    Procedure: LAMINECTOMY, SPINE, LUMBAR, WITH FUSION;  Surgeon: Gurjit Cassidy MD;  Location: [facility];  Service: Orthopedics;  Laterality: Bilateral;    OOPHORECTOMY      opherectom Left 2015    salpingo-opherectomy    REPAIR OF LIGAMENT OF ANKLE Right 06/23/2021    Procedure: REPAIR OF ANTERIOR TALOFIBULAR LIGAMENT CALCANEOFIBULAR LIGAMENT;  Surgeon: Bimal Mccormick DPM;  Location: [facility];  Service: Podiatry;  Laterality: Right;  WITH ARTHREX INTERNAL BRACE    REPAIR OF LIGAMENT OF ANKLE Right 4/27/2023    Procedure: REPAIR, LIGAMENT, ANKLE;  Surgeon: Aryan Poole DPM;  Location: [facility];  Service: Podiatry;  Laterality: Right;    REPAIR OF TENDON OF LOWER EXTREMITY Right 4/27/2023    Procedure: REPAIR, TENDON, LOWER EXTREMITY;  Surgeon: Aryan Poole DPM;  Location: [facility];  Service: Podiatry;  Laterality: Right;  arthrex    SURGICAL REMOVAL OF BONE SPUR Right 06/23/2021    Procedure: EXCISION OS TRIGONUM;  Surgeon: Bimal Mccormick DPM;  Location: [facility];  Service: Podiatry;  Laterality: Right;    WISDOM TOOTH EXTRACTION       Family History   Adopted: Yes   Problem Relation Name Age of Onset    No Known Problems Sister      No Known Problems Sister      No Known Problems Sister       Social History     Tobacco Use    Smoking status: Every Day     Current packs/day: 1.50     Average packs/day: 1.5 packs/day for 18.0 years (27.0 ttl pk-yrs)     Types: Cigarettes     Passive exposure: Current    Smokeless tobacco: Never    Tobacco  comments:     4/25/23--pt instructed no smoking 24hours before sx, pt voiced understanding.   Substance Use Topics    Alcohol use: Not Currently     Comment: rare    Drug use: Not Currently     Comment: twice     Review of Systems   Constitutional:  Negative for activity change, appetite change, chills, diaphoresis, fatigue and fever.   Respiratory:  Negative for cough, chest tightness, shortness of breath, wheezing and stridor.    Cardiovascular:  Negative for chest pain.   Gastrointestinal:  Negative for abdominal distention, abdominal pain, constipation, diarrhea, nausea and vomiting.   Genitourinary:  Negative for difficulty urinating, dysuria, flank pain, frequency, hematuria and urgency.   Musculoskeletal:  Positive for back pain and myalgias. Negative for arthralgias, neck pain and neck stiffness.   Skin:  Negative for rash.   Neurological:  Positive for numbness. Negative for dizziness, syncope, light-headedness and headaches.   Hematological:  Does not bruise/bleed easily.   Psychiatric/Behavioral:  Negative for confusion. The patient is not nervous/anxious.    All other systems reviewed and are negative.      Physical Exam     Initial Vitals [07/11/24 1648]   BP Pulse Resp Temp SpO2   (!) 142/83 98 20 98.4 °F (36.9 °C) 97 %      MAP       --         Physical Exam    Nursing note and vitals reviewed.  Constitutional: She appears well-developed and well-nourished. She is not diaphoretic. No distress.   HENT:   Head: Normocephalic and atraumatic.   Right Ear: External ear normal.   Left Ear: External ear normal.   Nose: Nose normal.   Mouth/Throat: Oropharynx is clear and moist.   Eyes: Conjunctivae and EOM are normal. Pupils are equal, round, and reactive to light.   Neck: Neck supple. No tracheal deviation present.   Normal range of motion.  Cardiovascular:  Normal rate, regular rhythm, normal heart sounds and intact distal pulses.     Exam reveals no gallop and no friction rub.       No murmur  heard.  Pulmonary/Chest: Breath sounds normal. No stridor. No respiratory distress. She has no wheezes. She has no rhonchi. She has no rales. She exhibits no tenderness.   Abdominal: Abdomen is soft. Bowel sounds are normal. She exhibits no distension and no mass. There is no abdominal tenderness. There is no rebound and no guarding.   Genitourinary:    Genitourinary Comments: Normal rectal tone  No perianal numbness     Musculoskeletal:         General: No edema. Normal range of motion.      Cervical back: Normal range of motion and neck supple.     Neurological: She is alert and oriented to person, place, and time. She has normal strength. A sensory deficit is present. No cranial nerve deficit.   Skin: Skin is warm and dry. No rash noted. No erythema. No pallor.   Psychiatric: She has a normal mood and affect. Her behavior is normal. Judgment and thought content normal.         ED Course   Procedures  Labs Reviewed   URINALYSIS, REFLEX TO URINE CULTURE - Abnormal; Notable for the following components:       Result Value    Appearance, UA Hazy (*)     Protein, UA Trace (*)     Urobilinogen, UA 2.0-3.0 (*)     All other components within normal limits    Narrative:     Specimen Source->Urine   CBC W/ AUTO DIFFERENTIAL - Abnormal; Notable for the following components:    WBC 12.94 (*)     MCH 26.2 (*)     MCHC 31.2 (*)     MPV 8.8 (*)     Gran # (ANC) 8.2 (*)     All other components within normal limits   COMPREHENSIVE METABOLIC PANEL - Abnormal; Notable for the following components:    AST 48 (*)     ALT 53 (*)     All other components within normal limits   C-REACTIVE PROTEIN - Abnormal; Notable for the following components:    CRP 1.60 (*)     All other components within normal limits   FIBRINOGEN - Abnormal; Notable for the following components:    Fibrinogen 415 (*)     All other components within normal limits   PROTIME-INR   LACTIC ACID, PLASMA   FIBRINOGEN   APTT   D DIMER, QUANTITATIVE   APTT   D DIMER,  QUANTITATIVE   ISTAT CREATININE          Imaging Results              MRI Lumbar Spine W WO Cont (Final result)  Result time 07/12/24 00:03:38      Final result by Heraclio Mariano MD (07/12/24 00:03:38)                   Impression:      Postoperative change of the lumbar spine as detailed above.  No suspicious disc space edema/enhancement identified to suggest discitis or evidence of epidural fluid collection.    Well encapsulated fluid collection within the midline superficial subcutaneous lumbar soft tissues with dimensions as above.  This may reflect a postoperative seroma or hematoma with abscess not entirely excluded.    Degenerative changes of the lumbar spine as detailed above.      Electronically signed by: Heraclio Mariano MD  Date:    07/12/2024  Time:    00:03               Narrative:    EXAMINATION:  MRI LUMBAR SPINE W WO CONTRAST    CLINICAL HISTORY:  Low back pain, cauda equina syndrome suspected;    TECHNIQUE:  Multiplanar, multisequence imaging of the lumbar spine was performed before and after the administration of 13 cc gadolinium based IV contrast.    COMPARISON:  Lumbar spine radiograph series 07/11/2024, MRI lumbar spine 03/07/2024    FINDINGS:  There is postoperative change of prior posterior decompression and posterior instrumented fusion at L4 through S1.  Please note previously questioned lucency about the L5 pedicle screws is better evaluated on prior radiograph examination.  There is an interbody spacer device at L4-L5.  There is soft tissue enhancement within the postoperative bed suggesting evolving granulation tissue.  There is a well encapsulated fluid collection within the midline superficial subcutaneous soft tissues measuring 1.6 x 1.6 x 10.8 cm (for example series 10, image 29 and series 3, image 12).    There is mild grade 1 anterolisthesis of L4 on L5. Lumbar vertebral body heights appear adequately maintained.  There is no evidence of diffuse bone marrow replacement process.   No new suspicious disc space edema or enhancement identified.  The conus terminates at the T12-L1 level.  Tiny focal central T2 hyperintensity noted within the conus likely reflecting a terminal ventricle, similar to prior MRI examination.  No abnormal enhancement of the cauda equina nerve roots appreciated.    L1-L2: Mild circumferential disc bulge.  No significant spinal canal or neural foraminal narrowing.    L2-L3: Mild circumferential disc bulge and bilateral facet arthropathy.  No significant spinal canal or neural foraminal narrowing.    L3-L4: Circumferential disc bulge and bilateral facet arthropathy.  Mild tapering the posterior thecal sac.  No significant neural foraminal narrowing appreciated.    L4-L5: Postoperative change as above.  No significant spinal canal stenosis.  Mild bilateral neural foraminal narrowing, left greater than right.    L5-S1: Broad-based disc bulge with superimposed small central disc protrusion.  No significant spinal canal stenosis.  Mild bilateral neural foraminal narrowing.    No psoas fluid collections identified.  Limited views of the posterior abdomen demonstrate no acute abnormalities.                                       X-Ray Lumbar Spine Ap And Lateral (Final result)  Result time 07/11/24 18:45:35      Final result by Alise Ramirez MD (07/11/24 18:45:35)                   Impression:      Status post posterior decompression at L4 and posterior instrumented fusion at L4-S1 with suggestion of abnormal lucency surrounding the patient's L5 pedicle screws.  Loosening and infection could produce this appearance and are not excluded.  No acute lumbar compression fracture.      Electronically signed by: Laci Ramirez MD  Date:    07/11/2024  Time:    18:45               Narrative:    EXAMINATION:  XR LUMBAR SPINE AP AND LATERAL    CLINICAL HISTORY:  back pain;    TECHNIQUE:  AP and lateral radiographs of the lumbar spine and a coned-down view of the lumbosacral  junction were acquired.    COMPARISON:  Lumbar spine radiographs-06/10/2024    FINDINGS:  There are postoperative changes of posterior instrumented fusion at L4-S1 utilizing bilateral pedicle screws and vertical stabilization rods.  There is a suggestion lucency surrounding the patient's L5 pedicle screws.  Please correlate clinically as loosening and infection cannot be excluded.  There is a mild residual grade I anterolisthesis of L4 on L5.  There is an interbody spacer present at L4-L5.  There are postprocedural changes of posterior decompression at L4.                                       Medications   HYDROmorphone injection 1 mg (1 mg Intravenous Given 7/11/24 2132)   HYDROmorphone injection 0.5 mg (0.5 mg Intravenous Given 7/11/24 2237)   gadobutroL (GADAVIST) injection 13 mL (13 mLs Intravenous Given 7/11/24 2253)   ondansetron injection 4 mg (4 mg Intravenous Given 7/12/24 0005)   diphenhydrAMINE injection 12.5 mg (12.5 mg Intravenous Given 7/12/24 0005)   HYDROmorphone injection 0.5 mg (0.5 mg Intravenous Given 7/12/24 0106)   DAPTOmycin (CUBICIN) 835 mg in 0.9% NaCl SolP 50 mL IVPB (0 mg Intravenous Stopped 7/12/24 0529)   ketorolac injection 15 mg (15 mg Intravenous Given 7/12/24 0954)     Medical Decision Making  Emergent evaluation of a 34-year-old female with history of morbid obesity, diabetes, migraines, GERD, ovarian cyst, anxiety depression bipolar disorder, and chronic low back pain who had anterior lumbar interbody fusion bilaterally with lumbar laminectomy and fusion on April 23, 2024 with Dr. Gurjit Cassidy who presents to the ER for acute midline low back pain radiating to the left hip and down the left leg.  She reports a new numbness of the left foot and lateral leg today.  She reports no bowel or bladder incontinence or retention.  No herve anal numbness no fever chills sweats no recent injections or new instrumentation of the back    MDM    Patient presents for emergent evaluation of acute  midline low back pain radiating to left low back and down the left leg with numbness of the left foot and left lateral leg today.  Patient was had surgery end of April with lumbar fusion.  No bowel or bladder incontinence no urinary or stool retention.  No perianal numbness.  No fever chills sweats no instrumentation of the back.  that poses a possible threat to life and/or bodily function.    Differential diagnosis includes but is not limited to cauda equina spinal stenosis hardware failure seroma hematoma abscess diskitis osteomyelitis sciatica pyelo UTI kidney stones muscular layer pain muscle spasm.  In the ED patient found to have acute sciatica of left lower extremity, seroma versus abscess and soft tissue of low back  I ordered labs and personally reviewed them.  Labs significant for see below    I ordered X-rays and personally reviewed them and reviewed the radiologist interpretation.  Xray significant for lumbar spine -Status post posterior decompression at L4 and posterior instrumented fusion at L4-S1 with suggestion of abnormal lucency surrounding the patient's L5 pedicle screws.  Loosening and infection could produce this appearance and are not excluded.  No acute lumbar compression fracture.   I ordered MRI WITH AND WITHUOT CONTRAST  scan and personally reviewed it and reviewed the radiologist interpretation.  MRI  significant for FINDINGS:  There is postoperative change of prior posterior decompression and posterior instrumented fusion at L4 through S1.  Please note previously questioned lucency about the L5 pedicle screws is better evaluated on prior radiograph examination.  There is an interbody spacer device at L4-L5.  There is soft tissue enhancement within the postoperative bed suggesting evolving granulation tissue.  There is a well encapsulated fluid collection within the midline superficial subcutaneous soft tissues measuring 1.6 x 1.6 x 10.8 cm (for example series 10, image 29 and series 3,  image 12).     There is mild grade 1 anterolisthesis of L4 on L5. Lumbar vertebral body heights appear adequately maintained.  There is no evidence of diffuse bone marrow replacement process.  No new suspicious disc space edema or enhancement identified.  The conus terminates at the T12-L1 level.  Tiny focal central T2 hyperintensity noted within the conus likely reflecting a terminal ventricle, similar to prior MRI examination.  No abnormal enhancement of the cauda equina nerve roots appreciated.     L1-L2: Mild circumferential disc bulge.  No significant spinal canal or neural foraminal narrowing.     L2-L3: Mild circumferential disc bulge and bilateral facet arthropathy.  No significant spinal canal or neural foraminal narrowing.     L3-L4: Circumferential disc bulge and bilateral facet arthropathy.  Mild tapering the posterior thecal sac.  No significant neural foraminal narrowing appreciated.     L4-L5: Postoperative change as above.  No significant spinal canal stenosis.  Mild bilateral neural foraminal narrowing, left greater than right.     L5-S1: Broad-based disc bulge with superimposed small central disc protrusion.  No significant spinal canal stenosis.  Mild bilateral neural foraminal narrowing.     No psoas fluid collections identified.  Limited views of the posterior abdomen demonstrate no acute abnormalities.     Impression:     Postoperative change of the lumbar spine as detailed above.  No suspicious disc space edema/enhancement identified to suggest discitis or evidence of epidural fluid collection.     Well encapsulated fluid collection within the midline superficial subcutaneous lumbar soft tissues with dimensions as above.  This may reflect a postoperative seroma or hematoma with abscess not entirely excluded.     Degenerative changes of the lumbar spine as detailed above.        Electronically signed by:Heraclio Mariano MD  Date:                                            07/12/2024  Time:                                            00:03       Admission MDM  I discussed the patient presentation labs, ekg, X-rays, CT findings with the Hospitalist and consultant(s) Dr Gurjit Cassidy's NP- agreed with the patient being admitted IV antibiotics he and Dr. Cassidy will review the images and see the patient today  Patient was managed in the ED with IV Dilaudid 1 mg iv, NPO   The response to treatment was good   Patient required emergent consultation to hospitalist for admission.  Kacie Denson M.D.         Amount and/or Complexity of Data Reviewed  Labs: ordered.     Details: AST 48 ALT 53  Mag 2.1  White count 12.94 ANC 8.2    PTT 28.8 normal PT and INR  CRP 1.6  Blood cultures x2  Lactate 1.6  Point of care creatinine 0.7  Urine was hazy trace protein 2-3 urobilinogen  Blood cultures pending  Radiology: ordered and independent interpretation performed.    Risk  Prescription drug management.  Decision regarding hospitalization.    Acute bilateral low back pain with left-sided sciatica, numbness and tingling leg abscess of low back           ED Course as of 07/17/24 0337   Thu Jul 11, 2024   2241 White count 12.94 ANC 8.2 normal H&H normal platelets  CRP 1.6  AST 48 ALT 53  Normal coags  Urine with hazy appearance trace protein [RM]      ED Course User Index  [RM] Kacie Denson MD                           Clinical Impression:  Final diagnoses:  [M54.42] Acute bilateral low back pain with left-sided sciatica (Primary)  [R20.0, R20.2] Numbness and tingling of left leg  [L02.212] Abscess of lower back          ED Disposition Condition    Admit Stable                Kacie Denson MD  07/12/24 0806       Kacie Denson MD  07/17/24 0337

## 2024-07-12 NOTE — HPI
Patient is approximately 3 months status post anterior lumbar interbody fusion at L4-5, L5-S1.  Has been doing very well postoperatively.  About 2 days ago started to have some intermittent low back pain.  Yesterday began to experience left lower extremity numbness/paresthesia extend the entirety of the leg to the foot.  This is similar to where she was preoperatively.  She has concerns that she may have over did it returning to work.  Denies any new injury or trauma.  Denies any bowel or bladder symptoms.

## 2024-07-12 NOTE — ASSESSMENT & PLAN NOTE
1. History of lumbar spinal fusion at L4-5, L5-S1.    Reviewed all lab results and imaging studies.  Discussed all of this with Dr. Cassidy as well he was able to review all of these results.  Very low clinical suspicion of abscess.  Patient normally ranges in the high-normal for white blood cell count.  She is afebrile.  There is no warmth, erythema, or fluctuance at her operative site.  She really has minimal back pain.  Her primary complaint his left lower extremity radicular symptoms.  These are similar to her preoperative symptoms.  Believe this may likely he just be secondary to irritation of the nerve roots at her previous surgery level we would likely secondary to scar irritation or tearing that caused some bleeding around the nerve roots causing some irritation.  I will increase her gabapentin from 300 once daily to 300 mg 3 times daily.  We will also start her on an oral anti-inflammatory.  She has tried Naprosyn and ibuprofen in the past without much relief.  I will try diclofenac as it does offer an analgesic effect as well.  No surgical intervention is planned at this time from an orthopedic perspective.  We would recommend out of bed and work with therapy to reduce pneumonia DVT risks and maintain mobility.  She is more than welcome to follow up with us in clinic if discharged earlier this week on Monday or Wednesday

## 2024-07-12 NOTE — ASSESSMENT & PLAN NOTE
Patient with Acute on chronic debility due to other reduced mobility. Latest AMPAC and GEMS scores have been reviewed. Evaluation for etiology is underway. Plan includes progressive mobility protocol initated, PT/OT consulted, and fall precautions in place.

## 2024-07-12 NOTE — HPI
Loretta Robison  is a 34 y.o. female with BMI 47.58, PMH significant for Diabetes recent back surgery 3 months ago who is being evaluated by the Hospital Medicine service  after developing sudden left sided back pain shooting down her left leg. Patient was driving on her way to work when she suddenly began to experience this sharp pain and she drove immediately to the [facility] ED. At the ED, MRI of her lumbar spine noted a collection suspicious for either a seroma or an abscess. Patient denies any other symptoms. She will be admitted to hospital Medicine for further mgt. Patient denies any headache or visual changes.  Denies any focal weakness, numbness tingling or paresthesias.  Denies any abdominal pain.  Denies melena hematochezia or any GI bleeding.  Denies diarrhea or constipation.  Denies any fever, cough or cold symptoms.  Denies dysuria, frequency or urgency.  Denies any other problems or complaints.

## 2024-07-12 NOTE — HOSPITAL COURSE
"Ms. Lea was monitored closely during her hospitalization. She was admitted on 7/12/24 for concerns of a post op infection s/p ALIF 4/23/24 with Dr. Cassidy. She presented with back pain and had an MRI that revealed "Well encapsulated fluid collection within the midline superficial subcutaneous lumbar soft tissues", but there were no signs of epidural abscess or discitis. Orthopedics was consulted and felt this most likely represents a seroma. They recommended adjusting pain medications and close oupt f/u, but cleared patient for discharge. She remained afebrile during her hospitalization. WBC normalized.  CRP mildly elevated with unknown significance. Prelim blood cultures are NGTD. She will be sent home on a 7 day supply of doxycycline out of an abundance of caution, with follow up on Monday or wed with Dr. Cassidy. She was seen and examined on the date of discharge. Strict return prxn provided.  "

## 2024-07-12 NOTE — ASSESSMENT & PLAN NOTE
"Patient's FSGs are uncontrolled due to hyperglycemia on current medication regimen.  Last A1c reviewed-   Lab Results   Component Value Date    HGBA1C 9.7 (H) 02/08/2023     Most recent fingerstick glucose reviewed- No results for input(s): "POCTGLUCOSE" in the last 24 hours.  Current correctional scale  High  Maintain anti-hyperglycemic dose as follows-   Antihyperglycemics (From admission, onward)      Start     Stop Route Frequency Ordered    07/12/24 0328  insulin aspart U-100 pen 0-10 Units         -- SubQ Before meals & nightly PRN 07/12/24 0229          Hold Oral hypoglycemics while patient is in the hospital.  "

## 2024-07-12 NOTE — PLAN OF CARE
Discharge orders and chart reviewed. No other discharge needs noted at this time. Pt is clear for discharge from case management. Pt is discharging to home.    Patient to follow up with PCP as previously scheduled. Per ortho clinic, patient to call Dr. Cassidy's clinic on Monday to schedule follow up appointment    Family to transport home     07/12/24 1044   Final Note   Assessment Type Final Discharge Note   Anticipated Discharge Disposition Home   What phone number can be called within the next 1-3 days to see how you are doing after discharge? [phone]   Hospital Resources/Appts/Education Provided Appointments scheduled and added to AVS   Post-Acute Status   Discharge Delays (!) Personal Transportation

## 2024-07-12 NOTE — DISCHARGE SUMMARY
"[facility] Medicine  Discharge Summary      Patient Name: Loretta Lea  MRN: [id number]  NAVEEN: [id number]  Patient Class: IP- Inpatient  Admission Date: 7/11/2024  Hospital Length of Stay: 1 days  Discharge Date and Time: 7/12/2024 11:10 AM  Attending Physician: No att. providers found   Discharging Provider: Sofie Bradley NP  Primary Care Provider: Magalie Euceda MD (Inactive)    Primary Care Team: Networked reference to record PCT     HPI:   Loretta Robison  is a 34 y.o. female with BMI 47.58, PMH significant for Diabetes recent back surgery 3 months ago who is being evaluated by the Hospital Medicine service  after developing sudden left sided back pain shooting down her left leg. Patient was driving on her way to work when she suddenly began to experience this sharp pain and she drove immediately to the [facility] ED. At the ED, MRI of her lumbar spine noted a collection suspicious for either a seroma or an abscess. Patient denies any other symptoms. She will be admitted to hospital Medicine for further mgt. Patient denies any headache or visual changes.  Denies any focal weakness, numbness tingling or paresthesias.  Denies any abdominal pain.  Denies melena hematochezia or any GI bleeding.  Denies diarrhea or constipation.  Denies any fever, cough or cold symptoms.  Denies dysuria, frequency or urgency.  Denies any other problems or complaints.      * No surgery found *      Hospital Course:   Ms. Lea was monitored closely during her hospitalization. She was admitted on 7/12/24 for concerns of a post op infection s/p ALIF 4/23/24 with Dr. Cassidy. She presented with back pain and had an MRI that revealed "Well encapsulated fluid collection within the midline superficial subcutaneous lumbar soft tissues", but there were no signs of epidural abscess or discitis. Orthopedics was consulted and felt this most likely represents a seroma. They recommended adjusting pain medications " and close oupt f/u, but cleared patient for discharge. She remained afebrile during her hospitalization. WBC normalized.  CRP mildly elevated with unknown significance. Prelim blood cultures are NGTD. She will be sent home on a 7 day supply of doxycycline out of an abundance of caution, with follow up on Monday or wed with Dr. Cassidy. She was seen and examined on the date of discharge. Strict return prxn provided.     Goals of Care Treatment Preferences:  Code Status: Full Code      Consults:   Consults (From admission, onward)          Status Ordering Provider     Inpatient consult to Orthopedic Surgery  Once        Provider:  Gurjit Cassidy MD    Completed AGNIESZKA KENNEDY            No new Assessment & Plan notes have been filed under this hospital service since the last note was generated.  Service: Hospital Medicine    Final Active Diagnoses:    Diagnosis Date Noted POA    PRINCIPAL PROBLEM:  Spinal abscess [M46.20] 07/12/2024 Yes    Tobacco dependency [F17.200] 07/12/2024 Yes    Difficulty in walking involving ankle and foot joint [R26.2] 06/14/2023 Yes    Uncontrolled type 2 diabetes mellitus with hyperglycemia [E11.65] 03/31/2023 Yes    Gait abnormality [R26.9] 09/27/2021 Yes    Class 3 severe obesity with body mass index (BMI) of 45.0 to 49.9 in adult [E66.01, Z68.42] 08/18/2021 Not Applicable    Bipolar disorder, in partial remission, most recent episode mixed [F31.77] 10/20/2020 Yes      Problems Resolved During this Admission:       Discharged Condition: stable    Disposition: Home or Self Care    Follow Up:   Follow-up Information       Gurjit Cassidy MD Follow up in 1 week(s).    Specialties: Orthopedic Surgery, Surgery  Why: per Dr. Cassidy's clinic, please call clinic on Monday to schedule follow up appointment  Contact information:  [address] [address] [phone]                           Patient Instructions:      Ambulatory referral/consult to Smoking Cessation  Program   Standing Status: Future   Referral Priority: Routine Referral Type: Consultation   Referral Reason: Specialty Services Required   Requested Specialty: CTTS   Number of Visits Requested: 1     Diet diabetic     Lifting restrictions   Order Comments: No heavy lifting over 5 lbs     Notify your health care provider if you experience any of the following:  temperature >100.4     Notify your health care provider if you experience any of the following:  persistent nausea and vomiting or diarrhea     Notify your health care provider if you experience any of the following:  severe uncontrolled pain     Notify your health care provider if you experience any of the following:  redness, tenderness, or signs of infection (pain, swelling, redness, odor or green/yellow discharge around incision site)     Activity as tolerated       Significant Diagnostic Studies: Labs: CMP   Recent Labs   Lab 07/11/24 2132 07/12/24  0605    138   K 4.1 3.9    103   CO2 26 27    94   BUN 8 9   CREATININE 0.6 0.6   CALCIUM 9.0 8.3*   PROT 8.0 7.4   ALBUMIN 4.0 3.7   BILITOT 0.4 0.4   ALKPHOS 108 99   AST 48* 50*   ALT 53* 51*   ANIONGAP 9 8    and CBC   Recent Labs   Lab 07/11/24 2132 07/12/24  0605   WBC 12.94* 11.84   HGB 13.9 13.0   HCT 44.5 42.7    341     MRI Lumbar Spine W WO Cont    Result Date: 7/12/2024  EXAMINATION: MRI LUMBAR SPINE W WO CONTRAST CLINICAL HISTORY: Low back pain, cauda equina syndrome suspected; TECHNIQUE: Multiplanar, multisequence imaging of the lumbar spine was performed before and after the administration of 13 cc gadolinium based IV contrast. COMPARISON: Lumbar spine radiograph series 07/11/2024, MRI lumbar spine 03/07/2024 FINDINGS: There is postoperative change of prior posterior decompression and posterior instrumented fusion at L4 through S1.  Please note previously questioned lucency about the L5 pedicle screws is better evaluated on prior radiograph examination.  There is an  interbody spacer device at L4-L5.  There is soft tissue enhancement within the postoperative bed suggesting evolving granulation tissue.  There is a well encapsulated fluid collection within the midline superficial subcutaneous soft tissues measuring 1.6 x 1.6 x 10.8 cm (for example series 10, image 29 and series 3, image 12). There is mild grade 1 anterolisthesis of L4 on L5. Lumbar vertebral body heights appear adequately maintained.  There is no evidence of diffuse bone marrow replacement process.  No new suspicious disc space edema or enhancement identified.  The conus terminates at the T12-L1 level.  Tiny focal central T2 hyperintensity noted within the conus likely reflecting a terminal ventricle, similar to prior MRI examination.  No abnormal enhancement of the cauda equina nerve roots appreciated. L1-L2: Mild circumferential disc bulge.  No significant spinal canal or neural foraminal narrowing. L2-L3: Mild circumferential disc bulge and bilateral facet arthropathy.  No significant spinal canal or neural foraminal narrowing. L3-L4: Circumferential disc bulge and bilateral facet arthropathy.  Mild tapering the posterior thecal sac.  No significant neural foraminal narrowing appreciated. L4-L5: Postoperative change as above.  No significant spinal canal stenosis.  Mild bilateral neural foraminal narrowing, left greater than right. L5-S1: Broad-based disc bulge with superimposed small central disc protrusion.  No significant spinal canal stenosis.  Mild bilateral neural foraminal narrowing. No psoas fluid collections identified.  Limited views of the posterior abdomen demonstrate no acute abnormalities.     Postoperative change of the lumbar spine as detailed above.  No suspicious disc space edema/enhancement identified to suggest discitis or evidence of epidural fluid collection. Well encapsulated fluid collection within the midline superficial subcutaneous lumbar soft tissues with dimensions as above.  This  may reflect a postoperative seroma or hematoma with abscess not entirely excluded. Degenerative changes of the lumbar spine as detailed above. Electronically signed by: Heraclio Mariano MD Date:    07/12/2024 Time:    00:03    X-Ray Lumbar Spine Ap And Lateral    Result Date: 7/11/2024  EXAMINATION: XR LUMBAR SPINE AP AND LATERAL CLINICAL HISTORY: back pain; TECHNIQUE: AP and lateral radiographs of the lumbar spine and a coned-down view of the lumbosacral junction were acquired. COMPARISON: Lumbar spine radiographs-06/10/2024 FINDINGS: There are postoperative changes of posterior instrumented fusion at L4-S1 utilizing bilateral pedicle screws and vertical stabilization rods.  There is a suggestion lucency surrounding the patient's L5 pedicle screws.  Please correlate clinically as loosening and infection cannot be excluded.  There is a mild residual grade I anterolisthesis of L4 on L5.  There is an interbody spacer present at L4-L5.  There are postprocedural changes of posterior decompression at L4.     Status post posterior decompression at L4 and posterior instrumented fusion at L4-S1 with suggestion of abnormal lucency surrounding the patient's L5 pedicle screws.  Loosening and infection could produce this appearance and are not excluded.  No acute lumbar compression fracture. Electronically signed by: Laci Ramirez MD Date:    07/11/2024 Time:    18:45      Pending Diagnostic Studies:       None           Medications:  Reconciled Home Medications:      Medication List        START taking these medications      diclofenac 50 MG EC tablet  Commonly known as: VOLTAREN  Take 1 tablet (50 mg total) by mouth 2 (two) times daily. for 7 days     doxycycline 100 MG Cap  Commonly known as: VIBRAMYCIN  Take 1 capsule (100 mg total) by mouth 2 (two) times daily. for 7 days     nicotine 14 mg/24 hr  Commonly known as: NICODERM CQ  Place 1 patch onto the skin once daily.            CHANGE how you take these medications       gabapentin 300 MG capsule  Commonly known as: NEURONTIN  Take 1 capsule (300 mg total) by mouth 3 (three) times daily.  What changed: when to take this            CONTINUE taking these medications      cetirizine 10 MG tablet  Commonly known as: ZYRTEC  Take 1 tablet (10 mg total) by mouth daily as needed for Allergies.     HYDROcodone-acetaminophen 7.5-325 mg per tablet  Commonly known as: NORCO  Take 1 tablet by mouth every 8 (eight) hours as needed for Pain.     losartan 25 MG tablet  Commonly known as: COZAAR  Take 1 tablet (25 mg total) by mouth once daily.     metFORMIN 500 MG ER 24hr tablet  Commonly known as: GLUCOPHAGE-XR  Take 2 tablets (1,000 mg total) by mouth 2 (two) times daily with meals.     ondansetron 4 MG Tbdl  Commonly known as: ZOFRAN-ODT  Take 1 tablet (4 mg total) by mouth every 6 (six) hours as needed (nausea or vomiting).     OZEMPIC 2 mg/dose (8 mg/3 mL) Pnij  Generic drug: semaglutide  Inject 2 mg into the skin every 7 days.     pyridoxine (vitamin B6) 25 MG Tab  Commonly known as: B-6  Take 25 mg by mouth once daily.     risperiDONE 1 MG tablet  Commonly known as: RISPERDAL  Take 1 tablet (1 mg total) by mouth 2 (two) times daily.     sertraline 100 MG tablet  Commonly known as: ZOLOFT  Take 1.5 tablets (150 mg total) by mouth once daily.     sumatriptan 100 MG tablet  Commonly known as: IMITREX  Take 1 tab PO at first sign of migraine. If not effective, repeat dose in 2 hours. Do not take more than 200mg in 24 hours.     traZODone 100 MG tablet  Commonly known as: DESYREL  TAKE 1 TO 2 TABLETS BY MOUTH EVERY NIGHT AT BEDTIME AS NEEDED FOR INSOMNIA            STOP taking these medications      diphenhydrAMINE 25 mg capsule  Commonly known as: BENADRYL            ASK your doctor about these medications      ACCU-CHEK GUIDE ME GLUCOSE MTR Misc  Generic drug: blood-glucose meter  Use to test blood sugar     blood sugar diagnostic Strp  Test blood sugar 4 (four) times daily before meals  and nightly.     lancets Misc  Commonly known as: ACCU-CHEK SOFTCLIX LANCETS  Test blood sugar 4 (four) times daily before meals and nightly.              Indwelling Lines/Drains at time of discharge:   Lines/Drains/Airways       None                   Time spent on the discharge of patient: 45 minutes         Sofie Bradley NP  Department of Hospital Medicine  [facility]

## 2024-07-12 NOTE — CONSULTS
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       Current Facility-Administered Medications   Medication    acetaminophen tablet 650 mg    acetaminophen tablet 650 mg    aluminum-magnesium hydroxide-simethicone 200-200-20 mg/5 mL suspension 30 mL    cefepime 2 g in dextrose 5% 100 mL IVPB (ready to mix)    [START ON 7/13/2024] DAPTOmycin (CUBICIN) 825 mg in 0.9% NaCl SolP 50 mL IVPB    dextrose 50% injection 12.5 g    dextrose 50% injection 25 g    diphenhydrAMINE injection 25 mg    gabapentin capsule 300 mg    glucagon (human recombinant) injection 1 mg    glucose chewable tablet 16 g    glucose chewable tablet 24 g    heparin (porcine) injection 5,000 Units    HYDROcodone-acetaminophen 5-325 mg per tablet 1 tablet    HYDROmorphone injection 1 mg    insulin aspart U-100 pen 0-10 Units    magnesium oxide tablet 800 mg    magnesium oxide tablet 800 mg    melatonin tablet 6 mg    metronidazole IVPB 500 mg    naloxone 0.4 mg/mL injection 0.02 mg    nicotine 14 mg/24 hr 1 patch    NON FORMULARY MEDICATION 50 mg    ondansetron injection 4 mg    potassium bicarbonate disintegrating tablet 35 mEq    potassium bicarbonate disintegrating tablet 50 mEq    potassium bicarbonate disintegrating tablet 60 mEq    potassium, sodium phosphates 280-160-250 mg packet 2 packet    potassium, sodium phosphates 280-160-250 mg packet 2 packet    potassium, sodium phosphates 280-160-250 mg packet 2 packet     "senna-docusate 8.6-50 mg per tablet 1 tablet    sodium chloride 0.9% flush 2 mL     Objective:     Vital Signs (Most Recent):  Temp: 98.1 °F (36.7 °C) (07/12/24 0834)  Pulse: 81 (07/12/24 0834)  Resp: 18 (07/12/24 0834)  BP: 115/76 (07/12/24 0834)  SpO2: 95 % (07/12/24 0834) Vital Signs (24h Range):  Temp:  [98.1 °F (36.7 °C)-98.7 °F (37.1 °C)] 98.1 °F (36.7 °C)  Pulse:  [70-98] 81  Resp:  [18-20] 18  SpO2:  [95 %-98 %] 95 %  BP: ()/(60-83) 115/76     Weight: (!) 137.8 kg (303 lb 12.7 oz)  Height: 5' 7" (170.2 cm)  Body mass index is 47.58 kg/m².    No intake or output data in the 24 hours ending 07/12/24 0901     General    Constitutional: She appears well-developed and well-nourished.   Pulmonary/Chest: Effort normal.   Neurological: She is alert.   Psychiatric: She has a normal mood and affect. Her behavior is normal. Judgment and thought content normal.         Back (L-Spine & T-Spine) / Neck (C-Spine) Exam     Back (L-Spine & T-Spine) Range of Motion   Lateral bend left:  normal   Rotation left:  normal     Spinal Sensation   Left Side Sensation  L-Spine Level: normal    Comments:  Can to lower back clean dry and intact.  Surgical incisions are well healed without wound dehiscence or drainage.  No surrounding erythema, ecchymosis, warmth, or fluctuance.  She is neurovascularly intact throughout bilateral lower extremities.  She can dorsiflex and plantar flex bilateral ankles without difficulty.  Bilateral EHLs are intact.  Negative Homans sign bilaterally.  Negative straight leg raise on the right.  Somewhat positive straight leg raise on the left.  She can actively perform a straight leg raise maneuver on her own with a left lower extremity.      Muscle Strength   Left Lower Extremity   Anterior tibial:  5/5   Gastrocsoleus:  5/5   EHL:  5/5       Significant Labs: All pertinent labs within the past 24 hours have been reviewed.    Significant Imaging: I have reviewed and interpreted all pertinent imaging " results/findings.  Assessment/Plan:     * Spinal abscess  1. History of lumbar spinal fusion at L4-5, L5-S1.    Reviewed all lab results and imaging studies.  Discussed all of this with Dr. Cassidy as well he was able to review all of these results.  Very low clinical suspicion of abscess.  Patient normally ranges in the high-normal for white blood cell count.  She is afebrile.  There is no warmth, erythema, or fluctuance at her operative site.  She really has minimal back pain.  Her primary complaint his left lower extremity radicular symptoms.  These are similar to her preoperative symptoms.  Believe this may likely he just be secondary to irritation of the nerve roots at her previous surgery level we would likely secondary to scar irritation or tearing that caused some bleeding around the nerve roots causing some irritation.  I will increase her gabapentin from 300 once daily to 300 mg 3 times daily.  We will also start her on an oral anti-inflammatory.  She has tried Naprosyn and ibuprofen in the past without much relief.  I will try diclofenac as it does offer an analgesic effect as well.  No surgical intervention is planned at this time from an orthopedic perspective.  We would recommend out of bed and work with therapy to reduce pneumonia DVT risks and maintain mobility.  She is more than welcome to follow up with us in clinic if discharged earlier this week on Monday or Wednesday          Aidan Alvares PA-C  Orthopedics  [facility]

## 2024-07-12 NOTE — H&P
[facility] Medicine  History & Physical    Patient Name: Loretta Lea  MRN: [id number]  Patient Class: IP- Inpatient  Admission Date: 7/11/2024  Attending Physician: Sukhi Quiroga MD   Primary Care Provider: Magalie Euceda MD (Inactive)         Patient information was obtained from patient and ER records.     Subjective:     Principal Problem:Spinal abscess    Chief Complaint:   Chief Complaint   Patient presents with    Back Pain     Left sided back pain radiating to left hip and down left leg. Recent back surgery.        HPI: Loretta Robison  is a 34 y.o. female with BMI 47.58, PMH significant for Diabetes recent back surgery 3 months ago who is being evaluated by the Hospital Medicine service  after developing sudden left sided back pain shooting down her left leg. Patient was driving on her way to work when she suddenly began to experience this sharp pain and she drove immediately to the [facility] ED. At the ED, MRI of her lumbar spine noted a collection suspicious for either a seroma or an abscess. Patient denies any other symptoms. She will be admitted to hospital Medicine for further mgt. Patient denies any headache or visual changes.  Denies any focal weakness, numbness tingling or paresthesias.  Denies any abdominal pain.  Denies melena hematochezia or any GI bleeding.  Denies diarrhea or constipation.  Denies any fever, cough or cold symptoms.  Denies dysuria, frequency or urgency.  Denies any other problems or complaints.      Past Medical History:   Diagnosis Date    Anxiety     Back pain     Bipolar disorder 2004    bipolar 2    Chronic bronchitis     Depression     Diabetes mellitus     GERD (gastroesophageal reflux disease)     HPV (human papilloma virus) infection     Insomnia     Migraines     Non-proliferative diabetic retinopathy, mild, both eyes 09/25/2023    Obese body habitus     Ovarian cyst     Pneumonia     PONV (postoperative nausea and vomiting)         Past Surgical History:   Procedure Laterality Date    ANTERIOR LUMBAR INTERBODY FUSION (ALIF) Bilateral 4/23/2024    Procedure: FUSION, SPINE, LUMBAR, ALIF;  Surgeon: Gurjit Cassidy MD;  Location: [facility];  Service: Orthopedics;  Laterality: Bilateral;    APPENDECTOMY      ARTHROSCOPY OF ANKLE Right 05/04/2022    Procedure: ARTHROSCOPY, ANKLE;  Surgeon: Bimal Mccormick DPM;  Location: [facility];  Service: Podiatry;  Laterality: Right;  CURT EXTERNAL ANKLE DISTRACTOR    CHOLECYSTECTOMY      DILATION AND CURETTAGE OF UTERUS      HYSTERECTOMY  2017    total, ovarian cysts, torsion, [place]; hyst per Dr JESUS Manriquez    LAPAROSCOPIC APPENDECTOMY N/A 08/18/2021    Procedure: APPENDECTOMY, LAPAROSCOPIC;  Surgeon: Osiel Ramirez MD;  Location: [facility];  Service: General;  Laterality: N/A;    LUMBAR LAMINECTOMY WITH FUSION Bilateral 4/23/2024    Procedure: LAMINECTOMY, SPINE, LUMBAR, WITH FUSION;  Surgeon: Gurjit Cassidy MD;  Location: [facility];  Service: Orthopedics;  Laterality: Bilateral;    OOPHORECTOMY      opherectom Left 2015    salpingo-opherectomy    REPAIR OF LIGAMENT OF ANKLE Right 06/23/2021    Procedure: REPAIR OF ANTERIOR TALOFIBULAR LIGAMENT CALCANEOFIBULAR LIGAMENT;  Surgeon: Bimal Mccormick DPM;  Location: [facility];  Service: Podiatry;  Laterality: Right;  WITH ARTHREX INTERNAL BRACE    REPAIR OF LIGAMENT OF ANKLE Right 4/27/2023    Procedure: REPAIR, LIGAMENT, ANKLE;  Surgeon: Aryan Poole DPM;  Location: [facility];  Service: Podiatry;  Laterality: Right;    REPAIR OF TENDON OF LOWER EXTREMITY Right 4/27/2023    Procedure: REPAIR, TENDON, LOWER EXTREMITY;  Surgeon: Aryan Poole DPM;  Location: [facility] OR;  Service: Podiatry;  Laterality: Right;  arthrex    SURGICAL REMOVAL OF BONE SPUR Right 06/23/2021    Procedure: EXCISION OS TRIGONUM;  Surgeon: Bimal Mccormick DPM;  Location: [facility] OR;  Service: Podiatry;  Laterality: Right;    WISDOM TOOTH EXTRACTION         Review of patient's  allergies indicates:   Allergen Reactions    Vancomycin analogues Other (See Comments)     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     Current Outpatient Medications on File Prior to Encounter   Medication Sig    blood sugar diagnostic Strp Test blood sugar 4 (four) times daily before meals and nightly. (Patient not taking: Reported on 6/5/2024)    blood-glucose meter (ACCU-CHEK GUIDE GLUCOSE METER) Misc Use to test blood sugar (Patient not taking: Reported on 6/5/2024)    cetirizine (ZYRTEC) 10 MG tablet Take 1 tablet (10 mg total) by mouth daily as needed for Allergies.    diphenhydrAMINE (BENADRYL) 25 mg capsule Take 1 capsule (25 mg total) by mouth every 6 (six) hours as needed for Itching or Allergies. (Patient not taking: Reported on 6/5/2024)    gabapentin (NEURONTIN) 300 MG capsule Take 1 capsule (300 mg total) by mouth every evening.    HYDROcodone-acetaminophen (NORCO) 7.5-325 mg per tablet Take 1 tablet by mouth every 8 (eight) hours as needed for Pain.    lancets (ACCU-CHEK SOFTCLIX LANCETS) Misc Test blood sugar 4 (four) times daily before meals and nightly. (Patient not taking: Reported on 6/5/2024)    losartan (COZAAR) 25 MG tablet Take 1 tablet (25 mg total) by mouth once daily.    metFORMIN (GLUCOPHAGE-XR) 500 MG ER 24hr tablet Take 2 tablets (1,000 mg total) by mouth 2 (two) times daily with meals.    ondansetron (ZOFRAN-ODT) 4 MG TbDL Take 1 tablet (4 mg total) by mouth every 6 (six) hours as needed (nausea or vomiting).    pyridoxine, vitamin B6, (B-6) 25 MG Tab Take 25 mg by mouth once daily.    risperiDONE (RISPERDAL) 1 MG tablet Take 1 tablet (1 mg total) by mouth 2 (two) times daily.    semaglutide (OZEMPIC) 2 mg/dose (8 mg/3 mL) PnIj Inject 2 mg into the skin every 7 days.    sertraline (ZOLOFT) 100 MG tablet Take 1.5 tablets (150 mg total) by mouth once daily.    sumatriptan (IMITREX) 100 MG tablet Take 1 tab PO at first sign of  migraine. If not effective, repeat dose in 2 hours. Do not take more than 200mg in 24 hours.    traZODone (DESYREL) 100 MG tablet TAKE 1 TO 2 TABLETS BY MOUTH EVERY NIGHT AT BEDTIME AS NEEDED FOR INSOMNIA     Family History       Problem Relation (Age of Onset)    No Known Problems Sister, Sister, Sister          Tobacco Use    Smoking status: Every Day     Current packs/day: 1.50     Average packs/day: 1.5 packs/day for 18.0 years (27.0 ttl pk-yrs)     Types: Cigarettes     Passive exposure: Current    Smokeless tobacco: Never    Tobacco comments:     4/25/23--pt instructed no smoking 24hours before sx, pt voiced understanding.   Substance and Sexual Activity    Alcohol use: Not Currently     Comment: rare    Drug use: Not Currently     Comment: twice    Sexual activity: Yes     Partners: Male     Birth control/protection: OCP, None     Review of Systems   Constitutional:  Negative for chills, fever and unexpected weight change.        Morbidly obese   HENT:  Negative for ear pain, hearing loss, sneezing and sore throat.    Eyes:  Negative for discharge.   Respiratory:  Negative for cough, chest tightness and wheezing.    Cardiovascular:  Negative for chest pain, palpitations and leg swelling.   Gastrointestinal:  Negative for abdominal distention, anal bleeding, blood in stool, constipation, nausea, rectal pain and vomiting.   Endocrine: Negative for cold intolerance and heat intolerance.   Genitourinary:  Negative for difficulty urinating, flank pain, frequency, pelvic pain, urgency, vaginal bleeding, vaginal discharge and vaginal pain.   Musculoskeletal:  Positive for back pain and gait problem. Negative for myalgias and neck pain.   Neurological:  Negative for dizziness, tremors, seizures, syncope, speech difficulty, weakness, numbness and headaches.   Hematological:  Negative for adenopathy. Does not bruise/bleed easily.   Psychiatric/Behavioral:  Negative for behavioral problems, confusion, hallucinations,  sleep disturbance and suicidal ideas. The patient is not nervous/anxious.      Objective:     Vital Signs (Most Recent):  Temp: 98.7 °F (37.1 °C) (07/12/24 0452)  Pulse: 70 (07/12/24 0452)  Resp: 20 (07/12/24 0459)  BP: 126/60 (07/12/24 0452)  SpO2: 98 % (07/12/24 0452) Vital Signs (24h Range):  Temp:  [98.4 °F (36.9 °C)-98.7 °F (37.1 °C)] 98.7 °F (37.1 °C)  Pulse:  [70-98] 70  Resp:  [18-20] 20  SpO2:  [95 %-98 %] 98 %  BP: ()/(60-83) 126/60     Weight: (!) 139.3 kg (307 lb)  Body mass index is 48.08 kg/m².     Physical Exam  Constitutional:       Appearance: Normal appearance. She is obese.      Comments: Morbidly obese   HENT:      Head: Normocephalic and atraumatic.      Nose: Nose normal.   Eyes:      Extraocular Movements: Extraocular movements intact.      Pupils: Pupils are equal, round, and reactive to light.   Cardiovascular:      Rate and Rhythm: Normal rate and regular rhythm.      Pulses: Normal pulses.      Heart sounds: Normal heart sounds.   Pulmonary:      Effort: Pulmonary effort is normal.      Breath sounds: Normal breath sounds.   Abdominal:      General: Abdomen is flat. Bowel sounds are normal.      Palpations: Abdomen is soft.   Musculoskeletal:         General: Normal range of motion.      Cervical back: Normal range of motion.      Comments: Lumbosacral incision site clean and dry   Skin:     General: Skin is warm and dry.      Capillary Refill: Capillary refill takes less than 2 seconds.   Neurological:      Mental Status: She is alert and oriented to person, place, and time. Mental status is at baseline.      Motor: Weakness present.      Gait: Gait abnormal.      Comments: Positive passive left leg raise, unable to lift leg high off the bed   Psychiatric:         Mood and Affect: Mood normal.              CRANIAL NERVES     CN III, IV, VI   Pupils are equal, round, and reactive to light.       Significant Labs: All pertinent labs within the past 24 hours have been  reviewed.    Significant Imaging: I have reviewed all pertinent imaging results/findings within the past 24 hours.    MRI LUMBAR SPINE W WO CONTRAST     CLINICAL HISTORY:  Low back pain, cauda equina syndrome suspected;     TECHNIQUE:  Multiplanar, multisequence imaging of the lumbar spine was performed before and after the administration of 13 cc gadolinium based IV contrast.     COMPARISON:  Lumbar spine radiograph series 07/11/2024, MRI lumbar spine 03/07/2024     FINDINGS:  There is postoperative change of prior posterior decompression and posterior instrumented fusion at L4 through S1.  Please note previously questioned lucency about the L5 pedicle screws is better evaluated on prior radiograph examination.  There is an interbody spacer device at L4-L5.  There is soft tissue enhancement within the postoperative bed suggesting evolving granulation tissue.  There is a well encapsulated fluid collection within the midline superficial subcutaneous soft tissues measuring 1.6 x 1.6 x 10.8 cm (for example series 10, image 29 and series 3, image 12).     There is mild grade 1 anterolisthesis of L4 on L5. Lumbar vertebral body heights appear adequately maintained.  There is no evidence of diffuse bone marrow replacement process.  No new suspicious disc space edema or enhancement identified.  The conus terminates at the T12-L1 level.  Tiny focal central T2 hyperintensity noted within the conus likely reflecting a terminal ventricle, similar to prior MRI examination.  No abnormal enhancement of the cauda equina nerve roots appreciated.     L1-L2: Mild circumferential disc bulge.  No significant spinal canal or neural foraminal narrowing.     L2-L3: Mild circumferential disc bulge and bilateral facet arthropathy.  No significant spinal canal or neural foraminal narrowing.     L3-L4: Circumferential disc bulge and bilateral facet arthropathy.  Mild tapering the posterior thecal sac.  No significant neural foraminal  narrowing appreciated.     L4-L5: Postoperative change as above.  No significant spinal canal stenosis.  Mild bilateral neural foraminal narrowing, left greater than right.     L5-S1: Broad-based disc bulge with superimposed small central disc protrusion.  No significant spinal canal stenosis.  Mild bilateral neural foraminal narrowing.     No psoas fluid collections identified.  Limited views of the posterior abdomen demonstrate no acute abnormalities.     Impression:     Postoperative change of the lumbar spine as detailed above.  No suspicious disc space edema/enhancement identified to suggest discitis or evidence of epidural fluid collection.     Well encapsulated fluid collection within the midline superficial subcutaneous lumbar soft tissues with dimensions as above.  This may reflect a postoperative seroma or hematoma with abscess not entirely excluded.     Degenerative changes of the lumbar spine as detailed above.        Electronically signed by:Heraclio Mariano MD  Date:                                            07/12/2024  Time:                                           00:03           Exam Ended: 07/11/24 23:27 CDT Last Resulted: 07/12/24 00:03 CDT           Assessment/Plan:     * Spinal abscess  On MRI Lumbosacral spine, There is a well encapsulated fluid collection within the midline superficial subcutaneous soft tissues measuring 1.6 x 1.6 x 10.8 cm and it may represent post-operative collection of seroma, hematoma or abscess.    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 Patient was Counseled for 3-10 minutes. Nicotine replacement options were discussed. Nicotine replacement was discussed- prescribed    Difficulty in walking involving ankle and foot joint  PT/OT evaluation for left  "foot      Uncontrolled type 2 diabetes mellitus with hyperglycemia  Patient's FSGs are uncontrolled due to hyperglycemia on current medication regimen.  Last A1c reviewed-   Lab Results   Component Value Date    HGBA1C 9.7 (H) 02/08/2023     Most recent fingerstick glucose reviewed- No results for input(s): "POCTGLUCOSE" in the last 24 hours.  Current correctional scale  High  Maintain anti-hyperglycemic dose as follows-   Antihyperglycemics (From admission, onward)      Start     Stop Route Frequency Ordered    07/12/24 0328  insulin aspart U-100 pen 0-10 Units         -- SubQ Before meals & nightly PRN 07/12/24 0229          Hold Oral hypoglycemics while patient is in the hospital.    Gait abnormality  Patient with Acute on chronic debility due to other reduced mobility. Latest AMPAC and GEMS scores have been reviewed. Evaluation for etiology is underway. Plan includes progressive mobility protocol initated, PT/OT consulted, and fall precautions in place.       Class 3 severe obesity with body mass index (BMI) of 45.0 to 49.9 in adult  Body mass index is 47.58 kg/m². Morbid obesity complicates all aspects of disease management from diagnostic modalities to treatment. Weight loss encouraged and health benefits explained to patient.         Bipolar disorder, in partial remission, most recent episode mixed  Continue home meds        VTE Risk Mitigation (From admission, onward)           Ordered     heparin (porcine) injection 5,000 Units  Every 8 hours         07/12/24 0229     IP VTE HIGH RISK PATIENT  Once         07/12/24 0229     Place sequential compression device  Until discontinued         07/12/24 0229                                    Adán Bui MD  Department of Hospital Medicine  [facility]          "

## 2024-07-12 NOTE — SUBJECTIVE & OBJECTIVE
Past Medical History:   Diagnosis Date    Anxiety     Back pain     Bipolar disorder 2004    bipolar 2    Chronic bronchitis     Depression     Diabetes mellitus     GERD (gastroesophageal reflux disease)     HPV (human papilloma virus) infection     Insomnia     Migraines     Non-proliferative diabetic retinopathy, mild, both eyes 09/25/2023    Obese body habitus     Ovarian cyst     Pneumonia     PONV (postoperative nausea and vomiting)        Past Surgical History:   Procedure Laterality Date    ANTERIOR LUMBAR INTERBODY FUSION (ALIF) Bilateral 4/23/2024    Procedure: FUSION, SPINE, LUMBAR, ALIF;  Surgeon: Gurjit Cassidy MD;  Location: [facility];  Service: Orthopedics;  Laterality: Bilateral;    APPENDECTOMY      ARTHROSCOPY OF ANKLE Right 05/04/2022    Procedure: ARTHROSCOPY, ANKLE;  Surgeon: Bimal Mccormick DPM;  Location: [facility];  Service: Podiatry;  Laterality: Right;  CURT EXTERNAL ANKLE DISTRACTOR    CHOLECYSTECTOMY      DILATION AND CURETTAGE OF UTERUS      HYSTERECTOMY  2017    total, ovarian cysts, torsion, [place]; hyst per Dr JESUS Manriquez    LAPAROSCOPIC APPENDECTOMY N/A 08/18/2021    Procedure: APPENDECTOMY, LAPAROSCOPIC;  Surgeon: Osiel Ramirez MD;  Location: [facility];  Service: General;  Laterality: N/A;    LUMBAR LAMINECTOMY WITH FUSION Bilateral 4/23/2024    Procedure: LAMINECTOMY, SPINE, LUMBAR, WITH FUSION;  Surgeon: Gurjit Cassidy MD;  Location: [facility];  Service: Orthopedics;  Laterality: Bilateral;    OOPHORECTOMY      opherectom Left 2015    salpingo-opherectomy    REPAIR OF LIGAMENT OF ANKLE Right 06/23/2021    Procedure: REPAIR OF ANTERIOR TALOFIBULAR LIGAMENT CALCANEOFIBULAR LIGAMENT;  Surgeon: Bimal Mccormick DPM;  Location: [facility];  Service: Podiatry;  Laterality: Right;  WITH ARTHREX INTERNAL BRACE    REPAIR OF LIGAMENT OF ANKLE Right 4/27/2023    Procedure: REPAIR, LIGAMENT, ANKLE;  Surgeon: Aryan Poole DPM;  Location: [facility];  Service: Podiatry;  Laterality: Right;     REPAIR OF TENDON OF LOWER EXTREMITY Right 4/27/2023    Procedure: REPAIR, TENDON, LOWER EXTREMITY;  Surgeon: Aryan Poole DPM;  Location: [facility];  Service: Podiatry;  Laterality: Right;  arthrex    SURGICAL REMOVAL OF BONE SPUR Right 06/23/2021    Procedure: EXCISION OS TRIGONUM;  Surgeon: Bimal Mccormick DPM;  Location: [facility];  Service: Podiatry;  Laterality: Right;    WISDOM TOOTH EXTRACTION         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     Current Outpatient Medications on File Prior to Encounter   Medication Sig    blood sugar diagnostic Strp Test blood sugar 4 (four) times daily before meals and nightly. (Patient not taking: Reported on 6/5/2024)    blood-glucose meter (ACCU-CHEK GUIDE GLUCOSE METER) Misc Use to test blood sugar (Patient not taking: Reported on 6/5/2024)    cetirizine (ZYRTEC) 10 MG tablet Take 1 tablet (10 mg total) by mouth daily as needed for Allergies.    diphenhydrAMINE (BENADRYL) 25 mg capsule Take 1 capsule (25 mg total) by mouth every 6 (six) hours as needed for Itching or Allergies. (Patient not taking: Reported on 6/5/2024)    gabapentin (NEURONTIN) 300 MG capsule Take 1 capsule (300 mg total) by mouth every evening.    HYDROcodone-acetaminophen (NORCO) 7.5-325 mg per tablet Take 1 tablet by mouth every 8 (eight) hours as needed for Pain.    lancets (ACCU-CHEK SOFTCLIX LANCETS) Misc Test blood sugar 4 (four) times daily before meals and nightly. (Patient not taking: Reported on 6/5/2024)    losartan (COZAAR) 25 MG tablet Take 1 tablet (25 mg total) by mouth once daily.    metFORMIN (GLUCOPHAGE-XR) 500 MG ER 24hr tablet Take 2 tablets (1,000 mg total) by mouth 2 (two) times daily with meals.    ondansetron (ZOFRAN-ODT) 4 MG TbDL Take 1 tablet (4 mg total) by mouth every 6 (six) hours as needed (nausea or  vomiting).    pyridoxine, vitamin B6, (B-6) 25 MG Tab Take 25 mg by mouth once daily.    risperiDONE (RISPERDAL) 1 MG tablet Take 1 tablet (1 mg total) by mouth 2 (two) times daily.    semaglutide (OZEMPIC) 2 mg/dose (8 mg/3 mL) PnIj Inject 2 mg into the skin every 7 days.    sertraline (ZOLOFT) 100 MG tablet Take 1.5 tablets (150 mg total) by mouth once daily.    sumatriptan (IMITREX) 100 MG tablet Take 1 tab PO at first sign of migraine. If not effective, repeat dose in 2 hours. Do not take more than 200mg in 24 hours.    traZODone (DESYREL) 100 MG tablet TAKE 1 TO 2 TABLETS BY MOUTH EVERY NIGHT AT BEDTIME AS NEEDED FOR INSOMNIA     Family History       Problem Relation (Age of Onset)    No Known Problems Sister, Sister, Sister          Tobacco Use    Smoking status: Every Day     Current packs/day: 1.50     Average packs/day: 1.5 packs/day for 18.0 years (27.0 ttl pk-yrs)     Types: Cigarettes     Passive exposure: Current    Smokeless tobacco: Never    Tobacco comments:     4/25/23--pt instructed no smoking 24hours before sx, pt voiced understanding.   Substance and Sexual Activity    Alcohol use: Not Currently     Comment: rare    Drug use: Not Currently     Comment: twice    Sexual activity: Yes     Partners: Male     Birth control/protection: OCP, None     Review of Systems   Constitutional:  Negative for chills, fever and unexpected weight change.        Morbidly obese   HENT:  Negative for ear pain, hearing loss, sneezing and sore throat.    Eyes:  Negative for discharge.   Respiratory:  Negative for cough, chest tightness and wheezing.    Cardiovascular:  Negative for chest pain, palpitations and leg swelling.   Gastrointestinal:  Negative for abdominal distention, anal bleeding, blood in stool, constipation, nausea, rectal pain and vomiting.   Endocrine: Negative for cold intolerance and heat intolerance.   Genitourinary:  Negative for difficulty urinating, flank pain, frequency, pelvic pain, urgency,  vaginal bleeding, vaginal discharge and vaginal pain.   Musculoskeletal:  Positive for back pain and gait problem. Negative for myalgias and neck pain.   Neurological:  Negative for dizziness, tremors, seizures, syncope, speech difficulty, weakness, numbness and headaches.   Hematological:  Negative for adenopathy. Does not bruise/bleed easily.   Psychiatric/Behavioral:  Negative for behavioral problems, confusion, hallucinations, sleep disturbance and suicidal ideas. The patient is not nervous/anxious.      Objective:     Vital Signs (Most Recent):  Temp: 98.7 °F (37.1 °C) (07/12/24 0452)  Pulse: 70 (07/12/24 0452)  Resp: 20 (07/12/24 0459)  BP: 126/60 (07/12/24 0452)  SpO2: 98 % (07/12/24 0452) Vital Signs (24h Range):  Temp:  [98.4 °F (36.9 °C)-98.7 °F (37.1 °C)] 98.7 °F (37.1 °C)  Pulse:  [70-98] 70  Resp:  [18-20] 20  SpO2:  [95 %-98 %] 98 %  BP: ()/(60-83) 126/60     Weight: (!) 139.3 kg (307 lb)  Body mass index is 48.08 kg/m².     Physical Exam  Constitutional:       Appearance: Normal appearance. She is obese.      Comments: Morbidly obese   HENT:      Head: Normocephalic and atraumatic.      Nose: Nose normal.   Eyes:      Extraocular Movements: Extraocular movements intact.      Pupils: Pupils are equal, round, and reactive to light.   Cardiovascular:      Rate and Rhythm: Normal rate and regular rhythm.      Pulses: Normal pulses.      Heart sounds: Normal heart sounds.   Pulmonary:      Effort: Pulmonary effort is normal.      Breath sounds: Normal breath sounds.   Abdominal:      General: Abdomen is flat. Bowel sounds are normal.      Palpations: Abdomen is soft.   Musculoskeletal:         General: Normal range of motion.      Cervical back: Normal range of motion.      Comments: Lumbosacral incision site clean and dry   Skin:     General: Skin is warm and dry.      Capillary Refill: Capillary refill takes less than 2 seconds.   Neurological:      Mental Status: She is alert and oriented to  person, place, and time. Mental status is at baseline.      Motor: Weakness present.      Gait: Gait abnormal.      Comments: Positive passive left leg raise, unable to lift leg high off the bed   Psychiatric:         Mood and Affect: Mood normal.              CRANIAL NERVES     CN III, IV, VI   Pupils are equal, round, and reactive to light.       Significant Labs: All pertinent labs within the past 24 hours have been reviewed.    Significant Imaging: I have reviewed all pertinent imaging results/findings within the past 24 hours.    MRI LUMBAR SPINE W WO CONTRAST     CLINICAL HISTORY:  Low back pain, cauda equina syndrome suspected;     TECHNIQUE:  Multiplanar, multisequence imaging of the lumbar spine was performed before and after the administration of 13 cc gadolinium based IV contrast.     COMPARISON:  Lumbar spine radiograph series 07/11/2024, MRI lumbar spine 03/07/2024     FINDINGS:  There is postoperative change of prior posterior decompression and posterior instrumented fusion at L4 through S1.  Please note previously questioned lucency about the L5 pedicle screws is better evaluated on prior radiograph examination.  There is an interbody spacer device at L4-L5.  There is soft tissue enhancement within the postoperative bed suggesting evolving granulation tissue.  There is a well encapsulated fluid collection within the midline superficial subcutaneous soft tissues measuring 1.6 x 1.6 x 10.8 cm (for example series 10, image 29 and series 3, image 12).     There is mild grade 1 anterolisthesis of L4 on L5. Lumbar vertebral body heights appear adequately maintained.  There is no evidence of diffuse bone marrow replacement process.  No new suspicious disc space edema or enhancement identified.  The conus terminates at the T12-L1 level.  Tiny focal central T2 hyperintensity noted within the conus likely reflecting a terminal ventricle, similar to prior MRI examination.  No abnormal enhancement of the cauda  equina nerve roots appreciated.     L1-L2: Mild circumferential disc bulge.  No significant spinal canal or neural foraminal narrowing.     L2-L3: Mild circumferential disc bulge and bilateral facet arthropathy.  No significant spinal canal or neural foraminal narrowing.     L3-L4: Circumferential disc bulge and bilateral facet arthropathy.  Mild tapering the posterior thecal sac.  No significant neural foraminal narrowing appreciated.     L4-L5: Postoperative change as above.  No significant spinal canal stenosis.  Mild bilateral neural foraminal narrowing, left greater than right.     L5-S1: Broad-based disc bulge with superimposed small central disc protrusion.  No significant spinal canal stenosis.  Mild bilateral neural foraminal narrowing.     No psoas fluid collections identified.  Limited views of the posterior abdomen demonstrate no acute abnormalities.     Impression:     Postoperative change of the lumbar spine as detailed above.  No suspicious disc space edema/enhancement identified to suggest discitis or evidence of epidural fluid collection.     Well encapsulated fluid collection within the midline superficial subcutaneous lumbar soft tissues with dimensions as above.  This may reflect a postoperative seroma or hematoma with abscess not entirely excluded.     Degenerative changes of the lumbar spine as detailed above.        Electronically signed by:Heraclio Mariano MD  Date:                                            07/12/2024  Time:                                           00:03           Exam Ended: 07/11/24 23:27 CDT Last Resulted: 07/12/24 00:03 CDT

## 2024-07-12 NOTE — PLAN OF CARE
Problem: Violence Risk or Actual  Goal: Anger and Impulse Control  Outcome: Met     Problem: Adult Inpatient Plan of Care  Goal: Plan of Care Review  Outcome: Met  Goal: Patient-Specific Goal (Individualized)  Outcome: Met  Goal: Absence of Hospital-Acquired Illness or Injury  Outcome: Met  Goal: Optimal Comfort and Wellbeing  Outcome: Met  Goal: Readiness for Transition of Care  Outcome: Met     Problem: Bariatric Environmental Safety  Goal: Safety Maintained with Care  Outcome: Met     Problem: Diabetes Comorbidity  Goal: Blood Glucose Level Within Targeted Range  Outcome: Met     Problem: Wound  Goal: Optimal Coping  Outcome: Met  Goal: Optimal Functional Ability  Outcome: Met  Goal: Absence of Infection Signs and Symptoms  Outcome: Met  Goal: Improved Oral Intake  Outcome: Met  Goal: Optimal Pain Control and Function  Outcome: Met  Goal: Skin Health and Integrity  Outcome: Met  Goal: Optimal Wound Healing  Outcome: Met     Problem: Skin Injury Risk Increased  Goal: Skin Health and Integrity  Outcome: Met

## 2024-07-12 NOTE — SUBJECTIVE & OBJECTIVE
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       Current Facility-Administered Medications   Medication    acetaminophen tablet 650 mg    acetaminophen tablet 650 mg    aluminum-magnesium hydroxide-simethicone 200-200-20 mg/5 mL suspension 30 mL    cefepime 2 g in dextrose 5% 100 mL IVPB (ready to mix)    [START ON 7/13/2024] DAPTOmycin (CUBICIN) 825 mg in 0.9% NaCl SolP 50 mL IVPB    dextrose 50% injection 12.5 g    dextrose 50% injection 25 g    diphenhydrAMINE injection 25 mg    gabapentin capsule 300 mg    glucagon (human recombinant) injection 1 mg    glucose chewable tablet 16 g    glucose chewable tablet 24 g    heparin (porcine) injection 5,000 Units    HYDROcodone-acetaminophen 5-325 mg per tablet 1 tablet    HYDROmorphone injection 1 mg    insulin aspart U-100 pen 0-10 Units    magnesium oxide tablet 800 mg    magnesium oxide tablet 800 mg    melatonin tablet 6 mg    metronidazole IVPB 500 mg    naloxone 0.4 mg/mL injection 0.02 mg    nicotine 14 mg/24 hr 1 patch    NON FORMULARY MEDICATION 50 mg    ondansetron injection 4 mg    potassium bicarbonate disintegrating tablet 35 mEq    potassium bicarbonate disintegrating tablet 50 mEq    potassium bicarbonate disintegrating tablet 60 mEq    potassium, sodium phosphates 280-160-250 mg packet 2 packet    potassium, sodium phosphates 280-160-250 mg packet 2 packet    potassium, sodium phosphates 280-160-250 mg packet 2 packet    senna-docusate 8.6-50 mg per tablet 1 tablet    sodium chloride 0.9% flush 2 mL     Objective:     Vital Signs (Most Recent):  Temp: 98.1 °F (36.7 °C) (07/12/24 0834)  Pulse: 81 (07/12/24 0834)  Resp: 18 (07/12/24 0834)  BP: 115/76 (07/12/24 0834)  SpO2: 95 % (07/12/24 0834) Vital Signs (24h  "Range):  Temp:  [98.1 °F (36.7 °C)-98.7 °F (37.1 °C)] 98.1 °F (36.7 °C)  Pulse:  [70-98] 81  Resp:  [18-20] 18  SpO2:  [95 %-98 %] 95 %  BP: ()/(60-83) 115/76     Weight: (!) 137.8 kg (303 lb 12.7 oz)  Height: 5' 7" (170.2 cm)  Body mass index is 47.58 kg/m².    No intake or output data in the 24 hours ending 07/12/24 0901     General    Constitutional: She appears well-developed and well-nourished.   Pulmonary/Chest: Effort normal.   Neurological: She is alert.   Psychiatric: She has a normal mood and affect. Her behavior is normal. Judgment and thought content normal.         Back (L-Spine & T-Spine) / Neck (C-Spine) Exam     Back (L-Spine & T-Spine) Range of Motion   Lateral bend left:  normal   Rotation left:  normal     Spinal Sensation   Left Side Sensation  L-Spine Level: normal    Comments:  Can to lower back clean dry and intact.  Surgical incisions are well healed without wound dehiscence or drainage.  No surrounding erythema, ecchymosis, warmth, or fluctuance.  She is neurovascularly intact throughout bilateral lower extremities.  She can dorsiflex and plantar flex bilateral ankles without difficulty.  Bilateral EHLs are intact.  Negative Homans sign bilaterally.  Negative straight leg raise on the right.  Somewhat positive straight leg raise on the left.  She can actively perform a straight leg raise maneuver on her own with a left lower extremity.      Muscle Strength   Left Lower Extremity   Anterior tibial:  5/5   Gastrocsoleus:  5/5   EHL:  5/5       Significant Labs: All pertinent labs within the past 24 hours have been reviewed.    Significant Imaging: I have reviewed and interpreted all pertinent imaging results/findings.  "

## 2024-07-12 NOTE — ASSESSMENT & PLAN NOTE
Body mass index is 47.58 kg/m². Morbid obesity complicates all aspects of disease management from diagnostic modalities to treatment. Weight loss encouraged and health benefits explained to patient.

## 2024-07-15 ENCOUNTER — HOSPITAL ENCOUNTER (EMERGENCY)
Facility: HOSPITAL | Age: 34
Discharge: HOME | End: 2024-07-15
Attending: EMERGENCY MEDICINE
Payer: MEDICAID

## 2024-07-15 VITALS
DIASTOLIC BLOOD PRESSURE: 98 MMHG | SYSTOLIC BLOOD PRESSURE: 139 MMHG | HEART RATE: 66 BPM | WEIGHT: 293 LBS | OXYGEN SATURATION: 95 % | RESPIRATION RATE: 18 BRPM | TEMPERATURE: 99 F | BODY MASS INDEX: 48.08 KG/M2

## 2024-07-15 DIAGNOSIS — M54.16 LUMBAR RADICULOPATHY: Primary | ICD-10-CM

## 2024-07-15 LAB
BASOPHILS # BLD AUTO: 0.07 K/UL (ref 0–0.2)
BASOPHILS NFR BLD: 0.4 % (ref 0–1.9)
CRP SERPL-MCNC: 0.6 MG/DL
DIFFERENTIAL METHOD BLD: ABNORMAL
EOSINOPHIL # BLD AUTO: 0.1 K/UL (ref 0–0.5)
EOSINOPHIL NFR BLD: 0.4 % (ref 0–8)
ERYTHROCYTE [DISTWIDTH] IN BLOOD BY AUTOMATED COUNT: 14.6 % (ref 11.5–14.5)
ERYTHROCYTE [SEDIMENTATION RATE] IN BLOOD BY WESTERGREN METHOD: 50 MM/HR (ref 0–20)
HCT VFR BLD AUTO: 42.1 % (ref 37–48.5)
HGB BLD-MCNC: 13 G/DL (ref 12–16)
IMM GRANULOCYTES # BLD AUTO: 0.07 K/UL (ref 0–0.04)
IMM GRANULOCYTES NFR BLD AUTO: 0.4 % (ref 0–0.5)
LYMPHOCYTES # BLD AUTO: 4.8 K/UL (ref 1–4.8)
LYMPHOCYTES NFR BLD: 30.3 % (ref 18–48)
MCH RBC QN AUTO: 25.9 PG (ref 27–31)
MCHC RBC AUTO-ENTMCNC: 30.9 G/DL (ref 32–36)
MCV RBC AUTO: 84 FL (ref 82–98)
MONOCYTES # BLD AUTO: 0.8 K/UL (ref 0.3–1)
MONOCYTES NFR BLD: 5.3 % (ref 4–15)
NEUTROPHILS # BLD AUTO: 9.9 K/UL (ref 1.8–7.7)
NEUTROPHILS NFR BLD: 63.2 % (ref 38–73)
NRBC BLD-RTO: 0 /100 WBC
PLATELET # BLD AUTO: 372 K/UL (ref 150–450)
PMV BLD AUTO: 9.3 FL (ref 9.2–12.9)
RBC # BLD AUTO: 5.01 M/UL (ref 4–5.4)
WBC # BLD AUTO: 15.72 K/UL (ref 3.9–12.7)

## 2024-07-15 PROCEDURE — 96376 TX/PRO/DX INJ SAME DRUG ADON: CPT

## 2024-07-15 PROCEDURE — 96374 THER/PROPH/DIAG INJ IV PUSH: CPT

## 2024-07-15 PROCEDURE — 86140 C-REACTIVE PROTEIN: CPT

## 2024-07-15 PROCEDURE — 96375 TX/PRO/DX INJ NEW DRUG ADDON: CPT

## 2024-07-15 PROCEDURE — 63600175 PHARM REV CODE 636 W HCPCS

## 2024-07-15 PROCEDURE — 85025 COMPLETE CBC W/AUTO DIFF WBC: CPT

## 2024-07-15 PROCEDURE — 99285 EMERGENCY DEPT VISIT HI MDM: CPT | Mod: 25

## 2024-07-15 PROCEDURE — 85651 RBC SED RATE NONAUTOMATED: CPT

## 2024-07-15 RX ORDER — HYDROMORPHONE HYDROCHLORIDE 1 MG/ML
1 INJECTION, SOLUTION INTRAMUSCULAR; INTRAVENOUS; SUBCUTANEOUS
Status: COMPLETED | OUTPATIENT
Start: 2024-07-15 | End: 2024-07-15

## 2024-07-15 RX ORDER — HYDROMORPHONE HYDROCHLORIDE 1 MG/ML
0.5 INJECTION, SOLUTION INTRAMUSCULAR; INTRAVENOUS; SUBCUTANEOUS
Status: COMPLETED | OUTPATIENT
Start: 2024-07-15 | End: 2024-07-15

## 2024-07-15 RX ORDER — HYDROCODONE BITARTRATE AND ACETAMINOPHEN 7.5; 325 MG/1; MG/1
1 TABLET ORAL
Status: DISCONTINUED | OUTPATIENT
Start: 2024-07-15 | End: 2024-07-15

## 2024-07-15 RX ORDER — KETOROLAC TROMETHAMINE 30 MG/ML
15 INJECTION, SOLUTION INTRAMUSCULAR; INTRAVENOUS
Status: COMPLETED | OUTPATIENT
Start: 2024-07-15 | End: 2024-07-15

## 2024-07-15 RX ADMIN — HYDROMORPHONE HYDROCHLORIDE 0.5 MG: 0.5 INJECTION, SOLUTION INTRAMUSCULAR; INTRAVENOUS; SUBCUTANEOUS at 04:07

## 2024-07-15 RX ADMIN — KETOROLAC TROMETHAMINE 15 MG: 30 INJECTION, SOLUTION INTRAMUSCULAR; INTRAVENOUS at 02:07

## 2024-07-15 RX ADMIN — HYDROMORPHONE HYDROCHLORIDE 1 MG: 1 INJECTION, SOLUTION INTRAMUSCULAR; INTRAVENOUS; SUBCUTANEOUS at 09:07

## 2024-07-15 NOTE — ED PROVIDER NOTES
Encounter Date: 7/15/2024       History     Chief Complaint   Patient presents with    Back Pain     Had back surgery in April. States she is still having back pain, worse over the last few days.      34-year-old female past medical history of a lumbar fusion 11 weeks ago presents to the emergency department for back pain with left lower extremity numbness and weakness.  Patient states after her surgery she was doing fine and without pain and was actually able to return to work.  She was here in the emergency department on 07/11 due to the same issue.  She reports having difficulty getting around the house.Patient states that on 07/10 she started to experience extreme lower back pain with numbness and tingling down the left leg.  Here in the emergency department she was admitted and was given medication for pain, antibiotics, and steroids due to an MRI finding of a collection of fluid in the subcutaneous space that orthopedics noted as a seroma. Patient stated that she feels the exact same as she did on Thursday.  Denies bowel or bladder incontinence, fever, nausea, vomiting, saddle anesthesia, IV drug use        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     Past Medical History:   Diagnosis Date    Anxiety     Back pain     Bipolar disorder 2004    bipolar 2    Chronic bronchitis     Depression     Diabetes mellitus     GERD (gastroesophageal reflux disease)     HPV (human papilloma virus) infection     Insomnia     Migraines     Non-proliferative diabetic retinopathy, mild, both eyes 09/25/2023    Obese body habitus     Ovarian cyst     Pneumonia     PONV (postoperative nausea and vomiting)      Past Surgical History:   Procedure Laterality Date    ANTERIOR LUMBAR INTERBODY FUSION (ALIF) Bilateral 4/23/2024    Procedure: FUSION, SPINE, LUMBAR, ALIF;  Surgeon: Gurjit Cassidy MD;  Location: [facility];  Service: Orthopedics;   Laterality: Bilateral;    APPENDECTOMY      ARTHROSCOPY OF ANKLE Right 05/04/2022    Procedure: ARTHROSCOPY, ANKLE;  Surgeon: Bimal Mccormick DPM;  Location: [facility];  Service: Podiatry;  Laterality: Right;  CURT EXTERNAL ANKLE DISTRACTOR    CHOLECYSTECTOMY      DILATION AND CURETTAGE OF UTERUS      HYSTERECTOMY  2017    total, ovarian cysts, torsion, [place]; hyst per Dr JESUS Mnariquez    LAPAROSCOPIC APPENDECTOMY N/A 08/18/2021    Procedure: APPENDECTOMY, LAPAROSCOPIC;  Surgeon: Osiel Ramirez MD;  Location: [facility];  Service: General;  Laterality: N/A;    LUMBAR LAMINECTOMY WITH FUSION Bilateral 4/23/2024    Procedure: LAMINECTOMY, SPINE, LUMBAR, WITH FUSION;  Surgeon: Gurjit Cassidy MD;  Location: [facility];  Service: Orthopedics;  Laterality: Bilateral;    OOPHORECTOMY      opherectom Left 2015    salpingo-opherectomy    REPAIR OF LIGAMENT OF ANKLE Right 06/23/2021    Procedure: REPAIR OF ANTERIOR TALOFIBULAR LIGAMENT CALCANEOFIBULAR LIGAMENT;  Surgeon: Bimal Mccormick DPM;  Location: [facility];  Service: Podiatry;  Laterality: Right;  WITH ARTHREX INTERNAL BRACE    REPAIR OF LIGAMENT OF ANKLE Right 4/27/2023    Procedure: REPAIR, LIGAMENT, ANKLE;  Surgeon: Aryan Poole DPM;  Location: [facility];  Service: Podiatry;  Laterality: Right;    REPAIR OF TENDON OF LOWER EXTREMITY Right 4/27/2023    Procedure: REPAIR, TENDON, LOWER EXTREMITY;  Surgeon: Aryan Poole DPM;  Location: [facility];  Service: Podiatry;  Laterality: Right;  arthrex    SURGICAL REMOVAL OF BONE SPUR Right 06/23/2021    Procedure: EXCISION OS TRIGONUM;  Surgeon: Bimal Mccormick DPM;  Location: [facility];  Service: Podiatry;  Laterality: Right;    WISDOM TOOTH EXTRACTION       Family History   Adopted: Yes   Problem Relation Name Age of Onset    No Known Problems Sister      No Known Problems Sister      No Known Problems Sister       Social History     Tobacco Use    Smoking status: Every Day     Current packs/day: 1.50     Average  packs/day: 1.5 packs/day for 18.0 years (27.0 ttl pk-yrs)     Types: Cigarettes     Passive exposure: Current    Smokeless tobacco: Never    Tobacco comments:     4/25/23--pt instructed no smoking 24hours before sx, pt voiced understanding.   Substance Use Topics    Alcohol use: Not Currently     Comment: rare    Drug use: Not Currently     Comment: twice     Review of Systems   Constitutional:  Positive for activity change. Negative for fever.   Respiratory: Negative.     Cardiovascular: Negative.    Musculoskeletal:  Positive for back pain.   Neurological:         Numbness and tingling in left lower extremity       Physical Exam     Initial Vitals [07/15/24 0323]   BP Pulse Resp Temp SpO2   (!) 139/98 98 14 98.6 °F (37 °C) 96 %      MAP       --         Physical Exam    Vitals reviewed.  Constitutional: She appears well-developed and well-nourished. She is not diaphoretic. No distress.   HENT:   Head: Normocephalic.   Mouth/Throat: Oropharynx is clear and moist.   Eyes: Conjunctivae and EOM are normal.   Neck:   Normal range of motion.  Cardiovascular:  Normal rate, regular rhythm, normal heart sounds and intact distal pulses.           Pulmonary/Chest: Breath sounds normal.   Musculoskeletal:      Cervical back: Normal range of motion.      Comments: No discoloration, swelling, erythema, lesions on the back.  Surgical scar is healing very well.  Patient does have tenderness over the lumbar spine and paraspinal muscles.  No fluctuance or masses are palpated in the spine on physical exam.  Painful and limited range of motion.  Patient is struggling to lift her leg off of the bed or bend it.     Neurological: She is alert and oriented to person, place, and time. She has normal strength and normal reflexes.   Abnormal gait.  Patient is dragging her left leg on the ground due to pain with lifting the leg.  She is only restricted by pain.  Patient is able to lift the leg but chooses not to.  5/5 strength in lower  extremities.  Sensation intact throughout.         ED Course   Procedures  Labs Reviewed   CBC W/ AUTO DIFFERENTIAL - Abnormal; Notable for the following components:       Result Value    WBC 15.72 (*)     MCH 25.9 (*)     MCHC 30.9 (*)     RDW 14.6 (*)     Gran # (ANC) 9.9 (*)     Immature Grans (Abs) 0.07 (*)     All other components within normal limits   SEDIMENTATION RATE - Abnormal; Notable for the following components:    Sed Rate 50 (*)     All other components within normal limits   C-REACTIVE PROTEIN          Imaging Results              CT Lumbar Spine Without Contrast (Final result)  Result time 07/15/24 16:42:07      Final result by Cecelia Schwartz MD (07/15/24 16:42:07)                   Impression:      Status post recent instrumented posterior fusion from L4-S1 without complication    Subcutaneous fluid within the posterior midline soft tissues      Electronically signed by: Cecelia Schwartz  Date:    07/15/2024  Time:    16:42               Narrative:      CMS MANDATED QUALITY DATA - CT RADIATION - 436    All CT scans at this facility utilize dose modulation, iterative reconstruction, and/or weight based dosing when appropriate to reduce radiation dose to as low as reasonably achievable.    EXAMINATION:  CT LUMBAR SPINE WITHOUT CONTRAST    CLINICAL HISTORY:  Lumbar radiculopathy, prior surgery, new symptoms;    TECHNIQUE:  CT lumbar spine without obtained with coronal and sagittal reformations.    COMPARISON:  MRI dated 07/11/2024    FINDINGS:  Axial images of the lumbar spine were obtained with sagittal and coronal reformatted images.    The lumbar spine is in satisfactory alignment.  The vertebral bodies are normal height.  There has been posterior instrumented lumbar fusion from L4-S1 with bilateral transpedicular screws and vertical rods.  There is an interbody disc expander at L4-5.  The hardware is intact.    At T12-L1, L1-2, there is no significant abnormality    At L2-3 there is facet  hypertrophy without central canal stenosis or foraminal narrowing    At L3-4 there is facet hypertrophy without central canal stenosis or foraminal narrowing    At L4-5 prior posterior fusion and decompression without central canal stenosis    At L5-S1 prior posterior fusion without central canal stenosis or foraminal narrowing    The paraspinous soft tissues are normal.    There is subcutaneous fluid within the midline posterior soft tissues.                                       Medications   HYDROmorphone injection 1 mg (1 mg Intravenous Given 7/15/24 0956)   ketorolac injection 15 mg (15 mg Intravenous Given 7/15/24 1414)   HYDROmorphone injection 0.5 mg (0.5 mg Intravenous Given 7/15/24 1631)     Medical Decision Making  34-year-old female with past medical history of lumbar fusion 11 weeks ago presents to the emergency department for back pain with numbness and tingling radiating to the left leg.  Considerations include but not limited to lumbar radiculopathy, postop complications, cauda equina, herniated disc  Upon arrival to the emergency department patient's vitals were stable and she is afebrile.  Patient is not experiencing bowel or bladder incontinence or fever.  Dr. Gurjit Cassidy who performed her surgery was consulted.  Dr. Cassidy requested that a CT of the lumbar spine be performed to ensure that pedicle screws were still in place.  Also requested a CBC, sed rate, CRP. CBC showed a white count at 15, CRP unremarkable, sed rate at 50.  CT of the lumbar spine shows that there is a subcutaneous fluid with in the midline posterior soft tissue which correlates with the MRI previously done on 07/11 in the emergency department. Status post recent instrumented posterior fusion from L4-S1 shows no complications.  Patient has been informed of these results and she has verbalized her understanding.  Patient was given 2 doses of Dilaudid in the emergency department as well as an injection of Toradol.  Dr. Cassidy  has requested to see her at 8:00 a.m. on Wednesday.  The plan has been discussed with the patient as well as my attending and all questions were answered at the bedside.    Amount and/or Complexity of Data Reviewed  Labs: ordered.  Radiology: ordered.    Risk  Prescription drug management.              Attending Attestation:             Attending ED Notes:   I discussed the case with the ANGELO, agree with the plan.  I did not see the patient.                               Clinical Impression:  Final diagnoses:  [M54.16] Lumbar radiculopathy (Primary)          ED Disposition Condition    Discharge Stable          ED Prescriptions    None       Follow-up Information       Follow up With Specialties Details Why Contact Info    Magalie Euceda MD Family Medicine Call   [address]  [address]  [place] [address]  [phone]      Gurjit Cassidy MD Orthopedic Surgery, Surgery   [address]  [address]  [place] [address]  [phone]               Astrid Rubio PA-C  07/15/24 8437       Bishnu Osei MD  07/15/24 3151

## 2024-07-15 NOTE — DISCHARGE INSTRUCTIONS
Please keep follow-up appointment with Dr. Cassidy for Wednesday July 17th.  Continue taking steroids and antibiotics until the course is complete.  Return to the emergency department if your symptoms worsen.

## 2024-07-15 NOTE — ED NOTES
Asked pt for urine sample for pregnancy check, pt stated that she has had a 'full hysterectomy' and has declined to provide sample at this time.

## 2024-07-17 ENCOUNTER — OFFICE VISIT (OUTPATIENT)
Dept: ORTHOPEDICS | Facility: CLINIC | Age: 34
End: 2024-07-17
Payer: MEDICAID

## 2024-07-17 ENCOUNTER — HOSPITAL ENCOUNTER (OUTPATIENT)
Dept: RADIOLOGY | Facility: HOSPITAL | Age: 34
Discharge: HOME | End: 2024-07-17
Attending: ORTHOPAEDIC SURGERY
Payer: MEDICAID

## 2024-07-17 VITALS — WEIGHT: 293 LBS | HEIGHT: 67 IN | BODY MASS INDEX: 45.99 KG/M2

## 2024-07-17 DIAGNOSIS — T84.216A HARDWARE FAILURE OF ANTERIOR COLUMN OF SPINE: Primary | ICD-10-CM

## 2024-07-17 DIAGNOSIS — Z98.1 S/P LUMBAR SPINAL FUSION: ICD-10-CM
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
## 2024-07-17 LAB
BACTERIA BLD CULT: NORMAL
BACTERIA BLD CULT: NORMAL

## 2024-07-17 PROCEDURE — 99024 POSTOP FOLLOW-UP VISIT: CPT | Mod: ,,, | Performed by: ORTHOPAEDIC SURGERY

## 2024-07-17 PROCEDURE — 72100 X-RAY EXAM L-S SPINE 2/3 VWS: CPT | Mod: TC,PN

## 2024-07-17 PROCEDURE — 99999 PR PBB SHADOW E&M-EST. PATIENT-LVL III: CPT | Mod: PBBFAC,,, | Performed by: ORTHOPAEDIC SURGERY

## 2024-07-17 PROCEDURE — 1160F RVW MEDS BY RX/DR IN RCRD: CPT | Mod: CPTII,,, | Performed by: ORTHOPAEDIC SURGERY

## 2024-07-17 PROCEDURE — 1159F MED LIST DOCD IN RCRD: CPT | Mod: CPTII,,, | Performed by: ORTHOPAEDIC SURGERY

## 2024-07-17 PROCEDURE — 72100 X-RAY EXAM L-S SPINE 2/3 VWS: CPT | Mod: 26,,, | Performed by: RADIOLOGY

## 2024-07-17 PROCEDURE — 99213 OFFICE O/P EST LOW 20 MIN: CPT | Mod: PBBFAC,25,PN | Performed by: ORTHOPAEDIC SURGERY

## 2024-07-17 PROCEDURE — 4010F ACE/ARB THERAPY RXD/TAKEN: CPT | Mod: CPTII,,, | Performed by: ORTHOPAEDIC SURGERY

## 2024-07-17 RX ORDER — OXYCODONE AND ACETAMINOPHEN 7.5; 325 MG/1; MG/1
1 TABLET ORAL EVERY 6 HOURS PRN
Qty: 28 TABLET | Refills: 0 | Status: SHIPPED | OUTPATIENT
Start: 2024-07-17 | End: 2024-07-25

## 2024-07-17 NOTE — PROGRESS NOTES
Subjective:    Patient ID: Loretta Lea is a 34 y.o. female.    Chief Complaint: Post-op Evaluation of the Lumbar Spine (Patient is here for a PO L45 ALIF, L4-5, L5-S1 PLF 4/23/24./hospital follow up from discharge 07/12/24, states her pain is not controlled)      History of Present Illness    Prior to meeting with the patient I reviewed the medical chart in [facility]. This included reviewing the previous progress notes from our office, review of the patient's last appointment with their primary care provider, review of any visits to the emergency room, and review of any pain management appointments or procedures.  Status post anterior posterior lumbar fusion L4-5 and posterior lumbar fusion with transforaminal interbody fusion L5-S1.  Surgery was performed April 24th.  Patient has been seen in the emergency room twice for intractable pain.  She fell recently and has had a worsening of her pain in her back and leg.    Current Medications  Current Outpatient Medications   Medication Sig Dispense Refill    blood sugar diagnostic Strp Test blood sugar 4 (four) times daily before meals and nightly. 200 each 5    blood-glucose meter (ACCU-CHEK GUIDE GLUCOSE METER) [facility] test blood sugar 1 each 0    cetirizine (ZYRTEC) 10 MG tablet Take 1 tablet (10 mg total) by mouth daily as needed for Allergies. 30 tablet 0    diclofenac (VOLTAREN) 50 MG EC tablet Take 1 tablet (50 mg total) by mouth 2 (two) times daily. for 7 days 14 tablet 0    doxycycline (VIBRAMYCIN) 100 MG Cap Take 1 capsule (100 mg total) by mouth 2 (two) times daily. for 7 days 14 capsule 0    gabapentin (NEURONTIN) 300 MG capsule Take 1 capsule (300 mg total) by mouth 3 (three) times daily. 90 capsule 0    HYDROcodone-acetaminophen (NORCO) 7.5-325 mg per tablet Take 1 tablet by mouth every 8 (eight) hours as needed for Pain. 21 tablet 0    lancets (ACCU-CHEK SOFTCLIX LANCETS) Misc Test blood sugar 4 (four) times daily before meals and nightly. 200 each 5     losartan (COZAAR) 25 MG tablet Take 1 tablet (25 mg total) by mouth once daily. 30 tablet 5    metFORMIN (GLUCOPHAGE-XR) 500 MG ER 24hr tablet Take 2 tablets (1,000 mg total) by mouth 2 (two) times daily with meals. 120 tablet 2    methylPREDNISolone (MEDROL DOSEPACK) 4 mg tablet use as directed 21 each 0    nicotine (NICODERM CQ) 14 mg/24 hr Place 1 patch onto the skin once daily. 28 patch 0    ondansetron (ZOFRAN-ODT) 4 MG TbDL Take 1 tablet (4 mg total) by mouth every 6 (six) hours as needed (nausea or vomiting). 9 tablet 0    pyridoxine, vitamin B6, (B-6) 25 MG Tab Take 25 mg by mouth once daily.      risperiDONE (RISPERDAL) 1 MG tablet Take 1 tablet (1 mg total) by mouth 2 (two) times daily. 60 tablet 5    sertraline (ZOLOFT) 100 MG tablet Take 1.5 tablets (150 mg total) by mouth once daily. 135 tablet 0    sumatriptan (IMITREX) 100 MG tablet Take 1 tab PO at first sign of migraine. If not effective, repeat dose in 2 hours. Do not take more than 200mg in 24 hours. 12 tablet 1    traZODone (DESYREL) 100 MG tablet TAKE 1 TO 2 TABLETS BY MOUTH EVERY NIGHT AT BEDTIME AS NEEDED FOR INSOMNIA 60 tablet 5     No current facility-administered medications for this visit.       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       Past Medical History  Past Medical History:   Diagnosis Date    Anxiety     Back pain     Bipolar disorder 2004    bipolar 2    Chronic bronchitis     Depression     Diabetes mellitus     GERD (gastroesophageal reflux disease)     HPV (human papilloma virus) infection     Insomnia     Migraines     Non-proliferative diabetic retinopathy, mild, both eyes 09/25/2023    Obese body habitus     Ovarian cyst     Pneumonia     PONV (postoperative nausea and vomiting)        Surgical History  Past Surgical History:   Procedure Laterality Date    ANTERIOR LUMBAR INTERBODY FUSION (ALIF) Bilateral 4/23/2024     Procedure: FUSION, SPINE, LUMBAR, ALIF;  Surgeon: Gurjit Cassidy MD;  Location: [facility];  Service: Orthopedics;  Laterality: Bilateral;    APPENDECTOMY      ARTHROSCOPY OF ANKLE Right 05/04/2022    Procedure: ARTHROSCOPY, ANKLE;  Surgeon: Bimal Mccormick DPM;  Location: [facility];  Service: Podiatry;  Laterality: Right;  CURT EXTERNAL ANKLE DISTRACTOR    CHOLECYSTECTOMY      DILATION AND CURETTAGE OF UTERUS      HYSTERECTOMY  2017    total, ovarian cysts, torsion, [place]; hyst per Dr JESUS Manriquez    LAPAROSCOPIC APPENDECTOMY N/A 08/18/2021    Procedure: APPENDECTOMY, LAPAROSCOPIC;  Surgeon: Osiel Ramirez MD;  Location: [facility];  Service: General;  Laterality: N/A;    LUMBAR LAMINECTOMY WITH FUSION Bilateral 4/23/2024    Procedure: LAMINECTOMY, SPINE, LUMBAR, WITH FUSION;  Surgeon: Gurjit Cassidy MD;  Location: [facility];  Service: Orthopedics;  Laterality: Bilateral;    OOPHORECTOMY      opherectom Left 2015    salpingo-opherectomy    REPAIR OF LIGAMENT OF ANKLE Right 06/23/2021    Procedure: REPAIR OF ANTERIOR TALOFIBULAR LIGAMENT CALCANEOFIBULAR LIGAMENT;  Surgeon: Bimal Mccormick DPM;  Location: [facility];  Service: Podiatry;  Laterality: Right;  WITH ARTHREX INTERNAL BRACE    REPAIR OF LIGAMENT OF ANKLE Right 4/27/2023    Procedure: REPAIR, LIGAMENT, ANKLE;  Surgeon: Aryan Poole DPM;  Location: [facility];  Service: Podiatry;  Laterality: Right;    REPAIR OF TENDON OF LOWER EXTREMITY Right 4/27/2023    Procedure: REPAIR, TENDON, LOWER EXTREMITY;  Surgeon: Aryan Poole DPM;  Location: [facility];  Service: Podiatry;  Laterality: Right;  arthrex    SURGICAL REMOVAL OF BONE SPUR Right 06/23/2021    Procedure: EXCISION OS TRIGONUM;  Surgeon: Bimal Mccormick DPM;  Location: [facility];  Service: Podiatry;  Laterality: Right;    WISDOM TOOTH EXTRACTION         Family History:   Family History   Adopted: Yes   Problem Relation Name Age of Onset    No Known Problems Sister      No Known Problems Sister      No  Known Problems Sister         Social History:   Social History     Socioeconomic History    Marital status:     Number of children: 0   Tobacco Use    Smoking status: Every Day     Current packs/day: 1.50     Average packs/day: 1.5 packs/day for 18.0 years (27.0 ttl pk-yrs)     Types: Cigarettes     Passive exposure: Current    Smokeless tobacco: Never    Tobacco comments:     4/25/23--pt instructed no smoking 24hours before sx, pt voiced understanding.   Substance and Sexual Activity    Alcohol use: Not Currently     Comment: rare    Drug use: Not Currently     Comment: twice    Sexual activity: Yes     Partners: Male     Birth control/protection: OCP, None     Social Determinants of Health     Financial Resource Strain: High Risk (4/23/2024)    Overall Financial Resource Strain (CARDIA)     Difficulty of Paying Living Expenses: Very hard   Food Insecurity: Food Insecurity Present (4/23/2024)    Hunger Vital Sign     Worried About Running Out of Food in the Last Year: Often true     Ran Out of Food in the Last Year: Often true   Transportation Needs: No Transportation Needs (4/23/2024)    PRAPARE - Transportation     Lack of Transportation (Medical): No     Lack of Transportation (Non-Medical): No   Stress: Stress Concern Present (4/23/2024)    [employer] - Occupational Stress Questionnaire     Feeling of Stress : Very much   Housing Stability: Low Risk  (4/23/2024)    Housing Stability Vital Sign     Unable to Pay for Housing in the Last Year: No     Homeless in the Last Year: No       Date of surgery:     Review of Systems     General/Constitutional: Chills denies. Fatigue denies. Fever denies. Weight gain denies. Weight loss denies.    Musculoskeletal: Comments: See HPI for details    Skin: Rash denies.    Objective:   Vital Signs: There were no vitals filed for this visit.     Physical Exam    This a well-developed, well nourished patient in no acute distress.  They are  alert and oriented and cooperative to examination.  Pt. walks without an antalgic gait.      General Examination:     Constitutional: The patient is alert and oriented to lace person and time. Mood is pleasant.     Head/Face: Normal facial features normal eyebrows    Eyes: Normal extraocular motion bilaterally    Lungs: Respirations are equal and unlabored    Gait is coordinated.    Cardiovascular: There are no swelling or varicosities present.    Lymphatic: Negative for adenopathy    Skin: Normal    Neurological: Level of consciousness normal. Oriented to place person and time and situation    Psychiatric: Oriented to time place person and situation    Patient stand erect has pain when doing so moderate bilateral paraspinous spasm range of motion limited due to pain motor exam grossly normal all major muscle groups both lower extremities no edema adenopathy varicosities.  XRAY Report/ Interpretation:  AP and lateral x-rays of lumbar spine will performed today. Indications low back pain. Findings:  Instrumented lumbar fusion L4-S1 some lucencies about the pedicle screws at L5.  Interbody device L 4 5.      Lumbar CT was personally reviewed I agree radiologist's interpretation please see report for details    ar Spine Without Contrast  CT Lumbar Spine Without Contrast  Order: [id number]  Status: Final result       Visible to patient: Yes (not seen)       Next appt: 08/12/2024 at 08:00 AM in Orthopedics (Gurjit Cassidy MD)    0 Result Notes  Details    Reading Physician Reading Date Result Priority   Cecelia Schwartz MD  [phone] 7/15/2024 STAT     Narrative & Impression     CMS MANDATED QUALITY DATA - CT RADIATION - 436     All CT scans at this facility utilize dose modulation, iterative reconstruction, and/or weight based dosing when appropriate to reduce radiation dose to as low as reasonably achievable.     EXAMINATION:  CT LUMBAR SPINE WITHOUT CONTRAST     CLINICAL HISTORY:  Lumbar radiculopathy, prior  surgery, new symptoms;     TECHNIQUE:  CT lumbar spine without obtained with coronal and sagittal reformations.     COMPARISON:  MRI dated 07/11/2024     FINDINGS:  Axial images of the lumbar spine were obtained with sagittal and coronal reformatted images.     The lumbar spine is in satisfactory alignment.  The vertebral bodies are normal height.  There has been posterior instrumented lumbar fusion from L4-S1 with bilateral transpedicular screws and vertical rods.  There is an interbody disc expander at L4-5.  The hardware is intact.     At T12-L1, L1-2, there is no significant abnormality     At L2-3 there is facet hypertrophy without central canal stenosis or foraminal narrowing     At L3-4 there is facet hypertrophy without central canal stenosis or foraminal narrowing     At L4-5 prior posterior fusion and decompression without central canal stenosis     At L5-S1 prior posterior fusion without central canal stenosis or foraminal narrowing     The paraspinous soft tissues are normal.     There is subcutaneous fluid within the midline posterior soft tissues.     Impression:     Status post recent instrumented posterior fusion from L4-S1 without complication     Subcutaneous fluid within the posterior midline soft tissues        Electronically signed by:Cecelia Schwartz  Date:                                            07/15/2024  Time:                                           16:42           Exam Ended: 07/15/24 16:34 CDT Last Resulted: 07/15/24 16:42 CDT                      Assessment:       1. 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 S/P lumbar spinal fusion    3. Fall, initial encounter        Plan:       Loretta was seen today for post-op evaluation.    Diagnoses and all orders for this visit:    Hardware failure of anterior column of spine    S/P lumbar spinal fusion  -     X-Ray Lumbar Spine Ap And Lateral    Fall, initial encounter         No follow-ups on file.    Patient appears to have some  loosened L5 pedicle screws probably from micro motion at the L5-S1 level.  Her x-rays show that the posterolateral fusion is starting to calcify however she may need further support up anteriorly we have talked about options of observation at this time to see if improvement of the fusion at L5-S1 will lessen her axial back pain or proceeding with an anterior interbody fusion at L5-S1 and revision of the hardware at L5 she would rather give a little bit more time and therefore we will have her back in 1 month at that time we will get standing flexion-extension lateral x-rays of the lumbar spine  Treatment options were discussed with regards to the nature of the medical condition. Conservative pain intervention and surgical options were discussed in detail. The probability of success of each separate treatment option was discussed. The patient expressed a clear understanding of the treatment options. With regards to surgery, the procedure risk, benefits, complications, and outcomes were discussed. No guarantees were given with regards to surgical outcome.   The risk of complications, morbidity, and mortality of patient management decisions have been made at the time of this visit. These are associated with the patient's problems, diagnostic procedures and treatment options. This includes the possible management options selected and those considered but not selected by the patient after shared medical decision making we discussed with the patient.     This note was created using Dragon voice recognition software that occasionally misinterpreted phrases or words.

## 2024-07-24 ENCOUNTER — HOSPITAL ENCOUNTER (OUTPATIENT)
Dept: RADIOLOGY | Facility: HOSPITAL | Age: 34
Discharge: HOME | End: 2024-07-24
Attending: ORTHOPAEDIC SURGERY
Payer: MEDICAID

## 2024-07-24 ENCOUNTER — OFFICE VISIT (OUTPATIENT)
Dept: ORTHOPEDICS | Facility: CLINIC | Age: 34
End: 2024-07-24
Payer: MEDICAID

## 2024-07-24 VITALS — HEIGHT: 67 IN | BODY MASS INDEX: 45.99 KG/M2 | WEIGHT: 293 LBS
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
## 2024-07-24 DIAGNOSIS — T84.216A HARDWARE FAILURE OF ANTERIOR COLUMN OF SPINE: Primary | ICD-10-CM

## 2024-07-24 DIAGNOSIS — Z98.1 S/P LUMBAR SPINAL FUSION: ICD-10-CM

## 2024-07-24 PROCEDURE — 99999 PR PBB SHADOW E&M-EST. PATIENT-LVL III: CPT | Mod: PBBFAC,,, | Performed by: ORTHOPAEDIC SURGERY

## 2024-07-24 PROCEDURE — 99213 OFFICE O/P EST LOW 20 MIN: CPT | Mod: PBBFAC,25,PN | Performed by: ORTHOPAEDIC SURGERY

## 2024-07-24 PROCEDURE — 72120 X-RAY BEND ONLY L-S SPINE: CPT | Mod: 26,,, | Performed by: RADIOLOGY

## 2024-07-24 PROCEDURE — 72120 X-RAY BEND ONLY L-S SPINE: CPT | Mod: TC,PN

## 2024-07-24 NOTE — PROGRESS NOTES
Subjective:       Patient ID: Loretta Lea is a 34 y.o. female.    Chief Complaint: Pain of the Lumbar Spine (Patient is here for a f/u for L4-5 ALIF, L4-5, L5-S1 PLF 4.23.24. Has increased pain along with numbness and tingling. Also has burning and shooting pain )      History of Present Illness    Prior to meeting with the patient I reviewed the medical chart in [facility]. This included reviewing the previous progress notes from our office, review of the patient's last appointment with their primary care provider, review of any visits to the emergency room, and review of any pain management appointments or procedures.   Status post combined anterior posterior lumbar fusion L4 to sacrum performed April 20, 2024 with interbody device L4-5.  Patient has had several falls since surgery and has noted some increasing back pain has some dysesthesias in the left side likely from her lateral surgical approach that persists back pain though his more severe no bowel or bladder incontinence.  Had previous CT scan and MRI suggesting of some early lucencies of the L5 screws suggesting micro motion that the L5-S1 level but we were trying to hold off on additional surgery.  Pain has become more unbearable.  Requires stronger analgesics.    Current Medications  Current Outpatient Medications   Medication Sig Dispense Refill    blood sugar diagnostic Strp Test blood sugar 4 (four) times daily before meals and nightly. 200 each 5    blood-glucose meter (ACCU-CHEK GUIDE GLUCOSE METER) [facility] Use to test blood sugar 1 each 0    cetirizine (ZYRTEC) 10 MG tablet Take 1 tablet (10 mg total) by mouth daily as needed for Allergies. 30 tablet 0    gabapentin (NEURONTIN) 300 MG capsule Take 1 capsule (300 mg total) by mouth 3 (three) times daily. 90 capsule 0    lancets (ACCU-CHEK SOFTCLIX LANCETS) Misc Test blood sugar 4 (four) times daily before meals and nightly. 200 each 5    losartan (COZAAR) 25 MG tablet Take 1 tablet (25 mg total) by  mouth once daily. 30 tablet 5    metFORMIN (GLUCOPHAGE-XR) 500 MG ER 24hr tablet Take 2 tablets (1,000 mg total) by mouth 2 (two) times daily with meals. 120 tablet 2    methylPREDNISolone (MEDROL DOSEPACK) 4 mg tablet use as directed 21 each 0    nicotine (NICODERM CQ) 14 mg/24 hr Place 1 patch onto the skin once daily. 28 patch 0    ondansetron (ZOFRAN-ODT) 4 MG TbDL Take 1 tablet (4 mg total) by mouth every 6 (six) hours as needed (nausea or vomiting). 9 tablet 0    oxyCODONE-acetaminophen (PERCOCET) 7.5-325 mg per tablet Take 1 tablet by mouth every 6 (six) hours as needed for Pain. 28 tablet 0    pyridoxine, vitamin B6, (B-6) 25 MG Tab Take 25 mg by mouth once daily.      risperiDONE (RISPERDAL) 1 MG tablet Take 1 tablet (1 mg total) by mouth 2 (two) times daily. 60 tablet 5    sertraline (ZOLOFT) 100 MG tablet Take 1.5 tablets (150 mg total) by mouth once daily. 135 tablet 0    sumatriptan (IMITREX) 100 MG tablet Take 1 tab PO at first sign of migraine. If not effective, repeat dose in 2 hours. Do not take more than 200mg in 24 hours. 12 tablet 1    traZODone (DESYREL) 100 MG tablet TAKE 1 TO 2 TABLETS BY MOUTH EVERY NIGHT AT BEDTIME AS NEEDED FOR INSOMNIA 60 tablet 5     No current facility-administered medications for this visit.       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       Past Medical History  Past Medical History:   Diagnosis Date    Anxiety     Back pain     Bipolar disorder 2004    bipolar 2    Chronic bronchitis     Depression     Diabetes mellitus     GERD (gastroesophageal reflux disease)     HPV (human papilloma virus) infection     Insomnia     Migraines     Non-proliferative diabetic retinopathy, mild, both eyes 09/25/2023    Obese body habitus     Ovarian cyst     Pneumonia     PONV (postoperative nausea and vomiting)        Surgical History  Past Surgical History:   Procedure Laterality  Date    ANTERIOR LUMBAR INTERBODY FUSION (ALIF) Bilateral 4/23/2024    Procedure: FUSION, SPINE, LUMBAR, ALIF;  Surgeon: Gurjit Cassidy MD;  Location: [facility];  Service: Orthopedics;  Laterality: Bilateral;    APPENDECTOMY      ARTHROSCOPY OF ANKLE Right 05/04/2022    Procedure: ARTHROSCOPY, ANKLE;  Surgeon: Bimal Mccormick DPM;  Location: [facility];  Service: Podiatry;  Laterality: Right;  CURT EXTERNAL ANKLE DISTRACTOR    CHOLECYSTECTOMY      DILATION AND CURETTAGE OF UTERUS      HYSTERECTOMY  2017    total, ovarian cysts, torsion, [place]; hyst per Dr JESUS Manriquez    LAPAROSCOPIC APPENDECTOMY N/A 08/18/2021    Procedure: APPENDECTOMY, LAPAROSCOPIC;  Surgeon: Osiel Ramirez MD;  Location: [facility];  Service: General;  Laterality: N/A;    LUMBAR LAMINECTOMY WITH FUSION Bilateral 4/23/2024    Procedure: LAMINECTOMY, SPINE, LUMBAR, WITH FUSION;  Surgeon: Gurjit Cassidy MD;  Location: [facility];  Service: Orthopedics;  Laterality: Bilateral;    OOPHORECTOMY      opherectom Left 2015    salpingo-opherectomy    REPAIR OF LIGAMENT OF ANKLE Right 06/23/2021    Procedure: REPAIR OF ANTERIOR TALOFIBULAR LIGAMENT CALCANEOFIBULAR LIGAMENT;  Surgeon: Bimal Mccormick DPM;  Location: [facility];  Service: Podiatry;  Laterality: Right;  WITH ARTHREX INTERNAL BRACE    REPAIR OF LIGAMENT OF ANKLE Right 4/27/2023    Procedure: REPAIR, LIGAMENT, ANKLE;  Surgeon: Aryan Poole DPM;  Location: [facility];  Service: Podiatry;  Laterality: Right;    REPAIR OF TENDON OF LOWER EXTREMITY Right 4/27/2023    Procedure: REPAIR, TENDON, LOWER EXTREMITY;  Surgeon: Aryan Poole DPM;  Location: [facility];  Service: Podiatry;  Laterality: Right;  arthrex    SURGICAL REMOVAL OF BONE SPUR Right 06/23/2021    Procedure: EXCISION OS TRIGONUM;  Surgeon: Bimal Mccormick DPM;  Location: [facility];  Service: Podiatry;  Laterality: Right;    WISDOM TOOTH EXTRACTION         Family History:   Family History   Adopted: Yes   Problem Relation Name Age of  Onset    No Known Problems Sister      No Known Problems Sister      No Known Problems Sister         Social History:   Social History     Socioeconomic History    Marital status:     Number of children: 0   Tobacco Use    Smoking status: Every Day     Current packs/day: 1.50     Average packs/day: 1.5 packs/day for 18.0 years (27.0 ttl pk-yrs)     Types: Cigarettes     Passive exposure: Current    Smokeless tobacco: Never    Tobacco comments:     4/25/23--pt instructed no smoking 24hours before sx, pt voiced understanding.   Substance and Sexual Activity    Alcohol use: Not Currently     Comment: rare    Drug use: Not Currently     Comment: twice    Sexual activity: Yes     Partners: Male     Birth control/protection: OCP, None     Social Determinants of Health     Financial Resource Strain: High Risk (4/23/2024)    Overall Financial Resource Strain (CARDIA)     Difficulty of Paying Living Expenses: Very hard   Food Insecurity: Food Insecurity Present (4/23/2024)    Hunger Vital Sign     Worried About Running Out of Food in the Last Year: Often true     Ran Out of Food in the Last Year: Often true   Transportation Needs: No Transportation Needs (4/23/2024)    PRAPARE - Transportation     Lack of Transportation (Medical): No     Lack of Transportation (Non-Medical): No   Stress: Stress Concern Present (4/23/2024)    [employer] - Occupational Stress Questionnaire     Feeling of Stress : Very much   Housing Stability: Low Risk  (4/23/2024)    Housing Stability Vital Sign     Unable to Pay for Housing in the Last Year: No     Homeless in the Last Year: No       Hospitalization/Major Diagnostic Procedure:     Review of Systems     General/Constitutional:  Chills denies. Fatigue denies. Fever denies. Weight gain denies. Weight loss denies.    Respiratory:  Shortness of breath denies.    Cardiovascular:  Chest pain denies.    Gastrointestinal:  Constipation denies. Diarrhea denies.  Nausea denies. Vomiting denies.     Hematology:  Easy bruising denies. Prolonged bleeding denies.     Genitourinary:  Frequent urination denies. Pain in lower back denies. Painful urination denies.     Musculoskeletal:  See HPI for details    Skin:  Rash denies.    Neurologic:  Dizziness denies. Gait abnormalities denies. Seizures denies. Tingling/Numbess denies.    Psychiatric:  Anxiety denies. Depressed mood denies.     Objective:   Vital Signs: There were no vitals filed for this visit.     Physical Exam      General Examination:     Constitutional: The patient is alert and oriented to lace person and time. Mood is pleasant.     Head/Face: Normal facial features normal eyebrows    Eyes: Normal extraocular motion bilaterally    Lungs: Respirations are equal and unlabored    Gait is coordinated.    Cardiovascular: There are no swelling or varicosities present.    Lymphatic: Negative for adenopathy    Skin: Normal    Neurological: Level of consciousness normal. Oriented to place person and time and situation    Psychiatric: Oriented to time place person and situation    Patient stand erect has pain when doing so well-healed lateral and posterior incisions mild paraspinous muscle spasm lumbar range of motion restricted due to pain straight leg raising maneuver causes increased back pain.  No edema adenopathy varicosities.  XRAY Report/ Interpretation:  Standing flexion-extension lateral x-rays were taken today and interpreted.  Findings:  No significant motion demonstrated on flexion-extension lateral views from L4-S1    Results of previous CT scan and lumbar MRI are is listed below I agree radiologist's interpretation       Lumbar CT was personally reviewed I agree radiologist's interpretation please see report for details     ar Spine Without Contrast  CT Lumbar Spine Without Contrast  Order: [id number]  Status: Final result       Visible to patient: Yes (not seen)       Next appt: 08/12/2024 at 08:00 AM in  Orthopedics (Gurjit Cassidy MD)    0 Result Notes  Details     Reading Physician Reading Date Result Priority   Cecelia Schwartz MD  [phone] 7/15/2024 STAT      Narrative & Impression     CMS MANDATED QUALITY DATA - CT RADIATION - 436     All CT scans at this facility utilize dose modulation, iterative reconstruction, and/or weight based dosing when appropriate to reduce radiation dose to as low as reasonably achievable.     EXAMINATION:  CT LUMBAR SPINE WITHOUT CONTRAST     CLINICAL HISTORY:  Lumbar radiculopathy, prior surgery, new symptoms;     TECHNIQUE:  CT lumbar spine without obtained with coronal and sagittal reformations.     COMPARISON:  MRI dated 07/11/2024     FINDINGS:  Axial images of the lumbar spine were obtained with sagittal and coronal reformatted images.     The lumbar spine is in satisfactory alignment.  The vertebral bodies are normal height.  There has been posterior instrumented lumbar fusion from L4-S1 with bilateral transpedicular screws and vertical rods.  There is an interbody disc expander at L4-5.  The hardware is intact.     At T12-L1, L1-2, there is no significant abnormality     At L2-3 there is facet hypertrophy without central canal stenosis or foraminal narrowing     At L3-4 there is facet hypertrophy without central canal stenosis or foraminal narrowing     At L4-5 prior posterior fusion and decompression without central canal stenosis     At L5-S1 prior posterior fusion without central canal stenosis or foraminal narrowing     The paraspinous soft tissues are normal.     There is subcutaneous fluid within the midline posterior soft tissues.     Impression:     Status post recent instrumented posterior fusion from L4-S1 without complication     Subcutaneous fluid within the posterior midline soft tissues        Electronically signed by:Cecelia Schwartz  Date:                                            07/15/2024  Time:                                           16:42              Exam Ended: 07/15/24 16:34 CDT Last Resulted: 07/15/24 16:42 CDT                    bar Spine W WO Cont  MRI Lumbar Spine W WO Cont  Order: [id number]  Status: Final result       Visible to patient: Yes (not seen)       Next appt: 08/12/2024 at 08:00 AM in Orthopedics (Gurjit Cassidy MD)    0 Result Notes  Details    Reading Physician Reading Date Result Priority   Heraclio Mariano MD  [phone]  [phone] 7/11/2024 STAT     Narrative & Impression  EXAMINATION:  MRI LUMBAR SPINE W WO CONTRAST     CLINICAL HISTORY:  Low back pain, cauda equina syndrome suspected;     TECHNIQUE:  Multiplanar, multisequence imaging of the lumbar spine was performed before and after the administration of 13 cc gadolinium based IV contrast.     COMPARISON:  Lumbar spine radiograph series 07/11/2024, MRI lumbar spine 03/07/2024     FINDINGS:  There is postoperative change of prior posterior decompression and posterior instrumented fusion at L4 through S1.  Please note previously questioned lucency about the L5 pedicle screws is better evaluated on prior radiograph examination.  There is an interbody spacer device at L4-L5.  There is soft tissue enhancement within the postoperative bed suggesting evolving granulation tissue.  There is a well encapsulated fluid collection within the midline superficial subcutaneous soft tissues measuring 1.6 x 1.6 x 10.8 cm (for example series 10, image 29 and series 3, image 12).     There is mild grade 1 anterolisthesis of L4 on L5. Lumbar vertebral body heights appear adequately maintained.  There is no evidence of diffuse bone marrow replacement process.  No new suspicious disc space edema or enhancement identified.  The conus terminates at the T12-L1 level.  Tiny focal central T2 hyperintensity noted within the conus likely reflecting a terminal ventricle, similar to prior MRI examination.  No abnormal enhancement of the cauda equina nerve roots appreciated.     L1-L2: Mild  circumferential disc bulge.  No significant spinal canal or neural foraminal narrowing.     L2-L3: Mild circumferential disc bulge and bilateral facet arthropathy.  No significant spinal canal or neural foraminal narrowing.     L3-L4: Circumferential disc bulge and bilateral facet arthropathy.  Mild tapering the posterior thecal sac.  No significant neural foraminal narrowing appreciated.     L4-L5: Postoperative change as above.  No significant spinal canal stenosis.  Mild bilateral neural foraminal narrowing, left greater than right.     L5-S1: Broad-based disc bulge with superimposed small central disc protrusion.  No significant spinal canal stenosis.  Mild bilateral neural foraminal narrowing.     No psoas fluid collections identified.  Limited views of the posterior abdomen demonstrate no acute abnormalities.     Impression:     Postoperative change of the lumbar spine as detailed above.  No suspicious disc space edema/enhancement identified to suggest discitis or evidence of epidural fluid collection.     Well encapsulated fluid collection within the midline superficial subcutaneous lumbar soft tissues with dimensions as above.  This may reflect a postoperative seroma or hematoma with abscess not entirely excluded.     Degenerative changes of the lumbar spine as detailed above.        Electronically signed by:Heraclio Mariano MD  Date:                                            07/12/2024  Time:                                          Assessment:       1. 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 S/P lumbar spinal fusion    3. Fall, initial encounter        Plan:       Loretta was seen today for pain.    Diagnoses and all orders for this visit:    Hardware failure of anterior column of spine    S/P lumbar spinal fusion  -     Cancel: X-Ray Lumbar Spine Ap And Lateral  -     X-Ray Lumbar Spine Flexion And Extension Only    Fall, initial encounter  -     Cancel: X-Ray Lumbar Spine Ap And Lateral  -      X-Ray Lumbar Spine Flexion And Extension Only         No follow-ups on file.    She has a previous history of a hysterectomy gallbladder excision and appendectomy.  We will make Dr. Krzysztof Arroyo aware of this since we were planning an anterior lumbar interbody fusion at L5-S1 from a paramedian incision area.  She has had multiple cysts cysts removed and gynecological surgeries and appendectomy and gallbladder excision and we will need to bring this to the attention Dr. Arroyo prior to surgery.    Proposed surgery would be anterior lumbar interbody fusion at L5-S1 with insertion interbody device and bone morphogenic protein.  Pros and cons and risks of bone morphogenic protein were discussed in detail.  Additionally we would remove the hardware that being the pedicle screws at L5 bilaterally and insert larger diameter screws and repeat posterolateral fusion L5-S1    The technical aspects of the surgery were discussed in detail with the patient today. The patient was able to verbalize an understanding. The procedure risk benefits alternatives and possible complications of the surgical procedure was discussed including expected outcomes. No guarantees were given regards to outcomes. Consent forms were will be signed at a later date. All questions regarding the surgery itself were answered. The patient wishes to proceed with surgery and will be scheduled. The patient may require preoperative medical clearance.    Treatment options were discussed with regards to the nature of the medical condition. Conservative pain intervention and surgical options were discussed in detail. The probability of success of each separate treatment option was discussed. The patient expressed a clear understanding of the treatment options. With regards to surgery, the procedure risk, benefits, complications, and outcomes were discussed. No guarantees were given with regards to surgical outcome.   The risk of complications, morbidity,  and mortality of patient management decisions have been made at the time of this visit. These are associated with the patient's problems, diagnostic procedures and treatment options. This includes the possible management options selected and those considered but not selected by the patient after shared medical decision making we discussed with the patient.     This note was created using Dragon voice recognition software that occasionally misinterpreted phrases or words.

## 2024-07-29 ENCOUNTER — TELEPHONE (OUTPATIENT)
Dept: ORTHOPEDICS | Facility: CLINIC | Age: 34
End: 2024-07-29
Payer: MEDICAID

## 2024-07-29 RX ORDER — KETOROLAC TROMETHAMINE 10 MG/1
10 TABLET, FILM COATED ORAL EVERY 6 HOURS
Qty: 20 TABLET | Refills: 0 | Status: SHIPPED | OUTPATIENT
Start: 2024-07-29 | End: 2024-08-03

## 2024-08-01 ENCOUNTER — PATIENT MESSAGE (OUTPATIENT)
Dept: ORTHOPEDICS | Facility: CLINIC | Age: 34
End: 2024-08-01
Payer: MEDICAID

## 2024-08-07 ENCOUNTER — PATIENT MESSAGE (OUTPATIENT)
Dept: FAMILY MEDICINE | Facility: CLINIC | Age: 34
End: 2024-08-07
Payer: MEDICAID

## 2024-08-07 DIAGNOSIS — M43.16 SPONDYLOLISTHESIS OF LUMBAR REGION: Primary | ICD-10-CM

## 2024-08-08 ENCOUNTER — PATIENT MESSAGE (OUTPATIENT)
Dept: ORTHOPEDICS | Facility: CLINIC | Age: 34
End: 2024-08-08
Payer: MEDICAID

## 2024-08-09 ENCOUNTER — PATIENT MESSAGE (OUTPATIENT)
Dept: ORTHOPEDICS | Facility: CLINIC | Age: 34
End: 2024-08-09
Payer: MEDICAID

## 2024-08-09 DIAGNOSIS — R11.0 NAUSEA: Primary | ICD-10-CM

## 2024-08-09 RX ORDER — ONDANSETRON 4 MG/1
4 TABLET, FILM COATED ORAL EVERY 8 HOURS PRN
Qty: 21 TABLET | Refills: 0 | Status: SHIPPED | OUTPATIENT
Start: 2024-08-09

## 2024-08-12 ENCOUNTER — HOSPITAL ENCOUNTER (OUTPATIENT)
Dept: RADIOLOGY | Facility: HOSPITAL | Age: 34
Discharge: HOME | End: 2024-08-12
Attending: ORTHOPAEDIC SURGERY
Payer: MEDICAID

## 2024-08-12 ENCOUNTER — OFFICE VISIT (OUTPATIENT)
Dept: ORTHOPEDICS | Facility: CLINIC | Age: 34
End: 2024-08-12
Payer: MEDICAID

## 2024-08-12 VITALS — WEIGHT: 293 LBS | BODY MASS INDEX: 45.99 KG/M2 | HEIGHT: 67 IN

## 2024-08-12 DIAGNOSIS — Z98.1 S/P LUMBAR SPINAL FUSION: Primary | ICD-10-CM

## 2024-08-12 DIAGNOSIS — M43.16 SPONDYLOLISTHESIS OF LUMBAR REGION: ICD-10-CM

## 2024-08-12 DIAGNOSIS — Z98.1 S/P LUMBAR SPINAL FUSION: ICD-10-CM

## 2024-08-12 DIAGNOSIS — T84.216A HARDWARE FAILURE OF ANTERIOR COLUMN OF SPINE: ICD-10-CM

## 2024-08-12 DIAGNOSIS — M43.16 SPONDYLOLISTHESIS, LUMBAR REGION: ICD-10-CM

## 2024-08-12 DIAGNOSIS — T84.216A HARDWARE FAILURE OF ANTERIOR COLUMN OF SPINE: Primary | ICD-10-CM

## 2024-08-12 PROCEDURE — 4010F ACE/ARB THERAPY RXD/TAKEN: CPT | Mod: CPTII,,, | Performed by: ORTHOPAEDIC SURGERY

## 2024-08-12 PROCEDURE — 1160F RVW MEDS BY RX/DR IN RCRD: CPT | Mod: CPTII,,, | Performed by: ORTHOPAEDIC SURGERY

## 2024-08-12 PROCEDURE — 99999 PR PBB SHADOW E&M-EST. PATIENT-LVL IV: CPT | Mod: PBBFAC,,, | Performed by: ORTHOPAEDIC SURGERY

## 2024-08-12 PROCEDURE — 99214 OFFICE O/P EST MOD 30 MIN: CPT | Mod: PBBFAC,25,PN | Performed by: ORTHOPAEDIC SURGERY

## 2024-08-12 PROCEDURE — 1159F MED LIST DOCD IN RCRD: CPT | Mod: CPTII,,, | Performed by: ORTHOPAEDIC SURGERY

## 2024-08-12 PROCEDURE — 72100 X-RAY EXAM L-S SPINE 2/3 VWS: CPT | Mod: TC,PN

## 2024-08-12 PROCEDURE — 99213 OFFICE O/P EST LOW 20 MIN: CPT | Mod: S$PBB,,, | Performed by: ORTHOPAEDIC SURGERY

## 2024-08-12 PROCEDURE — 72100 X-RAY EXAM L-S SPINE 2/3 VWS: CPT | Mod: 26,,, | Performed by: RADIOLOGY

## 2024-08-12 PROCEDURE — 3008F BODY MASS INDEX DOCD: CPT | Mod: CPTII,,, | Performed by: ORTHOPAEDIC SURGERY

## 2024-08-12 RX ORDER — SEMAGLUTIDE 2.68 MG/ML
2 INJECTION, SOLUTION SUBCUTANEOUS
COMMUNITY
Start: 2024-08-07

## 2024-08-12 RX ORDER — CEFAZOLIN SODIUM 2 G/50ML
2 SOLUTION INTRAVENOUS
OUTPATIENT
Start: 2024-08-12

## 2024-08-12 RX ORDER — ONDANSETRON 4 MG/1
4 TABLET, ORALLY DISINTEGRATING ORAL EVERY 6 HOURS PRN
COMMUNITY

## 2024-08-12 NOTE — H&P (VIEW-ONLY)
Subjective:       Patient ID: Loretta Lea is a 34 y.o. female.    Chief Complaint: Pain of the Lumbar Spine (Patient is here for a f/u on L4-5 ALIF, L4-5, L5-S1 PLF 4.23.24, states pain has stayed about the same since last visit with no improvement. )      History of Present Illness    Prior to meeting with the patient I reviewed the medical chart in [facility]. This included reviewing the previous progress notes from our office, review of the patient's last appointment with their primary care provider, review of any visits to the emergency room, and review of any pain management appointments or procedures.      Status post combined anterior posterior lumbar fusion L4 to sacrum performed April 20, 2024 with interbody device L4-5.  Patient has had several falls since surgery and has noted some increasing back pain has some dysesthesias in the left side likely from her lateral surgical approach that persists back pain though his more severe no bowel or bladder incontinence.  Had previous CT scan and MRI suggesting of some early lucencies of the L5 screws suggesting micro motion that the L5-S1 level but we were trying to hold off on additional surgery.  Pain has become more unbearable.  Requires stronger analgesics.     Patient continues have back pain saw Dr. Arroyo does not think it is feasible for us to do an interbody fusion at the L5-S1.  Her symptoms are unchanged.    Current Medications  Current Outpatient Medications   Medication Sig Dispense Refill    blood sugar diagnostic Strp Test blood sugar 4 (four) times daily before meals and nightly. 200 each 5    blood-glucose meter (ACCU-CHEK GUIDE GLUCOSE METER) [facility] Use to test blood sugar 1 each 0    cetirizine (ZYRTEC) 10 MG tablet Take 1 tablet (10 mg total) by mouth daily as needed for Allergies. 30 tablet 0    gabapentin (NEURONTIN) 300 MG capsule Take 1 capsule (300 mg total) by mouth 3 (three) times daily. 90 capsule 0    lancets (ACCU-CHEK SOFTCLIX LANCETS)  Misc Test blood sugar 4 (four) times daily before meals and nightly. 200 each 5    losartan (COZAAR) 25 MG tablet Take 1 tablet (25 mg total) by mouth once daily. 30 tablet 5    metFORMIN (GLUCOPHAGE-XR) 500 MG ER 24hr tablet Take 2 tablets (1,000 mg total) by mouth 2 (two) times daily with meals. 120 tablet 2    ondansetron (ZOFRAN) 4 MG tablet Take 1 tablet (4 mg total) by mouth every 8 (eight) hours as needed for Nausea. 21 tablet 0    ondansetron (ZOFRAN-ODT) 4 MG TbDL Take 4 mg by mouth every 6 (six) hours as needed.      oxyCODONE-acetaminophen (PERCOCET) 7.5-325 mg per tablet Take 1 tablet by mouth every 6 (six) hours as needed for Pain. 28 tablet 0    OZEMPIC 2 mg/dose (8 mg/3 mL) PnIj Inject 2 mg into the skin every 7 days.      pyridoxine, vitamin B6, (B-6) 25 MG Tab Take 25 mg by mouth once daily.      risperiDONE (RISPERDAL) 1 MG tablet Take 1 tablet (1 mg total) by mouth 2 (two) times daily. 60 tablet 5    sertraline (ZOLOFT) 100 MG tablet Take 1.5 tablets (150 mg total) by mouth once daily. 135 tablet 0    sumatriptan (IMITREX) 100 MG tablet Take 1 tab PO at first sign of migraine. If not effective, repeat dose in 2 hours. Do not take more than 200mg in 24 hours. 12 tablet 1    traZODone (DESYREL) 100 MG tablet TAKE 1 TO 2 TABLETS BY MOUTH EVERY NIGHT AT BEDTIME AS NEEDED FOR INSOMNIA 60 tablet 5     No current facility-administered medications for this visit.       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       Past Medical History  Past Medical History:   Diagnosis Date    Anxiety     Back pain     Bipolar disorder 2004    bipolar 2    Chronic bronchitis     Depression     Diabetes mellitus     GERD (gastroesophageal reflux disease)     HPV (human papilloma virus) infection     Insomnia     Migraines     Non-proliferative diabetic retinopathy, mild, both eyes 09/25/2023    Obese body habitus      Ovarian cyst     Pneumonia     PONV (postoperative nausea and vomiting)        Surgical History  Past Surgical History:   Procedure Laterality Date    ANTERIOR LUMBAR INTERBODY FUSION (ALIF) Bilateral 4/23/2024    Procedure: FUSION, SPINE, LUMBAR, ALIF;  Surgeon: Gurjit Cassidy MD;  Location: [facility];  Service: Orthopedics;  Laterality: Bilateral;    APPENDECTOMY      ARTHROSCOPY OF ANKLE Right 05/04/2022    Procedure: ARTHROSCOPY, ANKLE;  Surgeon: Bimal Mccormick DPM;  Location: [facility];  Service: Podiatry;  Laterality: Right;  CURT EXTERNAL ANKLE DISTRACTOR    CHOLECYSTECTOMY      DILATION AND CURETTAGE OF UTERUS      HYSTERECTOMY  2017    total, ovarian cysts, torsion, [place]; hyst per Dr JESUS Manriquez    LAPAROSCOPIC APPENDECTOMY N/A 08/18/2021    Procedure: APPENDECTOMY, LAPAROSCOPIC;  Surgeon: Osiel Ramirez MD;  Location: [facility];  Service: General;  Laterality: N/A;    LUMBAR LAMINECTOMY WITH FUSION Bilateral 4/23/2024    Procedure: LAMINECTOMY, SPINE, LUMBAR, WITH FUSION;  Surgeon: Gurjit Cassidy MD;  Location: [facility];  Service: Orthopedics;  Laterality: Bilateral;    OOPHORECTOMY      opherectom Left 2015    salpingo-opherectomy    REPAIR OF LIGAMENT OF ANKLE Right 06/23/2021    Procedure: REPAIR OF ANTERIOR TALOFIBULAR LIGAMENT CALCANEOFIBULAR LIGAMENT;  Surgeon: Bimal Mccormick DPM;  Location: [facility];  Service: Podiatry;  Laterality: Right;  WITH ARTHREX INTERNAL BRACE    REPAIR OF LIGAMENT OF ANKLE Right 4/27/2023    Procedure: REPAIR, LIGAMENT, ANKLE;  Surgeon: Aryan Poole DPM;  Location: [facility];  Service: Podiatry;  Laterality: Right;    REPAIR OF TENDON OF LOWER EXTREMITY Right 4/27/2023    Procedure: REPAIR, TENDON, LOWER EXTREMITY;  Surgeon: Aryan Poole DPM;  [facility];  Service: Podiatry;  Laterality: Right;  arthrex    SURGICAL REMOVAL OF BONE SPUR Right 06/23/2021    Procedure: EXCISION OS TRIGONUM;  Surgeon: Bimal Mccormick DPM;  Location: [facility];  Service:  Podiatry;  Laterality: Right;    WISDOM TOOTH EXTRACTION         Family History:   Family History   Adopted: Yes   Problem Relation Name Age of Onset    No Known Problems Sister      No Known Problems Sister      No Known Problems Sister         Social History:   Social History     Socioeconomic History    Marital status:     Number of children: 0   Tobacco Use    Smoking status: Every Day     Current packs/day: 1.50     Average packs/day: 1.5 packs/day for 18.0 years (27.0 ttl pk-yrs)     Types: Cigarettes     Passive exposure: Current    Smokeless tobacco: Never    Tobacco comments:     4/25/23--pt instructed no smoking 24hours before sx, pt voiced understanding.   Substance and Sexual Activity    Alcohol use: Not Currently     Comment: rare    Drug use: Not Currently     Comment: twice    Sexual activity: Yes     Partners: Male     Birth control/protection: OCP, None     Social Determinants of Health     Financial Resource Strain: High Risk (4/23/2024)    Overall Financial Resource Strain (CARDIA)     Difficulty of Paying Living Expenses: Very hard   Food Insecurity: Food Insecurity Present (4/23/2024)    Hunger Vital Sign     Worried About Running Out of Food in the Last Year: Often true     Ran Out of Food in the Last Year: Often true   Transportation Needs: No Transportation Needs (4/23/2024)    PRAPARE - Transportation     Lack of Transportation (Medical): No     Lack of Transportation (Non-Medical): No   Stress: Stress Concern Present (4/23/2024)    [employer] [place] [employer] - Occupational Stress Questionnaire     Feeling of Stress : Very much   Housing Stability: Low Risk  (4/23/2024)    Housing Stability Vital Sign     Unable to Pay for Housing in the Last Year: No     Homeless in the Last Year: No       Hospitalization/Major Diagnostic Procedure:     Review of Systems     General/Constitutional:  Chills denies. Fatigue denies. Fever denies. Weight gain denies. Weight loss  denies.    Respiratory:  Shortness of breath denies.    Cardiovascular:  Chest pain denies.    Gastrointestinal:  Constipation denies. Diarrhea denies. Nausea denies. Vomiting denies.     Hematology:  Easy bruising denies. Prolonged bleeding denies.     Genitourinary:  Frequent urination denies. Pain in lower back denies. Painful urination denies.     Musculoskeletal:  See HPI for details    Skin:  Rash denies.    Neurologic:  Dizziness denies. Gait abnormalities denies. Seizures denies. Tingling/Numbess denies.    Psychiatric:  Anxiety denies. Depressed mood denies.     Objective:   Vital Signs: There were no vitals filed for this visit.     Physical Exam      General Examination:     Constitutional: The patient is alert and oriented to lace person and time. Mood is pleasant.     Head/Face: Normal facial features normal eyebrows    Eyes: Normal extraocular motion bilaterally    Lungs: Respirations are equal and unlabored    Gait is coordinated.    Cardiovascular: There are no swelling or varicosities present.    Lymphatic: Negative for adenopathy    Skin: Normal    Neurological: Level of consciousness normal. Oriented to place person and time and situation    Psychiatric: Oriented to time place person and situation    Lumbar incision well healed mild paraspinous muscle spasm range motion limited due to pain motor exam grossly normal no edema adenopathy varicosities.  XRAY Report/ Interpretation:  AP and lateral x-rays of lumbar spine will performed today. Indications low back pain. Findings:  Instrumented hardware L4-S1 interbody device L4-5.  Lucencies about the L5 screws noted suggestion of micro motion      Assessment:       1. 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 S/P lumbar spinal fusion    3. Spondylolisthesis of lumbar region        Plan:       Loretta was seen today for pain.    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    Since Dr. Arroyo does not feel that she is a good candidate for an anterior lumbar approach we will plan a posterior approach with a transforaminal lumbar interbody fusion at the L5-S1 level and revision of hardware.  The pros and cons of doing so were discussed.  The technical aspects of the surgery were discussed in detail with the patient today. The patient was able to verbalize an understanding. The procedure risk benefits alternatives and possible complications of the surgical procedure was discussed including expected outcomes. No guarantees were given regards to outcomes. Consent forms were will be signed at a later date. All questions regarding the surgery itself were answered. The patient wishes to proceed with surgery and will be scheduled. The patient may require preoperative medical clearance.  This patient has multiple comorbidities that may include but is not limited to cardiovascular disease obesity diabetes hypothyroidism obstructive sleep apnea COPD anxiety and depression. These comorbidities require more complex surgical decision-making and may complicate the postoperative process as well.    Treatment options were discussed with regards to the nature of the medical condition. Conservative pain intervention and surgical options were discussed in detail. The probability of success of each separate treatment option was discussed. The patient expressed a clear understanding of the treatment options. With regards to surgery, the procedure risk, benefits, complications, and outcomes were discussed. No guarantees were given with regards to surgical outcome.   The risk of complications, morbidity, and mortality of patient management decisions have been made at the time of this visit. These are associated with the patient's problems,  diagnostic procedures and treatment options. This includes the possible management options selected and those considered but not selected by the patient after shared medical decision making we discussed with the patient.     This note was created using Dragon voice recognition software that occasionally misinterpreted phrases or words.

## 2024-08-12 NOTE — PROGRESS NOTES
Subjective:       Patient ID: Loretta Lea is a 34 y.o. female.    Chief Complaint: Pain of the Lumbar Spine (Patient is here for a f/u on L4-5 ALIF, L4-5, L5-S1 PLF 4.23.24, states pain has stayed about the same since last visit with no improvement. )      History of Present Illness    Prior to meeting with the patient I reviewed the medical chart in [facility]. This included reviewing the previous progress notes from our office, review of the patient's last appointment with their primary care provider, review of any visits to the emergency room, and review of any pain management appointments or procedures.      Status post combined anterior posterior lumbar fusion L4 to sacrum performed April 20, 2024 with interbody device L4-5.  Patient has had several falls since surgery and has noted some increasing back pain has some dysesthesias in the left side likely from her lateral surgical approach that persists back pain though his more severe no bowel or bladder incontinence.  Had previous CT scan and MRI suggesting of some early lucencies of the L5 screws suggesting micro motion that the L5-S1 level but we were trying to hold off on additional surgery.  Pain has become more unbearable.  Requires stronger analgesics.     Patient continues have back pain saw Dr. Arroyo does not think it is feasible for us to do an interbody fusion at the L5-S1.  Her symptoms are unchanged.    Current Medications  Current Outpatient Medications   Medication Sig Dispense Refill    blood sugar diagnostic Strp Test blood sugar 4 (four) times daily before meals and nightly. 200 each 5    blood-glucose meter (ACCU-CHEK GUIDE GLUCOSE METER) [facility] Use to test blood sugar 1 each 0    cetirizine (ZYRTEC) 10 MG tablet Take 1 tablet (10 mg total) by mouth daily as needed for Allergies. 30 tablet 0    gabapentin (NEURONTIN) 300 MG capsule Take 1 capsule (300 mg total) by mouth 3 (three) times daily. 90 capsule 0    lancets (ACCU-CHEK SOFTCLIX LANCETS)  Misc Test blood sugar 4 (four) times daily before meals and nightly. 200 each 5    losartan (COZAAR) 25 MG tablet Take 1 tablet (25 mg total) by mouth once daily. 30 tablet 5    metFORMIN (GLUCOPHAGE-XR) 500 MG ER 24hr tablet Take 2 tablets (1,000 mg total) by mouth 2 (two) times daily with meals. 120 tablet 2    ondansetron (ZOFRAN) 4 MG tablet Take 1 tablet (4 mg total) by mouth every 8 (eight) hours as needed for Nausea. 21 tablet 0    ondansetron (ZOFRAN-ODT) 4 MG TbDL Take 4 mg by mouth every 6 (six) hours as needed.      oxyCODONE-acetaminophen (PERCOCET) 7.5-325 mg per tablet Take 1 tablet by mouth every 6 (six) hours as needed for Pain. 28 tablet 0    OZEMPIC 2 mg/dose (8 mg/3 mL) PnIj Inject 2 mg into the skin every 7 days.      pyridoxine, vitamin B6, (B-6) 25 MG Tab Take 25 mg by mouth once daily.      risperiDONE (RISPERDAL) 1 MG tablet Take 1 tablet (1 mg total) by mouth 2 (two) times daily. 60 tablet 5    sertraline (ZOLOFT) 100 MG tablet Take 1.5 tablets (150 mg total) by mouth once daily. 135 tablet 0    sumatriptan (IMITREX) 100 MG tablet Take 1 tab PO at first sign of migraine. If not effective, repeat dose in 2 hours. Do not take more than 200mg in 24 hours. 12 tablet 1    traZODone (DESYREL) 100 MG tablet TAKE 1 TO 2 TABLETS BY MOUTH EVERY NIGHT AT BEDTIME AS NEEDED FOR INSOMNIA 60 tablet 5     No current facility-administered medications for this visit.       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       Past Medical History  Past Medical History:   Diagnosis Date    Anxiety     Back pain     Bipolar disorder 2004    bipolar 2    Chronic bronchitis     Depression     Diabetes mellitus     GERD (gastroesophageal reflux disease)     HPV (human papilloma virus) infection     Insomnia     Migraines     Non-proliferative diabetic retinopathy, mild, both eyes 09/25/2023    Obese body habitus      Ovarian cyst     Pneumonia     PONV (postoperative nausea and vomiting)        Surgical History  Past Surgical History:   Procedure Laterality Date    ANTERIOR LUMBAR INTERBODY FUSION (ALIF) Bilateral 4/23/2024    Procedure: FUSION, SPINE, LUMBAR, ALIF;  Surgeon: Gurjit Cassidy MD;  Location: [facility];  Service: Orthopedics;  Laterality: Bilateral;    APPENDECTOMY      ARTHROSCOPY OF ANKLE Right 05/04/2022    Procedure: ARTHROSCOPY, ANKLE;  Surgeon: Bimal Mccormick DPM;  Location: [facility];  Service: Podiatry;  Laterality: Right;  CURT EXTERNAL ANKLE DISTRACTOR    CHOLECYSTECTOMY      DILATION AND CURETTAGE OF UTERUS      HYSTERECTOMY  2017    total, ovarian cysts, torsion, [place]; hyst per Dr JESUS Manriquez    LAPAROSCOPIC APPENDECTOMY N/A 08/18/2021    Procedure: APPENDECTOMY, LAPAROSCOPIC;  Surgeon: Osiel Ramirez MD;  Location: [facility];  Service: General;  Laterality: N/A;    LUMBAR LAMINECTOMY WITH FUSION Bilateral 4/23/2024    Procedure: LAMINECTOMY, SPINE, LUMBAR, WITH FUSION;  Surgeon: Gurjit Cassidy MD;  Location: [facility];  Service: Orthopedics;  Laterality: Bilateral;    OOPHORECTOMY      opherectom Left 2015    salpingo-opherectomy    REPAIR OF LIGAMENT OF ANKLE Right 06/23/2021    Procedure: REPAIR OF ANTERIOR TALOFIBULAR LIGAMENT CALCANEOFIBULAR LIGAMENT;  Surgeon: Bimal Mccormick DPM;  Location: [facility];  Service: Podiatry;  Laterality: Right;  WITH ARTHREX INTERNAL BRACE    REPAIR OF LIGAMENT OF ANKLE Right 4/27/2023    Procedure: REPAIR, LIGAMENT, ANKLE;  Surgeon: Aryan Poole DPM;  Location: [facility];  Service: Podiatry;  Laterality: Right;    REPAIR OF TENDON OF LOWER EXTREMITY Right 4/27/2023    Procedure: REPAIR, TENDON, LOWER EXTREMITY;  Surgeon: Aryan Poole DPM;  Location: [facility];  Service: Podiatry;  Laterality: Right;  arthrex    SURGICAL REMOVAL OF BONE SPUR Right 06/23/2021    Procedure: EXCISION OS TRIGONUM;  Surgeon: Bimal Mccormick DPM;  Location: [facility];  Service:  Podiatry;  Laterality: Right;    WISDOM TOOTH EXTRACTION         Family History:   Family History   Adopted: Yes   Problem Relation Name Age of Onset    No Known Problems Sister      No Known Problems Sister      No Known Problems Sister         Social History:   Social History     Socioeconomic History    Marital status:     Number of children: 0   Tobacco Use    Smoking status: Every Day     Current packs/day: 1.50     Average packs/day: 1.5 packs/day for 18.0 years (27.0 ttl pk-yrs)     Types: Cigarettes     Passive exposure: Current    Smokeless tobacco: Never    Tobacco comments:     4/25/23--pt instructed no smoking 24hours before sx, pt voiced understanding.   Substance and Sexual Activity    Alcohol use: Not Currently     Comment: rare    Drug use: Not Currently     Comment: twice    Sexual activity: Yes     Partners: Male     Birth control/protection: OCP, None     Social Determinants of Health     Financial Resource Strain: High Risk (4/23/2024)    Overall Financial Resource Strain (CARDIA)     Difficulty of Paying Living Expenses: Very hard   Food Insecurity: Food Insecurity Present (4/23/2024)    Hunger Vital Sign     Worried About Running Out of Food in the Last Year: Often true     Ran Out of Food in the Last Year: Often true   Transportation Needs: No Transportation Needs (4/23/2024)    PRAPARE - Transportation     Lack of Transportation (Medical): No     Lack of Transportation (Non-Medical): No   Stress: Stress Concern Present (4/23/2024)    [employer] - Occupational Stress Questionnaire     Feeling of Stress : Very much   Housing Stability: Low Risk  (4/23/2024)    Housing Stability Vital Sign     Unable to Pay for Housing in the Last Year: No     Homeless in the Last Year: No       Hospitalization/Major Diagnostic Procedure:     Review of Systems     General/Constitutional:  Chills denies. Fatigue denies. Fever denies. Weight gain denies. Weight loss  denies.    Respiratory:  Shortness of breath denies.    Cardiovascular:  Chest pain denies.    Gastrointestinal:  Constipation denies. Diarrhea denies. Nausea denies. Vomiting denies.     Hematology:  Easy bruising denies. Prolonged bleeding denies.     Genitourinary:  Frequent urination denies. Pain in lower back denies. Painful urination denies.     Musculoskeletal:  See HPI for details    Skin:  Rash denies.    Neurologic:  Dizziness denies. Gait abnormalities denies. Seizures denies. Tingling/Numbess denies.    Psychiatric:  Anxiety denies. Depressed mood denies.     Objective:   Vital Signs: There were no vitals filed for this visit.     Physical Exam      General Examination:     Constitutional: The patient is alert and oriented to lace person and time. Mood is pleasant.     Head/Face: Normal facial features normal eyebrows    Eyes: Normal extraocular motion bilaterally    Lungs: Respirations are equal and unlabored    Gait is coordinated.    Cardiovascular: There are no swelling or varicosities present.    Lymphatic: Negative for adenopathy    Skin: Normal    Neurological: Level of consciousness normal. Oriented to place person and time and situation    Psychiatric: Oriented to time place person and situation    Lumbar incision well healed mild paraspinous muscle spasm range motion limited due to pain motor exam grossly normal no edema adenopathy varicosities.  XRAY Report/ Interpretation:  AP and lateral x-rays of lumbar spine will performed today. Indications low back pain. Findings:  Instrumented hardware L4-S1 interbody device L4-5.  Lucencies about the L5 screws noted suggestion of micro motion      Assessment:       1. 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 S/P lumbar spinal fusion    3. Spondylolisthesis of lumbar region        Plan:       Loretta was seen today for pain.    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    Since Dr. Arroyo does not feel that she is a good candidate for an anterior lumbar approach we will plan a posterior approach with a transforaminal lumbar interbody fusion at the L5-S1 level and revision of hardware.  The pros and cons of doing so were discussed.  The technical aspects of the surgery were discussed in detail with the patient today. The patient was able to verbalize an understanding. The procedure risk benefits alternatives and possible complications of the surgical procedure was discussed including expected outcomes. No guarantees were given regards to outcomes. Consent forms were will be signed at a later date. All questions regarding the surgery itself were answered. The patient wishes to proceed with surgery and will be scheduled. The patient may require preoperative medical clearance.  This patient has multiple comorbidities that may include but is not limited to cardiovascular disease obesity diabetes hypothyroidism obstructive sleep apnea COPD anxiety and depression. These comorbidities require more complex surgical decision-making and may complicate the postoperative process as well.    Treatment options were discussed with regards to the nature of the medical condition. Conservative pain intervention and surgical options were discussed in detail. The probability of success of each separate treatment option was discussed. The patient expressed a clear understanding of the treatment options. With regards to surgery, the procedure risk, benefits, complications, and outcomes were discussed. No guarantees were given with regards to surgical outcome.   The risk of complications, morbidity, and mortality of patient management decisions have been made at the time of this visit. These are associated with the patient's problems,  diagnostic procedures and treatment options. This includes the possible management options selected and those considered but not selected by the patient after shared medical decision making we discussed with the patient.     This note was created using Dragon voice recognition software that occasionally misinterpreted phrases or words.

## 2024-08-22 ENCOUNTER — HOSPITAL ENCOUNTER (OUTPATIENT)
Dept: PREADMISSION TESTING | Facility: HOSPITAL | Age: 34
Discharge: HOME | End: 2024-08-22
Attending: ORTHOPAEDIC SURGERY
Payer: MEDICAID

## 2024-08-22 DIAGNOSIS — M43.16 SPONDYLOLISTHESIS OF LUMBAR REGION: ICD-10-CM

## 2024-08-22 DIAGNOSIS — T84.216A HARDWARE FAILURE OF ANTERIOR COLUMN OF SPINE: ICD-10-CM

## 2024-08-22 DIAGNOSIS — Z98.1 S/P LUMBAR SPINAL FUSION: ICD-10-CM

## 2024-08-22 LAB
ABO + RH BLD: NORMAL
BILIRUB UR QL STRIP: NEGATIVE
BLD GP AB SCN CELLS X3 SERPL QL: NORMAL
CLARITY UR: CLEAR
COLOR UR: YELLOW
GLUCOSE UR QL STRIP: NEGATIVE
HGB UR QL STRIP: NEGATIVE
KETONES UR QL STRIP: NEGATIVE
LEUKOCYTE ESTERASE UR QL STRIP: NEGATIVE
NITRITE UR QL STRIP: NEGATIVE
PH UR STRIP: 5 [PH] (ref 5–8)
PROT UR QL STRIP: NEGATIVE
SP GR UR STRIP: 1.02 (ref 1–1.03)
SPECIMEN OUTDATE: NORMAL
URN SPEC COLLECT METH UR: NORMAL
UROBILINOGEN UR STRIP-ACNC: NEGATIVE EU/DL

## 2024-08-22 PROCEDURE — 86900 BLOOD TYPING SEROLOGIC ABO: CPT | Performed by: ORTHOPAEDIC SURGERY

## 2024-08-22 PROCEDURE — 81003 URINALYSIS AUTO W/O SCOPE: CPT | Performed by: ORTHOPAEDIC SURGERY

## 2024-08-22 PROCEDURE — 86901 BLOOD TYPING SEROLOGIC RH(D): CPT | Performed by: ORTHOPAEDIC SURGERY

## 2024-08-22 PROCEDURE — 36415 COLL VENOUS BLD VENIPUNCTURE: CPT | Performed by: ORTHOPAEDIC SURGERY

## 2024-08-22 PROCEDURE — 86850 RBC ANTIBODY SCREEN: CPT | Performed by: ORTHOPAEDIC SURGERY

## 2024-08-22 NOTE — DISCHARGE INSTRUCTIONS
To confirm, Your doctor has instructed you that surgery is scheduled for: 8/29/24 with Dr. Cassidy    [facility] the morning of surgery. You must check-in and receive a wristband before going to your procedure.  [address] [place] [address] [place] [address]    Pre-Op will call the afternoon prior to surgery between 1:00 and 6:00 PM with the final arrival time.  Phone number: [phone]    PLEASE NOTE:  The surgery schedule has many variables which may affect the time of your surgery case.  Family members should be available if your surgery time changes.  Plan to be here the day of your procedure between 4-6 hours.    MEDICATIONS:  TAKE ONLY THESE MEDICATIONS WITH A SMALL SIP OF WATER THE MORNING OF YOUR PROCEDURE:  SEE LIST      DO NOT TAKE THESE MEDICATIONS 5-7 DAYS PRIOR to your procedure or per your surgeon's request:   ASPIRIN, ALEVE, ADVIL, IBUPROFEN, FISH OIL VITAMIN E, HERBALS  (May take Tylenol)    ONLY if you are prescribed any types of blood thinners such as:  Aspirin, Coumadin, Plavix, Pradaxa, Xarelto, Aggrenox, Effient, Eliquis, Savasya, Brilinta, or any other, ask your surgeon whether you should stop taking them and how long before surgery you should stop.  You may also need to verify with the prescribing physician if it is ok to stop your medication.      INSTRUCTIONS IMPORTANT!!  Do not eat or drink anything between midnight and the time of your procedure- this includes gum, mints, and candy.  Do not smoke or drink alcoholic beverages 24 hours prior to your procedure.  Shower the night before AND the morning of your procedure with a Chlorhexidine wash such as Hibiclens or Dial antibacterial soap from the neck down.  Do not get it on your face or in your eyes.  You may use your own shampoo and face wash. This helps your skin to be as bacteria free as possible.    If you wear contact lenses, dentures, hearing aids or glasses, bring a container to put them  in during surgery and give to a family member for safe keeping.  Please leave all jewelry, piercing's and valuables at home. You must remove your false eyelashes prior to surgery.    DO NOT remove hair from the surgery site.  Do not shave the incision site unless you are given specific instructions to do so.    ONLY if you have been diagnosed with sleep apnea please bring your C-PAP machine.  ONLY if you wear home oxygen please bring your portable oxygen tank the day of your procedure.  ONLY if you have a history of OPEN HEART SURGERY you will need a clearance from your Cardiologist per Anesthesia.      ONLY for patients requiring bowel prep, written instructions will be given by your doctor's office.  ONLY if you have a neuro stimulator, please bring the controller with you the morning of surgery  ONLY if a type and screen test is needed before surgery, please return:  If your doctor has scheduled you for an overnight stay, bring a small overnight bag with any personal items you need.  Make arrangements in advance for transportation home by a responsible adult. You can not go home in an uber or a cab per hospital policy.  It is not safe to drive a vehicle during the 24 hours after anesthesia.          All  facilities and properties are tobacco free.  Smoking is NOT allowed.   If you have any questions about these instructions, call Pre-Op Admit  Nursing at [phone] or the Pre-Op Day Surgery Unit at [phone].

## 2024-08-28 ENCOUNTER — ANESTHESIA EVENT (OUTPATIENT)
Dept: SURGERY | Facility: HOSPITAL | Age: 34
End: 2024-08-28
Payer: MEDICAID

## 2024-08-29 ENCOUNTER — HOSPITAL ENCOUNTER (INPATIENT)
Facility: HOSPITAL | Age: 34
LOS: 5 days | Discharge: HOME | DRG: 460 | End: 2024-09-03
Attending: ORTHOPAEDIC SURGERY | Admitting: ORTHOPAEDIC SURGERY
Payer: MEDICAID

## 2024-08-29 ENCOUNTER — ANESTHESIA (OUTPATIENT)
Dept: SURGERY | Facility: HOSPITAL | Age: 34
End: 2024-08-29
Payer: MEDICAID

## 2024-08-29 DIAGNOSIS — Z98.1 S/P LUMBAR SPINAL FUSION: ICD-10-CM

## 2024-08-29 DIAGNOSIS — M43.16 SPONDYLOLISTHESIS, LUMBAR REGION: ICD-10-CM

## 2024-08-29 DIAGNOSIS — M43.16 SPONDYLOLISTHESIS OF LUMBAR REGION: ICD-10-CM

## 2024-08-29 DIAGNOSIS — T84.216A HARDWARE FAILURE OF ANTERIOR COLUMN OF SPINE: ICD-10-CM

## 2024-08-29 PROBLEM — E11.9 DIABETES: Status: ACTIVE | Noted: 2024-08-29

## 2024-08-29 LAB
ANION GAP SERPL CALC-SCNC: 12 MMOL/L (ref 8–16)
BASOPHILS # BLD AUTO: 0.03 K/UL (ref 0–0.2)
BASOPHILS NFR BLD: 0.2 % (ref 0–1.9)
BUN SERPL-MCNC: 10 MG/DL (ref 6–20)
CALCIUM SERPL-MCNC: 8.2 MG/DL (ref 8.7–10.5)
CHLORIDE SERPL-SCNC: 104 MMOL/L (ref 95–110)
CO2 SERPL-SCNC: 19 MMOL/L (ref 23–29)
CREAT SERPL-MCNC: 0.8 MG/DL (ref 0.5–1.4)
DIFFERENTIAL METHOD BLD: ABNORMAL
EOSINOPHIL # BLD AUTO: 0 K/UL (ref 0–0.5)
EOSINOPHIL NFR BLD: 0.1 % (ref 0–8)
ERYTHROCYTE [DISTWIDTH] IN BLOOD BY AUTOMATED COUNT: 15.6 % (ref 11.5–14.5)
EST. GFR  (NO RACE VARIABLE): >60 ML/MIN/1.73 M^2
GLUCOSE SERPL-MCNC: 217 MG/DL (ref 70–110)
HCT VFR BLD AUTO: 40.9 % (ref 37–48.5)
HGB BLD-MCNC: 12.9 G/DL (ref 12–16)
IMM GRANULOCYTES # BLD AUTO: 0.09 K/UL (ref 0–0.04)
IMM GRANULOCYTES NFR BLD AUTO: 0.6 % (ref 0–0.5)
LYMPHOCYTES # BLD AUTO: 1.6 K/UL (ref 1–4.8)
LYMPHOCYTES NFR BLD: 11.3 % (ref 18–48)
MCH RBC QN AUTO: 26 PG (ref 27–31)
MCHC RBC AUTO-ENTMCNC: 31.5 G/DL (ref 32–36)
MCV RBC AUTO: 83 FL (ref 82–98)
MONOCYTES # BLD AUTO: 0.2 K/UL (ref 0.3–1)
MONOCYTES NFR BLD: 1.4 % (ref 4–15)
NEUTROPHILS # BLD AUTO: 12.1 K/UL (ref 1.8–7.7)
NEUTROPHILS NFR BLD: 86.4 % (ref 38–73)
NRBC BLD-RTO: 0 /100 WBC
PLATELET # BLD AUTO: 355 K/UL (ref 150–450)
PMV BLD AUTO: 8.9 FL (ref 9.2–12.9)
POCT GLUCOSE: 267 MG/DL (ref 70–110)
POCT GLUCOSE: 272 MG/DL (ref 70–110)
POTASSIUM SERPL-SCNC: 4.8 MMOL/L (ref 3.5–5.1)
RBC # BLD AUTO: 4.96 M/UL (ref 4–5.4)
SODIUM SERPL-SCNC: 135 MMOL/L (ref 136–145)
WBC # BLD AUTO: 13.96 K/UL (ref 3.9–12.7)

## 2024-08-29 PROCEDURE — 99900035 HC TECH TIME PER 15 MIN (STAT)

## 2024-08-29 PROCEDURE — 0SG30AJ FUSION OF LUMBOSACRAL JOINT WITH INTERBODY FUSION DEVICE, POSTERIOR APPROACH, ANTERIOR COLUMN, OPEN APPROACH: ICD-10-PCS | Performed by: ORTHOPAEDIC SURGERY

## 2024-08-29 PROCEDURE — 36000711: Performed by: ORTHOPAEDIC SURGERY

## 2024-08-29 PROCEDURE — 94799 UNLISTED PULMONARY SVC/PX: CPT | Mod: XB

## 2024-08-29 PROCEDURE — 25000003 PHARM REV CODE 250: Performed by: ORTHOPAEDIC SURGERY

## 2024-08-29 PROCEDURE — 25000003 PHARM REV CODE 250: Performed by: NURSE ANESTHETIST, CERTIFIED REGISTERED

## 2024-08-29 PROCEDURE — 71000039 HC RECOVERY, EACH ADD'L HOUR: Performed by: ORTHOPAEDIC SURGERY

## 2024-08-29 PROCEDURE — 11000001 HC ACUTE MED/SURG PRIVATE ROOM

## 2024-08-29 PROCEDURE — 94799 UNLISTED PULMONARY SVC/PX: CPT

## 2024-08-29 PROCEDURE — 63600175 PHARM REV CODE 636 W HCPCS: Performed by: ANESTHESIOLOGY

## 2024-08-29 PROCEDURE — 36000710: Performed by: ORTHOPAEDIC SURGERY

## 2024-08-29 PROCEDURE — 63600175 PHARM REV CODE 636 W HCPCS: Performed by: HOSPITALIST

## 2024-08-29 PROCEDURE — 27201423 OPTIME MED/SURG SUP & DEVICES STERILE SUPPLY: Performed by: ORTHOPAEDIC SURGERY

## 2024-08-29 PROCEDURE — 22853 INSJ BIOMECHANICAL DEVICE: CPT | Mod: ,,, | Performed by: ORTHOPAEDIC SURGERY

## 2024-08-29 PROCEDURE — 63052 LAM FACETC/FRMT ARTHRD LUM 1: CPT | Mod: ,,, | Performed by: ORTHOPAEDIC SURGERY

## 2024-08-29 PROCEDURE — 85025 COMPLETE CBC W/AUTO DIFF WBC: CPT | Performed by: ORTHOPAEDIC SURGERY

## 2024-08-29 PROCEDURE — 63600175 PHARM REV CODE 636 W HCPCS: Performed by: ORTHOPAEDIC SURGERY

## 2024-08-29 PROCEDURE — 25000003 PHARM REV CODE 250: Performed by: ANESTHESIOLOGY

## 2024-08-29 PROCEDURE — 37000008 HC ANESTHESIA 1ST 15 MINUTES: Performed by: ORTHOPAEDIC SURGERY

## 2024-08-29 PROCEDURE — 01NB0ZZ RELEASE LUMBAR NERVE, OPEN APPROACH: ICD-10-PCS | Performed by: ORTHOPAEDIC SURGERY

## 2024-08-29 PROCEDURE — C1713 ANCHOR/SCREW BN/BN,TIS/BN: HCPCS | Performed by: ORTHOPAEDIC SURGERY

## 2024-08-29 PROCEDURE — 80048 BASIC METABOLIC PNL TOTAL CA: CPT | Performed by: ORTHOPAEDIC SURGERY

## 2024-08-29 PROCEDURE — 27800903 OPTIME MED/SURG SUP & DEVICES OTHER IMPLANTS: Performed by: ORTHOPAEDIC SURGERY

## 2024-08-29 PROCEDURE — 37000009 HC ANESTHESIA EA ADD 15 MINS: Performed by: ORTHOPAEDIC SURGERY

## 2024-08-29 PROCEDURE — C9290 INJ, BUPIVACAINE LIPOSOME: HCPCS | Performed by: ORTHOPAEDIC SURGERY

## 2024-08-29 PROCEDURE — 94760 N-INVAS EAR/PLS OXIMETRY 1: CPT

## 2024-08-29 PROCEDURE — 63600175 PHARM REV CODE 636 W HCPCS: Mod: JZ,JG | Performed by: NURSE ANESTHETIST, CERTIFIED REGISTERED

## 2024-08-29 PROCEDURE — 27000221 HC OXYGEN, UP TO 24 HOURS

## 2024-08-29 PROCEDURE — 22633 ARTHRD CMBN 1NTRSPC LUMBAR: CPT | Mod: ,,, | Performed by: ORTHOPAEDIC SURGERY

## 2024-08-29 PROCEDURE — 94761 N-INVAS EAR/PLS OXIMETRY MLT: CPT

## 2024-08-29 PROCEDURE — 71000033 HC RECOVERY, INTIAL HOUR: Performed by: ORTHOPAEDIC SURGERY

## 2024-08-29 PROCEDURE — 36415 COLL VENOUS BLD VENIPUNCTURE: CPT | Performed by: ORTHOPAEDIC SURGERY

## 2024-08-29 PROCEDURE — 20936 SP BONE AGRFT LOCAL ADD-ON: CPT | Mod: ,,, | Performed by: ORTHOPAEDIC SURGERY

## 2024-08-29 PROCEDURE — 20930 SP BONE ALGRFT MORSEL ADD-ON: CPT | Mod: ,,, | Performed by: ORTHOPAEDIC SURGERY

## 2024-08-29 PROCEDURE — C1729 CATH, DRAINAGE: HCPCS | Performed by: ORTHOPAEDIC SURGERY

## 2024-08-29 PROCEDURE — 61783 SCAN PROC SPINAL: CPT | Mod: 59,,, | Performed by: ORTHOPAEDIC SURGERY

## 2024-08-29 DEVICE — ROD HORIZON CURV 5.5CCM 70MM: Type: IMPLANTABLE DEVICE | Site: SPINE LUMBAR | Status: FUNCTIONAL

## 2024-08-29 DEVICE — SPACER 3992611 26MM X 11MM
Type: IMPLANTABLE DEVICE | Site: SPINE LUMBAR | Status: FUNCTIONAL
Brand: CAPSTONE PTC™ SPINAL SYSTEM

## 2024-08-29 DEVICE — PUTTY GRAFTON DBF 6CC: Type: IMPLANTABLE DEVICE | Site: SPINE LUMBAR | Status: FUNCTIONAL

## 2024-08-29 DEVICE — SET SCREW HORIZON SOLERA 5.5-6: Type: IMPLANTABLE DEVICE | Site: SPINE LUMBAR | Status: FUNCTIONAL

## 2024-08-29 DEVICE — ROD CD HORIZON SOLERA 5.5-6X40: Type: IMPLANTABLE DEVICE | Site: SPINE LUMBAR | Status: FUNCTIONAL

## 2024-08-29 RX ORDER — SUCCINYLCHOLINE CHLORIDE 20 MG/ML
INJECTION INTRAMUSCULAR; INTRAVENOUS
Status: DISCONTINUED | OUTPATIENT
Start: 2024-08-29 | End: 2024-08-29

## 2024-08-29 RX ORDER — PROCHLORPERAZINE EDISYLATE 5 MG/ML
5 INJECTION INTRAMUSCULAR; INTRAVENOUS EVERY 6 HOURS PRN
Status: DISCONTINUED | OUTPATIENT
Start: 2024-08-29 | End: 2024-09-03 | Stop reason: HOSPADM

## 2024-08-29 RX ORDER — INSULIN ASPART 100 [IU]/ML
0-10 INJECTION, SOLUTION INTRAVENOUS; SUBCUTANEOUS
Status: DISCONTINUED | OUTPATIENT
Start: 2024-08-29 | End: 2024-09-03 | Stop reason: HOSPADM

## 2024-08-29 RX ORDER — PYRIDOXINE HCL (VITAMIN B6) 25 MG
25 TABLET ORAL DAILY
Status: DISCONTINUED | OUTPATIENT
Start: 2024-08-29 | End: 2024-09-03 | Stop reason: HOSPADM

## 2024-08-29 RX ORDER — OXYCODONE AND ACETAMINOPHEN 10; 325 MG/1; MG/1
1 TABLET ORAL EVERY 4 HOURS PRN
Qty: 42 TABLET | Refills: 0 | Status: SHIPPED | OUTPATIENT
Start: 2024-08-29 | End: 2024-09-05

## 2024-08-29 RX ORDER — LIDOCAINE HYDROCHLORIDE 10 MG/ML
1 INJECTION, SOLUTION EPIDURAL; INFILTRATION; INTRACAUDAL; PERINEURAL ONCE
Status: DISCONTINUED | OUTPATIENT
Start: 2024-08-29 | End: 2024-08-29 | Stop reason: HOSPADM

## 2024-08-29 RX ORDER — OXYCODONE HYDROCHLORIDE 5 MG/1
10 TABLET ORAL EVERY 4 HOURS PRN
Status: DISCONTINUED | OUTPATIENT
Start: 2024-08-29 | End: 2024-09-02

## 2024-08-29 RX ORDER — LOSARTAN POTASSIUM 25 MG/1
25 TABLET ORAL DAILY
Status: DISCONTINUED | OUTPATIENT
Start: 2024-08-29 | End: 2024-09-03 | Stop reason: HOSPADM

## 2024-08-29 RX ORDER — IBUPROFEN 200 MG
24 TABLET ORAL
Status: DISCONTINUED | OUTPATIENT
Start: 2024-08-29 | End: 2024-09-03 | Stop reason: HOSPADM

## 2024-08-29 RX ORDER — ONDANSETRON HYDROCHLORIDE 2 MG/ML
INJECTION, SOLUTION INTRAVENOUS
Status: DISCONTINUED | OUTPATIENT
Start: 2024-08-29 | End: 2024-08-29

## 2024-08-29 RX ORDER — SODIUM CHLORIDE, SODIUM LACTATE, POTASSIUM CHLORIDE, CALCIUM CHLORIDE 600; 310; 30; 20 MG/100ML; MG/100ML; MG/100ML; MG/100ML
INJECTION, SOLUTION INTRAVENOUS CONTINUOUS
Status: DISCONTINUED | OUTPATIENT
Start: 2024-08-29 | End: 2024-08-31

## 2024-08-29 RX ORDER — HYDROMORPHONE HYDROCHLORIDE 2 MG/ML
0.2 INJECTION, SOLUTION INTRAMUSCULAR; INTRAVENOUS; SUBCUTANEOUS EVERY 5 MIN PRN
Status: DISCONTINUED | OUTPATIENT
Start: 2024-08-29 | End: 2024-08-29

## 2024-08-29 RX ORDER — HYDROMORPHONE HYDROCHLORIDE 2 MG/ML
0.2 INJECTION, SOLUTION INTRAMUSCULAR; INTRAVENOUS; SUBCUTANEOUS EVERY 5 MIN PRN
Status: DISCONTINUED | OUTPATIENT
Start: 2024-08-29 | End: 2024-08-29 | Stop reason: HOSPADM

## 2024-08-29 RX ORDER — HYDROMORPHONE HCL IN 0.9% NACL 6 MG/30 ML
PATIENT CONTROLLED ANALGESIA SYRINGE INTRAVENOUS CONTINUOUS
Status: DISCONTINUED | OUTPATIENT
Start: 2024-08-29 | End: 2024-08-29

## 2024-08-29 RX ORDER — DIPHENHYDRAMINE HCL 25 MG
50 CAPSULE ORAL EVERY 6 HOURS PRN
Status: DISCONTINUED | OUTPATIENT
Start: 2024-08-29 | End: 2024-09-03 | Stop reason: HOSPADM

## 2024-08-29 RX ORDER — TRANEXAMIC ACID 100 MG/ML
INJECTION, SOLUTION INTRAVENOUS
Status: DISCONTINUED | OUTPATIENT
Start: 2024-08-29 | End: 2024-08-29

## 2024-08-29 RX ORDER — MIDAZOLAM HYDROCHLORIDE 1 MG/ML
INJECTION INTRAMUSCULAR; INTRAVENOUS
Status: DISCONTINUED | OUTPATIENT
Start: 2024-08-29 | End: 2024-08-29

## 2024-08-29 RX ORDER — MUPIROCIN 20 MG/G
OINTMENT TOPICAL 2 TIMES DAILY
Status: DISCONTINUED | OUTPATIENT
Start: 2024-08-29 | End: 2024-08-29 | Stop reason: SDUPTHER

## 2024-08-29 RX ORDER — SERTRALINE HYDROCHLORIDE 50 MG/1
150 TABLET, FILM COATED ORAL DAILY
Status: DISCONTINUED | OUTPATIENT
Start: 2024-08-29 | End: 2024-09-03 | Stop reason: HOSPADM

## 2024-08-29 RX ORDER — ONDANSETRON 4 MG/1
8 TABLET, ORALLY DISINTEGRATING ORAL EVERY 8 HOURS PRN
Status: DISCONTINUED | OUTPATIENT
Start: 2024-08-29 | End: 2024-08-29

## 2024-08-29 RX ORDER — GABAPENTIN 300 MG/1
300 CAPSULE ORAL 3 TIMES DAILY
Status: DISCONTINUED | OUTPATIENT
Start: 2024-08-29 | End: 2024-09-03 | Stop reason: HOSPADM

## 2024-08-29 RX ORDER — BISACODYL 10 MG/1
10 SUPPOSITORY RECTAL DAILY
Status: DISCONTINUED | OUTPATIENT
Start: 2024-08-29 | End: 2024-09-03 | Stop reason: HOSPADM

## 2024-08-29 RX ORDER — AMOXICILLIN 250 MG
2 CAPSULE ORAL NIGHTLY PRN
Status: DISCONTINUED | OUTPATIENT
Start: 2024-08-29 | End: 2024-09-03 | Stop reason: HOSPADM

## 2024-08-29 RX ORDER — SODIUM CHLORIDE, SODIUM LACTATE, POTASSIUM CHLORIDE, CALCIUM CHLORIDE 600; 310; 30; 20 MG/100ML; MG/100ML; MG/100ML; MG/100ML
INJECTION, SOLUTION INTRAVENOUS CONTINUOUS
Status: DISCONTINUED | OUTPATIENT
Start: 2024-08-29 | End: 2024-08-29

## 2024-08-29 RX ORDER — OXYCODONE HYDROCHLORIDE 5 MG/1
5 TABLET ORAL EVERY 4 HOURS PRN
Status: DISCONTINUED | OUTPATIENT
Start: 2024-08-29 | End: 2024-09-02

## 2024-08-29 RX ORDER — DEXAMETHASONE SODIUM PHOSPHATE 4 MG/ML
INJECTION, SOLUTION INTRA-ARTICULAR; INTRALESIONAL; INTRAMUSCULAR; INTRAVENOUS; SOFT TISSUE
Status: DISCONTINUED | OUTPATIENT
Start: 2024-08-29 | End: 2024-08-29

## 2024-08-29 RX ORDER — DEXMEDETOMIDINE HYDROCHLORIDE 100 UG/ML
INJECTION, SOLUTION INTRAVENOUS
Status: DISCONTINUED | OUTPATIENT
Start: 2024-08-29 | End: 2024-08-29

## 2024-08-29 RX ORDER — DIPHENHYDRAMINE HYDROCHLORIDE 50 MG/ML
12.5 INJECTION INTRAMUSCULAR; INTRAVENOUS EVERY 4 HOURS PRN
Status: DISCONTINUED | OUTPATIENT
Start: 2024-08-29 | End: 2024-08-29

## 2024-08-29 RX ORDER — ONDANSETRON 4 MG/1
8 TABLET, ORALLY DISINTEGRATING ORAL EVERY 6 HOURS PRN
Status: DISCONTINUED | OUTPATIENT
Start: 2024-08-29 | End: 2024-09-03 | Stop reason: HOSPADM

## 2024-08-29 RX ORDER — ROCURONIUM BROMIDE 10 MG/ML
INJECTION, SOLUTION INTRAVENOUS
Status: DISCONTINUED | OUTPATIENT
Start: 2024-08-29 | End: 2024-08-29

## 2024-08-29 RX ORDER — FENTANYL CITRATE 50 UG/ML
25 INJECTION, SOLUTION INTRAMUSCULAR; INTRAVENOUS EVERY 5 MIN PRN
Status: COMPLETED | OUTPATIENT
Start: 2024-08-29 | End: 2024-08-29

## 2024-08-29 RX ORDER — HYDROMORPHONE HYDROCHLORIDE 2 MG/ML
2 INJECTION, SOLUTION INTRAMUSCULAR; INTRAVENOUS; SUBCUTANEOUS
Status: DISCONTINUED | OUTPATIENT
Start: 2024-08-29 | End: 2024-09-02

## 2024-08-29 RX ORDER — FENTANYL CITRATE 50 UG/ML
25 INJECTION, SOLUTION INTRAMUSCULAR; INTRAVENOUS EVERY 5 MIN PRN
Status: DISCONTINUED | OUTPATIENT
Start: 2024-08-29 | End: 2024-08-29

## 2024-08-29 RX ORDER — KETAMINE HCL IN 0.9 % NACL 50 MG/5 ML
SYRINGE (ML) INTRAVENOUS
Status: DISCONTINUED | OUTPATIENT
Start: 2024-08-29 | End: 2024-08-29

## 2024-08-29 RX ORDER — IBUPROFEN 200 MG
16 TABLET ORAL
Status: DISCONTINUED | OUTPATIENT
Start: 2024-08-29 | End: 2024-09-03 | Stop reason: HOSPADM

## 2024-08-29 RX ORDER — OXYCODONE HYDROCHLORIDE 5 MG/1
15 TABLET ORAL EVERY 4 HOURS PRN
Status: DISCONTINUED | OUTPATIENT
Start: 2024-08-29 | End: 2024-09-02

## 2024-08-29 RX ORDER — PROPOFOL 10 MG/ML
VIAL (ML) INTRAVENOUS CONTINUOUS PRN
Status: DISCONTINUED | OUTPATIENT
Start: 2024-08-29 | End: 2024-08-29

## 2024-08-29 RX ORDER — MEPERIDINE HYDROCHLORIDE 50 MG/ML
12.5 INJECTION INTRAMUSCULAR; INTRAVENOUS; SUBCUTANEOUS ONCE AS NEEDED
Status: DISCONTINUED | OUTPATIENT
Start: 2024-08-29 | End: 2024-08-29

## 2024-08-29 RX ORDER — NALOXONE HCL 0.4 MG/ML
0.02 VIAL (ML) INJECTION
Status: DISCONTINUED | OUTPATIENT
Start: 2024-08-29 | End: 2024-08-29

## 2024-08-29 RX ORDER — RISPERIDONE 1 MG/1
1 TABLET ORAL 2 TIMES DAILY
Status: DISCONTINUED | OUTPATIENT
Start: 2024-08-29 | End: 2024-09-03 | Stop reason: HOSPADM

## 2024-08-29 RX ORDER — GLUCAGON 1 MG
1 KIT INJECTION
Status: DISCONTINUED | OUTPATIENT
Start: 2024-08-29 | End: 2024-09-03 | Stop reason: HOSPADM

## 2024-08-29 RX ORDER — HYDROMORPHONE HCL IN 0.9% NACL 6 MG/30 ML
PATIENT CONTROLLED ANALGESIA SYRINGE INTRAVENOUS CONTINUOUS
Status: DISCONTINUED | OUTPATIENT
Start: 2024-08-29 | End: 2024-08-30

## 2024-08-29 RX ORDER — LIDOCAINE HYDROCHLORIDE 20 MG/ML
INJECTION INTRAVENOUS
Status: DISCONTINUED | OUTPATIENT
Start: 2024-08-29 | End: 2024-08-29

## 2024-08-29 RX ORDER — TALC
9 POWDER (GRAM) TOPICAL NIGHTLY PRN
Status: DISCONTINUED | OUTPATIENT
Start: 2024-08-29 | End: 2024-09-03 | Stop reason: HOSPADM

## 2024-08-29 RX ORDER — ALUMINUM HYDROXIDE, MAGNESIUM HYDROXIDE, AND SIMETHICONE 1200; 120; 1200 MG/30ML; MG/30ML; MG/30ML
30 SUSPENSION ORAL EVERY 4 HOURS PRN
Status: DISCONTINUED | OUTPATIENT
Start: 2024-08-29 | End: 2024-09-03 | Stop reason: HOSPADM

## 2024-08-29 RX ORDER — MUPIROCIN 20 MG/G
OINTMENT TOPICAL 2 TIMES DAILY
Status: COMPLETED | OUTPATIENT
Start: 2024-08-29 | End: 2024-08-31

## 2024-08-29 RX ORDER — BUPIVACAINE HYDROCHLORIDE 5 MG/ML
INJECTION, SOLUTION EPIDURAL; INTRACAUDAL CODE/TRAUMA/SEDATION MEDICATION
Status: DISCONTINUED | OUTPATIENT
Start: 2024-08-29 | End: 2024-08-29 | Stop reason: HOSPADM

## 2024-08-29 RX ORDER — DIPHENHYDRAMINE HYDROCHLORIDE 50 MG/ML
12.5 INJECTION INTRAMUSCULAR; INTRAVENOUS EVERY 6 HOURS PRN
Status: DISCONTINUED | OUTPATIENT
Start: 2024-08-29 | End: 2024-08-29

## 2024-08-29 RX ORDER — OXYCODONE HYDROCHLORIDE 5 MG/1
5 TABLET ORAL
Status: DISCONTINUED | OUTPATIENT
Start: 2024-08-29 | End: 2024-08-29 | Stop reason: HOSPADM

## 2024-08-29 RX ORDER — METFORMIN HYDROCHLORIDE 500 MG/1
1000 TABLET, EXTENDED RELEASE ORAL 2 TIMES DAILY WITH MEALS
Status: DISCONTINUED | OUTPATIENT
Start: 2024-08-29 | End: 2024-08-29

## 2024-08-29 RX ORDER — ONDANSETRON HYDROCHLORIDE 2 MG/ML
4 INJECTION, SOLUTION INTRAVENOUS ONCE AS NEEDED
Status: DISCONTINUED | OUTPATIENT
Start: 2024-08-29 | End: 2024-08-29

## 2024-08-29 RX ORDER — HEPARIN SODIUM 10000 [USP'U]/ML
INJECTION, SOLUTION INTRAVENOUS; SUBCUTANEOUS CODE/TRAUMA/SEDATION MEDICATION
Status: DISCONTINUED | OUTPATIENT
Start: 2024-08-29 | End: 2024-08-29 | Stop reason: HOSPADM

## 2024-08-29 RX ORDER — OXYCODONE HYDROCHLORIDE 5 MG/1
5 TABLET ORAL
Status: DISCONTINUED | OUTPATIENT
Start: 2024-08-29 | End: 2024-08-29

## 2024-08-29 RX ORDER — PROPOFOL 10 MG/ML
VIAL (ML) INTRAVENOUS
Status: DISCONTINUED | OUTPATIENT
Start: 2024-08-29 | End: 2024-08-29

## 2024-08-29 RX ORDER — GLUCAGON 1 MG
1 KIT INJECTION
Status: DISCONTINUED | OUTPATIENT
Start: 2024-08-29 | End: 2024-08-29 | Stop reason: HOSPADM

## 2024-08-29 RX ORDER — ACETAMINOPHEN 10 MG/ML
INJECTION, SOLUTION INTRAVENOUS
Status: DISCONTINUED | OUTPATIENT
Start: 2024-08-29 | End: 2024-08-29

## 2024-08-29 RX ORDER — GLUCAGON 1 MG
1 KIT INJECTION
Status: DISCONTINUED | OUTPATIENT
Start: 2024-08-29 | End: 2024-08-29

## 2024-08-29 RX ORDER — DOCUSATE SODIUM 100 MG/1
100 CAPSULE, LIQUID FILLED ORAL DAILY
Status: DISCONTINUED | OUTPATIENT
Start: 2024-08-29 | End: 2024-09-03 | Stop reason: HOSPADM

## 2024-08-29 RX ORDER — FENTANYL CITRATE 50 UG/ML
INJECTION, SOLUTION INTRAMUSCULAR; INTRAVENOUS
Status: DISCONTINUED | OUTPATIENT
Start: 2024-08-29 | End: 2024-08-29

## 2024-08-29 RX ORDER — ONDANSETRON 4 MG/1
4 TABLET, FILM COATED ORAL EVERY 8 HOURS PRN
Status: DISCONTINUED | OUTPATIENT
Start: 2024-08-29 | End: 2024-08-29 | Stop reason: SDUPTHER

## 2024-08-29 RX ORDER — ACETAMINOPHEN 325 MG/1
650 TABLET ORAL EVERY 6 HOURS PRN
Status: DISCONTINUED | OUTPATIENT
Start: 2024-08-30 | End: 2024-09-03 | Stop reason: HOSPADM

## 2024-08-29 RX ADMIN — SERTRALINE HYDROCHLORIDE 150 MG: 50 TABLET ORAL at 03:08

## 2024-08-29 RX ADMIN — SUCCINYLCHOLINE CHLORIDE 160 MG: 20 INJECTION, SOLUTION INTRAMUSCULAR; INTRAVENOUS at 07:08

## 2024-08-29 RX ADMIN — ONDANSETRON 4 MG: 2 INJECTION INTRAMUSCULAR; INTRAVENOUS at 10:08

## 2024-08-29 RX ADMIN — MUPIROCIN 1 G: 20 OINTMENT TOPICAL at 08:08

## 2024-08-29 RX ADMIN — Medication: at 12:08

## 2024-08-29 RX ADMIN — DIPHENHYDRAMINE HYDROCHLORIDE 50 MG: 25 CAPSULE ORAL at 05:08

## 2024-08-29 RX ADMIN — FENTANYL CITRATE 25 MCG: 50 INJECTION, SOLUTION INTRAMUSCULAR; INTRAVENOUS at 11:08

## 2024-08-29 RX ADMIN — LOSARTAN POTASSIUM 25 MG: 25 TABLET, FILM COATED ORAL at 03:08

## 2024-08-29 RX ADMIN — Medication: at 05:08

## 2024-08-29 RX ADMIN — PROMETHAZINE HYDROCHLORIDE 12.5 MG: 25 INJECTION INTRAMUSCULAR; INTRAVENOUS at 12:08

## 2024-08-29 RX ADMIN — PROPOFOL 200 MG: 10 INJECTION, EMULSION INTRAVENOUS at 07:08

## 2024-08-29 RX ADMIN — HYDROMORPHONE HYDROCHLORIDE 0.2 MG: 2 INJECTION INTRAMUSCULAR; INTRAVENOUS; SUBCUTANEOUS at 12:08

## 2024-08-29 RX ADMIN — PROPOFOL 50 MCG/KG/MIN: 10 INJECTION, EMULSION INTRAVENOUS at 08:08

## 2024-08-29 RX ADMIN — SODIUM CHLORIDE, SODIUM GLUCONATE, SODIUM ACETATE, POTASSIUM CHLORIDE AND MAGNESIUM CHLORIDE: 526; 502; 368; 37; 30 INJECTION, SOLUTION INTRAVENOUS at 06:08

## 2024-08-29 RX ADMIN — ROCURONIUM BROMIDE 5 MG: 10 INJECTION, SOLUTION INTRAVENOUS at 07:08

## 2024-08-29 RX ADMIN — GABAPENTIN 300 MG: 300 CAPSULE ORAL at 03:08

## 2024-08-29 RX ADMIN — FENTANYL CITRATE 100 MCG: 50 INJECTION, SOLUTION INTRAMUSCULAR; INTRAVENOUS at 07:08

## 2024-08-29 RX ADMIN — TRAZODONE HYDROCHLORIDE 50 MG: 50 TABLET ORAL at 08:08

## 2024-08-29 RX ADMIN — CEFAZOLIN 2 G: 2 INJECTION, POWDER, FOR SOLUTION INTRAMUSCULAR; INTRAVENOUS at 01:08

## 2024-08-29 RX ADMIN — FENTANYL CITRATE 25 MCG: 50 INJECTION, SOLUTION INTRAMUSCULAR; INTRAVENOUS at 12:08

## 2024-08-29 RX ADMIN — GLYCOPYRROLATE 0.2 MG: 0.2 INJECTION, SOLUTION INTRAMUSCULAR; INTRAVITREAL at 07:08

## 2024-08-29 RX ADMIN — LIDOCAINE HYDROCHLORIDE 100 MG: 20 INJECTION, SOLUTION INTRAVENOUS at 07:08

## 2024-08-29 RX ADMIN — DIPHENHYDRAMINE HYDROCHLORIDE 12.5 MG: 50 INJECTION INTRAMUSCULAR; INTRAVENOUS at 01:08

## 2024-08-29 RX ADMIN — OXYCODONE HYDROCHLORIDE 5 MG: 5 TABLET ORAL at 11:08

## 2024-08-29 RX ADMIN — SODIUM CHLORIDE, SODIUM GLUCONATE, SODIUM ACETATE, POTASSIUM CHLORIDE AND MAGNESIUM CHLORIDE: 526; 502; 368; 37; 30 INJECTION, SOLUTION INTRAVENOUS at 08:08

## 2024-08-29 RX ADMIN — Medication: at 09:08

## 2024-08-29 RX ADMIN — HYDROMORPHONE HYDROCHLORIDE 0.2 MG: 2 INJECTION INTRAMUSCULAR; INTRAVENOUS; SUBCUTANEOUS at 11:08

## 2024-08-29 RX ADMIN — TRANEXAMIC ACID 1000 MG: 100 INJECTION, SOLUTION INTRAVENOUS at 08:08

## 2024-08-29 RX ADMIN — INSULIN ASPART 6 UNITS: 100 INJECTION, SOLUTION INTRAVENOUS; SUBCUTANEOUS at 05:08

## 2024-08-29 RX ADMIN — DEXAMETHASONE SODIUM PHOSPHATE 8 MG: 4 INJECTION, SOLUTION INTRA-ARTICULAR; INTRALESIONAL; INTRAMUSCULAR; INTRAVENOUS; SOFT TISSUE at 07:08

## 2024-08-29 RX ADMIN — INSULIN ASPART 3 UNITS: 100 INJECTION, SOLUTION INTRAVENOUS; SUBCUTANEOUS at 08:08

## 2024-08-29 RX ADMIN — DOCUSATE SODIUM 100 MG: 100 CAPSULE, LIQUID FILLED ORAL at 03:08

## 2024-08-29 RX ADMIN — CEFAZOLIN 2 G: 2 INJECTION, POWDER, FOR SOLUTION INTRAMUSCULAR; INTRAVENOUS at 07:08

## 2024-08-29 RX ADMIN — Medication 30 MG: at 07:08

## 2024-08-29 RX ADMIN — ONDANSETRON 8 MG: 4 TABLET, ORALLY DISINTEGRATING ORAL at 09:08

## 2024-08-29 RX ADMIN — GABAPENTIN 300 MG: 300 CAPSULE ORAL at 08:08

## 2024-08-29 RX ADMIN — SODIUM CHLORIDE, POTASSIUM CHLORIDE, SODIUM LACTATE AND CALCIUM CHLORIDE: 600; 310; 30; 20 INJECTION, SOLUTION INTRAVENOUS at 08:08

## 2024-08-29 RX ADMIN — Medication 10 MG: at 08:08

## 2024-08-29 RX ADMIN — CEFAZOLIN 2 G: 2 INJECTION, POWDER, FOR SOLUTION INTRAMUSCULAR; INTRAVENOUS at 08:08

## 2024-08-29 RX ADMIN — FENTANYL CITRATE 50 MCG: 50 INJECTION, SOLUTION INTRAMUSCULAR; INTRAVENOUS at 09:08

## 2024-08-29 RX ADMIN — FENTANYL CITRATE 50 MCG: 50 INJECTION, SOLUTION INTRAMUSCULAR; INTRAVENOUS at 10:08

## 2024-08-29 RX ADMIN — ACETAMINOPHEN 1000 MG: 10 INJECTION INTRAVENOUS at 08:08

## 2024-08-29 RX ADMIN — MIDAZOLAM HYDROCHLORIDE 2 MG: 1 INJECTION, SOLUTION INTRAMUSCULAR; INTRAVENOUS at 07:08

## 2024-08-29 RX ADMIN — ONDANSETRON 4 MG: 2 INJECTION INTRAMUSCULAR; INTRAVENOUS at 07:08

## 2024-08-29 RX ADMIN — RISPERIDONE 1 MG: 1 TABLET, FILM COATED ORAL at 08:08

## 2024-08-29 RX ADMIN — DEXMEDETOMIDINE HYDROCHLORIDE 30 MCG: 100 INJECTION, SOLUTION INTRAVENOUS at 08:08

## 2024-08-29 NOTE — H&P
[facility] Medicine  History & Physical    Patient Name: Loretta Lea  MRN: [id number]  Patient Class: IP- Inpatient  Admission Date: 8/29/2024  Attending Physician: Xavier Ballard MD  Primary Care Provider: Richard Ruiz FNP-C         Patient information was obtained from patient and ER records.     Subjective:     Principal Problem:<principal problem not specified>    Chief Complaint:   Chief Complaint   Patient presents with    Status post Lumbar fusion        HPI: This is a 34-year-old  female with a history of morbid obesity with BMI 48.08, chronic lower back pain, anxiety/depression,  hypertension, diabetes presenting here for elective surgery, patient had L 5-S1 lumbar fusion with Dr. Cassidy earlier today, the procedure went uneventfully.  Patient was seen by me postoperatively, patient is awake alert, AAO x3, denies any fever or chills, no nausea vomiting diarrhea, move both lower extremities, FIONA drain in place, patient is on PCA pump.  Vitals stable.  Review of the chart, patient had L4-L5, L5-S1 lumbar fusion, laminectomy with Dr. Cassidy on 4/2024, later she presented with a worsening lower back pain on 7/2024, MRI shows fluid collection at lumbar spine, evaluated by Orthopedic at the time, deemed that it was surgical seroma, patient was discharged with 7 days of p.o. doxycycline .   I was consulted for postoperative medical management.     Past Medical History:   Diagnosis Date    Anxiety     Back pain     Bipolar disorder 2004    bipolar 2    Chronic bronchitis     Depression     Diabetes mellitus     GERD (gastroesophageal reflux disease)     HPV (human papilloma virus) infection     Insomnia     Migraines     Non-proliferative diabetic retinopathy, mild, both eyes 09/25/2023    Obese body habitus     Ovarian cyst     Pneumonia     PONV (postoperative nausea and vomiting)        Past Surgical History:   Procedure Laterality Date    ANTERIOR LUMBAR  INTERBODY FUSION (ALIF) Bilateral 4/23/2024    Procedure: FUSION, SPINE, LUMBAR, ALIF;  Surgeon: Gurjit Cassidy MD;  Location: [facility];  Service: Orthopedics;  Laterality: Bilateral;    APPENDECTOMY      ARTHROSCOPY OF ANKLE Right 05/04/2022    Procedure: ARTHROSCOPY, ANKLE;  Surgeon: Bimal Mccormick DPM;  Location: [facility] OR;  Service: Podiatry;  Laterality: Right;  CURT EXTERNAL ANKLE DISTRACTOR    CHOLECYSTECTOMY      DILATION AND CURETTAGE OF UTERUS      HYSTERECTOMY  2017    total, ovarian cysts, torsion, Berrault; hyst per Dr JESUS Manriquez    LAPAROSCOPIC APPENDECTOMY N/A 08/18/2021    Procedure: APPENDECTOMY, LAPAROSCOPIC;  Surgeon: Osiel Ramirez MD;  Location: [facility];  Service: General;  Laterality: N/A;    LUMBAR LAMINECTOMY WITH FUSION Bilateral 4/23/2024    Procedure: LAMINECTOMY, SPINE, LUMBAR, WITH FUSION;  Surgeon: Gurjit Cassidy MD;  Location: [facility];  Service: Orthopedics;  Laterality: Bilateral;    OOPHORECTOMY      opherectom Left 2015    salpingo-opherectomy    REPAIR OF LIGAMENT OF ANKLE Right 06/23/2021    Procedure: REPAIR OF ANTERIOR TALOFIBULAR LIGAMENT CALCANEOFIBULAR LIGAMENT;  Surgeon: Bimal Mccormick DPM;  Location: [facility];  Service: Podiatry;  Laterality: Right;  WITH ARTHREX INTERNAL BRACE    REPAIR OF LIGAMENT OF ANKLE Right 4/27/2023    Procedure: REPAIR, LIGAMENT, ANKLE;  Surgeon: Aryan Poole DPM;  Location: [facility] OR;  Service: Podiatry;  Laterality: Right;    REPAIR OF TENDON OF LOWER EXTREMITY Right 4/27/2023    Procedure: REPAIR, TENDON, LOWER EXTREMITY;  Surgeon: Aryan Poole DPM;  Location: [facility] OR;  Service: Podiatry;  Laterality: Right;  arthrex    SURGICAL REMOVAL OF BONE SPUR Right 06/23/2021    Procedure: EXCISION OS TRIGONUM;  Surgeon: Bimal Mccormick DPM;  Location: [facility] OR;  Service: Podiatry;  Laterality: Right;    WISDOM TOOTH EXTRACTION         Review of patient's allergies indicates:   Allergen Reactions    Vancomycin analogues Other (See  Comments)     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     Current Outpatient Medications on File Prior to Encounter   Medication Sig    gabapentin (NEURONTIN) 300 MG capsule Take 1 capsule (300 mg total) by mouth 3 (three) times daily.    losartan (COZAAR) 25 MG tablet Take 1 tablet (25 mg total) by mouth once daily.    metFORMIN (GLUCOPHAGE-XR) 500 MG ER 24hr tablet Take 2 tablets (1,000 mg total) by mouth 2 (two) times daily with meals.    ondansetron (ZOFRAN) 4 MG tablet Take 1 tablet (4 mg total) by mouth every 8 (eight) hours as needed for Nausea.    ondansetron (ZOFRAN-ODT) 4 MG TbDL Take 4 mg by mouth every 6 (six) hours as needed.    pyridoxine, vitamin B6, (B-6) 25 MG Tab Take 25 mg by mouth once daily.    risperiDONE (RISPERDAL) 1 MG tablet Take 1 tablet (1 mg total) by mouth 2 (two) times daily.    sertraline (ZOLOFT) 100 MG tablet Take 1.5 tablets (150 mg total) by mouth once daily.    traZODone (DESYREL) 100 MG tablet TAKE 1 TO 2 TABLETS BY MOUTH EVERY NIGHT AT BEDTIME AS NEEDED FOR INSOMNIA    [DISCONTINUED] oxyCODONE-acetaminophen (PERCOCET) 7.5-325 mg per tablet Take 1 tablet by mouth every 6 (six) hours as needed for Pain.    blood sugar diagnostic Strp Test blood sugar 4 (four) times daily before meals and nightly.    blood-glucose meter (ACCU-CHEK GUIDE GLUCOSE METER) [facility] [place] to test blood sugar    cetirizine (ZYRTEC) 10 MG tablet Take 1 tablet (10 mg total) by mouth daily as needed for Allergies.    lancets (ACCU-CHEK SOFTCLIX LANCETS) Misc Test blood sugar 4 (four) times daily before meals and nightly.    OZEMPIC 2 mg/dose (8 mg/3 mL) PnIj Inject 2 mg into the skin every 7 days.    sumatriptan (IMITREX) 100 MG tablet Take 1 tab PO at first sign of migraine. If not effective, repeat dose in 2 hours. Do not take more than 200mg in 24 hours.     Family History       Problem Relation (Age of Onset)    No Known Problems Sister, Sister, Sister           Tobacco Use    Smoking status: Every Day     Current packs/day: 1.50     Average packs/day: 1.5 packs/day for 18.0 years (27.0 ttl pk-yrs)     Types: Cigarettes     Passive exposure: Current    Smokeless tobacco: Never    Tobacco comments:     4/25/23--pt instructed no smoking 24hours before sx, pt voiced understanding.   Substance and Sexual Activity    Alcohol use: Not Currently     Comment: rare    Drug use: Not Currently     Comment: twice    Sexual activity: Yes     Partners: Male     Birth control/protection: OCP, None     Review of Systems   Musculoskeletal:  Positive for back pain.     Objective:     Vital Signs (Most Recent):  Temp: 98.2 °F (36.8 °C) (08/29/24 1321)  Pulse: 81 (08/29/24 1427)  Resp: 16 (08/29/24 1427)  BP: (!) 103/56 (08/29/24 1321)  SpO2: 96 % (08/29/24 1427) Vital Signs (24h Range):  Temp:  [98.2 °F (36.8 °C)-99 °F (37.2 °C)] 98.2 °F (36.8 °C)  Pulse:  [] 81  Resp:  [13-20] 16  SpO2:  [91 %-100 %] 96 %  BP: (102-145)/(51-80) 103/56     Weight: (!) 139.3 kg (307 lb)  Body mass index is 48.08 kg/m².     Physical Exam  Vitals and nursing note reviewed.   Constitutional:       General: She is not in acute distress.  Cardiovascular:      Rate and Rhythm: Normal rate and regular rhythm.      Heart sounds: No murmur heard.  Pulmonary:      Effort: Pulmonary effort is normal.      Breath sounds: Normal breath sounds.   Abdominal:      General: There is no distension.      Palpations: Abdomen is soft.      Tenderness: There is no abdominal tenderness.   Musculoskeletal:      Comments: FIONA drainage in place of the lower back   Skin:     Coloration: Skin is not pale.      Findings: No rash.   Neurological:      Mental Status: She is alert and oriented to person, place, and time.                Significant Labs: All pertinent labs within the past 24 hours have been reviewed.  CBC:   Recent Labs   Lab 08/29/24  1211   WBC 13.96*   HGB 12.9   HCT 40.9        CMP:   Recent Labs    Lab 08/29/24  1211   *   K 4.8      CO2 19*   *   BUN 10   CREATININE 0.8   CALCIUM 8.2*   ANIONGAP 12       Significant Imaging: I have reviewed all pertinent imaging results/findings within the past 24 hours.  I have reviewed and interpreted all pertinent imaging results/findings within the past 24 hours.    SURG FL Surgery Fluoro Usage    Result Date: 8/29/2024  See OP Notes for results. IMPRESSION: See OP Notes for results. This procedure was auto-finalized by: Virtual Radiologist    X-Ray Lumbar Spine Ap And Lateral    Result Date: 8/12/2024  EXAMINATION: XR LUMBAR SPINE AP AND LATERAL CLINICAL HISTORY: Arthrodesis status TECHNIQUE: AP, lateral and spot images were performed of the lumbar spine. COMPARISON: Radiographs 07/24/2024. FINDINGS: Redemonstrated posterior instrumented fusion spanning L4-S1 as well as interbody fusion changes at L4-L5.  Hardware demonstrates stable alignment without evidence of fracture or obvious loosening.  No subsidence.  Vertebral body heights are preserved.  No acute bony process.  Similar grade 1 anterolisthesis of L4 on L5.  Moderate disc height loss at L5-S1.  Right upper quadrant surgical clips are noted.     1. Stable postoperative and degenerative changes of the lumbosacral spine. Electronically signed by: Hamzah Ayala Date:    08/12/2024 Time:    09:11  - pulls last radiology orders      Scheduled Meds:   bisacodyL  10 mg Rectal Daily    ceFAZolin (Ancef) IV (PEDS and ADULTS)  2 g Intravenous Q6H    docusate sodium  100 mg Oral Daily    gabapentin  300 mg Oral TID    losartan  25 mg Oral Daily    mupirocin   Nasal BID    pyridoxine (vitamin B6)  25 mg Oral Daily    risperiDONE  1 mg Oral BID    sertraline  150 mg Oral Daily     Continuous Infusions:   lactated ringers   Intravenous Continuous 125 mL/hr at 08/29/24 1105 Rate Change at 08/29/24 1105     PRN Meds:.  Current Facility-Administered Medications:     [START ON 8/30/2024] acetaminophen, 650 mg,  "Oral, Q6H PRN    aluminum-magnesium hydroxide-simethicone, 30 mL, Oral, Q4H PRN    dextrose 10%, 12.5 g, Intravenous, PRN    dextrose 10%, 25 g, Intravenous, PRN    diphenhydrAMINE, 50 mg, Oral, Q6H PRN    glucagon (human recombinant), 1 mg, Intramuscular, PRN    glucose, 16 g, Oral, PRN    glucose, 24 g, Oral, PRN    HYDROmorphone, 2 mg, Intravenous, Q3H PRN    insulin aspart U-100, 0-10 Units, Subcutaneous, QID (AC + HS) PRN    melatonin, 9 mg, Oral, Nightly PRN    ondansetron, 8 mg, Oral, Q6H PRN    oxyCODONE, 10 mg, Oral, Q4H PRN    oxyCODONE, 15 mg, Oral, Q4H PRN    oxyCODONE, 5 mg, Oral, Q4H PRN    prochlorperazine, 5 mg, Intravenous, Q6H PRN    senna-docusate 8.6-50 mg, 2 tablet, Oral, Nightly PRN    traZODone, 50 mg, Oral, Nightly PRN    Assessment/Plan:     Diabetes  Patient's FSGs are uncontrolled due to hyperglycemia on current medication regimen.  Last A1c reviewed-   Lab Results   Component Value Date    HGBA1C 9.7 (H) 02/08/2023     Most recent fingerstick glucose reviewed- No results for input(s): "POCTGLUCOSE" in the last 24 hours.  Current correctional scale  Medium  Maintain anti-hyperglycemic dose as follows-   Antihyperglycemics (From admission, onward)      Start     Stop Route Frequency Ordered    08/29/24 1658  insulin aspart U-100 pen 0-10 Units         -- SubQ Before meals & nightly PRN 08/29/24 1558          Hold Oral hypoglycemics while patient is in the hospital.    Status post lumbar spinal fusion    Status post L5-S1 lumbar fusion with Dr. Cassidy 8/29  Continue postoperative management as per orthopedic surgeon  On PCA pump  IV hydration  Empiric IV antibiotics  Incentive spirometry  DVT prophylaxis as per surgical team    Morbid obesity  Body mass index is 48.08 kg/m². Morbid obesity complicates all aspects of disease management from diagnostic modalities to treatment. Weight loss encouraged and health benefits explained to patient.         Cigarette smoker  Dangers of cigarette " smoking were reviewed with patient in detail for 10 minutes and patient was encouraged to quit. Nicotine replacement options were discussed. Nicotine replacement options were discussed. Nicotine replacement was not prescribed per patient request.    Offer nicotine patch, patient refused    Bipolar disorder, in partial remission, most recent episode mixed    Continue home medication      VTE Risk Mitigation (From admission, onward)           Ordered     Place ZAKI hose  Until discontinued         08/29/24 0533     Place sequential compression device  Until discontinued         08/29/24 0533                                    Xavier Ballard MD  Department of Hospital Medicine  [facility]/Surg

## 2024-08-29 NOTE — ANESTHESIA PREPROCEDURE EVALUATION
08/29/2024  Loretta Lea is a 34 y.o., female.      Pre-op Assessment          Review of Systems  Pulmonary:  Pneumonia                  Pulmonary Infection:  Pneumonia.     Hepatic/GI:     GERD      Gerd          Neurological:      Headaches      Dx of Headaches                           Endocrine:  Diabetes    Diabetes                      Psych:  Psychiatric History                  Physical Exam  General: Well nourished        Anesthesia Plan  Type of Anesthesia, risks & benefits discussed:    Anesthesia Type: Gen ETT  Intra-op Monitoring Plan: Standard ASA Monitors  Post Op Pain Control Plan: multimodal analgesia and IV/PO Opioids PRN  Induction:  IV  Airway Plan: Video  Informed Consent: Informed consent signed with the Patient and all parties understand the risks and agree with anesthesia plan.  All questions answered.   ASA Score: 3  Day of Surgery Review of History & Physical: H&P Update referred to the surgeon/provider.    Ready For Surgery From Anesthesia Perspective.     .

## 2024-08-29 NOTE — SUBJECTIVE & OBJECTIVE
Past Medical History:   Diagnosis Date    Anxiety     Back pain     Bipolar disorder 2004    bipolar 2    Chronic bronchitis     Depression     Diabetes mellitus     GERD (gastroesophageal reflux disease)     HPV (human papilloma virus) infection     Insomnia     Migraines     Non-proliferative diabetic retinopathy, mild, both eyes 09/25/2023    Obese body habitus     Ovarian cyst     Pneumonia     PONV (postoperative nausea and vomiting)        Past Surgical History:   Procedure Laterality Date    ANTERIOR LUMBAR INTERBODY FUSION (ALIF) Bilateral 4/23/2024    Procedure: FUSION, SPINE, LUMBAR, ALIF;  Surgeon: Gurjit Cassidy MD;  Location: [facility];  Service: Orthopedics;  Laterality: Bilateral;    APPENDECTOMY      ARTHROSCOPY OF ANKLE Right 05/04/2022    Procedure: ARTHROSCOPY, ANKLE;  Surgeon: Bimal Mccormick DPM;  Location: [facility];  Service: Podiatry;  Laterality: Right;  CURT EXTERNAL ANKLE DISTRACTOR    CHOLECYSTECTOMY      DILATION AND CURETTAGE OF UTERUS      HYSTERECTOMY  2017    total, ovarian cysts, torsion, Berrault; hyst per Dr JESUS Manriquez    LAPAROSCOPIC APPENDECTOMY N/A 08/18/2021    Procedure: APPENDECTOMY, LAPAROSCOPIC;  Surgeon: Osiel Ramirez MD;  Location: [facility];  Service: General;  Laterality: N/A;    LUMBAR LAMINECTOMY WITH FUSION Bilateral 4/23/2024    Procedure: LAMINECTOMY, SPINE, LUMBAR, WITH FUSION;  Surgeon: Gurjit Cassidy MD;  Location: [facility];  Service: Orthopedics;  Laterality: Bilateral;    OOPHORECTOMY      opherectom Left 2015    salpingo-opherectomy    REPAIR OF LIGAMENT OF ANKLE Right 06/23/2021    Procedure: REPAIR OF ANTERIOR TALOFIBULAR LIGAMENT CALCANEOFIBULAR LIGAMENT;  Surgeon: Bimal Mccormick DPM;  Location: [facility];  Service: Podiatry;  Laterality: Right;  WITH ARTHREX INTERNAL BRACE    REPAIR OF LIGAMENT OF ANKLE Right 4/27/2023    Procedure: REPAIR, LIGAMENT, ANKLE;  Surgeon: Aryan Poole DPM;  Location: [facility];  Service: Podiatry;  Laterality: Right;     REPAIR OF TENDON OF LOWER EXTREMITY Right 4/27/2023    Procedure: REPAIR, TENDON, LOWER EXTREMITY;  Surgeon: Aryan Poole DPM;  Location: [facility] OR;  Service: Podiatry;  Laterality: Right;  arthrex    SURGICAL REMOVAL OF BONE SPUR Right 06/23/2021    Procedure: EXCISION OS TRIGONUM;  Surgeon: Bimal Mccormick DPM;  Location: [facility] OR;  Service: Podiatry;  Laterality: Right;    WISDOM TOOTH EXTRACTION         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     Current Outpatient Medications on File Prior to Encounter   Medication Sig    gabapentin (NEURONTIN) 300 MG capsule Take 1 capsule (300 mg total) by mouth 3 (three) times daily.    losartan (COZAAR) 25 MG tablet Take 1 tablet (25 mg total) by mouth once daily.    metFORMIN (GLUCOPHAGE-XR) 500 MG ER 24hr tablet Take 2 tablets (1,000 mg total) by mouth 2 (two) times daily with meals.    ondansetron (ZOFRAN) 4 MG tablet Take 1 tablet (4 mg total) by mouth every 8 (eight) hours as needed for Nausea.    ondansetron (ZOFRAN-ODT) 4 MG TbDL Take 4 mg by mouth every 6 (six) hours as needed.    pyridoxine, vitamin B6, (B-6) 25 MG Tab Take 25 mg by mouth once daily.    risperiDONE (RISPERDAL) 1 MG tablet Take 1 tablet (1 mg total) by mouth 2 (two) times daily.    sertraline (ZOLOFT) 100 MG tablet Take 1.5 tablets (150 mg total) by mouth once daily.    traZODone (DESYREL) 100 MG tablet TAKE 1 TO 2 TABLETS BY MOUTH EVERY NIGHT AT BEDTIME AS NEEDED FOR INSOMNIA    [DISCONTINUED] oxyCODONE-acetaminophen (PERCOCET) 7.5-325 mg per tablet Take 1 tablet by mouth every 6 (six) hours as needed for Pain.    blood sugar diagnostic Strp Test blood sugar 4 (four) times daily before meals and nightly.    blood-glucose meter (ACCU-CHEK GUIDE GLUCOSE METER) [facility] [place] Use to test blood sugar    cetirizine (ZYRTEC) 10 MG tablet Take  1 tablet (10 mg total) by mouth daily as needed for Allergies.    lancets (ACCU-CHEK SOFTCLIX LANCETS) Misc Test blood sugar 4 (four) times daily before meals and nightly.    OZEMPIC 2 mg/dose (8 mg/3 mL) PnIj Inject 2 mg into the skin every 7 days.    sumatriptan (IMITREX) 100 MG tablet Take 1 tab PO at first sign of migraine. If not effective, repeat dose in 2 hours. Do not take more than 200mg in 24 hours.     Family History       Problem Relation (Age of Onset)    No Known Problems Sister, Sister, Sister          Tobacco Use    Smoking status: Every Day     Current packs/day: 1.50     Average packs/day: 1.5 packs/day for 18.0 years (27.0 ttl pk-yrs)     Types: Cigarettes     Passive exposure: Current    Smokeless tobacco: Never    Tobacco comments:     4/25/23--pt instructed no smoking 24hours before sx, pt voiced understanding.   Substance and Sexual Activity    Alcohol use: Not Currently     Comment: rare    Drug use: Not Currently     Comment: twice    Sexual activity: Yes     Partners: Male     Birth control/protection: OCP, None     Review of Systems   Musculoskeletal:  Positive for back pain.     Objective:     Vital Signs (Most Recent):  Temp: 98.2 °F (36.8 °C) (08/29/24 1321)  Pulse: 81 (08/29/24 1427)  Resp: 16 (08/29/24 1427)  BP: (!) 103/56 (08/29/24 1321)  SpO2: 96 % (08/29/24 1427) Vital Signs (24h Range):  Temp:  [98.2 °F (36.8 °C)-99 °F (37.2 °C)] 98.2 °F (36.8 °C)  Pulse:  [] 81  Resp:  [13-20] 16  SpO2:  [91 %-100 %] 96 %  BP: (102-145)/(51-80) 103/56     Weight: (!) 139.3 kg (307 lb)  Body mass index is 48.08 kg/m².     Physical Exam  Vitals and nursing note reviewed.   Constitutional:       General: She is not in acute distress.  Cardiovascular:      Rate and Rhythm: Normal rate and regular rhythm.      Heart sounds: No murmur heard.  Pulmonary:      Effort: Pulmonary effort is normal.      Breath sounds: Normal breath sounds.   Abdominal:      General: There is no distension.       Palpations: Abdomen is soft.      Tenderness: There is no abdominal tenderness.   Musculoskeletal:      Comments: FIONA drainage in place of the lower back   Skin:     Coloration: Skin is not pale.      Findings: No rash.   Neurological:      Mental Status: She is alert and oriented to person, place, and time.                Significant Labs: All pertinent labs within the past 24 hours have been reviewed.  CBC:   Recent Labs   Lab 08/29/24  1211   WBC 13.96*   HGB 12.9   HCT 40.9        CMP:   Recent Labs   Lab 08/29/24  1211   *   K 4.8      CO2 19*   *   BUN 10   CREATININE 0.8   CALCIUM 8.2*   ANIONGAP 12       Significant Imaging: I have reviewed all pertinent imaging results/findings within the past 24 hours.  I have reviewed and interpreted all pertinent imaging results/findings within the past 24 hours.    SURG FL Surgery Fluoro Usage    Result Date: 8/29/2024  See OP Notes for results. IMPRESSION: See OP Notes for results. This procedure was auto-finalized by: Virtual Radiologist    X-Ray Lumbar Spine Ap And Lateral    Result Date: 8/12/2024  EXAMINATION: XR LUMBAR SPINE AP AND LATERAL CLINICAL HISTORY: Arthrodesis status TECHNIQUE: AP, lateral and spot images were performed of the lumbar spine. COMPARISON: Radiographs 07/24/2024. FINDINGS: Redemonstrated posterior instrumented fusion spanning L4-S1 as well as interbody fusion changes at L4-L5.  Hardware demonstrates stable alignment without evidence of fracture or obvious loosening.  No subsidence.  Vertebral body heights are preserved.  No acute bony process.  Similar grade 1 anterolisthesis of L4 on L5.  Moderate disc height loss at L5-S1.  Right upper quadrant surgical clips are noted.     1. Stable postoperative and degenerative changes of the lumbosacral spine. Electronically signed by: Hamzah Ayala Date:    08/12/2024 Time:    09:11  - pulls last radiology orders      Scheduled Meds:   bisacodyL  10 mg Rectal Daily    ceFAZolin  (Ancef) IV (PEDS and ADULTS)  2 g Intravenous Q6H    docusate sodium  100 mg Oral Daily    gabapentin  300 mg Oral TID    losartan  25 mg Oral Daily    mupirocin   Nasal BID    pyridoxine (vitamin B6)  25 mg Oral Daily    risperiDONE  1 mg Oral BID    sertraline  150 mg Oral Daily     Continuous Infusions:   lactated ringers   Intravenous Continuous 125 mL/hr at 08/29/24 1105 Rate Change at 08/29/24 1105     PRN Meds:.  Current Facility-Administered Medications:     [START ON 8/30/2024] acetaminophen, 650 mg, Oral, Q6H PRN    aluminum-magnesium hydroxide-simethicone, 30 mL, Oral, Q4H PRN    dextrose 10%, 12.5 g, Intravenous, PRN    dextrose 10%, 25 g, Intravenous, PRN    diphenhydrAMINE, 50 mg, Oral, Q6H PRN    glucagon (human recombinant), 1 mg, Intramuscular, PRN    glucose, 16 g, Oral, PRN    glucose, 24 g, Oral, PRN    HYDROmorphone, 2 mg, Intravenous, Q3H PRN    insulin aspart U-100, 0-10 Units, Subcutaneous, QID (AC + HS) PRN    melatonin, 9 mg, Oral, Nightly PRN    ondansetron, 8 mg, Oral, Q6H PRN    oxyCODONE, 10 mg, Oral, Q4H PRN    oxyCODONE, 15 mg, Oral, Q4H PRN    oxyCODONE, 5 mg, Oral, Q4H PRN    prochlorperazine, 5 mg, Intravenous, Q6H PRN    senna-docusate 8.6-50 mg, 2 tablet, Oral, Nightly PRN    traZODone, 50 mg, Oral, Nightly PRN

## 2024-08-29 NOTE — NURSING
Pt Dilaudid PCA order was discontinued on transfer from PACU. Wendy Soto RN spoke with Dr. Cassidy, who states continue dilaudid PCA as previously ordered. See new orders for update. Primary RN Wendy updated.

## 2024-08-29 NOTE — ANESTHESIA PROCEDURE NOTES
Intubation    Date/Time: 8/29/2024 8:01 AM    Performed by: Dionicio Bonner CRNA  Authorized by: Meghan Quintana MD    Intubation:     Induction:  Intravenous    Intubated:  Postinduction    Mask Ventilation:  Easy with oral airway    Attempts:  1    Attempted By:  Student    Method of Intubation:  Video laryngoscopy    Blade:  Carson 3    Laryngeal View Grade: Grade I - full view of cords      Difficult Airway Encountered?: No      Complications:  None    Airway Device:  Oral endotracheal tube    Airway Device Size:  7.0    Style/Cuff Inflation:  Cuffed (inflated to minimal occlusive pressure)    Tube secured:  21    Secured at:  The lips    Placement Verified By:  Capnometry    Complicating Factors:  None    Findings Post-Intubation:  BS equal bilateral and atraumatic/condition of teeth unchanged

## 2024-08-29 NOTE — OP NOTE
[facility]  Orthopedic  Operative Note    SUMMARY     Date of Procedure: 8/29/2024     Procedure:   1. Transforaminal lumbar interbody fusion L5-S1 and posterolateral fusion.  CPT code  2. Insertion of interbody device L5-S1 with autogenous bone graft CPT code 63782  3. Removal of broken hardware S1 pedicle screws  4. Revision of posterior segmental instrumentation L4-S1 with Medtronic instrumentation  5. 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  Neural monitoring leads were placed and the patient was turned prone on transverse rolls with the abdomen allowed to hang free.  The back was cleansed with alcohol and prepped with ChloraPrep solution and draped in the usual fashion.  A time-out was performed intravenous antibiotics were given.  Due to the complexity of the surgical procedure it is my opinion that a skilled surgical assistant was necessary to complete the procedure in a safe and timely manner. the role of the assistant included patient positioning surgical exposure and wound closure jose Marrero serve as 1st surgical assistant  An incision was made from L4-S1 and paraspinous muscles elevated from the midline.  We identified the sacral area posteriorly and then elevated soft tissues to the segmental instrumentation from L4-S1.  We identified the  lamina of of L5.  We removed the set screws bilaterally and removed the rods bilaterally.  We suspected that the S1 screw on the left side might be broken we used an instrumented and removed the head of the screw but it knee was broken at the screw head shank interface.  We started with our decompression since patient has some radicular symptoms in the left lower extremity persistent since her surgery.  A hemilaminectomy on the left side was performed.  There was thickening of ligamentum flavum.  There was some irritability of the exiting L5 nerve root.  A complete foraminotomy was performed removing the pars interarticularis on the left.  The S1 nerve root was also explored and decompressed on the left side.  The bone graft harvested during the decompression was morselized and saved for the spinal fusion.  We then made a laminotomy on right-sided L5-S1 in order to insert the distraction device.  On left side we then protected the neural elements and entered the L5-S1 disc space after making an annulotomy window.  Multiple small pieces of disc material were removed.  We then started using the interbody Guillermina.  We used a set in 11 mm interbody shaver and then inserted an 11 mm interbody trial.  The 11 mm trial gave a good fit.  The bone graft harvested during the decompression was morselized and some bone graft separate separately packed into the anterior L5-S1 disc space.  A Medtronic 11 mm capstone interbody device was brought onto the field filled with autogenous bone graft and then while protecting neural elements impacted into the L5-S1 disc space.  Position of the interbody device was confirmed with fluoroscopy.  We attempted to remove the threads of the broken S1 screw on the left side by using a trephined over the screws but the remainder of the screw with rigidly fixed within the bone.  The Red Stamp O arm navigation device was brought onto the field a reference arc was placed on L3 and we visualized the  S1 pedicles bilaterally.  An intraoperative CT scan was then performed and interpreted.  We could not go proximal to the previous S1 screw and attempted to inserted another screw inferior to the broken screw we did so and thought we had good bony purchase.  We performed intraoperative EMG pedicle screw testing and the findings gave us a result of 7 milliamps below the normal acceptable threshold.  Therefore we decided to abandon the attempt to put in a separate new S1 screw on the left.  The area where the decompression had been performed was covered with Gelfoam.  We elevated soft tissues to expose the transverse process of L5 and the sacral ala on the right.  We noted that there was some early bone formation consistent with a previous attempted posterolateral fusion.  The remainder of the bone graft was packed in the posterolateral gutter on the right side connecting the transverse process of L5 to the sacral ala. we then used a connecting manny to connect from L4-S1 on the right and L4-L5 on the left.  Locking screws were inserted and tightened the proper torque.  We visualized the construct with fluoroscopy and it looked satisfactory.  The wound was closed in layers over a drain placed in the subcutaneous tissues.  Staples were used on the skin followed by sterile dressings.  The patient was turned supine and extubated.          Complications: None    Estimated Blood Loss (EBL):            Implants:   Implant Name Type Inv. Item Serial No.  Lot No. LRB No. Used Action   LOG [id number] - MEDTRONIC CAPSTONE PEEK INTERBODY - 1           SPACER CAPSTONE PTC [id number] - [id number]  SPACER CAPSTONE PTC [id number]  MEDTRONIC [facility] [id number]  1 Implanted   PUTTY AVA DBF 6C - [id number]  PUTTY AVA DBF [facility] [id number] [employer]TRON[employer] [facility]   1 Implanted   SET SCREW HORIZON SOLERA 5.5-6 - [id number]  SET SCREW HORIZON SOLERA 5.5-6  [employer]TRONIC [facility]   5 Implanted   MANNY HORIZON CURV 5.5CCM 70MM - [id number]  MANNY HORIZON CURV  5.5CCM 70MM  [employer] [place]   1 Implanted   MANNY CD HORIZON SOLERA 5.5-6X40 - [id number]  MANNY CD HORIZON SOLERA 5.5-6X40  [employer] [place]   1 Implanted   6.5X 35 SCREW    [employer]TRON[employer]   1 Implanted and Explanted       Specimens:   Specimen (24h ago, onward)      None                    Condition: Good    Disposition: PACU - hemodynamically stable.    Attestation: I was present and scrubbed for the entire procedure.

## 2024-08-29 NOTE — BRIEF OP NOTE
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 Item Serial No.  Lot No. LRB No. Used Action   SPACER CAPSTONE PTC [id number] - [id number]  SPACER CAPSTONE PTC [id number]  [facility] [id number]  1 Implanted   PUTTY AVA DBF 6CC - [id number]  PUTTY AVA DBF 6C [id number] [employer] [facility]   1 Implanted   SET SCREW HORIZON SOLERA 5.5-6 - [id number]  SET SCREW HORIZON SOLERA 5.5-6  [employer] [facility]   5 Implanted   MANNY HORIZON CURV 5.5CCM 70MM - [id number]  MANNY HORIZON CURV 5.5CCM 70MM  [employer] [facility]   1 Implanted   MANNY CD HORIZON SOLERA 5.5-6X40 - [id number]  MANNY CD HORIZON SOLERA 5.5-6X40  [employer] [facility]   1 Implanted       Operative Findings:  Broken S1 pedicle screw left side.    Estimated Blood Loss: 350 mL    Estimated Blood Loss has been documented.         Specimens:   Specimen (24h ago, onward)      None            [id number]

## 2024-08-29 NOTE — RESPIRATORY THERAPY
08/29/24 1427   Patient Assessment/Suction   Level of Consciousness (AVPU) alert   Respiratory Effort Unlabored;Normal   Expansion/Accessory Muscles/Retractions expansion symmetric;no retractions;no use of accessory muscles   Rhythm/Pattern, Respiratory depth regular;pattern regular;unlabored   Cough Frequency infrequent   Cough Type good;loose;nonproductive   Skin Integrity   $ Wound Care Tech Time 15 min   Area Observed Left;Right;Behind ear;Cheek;Nares   Skin Appearance without discoloration   PRE-TX-O2   Device (Oxygen Therapy) nasal cannula  (pt stated wants NC on while on PCA pump. Pt stated her O2sat drops while sleeping on PCA pump)   $ Is the patient on Low Flow Oxygen? Yes   Flow (L/min) (Oxygen Therapy) 2   SpO2 96 %   Pulse Oximetry Type Intermittent   $ Pulse Oximetry - Single Charge Pulse Oximetry - Single   Pulse 81   Resp 16   ETCO2   $ ETCO2 Usage Currently wearing   ETCO2 (mmHg) 47 mmHg   ETCO2 Device Type Bedside Monitor;Nasal Cannula   Incentive Spirometer   $ Incentive Spirometer Charges done with encouragement   Incentive Spirometer Predicted Level (mL) 2040   Administration (IS) proper technique demonstrated   Number of Repetitions (IS) 6   Level Incentive Spirometer (mL) 1500   Patient Tolerance (IS) good;no adverse signs/symptoms present

## 2024-08-29 NOTE — ASSESSMENT & PLAN NOTE
Status post L5-S1 lumbar fusion with Dr. Cassidy 8/29  Continue postoperative management as per orthopedic surgeon  On PCA pump  IV hydration  Empiric IV antibiotics  Incentive spirometry  DVT prophylaxis as per surgical team

## 2024-08-29 NOTE — NURSING
Nurses Note -- 4 Eyes      8/29/2024   4:42 PM      Skin assessed during: Admit      [x] No Altered Skin Integrity Present    []Prevention Measures Documented      [] Yes- Altered Skin Integrity Present or Discovered   [] LDA Added if Not in Epic (Describe Wound)   [] New Altered Skin Integrity was Present on Admit and Documented in LDA   [] Wound Image Taken    Wound Care Consulted? No    Attending Nurse:  Wendy Licona RN/Staff Member:   Bri

## 2024-08-29 NOTE — PLAN OF CARE
Patient released per anesthesia. Pain controlled. PCA set up. Patient tolerated oral liquids. Family updated.

## 2024-08-29 NOTE — TRANSFER OF CARE
"Anesthesia Transfer of Care Note    Patient: Loretta Lea    Procedure(s) Performed: Procedure(s) (LRB):  LAMINECTOMY, SPINE, LUMBAR, WITH FUSION (Bilateral)    Patient location: PACU    Anesthesia Type: general    Transport from OR: Transported from OR on 2-3 L/min O2 by NC with adequate spontaneous ventilation    Post pain: adequate analgesia    Post assessment: no apparent anesthetic complications    Post vital signs: stable    Level of consciousness: sedated    Nausea/Vomiting: no nausea/vomiting    Complications: none    Transfer of care protocol was followed    Last vitals: Visit Vitals  /80 (BP Location: Left arm, Patient Position: Lying)   Pulse 85   Temp 37 °C (98.6 °F) (Skin)   Resp 16   Ht 5' 7" (1.702 m)   Wt (!) 139.3 kg (307 lb)   LMP 04/15/2017 (Approximate)   SpO2 97%   Breastfeeding No   BMI 48.08 kg/m²     "

## 2024-08-29 NOTE — HPI
This is a 34-year-old  female with a history of morbid obesity with BMI 48.08, chronic lower back pain, anxiety/depression,  hypertension, diabetes presenting here for elective surgery, patient had L 5-S1 lumbar fusion with Dr. Cassidy earlier today, the procedure went uneventfully.  Patient was seen by me postoperatively, patient is awake alert, AAO x3, denies any fever or chills, no nausea vomiting diarrhea, move both lower extremities, FIONA drain in place, patient is on PCA pump.  Vitals stable.  Review of the chart, patient had L4-L5, L5-S1 lumbar fusion, laminectomy with Dr. Cassidy on 4/2024, later she presented with a worsening lower back pain on 7/2024, MRI shows fluid collection at lumbar spine, evaluated by Orthopedic at the time, deemed that it was surgical seroma, patient was discharged with 7 days of p.o. doxycycline .   I was consulted for postoperative medical management.

## 2024-08-29 NOTE — ANESTHESIA POSTPROCEDURE EVALUATION
Anesthesia Post Evaluation    Patient: Loretta Lea    Procedure(s) Performed: Procedure(s) (LRB):  LAMINECTOMY, SPINE, LUMBAR, WITH FUSION (Bilateral)    Final Anesthesia Type: general      Patient location during evaluation: PACU  Patient participation: Yes- Able to Participate  Level of consciousness: awake and alert  Post-procedure vital signs: reviewed and stable  Pain management: adequate  Airway patency: patent    PONV status at discharge: No PONV  Anesthetic complications: no      Cardiovascular status: blood pressure returned to baseline  Respiratory status: unassisted and room air  Hydration status: euvolemic  Follow-up not needed.              Vitals Value Taken Time   /65 08/29/24 1300   Temp 37.1 °C (98.8 °F) 08/29/24 1230   Pulse 82 08/29/24 1311   Resp 25 08/29/24 1311   SpO2 99 % 08/29/24 1311   Vitals shown include unfiled device data.      No case tracking events are documented in the log.      Pain/Luz Maria Score: Pain Rating Prior to Med Admin: 5 (8/29/2024 12:48 PM)  Pain Rating Post Med Admin: 4 (8/29/2024 12:45 PM)  Luz Maria Score: 9 (8/29/2024 12:45 PM)

## 2024-08-29 NOTE — PLAN OF CARE
[facility] - Med/Surg  Initial Discharge Assessment       Primary Care Provider: Richard Ruiz FNP-C    Admission Diagnosis: S/P lumbar spinal fusion [Z98.1]  Spondylolisthesis of lumbar region [M43.16]  Hardware failure of anterior column of spine [T84.216A]  Spondylolisthesis, lumbar region [M43.16]    Admission Date: 8/29/2024  Expected Discharge Date:     Transition of Care Barriers: None    Payor: MEDICAID / Plan: [employer] ([place]) [facility] / Product Type: Managed Medicaid /     Extended Emergency Contact Information  Primary Emergency Contact: Isauro Funk  Address: [address] [place] [address]           [place] [address] [facility]  Home Phone: [phone]  Mobile Phone: [phone]  Relation: Spouse    Discharge Plan A: Home with family  Discharge Plan B: Home      [id number] - [place] - [address] [facility] [address]  [address] [place] [address]  Phone: [phone] Fax: [phone]    [facility]  [address]  [facility] [address]  Phone: [phone] Fax: [phone]      DC assessment completed with patient and SO at bedside. Verified information on facesheet as correct. Denies POA. NOK is mom- would not provide contact information. Lives at listed address with fiance and roommate. PCP is JAMESON Ruiz, reports last apt was within a year. [facility]. Denies hh/hd/blood thinners/outpt services. DME- glucometer. Independent at baseline. Drives herself to apts. Fiance to provide transportation home upon DC. Reports taking home medications as prescribed and can currently afford them. Verified insurance on file. DC plan is home. HH not option d/t payor.     Initial Assessment (most recent)       Adult Discharge Assessment - 08/29/24 1548          Discharge Assessment    Assessment Type Discharge Planning Assessment     Confirmed/corrected address,  phone number and insurance Yes     Confirmed Demographics Correct on Facesheet     Source of Information patient     Communicated PEDRO with patient/caregiver Yes     Reason For Admission planned sx     People in Home significant other;friend(s)     Facility Arrived From: home     Do you expect to return to your current living situation? Yes     Do you have help at home or someone to help you manage your care at home? Yes     Who are your caregiver(s) and their phone number(s)? SO And roommate     Prior to hospitilization cognitive status: Alert/Oriented     Current cognitive status: Alert/Oriented     Walking or Climbing Stairs Difficulty no     Dressing/Bathing Difficulty no     Equipment Currently Used at Home glucometer     Readmission within 30 days? No     Patient currently being followed by outpatient case management? No     Do you currently have service(s) that help you manage your care at home? No     Do you take prescription medications? Yes     Do you have prescription coverage? Yes     Do you have any problems affording any of your prescribed medications? No     Is the patient taking medications as prescribed? yes     Who is going to help you get home at discharge? SO     How do you get to doctors appointments? family or friend will provide;car, drives self     Are you on dialysis? No     Do you take coumadin? No     Discharge Plan A Home with family     Discharge Plan B Home     DME Needed Upon Discharge  none     Discharge Plan discussed with: Patient     Transition of Care Barriers None        Physical Activity    On average, how many days per week do you engage in moderate to strenuous exercise (like a brisk walk)? 0 days     On average, how many minutes do you engage in exercise at this level? 0 min        Financial Resource Strain    How hard is it for you to pay for the very basics like food, housing, medical care, and heating? Somewhat hard        Housing Stability    In the last 12 months, was  there a time when you were not able to pay the mortgage or rent on time? No     At any time in the past 12 months, were you homeless or living in a shelter (including now)? No        Transportation Needs    Has the lack of transportation kept you from medical appointments, meetings, work or from getting things needed for daily living? Yes, it has kept me from non-medical meetings, appointments, work or from getting things that I need.        Food Insecurity    Within the past 12 months, you worried that your food would run out before you got the money to buy more. Never true     Within the past 12 months, the food you bought just didn't last and you didn't have money to get more. Never true        Stress    Do you feel stress - tense, restless, nervous, or anxious, or unable to sleep at night because your mind is troubled all the time - these days? Patient declined        Social Isolation    How often do you feel lonely or isolated from those around you?  Never        Alcohol Use    Q1: How often do you have a drink containing alcohol? Never     Q2: How many drinks containing alcohol do you have on a typical day when you are drinking? Patient does not drink     Q3: How often do you have six or more drinks on one occasion? Never        Utilities    In the past 12 months has the electric, gas, oil, or water company threatened to shut off services in your home? No        Health Literacy    How often do you need to have someone help you when you read instructions, pamphlets, or other written material from your doctor or pharmacy? Never

## 2024-08-29 NOTE — ASSESSMENT & PLAN NOTE
"Patient's FSGs are uncontrolled due to hyperglycemia on current medication regimen.  Last A1c reviewed-   Lab Results   Component Value Date    HGBA1C 9.7 (H) 02/08/2023     Most recent fingerstick glucose reviewed- No results for input(s): "POCTGLUCOSE" in the last 24 hours.  Current correctional scale  Medium  Maintain anti-hyperglycemic dose as follows-   Antihyperglycemics (From admission, onward)      Start     Stop Route Frequency Ordered    08/29/24 1658  insulin aspart U-100 pen 0-10 Units         -- SubQ Before meals & nightly PRN 08/29/24 1558          Hold Oral hypoglycemics while patient is in the hospital.  "

## 2024-08-29 NOTE — ASSESSMENT & PLAN NOTE
Dangers of cigarette smoking were reviewed with patient in detail for 10 minutes and patient was encouraged to quit. Nicotine replacement options were discussed. Nicotine replacement options were discussed. Nicotine replacement was not prescribed per patient request.    Offer nicotine patch, patient refused

## 2024-08-30 LAB
ANION GAP SERPL CALC-SCNC: 10 MMOL/L (ref 8–16)
BASOPHILS # BLD AUTO: 0.04 K/UL (ref 0–0.2)
BASOPHILS NFR BLD: 0.2 % (ref 0–1.9)
BUN SERPL-MCNC: 7 MG/DL (ref 6–20)
CALCIUM SERPL-MCNC: 8.2 MG/DL (ref 8.7–10.5)
CHLORIDE SERPL-SCNC: 102 MMOL/L (ref 95–110)
CO2 SERPL-SCNC: 25 MMOL/L (ref 23–29)
CREAT SERPL-MCNC: 0.7 MG/DL (ref 0.5–1.4)
DIFFERENTIAL METHOD BLD: ABNORMAL
EOSINOPHIL # BLD AUTO: 0 K/UL (ref 0–0.5)
EOSINOPHIL NFR BLD: 0.1 % (ref 0–8)
ERYTHROCYTE [DISTWIDTH] IN BLOOD BY AUTOMATED COUNT: 15.2 % (ref 11.5–14.5)
EST. GFR  (NO RACE VARIABLE): >60 ML/MIN/1.73 M^2
ESTIMATED AVG GLUCOSE: 137 MG/DL (ref 68–131)
GLUCOSE SERPL-MCNC: 161 MG/DL (ref 70–110)
HBA1C MFR BLD: 6.4 % (ref 4.5–6.2)
HCT VFR BLD AUTO: 36.6 % (ref 37–48.5)
HGB BLD-MCNC: 11.4 G/DL (ref 12–16)
IMM GRANULOCYTES # BLD AUTO: 0.07 K/UL (ref 0–0.04)
IMM GRANULOCYTES NFR BLD AUTO: 0.4 % (ref 0–0.5)
LYMPHOCYTES # BLD AUTO: 3.5 K/UL (ref 1–4.8)
LYMPHOCYTES NFR BLD: 18.7 % (ref 18–48)
MAGNESIUM SERPL-MCNC: 2 MG/DL (ref 1.6–2.6)
MCH RBC QN AUTO: 26.4 PG (ref 27–31)
MCHC RBC AUTO-ENTMCNC: 31.1 G/DL (ref 32–36)
MCV RBC AUTO: 85 FL (ref 82–98)
MONOCYTES # BLD AUTO: 1.3 K/UL (ref 0.3–1)
MONOCYTES NFR BLD: 6.8 % (ref 4–15)
NEUTROPHILS # BLD AUTO: 13.8 K/UL (ref 1.8–7.7)
NEUTROPHILS NFR BLD: 73.8 % (ref 38–73)
NRBC BLD-RTO: 0 /100 WBC
PHOSPHATE SERPL-MCNC: 4.1 MG/DL (ref 2.7–4.5)
PLATELET # BLD AUTO: 330 K/UL (ref 150–450)
PMV BLD AUTO: 9.1 FL (ref 9.2–12.9)
POCT GLUCOSE: 183 MG/DL (ref 70–110)
POCT GLUCOSE: 185 MG/DL (ref 70–110)
POCT GLUCOSE: 201 MG/DL (ref 70–110)
POCT GLUCOSE: 283 MG/DL (ref 70–110)
POTASSIUM SERPL-SCNC: 4.3 MMOL/L (ref 3.5–5.1)
RBC # BLD AUTO: 4.32 M/UL (ref 4–5.4)
SODIUM SERPL-SCNC: 137 MMOL/L (ref 136–145)
WBC # BLD AUTO: 18.64 K/UL (ref 3.9–12.7)

## 2024-08-30 PROCEDURE — 85025 COMPLETE CBC W/AUTO DIFF WBC: CPT | Performed by: ORTHOPAEDIC SURGERY

## 2024-08-30 PROCEDURE — 25000003 PHARM REV CODE 250: Performed by: HOSPITALIST

## 2024-08-30 PROCEDURE — 63600175 PHARM REV CODE 636 W HCPCS: Performed by: HOSPITALIST

## 2024-08-30 PROCEDURE — 36415 COLL VENOUS BLD VENIPUNCTURE: CPT | Performed by: HOSPITALIST

## 2024-08-30 PROCEDURE — 63600175 PHARM REV CODE 636 W HCPCS: Performed by: ORTHOPAEDIC SURGERY

## 2024-08-30 PROCEDURE — 99406 BEHAV CHNG SMOKING 3-10 MIN: CPT

## 2024-08-30 PROCEDURE — 97116 GAIT TRAINING THERAPY: CPT

## 2024-08-30 PROCEDURE — 94799 UNLISTED PULMONARY SVC/PX: CPT | Mod: XB

## 2024-08-30 PROCEDURE — 97530 THERAPEUTIC ACTIVITIES: CPT

## 2024-08-30 PROCEDURE — S4991 NICOTINE PATCH NONLEGEND: HCPCS | Performed by: HOSPITALIST

## 2024-08-30 PROCEDURE — 83735 ASSAY OF MAGNESIUM: CPT | Performed by: HOSPITALIST

## 2024-08-30 PROCEDURE — 97535 SELF CARE MNGMENT TRAINING: CPT

## 2024-08-30 PROCEDURE — 25000003 PHARM REV CODE 250: Performed by: ORTHOPAEDIC SURGERY

## 2024-08-30 PROCEDURE — 97165 OT EVAL LOW COMPLEX 30 MIN: CPT

## 2024-08-30 PROCEDURE — 99900035 HC TECH TIME PER 15 MIN (STAT)

## 2024-08-30 PROCEDURE — 94761 N-INVAS EAR/PLS OXIMETRY MLT: CPT

## 2024-08-30 PROCEDURE — 80048 BASIC METABOLIC PNL TOTAL CA: CPT | Performed by: ORTHOPAEDIC SURGERY

## 2024-08-30 PROCEDURE — 83036 HEMOGLOBIN GLYCOSYLATED A1C: CPT | Performed by: HOSPITALIST

## 2024-08-30 PROCEDURE — 84100 ASSAY OF PHOSPHORUS: CPT | Performed by: HOSPITALIST

## 2024-08-30 PROCEDURE — 27000221 HC OXYGEN, UP TO 24 HOURS

## 2024-08-30 PROCEDURE — 11000001 HC ACUTE MED/SURG PRIVATE ROOM

## 2024-08-30 PROCEDURE — 97161 PT EVAL LOW COMPLEX 20 MIN: CPT

## 2024-08-30 RX ORDER — IBUPROFEN 200 MG
1 TABLET ORAL DAILY
Status: DISCONTINUED | OUTPATIENT
Start: 2024-08-30 | End: 2024-09-03 | Stop reason: HOSPADM

## 2024-08-30 RX ADMIN — INSULIN ASPART 6 UNITS: 100 INJECTION, SOLUTION INTRAVENOUS; SUBCUTANEOUS at 05:08

## 2024-08-30 RX ADMIN — HYDROMORPHONE HYDROCHLORIDE 2 MG: 2 INJECTION INTRAMUSCULAR; INTRAVENOUS; SUBCUTANEOUS at 04:08

## 2024-08-30 RX ADMIN — Medication: at 05:08

## 2024-08-30 RX ADMIN — RISPERIDONE 1 MG: 1 TABLET, FILM COATED ORAL at 08:08

## 2024-08-30 RX ADMIN — LOSARTAN POTASSIUM 25 MG: 25 TABLET, FILM COATED ORAL at 08:08

## 2024-08-30 RX ADMIN — HYDROMORPHONE HYDROCHLORIDE 2 MG: 2 INJECTION INTRAMUSCULAR; INTRAVENOUS; SUBCUTANEOUS at 10:08

## 2024-08-30 RX ADMIN — ACETAMINOPHEN 650 MG: 325 TABLET ORAL at 06:08

## 2024-08-30 RX ADMIN — GABAPENTIN 300 MG: 300 CAPSULE ORAL at 03:08

## 2024-08-30 RX ADMIN — CEFAZOLIN 2 G: 2 INJECTION, POWDER, FOR SOLUTION INTRAMUSCULAR; INTRAVENOUS at 01:08

## 2024-08-30 RX ADMIN — INSULIN ASPART 4 UNITS: 100 INJECTION, SOLUTION INTRAVENOUS; SUBCUTANEOUS at 12:08

## 2024-08-30 RX ADMIN — SERTRALINE HYDROCHLORIDE 150 MG: 50 TABLET ORAL at 08:08

## 2024-08-30 RX ADMIN — SODIUM CHLORIDE, POTASSIUM CHLORIDE, SODIUM LACTATE AND CALCIUM CHLORIDE: 600; 310; 30; 20 INJECTION, SOLUTION INTRAVENOUS at 04:08

## 2024-08-30 RX ADMIN — DIPHENHYDRAMINE HYDROCHLORIDE 50 MG: 25 CAPSULE ORAL at 09:08

## 2024-08-30 RX ADMIN — Medication 1 PATCH: at 10:08

## 2024-08-30 RX ADMIN — INSULIN ASPART 1 UNITS: 100 INJECTION, SOLUTION INTRAVENOUS; SUBCUTANEOUS at 08:08

## 2024-08-30 RX ADMIN — DIPHENHYDRAMINE HYDROCHLORIDE 50 MG: 25 CAPSULE ORAL at 12:08

## 2024-08-30 RX ADMIN — MUPIROCIN 1 G: 20 OINTMENT TOPICAL at 08:08

## 2024-08-30 RX ADMIN — OXYCODONE HYDROCHLORIDE 10 MG: 5 TABLET ORAL at 03:08

## 2024-08-30 RX ADMIN — PROCHLORPERAZINE EDISYLATE 5 MG: 5 INJECTION INTRAMUSCULAR; INTRAVENOUS at 12:08

## 2024-08-30 RX ADMIN — HYDROMORPHONE HYDROCHLORIDE 2 MG: 2 INJECTION INTRAMUSCULAR; INTRAVENOUS; SUBCUTANEOUS at 09:08

## 2024-08-30 RX ADMIN — OXYCODONE HYDROCHLORIDE 15 MG: 5 TABLET ORAL at 07:08

## 2024-08-30 RX ADMIN — ONDANSETRON 8 MG: 4 TABLET, ORALLY DISINTEGRATING ORAL at 04:08

## 2024-08-30 RX ADMIN — GABAPENTIN 300 MG: 300 CAPSULE ORAL at 08:08

## 2024-08-30 RX ADMIN — Medication 25 MG: at 08:08

## 2024-08-30 RX ADMIN — SODIUM CHLORIDE, POTASSIUM CHLORIDE, SODIUM LACTATE AND CALCIUM CHLORIDE: 600; 310; 30; 20 INJECTION, SOLUTION INTRAVENOUS at 05:08

## 2024-08-30 NOTE — ASSESSMENT & PLAN NOTE
Status post  transforaminal lumbar interbody fusion L5-S1 and posterolateral fusion, insertion of interbody device L5-S1, removal of broken hardware S1 pedicle screws, revision of posterior segmental instrumentation L4-S1 on 8/29 with Dr. Cassidy      Continue postoperative management as per orthopedic surgeon  On PCA pump  IV hydration, cut down the rate  Empiric IV antibiotics  Incentive spirometry  DVT prophylaxis as per surgical team

## 2024-08-30 NOTE — PROGRESS NOTES
[facility]/Surg  Orthopedics  Progress Note    Patient Name: Loretta Lea  MRN: [id number]  Admission Date: 8/29/2024  Hospital Length of Stay: 1 days  Attending Provider: Xavier Ballard MD  Primary Care Provider: Richard Ruiz FNP-C  Follow-up For: Procedure(s) (LRB):  LAMINECTOMY, SPINE, LUMBAR, WITH FUSION (Bilateral)    Post-Operative Day: 1 Day Post-Op  Subjective:     Principal Problem:<principal problem not specified>    Principal Orthopedic Problem:  Lumbar degenerative disc disease with radiculopathy and failed hardware    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       Current Facility-Administered Medications   Medication    acetaminophen tablet 650 mg    aluminum-magnesium hydroxide-simethicone 200-200-20 mg/5 mL suspension 30 mL    bisacodyL suppository 10 mg    dextrose 10% bolus 125 mL 125 mL    dextrose 10% bolus 250 mL 250 mL    diphenhydrAMINE capsule 50 mg    docusate sodium capsule 100 mg    gabapentin capsule 300 mg    glucagon (human recombinant) injection 1 mg    glucose chewable tablet 16 g    glucose chewable tablet 24 g    HYDROmorphone (PF) injection 2 mg    HYDROmorphone PCA syringe 6 mg/30 mL (0.2 mg/mL) NS    insulin aspart U-100 pen 0-10 Units    lactated ringers infusion    losartan tablet 25 mg    melatonin tablet 9 mg    mupirocin 2 % ointment    ondansetron disintegrating tablet 8 mg    oxyCODONE immediate release tablet 10 mg    oxyCODONE immediate release tablet 15 mg    oxyCODONE immediate release tablet 5 mg    prochlorperazine injection Soln 5 mg    pyridoxine (vitamin B6) tablet 25 mg    risperiDONE tablet 1 mg    senna-docusate 8.6-50 mg per tablet 2 tablet    sertraline tablet 150 mg    trazodone split tablet 50 mg     Objective:     Vital Signs (Most Recent):  Temp: 99.2 °F (37.3 °C) (08/30/24 0002)  Pulse: 98 (08/30/24 0002)  Resp:  "18 (08/30/24 0549)  BP: (!) 126/90 (08/30/24 0002)  SpO2: 99 % (08/30/24 0002) Vital Signs (24h Range):  Temp:  [97.9 °F (36.6 °C)-99.2 °F (37.3 °C)] 99.2 °F (37.3 °C)  Pulse:  [] 98  Resp:  [13-20] 18  SpO2:  [91 %-100 %] 99 %  BP: (102-145)/(51-90) 126/90     Weight: (!) 139.2 kg (306 lb 14.1 oz)  Height: 5' 7" (170.2 cm)  Body mass index is 48.06 kg/m².      Intake/Output Summary (Last 24 hours) at 8/30/2024 0710  Last data filed at 8/30/2024 0652  Gross per 24 hour   Intake 3444.17 ml   Output 4310 ml   Net -865.83 ml        General    Constitutional: She is oriented to person, place, and time. She appears well-developed and well-nourished.   Pulmonary/Chest: Effort normal.   Neurological: She is alert and oriented to person, place, and time.   Psychiatric: She has a normal mood and affect. Her behavior is normal. Judgment and thought content normal.         Back (L-Spine & T-Spine) / Neck (C-Spine) Exam     Tenderness Right paramedian tenderness of the Lower L-Spine. Left paramedian tenderness of the Lower L-Spine.     Spinal Sensation   Right Side Sensation  L-Spine Level: normal  Left Side Sensation  L-Spine Level: normal    Comments:  Patient lying in bed supine comfortably.  Pain controlled dressing to lower back clean dry and intact.  No surrounding erythema or ecchymosis.  Patient neurovascularly intact throughout bilateral lower extremities.  Negative Homans bilaterally.  She can dorsiflex and plantar flex bilateral ankles without difficulty.  Bilateral EHLs are intact.  Reports she has been tolerating a p.o. diet without issue.      Muscle Strength   Right Lower Extremity   Anterior tibial:  5/5   Gastrocsoleus:  5/5   EHL:  5/5  Left Lower Extremity   Anterior tibial:  5/5   Gastrocsoleus:  5/5   EHL:  5/5       Significant Labs: None    Significant Imaging: None  Assessment/Plan:     Status post lumbar spinal fusion  1. Status post transforaminal lumbar interbody fusion L5-S1 and posterolateral " fusion, insertion of interbody device L5-S1, removal of broken hardware S1 pedicle screws, revision of posterior segmental instrumentation L4-S1    Patient doing well at this stage postoperative day 1.  Drain was removed this morning.  We will start daily dressing changes.  We will discontinue her Claros and PCA.  She is tolerating a p.o. diet.  She should begin working with physical therapy.  Out of bed to bedside chair twice daily.  She can weightbear as tolerated on bilateral lower extremities.  Patient is stable from an orthopedic perspective.  Discharge planning per attending.            Aidan Alvares PA-C  Orthopedics  [facility] - [facility]/Surg

## 2024-08-30 NOTE — PLAN OF CARE
POC reviewed with pt, verbalized understanding. Pt AAO x 4, able to make needs known. Meds given per MAR. IV intact and patent with IVF infusing as ordered. PCA pump in place. Claros to gravity. Hemovac in place. Purposeful q2hr rounding done. Safety maintained with side rails up x3, bed wheels locked, bed in lowest position, and call light in reach. Pt educated to call for assistance with ambulation, verbalized understanding. Pt remains free of falls. No further needs expressed at this time. Will continue to monitor.

## 2024-08-30 NOTE — CARE UPDATE
08/30/24 1000   Tobacco Cessation Intervention   Do you use any type of tobacco product? Yes   Are you interested in quitting use of tobacco products? Not interested   Are you interested in Nicotine Replacement for symptom relief? Yes   $ Smoking Cessation Charges Smoking Cessation - Intermediate (CTTS)     Patient states that she smokes 2 packs a day and needs a nicotine patch.

## 2024-08-30 NOTE — HPI
Status post transforaminal lumbar interbody fusion at L5-S1 and posterolateral fusion with insertion of interbody device at L5-S1 and removal of broken hardware S1 pedicle screw and revision and posterior segment instrumentation from L4-S1.

## 2024-08-30 NOTE — PROGRESS NOTES
[facility] Medicine  Progress Note    Patient Name: Loretta Lea  MRN: [id number]  Patient Class: IP- Inpatient   Admission Date: 8/29/2024  Length of Stay: 1 days  Attending Physician: Xavier Ballard MD  Primary Care Provider: Richard Ruiz FNP-C        Subjective:     Principal Problem:<principal problem not specified>        HPI:  This is a 34-year-old  female with a history of morbid obesity with BMI 48.08, chronic lower back pain, anxiety/depression,  hypertension, diabetes presenting here for elective surgery, patient had L 5-S1 lumbar fusion with Dr. Cassidy earlier today, the procedure went uneventfully.  Patient was seen by me postoperatively, patient is awake alert, AAO x3, denies any fever or chills, no nausea vomiting diarrhea, move both lower extremities, FIONA drain in place, patient is on PCA pump.  Vitals stable.  Review of the chart, patient had L4-L5, L5-S1 lumbar fusion, laminectomy with Dr. Cassidy on 4/2024, later she presented with a worsening lower back pain on 7/2024, MRI shows fluid collection at lumbar spine, evaluated by Orthopedic at the time, deemed that it was surgical seroma, patient was discharged with 7 days of p.o. doxycycline .   I was consulted for postoperative medical management.     Overview/Hospital Course:  34-year-old female with history of morbid obesity, hypertension, bipolar disorder, diabetes, status post L4-S1 lumbar fusion laminectomy on 4/2024, lumbar spine seroma 7/2024 now presenting for redo lumbar spine fusion, patient is  Status post transforaminal lumbar interbody fusion L5-S1 and posterolateral fusion, insertion of interbody device L5-S1, removal of broken hardware S1 pedicle screws, revision of posterior segmental instrumentation L4-S1 on 8/29 is Dr. Cassidy, doing well, on PCA pump.        Interval History:   Seen and examined at multidisciplinary rounds, no fever or chills, no nausea vomiting diarrhea,  complaining of lower back pain, still on PCA pump, able to move both lower extremities.  Glucose better controlled.     Review of Systems   Musculoskeletal:  Positive for back pain.     Objective:     Vital Signs (Most Recent):  Temp: 99.1 °F (37.3 °C) (08/30/24 0755)  Pulse: 91 (08/30/24 0755)  Resp: 18 (08/30/24 0755)  BP: 121/63 (08/30/24 0844)  SpO2: 96 % (08/30/24 0755) Vital Signs (24h Range):  Temp:  [97.9 °F (36.6 °C)-99.2 °F (37.3 °C)] 99.1 °F (37.3 °C)  Pulse:  [] 91  Resp:  [13-20] 18  SpO2:  [91 %-100 %] 96 %  BP: (102-145)/(51-90) 121/63     Weight: (!) 139.2 kg (306 lb 14.1 oz)  Body mass index is 48.06 kg/m².    Intake/Output Summary (Last 24 hours) at 8/30/2024 1041  Last data filed at 8/30/2024 0652  Gross per 24 hour   Intake 2444.17 ml   Output 3755 ml   Net -1310.83 ml         Physical Exam  Vitals and nursing note reviewed.   Constitutional:       General: She is not in acute distress.     Appearance: She is obese.   Eyes:      Pupils: Pupils are equal, round, and reactive to light.   Cardiovascular:      Rate and Rhythm: Normal rate and regular rhythm.      Heart sounds: No murmur heard.  Pulmonary:      Effort: Pulmonary effort is normal.      Breath sounds: Normal breath sounds.   Abdominal:      General: There is no distension.      Palpations: Abdomen is soft.      Tenderness: There is no abdominal tenderness.   Musculoskeletal:      Comments: FIONA drain in lower back   Skin:     Coloration: Skin is not pale.      Findings: No rash.   Neurological:      Mental Status: She is alert and oriented to person, place, and time.             Significant Labs: All pertinent labs within the past 24 hours have been reviewed.  CBC:   Recent Labs   Lab 08/29/24  1211 08/30/24  0514   WBC 13.96* 18.64*   HGB 12.9 11.4*   HCT 40.9 36.6*    330     CMP:   Recent Labs   Lab 08/29/24  1211 08/30/24  0514   * 137   K 4.8 4.3    102   CO2 19* 25   * 161*   BUN 10 7   CREATININE 0.8  0.7   CALCIUM 8.2* 8.2*   ANIONGAP 12 10       Significant Imaging: I have reviewed all pertinent imaging results/findings within the past 24 hours.  I have reviewed and interpreted all pertinent imaging results/findings within the past 24 hours.    Scheduled Meds:   bisacodyL  10 mg Rectal Daily    docusate sodium  100 mg Oral Daily    gabapentin  300 mg Oral TID    losartan  25 mg Oral Daily    mupirocin   Nasal BID    nicotine  1 patch Transdermal Daily    pyridoxine (vitamin B6)  25 mg Oral Daily    risperiDONE  1 mg Oral BID    sertraline  150 mg Oral Daily     Continuous Infusions:   lactated ringers   Intravenous Continuous 125 mL/hr at 08/30/24 0652 Rate Verify at 08/30/24 0652     PRN Meds:.  Current Facility-Administered Medications:     acetaminophen, 650 mg, Oral, Q6H PRN    aluminum-magnesium hydroxide-simethicone, 30 mL, Oral, Q4H PRN    dextrose 10%, 12.5 g, Intravenous, PRN    dextrose 10%, 25 g, Intravenous, PRN    diphenhydrAMINE, 50 mg, Oral, Q6H PRN    glucagon (human recombinant), 1 mg, Intramuscular, PRN    glucose, 16 g, Oral, PRN    glucose, 24 g, Oral, PRN    HYDROmorphone, 2 mg, Intravenous, Q3H PRN    insulin aspart U-100, 0-10 Units, Subcutaneous, QID (AC + HS) PRN    melatonin, 9 mg, Oral, Nightly PRN    ondansetron, 8 mg, Oral, Q6H PRN    oxyCODONE, 10 mg, Oral, Q4H PRN    oxyCODONE, 15 mg, Oral, Q4H PRN    oxyCODONE, 5 mg, Oral, Q4H PRN    prochlorperazine, 5 mg, Intravenous, Q6H PRN    senna-docusate 8.6-50 mg, 2 tablet, Oral, Nightly PRN    traZODone, 100 mg, Oral, Nightly PRN    SURG FL Surgery Fluoro Usage    Result Date: 8/29/2024  See OP Notes for results. IMPRESSION: See OP Notes for results. This procedure was auto-finalized by: Virtual Radiologist    X-Ray Lumbar Spine Ap And Lateral    Result Date: 8/12/2024  EXAMINATION: XR LUMBAR SPINE AP AND LATERAL CLINICAL HISTORY: Arthrodesis status TECHNIQUE: AP, lateral and spot images were performed of the lumbar spine. COMPARISON:  "Radiographs 07/24/2024. FINDINGS: Redemonstrated posterior instrumented fusion spanning L4-S1 as well as interbody fusion changes at L4-L5.  Hardware demonstrates stable alignment without evidence of fracture or obvious loosening.  No subsidence.  Vertebral body heights are preserved.  No acute bony process.  Similar grade 1 anterolisthesis of L4 on L5.  Moderate disc height loss at L5-S1.  Right upper quadrant surgical clips are noted.     1. Stable postoperative and degenerative changes of the lumbosacral spine. Electronically signed by: Hamzah Ayala Date:    08/12/2024 Time:    09:11  - pulls last radiology orders      Assessment/Plan:      Diabetes  Patient's FSGs are uncontrolled due to hyperglycemia on current medication regimen.  Last A1c reviewed-   Lab Results   Component Value Date    HGBA1C 9.7 (H) 02/08/2023     Most recent fingerstick glucose reviewed- No results for input(s): "POCTGLUCOSE" in the last 24 hours.  Current correctional scale  Medium  Maintain anti-hyperglycemic dose as follows-   Antihyperglycemics (From admission, onward)      Start     Stop Route Frequency Ordered    08/29/24 1658  insulin aspart U-100 pen 0-10 Units         -- SubQ Before meals & nightly PRN 08/29/24 1558          Hold Oral hypoglycemics while patient is in the hospital.    Status post lumbar spinal fusion    Status post  transforaminal lumbar interbody fusion L5-S1 and posterolateral fusion, insertion of interbody device L5-S1, removal of broken hardware S1 pedicle screws, revision of posterior segmental instrumentation L4-S1 on 8/29 with Dr. Cassidy      Continue postoperative management as per orthopedic surgeon  On PCA pump  IV hydration, cut down the rate  Empiric IV antibiotics  Incentive spirometry  DVT prophylaxis as per surgical team    Morbid obesity  Body mass index is 48.08 kg/m². Morbid obesity complicates all aspects of disease management from diagnostic modalities to treatment. Weight loss encouraged and " health benefits explained to patient.         Cigarette smoker  Dangers of cigarette smoking were reviewed with patient in detail for 10 minutes and patient was encouraged to quit. Nicotine replacement options were discussed. Nicotine replacement options were discussed. Nicotine replacement was not prescribed per patient request.    Offer nicotine patch, patient refused    Bipolar disorder, in partial remission, most recent episode mixed    Continue home medication      VTE Risk Mitigation (From admission, onward)           Ordered     Place ZAKI hose  Until discontinued         08/29/24 0533     Place sequential compression device  Until discontinued         08/29/24 0533                    Discharge Planning   PEDRO: 8/31/2024     Code Status: Full Code   Is the patient medically ready for discharge?:     Reason for patient still in hospital (select all that apply): Patient trending condition and Treatment  Discharge Plan A: Home with family                  Xavier Ballard MD  Department of Hospital Medicine   [facility] [place] [facility]/Surg
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
## 2024-08-30 NOTE — PLAN OF CARE
Plan of care reviewed with patient. PCA was discontinued per order. Pain controlled with prn meds. Pt tolerated out of bed for first time post op. Dressing changed. Scant amt drng. No s/s infection to surgical site. Claros discontinued. Pt voiding spontaneously. Pt verbalized understanding of POC. Med compliant.

## 2024-08-30 NOTE — HOSPITAL COURSE
34-year-old female with history of morbid obesity, hypertension, bipolar disorder, diabetes, status post L4-S1 lumbar fusion laminectomy on 4/2024, lumbar spine seroma 7/2024 now presenting for redo lumbar spine fusion, patient underwent transforaminal lumbar interbody fusion L5-S1 and posterolateral fusion, insertion of interbody device L5-S1, removal of broken hardware S1 pedicle screws, revision of posterior segmental instrumentation L4-S1 on 8/29 is Dr. Cassidy.  Required PCA pump postoperatively which was discontinued.  She had a fever postoperatively which was thought to be from atelectasis.  Ambulation and incentive spirometry were encouraged.  She was started on empiric Levaquin to cover for possible pneumonia.  Fever resolved.  Patient was discharged home once afebrile for 24 hours.  She felt well on day of discharge and did well with therapy and was more than agreeable to discharge home.  She will follow up with Dr. Cassidy.  Outpatient PT/OT ordered.

## 2024-08-30 NOTE — PT/OT/SLP EVAL
Occupational Therapy Evaluation and Treatment    Name: Loretta Lea  MRN: [id number]  Admitting Diagnosis: <principal problem not specified>  Recent Surgery: Procedure(s) (LRB):  LAMINECTOMY, SPINE, LUMBAR, WITH FUSION (Bilateral) 1 Day Post-Op    Recommendations:     Discharge Recommendations: Low intensity   Discharge Equipment Recommendations: None  Barriers to discharge: Limited mobility     Assessment:     Loretta Lea is a 34 y.o. female with a medical diagnosis of <principal problem not specified>. She presents with performance deficits affecting function including impaired endurance, impaired self care skills, impaired functional mobility, gait instability, impaired balance, decreased safety awareness and orthopedic precautions.     Rehab Prognosis: Good; patient would benefit from acute OT services to address these deficits and reach maximum level of function.    Plan:     Patient to be seen 5 x/week to address the above listed problems via self-care/home management, therapeutic activities, therapeutic exercises  Plan of Care Expires: 09/14/24  Plan of Care Reviewed with:      Subjective     Chief Complaint: Back pain  Patient Comments/Goals: Pain relief  Pain/Comfort:  Pain Rating 1: 4/10  Location 1: back  Pain Addressed 1: Nurse notified    Patients cultural, spiritual, Sikhism conflicts given the current situation:      Social History:  Living Environment: Patient lives with her  and a roommate in a mobile home with 3 DOMINIK and no rails.  Prior Level of Function: Prior to admission, patient was independent.  Roles and Routines: Patient was driving and working prior to admission.  Equipment Used at Home: None  DME owned (not currently used): none  Assistance Upon Discharge: significant other    Objective:     Communicated with nurse prior to session. Patient found HOB elevated with bejarano catheter, oxygen, PCA, peripheral IV and telemetry upon OT entry to room.    General Precautions:  Standard,     Orthopedic Precautions: spinal precautions   Braces: TLSO  Respiratory Status: Room air    Occupational Performance    Bed Mobility:   Scooting anteriorly to EOB to have both feet on floor: stand by assistance  Supine to sit with minimum assistance  Sit to Supine with minimum assistance    Activities of Daily Living:  Feeding: independence  Grooming: set up assistance  Upper Body Dressing: set up assistance  Lower Body Dressing: maximal assistance  Toileting: total assistance bejarano catheter    Cognitive/Visual Perceptual:  Cognitive/Psychosocial Skills: -     Oriented to: Person, Place, Time, Situation  -     Follows Commands/attention: Follows multistep commands  -     Communication: clear/fluent  -     Memory: No Deficits noted  -     Safety awareness/insight to disability: impaired  -     Mood/Affect/Coping skills/emotional control: Cooperative, Anxious, and Pleasant    Physical Exam:  Upper Extremity Range of Motion:  -       Right Upper Extremity: WFL  -       Left Upper Extremity: WFL  Upper Extremity Strength: -       Right Upper Extremity: WFL  -       Left Upper Extremity: WFL    AMPAC 6 Click ADL:  AMPAC Total Score: 16    Treatment & Education:  Patient was educated on role of OT/POC, importance of getting out of bed during hospitalization, equipment needs and discharge planning and reviewed use of call bell for assistance.    OT ed pt on log roll tech to reduce strain on abdomen & spine and increase independence with bed mobility.    Patient left HOB elevated with all lines intact, call button in reach, and bed alarm on.    GOALS:   Multidisciplinary Problems       Occupational Therapy Goals          Problem: Occupational Therapy    Goal Priority Disciplines Outcome Interventions   Occupational Therapy Goal     OT, PT/OT     Description: Goals to be met by: 9/14/2024     Patient will increase functional independence with ADLs by performing:    UE Dressing with Modified [place].  DAVEY  Dressing with Standby A.  Grooming with Mod I.   Toileting from toilet with Supervision for hygiene and clothing management.   Toilet transfer to toilet with Supervision.                         History:     Past Medical History:   Diagnosis Date    Anxiety     Back pain     Bipolar disorder 2004    bipolar 2    Chronic bronchitis     Depression     Diabetes mellitus     GERD (gastroesophageal reflux disease)     HPV (human papilloma virus) infection     Insomnia     Migraines     Non-proliferative diabetic retinopathy, mild, both eyes 09/25/2023    Obese body habitus     Ovarian cyst     Pneumonia     PONV (postoperative nausea and vomiting)          Past Surgical History:   Procedure Laterality Date    ANTERIOR LUMBAR INTERBODY FUSION (ALIF) Bilateral 4/23/2024    Procedure: FUSION, SPINE, LUMBAR, ALIF;  Surgeon: Gurjit Cassidy MD;  Location: [facility] OR;  Service: Orthopedics;  Laterality: Bilateral;    APPENDECTOMY      ARTHROSCOPY OF ANKLE Right 05/04/2022    Procedure: ARTHROSCOPY, ANKLE;  Surgeon: Bimal Mccormick DPM;  Location: [facility] OR;  Service: Podiatry;  Laterality: Right;  [employer] EXTERNAL ANKLE DISTRACTOR    CHOLECYSTECTOMY      DILATION AND CURETTAGE OF UTERUS      HYSTERECTOMY  2017    total, ovarian cysts, torsion, Berrault; hyst per Dr JESUS Manriquez    LAPAROSCOPIC APPENDECTOMY N/A 08/18/2021    Procedure: APPENDECTOMY, LAPAROSCOPIC;  Surgeon: Osiel Ramirez MD;  Location: [facility] OR;  Service: General;  Laterality: N/A;    LUMBAR LAMINECTOMY WITH FUSION Bilateral 4/23/2024    Procedure: LAMINECTOMY, SPINE, LUMBAR, WITH FUSION;  Surgeon: Gurjit Cassidy MD;  Location: [facility] OR;  Service: Orthopedics;  Laterality: Bilateral;    LUMBAR LAMINECTOMY WITH FUSION Bilateral 8/29/2024    Procedure: LAMINECTOMY, SPINE, LUMBAR, WITH FUSION;  Surgeon: Gurjit Cassidy MD;  Location: [facility] OR;  Service: Orthopedics;  Laterality: Bilateral;  NTI, MEDTRONIC    OOPHORECTOMY      opherectom Left 2015     salpingo-opherectomy    REPAIR OF LIGAMENT OF ANKLE Right 06/23/2021    Procedure: REPAIR OF ANTERIOR TALOFIBULAR LIGAMENT CALCANEOFIBULAR LIGAMENT;  Surgeon: Bimal Mccormick DPM;  Location: [facility] OR;  Service: Podiatry;  Laterality: Right;  WITH ARTHREX INTERNAL BRACE    REPAIR OF LIGAMENT OF ANKLE Right 4/27/2023    Procedure: REPAIR, LIGAMENT, ANKLE;  Surgeon: Aryan Poole DPM;  Location: [facility] OR;  Service: Podiatry;  Laterality: Right;    REPAIR OF TENDON OF LOWER EXTREMITY Right 4/27/2023    Procedure: REPAIR, TENDON, LOWER EXTREMITY;  Surgeon: Aryan Poole DPM;  Location: [facility] OR;  Service: Podiatry;  Laterality: Right;  arthrex    SURGICAL REMOVAL OF BONE SPUR Right 06/23/2021    Procedure: EXCISION OS TRIGONUM;  Surgeon: Bimal Mccormick DPM;  Location: [facility];  Service: Podiatry;  Laterality: Right;    WISDOM TOOTH EXTRACTION         Time Tracking:     OT Date of Treatment: 08/30/24  OT Start Time: 0924  OT Stop Time: 0952  OT Total Time (min): 28 min    Billable Minutes: Evaluation 15 and Self Care/Home Management 13    8/30/2024

## 2024-08-30 NOTE — SUBJECTIVE & OBJECTIVE
Interval History:   Seen and examined at multidisciplinary rounds, no fever or chills, no nausea vomiting diarrhea, complaining of lower back pain, still on PCA pump, able to move both lower extremities.  Glucose better controlled.     Review of Systems   Musculoskeletal:  Positive for back pain.     Objective:     Vital Signs (Most Recent):  Temp: 99.1 °F (37.3 °C) (08/30/24 0755)  Pulse: 91 (08/30/24 0755)  Resp: 18 (08/30/24 0755)  BP: 121/63 (08/30/24 0844)  SpO2: 96 % (08/30/24 0755) Vital Signs (24h Range):  Temp:  [97.9 °F (36.6 °C)-99.2 °F (37.3 °C)] 99.1 °F (37.3 °C)  Pulse:  [] 91  Resp:  [13-20] 18  SpO2:  [91 %-100 %] 96 %  BP: (102-145)/(51-90) 121/63     Weight: (!) 139.2 kg (306 lb 14.1 oz)  Body mass index is 48.06 kg/m².    Intake/Output Summary (Last 24 hours) at 8/30/2024 1041  Last data filed at 8/30/2024 0652  Gross per 24 hour   Intake 2444.17 ml   Output 3755 ml   Net -1310.83 ml         Physical Exam  Vitals and nursing note reviewed.   Constitutional:       General: She is not in acute distress.     Appearance: She is obese.   Eyes:      Pupils: Pupils are equal, round, and reactive to light.   Cardiovascular:      Rate and Rhythm: Normal rate and regular rhythm.      Heart sounds: No murmur heard.  Pulmonary:      Effort: Pulmonary effort is normal.      Breath sounds: Normal breath sounds.   Abdominal:      General: There is no distension.      Palpations: Abdomen is soft.      Tenderness: There is no abdominal tenderness.   Musculoskeletal:      Comments: FIONA drain in lower back   Skin:     Coloration: Skin is not pale.      Findings: No rash.   Neurological:      Mental Status: She is alert and oriented to person, place, and time.             Significant Labs: All pertinent labs within the past 24 hours have been reviewed.  CBC:   Recent Labs   Lab 08/29/24  1211 08/30/24  0514   WBC 13.96* 18.64*   HGB 12.9 11.4*   HCT 40.9 36.6*    330     CMP:   Recent Labs   Lab  08/29/24  1211 08/30/24  0514   * 137   K 4.8 4.3    102   CO2 19* 25   * 161*   BUN 10 7   CREATININE 0.8 0.7   CALCIUM 8.2* 8.2*   ANIONGAP 12 10       Significant Imaging: I have reviewed all pertinent imaging results/findings within the past 24 hours.  I have reviewed and interpreted all pertinent imaging results/findings within the past 24 hours.    Scheduled Meds:   bisacodyL  10 mg Rectal Daily    docusate sodium  100 mg Oral Daily    gabapentin  300 mg Oral TID    losartan  25 mg Oral Daily    mupirocin   Nasal BID    nicotine  1 patch Transdermal Daily    pyridoxine (vitamin B6)  25 mg Oral Daily    risperiDONE  1 mg Oral BID    sertraline  150 mg Oral Daily     Continuous Infusions:   lactated ringers   Intravenous Continuous 125 mL/hr at 08/30/24 0652 Rate Verify at 08/30/24 0652     PRN Meds:.  Current Facility-Administered Medications:     acetaminophen, 650 mg, Oral, Q6H PRN    aluminum-magnesium hydroxide-simethicone, 30 mL, Oral, Q4H PRN    dextrose 10%, 12.5 g, Intravenous, PRN    dextrose 10%, 25 g, Intravenous, PRN    diphenhydrAMINE, 50 mg, Oral, Q6H PRN    glucagon (human recombinant), 1 mg, Intramuscular, PRN    glucose, 16 g, Oral, PRN    glucose, 24 g, Oral, PRN    HYDROmorphone, 2 mg, Intravenous, Q3H PRN    insulin aspart U-100, 0-10 Units, Subcutaneous, QID (AC + HS) PRN    melatonin, 9 mg, Oral, Nightly PRN    ondansetron, 8 mg, Oral, Q6H PRN    oxyCODONE, 10 mg, Oral, Q4H PRN    oxyCODONE, 15 mg, Oral, Q4H PRN    oxyCODONE, 5 mg, Oral, Q4H PRN    prochlorperazine, 5 mg, Intravenous, Q6H PRN    senna-docusate 8.6-50 mg, 2 tablet, Oral, Nightly PRN    traZODone, 100 mg, Oral, Nightly PRN    SURG FL Surgery Fluoro Usage    Result Date: 8/29/2024  See OP Notes for results. IMPRESSION: See OP Notes for results. This procedure was auto-finalized by: Virtual Radiologist    X-Ray Lumbar Spine Ap And Lateral    Result Date: 8/12/2024  EXAMINATION: XR LUMBAR SPINE AP AND LATERAL  CLINICAL HISTORY: Arthrodesis status TECHNIQUE: AP, lateral and spot images were performed of the lumbar spine. COMPARISON: Radiographs 07/24/2024. FINDINGS: Redemonstrated posterior instrumented fusion spanning L4-S1 as well as interbody fusion changes at L4-L5.  Hardware demonstrates stable alignment without evidence of fracture or obvious loosening.  No subsidence.  Vertebral body heights are preserved.  No acute bony process.  Similar grade 1 anterolisthesis of L4 on L5.  Moderate disc height loss at L5-S1.  Right upper quadrant surgical clips are noted.     1. Stable postoperative and degenerative changes of the lumbosacral spine. Electronically signed by: Hamzah Ayala Date:    08/12/2024 Time:    09:11  - pulls last radiology orders

## 2024-08-30 NOTE — SUBJECTIVE & OBJECTIVE
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       Current Facility-Administered Medications   Medication    acetaminophen tablet 650 mg    aluminum-magnesium hydroxide-simethicone 200-200-20 mg/5 mL suspension 30 mL    bisacodyL suppository 10 mg    dextrose 10% bolus 125 mL 125 mL    dextrose 10% bolus 250 mL 250 mL    diphenhydrAMINE capsule 50 mg    docusate sodium capsule 100 mg    gabapentin capsule 300 mg    glucagon (human recombinant) injection 1 mg    glucose chewable tablet 16 g    glucose chewable tablet 24 g    HYDROmorphone (PF) injection 2 mg    HYDROmorphone PCA syringe 6 mg/30 mL (0.2 mg/mL) NS    insulin aspart U-100 pen 0-10 Units    lactated ringers infusion    losartan tablet 25 mg    melatonin tablet 9 mg    mupirocin 2 % ointment    ondansetron disintegrating tablet 8 mg    oxyCODONE immediate release tablet 10 mg    oxyCODONE immediate release tablet 15 mg    oxyCODONE immediate release tablet 5 mg    prochlorperazine injection Soln 5 mg    pyridoxine (vitamin B6) tablet 25 mg    risperiDONE tablet 1 mg    senna-docusate 8.6-50 mg per tablet 2 tablet    sertraline tablet 150 mg    trazodone split tablet 50 mg     Objective:     Vital Signs (Most Recent):  Temp: 99.2 °F (37.3 °C) (08/30/24 0002)  Pulse: 98 (08/30/24 0002)  Resp: 18 (08/30/24 0549)  BP: (!) 126/90 (08/30/24 0002)  SpO2: 99 % (08/30/24 0002) Vital Signs (24h Range):  Temp:  [97.9 °F (36.6 °C)-99.2 °F (37.3 °C)] 99.2 °F (37.3 °C)  Pulse:  [] 98  Resp:  [13-20] 18  SpO2:  [91 %-100 %] 99 %  BP: (102-145)/(51-90) 126/90     Weight: (!) 139.2 kg (306 lb 14.1 oz)  Height: 5' 7" (170.2 cm)  Body mass index is 48.06 kg/m².      Intake/Output Summary (Last 24 hours) " at 8/30/2024 0710  Last data filed at 8/30/2024 0652  Gross per 24 hour   Intake 3444.17 ml   Output 4310 ml   Net -865.83 ml        General    Constitutional: She is oriented to person, place, and time. She appears well-developed and well-nourished.   Pulmonary/Chest: Effort normal.   Neurological: She is alert and oriented to person, place, and time.   Psychiatric: She has a normal mood and affect. Her behavior is normal. Judgment and thought content normal.         Back (L-Spine & T-Spine) / Neck (C-Spine) Exam     Tenderness Right paramedian tenderness of the Lower L-Spine. Left paramedian tenderness of the Lower L-Spine.     Spinal Sensation   Right Side Sensation  L-Spine Level: normal  Left Side Sensation  L-Spine Level: normal    Comments:  Patient lying in bed supine comfortably.  Pain controlled dressing to lower back clean dry and intact.  No surrounding erythema or ecchymosis.  Patient neurovascularly intact throughout bilateral lower extremities.  Negative Homans bilaterally.  She can dorsiflex and plantar flex bilateral ankles without difficulty.  Bilateral EHLs are intact.  Reports she has been tolerating a p.o. diet without issue.      Muscle Strength   Right Lower Extremity   Anterior tibial:  5/5   Gastrocsoleus:  5/5   EHL:  5/5  Left Lower Extremity   Anterior tibial:  5/5   Gastrocsoleus:  5/5   EHL:  5/5       Significant Labs: None    Significant Imaging: None

## 2024-08-30 NOTE — RESPIRATORY THERAPY
08/30/24 0753   Patient Assessment/Suction   Level of Consciousness (AVPU) alert   Respiratory Effort Normal;Unlabored   Expansion/Accessory Muscles/Retractions expansion symmetric;no use of accessory muscles;no retractions   Rhythm/Pattern, Respiratory depth regular;unlabored;pattern regular   Cough Frequency no cough   Skin Integrity   $ Wound Care Tech Time 15 min   Area Observed Left;Right;Behind ear;Cheek;Nares   Skin Appearance without discoloration   PRE-TX-O2   Device (Oxygen Therapy) nasal cannula   $ Is the patient on Low Flow Oxygen? Yes   Flow (L/min) (Oxygen Therapy) 2   SpO2 96 %   Pulse Oximetry Type Intermittent   $ Pulse Oximetry - Multiple Charge Pulse Oximetry - Multiple   Pulse 91   Resp 18   ETCO2   $ ETCO2 Usage Currently wearing   ETCO2 (mmHg) 53 mmHg   ETCO2 Device Type Bedside Monitor;Nasal Cannula   Incentive Spirometer   $ Incentive Spirometer Charges done with encouragement   Incentive Spirometer Predicted Level (mL) 2040   Administration (IS) proper technique demonstrated   Number of Repetitions (IS) 10   Level Incentive Spirometer (mL) 1500   Patient Tolerance (IS) good;no adverse signs/symptoms present

## 2024-08-30 NOTE — ASSESSMENT & PLAN NOTE
1. Status post transforaminal lumbar interbody fusion L5-S1 and posterolateral fusion, insertion of interbody device L5-S1, removal of broken hardware S1 pedicle screws, revision of posterior segmental instrumentation L4-S1    Patient doing well at this stage postoperative day 1.  Drain was removed this morning.  We will start daily dressing changes.  We will discontinue her Claros and PCA.  She is tolerating a p.o. diet.  She should begin working with physical therapy.  Out of bed to bedside chair twice daily.  She can weightbear as tolerated on bilateral lower extremities.  Patient is stable from an orthopedic perspective.  Discharge planning per attending.

## 2024-08-30 NOTE — PLAN OF CARE
Problem: Physical Therapy  Goal: Physical Therapy Goal  Description: Goals to be met by: 24    Patient will increase functional independence with mobility by performin. Supine to sit with Supervision  2. Sit to stand transfer with Supervision  3. Bed to chair transfer with Supervision using Rolling Walker  4. Gait  x 250 feet with Supervision using Rolling Walker.   5. Ascend/descend 3 stairs with no rails with Supervision using no AD  6. Lower extremity exercise program x20 reps per handout, with supervision  Outcome: Progressing

## 2024-08-31 LAB
ANION GAP SERPL CALC-SCNC: 10 MMOL/L (ref 8–16)
BASOPHILS # BLD AUTO: 0.07 K/UL (ref 0–0.2)
BASOPHILS NFR BLD: 0.4 % (ref 0–1.9)
BILIRUB UR QL STRIP: NEGATIVE
BNP SERPL-MCNC: 145 PG/ML (ref 0–99)
BUN SERPL-MCNC: 6 MG/DL (ref 6–20)
CALCIUM SERPL-MCNC: 8.2 MG/DL (ref 8.7–10.5)
CHLORIDE SERPL-SCNC: 100 MMOL/L (ref 95–110)
CLARITY UR: CLEAR
CO2 SERPL-SCNC: 27 MMOL/L (ref 23–29)
COLOR UR: YELLOW
CREAT SERPL-MCNC: 0.7 MG/DL (ref 0.5–1.4)
DIFFERENTIAL METHOD BLD: ABNORMAL
EOSINOPHIL # BLD AUTO: 0 K/UL (ref 0–0.5)
EOSINOPHIL NFR BLD: 0.1 % (ref 0–8)
ERYTHROCYTE [DISTWIDTH] IN BLOOD BY AUTOMATED COUNT: 15.5 % (ref 11.5–14.5)
EST. GFR  (NO RACE VARIABLE): >60 ML/MIN/1.73 M^2
GLUCOSE SERPL-MCNC: 154 MG/DL (ref 70–110)
GLUCOSE UR QL STRIP: ABNORMAL
HCT VFR BLD AUTO: 34.9 % (ref 37–48.5)
HGB BLD-MCNC: 10.8 G/DL (ref 12–16)
HGB UR QL STRIP: NEGATIVE
IMM GRANULOCYTES # BLD AUTO: 0.08 K/UL (ref 0–0.04)
IMM GRANULOCYTES NFR BLD AUTO: 0.5 % (ref 0–0.5)
KETONES UR QL STRIP: NEGATIVE
LACTATE SERPL-SCNC: 1.8 MMOL/L (ref 0.5–2.2)
LEUKOCYTE ESTERASE UR QL STRIP: NEGATIVE
LYMPHOCYTES # BLD AUTO: 3.6 K/UL (ref 1–4.8)
LYMPHOCYTES NFR BLD: 21.5 % (ref 18–48)
MCH RBC QN AUTO: 26.1 PG (ref 27–31)
MCHC RBC AUTO-ENTMCNC: 30.9 G/DL (ref 32–36)
MCV RBC AUTO: 84 FL (ref 82–98)
MONOCYTES # BLD AUTO: 1.1 K/UL (ref 0.3–1)
MONOCYTES NFR BLD: 6.6 % (ref 4–15)
NEUTROPHILS # BLD AUTO: 11.8 K/UL (ref 1.8–7.7)
NEUTROPHILS NFR BLD: 70.9 % (ref 38–73)
NITRITE UR QL STRIP: NEGATIVE
NRBC BLD-RTO: 0 /100 WBC
PH UR STRIP: 6 [PH] (ref 5–8)
PLATELET # BLD AUTO: 304 K/UL (ref 150–450)
PMV BLD AUTO: 9.2 FL (ref 9.2–12.9)
POCT GLUCOSE: 132 MG/DL (ref 70–110)
POCT GLUCOSE: 136 MG/DL (ref 70–110)
POCT GLUCOSE: 153 MG/DL (ref 70–110)
POCT GLUCOSE: 177 MG/DL (ref 70–110)
POTASSIUM SERPL-SCNC: 3.9 MMOL/L (ref 3.5–5.1)
PROCALCITONIN SERPL IA-MCNC: 0.1 NG/ML (ref 0–0.5)
PROT UR QL STRIP: NEGATIVE
RBC # BLD AUTO: 4.14 M/UL (ref 4–5.4)
SODIUM SERPL-SCNC: 137 MMOL/L (ref 136–145)
SP GR UR STRIP: 1.01 (ref 1–1.03)
URN SPEC COLLECT METH UR: ABNORMAL
UROBILINOGEN UR STRIP-ACNC: NEGATIVE EU/DL
WBC # BLD AUTO: 16.67 K/UL (ref 3.9–12.7)

## 2024-08-31 PROCEDURE — 25000003 PHARM REV CODE 250: Performed by: ORTHOPAEDIC SURGERY

## 2024-08-31 PROCEDURE — 63600175 PHARM REV CODE 636 W HCPCS: Performed by: ORTHOPAEDIC SURGERY

## 2024-08-31 PROCEDURE — 84145 PROCALCITONIN (PCT): CPT

## 2024-08-31 PROCEDURE — 63600175 PHARM REV CODE 636 W HCPCS: Mod: JZ,JG

## 2024-08-31 PROCEDURE — 94761 N-INVAS EAR/PLS OXIMETRY MLT: CPT

## 2024-08-31 PROCEDURE — 87040 BLOOD CULTURE FOR BACTERIA: CPT

## 2024-08-31 PROCEDURE — 63600175 PHARM REV CODE 636 W HCPCS: Performed by: HOSPITALIST

## 2024-08-31 PROCEDURE — 27000221 HC OXYGEN, UP TO 24 HOURS

## 2024-08-31 PROCEDURE — 83880 ASSAY OF NATRIURETIC PEPTIDE: CPT | Performed by: HOSPITALIST

## 2024-08-31 PROCEDURE — 94799 UNLISTED PULMONARY SVC/PX: CPT | Mod: XB

## 2024-08-31 PROCEDURE — 81003 URINALYSIS AUTO W/O SCOPE: CPT | Performed by: HOSPITALIST

## 2024-08-31 PROCEDURE — 25000003 PHARM REV CODE 250: Performed by: HOSPITALIST

## 2024-08-31 PROCEDURE — 85025 COMPLETE CBC W/AUTO DIFF WBC: CPT | Performed by: ORTHOPAEDIC SURGERY

## 2024-08-31 PROCEDURE — 11000001 HC ACUTE MED/SURG PRIVATE ROOM

## 2024-08-31 PROCEDURE — 63600175 PHARM REV CODE 636 W HCPCS

## 2024-08-31 PROCEDURE — S4991 NICOTINE PATCH NONLEGEND: HCPCS | Performed by: HOSPITALIST

## 2024-08-31 PROCEDURE — 80048 BASIC METABOLIC PNL TOTAL CA: CPT | Performed by: ORTHOPAEDIC SURGERY

## 2024-08-31 PROCEDURE — 87040 BLOOD CULTURE FOR BACTERIA: CPT | Mod: 59 | Performed by: HOSPITALIST

## 2024-08-31 PROCEDURE — 83605 ASSAY OF LACTIC ACID: CPT

## 2024-08-31 PROCEDURE — 97530 THERAPEUTIC ACTIVITIES: CPT | Mod: CQ

## 2024-08-31 PROCEDURE — 99900035 HC TECH TIME PER 15 MIN (STAT)

## 2024-08-31 PROCEDURE — 97116 GAIT TRAINING THERAPY: CPT | Mod: CQ

## 2024-08-31 RX ORDER — LEVOFLOXACIN 5 MG/ML
750 INJECTION, SOLUTION INTRAVENOUS
Status: DISCONTINUED | OUTPATIENT
Start: 2024-08-31 | End: 2024-09-03

## 2024-08-31 RX ORDER — ACETAMINOPHEN 10 MG/ML
1000 INJECTION, SOLUTION INTRAVENOUS ONCE
Status: COMPLETED | OUTPATIENT
Start: 2024-08-31 | End: 2024-08-31

## 2024-08-31 RX ADMIN — SODIUM CHLORIDE, POTASSIUM CHLORIDE, SODIUM LACTATE AND CALCIUM CHLORIDE: 600; 310; 30; 20 INJECTION, SOLUTION INTRAVENOUS at 06:08

## 2024-08-31 RX ADMIN — ONDANSETRON 8 MG: 4 TABLET, ORALLY DISINTEGRATING ORAL at 12:08

## 2024-08-31 RX ADMIN — Medication 1 PATCH: at 09:08

## 2024-08-31 RX ADMIN — OXYCODONE HYDROCHLORIDE 15 MG: 5 TABLET ORAL at 08:08

## 2024-08-31 RX ADMIN — LOSARTAN POTASSIUM 25 MG: 25 TABLET, FILM COATED ORAL at 09:08

## 2024-08-31 RX ADMIN — MUPIROCIN 1 G: 20 OINTMENT TOPICAL at 08:08

## 2024-08-31 RX ADMIN — RISPERIDONE 1 MG: 1 TABLET, FILM COATED ORAL at 08:08

## 2024-08-31 RX ADMIN — GABAPENTIN 300 MG: 300 CAPSULE ORAL at 09:08

## 2024-08-31 RX ADMIN — HYDROMORPHONE HYDROCHLORIDE 2 MG: 2 INJECTION INTRAMUSCULAR; INTRAVENOUS; SUBCUTANEOUS at 05:08

## 2024-08-31 RX ADMIN — GABAPENTIN 300 MG: 300 CAPSULE ORAL at 03:08

## 2024-08-31 RX ADMIN — HYDROMORPHONE HYDROCHLORIDE 2 MG: 2 INJECTION INTRAMUSCULAR; INTRAVENOUS; SUBCUTANEOUS at 03:08

## 2024-08-31 RX ADMIN — MUPIROCIN 1 G: 20 OINTMENT TOPICAL at 09:08

## 2024-08-31 RX ADMIN — HYDROMORPHONE HYDROCHLORIDE 2 MG: 2 INJECTION INTRAMUSCULAR; INTRAVENOUS; SUBCUTANEOUS at 01:08

## 2024-08-31 RX ADMIN — HYDROMORPHONE HYDROCHLORIDE 2 MG: 2 INJECTION INTRAMUSCULAR; INTRAVENOUS; SUBCUTANEOUS at 09:08

## 2024-08-31 RX ADMIN — DIPHENHYDRAMINE HYDROCHLORIDE 50 MG: 25 CAPSULE ORAL at 01:08

## 2024-08-31 RX ADMIN — LEVOFLOXACIN 750 MG: 5 INJECTION, SOLUTION INTRAVENOUS at 12:08

## 2024-08-31 RX ADMIN — OXYCODONE HYDROCHLORIDE 15 MG: 5 TABLET ORAL at 04:08

## 2024-08-31 RX ADMIN — ACETAMINOPHEN 650 MG: 325 TABLET ORAL at 09:08

## 2024-08-31 RX ADMIN — ACETAMINOPHEN 650 MG: 325 TABLET ORAL at 11:08

## 2024-08-31 RX ADMIN — OXYCODONE HYDROCHLORIDE 15 MG: 5 TABLET ORAL at 01:08

## 2024-08-31 RX ADMIN — INSULIN ASPART 1 UNITS: 100 INJECTION, SOLUTION INTRAVENOUS; SUBCUTANEOUS at 09:08

## 2024-08-31 RX ADMIN — RISPERIDONE 1 MG: 1 TABLET, FILM COATED ORAL at 09:08

## 2024-08-31 RX ADMIN — SERTRALINE HYDROCHLORIDE 150 MG: 50 TABLET ORAL at 09:08

## 2024-08-31 RX ADMIN — OXYCODONE HYDROCHLORIDE 15 MG: 5 TABLET ORAL at 11:08

## 2024-08-31 RX ADMIN — Medication 25 MG: at 09:08

## 2024-08-31 RX ADMIN — ACETAMINOPHEN 650 MG: 325 TABLET ORAL at 01:08

## 2024-08-31 RX ADMIN — TRAZODONE HYDROCHLORIDE 100 MG: 50 TABLET ORAL at 08:08

## 2024-08-31 RX ADMIN — ACETAMINOPHEN 650 MG: 325 TABLET ORAL at 04:08

## 2024-08-31 RX ADMIN — OXYCODONE HYDROCHLORIDE 15 MG: 5 TABLET ORAL at 06:08

## 2024-08-31 RX ADMIN — GABAPENTIN 300 MG: 300 CAPSULE ORAL at 08:08

## 2024-08-31 RX ADMIN — ACETAMINOPHEN 1000 MG: 10 INJECTION INTRAVENOUS at 02:08

## 2024-08-31 RX ADMIN — INSULIN ASPART 2 UNITS: 100 INJECTION, SOLUTION INTRAVENOUS; SUBCUTANEOUS at 12:08

## 2024-08-31 NOTE — NURSING
Checked temperature at patient's request. 102.4 axillary. Gave Tylenol and took rectal temperature which was 101.4. NP notified. Instructed to recheck in 45 minutes.

## 2024-08-31 NOTE — SUBJECTIVE & OBJECTIVE
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       Current Facility-Administered Medications   Medication    acetaminophen tablet 650 mg    aluminum-magnesium hydroxide-simethicone 200-200-20 mg/5 mL suspension 30 mL    bisacodyL suppository 10 mg    dextrose 10% bolus 125 mL 125 mL    dextrose 10% bolus 250 mL 250 mL    diphenhydrAMINE capsule 50 mg    docusate sodium capsule 100 mg    gabapentin capsule 300 mg    glucagon (human recombinant) injection 1 mg    glucose chewable tablet 16 g    glucose chewable tablet 24 g    HYDROmorphone (PF) injection 2 mg    insulin aspart U-100 pen 0-10 Units    levoFLOXacin 750 mg/150 mL IVPB 750 mg    losartan tablet 25 mg    melatonin tablet 9 mg    mupirocin 2 % ointment    nicotine 21 mg/24 hr 1 patch    ondansetron disintegrating tablet 8 mg    oxyCODONE immediate release tablet 10 mg    oxyCODONE immediate release tablet 15 mg    oxyCODONE immediate release tablet 5 mg    prochlorperazine injection Soln 5 mg    pyridoxine (vitamin B6) tablet 25 mg    risperiDONE tablet 1 mg    senna-docusate 8.6-50 mg per tablet 2 tablet    sertraline tablet 150 mg    trazodone split tablet 100 mg     Objective:     Vital Signs (Most Recent):  Temp: 98.4 °F (36.9 °C) (08/31/24 1529)  Pulse: 105 (08/31/24 1529)  Resp: 18 (08/31/24 1529)  BP: 117/72 (08/31/24 1529)  SpO2: 96 % (08/31/24 1529) Vital Signs (24h Range):  Temp:  [98.4 °F (36.9 °C)-103 °F (39.4 °C)] 98.4 °F (36.9 °C)  Pulse:  [104-124] 105  Resp:  [15-21] 18  SpO2:  [92 %-96 %] 96 %  BP: (109-134)/(70-86) 117/72     Weight: (!) 139.2 kg (306 lb 14.1 oz)  Height: 5' 7" (170.2 cm)  Body mass index is 48.06 kg/m².      Intake/Output Summary (Last 24 hours) at 8/31/2024 " 1539  Last data filed at 8/31/2024 0543  Gross per 24 hour   Intake 2842.53 ml   Output 2950 ml   Net -107.47 ml        General    Constitutional: She is oriented to person, place, and time. She appears well-developed and well-nourished.   Pulmonary/Chest: Effort normal.   Neurological: She is alert and oriented to person, place, and time.   Psychiatric: She has a normal mood and affect. Her behavior is normal. Judgment and thought content normal.         Back (L-Spine & T-Spine) / Neck (C-Spine) Exam     Tenderness Right paramedian tenderness of the Lower L-Spine. Left paramedian tenderness of the Lower L-Spine.     Spinal Sensation   Right Side Sensation  L-Spine Level: normal  Left Side Sensation  L-Spine Level: normal    Comments:  Lying in bed comfortably in the supine position.  Independently rolls to the lateral decubitus for wound inspection.  Posterior lumbar incision healing well without wound dehiscence or drainage.  Staples in place.  No surrounding erythema or ecchymosis.  Negative Homans bilaterally.  Neurovascularly intact throughout bilateral lower extremities.  Can dorsiflex and plantar flex bilateral ankles without difficulty.  Has been up out of bed in ambulating with PT. overnight fever being worked up/managed by attending.      Muscle Strength   Right Lower Extremity   Anterior tibial:  5/5   Gastrocsoleus:  5/5   EHL:  5/5  Left Lower Extremity   Anterior tibial:  5/5   Gastrocsoleus:  5/5   EHL:  5/5       Significant Labs: None    Significant Imaging: None

## 2024-08-31 NOTE — RESPIRATORY THERAPY
02 SATURATION 89% ON ROOM AIR.  PATIENT AVERAGING 200OML ON IS.  02 SATURATION 95% AFTER IS PERFORMED.  ADVISED PATIENT TO USE IS EVERY 2-3 HRS.

## 2024-08-31 NOTE — PLAN OF CARE
Problem: Physical Therapy  Goal: Physical Therapy Goal  Description: Goals to be met by: 24    Patient will increase functional independence with mobility by performin. Supine to sit with Supervision  2. Sit to stand transfer with Supervision  3. Bed to chair transfer with Supervision using Rolling Walker  4. Gait  x 250 feet with Supervision using Rolling Walker.   5. Ascend/descend 3 stairs with no rails with Supervision using no AD  6. Lower extremity exercise program x20 reps per handout, with supervision  Outcome: Progressing   Ambulate with rw and assistance for safety.

## 2024-08-31 NOTE — ASSESSMENT & PLAN NOTE
1. Status post transforaminal lumbar interbody fusion L5-S1 and posterolateral fusion, insertion of interbody device L5-S1, removal of broken hardware S1 pedicle screws, revision of posterior segmental instrumentation L4-S1    Patient doing well orthopedically at this stage postoperative day 2.  Drain was removed yesterday.  Continue with daily dressing changes.  Discontinued her Claros and PCA yesterday.  She is tolerating a p.o. diet.  She should begin working with physical therapy.  Out of bed to bedside chair twice daily.  She can weightbear as tolerated on bilateral lower extremities.  Patient is stable from an orthopedic perspective.  Discharge planning per attending.  Did discuss with her an instruct her on incentive spirometry use and being up out of bed and ambulating to help reduce her pneumonia risk.  She is at increased risk due to her postoperative state, smoking history, and her obesity.

## 2024-08-31 NOTE — CARE UPDATE
08/30/24 1913   Patient Assessment/Suction   Level of Consciousness (AVPU) alert   Respiratory Effort Unlabored   Expansion/Accessory Muscles/Retractions no use of accessory muscles;no retractions   All Lung Fields Breath Sounds diminished   PRE-TX-O2   Device (Oxygen Therapy) room air   SpO2 (!) 92 %   Pulse Oximetry Type Intermittent   $ Pulse Oximetry - Multiple Charge Pulse Oximetry - Multiple   Pulse (!) 112   Resp (!) 21   ETCO2   $ ETCO2 Usage Other (see comments)  (off at this time)   Incentive Spirometer   $ Incentive Spirometer Charges done with encouragement;proper technique demonstrated   Administration (IS) proper technique demonstrated   Number of Repetitions (IS) 8   Level Incentive Spirometer (mL) 1000   Patient Tolerance (IS) good;no adverse signs/symptoms present

## 2024-08-31 NOTE — SUBJECTIVE & OBJECTIVE
Interval History:   Patient seen and examined.  Reports starting to feel poorly last night.  Febrile overnight to 103.  She reports a cough and generally feeling unwell.  Denies runny nose or postnasal drip.  Denies any urinary symptoms such as dysuria, urinary frequency, urgency.  Chest x-ray concerning for fluid overload.  Stop IV fluids.  Started on empiric Levaquin.    Review of Systems   Musculoskeletal:  Positive for back pain.     Objective:     Vital Signs (Most Recent):  Temp: 100.2 °F (37.9 °C) (08/31/24 1127)  Pulse: (!) 111 (08/31/24 1127)  Resp: 20 (08/31/24 1142)  BP: 109/78 (08/31/24 1127)  SpO2: (!) 94 % (08/31/24 1127) Vital Signs (24h Range):  Temp:  [97.8 °F (36.6 °C)-103 °F (39.4 °C)] 100.2 °F (37.9 °C)  Pulse:  [] 111  Resp:  [15-21] 20  SpO2:  [92 %-95 %] 94 %  BP: (109-134)/(61-86) 109/78     Weight: (!) 139.2 kg (306 lb 14.1 oz)  Body mass index is 48.06 kg/m².    Intake/Output Summary (Last 24 hours) at 8/31/2024 1153  Last data filed at 8/31/2024 0543  Gross per 24 hour   Intake 3802.53 ml   Output 3600 ml   Net 202.53 ml         Physical Exam  Vitals and nursing note reviewed.   Constitutional:       General: She is not in acute distress.     Appearance: She is obese.   Eyes:      Pupils: Pupils are equal, round, and reactive to light.   Cardiovascular:      Rate and Rhythm: Normal rate and regular rhythm.      Heart sounds: No murmur heard.  Pulmonary:      Effort: Pulmonary effort is normal.      Breath sounds: Normal breath sounds.   Abdominal:      General: There is no distension.      Palpations: Abdomen is soft.      Tenderness: There is no abdominal tenderness.   Musculoskeletal:      Comments: FIONA drain in lower back   Skin:     Coloration: Skin is not pale.      Findings: No rash.   Neurological:      Mental Status: She is alert and oriented to person, place, and time.             Significant Labs: All pertinent labs within the past 24 hours have been reviewed.  CBC:   Recent  Labs   Lab 08/29/24  1211 08/30/24  0514 08/31/24  0418   WBC 13.96* 18.64* 16.67*   HGB 12.9 11.4* 10.8*   HCT 40.9 36.6* 34.9*    330 304     CMP:   Recent Labs   Lab 08/29/24  1211 08/30/24  0514 08/31/24  0416   * 137 137   K 4.8 4.3 3.9    102 100   CO2 19* 25 27   * 161* 154*   BUN 10 7 6   CREATININE 0.8 0.7 0.7   CALCIUM 8.2* 8.2* 8.2*   ANIONGAP 12 10 10       Significant Imaging: I have reviewed all pertinent imaging results/findings within the past 24 hours.  I have reviewed and interpreted all pertinent imaging results/findings within the past 24 hours.    Scheduled Meds:   bisacodyL  10 mg Rectal Daily    docusate sodium  100 mg Oral Daily    gabapentin  300 mg Oral TID    levoFLOXacin  750 mg Intravenous Q24H    losartan  25 mg Oral Daily    mupirocin   Nasal BID    nicotine  1 patch Transdermal Daily    pyridoxine (vitamin B6)  25 mg Oral Daily    risperiDONE  1 mg Oral BID    sertraline  150 mg Oral Daily     Continuous Infusions:   lactated ringers   Intravenous Continuous 125 mL/hr at 08/31/24 0617 New Bag at 08/31/24 0617     PRN Meds:.  Current Facility-Administered Medications:     acetaminophen, 650 mg, Oral, Q6H PRN    aluminum-magnesium hydroxide-simethicone, 30 mL, Oral, Q4H PRN    dextrose 10%, 12.5 g, Intravenous, PRN    dextrose 10%, 25 g, Intravenous, PRN    diphenhydrAMINE, 50 mg, Oral, Q6H PRN    glucagon (human recombinant), 1 mg, Intramuscular, PRN    glucose, 16 g, Oral, PRN    glucose, 24 g, Oral, PRN    HYDROmorphone, 2 mg, Intravenous, Q3H PRN    insulin aspart U-100, 0-10 Units, Subcutaneous, QID (AC + HS) PRN    melatonin, 9 mg, Oral, Nightly PRN    ondansetron, 8 mg, Oral, Q6H PRN    oxyCODONE, 10 mg, Oral, Q4H PRN    oxyCODONE, 15 mg, Oral, Q4H PRN    oxyCODONE, 5 mg, Oral, Q4H PRN    prochlorperazine, 5 mg, Intravenous, Q6H PRN    senna-docusate 8.6-50 mg, 2 tablet, Oral, Nightly PRN    traZODone, 100 mg, Oral, Nightly PRN    SURG [place] Surgery Fluoro  Usage    Result Date: 8/29/2024  See OP Notes for results. IMPRESSION: See OP Notes for results. This procedure was auto-finalized by: Virtual Radiologist    X-Ray Lumbar Spine Ap And Lateral    Result Date: 8/12/2024  EXAMINATION: XR LUMBAR SPINE AP AND LATERAL CLINICAL HISTORY: Arthrodesis status TECHNIQUE: AP, lateral and spot images were performed of the lumbar spine. COMPARISON: Radiographs 07/24/2024. FINDINGS: Redemonstrated posterior instrumented fusion spanning L4-S1 as well as interbody fusion changes at L4-L5.  Hardware demonstrates stable alignment without evidence of fracture or obvious loosening.  No subsidence.  Vertebral body heights are preserved.  No acute bony process.  Similar grade 1 anterolisthesis of L4 on L5.  Moderate disc height loss at L5-S1.  Right upper quadrant surgical clips are noted.     1. Stable postoperative and degenerative changes of the lumbosacral spine. Electronically signed by: Hamzah Ayala Date:    08/12/2024 Time:    09:11  - pulls last radiology orders

## 2024-08-31 NOTE — PROGRESS NOTES
[facility]/Surg  Orthopedics  Progress Note    Patient Name: Loretta Lea  MRN: [id number]  Admission Date: 8/29/2024  Hospital Length of Stay: 2 days  Attending Provider: Briana Kessler MD  Primary Care Provider: Richard Ruiz FNP-C  Follow-up For: Procedure(s) (LRB):  LAMINECTOMY, SPINE, LUMBAR, WITH FUSION (Bilateral)    Post-Operative Day: 2 Days Post-Op  Subjective:     Principal Problem:<principal problem not specified>    Principal Orthopedic Problem:  Failed posterior lumbar fusion    Interval History:  Posterior lumbar fusion revision    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       Current Facility-Administered Medications   Medication    acetaminophen tablet 650 mg    aluminum-magnesium hydroxide-simethicone 200-200-20 mg/5 mL suspension 30 mL    bisacodyL suppository 10 mg    dextrose 10% bolus 125 mL 125 mL    dextrose 10% bolus 250 mL 250 mL    diphenhydrAMINE capsule 50 mg    docusate sodium capsule 100 mg    gabapentin capsule 300 mg    glucagon (human recombinant) injection 1 mg    glucose chewable tablet 16 g    glucose chewable tablet 24 g    HYDROmorphone (PF) injection 2 mg    insulin aspart U-100 pen 0-10 Units    levoFLOXacin 750 mg/150 mL IVPB 750 mg    losartan tablet 25 mg    melatonin tablet 9 mg    mupirocin 2 % ointment    nicotine 21 mg/24 hr 1 patch    ondansetron disintegrating tablet 8 mg    oxyCODONE immediate release tablet 10 mg    oxyCODONE immediate release tablet 15 mg    oxyCODONE immediate release tablet 5 mg    prochlorperazine injection Soln 5 mg    pyridoxine (vitamin B6) tablet 25 mg    risperiDONE tablet 1 mg    senna-docusate 8.6-50 mg per tablet 2 tablet    sertraline tablet 150 mg    trazodone split tablet 100 mg     Objective:     Vital Signs (Most Recent):  Temp: 98.4 °F (36.9 °C) (08/31/24 1529)  Pulse: 105 (08/31/24 1529)  Resp: 18 (08/31/24  "1529)  BP: 117/72 (08/31/24 1529)  SpO2: 96 % (08/31/24 1529) Vital Signs (24h Range):  Temp:  [98.4 °F (36.9 °C)-103 °F (39.4 °C)] 98.4 °F (36.9 °C)  Pulse:  [104-124] 105  Resp:  [15-21] 18  SpO2:  [92 %-96 %] 96 %  BP: (109-134)/(70-86) 117/72     Weight: (!) 139.2 kg (306 lb 14.1 oz)  Height: 5' 7" (170.2 cm)  Body mass index is 48.06 kg/m².      Intake/Output Summary (Last 24 hours) at 8/31/2024 1539  Last data filed at 8/31/2024 0543  Gross per 24 hour   Intake 2842.53 ml   Output 2950 ml   Net -107.47 ml        General    Constitutional: She is oriented to person, place, and time. She appears well-developed and well-nourished.   Pulmonary/Chest: Effort normal.   Neurological: She is alert and oriented to person, place, and time.   Psychiatric: She has a normal mood and affect. Her behavior is normal. Judgment and thought content normal.         Back (L-Spine & T-Spine) / Neck (C-Spine) Exam     Tenderness Right paramedian tenderness of the Lower L-Spine. Left paramedian tenderness of the Lower L-Spine.     Spinal Sensation   Right Side Sensation  L-Spine Level: normal  Left Side Sensation  L-Spine Level: normal    Comments:  Lying in bed comfortably in the supine position.  Independently rolls to the lateral decubitus for wound inspection.  Posterior lumbar incision healing well without wound dehiscence or drainage.  Staples in place.  No surrounding erythema or ecchymosis.  Negative Homans bilaterally.  Neurovascularly intact throughout bilateral lower extremities.  Can dorsiflex and plantar flex bilateral ankles without difficulty.  Has been up out of bed in ambulating with PT. overnight fever being worked up/managed by attending.      Muscle Strength   Right Lower Extremity   Anterior tibial:  5/5   Gastrocsoleus:  5/5   EHL:  5/5  Left Lower Extremity   Anterior tibial:  5/5   Gastrocsoleus:  5/5   EHL:  5/5       Significant Labs: None    Significant Imaging: None  Assessment/Plan:     Status post lumbar " spinal fusion  1. Status post transforaminal lumbar interbody fusion L5-S1 and posterolateral fusion, insertion of interbody device L5-S1, removal of broken hardware S1 pedicle screws, revision of posterior segmental instrumentation L4-S1    Patient doing well orthopedically at this stage postoperative day 2.  Drain was removed yesterday.  Continue with daily dressing changes.  Discontinued her Claros and PCA yesterday.  She is tolerating a p.o. diet.  She should begin working with physical therapy.  Out of bed to bedside chair twice daily.  She can weightbear as tolerated on bilateral lower extremities.  Patient is stable from an orthopedic perspective.  Discharge planning per attending.  Did discuss with her an instruct her on incentive spirometry use and being up out of bed and ambulating to help reduce her pneumonia risk.  She is at increased risk due to her postoperative state, smoking history, and her obesity.          Aidan Alvares PA-C  Orthopedics  Formerly [facility], [facility] - [facility]/Surg

## 2024-08-31 NOTE — PLAN OF CARE
Plan of care reviewed with patient. Verbalized understanding. IV intact and patent with fluids infusing. Glucose monitored and covered with sliding scale insulin as needed. Pain managed with prn medications. Ambulating to bathroom without difficulty. Dressing to incision CDI. No signs of redness, swelling, or drainage noted. Safety maintained. Call light in reach and instructed to call for assistance. Will continue to monitor.
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
## 2024-08-31 NOTE — ASSESSMENT & PLAN NOTE
Status post  transforaminal lumbar interbody fusion L5-S1 and posterolateral fusion, insertion of interbody device L5-S1, removal of broken hardware S1 pedicle screws, revision of posterior segmental instrumentation L4-S1 on 8/29 with Dr. Cassdiy      Continue postoperative management as per orthopedic surgeon  Stopped IV fluids  Empiric Levaquin given fever.  Follow up blood cultures.  Incentive spirometry  DVT prophylaxis as per surgical team

## 2024-08-31 NOTE — PROGRESS NOTES
[facility]  Progress Note    Patient Name: Loretta Lea  MRN: [id number]  Patient Class: IP- Inpatient   Admission Date: 8/29/2024  Length of Stay: 2 days  Attending Physician: Briana Kessler MD  Primary Care Provider: Richard Ruiz FNP-C        Subjective:     Principal Problem:<principal problem not specified>        HPI:  This is a 34-year-old  female with a history of morbid obesity with BMI 48.08, chronic lower back pain, anxiety/depression,  hypertension, diabetes presenting here for elective surgery, patient had L 5-S1 lumbar fusion with Dr. Cassidy earlier today, the procedure went uneventfully.  Patient was seen by me postoperatively, patient is awake alert, AAO x3, denies any fever or chills, no nausea vomiting diarrhea, move both lower extremities, FIONA drain in place, patient is on PCA pump.  Vitals stable.  Review of the chart, patient had L4-L5, L5-S1 lumbar fusion, laminectomy with Dr. Cassidy on 4/2024, later she presented with a worsening lower back pain on 7/2024, MRI shows fluid collection at lumbar spine, evaluated by Orthopedic at the time, deemed that it was surgical seroma, patient was discharged with 7 days of p.o. doxycycline .   I was consulted for postoperative medical management.     Overview/Hospital Course:  34-year-old female with history of morbid obesity, hypertension, bipolar disorder, diabetes, status post L4-S1 lumbar fusion laminectomy on 4/2024, lumbar spine seroma 7/2024 now presenting for redo lumbar spine fusion, patient is  Status post transforaminal lumbar interbody fusion L5-S1 and posterolateral fusion, insertion of interbody device L5-S1, removal of broken hardware S1 pedicle screws, revision of posterior segmental instrumentation L4-S1 on 8/29 is Dr. Cassidy, doing well, on PCA pump.        Interval History:   Patient seen and examined.  Reports starting to feel poorly last night.  Febrile overnight to 103.   She reports a cough and generally feeling unwell.  Denies runny nose or postnasal drip.  Denies any urinary symptoms such as dysuria, urinary frequency, urgency.  Chest x-ray concerning for fluid overload.  Stop IV fluids.  Started on empiric Levaquin.    Review of Systems   Musculoskeletal:  Positive for back pain.     Objective:     Vital Signs (Most Recent):  Temp: 100.2 °F (37.9 °C) (08/31/24 1127)  Pulse: (!) 111 (08/31/24 1127)  Resp: 20 (08/31/24 1142)  BP: 109/78 (08/31/24 1127)  SpO2: (!) 94 % (08/31/24 1127) Vital Signs (24h Range):  Temp:  [97.8 °F (36.6 °C)-103 °F (39.4 °C)] 100.2 °F (37.9 °C)  Pulse:  [] 111  Resp:  [15-21] 20  SpO2:  [92 %-95 %] 94 %  BP: (109-134)/(61-86) 109/78     Weight: (!) 139.2 kg (306 lb 14.1 oz)  Body mass index is 48.06 kg/m².    Intake/Output Summary (Last 24 hours) at 8/31/2024 1153  Last data filed at 8/31/2024 0543  Gross per 24 hour   Intake 3802.53 ml   Output 3600 ml   Net 202.53 ml         Physical Exam  Vitals and nursing note reviewed.   Constitutional:       General: She is not in acute distress.     Appearance: She is obese.   Eyes:      Pupils: Pupils are equal, round, and reactive to light.   Cardiovascular:      Rate and Rhythm: Normal rate and regular rhythm.      Heart sounds: No murmur heard.  Pulmonary:      Effort: Pulmonary effort is normal.      Breath sounds: Normal breath sounds.   Abdominal:      General: There is no distension.      Palpations: Abdomen is soft.      Tenderness: There is no abdominal tenderness.   Musculoskeletal:      Comments: FIONA drain in lower back   Skin:     Coloration: Skin is not pale.      Findings: No rash.   Neurological:      Mental Status: She is alert and oriented to person, place, and time.             Significant Labs: All pertinent labs within the past 24 hours have been reviewed.  CBC:   Recent Labs   Lab 08/29/24  1211 08/30/24  0514 08/31/24  0418   WBC 13.96* 18.64* 16.67*   HGB 12.9 11.4* 10.8*   HCT 40.9  36.6* 34.9*    330 304     CMP:   Recent Labs   Lab 08/29/24  1211 08/30/24  0514 08/31/24  0416   * 137 137   K 4.8 4.3 3.9    102 100   CO2 19* 25 27   * 161* 154*   BUN 10 7 6   CREATININE 0.8 0.7 0.7   CALCIUM 8.2* 8.2* 8.2*   ANIONGAP 12 10 10       Significant Imaging: I have reviewed all pertinent imaging results/findings within the past 24 hours.  I have reviewed and interpreted all pertinent imaging results/findings within the past 24 hours.    Scheduled Meds:   bisacodyL  10 mg Rectal Daily    docusate sodium  100 mg Oral Daily    gabapentin  300 mg Oral TID    levoFLOXacin  750 mg Intravenous Q24H    losartan  25 mg Oral Daily    mupirocin   Nasal BID    nicotine  1 patch Transdermal Daily    pyridoxine (vitamin B6)  25 mg Oral Daily    risperiDONE  1 mg Oral BID    sertraline  150 mg Oral Daily     Continuous Infusions:   lactated ringers   Intravenous Continuous 125 mL/hr at 08/31/24 0617 New Bag at 08/31/24 0617     PRN Meds:.  Current Facility-Administered Medications:     acetaminophen, 650 mg, Oral, Q6H PRN    aluminum-magnesium hydroxide-simethicone, 30 mL, Oral, Q4H PRN    dextrose 10%, 12.5 g, Intravenous, PRN    dextrose 10%, 25 g, Intravenous, PRN    diphenhydrAMINE, 50 mg, Oral, Q6H PRN    glucagon (human recombinant), 1 mg, Intramuscular, PRN    glucose, 16 g, Oral, PRN    glucose, 24 g, Oral, PRN    HYDROmorphone, 2 mg, Intravenous, Q3H PRN    insulin aspart U-100, 0-10 Units, Subcutaneous, QID (AC + HS) PRN    melatonin, 9 mg, Oral, Nightly PRN    ondansetron, 8 mg, Oral, Q6H PRN    oxyCODONE, 10 mg, Oral, Q4H PRN    oxyCODONE, 15 mg, Oral, Q4H PRN    oxyCODONE, 5 mg, Oral, Q4H PRN    prochlorperazine, 5 mg, Intravenous, Q6H PRN    senna-docusate 8.6-50 mg, 2 tablet, Oral, Nightly PRN    traZODone, 100 mg, Oral, Nightly PRN    SURG [place] Surgery Fluoro Usage    Result Date: 8/29/2024  See OP Notes for results. IMPRESSION: See OP Notes for results. This procedure was  "auto-finalized by: Virtual Radiologist    X-Ray Lumbar Spine Ap And Lateral    Result Date: 8/12/2024  EXAMINATION: XR LUMBAR SPINE AP AND LATERAL CLINICAL HISTORY: Arthrodesis status TECHNIQUE: AP, lateral and spot images were performed of the lumbar spine. COMPARISON: Radiographs 07/24/2024. FINDINGS: Redemonstrated posterior instrumented fusion spanning L4-S1 as well as interbody fusion changes at L4-L5.  Hardware demonstrates stable alignment without evidence of fracture or obvious loosening.  No subsidence.  Vertebral body heights are preserved.  No acute bony process.  Similar grade 1 anterolisthesis of L4 on L5.  Moderate disc height loss at L5-S1.  Right upper quadrant surgical clips are noted.     1. Stable postoperative and degenerative changes of the lumbosacral spine. Electronically signed by: Hamzah Ayala Date:    08/12/2024 Time:    09:11  - pulls last radiology orders      Assessment/Plan:      Diabetes  Patient's FSGs are uncontrolled due to hyperglycemia on current medication regimen.  Last A1c reviewed-   Lab Results   Component Value Date    HGBA1C 9.7 (H) 02/08/2023     Most recent fingerstick glucose reviewed- No results for input(s): "POCTGLUCOSE" in the last 24 hours.  Current correctional scale  Medium  Maintain anti-hyperglycemic dose as follows-   Antihyperglycemics (From admission, onward)      Start     Stop Route Frequency Ordered    08/29/24 1658  insulin aspart U-100 pen 0-10 Units         -- SubQ Before meals & nightly PRN 08/29/24 1558          Hold Oral hypoglycemics while patient is in the hospital.    Status post lumbar spinal fusion    Status post  transforaminal lumbar interbody fusion L5-S1 and posterolateral fusion, insertion of interbody device L5-S1, removal of broken hardware S1 pedicle screws, revision of posterior segmental instrumentation L4-S1 on 8/29 with Dr. Cassidy      Continue postoperative management as per orthopedic surgeon  Stopped IV fluids  Empiric Levaquin " given fever.  Follow up blood cultures.  Incentive spirometry  DVT prophylaxis as per surgical team    Morbid obesity  Body mass index is 48.08 kg/m². Morbid obesity complicates all aspects of disease management from diagnostic modalities to treatment. Weight loss encouraged and health benefits explained to patient.         Cigarette smoker  Dangers of cigarette smoking were reviewed with patient in detail for 10 minutes and patient was encouraged to quit. Nicotine replacement options were discussed. Nicotine replacement options were discussed. Nicotine replacement was not prescribed per patient request.    Offer nicotine patch, patient refused    Bipolar disorder, in partial remission, most recent episode mixed    Continue home medication      VTE Risk Mitigation (From admission, onward)           Ordered     Place ZAKI hose  Until discontinued         08/29/24 0533     Place sequential compression device  Until discontinued         08/29/24 0533                    Discharge Planning   PEDRO: 8/31/2024     Code Status: Full Code   Is the patient medically ready for discharge?:     Reason for patient still in hospital (select all that apply): Patient trending condition and Treatment  Discharge Plan A: Home with family                  Briana Kessler MD  Department of Hospital Medicine   [facility]/Surg

## 2024-08-31 NOTE — PLAN OF CARE
Plan of care reviewed with patient. Pt placed on 2L O2 per NC by RT for increased WOB and sats <90%. Diminished LS with fine crackles in LLL. Pt encouraged to IS every hour. Pain controlled with prn meds.  Surgical site dressing changed. No s/s infection noted. Pt verbalized understanding of POC. Compliant with scheduled meds and prns for pain, itching, and nausea, all with effectiveness.    Problem: Adult Inpatient Plan of Care  Goal: Optimal Comfort and Wellbeing  Outcome: Progressing     Problem: Wound  Goal: Optimal Coping  Outcome: Progressing

## 2024-09-01 LAB
ANION GAP SERPL CALC-SCNC: 9 MMOL/L (ref 8–16)
BASOPHILS # BLD AUTO: 0.07 K/UL (ref 0–0.2)
BASOPHILS NFR BLD: 0.5 % (ref 0–1.9)
BUN SERPL-MCNC: 4 MG/DL (ref 6–20)
CALCIUM SERPL-MCNC: 8 MG/DL (ref 8.7–10.5)
CHLORIDE SERPL-SCNC: 98 MMOL/L (ref 95–110)
CO2 SERPL-SCNC: 28 MMOL/L (ref 23–29)
CREAT SERPL-MCNC: 0.6 MG/DL (ref 0.5–1.4)
DIFFERENTIAL METHOD BLD: ABNORMAL
EOSINOPHIL # BLD AUTO: 0.1 K/UL (ref 0–0.5)
EOSINOPHIL NFR BLD: 0.5 % (ref 0–8)
ERYTHROCYTE [DISTWIDTH] IN BLOOD BY AUTOMATED COUNT: 15.3 % (ref 11.5–14.5)
EST. GFR  (NO RACE VARIABLE): >60 ML/MIN/1.73 M^2
GLUCOSE SERPL-MCNC: 121 MG/DL (ref 70–110)
HCT VFR BLD AUTO: 32.4 % (ref 37–48.5)
HGB BLD-MCNC: 10 G/DL (ref 12–16)
IMM GRANULOCYTES # BLD AUTO: 0.04 K/UL (ref 0–0.04)
IMM GRANULOCYTES NFR BLD AUTO: 0.3 % (ref 0–0.5)
LYMPHOCYTES # BLD AUTO: 3.8 K/UL (ref 1–4.8)
LYMPHOCYTES NFR BLD: 27.6 % (ref 18–48)
MCH RBC QN AUTO: 26.3 PG (ref 27–31)
MCHC RBC AUTO-ENTMCNC: 30.9 G/DL (ref 32–36)
MCV RBC AUTO: 85 FL (ref 82–98)
MONOCYTES # BLD AUTO: 1 K/UL (ref 0.3–1)
MONOCYTES NFR BLD: 7 % (ref 4–15)
NEUTROPHILS # BLD AUTO: 8.7 K/UL (ref 1.8–7.7)
NEUTROPHILS NFR BLD: 64.1 % (ref 38–73)
NRBC BLD-RTO: 0 /100 WBC
PLATELET # BLD AUTO: 270 K/UL (ref 150–450)
PMV BLD AUTO: 9 FL (ref 9.2–12.9)
POCT GLUCOSE: 148 MG/DL (ref 70–110)
POCT GLUCOSE: 160 MG/DL (ref 70–110)
POCT GLUCOSE: 177 MG/DL (ref 70–110)
POCT GLUCOSE: 199 MG/DL (ref 70–110)
POCT GLUCOSE: 233 MG/DL (ref 70–110)
POTASSIUM SERPL-SCNC: 3.9 MMOL/L (ref 3.5–5.1)
PROCALCITONIN SERPL IA-MCNC: 0.07 NG/ML (ref 0–0.5)
RBC # BLD AUTO: 3.8 M/UL (ref 4–5.4)
SODIUM SERPL-SCNC: 135 MMOL/L (ref 136–145)
WBC # BLD AUTO: 13.64 K/UL (ref 3.9–12.7)

## 2024-09-01 PROCEDURE — 63600175 PHARM REV CODE 636 W HCPCS: Performed by: ORTHOPAEDIC SURGERY

## 2024-09-01 PROCEDURE — 25000003 PHARM REV CODE 250: Performed by: PHYSICIAN ASSISTANT

## 2024-09-01 PROCEDURE — 25000003 PHARM REV CODE 250

## 2024-09-01 PROCEDURE — 63600175 PHARM REV CODE 636 W HCPCS: Performed by: HOSPITALIST

## 2024-09-01 PROCEDURE — 84145 PROCALCITONIN (PCT): CPT | Performed by: HOSPITALIST

## 2024-09-01 PROCEDURE — 36415 COLL VENOUS BLD VENIPUNCTURE: CPT | Performed by: ORTHOPAEDIC SURGERY

## 2024-09-01 PROCEDURE — 11000001 HC ACUTE MED/SURG PRIVATE ROOM

## 2024-09-01 PROCEDURE — 99900035 HC TECH TIME PER 15 MIN (STAT)

## 2024-09-01 PROCEDURE — 80048 BASIC METABOLIC PNL TOTAL CA: CPT | Performed by: ORTHOPAEDIC SURGERY

## 2024-09-01 PROCEDURE — 97116 GAIT TRAINING THERAPY: CPT | Mod: CQ

## 2024-09-01 PROCEDURE — 97530 THERAPEUTIC ACTIVITIES: CPT | Mod: CQ

## 2024-09-01 PROCEDURE — 85025 COMPLETE CBC W/AUTO DIFF WBC: CPT | Performed by: ORTHOPAEDIC SURGERY

## 2024-09-01 PROCEDURE — 94799 UNLISTED PULMONARY SVC/PX: CPT | Mod: XB

## 2024-09-01 PROCEDURE — 36415 COLL VENOUS BLD VENIPUNCTURE: CPT | Performed by: HOSPITALIST

## 2024-09-01 PROCEDURE — 25000003 PHARM REV CODE 250: Performed by: ORTHOPAEDIC SURGERY

## 2024-09-01 PROCEDURE — 25000003 PHARM REV CODE 250: Performed by: HOSPITALIST

## 2024-09-01 PROCEDURE — 94761 N-INVAS EAR/PLS OXIMETRY MLT: CPT

## 2024-09-01 PROCEDURE — 27000221 HC OXYGEN, UP TO 24 HOURS

## 2024-09-01 RX ORDER — IBUPROFEN 600 MG/1
600 TABLET ORAL EVERY 8 HOURS PRN
Status: DISCONTINUED | OUTPATIENT
Start: 2024-09-01 | End: 2024-09-03 | Stop reason: HOSPADM

## 2024-09-01 RX ORDER — IBUPROFEN 400 MG/1
400 TABLET ORAL ONCE
Status: COMPLETED | OUTPATIENT
Start: 2024-09-01 | End: 2024-09-01

## 2024-09-01 RX ADMIN — GABAPENTIN 300 MG: 300 CAPSULE ORAL at 08:09

## 2024-09-01 RX ADMIN — DIPHENHYDRAMINE HYDROCHLORIDE 50 MG: 25 CAPSULE ORAL at 07:09

## 2024-09-01 RX ADMIN — ACETAMINOPHEN 650 MG: 325 TABLET ORAL at 09:09

## 2024-09-01 RX ADMIN — GABAPENTIN 300 MG: 300 CAPSULE ORAL at 02:09

## 2024-09-01 RX ADMIN — LOSARTAN POTASSIUM 25 MG: 25 TABLET, FILM COATED ORAL at 08:09

## 2024-09-01 RX ADMIN — TRAZODONE HYDROCHLORIDE 100 MG: 50 TABLET ORAL at 08:09

## 2024-09-01 RX ADMIN — OXYCODONE HYDROCHLORIDE 15 MG: 5 TABLET ORAL at 02:09

## 2024-09-01 RX ADMIN — ONDANSETRON 8 MG: 4 TABLET, ORALLY DISINTEGRATING ORAL at 05:09

## 2024-09-01 RX ADMIN — OXYCODONE HYDROCHLORIDE 15 MG: 5 TABLET ORAL at 06:09

## 2024-09-01 RX ADMIN — ONDANSETRON 8 MG: 4 TABLET, ORALLY DISINTEGRATING ORAL at 08:09

## 2024-09-01 RX ADMIN — OXYCODONE HYDROCHLORIDE 15 MG: 5 TABLET ORAL at 01:09

## 2024-09-01 RX ADMIN — HYDROMORPHONE HYDROCHLORIDE 2 MG: 2 INJECTION INTRAMUSCULAR; INTRAVENOUS; SUBCUTANEOUS at 04:09

## 2024-09-01 RX ADMIN — OXYCODONE HYDROCHLORIDE 15 MG: 5 TABLET ORAL at 10:09

## 2024-09-01 RX ADMIN — DIPHENHYDRAMINE HYDROCHLORIDE 50 MG: 25 CAPSULE ORAL at 04:09

## 2024-09-01 RX ADMIN — INSULIN ASPART 1 UNITS: 100 INJECTION, SOLUTION INTRAVENOUS; SUBCUTANEOUS at 08:09

## 2024-09-01 RX ADMIN — RISPERIDONE 1 MG: 1 TABLET, FILM COATED ORAL at 08:09

## 2024-09-01 RX ADMIN — HYDROMORPHONE HYDROCHLORIDE 2 MG: 2 INJECTION INTRAMUSCULAR; INTRAVENOUS; SUBCUTANEOUS at 11:09

## 2024-09-01 RX ADMIN — IBUPROFEN 400 MG: 400 TABLET, FILM COATED ORAL at 04:09

## 2024-09-01 RX ADMIN — HYDROMORPHONE HYDROCHLORIDE 2 MG: 2 INJECTION INTRAMUSCULAR; INTRAVENOUS; SUBCUTANEOUS at 07:09

## 2024-09-01 RX ADMIN — LEVOFLOXACIN 750 MG: 5 INJECTION, SOLUTION INTRAVENOUS at 09:09

## 2024-09-01 RX ADMIN — OXYCODONE HYDROCHLORIDE 15 MG: 5 TABLET ORAL at 09:09

## 2024-09-01 RX ADMIN — ACETAMINOPHEN 650 MG: 325 TABLET ORAL at 04:09

## 2024-09-01 RX ADMIN — INSULIN ASPART 2 UNITS: 100 INJECTION, SOLUTION INTRAVENOUS; SUBCUTANEOUS at 07:09

## 2024-09-01 RX ADMIN — INSULIN ASPART 4 UNITS: 100 INJECTION, SOLUTION INTRAVENOUS; SUBCUTANEOUS at 04:09

## 2024-09-01 RX ADMIN — IBUPROFEN 600 MG: 600 TABLET ORAL at 02:09

## 2024-09-01 RX ADMIN — SERTRALINE HYDROCHLORIDE 150 MG: 50 TABLET ORAL at 08:09

## 2024-09-01 RX ADMIN — Medication 25 MG: at 08:09

## 2024-09-01 RX ADMIN — INSULIN ASPART 2 UNITS: 100 INJECTION, SOLUTION INTRAVENOUS; SUBCUTANEOUS at 12:09

## 2024-09-01 NOTE — SUBJECTIVE & OBJECTIVE
Interval History:   Patient seen and examined.  T-max 102°.  Continues on empiric Levaquin.  Discussed with Dr. Cassidy, suspect atelectasis.  Encouraged incentive spirometry use and ambulation.    Review of Systems   Musculoskeletal:  Positive for back pain.     Objective:     Vital Signs (Most Recent):  Temp: 98.1 °F (36.7 °C) (09/01/24 0749)  Pulse: 101 (09/01/24 0749)  Resp: 20 (09/01/24 1128)  BP: 122/70 (09/01/24 0749)  SpO2: 97 % (09/01/24 0749) Vital Signs (24h Range):  Temp:  [98.1 °F (36.7 °C)-102 °F (38.9 °C)] 98.1 °F (36.7 °C)  Pulse:  [101-128] 101  Resp:  [18-20] 20  SpO2:  [93 %-97 %] 97 %  BP: (116-122)/(53-72) 122/70     Weight: (!) 139.2 kg (306 lb 14.1 oz)  Body mass index is 48.06 kg/m².    Intake/Output Summary (Last 24 hours) at 9/1/2024 1152  Last data filed at 9/1/2024 0552  Gross per 24 hour   Intake 3188.45 ml   Output 2650 ml   Net 538.45 ml         Physical Exam  Vitals and nursing note reviewed.   Constitutional:       General: She is not in acute distress.     Appearance: She is obese.   Eyes:      Pupils: Pupils are equal, round, and reactive to light.   Cardiovascular:      Rate and Rhythm: Normal rate and regular rhythm.      Heart sounds: No murmur heard.  Pulmonary:      Effort: Pulmonary effort is normal.      Breath sounds: Normal breath sounds.   Abdominal:      General: There is no distension.      Palpations: Abdomen is soft.      Tenderness: There is no abdominal tenderness.   Musculoskeletal:      Comments: FIONA drain in lower back   Skin:     Coloration: Skin is not pale.      Findings: No rash.   Neurological:      Mental Status: She is alert and oriented to person, place, and time.             Significant Labs: All pertinent labs within the past 24 hours have been reviewed.  CBC:   Recent Labs   Lab 08/31/24  0418 09/01/24  0442   WBC 16.67* 13.64*   HGB 10.8* 10.0*   HCT 34.9* 32.4*    270     CMP:   Recent Labs   Lab 08/31/24  0416 09/01/24  0442    135*   K  3.9 3.9    98   CO2 27 28   * 121*   BUN 6 4*   CREATININE 0.7 0.6   CALCIUM 8.2* 8.0*   ANIONGAP 10 9       Significant Imaging: I have reviewed all pertinent imaging results/findings within the past 24 hours.  I have reviewed and interpreted all pertinent imaging results/findings within the past 24 hours.    Scheduled Meds:   bisacodyL  10 mg Rectal Daily    docusate sodium  100 mg Oral Daily    gabapentin  300 mg Oral TID    levoFLOXacin  750 mg Intravenous Q24H    losartan  25 mg Oral Daily    nicotine  1 patch Transdermal Daily    pyridoxine (vitamin B6)  25 mg Oral Daily    risperiDONE  1 mg Oral BID    sertraline  150 mg Oral Daily     Continuous Infusions:      PRN Meds:.  Current Facility-Administered Medications:     acetaminophen, 650 mg, Oral, Q6H PRN    aluminum-magnesium hydroxide-simethicone, 30 mL, Oral, Q4H PRN    dextrose 10%, 12.5 g, Intravenous, PRN    dextrose 10%, 25 g, Intravenous, PRN    diphenhydrAMINE, 50 mg, Oral, Q6H PRN    glucagon (human recombinant), 1 mg, Intramuscular, PRN    glucose, 16 g, Oral, PRN    glucose, 24 g, Oral, PRN    HYDROmorphone, 2 mg, Intravenous, Q3H PRN    insulin aspart U-100, 0-10 Units, Subcutaneous, QID (AC + HS) PRN    melatonin, 9 mg, Oral, Nightly PRN    ondansetron, 8 mg, Oral, Q6H PRN    oxyCODONE, 10 mg, Oral, Q4H PRN    oxyCODONE, 15 mg, Oral, Q4H PRN    oxyCODONE, 5 mg, Oral, Q4H PRN    prochlorperazine, 5 mg, Intravenous, Q6H PRN    senna-docusate 8.6-50 mg, 2 tablet, Oral, Nightly PRN    traZODone, 100 mg, Oral, Nightly PRN    SURG [place] Surgery Fluoro Usage    Result Date: 8/29/2024  See OP Notes for results. IMPRESSION: See OP Notes for results. This procedure was auto-finalized by: Virtual Radiologist    X-Ray Lumbar Spine Ap And Lateral    Result Date: 8/12/2024  EXAMINATION: XR LUMBAR SPINE AP AND LATERAL CLINICAL HISTORY: Arthrodesis status TECHNIQUE: AP, lateral and spot images were performed of the lumbar spine. COMPARISON:  Radiographs 07/24/2024. FINDINGS: Redemonstrated posterior instrumented fusion spanning L4-S1 as well as interbody fusion changes at L4-L5.  Hardware demonstrates stable alignment without evidence of fracture or obvious loosening.  No subsidence.  Vertebral body heights are preserved.  No acute bony process.  Similar grade 1 anterolisthesis of L4 on L5.  Moderate disc height loss at L5-S1.  Right upper quadrant surgical clips are noted.     1. Stable postoperative and degenerative changes of the lumbosacral spine. Electronically signed by: Hamzah Ayala Date:    08/12/2024 Time:    09:11  - pulls last radiology orders

## 2024-09-01 NOTE — PROGRESS NOTES
[facility] Medicine  Progress Note    Patient Name: Loretta Lea  MRN: [id number]  Patient Class: IP- Inpatient   Admission Date: 8/29/2024  Length of Stay: 3 days  Attending Physician: Briana Kessler MD  Primary Care Provider: Richard Ruiz FNP-C        Subjective:     Principal Problem:<principal problem not specified>        HPI:  This is a 34-year-old  female with a history of morbid obesity with BMI 48.08, chronic lower back pain, anxiety/depression,  hypertension, diabetes presenting here for elective surgery, patient had L 5-S1 lumbar fusion with Dr. Cassidy earlier today, the procedure went uneventfully.  Patient was seen by me postoperatively, patient is awake alert, AAO x3, denies any fever or chills, no nausea vomiting diarrhea, move both lower extremities, FIONA drain in place, patient is on PCA pump.  Vitals stable.  Review of the chart, patient had L4-L5, L5-S1 lumbar fusion, laminectomy with Dr. Cassidy on 4/2024, later she presented with a worsening lower back pain on 7/2024, MRI shows fluid collection at lumbar spine, evaluated by Orthopedic at the time, deemed that it was surgical seroma, patient was discharged with 7 days of p.o. doxycycline .   I was consulted for postoperative medical management.     Overview/Hospital Course:  34-year-old female with history of morbid obesity, hypertension, bipolar disorder, diabetes, status post L4-S1 lumbar fusion laminectomy on 4/2024, lumbar spine seroma 7/2024 now presenting for redo lumbar spine fusion, patient is  Status post transforaminal lumbar interbody fusion L5-S1 and posterolateral fusion, insertion of interbody device L5-S1, removal of broken hardware S1 pedicle screws, revision of posterior segmental instrumentation L4-S1 on 8/29 is Dr. Cassidy, doing well, on PCA pump.        Interval History:   Patient seen and examined.  T-max 102°.  Continues on empiric Levaquin.  Discussed with Dr. Cassidy,  suspect atelectasis.  Encouraged incentive spirometry use and ambulation.    Review of Systems   Musculoskeletal:  Positive for back pain.     Objective:     Vital Signs (Most Recent):  Temp: 98.1 °F (36.7 °C) (09/01/24 0749)  Pulse: 101 (09/01/24 0749)  Resp: 20 (09/01/24 1128)  BP: 122/70 (09/01/24 0749)  SpO2: 97 % (09/01/24 0749) Vital Signs (24h Range):  Temp:  [98.1 °F (36.7 °C)-102 °F (38.9 °C)] 98.1 °F (36.7 °C)  Pulse:  [101-128] 101  Resp:  [18-20] 20  SpO2:  [93 %-97 %] 97 %  BP: (116-122)/(53-72) 122/70     Weight: (!) 139.2 kg (306 lb 14.1 oz)  Body mass index is 48.06 kg/m².    Intake/Output Summary (Last 24 hours) at 9/1/2024 1152  Last data filed at 9/1/2024 0552  Gross per 24 hour   Intake 3188.45 ml   Output 2650 ml   Net 538.45 ml         Physical Exam  Vitals and nursing note reviewed.   Constitutional:       General: She is not in acute distress.     Appearance: She is obese.   Eyes:      Pupils: Pupils are equal, round, and reactive to light.   Cardiovascular:      Rate and Rhythm: Normal rate and regular rhythm.      Heart sounds: No murmur heard.  Pulmonary:      Effort: Pulmonary effort is normal.      Breath sounds: Normal breath sounds.   Abdominal:      General: There is no distension.      Palpations: Abdomen is soft.      Tenderness: There is no abdominal tenderness.   Musculoskeletal:      Comments: FIONA drain in lower back   Skin:     Coloration: Skin is not pale.      Findings: No rash.   Neurological:      Mental Status: She is alert and oriented to person, place, and time.             Significant Labs: All pertinent labs within the past 24 hours have been reviewed.  CBC:   Recent Labs   Lab 08/31/24 0418 09/01/24 0442   WBC 16.67* 13.64*   HGB 10.8* 10.0*   HCT 34.9* 32.4*    270     CMP:   Recent Labs   Lab 08/31/24  0416 09/01/24  0442    135*   K 3.9 3.9    98   CO2 27 28   * 121*   BUN 6 4*   CREATININE 0.7 0.6   CALCIUM 8.2* 8.0*   ANIONGAP 10 9        Significant Imaging: I have reviewed all pertinent imaging results/findings within the past 24 hours.  I have reviewed and interpreted all pertinent imaging results/findings within the past 24 hours.    Scheduled Meds:   bisacodyL  10 mg Rectal Daily    docusate sodium  100 mg Oral Daily    gabapentin  300 mg Oral TID    levoFLOXacin  750 mg Intravenous Q24H    losartan  25 mg Oral Daily    nicotine  1 patch Transdermal Daily    pyridoxine (vitamin B6)  25 mg Oral Daily    risperiDONE  1 mg Oral BID    sertraline  150 mg Oral Daily     Continuous Infusions:      PRN Meds:.  Current Facility-Administered Medications:     acetaminophen, 650 mg, Oral, Q6H PRN    aluminum-magnesium hydroxide-simethicone, 30 mL, Oral, Q4H PRN    dextrose 10%, 12.5 g, Intravenous, PRN    dextrose 10%, 25 g, Intravenous, PRN    diphenhydrAMINE, 50 mg, Oral, Q6H PRN    glucagon (human recombinant), 1 mg, Intramuscular, PRN    glucose, 16 g, Oral, PRN    glucose, 24 g, Oral, PRN    HYDROmorphone, 2 mg, Intravenous, Q3H PRN    insulin aspart U-100, 0-10 Units, Subcutaneous, QID (AC + HS) PRN    melatonin, 9 mg, Oral, Nightly PRN    ondansetron, 8 mg, Oral, Q6H PRN    oxyCODONE, 10 mg, Oral, Q4H PRN    oxyCODONE, 15 mg, Oral, Q4H PRN    oxyCODONE, 5 mg, Oral, Q4H PRN    prochlorperazine, 5 mg, Intravenous, Q6H PRN    senna-docusate 8.6-50 mg, 2 tablet, Oral, Nightly PRN    traZODone, 100 mg, Oral, Nightly PRN    SURG FL Surgery Fluoro Usage    Result Date: 8/29/2024  See OP Notes for results. IMPRESSION: See OP Notes for results. This procedure was auto-finalized by: Virtual Radiologist    X-Ray Lumbar Spine Ap And Lateral    Result Date: 8/12/2024  EXAMINATION: XR LUMBAR SPINE AP AND LATERAL CLINICAL HISTORY: Arthrodesis status TECHNIQUE: AP, lateral and spot images were performed of the lumbar spine. COMPARISON: Radiographs 07/24/2024. FINDINGS: Redemonstrated posterior instrumented fusion spanning L4-S1 as well as interbody fusion  changes at L4-L5.  Hardware demonstrates stable alignment without evidence of fracture or obvious loosening.  No subsidence.  Vertebral body heights are preserved.  No acute bony process.  Similar grade 1 anterolisthesis of L4 on L5.  Moderate disc height loss at L5-S1.  Right upper quadrant surgical clips are noted.     1. Stable postoperative and degenerative changes of the lumbosacral spine. Electronically signed by: Hamzah Ayala Date:    08/12/2024 Time:    09:11  - pulls last radiology orders      Assessment/Plan:      Diabetes  Patient's FSGs are uncontrolled due to hyperglycemia on current medication regimen.  Last A1c reviewed-   Lab Results   Component Value Date    HGBA1C 6.4 (H) 08/30/2024     Most recent fingerstick glucose reviewed-   Recent Labs   Lab 08/31/24  2058 09/01/24  0124 09/01/24  0751 09/01/24  1137   POCTGLUCOSE 177* 148* 199* 177*     Current correctional scale  Medium  Maintain anti-hyperglycemic dose as follows-   Antihyperglycemics (From admission, onward)      Start     Stop Route Frequency Ordered    08/29/24 1658  insulin aspart U-100 pen 0-10 Units         -- SubQ Before meals & nightly PRN 08/29/24 1558          Hold Oral hypoglycemics while patient is in the hospital.    Status post lumbar spinal fusion    Status post  transforaminal lumbar interbody fusion L5-S1 and posterolateral fusion, insertion of interbody device L5-S1, removal of broken hardware S1 pedicle screws, revision of posterior segmental instrumentation L4-S1 on 8/29 with Dr. Cassidy      Continue postoperative management as per orthopedic surgeon  Stopped IV fluids  Empiric Levaquin given fever.  Blood cultures no growth to date.  Procalcitonin negative.  Atelectasis suspected.  Incentive spirometry  DVT prophylaxis as per surgical team    Morbid obesity  Body mass index is 48.08 kg/m². Morbid obesity complicates all aspects of disease management from diagnostic modalities to treatment. Weight loss encouraged and  health benefits explained to patient.         Cigarette smoker  Dangers of cigarette smoking were reviewed with patient in detail for 10 minutes and patient was encouraged to quit. Nicotine replacement options were discussed. Nicotine replacement options were discussed. Nicotine replacement was not prescribed per patient request.    Offer nicotine patch, patient refused    Bipolar disorder, in partial remission, most recent episode mixed    Continue home medication      VTE Risk Mitigation (From admission, onward)           Ordered     Place ZAKI hose  Until discontinued         08/29/24 0533     Place sequential compression device  Until discontinued         08/29/24 0533                    Discharge Planning   PEDRO: 9/3/2024     Code Status: Full Code   Is the patient medically ready for discharge?:     Reason for patient still in hospital (select all that apply): Patient trending condition and Treatment  Discharge Plan A: Home with family                  Briana Kessler MD  Department of Hospital Medicine   [facility]/Surg
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
## 2024-09-01 NOTE — PLAN OF CARE
Plan of care reviewed with patient. Verbalized understanding. IV intact and patent. O2 in place. Glucose monitored and covered with sliding scale insulin as needed. Pain and temperature managed with prn medications. Safety maintained. Call light in reach and instructed to call for assistance. Will continue to monitor.

## 2024-09-01 NOTE — ASSESSMENT & PLAN NOTE
Status post  transforaminal lumbar interbody fusion L5-S1 and posterolateral fusion, insertion of interbody device L5-S1, removal of broken hardware S1 pedicle screws, revision of posterior segmental instrumentation L4-S1 on 8/29 with Dr. Cassidy      Continue postoperative management as per orthopedic surgeon  Stopped IV fluids  Empiric Levaquin given fever.  Blood cultures no growth to date.  Procalcitonin negative.  Atelectasis suspected.  Incentive spirometry  DVT prophylaxis as per surgical team

## 2024-09-01 NOTE — NURSING
Patient's temperature went up again to 102. Notified NP and received a one time order for Ibuprofen. Patient is using IS and getting out of bed.

## 2024-09-01 NOTE — ASSESSMENT & PLAN NOTE
Patient's FSGs are uncontrolled due to hyperglycemia on current medication regimen.  Last A1c reviewed-   Lab Results   Component Value Date    HGBA1C 6.4 (H) 08/30/2024     Most recent fingerstick glucose reviewed-   Recent Labs   Lab 08/31/24 2058 09/01/24  0124 09/01/24  0751 09/01/24  1137   POCTGLUCOSE 177* 148* 199* 177*     Current correctional scale  Medium  Maintain anti-hyperglycemic dose as follows-   Antihyperglycemics (From admission, onward)    Start     Stop Route Frequency Ordered    08/29/24 1658  insulin aspart U-100 pen 0-10 Units         -- SubQ Before meals & nightly PRN 08/29/24 1558        Hold Oral hypoglycemics while patient is in the hospital.

## 2024-09-01 NOTE — PLAN OF CARE
POC reviewed with pt. Pt verbalized understanding. New PIV to R hand SL and remains patent. Pt has maintained adequate fluid hydration and has had adequate UOP. Last BM yesterday. Pt intermittently febrile and diaphoretic. Tmax 100.2 ax. Pain, nausea, itching, and fever controlled with prn meds. Increased redness around surgical incision site noted. (See photo). Dr Camejo notified. Pt educated on high fall risk meds. Pt wearing antislip socks. Bed in lowest position, locked, call light within reach. Side rails up x2. Safety and fall precautions maintained.    Problem: Adult Inpatient Plan of Care  Goal: Optimal Comfort and Wellbeing  Outcome: Progressing     Problem: Wound  Goal: Optimal Coping  Outcome: Progressing     Problem: Wound  Goal: Optimal Functional Ability  Outcome: Progressing     Problem: Wound  Goal: Optimal Pain Control and Function  Outcome: Progressing     Problem: Wound  Goal: Optimal Wound Healing  Outcome: Progressing

## 2024-09-01 NOTE — CARE UPDATE
08/31/24 2003   Patient Assessment/Suction   Level of Consciousness (AVPU) alert   Respiratory Effort Unlabored   Expansion/Accessory Muscles/Retractions no use of accessory muscles;no retractions   All Lung Fields Breath Sounds diminished   PRE-TX-O2   Device (Oxygen Therapy) nasal cannula   $ Is the patient on Low Flow Oxygen? Yes   Flow (L/min) (Oxygen Therapy) 2   SpO2 95 %   Pulse Oximetry Type Intermittent   $ Pulse Oximetry - Multiple Charge Pulse Oximetry - Multiple   Pulse (!) 114   Resp 18   ETCO2   $ ETCO2 Usage Other (see comments)  (off)   Incentive Spirometer   $ Incentive Spirometer Charges done with encouragement;proper technique demonstrated   Administration (IS) proper technique demonstrated   Number of Repetitions (IS) 10   Level Incentive Spirometer (mL) 2000   Patient Tolerance (IS) good;no adverse signs/symptoms present

## 2024-09-02 LAB
ANION GAP SERPL CALC-SCNC: 10 MMOL/L (ref 8–16)
BASOPHILS # BLD AUTO: 0.04 K/UL (ref 0–0.2)
BASOPHILS NFR BLD: 0.3 % (ref 0–1.9)
BUN SERPL-MCNC: 6 MG/DL (ref 6–20)
CALCIUM SERPL-MCNC: 8.5 MG/DL (ref 8.7–10.5)
CHLORIDE SERPL-SCNC: 97 MMOL/L (ref 95–110)
CO2 SERPL-SCNC: 30 MMOL/L (ref 23–29)
CREAT SERPL-MCNC: 0.7 MG/DL (ref 0.5–1.4)
DIFFERENTIAL METHOD BLD: ABNORMAL
EOSINOPHIL # BLD AUTO: 0.1 K/UL (ref 0–0.5)
EOSINOPHIL NFR BLD: 1 % (ref 0–8)
ERYTHROCYTE [DISTWIDTH] IN BLOOD BY AUTOMATED COUNT: 15.4 % (ref 11.5–14.5)
EST. GFR  (NO RACE VARIABLE): >60 ML/MIN/1.73 M^2
GLUCOSE SERPL-MCNC: 161 MG/DL (ref 70–110)
HCT VFR BLD AUTO: 32 % (ref 37–48.5)
HGB BLD-MCNC: 9.8 G/DL (ref 12–16)
IMM GRANULOCYTES # BLD AUTO: 0.05 K/UL (ref 0–0.04)
IMM GRANULOCYTES NFR BLD AUTO: 0.3 % (ref 0–0.5)
LYMPHOCYTES # BLD AUTO: 2.9 K/UL (ref 1–4.8)
LYMPHOCYTES NFR BLD: 20.2 % (ref 18–48)
MCH RBC QN AUTO: 26.4 PG (ref 27–31)
MCHC RBC AUTO-ENTMCNC: 30.6 G/DL (ref 32–36)
MCV RBC AUTO: 86 FL (ref 82–98)
MONOCYTES # BLD AUTO: 0.9 K/UL (ref 0.3–1)
MONOCYTES NFR BLD: 6 % (ref 4–15)
NEUTROPHILS # BLD AUTO: 10.4 K/UL (ref 1.8–7.7)
NEUTROPHILS NFR BLD: 72.2 % (ref 38–73)
NRBC BLD-RTO: 0 /100 WBC
PLATELET # BLD AUTO: 302 K/UL (ref 150–450)
PMV BLD AUTO: 9.6 FL (ref 9.2–12.9)
POCT GLUCOSE: 127 MG/DL (ref 70–110)
POCT GLUCOSE: 140 MG/DL (ref 70–110)
POCT GLUCOSE: 144 MG/DL (ref 70–110)
POTASSIUM SERPL-SCNC: 3.9 MMOL/L (ref 3.5–5.1)
RBC # BLD AUTO: 3.71 M/UL (ref 4–5.4)
SODIUM SERPL-SCNC: 137 MMOL/L (ref 136–145)
WBC # BLD AUTO: 14.43 K/UL (ref 3.9–12.7)

## 2024-09-02 PROCEDURE — 94799 UNLISTED PULMONARY SVC/PX: CPT | Mod: XB

## 2024-09-02 PROCEDURE — 97530 THERAPEUTIC ACTIVITIES: CPT | Mod: CQ

## 2024-09-02 PROCEDURE — 36415 COLL VENOUS BLD VENIPUNCTURE: CPT | Performed by: ORTHOPAEDIC SURGERY

## 2024-09-02 PROCEDURE — 99900035 HC TECH TIME PER 15 MIN (STAT)

## 2024-09-02 PROCEDURE — 99900031 HC PATIENT EDUCATION (STAT)

## 2024-09-02 PROCEDURE — 80048 BASIC METABOLIC PNL TOTAL CA: CPT | Performed by: ORTHOPAEDIC SURGERY

## 2024-09-02 PROCEDURE — 63600175 PHARM REV CODE 636 W HCPCS: Performed by: HOSPITALIST

## 2024-09-02 PROCEDURE — 11000001 HC ACUTE MED/SURG PRIVATE ROOM

## 2024-09-02 PROCEDURE — 25000003 PHARM REV CODE 250: Performed by: HOSPITALIST

## 2024-09-02 PROCEDURE — 94799 UNLISTED PULMONARY SVC/PX: CPT

## 2024-09-02 PROCEDURE — 27000221 HC OXYGEN, UP TO 24 HOURS

## 2024-09-02 PROCEDURE — 94761 N-INVAS EAR/PLS OXIMETRY MLT: CPT

## 2024-09-02 PROCEDURE — 97535 SELF CARE MNGMENT TRAINING: CPT

## 2024-09-02 PROCEDURE — 25000003 PHARM REV CODE 250: Performed by: PHYSICIAN ASSISTANT

## 2024-09-02 PROCEDURE — 63600175 PHARM REV CODE 636 W HCPCS: Performed by: ORTHOPAEDIC SURGERY

## 2024-09-02 PROCEDURE — 25000003 PHARM REV CODE 250: Performed by: ORTHOPAEDIC SURGERY

## 2024-09-02 PROCEDURE — 85025 COMPLETE CBC W/AUTO DIFF WBC: CPT | Performed by: ORTHOPAEDIC SURGERY

## 2024-09-02 RX ORDER — OXYCODONE HYDROCHLORIDE 5 MG/1
15 TABLET ORAL EVERY 6 HOURS PRN
Status: DISCONTINUED | OUTPATIENT
Start: 2024-09-02 | End: 2024-09-03 | Stop reason: HOSPADM

## 2024-09-02 RX ORDER — OXYCODONE HYDROCHLORIDE 5 MG/1
5 TABLET ORAL EVERY 6 HOURS PRN
Status: DISCONTINUED | OUTPATIENT
Start: 2024-09-02 | End: 2024-09-03 | Stop reason: HOSPADM

## 2024-09-02 RX ORDER — HYDROMORPHONE HYDROCHLORIDE 2 MG/ML
2 INJECTION, SOLUTION INTRAMUSCULAR; INTRAVENOUS; SUBCUTANEOUS EVERY 4 HOURS PRN
Status: DISCONTINUED | OUTPATIENT
Start: 2024-09-02 | End: 2024-09-03 | Stop reason: HOSPADM

## 2024-09-02 RX ORDER — OXYCODONE HYDROCHLORIDE 5 MG/1
10 TABLET ORAL EVERY 6 HOURS PRN
Status: DISCONTINUED | OUTPATIENT
Start: 2024-09-02 | End: 2024-09-03 | Stop reason: HOSPADM

## 2024-09-02 RX ADMIN — DIPHENHYDRAMINE HYDROCHLORIDE 50 MG: 25 CAPSULE ORAL at 11:09

## 2024-09-02 RX ADMIN — GABAPENTIN 300 MG: 300 CAPSULE ORAL at 08:09

## 2024-09-02 RX ADMIN — LEVOFLOXACIN 750 MG: 5 INJECTION, SOLUTION INTRAVENOUS at 11:09

## 2024-09-02 RX ADMIN — DOCUSATE SODIUM 100 MG: 100 CAPSULE, LIQUID FILLED ORAL at 08:09

## 2024-09-02 RX ADMIN — ONDANSETRON 8 MG: 4 TABLET, ORALLY DISINTEGRATING ORAL at 08:09

## 2024-09-02 RX ADMIN — RISPERIDONE 1 MG: 1 TABLET, FILM COATED ORAL at 08:09

## 2024-09-02 RX ADMIN — OXYCODONE HYDROCHLORIDE 15 MG: 5 TABLET ORAL at 08:09

## 2024-09-02 RX ADMIN — OXYCODONE HYDROCHLORIDE 15 MG: 5 TABLET ORAL at 03:09

## 2024-09-02 RX ADMIN — LOSARTAN POTASSIUM 25 MG: 25 TABLET, FILM COATED ORAL at 08:09

## 2024-09-02 RX ADMIN — OXYCODONE HYDROCHLORIDE 15 MG: 5 TABLET ORAL at 02:09

## 2024-09-02 RX ADMIN — HYDROMORPHONE HYDROCHLORIDE 2 MG: 2 INJECTION INTRAMUSCULAR; INTRAVENOUS; SUBCUTANEOUS at 11:09

## 2024-09-02 RX ADMIN — SERTRALINE HYDROCHLORIDE 150 MG: 50 TABLET ORAL at 08:09

## 2024-09-02 RX ADMIN — Medication 25 MG: at 08:09

## 2024-09-02 RX ADMIN — DIPHENHYDRAMINE HYDROCHLORIDE 50 MG: 25 CAPSULE ORAL at 08:09

## 2024-09-02 RX ADMIN — IBUPROFEN 600 MG: 600 TABLET ORAL at 03:09

## 2024-09-02 RX ADMIN — HYDROMORPHONE HYDROCHLORIDE 2 MG: 2 INJECTION INTRAMUSCULAR; INTRAVENOUS; SUBCUTANEOUS at 04:09

## 2024-09-02 RX ADMIN — GABAPENTIN 300 MG: 300 CAPSULE ORAL at 02:09

## 2024-09-02 RX ADMIN — HYDROMORPHONE HYDROCHLORIDE 2 MG: 2 INJECTION INTRAMUSCULAR; INTRAVENOUS; SUBCUTANEOUS at 12:09

## 2024-09-02 RX ADMIN — ONDANSETRON 8 MG: 4 TABLET, ORALLY DISINTEGRATING ORAL at 02:09

## 2024-09-02 RX ADMIN — OXYCODONE HYDROCHLORIDE 15 MG: 5 TABLET ORAL at 09:09

## 2024-09-02 RX ADMIN — TRAZODONE HYDROCHLORIDE 100 MG: 50 TABLET ORAL at 08:09

## 2024-09-02 RX ADMIN — IBUPROFEN 600 MG: 600 TABLET ORAL at 06:09

## 2024-09-02 RX ADMIN — DIPHENHYDRAMINE HYDROCHLORIDE 50 MG: 25 CAPSULE ORAL at 05:09

## 2024-09-02 RX ADMIN — HYDROMORPHONE HYDROCHLORIDE 2 MG: 2 INJECTION INTRAMUSCULAR; INTRAVENOUS; SUBCUTANEOUS at 08:09

## 2024-09-02 RX ADMIN — HYDROMORPHONE HYDROCHLORIDE 2 MG: 2 INJECTION INTRAMUSCULAR; INTRAVENOUS; SUBCUTANEOUS at 07:09

## 2024-09-02 NOTE — PROGRESS NOTES
[facility] Medicine  Progress Note    Patient Name: Loretta Lea  MRN: [id number]  Patient Class: IP- Inpatient   Admission Date: 8/29/2024  Length of Stay: 4 days  Attending Physician: Briana Kessler MD  Primary Care Provider: Richard Ruiz FNP-C        Subjective:     Principal Problem:<principal problem not specified>        HPI:  This is a 34-year-old  female with a history of morbid obesity with BMI 48.08, chronic lower back pain, anxiety/depression,  hypertension, diabetes presenting here for elective surgery, patient had L 5-S1 lumbar fusion with Dr. Cassidy earlier today, the procedure went uneventfully.  Patient was seen by me postoperatively, patient is awake alert, AAO x3, denies any fever or chills, no nausea vomiting diarrhea, move both lower extremities, FIONA drain in place, patient is on PCA pump.  Vitals stable.  Review of the chart, patient had L4-L5, L5-S1 lumbar fusion, laminectomy with Dr. Cassidy on 4/2024, later she presented with a worsening lower back pain on 7/2024, MRI shows fluid collection at lumbar spine, evaluated by Orthopedic at the time, deemed that it was surgical seroma, patient was discharged with 7 days of p.o. doxycycline .   I was consulted for postoperative medical management.     Overview/Hospital Course:  34-year-old female with history of morbid obesity, hypertension, bipolar disorder, diabetes, status post L4-S1 lumbar fusion laminectomy on 4/2024, lumbar spine seroma 7/2024 now presenting for redo lumbar spine fusion, patient is  Status post transforaminal lumbar interbody fusion L5-S1 and posterolateral fusion, insertion of interbody device L5-S1, removal of broken hardware S1 pedicle screws, revision of posterior segmental instrumentation L4-S1 on 8/29 is Dr. Cassidy, doing well, on PCA pump.        Interval History:   Patient seen and examined.  T-max 101°.  Continued to encourage ambulation and incentive  spirometer.  She was on 2 L nasal cannula.  We will decrease frequency of pain medication today.  Patient agreeable.    Review of Systems   Musculoskeletal:  Positive for back pain.     Objective:     Vital Signs (Most Recent):  Temp: 98.6 °F (37 °C) (09/02/24 0730)  Pulse: 77 (09/02/24 0744)  Resp: 16 (09/02/24 0837)  BP: 117/64 (09/02/24 0730)  SpO2: 98 % (09/02/24 0744) Vital Signs (24h Range):  Temp:  [98.2 °F (36.8 °C)-101 °F (38.3 °C)] 98.6 °F (37 °C)  Pulse:  [] 77  Resp:  [16-22] 16  SpO2:  [91 %-98 %] 98 %  BP: (108-133)/(51-81) 117/64     Weight: (!) 139.2 kg (306 lb 14.1 oz)  Body mass index is 48.06 kg/m².    Intake/Output Summary (Last 24 hours) at 9/2/2024 1034  Last data filed at 9/2/2024 0606  Gross per 24 hour   Intake 2160 ml   Output 2150 ml   Net 10 ml         Physical Exam  Vitals and nursing note reviewed.   Constitutional:       General: She is not in acute distress.     Appearance: She is obese.   Eyes:      Pupils: Pupils are equal, round, and reactive to light.   Cardiovascular:      Rate and Rhythm: Normal rate and regular rhythm.      Heart sounds: No murmur heard.  Pulmonary:      Effort: Pulmonary effort is normal.      Breath sounds: Normal breath sounds.   Abdominal:      General: There is no distension.      Palpations: Abdomen is soft.      Tenderness: There is no abdominal tenderness.   Musculoskeletal:      Comments: FIONA drain in lower back   Skin:     Coloration: Skin is not pale.      Findings: No rash.   Neurological:      Mental Status: She is alert and oriented to person, place, and time.             Significant Labs: All pertinent labs within the past 24 hours have been reviewed.  CBC:   Recent Labs   Lab 09/01/24 0442 09/02/24 0443   WBC 13.64* 14.43*   HGB 10.0* 9.8*   HCT 32.4* 32.0*    302     CMP:   Recent Labs   Lab 09/01/24  0442 09/02/24  0443   * 137   K 3.9 3.9   CL 98 97   CO2 28 30*   * 161*   BUN 4* 6   CREATININE 0.6 0.7   CALCIUM  8.0* 8.5*   ANIONGAP 9 10       Significant Imaging: I have reviewed all pertinent imaging results/findings within the past 24 hours.  I have reviewed and interpreted all pertinent imaging results/findings within the past 24 hours.    Scheduled Meds:   bisacodyL  10 mg Rectal Daily    docusate sodium  100 mg Oral Daily    gabapentin  300 mg Oral TID    levoFLOXacin  750 mg Intravenous Q24H    losartan  25 mg Oral Daily    nicotine  1 patch Transdermal Daily    pyridoxine (vitamin B6)  25 mg Oral Daily    risperiDONE  1 mg Oral BID    sertraline  150 mg Oral Daily     Continuous Infusions:      PRN Meds:.  Current Facility-Administered Medications:     acetaminophen, 650 mg, Oral, Q6H PRN    aluminum-magnesium hydroxide-simethicone, 30 mL, Oral, Q4H PRN    dextrose 10%, 12.5 g, Intravenous, PRN    dextrose 10%, 25 g, Intravenous, PRN    diphenhydrAMINE, 50 mg, Oral, Q6H PRN    glucagon (human recombinant), 1 mg, Intramuscular, PRN    glucose, 16 g, Oral, PRN    glucose, 24 g, Oral, PRN    HYDROmorphone, 2 mg, Intravenous, Q3H PRN    ibuprofen, 600 mg, Oral, Q8H PRN    insulin aspart U-100, 0-10 Units, Subcutaneous, QID (AC + HS) PRN    melatonin, 9 mg, Oral, Nightly PRN    ondansetron, 8 mg, Oral, Q6H PRN    oxyCODONE, 10 mg, Oral, Q4H PRN    oxyCODONE, 15 mg, Oral, Q4H PRN    oxyCODONE, 5 mg, Oral, Q4H PRN    prochlorperazine, 5 mg, Intravenous, Q6H PRN    senna-docusate 8.6-50 mg, 2 tablet, Oral, Nightly PRN    traZODone, 100 mg, Oral, Nightly PRN    SURG FL Surgery Fluoro Usage    Result Date: 8/29/2024  See OP Notes for results. IMPRESSION: See OP Notes for results. This procedure was auto-finalized by: Virtual Radiologist    X-Ray Lumbar Spine Ap And Lateral    Result Date: 8/12/2024  EXAMINATION: XR LUMBAR SPINE AP AND LATERAL CLINICAL HISTORY: Arthrodesis status TECHNIQUE: AP, lateral and spot images were performed of the lumbar spine. COMPARISON: Radiographs 07/24/2024. FINDINGS: Redemonstrated posterior  instrumented fusion spanning L4-S1 as well as interbody fusion changes at L4-L5.  Hardware demonstrates stable alignment without evidence of fracture or obvious loosening.  No subsidence.  Vertebral body heights are preserved.  No acute bony process.  Similar grade 1 anterolisthesis of L4 on L5.  Moderate disc height loss at L5-S1.  Right upper quadrant surgical clips are noted.     1. Stable postoperative and degenerative changes of the lumbosacral spine. Electronically signed by: Hamzah Ayala Date:    08/12/2024 Time:    09:11  - pulls last radiology orders      Assessment/Plan:      Diabetes  Patient's FSGs are uncontrolled due to hyperglycemia on current medication regimen.  Last A1c reviewed-   Lab Results   Component Value Date    HGBA1C 6.4 (H) 08/30/2024     Most recent fingerstick glucose reviewed-   Recent Labs   Lab 09/01/24  1137 09/01/24  1639 09/01/24  1943   POCTGLUCOSE 177* 233* 160*       Current correctional scale  Medium  Maintain anti-hyperglycemic dose as follows-   Antihyperglycemics (From admission, onward)      Start     Stop Route Frequency Ordered    08/29/24 1658  insulin aspart U-100 pen 0-10 Units         -- SubQ Before meals & nightly PRN 08/29/24 1558          Hold Oral hypoglycemics while patient is in the hospital.    Status post lumbar spinal fusion    Status post  transforaminal lumbar interbody fusion L5-S1 and posterolateral fusion, insertion of interbody device L5-S1, removal of broken hardware S1 pedicle screws, revision of posterior segmental instrumentation L4-S1 on 8/29 with Dr. Cassidy      Continue postoperative management as per orthopedic surgeon  Stopped IV fluids  Empiric Levaquin given fever.  Blood cultures no growth to date.  Procalcitonin negative.  Atelectasis suspected.  Incentive spirometry  DVT prophylaxis as per surgical team    Morbid obesity  Body mass index is 48.08 kg/m². Morbid obesity complicates all aspects of disease management from diagnostic modalities  to treatment. Weight loss encouraged and health benefits explained to patient.         Cigarette smoker  Dangers of cigarette smoking were reviewed with patient in detail for 10 minutes and patient was encouraged to quit. Nicotine replacement options were discussed. Nicotine replacement options were discussed. Nicotine replacement was not prescribed per patient request.    Offer nicotine patch, patient refused    Bipolar disorder, in partial remission, most recent episode mixed    Continue home medication      VTE Risk Mitigation (From admission, onward)           Ordered     Place ZAKI hose  Until discontinued         08/29/24 0533     Place sequential compression device  Until discontinued         08/29/24 0533                    Discharge Planning   PEDRO: 9/3/2024     Code Status: Full Code   Is the patient medically ready for discharge?:     Reason for patient still in hospital (select all that apply): Patient trending condition and Treatment  Discharge Plan A: Home with family                  Briana Kessler MD  Department of Hospital Medicine   [facility]/Surg

## 2024-09-02 NOTE — ASSESSMENT & PLAN NOTE
Patient's FSGs are uncontrolled due to hyperglycemia on current medication regimen.  Last A1c reviewed-   Lab Results   Component Value Date    HGBA1C 6.4 (H) 08/30/2024     Most recent fingerstick glucose reviewed-   Recent Labs   Lab 09/01/24  1137 09/01/24  1639 09/01/24  1943   POCTGLUCOSE 177* 233* 160*       Current correctional scale  Medium  Maintain anti-hyperglycemic dose as follows-   Antihyperglycemics (From admission, onward)    Start     Stop Route Frequency Ordered    08/29/24 1658  insulin aspart U-100 pen 0-10 Units         -- SubQ Before meals & nightly PRN 08/29/24 1558        Hold Oral hypoglycemics while patient is in the hospital.

## 2024-09-02 NOTE — SUBJECTIVE & OBJECTIVE
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       Current Facility-Administered Medications   Medication    acetaminophen tablet 650 mg    aluminum-magnesium hydroxide-simethicone 200-200-20 mg/5 mL suspension 30 mL    bisacodyL suppository 10 mg    dextrose 10% bolus 125 mL 125 mL    dextrose 10% bolus 250 mL 250 mL    diphenhydrAMINE capsule 50 mg    docusate sodium capsule 100 mg    gabapentin capsule 300 mg    glucagon (human recombinant) injection 1 mg    glucose chewable tablet 16 g    glucose chewable tablet 24 g    HYDROmorphone (PF) injection 2 mg    ibuprofen tablet 600 mg    insulin aspart U-100 pen 0-10 Units    levoFLOXacin 750 mg/150 mL IVPB 750 mg    losartan tablet 25 mg    melatonin tablet 9 mg    nicotine 21 mg/24 hr 1 patch    ondansetron disintegrating tablet 8 mg    oxyCODONE immediate release tablet 10 mg    oxyCODONE immediate release tablet 15 mg    oxyCODONE immediate release tablet 5 mg    prochlorperazine injection Soln 5 mg    pyridoxine (vitamin B6) tablet 25 mg    risperiDONE tablet 1 mg    senna-docusate 8.6-50 mg per tablet 2 tablet    sertraline tablet 150 mg    trazodone split tablet 100 mg     Objective:     Vital Signs (Most Recent):  Temp: 98.6 °F (37 °C) (09/02/24 0730)  Pulse: 77 (09/02/24 0744)  Resp: 16 (09/02/24 0837)  BP: 117/64 (09/02/24 0730)  SpO2: 98 % (09/02/24 0744) Vital Signs (24h Range):  Temp:  [98.2 °F (36.8 °C)-101 °F (38.3 °C)] 98.6 °F (37 °C)  Pulse:  [] 77  Resp:  [16-22] 16  SpO2:  [91 %-98 %] 98 %  BP: (108-133)/(51-81) 117/64     Weight: (!) 139.2 kg (306 lb 14.1 oz)  Height: 5' 7" (170.2 cm)  Body mass index is 48.06 kg/m².      Intake/Output Summary (Last 24 hours) at 9/2/2024 0914  Last data filed at 9/2/2024 " 0606  Gross per 24 hour   Intake 2520 ml   Output 2750 ml   Net -230 ml        General    Constitutional: She is oriented to person, place, and time. She appears well-developed and well-nourished.   Pulmonary/Chest: Effort normal.   Neurological: She is alert and oriented to person, place, and time.   Psychiatric: She has a normal mood and affect. Her behavior is normal. Judgment and thought content normal.         Back (L-Spine & T-Spine) / Neck (C-Spine) Exam     Tenderness Right paramedian tenderness of the Lower L-Spine. Left paramedian tenderness of the Lower L-Spine.     Spinal Sensation   Right Side Sensation  L-Spine Level: normal  Left Side Sensation  L-Spine Level: normal    Comments:   Patient lying in bed supine comfortably.  Has been transferring in and out of bed independently.  Able to ambulate independently.  Having some increased low back pain with her increased activity as expected.  Had some blood-tinged serous drainage from incision yesterday.  Dressing today is clean dry and intact.  There is some surrounding skin irritation/ dermatitis likely from the adhesive.  Otherwise, posterior lumbar incision healing well without wound dehiscence or active drainage.  Patient neurovascularly intact throughout bilateral lower extremities.  Negative Homans bilaterally.      Muscle Strength   Right Lower Extremity   Anterior tibial:  5/5   Gastrocsoleus:  5/5   EHL:  5/5  Left Lower Extremity   Anterior tibial:  5/5   Gastrocsoleus:  5/5   EHL:  5/5       Significant Labs: None    Significant Imaging: None

## 2024-09-02 NOTE — CARE UPDATE
09/01/24 1945   Patient Assessment/Suction   Level of Consciousness (AVPU) alert   Respiratory Effort Unlabored   Expansion/Accessory Muscles/Retractions no retractions;no use of accessory muscles   All Lung Fields Breath Sounds diminished   PRE-TX-O2   Device (Oxygen Therapy) nasal cannula   $ Is the patient on Low Flow Oxygen? Yes   Flow (L/min) (Oxygen Therapy) 2   SpO2 95 %   Pulse Oximetry Type Intermittent   $ Pulse Oximetry - Multiple Charge Pulse Oximetry - Multiple   Pulse 103   Resp 20   Incentive Spirometer   $ Incentive Spirometer Charges done with encouragement;proper technique demonstrated   Administration (IS) proper technique demonstrated   Number of Repetitions (IS) 15   Level Incentive Spirometer (mL) 2500   Patient Tolerance (IS) good;no adverse signs/symptoms present

## 2024-09-02 NOTE — PT/OT/SLP PROGRESS
Occupational Therapy   Treatment    Name: Loretta Lea  MRN: [id number]  Admitting Diagnosis:  <principal problem not specified>  4 Days Post-Op    Recommendations:     Discharge Recommendations: Low intensity therapy  Discharge Equipment Recommendations:  None  Barriers to discharge:  None    Assessment:     Loretta Lea is a 34 y.o. female with a medical diagnosis of <principal problem not specified>.  Patient agreed to participate in OT. Pt demonstrates progress and is able to complete bed mobility with supervision from min A on evaluation date 8/30/2024. She is also able to complete toileting while maintaining precautions with supervision from total A on 8/30/2024. She continues to present with impaired endurance, impaired self care skills and orthopedic precautions.     Rehab Prognosis:  Good; patient would benefit from acute skilled OT services to address these deficits and reach maximum level of function.       Plan:     Patient to be seen 5 x/week to address the above listed problems via self-care/home management, therapeutic activities, therapeutic exercises  Plan of Care Expires: 09/14/24  Plan of Care Reviewed with:      Subjective     Chief Complaint: None stated  Patient/Family Comments/goals: Patient agreed to participate in OT.  Pain/Comfort:  Pain Rating 1: 0/10    Objective:     Communicated with: nurse prior to session.  Patient found HOB elevated with oxygen upon OT entry to room.    General Precautions: Standard, fall   Orthopedic Precautions:spinal precautions  Braces: TLSO (not at bedside, cleared to ambulate without)  Respiratory Status: Nasal cannula, flow 2 L/min     Occupational Performance:     Bed Mobility:    Patient completed Scooting/Bridging with supervision  Patient completed Supine to Sit with supervision  Patient completed Sit to Supine with supervision     Functional Mobility/Transfers:  Patient completed Sit <> Stand Transfer with stand by assistance with no assistive device    Patient completed Toilet Transfer Step Transfer technique with stand by assistance with no AD  Functional Mobility: bed > bathroom > chair with SBA with no AD    Activities of Daily Living:  Feeding:  independence seated in chair  Toileting: supervision       Treatment & Education:  Therapist provided facilitation and instruction of proper body mechanics and fall prevention strategies during tasks listed above.     Patient left up in chair with all lines intact, call button in reach and nurse notified.    GOALS:   Multidisciplinary Problems       Occupational Therapy Goals          Problem: Occupational Therapy    Goal Priority Disciplines Outcome Interventions   Occupational Therapy Goal     OT, PT/OT     Description: Goals to be met by: 9/14/2024     Patient will increase functional independence with ADLs by performing:    UE Dressing with Modified [place].  LE Dressing with Standby A.  Grooming with Mod I.   Toileting from toilet with Supervision for hygiene and clothing management.   Toilet transfer to toilet with Supervision.                         Time Tracking:     OT Date of Treatment: 09/02/24  OT Start Time: 0748  OT Stop Time: 0802  OT Total Time (min): 14 min    Billable Minutes:Self Care/Home Management 14               9/2/2024

## 2024-09-02 NOTE — CARE UPDATE
09/02/24 0744   Patient Assessment/Suction   Level of Consciousness (AVPU) alert   Respiratory Effort Unlabored   Expansion/Accessory Muscles/Retractions no use of accessory muscles;no retractions;expansion symmetric   All Lung Fields Breath Sounds Anterior:;Lateral:   Rhythm/Pattern, Respiratory unlabored;pattern regular;depth regular;no shortness of breath reported   Cough Frequency no cough   PRE-TX-O2   Device (Oxygen Therapy) nasal cannula   $ Is the patient on Low Flow Oxygen? Yes   Flow (L/min) (Oxygen Therapy) 2   SpO2 98 %   Pulse Oximetry Type Intermittent   $ Pulse Oximetry - Multiple Charge Pulse Oximetry - Multiple   Pulse 77   Resp 20  (Simultaneous filing. User may not have seen previous data.)   Positioning HOB elevated 45 degrees   Incentive Spirometer   $ Incentive Spirometer Charges done with encouragement   Incentive Spirometer Predicted Level (mL) 2040   Administration (IS) mouthpiece utilized   Number of Repetitions (IS) 10   Level Incentive Spirometer (mL) 2500   Patient Tolerance (IS) good;no adverse signs/symptoms present   Education   $ Education 15 min
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
## 2024-09-02 NOTE — ASSESSMENT & PLAN NOTE
1. Status post transforaminal lumbar interbody fusion L5-S1 and posterolateral fusion, insertion of interbody device L5-S1, removal of broken hardware S1 pedicle screws, revision of posterior segmental instrumentation L4-S1    Patient doing well orthopedically at this stage postoperative day 4.  Continue with daily dressing changes.  May need to increase frequency if she has any drainage to not allow any moisture to sit at the operative site.  She is tolerating a p.o. diet.  She has been working with physical therapy.  Out of bed to bedside chair twice daily.  She can weightbear as tolerated on bilateral lower extremities.  Patient is stable from an orthopedic perspective.  Discharge planning per attending.  Did discuss with her and instruct her on incentive spirometry use and being up out of bed and ambulating to help reduce her pneumonia risk.    She needs aggressive respiratory therapy.    Agree with prophylactic Levaquin for pneumonia risk. She is at increased risk due to her postoperative state, smoking history, and her obesity.

## 2024-09-02 NOTE — PLAN OF CARE
Plan of care continues. Tmax 101 this shift, treated. Encouraged IS, pt ambulated in the room and to the bathroom. O2 @ 2L.. drops quickly if removed. Medicated for pain. Dsg clean and dry. Safety maintained       Problem: Adult Inpatient Plan of Care  Goal: Plan of Care Review  Outcome: Progressing

## 2024-09-02 NOTE — SUBJECTIVE & OBJECTIVE
Interval History:   Patient seen and examined.  T-max 101°.  Continued to encourage ambulation and incentive spirometer.  She was on 2 L nasal cannula.  We will decrease frequency of pain medication today.  Patient agreeable.    Review of Systems   Musculoskeletal:  Positive for back pain.     Objective:     Vital Signs (Most Recent):  Temp: 98.6 °F (37 °C) (09/02/24 0730)  Pulse: 77 (09/02/24 0744)  Resp: 16 (09/02/24 0837)  BP: 117/64 (09/02/24 0730)  SpO2: 98 % (09/02/24 0744) Vital Signs (24h Range):  Temp:  [98.2 °F (36.8 °C)-101 °F (38.3 °C)] 98.6 °F (37 °C)  Pulse:  [] 77  Resp:  [16-22] 16  SpO2:  [91 %-98 %] 98 %  BP: (108-133)/(51-81) 117/64     Weight: (!) 139.2 kg (306 lb 14.1 oz)  Body mass index is 48.06 kg/m².    Intake/Output Summary (Last 24 hours) at 9/2/2024 1034  Last data filed at 9/2/2024 0606  Gross per 24 hour   Intake 2160 ml   Output 2150 ml   Net 10 ml         Physical Exam  Vitals and nursing note reviewed.   Constitutional:       General: She is not in acute distress.     Appearance: She is obese.   Eyes:      Pupils: Pupils are equal, round, and reactive to light.   Cardiovascular:      Rate and Rhythm: Normal rate and regular rhythm.      Heart sounds: No murmur heard.  Pulmonary:      Effort: Pulmonary effort is normal.      Breath sounds: Normal breath sounds.   Abdominal:      General: There is no distension.      Palpations: Abdomen is soft.      Tenderness: There is no abdominal tenderness.   Musculoskeletal:      Comments: FIONA drain in lower back   Skin:     Coloration: Skin is not pale.      Findings: No rash.   Neurological:      Mental Status: She is alert and oriented to person, place, and time.             Significant Labs: All pertinent labs within the past 24 hours have been reviewed.  CBC:   Recent Labs   Lab 09/01/24  0442 09/02/24  0443   WBC 13.64* 14.43*   HGB 10.0* 9.8*   HCT 32.4* 32.0*    302     CMP:   Recent Labs   Lab 09/01/24  0442 09/02/24  0444    * 137   K 3.9 3.9   CL 98 97   CO2 28 30*   * 161*   BUN 4* 6   CREATININE 0.6 0.7   CALCIUM 8.0* 8.5*   ANIONGAP 9 10       Significant Imaging: I have reviewed all pertinent imaging results/findings within the past 24 hours.  I have reviewed and interpreted all pertinent imaging results/findings within the past 24 hours.    Scheduled Meds:   bisacodyL  10 mg Rectal Daily    docusate sodium  100 mg Oral Daily    gabapentin  300 mg Oral TID    levoFLOXacin  750 mg Intravenous Q24H    losartan  25 mg Oral Daily    nicotine  1 patch Transdermal Daily    pyridoxine (vitamin B6)  25 mg Oral Daily    risperiDONE  1 mg Oral BID    sertraline  150 mg Oral Daily     Continuous Infusions:      PRN Meds:.  Current Facility-Administered Medications:     acetaminophen, 650 mg, Oral, Q6H PRN    aluminum-magnesium hydroxide-simethicone, 30 mL, Oral, Q4H PRN    dextrose 10%, 12.5 g, Intravenous, PRN    dextrose 10%, 25 g, Intravenous, PRN    diphenhydrAMINE, 50 mg, Oral, Q6H PRN    glucagon (human recombinant), 1 mg, Intramuscular, PRN    glucose, 16 g, Oral, PRN    glucose, 24 g, Oral, PRN    HYDROmorphone, 2 mg, Intravenous, Q3H PRN    ibuprofen, 600 mg, Oral, Q8H PRN    insulin aspart U-100, 0-10 Units, Subcutaneous, QID (AC + HS) PRN    melatonin, 9 mg, Oral, Nightly PRN    ondansetron, 8 mg, Oral, Q6H PRN    oxyCODONE, 10 mg, Oral, Q4H PRN    oxyCODONE, 15 mg, Oral, Q4H PRN    oxyCODONE, 5 mg, Oral, Q4H PRN    prochlorperazine, 5 mg, Intravenous, Q6H PRN    senna-docusate 8.6-50 mg, 2 tablet, Oral, Nightly PRN    traZODone, 100 mg, Oral, Nightly PRN    SURG [place] Surgery Fluoro Usage    Result Date: 8/29/2024  See OP Notes for results. IMPRESSION: See OP Notes for results. This procedure was auto-finalized by: Virtual Radiologist    X-Ray Lumbar Spine Ap And Lateral    Result Date: 8/12/2024  EXAMINATION: XR LUMBAR SPINE AP AND LATERAL CLINICAL HISTORY: Arthrodesis status TECHNIQUE: AP, lateral and spot images  were performed of the lumbar spine. COMPARISON: Radiographs 07/24/2024. FINDINGS: Redemonstrated posterior instrumented fusion spanning L4-S1 as well as interbody fusion changes at L4-L5.  Hardware demonstrates stable alignment without evidence of fracture or obvious loosening.  No subsidence.  Vertebral body heights are preserved.  No acute bony process.  Similar grade 1 anterolisthesis of L4 on L5.  Moderate disc height loss at L5-S1.  Right upper quadrant surgical clips are noted.     1. Stable postoperative and degenerative changes of the lumbosacral spine. Electronically signed by: Hamzah Ayala Date:    08/12/2024 Time:    09:11  - pulls last radiology orders

## 2024-09-02 NOTE — PLAN OF CARE
[facility] - Med/Surg  Discharge Reassessment    Primary Care Provider: Richard Ruiz FNP-C    Expected Discharge Date: 9/3/2024    Per MDRs, patient not medically ready for discharge today.  Patient with fever and decreased pain medicine today.    Reassessment (most recent)       Discharge Reassessment - 09/02/24 1251          Discharge Reassessment    Assessment Type Discharge Planning Reassessment     Did the patient's condition or plan change since previous assessment? Yes     Discharge Plan discussed with: Patient     Communicated PEDRO with patient/caregiver Yes     Discharge Plan A Home     Discharge Plan B Home with family     DME Needed Upon Discharge  none     Transition of Care Barriers None     Why the patient remains in the hospital Requires continued medical care        Post-Acute Status    Post-Acute Authorization Other     Other Status No Post-Acute Service Needs     Discharge Delays None known at this time

## 2024-09-02 NOTE — CARE UPDATE
09/02/24 1115   Patient Assessment/Suction   Level of Consciousness (AVPU) alert   Respiratory Effort Unlabored   PRE-TX-O2   Device (Oxygen Therapy) nasal cannula   $ Is the patient on Low Flow Oxygen? Yes   Flow (L/min) (Oxygen Therapy) 2  (decreased to 1 lpm)   SpO2 97 %   Pulse Oximetry Type Intermittent   $ Pulse Oximetry - Multiple Charge Pulse Oximetry - Multiple   Pulse 99   Resp 18   Positioning Sitting in chair

## 2024-09-02 NOTE — PROGRESS NOTES
[facility]/Surg  Orthopedics  Progress Note    Patient Name: Loretta Lea  MRN: [id number]  Admission Date: 8/29/2024  Hospital Length of Stay: 4 days  Attending Provider: Briana Kessler MD  Primary Care Provider: Richard Ruiz FNP-C  Follow-up For: Procedure(s) (LRB):  LAMINECTOMY, SPINE, LUMBAR, WITH FUSION (Bilateral)    Post-Operative Day: 4 Days Post-Op  Subjective:     Principal Problem:<principal problem not specified>    Principal Orthopedic Problem:   Failed lumbar fusion    Interval History:  revision lumbar fusion    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       Current Facility-Administered Medications   Medication    acetaminophen tablet 650 mg    aluminum-magnesium hydroxide-simethicone 200-200-20 mg/5 mL suspension 30 mL    bisacodyL suppository 10 mg    dextrose 10% bolus 125 mL 125 mL    dextrose 10% bolus 250 mL 250 mL    diphenhydrAMINE capsule 50 mg    docusate sodium capsule 100 mg    gabapentin capsule 300 mg    glucagon (human recombinant) injection 1 mg    glucose chewable tablet 16 g    glucose chewable tablet 24 g    HYDROmorphone (PF) injection 2 mg    ibuprofen tablet 600 mg    insulin aspart U-100 pen 0-10 Units    levoFLOXacin 750 mg/150 mL IVPB 750 mg    losartan tablet 25 mg    melatonin tablet 9 mg    nicotine 21 mg/24 hr 1 patch    ondansetron disintegrating tablet 8 mg    oxyCODONE immediate release tablet 10 mg    oxyCODONE immediate release tablet 15 mg    oxyCODONE immediate release tablet 5 mg    prochlorperazine injection Soln 5 mg    pyridoxine (vitamin B6) tablet 25 mg    risperiDONE tablet 1 mg    senna-docusate 8.6-50 mg per tablet 2 tablet    sertraline tablet 150 mg    trazodone split tablet 100 mg     Objective:     Vital Signs (Most Recent):  Temp: 98.6 °F (37 °C) (09/02/24 0730)  Pulse: 77 (09/02/24 0744)  Resp: 16 (09/02/24 0837)  BP: 117/64  "(09/02/24 0730)  SpO2: 98 % (09/02/24 0744) Vital Signs (24h Range):  Temp:  [98.2 °F (36.8 °C)-101 °F (38.3 °C)] 98.6 °F (37 °C)  Pulse:  [] 77  Resp:  [16-22] 16  SpO2:  [91 %-98 %] 98 %  BP: (108-133)/(51-81) 117/64     Weight: (!) 139.2 kg (306 lb 14.1 oz)  Height: 5' 7" (170.2 cm)  Body mass index is 48.06 kg/m².      Intake/Output Summary (Last 24 hours) at 9/2/2024 0914  Last data filed at 9/2/2024 0606  Gross per 24 hour   Intake 2520 ml   Output 2750 ml   Net -230 ml        General    Constitutional: She is oriented to person, place, and time. She appears well-developed and well-nourished.   Pulmonary/Chest: Effort normal.   Neurological: She is alert and oriented to person, place, and time.   Psychiatric: She has a normal mood and affect. Her behavior is normal. Judgment and thought content normal.         Back (L-Spine & T-Spine) / Neck (C-Spine) Exam     Tenderness Right paramedian tenderness of the Lower L-Spine. Left paramedian tenderness of the Lower L-Spine.     Spinal Sensation   Right Side Sensation  L-Spine Level: normal  Left Side Sensation  L-Spine Level: normal    Comments:   Patient lying in bed supine comfortably.  Has been transferring in and out of bed independently.  Able to ambulate independently.  Having some increased low back pain with her increased activity as expected.  Had some blood-tinged serous drainage from incision yesterday.  Dressing today is clean dry and intact.  There is some surrounding skin irritation/ dermatitis likely from the adhesive.  Otherwise, posterior lumbar incision healing well without wound dehiscence or active drainage.  Patient neurovascularly intact throughout bilateral lower extremities.  Negative Homans bilaterally.      Muscle Strength   Right Lower Extremity   Anterior tibial:  5/5   Gastrocsoleus:  5/5   EHL:  5/5  Left Lower Extremity   Anterior tibial:  5/5   Gastrocsoleus:  5/5   EHL:  5/5       Significant Labs: None    Significant Imaging: " None  Assessment/Plan:     Status post lumbar spinal fusion  1. Status post transforaminal lumbar interbody fusion L5-S1 and posterolateral fusion, insertion of interbody device L5-S1, removal of broken hardware S1 pedicle screws, revision of posterior segmental instrumentation L4-S1    Patient doing well orthopedically at this stage postoperative day 4.  Continue with daily dressing changes.  May need to increase frequency if she has any drainage to not allow any moisture to sit at the operative site.  She is tolerating a p.o. diet.  She has been working with physical therapy.  Out of bed to bedside chair twice daily.  She can weightbear as tolerated on bilateral lower extremities.  Patient is stable from an orthopedic perspective.  Discharge planning per attending.  Did discuss with her and instruct her on incentive spirometry use and being up out of bed and ambulating to help reduce her pneumonia risk.    She needs aggressive respiratory therapy.    Agree with prophylactic Levaquin for pneumonia risk. She is at increased risk due to her postoperative state, smoking history, and her obesity.          Aidan Alvares PA-C  Orthopedics  [facility]/Surg

## 2024-09-03 VITALS
SYSTOLIC BLOOD PRESSURE: 117 MMHG | OXYGEN SATURATION: 95 % | HEIGHT: 67 IN | WEIGHT: 293 LBS | TEMPERATURE: 99 F | BODY MASS INDEX: 45.99 KG/M2 | RESPIRATION RATE: 18 BRPM | DIASTOLIC BLOOD PRESSURE: 73 MMHG | HEART RATE: 115 BPM

## 2024-09-03 LAB
ANION GAP SERPL CALC-SCNC: 8 MMOL/L (ref 8–16)
BASOPHILS # BLD AUTO: 0.04 K/UL (ref 0–0.2)
BASOPHILS NFR BLD: 0.4 % (ref 0–1.9)
BUN SERPL-MCNC: 6 MG/DL (ref 6–20)
CALCIUM SERPL-MCNC: 8.5 MG/DL (ref 8.7–10.5)
CHLORIDE SERPL-SCNC: 100 MMOL/L (ref 95–110)
CO2 SERPL-SCNC: 31 MMOL/L (ref 23–29)
CREAT SERPL-MCNC: 0.6 MG/DL (ref 0.5–1.4)
DIFFERENTIAL METHOD BLD: ABNORMAL
EOSINOPHIL # BLD AUTO: 0.2 K/UL (ref 0–0.5)
EOSINOPHIL NFR BLD: 1.7 % (ref 0–8)
ERYTHROCYTE [DISTWIDTH] IN BLOOD BY AUTOMATED COUNT: 15.4 % (ref 11.5–14.5)
EST. GFR  (NO RACE VARIABLE): >60 ML/MIN/1.73 M^2
GLUCOSE SERPL-MCNC: 159 MG/DL (ref 70–110)
HCT VFR BLD AUTO: 30.7 % (ref 37–48.5)
HGB BLD-MCNC: 9.2 G/DL (ref 12–16)
IMM GRANULOCYTES # BLD AUTO: 0.05 K/UL (ref 0–0.04)
IMM GRANULOCYTES NFR BLD AUTO: 0.5 % (ref 0–0.5)
LYMPHOCYTES # BLD AUTO: 2.7 K/UL (ref 1–4.8)
LYMPHOCYTES NFR BLD: 25.2 % (ref 18–48)
MCH RBC QN AUTO: 26 PG (ref 27–31)
MCHC RBC AUTO-ENTMCNC: 30 G/DL (ref 32–36)
MCV RBC AUTO: 87 FL (ref 82–98)
MONOCYTES # BLD AUTO: 0.7 K/UL (ref 0.3–1)
MONOCYTES NFR BLD: 6.6 % (ref 4–15)
NEUTROPHILS # BLD AUTO: 7 K/UL (ref 1.8–7.7)
NEUTROPHILS NFR BLD: 65.6 % (ref 38–73)
NRBC BLD-RTO: 0 /100 WBC
PLATELET # BLD AUTO: 269 K/UL (ref 150–450)
PMV BLD AUTO: 9.4 FL (ref 9.2–12.9)
POCT GLUCOSE: 191 MG/DL (ref 70–110)
POTASSIUM SERPL-SCNC: 3.6 MMOL/L (ref 3.5–5.1)
RBC # BLD AUTO: 3.54 M/UL (ref 4–5.4)
SODIUM SERPL-SCNC: 139 MMOL/L (ref 136–145)
WBC # BLD AUTO: 10.62 K/UL (ref 3.9–12.7)

## 2024-09-03 PROCEDURE — 80048 BASIC METABOLIC PNL TOTAL CA: CPT | Performed by: ORTHOPAEDIC SURGERY

## 2024-09-03 PROCEDURE — 94618 PULMONARY STRESS TESTING: CPT

## 2024-09-03 PROCEDURE — 94760 N-INVAS EAR/PLS OXIMETRY 1: CPT

## 2024-09-03 PROCEDURE — 85025 COMPLETE CBC W/AUTO DIFF WBC: CPT | Performed by: ORTHOPAEDIC SURGERY

## 2024-09-03 PROCEDURE — 25000003 PHARM REV CODE 250: Performed by: HOSPITALIST

## 2024-09-03 PROCEDURE — 63600175 PHARM REV CODE 636 W HCPCS: Performed by: HOSPITALIST

## 2024-09-03 PROCEDURE — 94799 UNLISTED PULMONARY SVC/PX: CPT

## 2024-09-03 PROCEDURE — 36415 COLL VENOUS BLD VENIPUNCTURE: CPT | Performed by: ORTHOPAEDIC SURGERY

## 2024-09-03 PROCEDURE — 25000003 PHARM REV CODE 250: Performed by: ORTHOPAEDIC SURGERY

## 2024-09-03 PROCEDURE — 94761 N-INVAS EAR/PLS OXIMETRY MLT: CPT

## 2024-09-03 RX ORDER — LEVOFLOXACIN 750 MG/1
750 TABLET ORAL DAILY
Qty: 2 TABLET | Refills: 0 | Status: SHIPPED | OUTPATIENT
Start: 2024-09-03

## 2024-09-03 RX ORDER — LEVOFLOXACIN 750 MG/1
750 TABLET ORAL DAILY
Qty: 1 TABLET | Refills: 0 | Status: SHIPPED | OUTPATIENT
Start: 2024-09-04 | End: 2024-09-03

## 2024-09-03 RX ADMIN — HYDROMORPHONE HYDROCHLORIDE 2 MG: 2 INJECTION INTRAMUSCULAR; INTRAVENOUS; SUBCUTANEOUS at 09:09

## 2024-09-03 RX ADMIN — OXYCODONE HYDROCHLORIDE 15 MG: 5 TABLET ORAL at 04:09

## 2024-09-03 RX ADMIN — RISPERIDONE 1 MG: 1 TABLET, FILM COATED ORAL at 09:09

## 2024-09-03 RX ADMIN — DOCUSATE SODIUM 100 MG: 100 CAPSULE, LIQUID FILLED ORAL at 09:09

## 2024-09-03 RX ADMIN — HYDROMORPHONE HYDROCHLORIDE 2 MG: 2 INJECTION INTRAMUSCULAR; INTRAVENOUS; SUBCUTANEOUS at 05:09

## 2024-09-03 RX ADMIN — DIPHENHYDRAMINE HYDROCHLORIDE 50 MG: 25 CAPSULE ORAL at 04:09

## 2024-09-03 RX ADMIN — GABAPENTIN 300 MG: 300 CAPSULE ORAL at 09:09

## 2024-09-03 RX ADMIN — HYDROMORPHONE HYDROCHLORIDE 2 MG: 2 INJECTION INTRAMUSCULAR; INTRAVENOUS; SUBCUTANEOUS at 12:09

## 2024-09-03 RX ADMIN — LOSARTAN POTASSIUM 25 MG: 25 TABLET, FILM COATED ORAL at 09:09

## 2024-09-03 RX ADMIN — SERTRALINE HYDROCHLORIDE 150 MG: 50 TABLET ORAL at 09:09

## 2024-09-03 RX ADMIN — INSULIN ASPART 2 UNITS: 100 INJECTION, SOLUTION INTRAVENOUS; SUBCUTANEOUS at 09:09

## 2024-09-03 NOTE — SUBJECTIVE & OBJECTIVE
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       Current Facility-Administered Medications   Medication    acetaminophen tablet 650 mg    aluminum-magnesium hydroxide-simethicone 200-200-20 mg/5 mL suspension 30 mL    bisacodyL suppository 10 mg    dextrose 10% bolus 125 mL 125 mL    dextrose 10% bolus 250 mL 250 mL    diphenhydrAMINE capsule 50 mg    docusate sodium capsule 100 mg    gabapentin capsule 300 mg    glucagon (human recombinant) injection 1 mg    glucose chewable tablet 16 g    glucose chewable tablet 24 g    HYDROmorphone (PF) injection 2 mg    ibuprofen tablet 600 mg    insulin aspart U-100 pen 0-10 Units    levoFLOXacin 750 mg/150 mL IVPB 750 mg    losartan tablet 25 mg    melatonin tablet 9 mg    nicotine 21 mg/24 hr 1 patch    ondansetron disintegrating tablet 8 mg    oxyCODONE immediate release tablet 10 mg    oxyCODONE immediate release tablet 15 mg    oxyCODONE immediate release tablet 5 mg    prochlorperazine injection Soln 5 mg    pyridoxine (vitamin B6) tablet 25 mg    risperiDONE tablet 1 mg    senna-docusate 8.6-50 mg per tablet 2 tablet    sertraline tablet 150 mg    trazodone split tablet 100 mg     Objective:     Vital Signs (Most Recent):  Temp: 98.6 °F (37 °C) (09/03/24 0256)  Pulse: 81 (09/03/24 0256)  Resp: 16 (09/03/24 0535)  BP: 121/60 (09/03/24 0256)  SpO2: 95 % (09/03/24 0256) Vital Signs (24h Range):  Temp:  [97.6 °F (36.4 °C)-99.7 °F (37.6 °C)] 98.6 °F (37 °C)  Pulse:  [] 81  Resp:  [16-21] 16  SpO2:  [93 %-98 %] 95 %  BP: ()/(50-66) 121/60     Weight: (!) 139.2 kg (306 lb 14.1 oz)  Height: 5' 7" (170.2 cm)  Body mass index is 48.06 kg/m².      Intake/Output Summary (Last 24 hours) at 9/3/2024 0621  Last " data filed at 9/3/2024 0353  Gross per 24 hour   Intake --   Output 1200 ml   Net -1200 ml        General    Constitutional: She is oriented to person, place, and time. She appears well-developed and well-nourished.   Pulmonary/Chest: Effort normal.   Neurological: She is alert and oriented to person, place, and time.   Psychiatric: She has a normal mood and affect. Her behavior is normal. Judgment and thought content normal.         Back (L-Spine & T-Spine) / Neck (C-Spine) Exam     Tenderness Right paramedian tenderness of the Lower L-Spine. Left paramedian tenderness of the Lower L-Spine.     Spinal Sensation   Right Side Sensation  L-Spine Level: normal  Left Side Sensation  L-Spine Level: normal    Comments:  Sitting up in bedside chair.  Able to arise from seated position independently without assistance.  Posterior lumbar incision healing well without wound dehiscence or active drainage.  Some surrounding skin irritation/dermatitis likely from tape adhesive.  Neurovascularly intact throughout bilateral lower extremities.  Negative Homans bilaterally.  Can dorsiflex and plantar flex bilateral ankles without difficulty.  Bilateral EHLs intact.      Muscle Strength   Right Lower Extremity   Anterior tibial:  5/5   Gastrocsoleus:  5/5   EHL:  5/5  Left Lower Extremity   Anterior tibial:  5/5   Gastrocsoleus:  5/5   EHL:  5/5       Significant Labs: None    Significant Imaging: None

## 2024-09-03 NOTE — CARE UPDATE
09/02/24 1948   Patient Assessment/Suction   Level of Consciousness (AVPU) alert   Respiratory Effort Unlabored   Expansion/Accessory Muscles/Retractions no retractions;no use of accessory muscles   All Lung Fields Breath Sounds diminished   PRE-TX-O2   Device (Oxygen Therapy) nasal cannula   $ Is the patient on Low Flow Oxygen? Yes   Flow (L/min) (Oxygen Therapy) 1   SpO2 (!) 93 %   Pulse Oximetry Type Intermittent   $ Pulse Oximetry - Multiple Charge Pulse Oximetry - Multiple   Pulse 103   Resp (!) 21   Incentive Spirometer   $ Incentive Spirometer Charges done with encouragement;proper technique demonstrated   Administration (IS) proper technique demonstrated   Number of Repetitions (IS) 10   Level Incentive Spirometer (mL) 2500   Patient Tolerance (IS) good;no adverse signs/symptoms present

## 2024-09-03 NOTE — ASSESSMENT & PLAN NOTE
1. Status post transforaminal lumbar interbody fusion L5-S1 and posterolateral fusion, insertion of interbody device L5-S1, removal of broken hardware S1 pedicle screws, revision of posterior segmental instrumentation L4-S1    Patient doing well orthopedically at this stage postoperative day 5.  Continue with daily dressing changes.  May need to increase frequency if she has any drainage to not allow any moisture to sit at the operative site.  She is tolerating a p.o. diet.  She has been working with physical therapy.  Out of bed to bedside chair twice daily.  She can weightbear as tolerated on bilateral lower extremities.  Patient is stable from an orthopedic perspective.  Discharge planning per attending.  Did discuss with her and instruct her on incentive spirometry use and being up out of bed and ambulating to help reduce her pneumonia risk.    She needs aggressive respiratory therapy.    Agree with prophylactic Levaquin for pneumonia risk. She is at increased risk due to her postoperative state, smoking history, and her obesity.  Patient reports she has been afebrile overnight.  Hopefully can discharge home today.  Follow up in the outpatient clinic in 1 week with Dr. Cassidy for wound check and staple removal.

## 2024-09-03 NOTE — CARE UPDATE
09/03/24 0935   Home Oxygen Qualification   $ Home O2 Qualification Pulmonary Stress Test/6 min walk   Room Air SpO2 At Rest 96 %   Room Air SpO2 During Ambulation 91 %   SpO2 Post Ambulation 95 %   Post Ambulation Heart Rate 115 bpm

## 2024-09-03 NOTE — PT/OT/SLP PROGRESS
Occupational Therapy      Patient Name:  Loretta Lea   MRN:  [id number]    Patient not seen today secondary to Other (Comment) (Pt discharged prior to OT Tx.).    9/3/2024

## 2024-09-03 NOTE — NURSING
Pt given discharge paperwork and instructions. Education provided. Paper script given to patient. No questions or concerns voiced. No distress noted. Pt left via wheel chair per private vehicle.

## 2024-09-03 NOTE — PLAN OF CARE
Plan of care reviewed with patient and spouse, verbalized understanding. IV intact and patent with no s/s infection or infiltration noted to site. Meds given per MAR. BG monitored closely. Ambulated to bathroom with x1 assist. Pain managed with PRN medication. IS at bedside and encouraged use. NAD noted. Purposeful q2hr rounding done. Safety maintained with side rails up x3, bed wheels locked, bed in lowest position, bed alarm set, slip resistant socks maintained, and call light with in reach of patient. Patient educated to call for assistance when needed, verbalized understanding. Patient remains free from falls. No further needs expressed at this time. Will continue to monitor.     Problem: Adult Inpatient Plan of Care  Goal: Plan of Care Review  Outcome: Progressing  Goal: Patient-Specific Goal (Individualized)  Outcome: Progressing  Goal: Absence of Hospital-Acquired Illness or Injury  Outcome: Progressing  Goal: Optimal Comfort and Wellbeing  Outcome: Progressing  Goal: Readiness for Transition of Care  Outcome: Progressing     Problem: Diabetes Comorbidity  Goal: Blood Glucose Level Within Targeted Range  Outcome: Progressing     Problem: Bariatric Environmental Safety  Goal: Safety Maintained with Care  Outcome: Progressing     Problem: Wound  Goal: Optimal Coping  Outcome: Progressing  Goal: Optimal Functional Ability  Outcome: Progressing  Goal: Absence of Infection Signs and Symptoms  Outcome: Progressing  Goal: Improved Oral Intake  Outcome: Progressing  Goal: Optimal Pain Control and Function  Outcome: Progressing  Goal: Skin Health and Integrity  Outcome: Progressing  Goal: Optimal Wound Healing  Outcome: Progressing     Problem: Infection  Goal: Absence of Infection Signs and Symptoms  Outcome: Progressing     Problem: Skin Injury Risk Increased  Goal: Skin Health and Integrity  Outcome: Progressing     Problem: Fall Injury Risk  Goal: Absence of Fall and Fall-Related Injury  Outcome: Progressing      Problem: Pain Acute  Goal: Optimal Pain Control and Function  Outcome: Progressing
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
## 2024-09-03 NOTE — DISCHARGE SUMMARY
[facility]  Discharge Summary      Patient Name: Loretta Lea  MRN: [id number]  NAVEEN: [id number]  Patient Class: IP- Inpatient  Admission Date: 8/29/2024  Hospital Length of Stay: 5 days  Discharge Date and Time: 9/3/2024 11:06 AM  Attending Physician: No att. providers found   Discharging Provider: Briana Kessler MD  Primary Care Provider: Richard Ruiz FNP-C    Primary Care Team: Networked reference to record PCT     HPI:   This is a 34-year-old  female with a history of morbid obesity with BMI 48.08, chronic lower back pain, anxiety/depression,  hypertension, diabetes presenting here for elective surgery, patient had L 5-S1 lumbar fusion with Dr. Cassidy earlier today, the procedure went uneventfully.  Patient was seen by me postoperatively, patient is awake alert, AAO x3, denies any fever or chills, no nausea vomiting diarrhea, move both lower extremities, FIONA drain in place, patient is on PCA pump.  Vitals stable.  Review of the chart, patient had L4-L5, L5-S1 lumbar fusion, laminectomy with Dr. Cassidy on 4/2024, later she presented with a worsening lower back pain on 7/2024, MRI shows fluid collection at lumbar spine, evaluated by Orthopedic at the time, deemed that it was surgical seroma, patient was discharged with 7 days of p.o. doxycycline .   I was consulted for postoperative medical management.     Procedure(s) (LRB):  LAMINECTOMY, SPINE, LUMBAR, WITH FUSION (Bilateral)      Hospital Course:   34-year-old female with history of morbid obesity, hypertension, bipolar disorder, diabetes, status post L4-S1 lumbar fusion laminectomy on 4/2024, lumbar spine seroma 7/2024 now presenting for redo lumbar spine fusion, patient underwent transforaminal lumbar interbody fusion L5-S1 and posterolateral fusion, insertion of interbody device L5-S1, removal of broken hardware S1 pedicle screws, revision of posterior segmental instrumentation L4-S1 on 8/29 is  Dr. Vernon.  Required PCA pump postoperatively which was discontinued.  She had a fever postoperatively which was thought to be from atelectasis.  Ambulation and incentive spirometry were encouraged.  She was started on empiric Levaquin to cover for possible pneumonia.  Fever resolved.  Patient was discharged home once afebrile for 24 hours.  She felt well on day of discharge and did well with therapy and was more than agreeable to discharge home.  She will follow up with Dr. Vernon.  Outpatient PT/OT ordered.       Goals of Care Treatment Preferences:  Code Status: Full Code      SDOH Screening:  The patient was screened for food insecurity, housing instability, transportation needs, utility difficulties, and interpersonal safety. The social determinant(s) of health identified as a concern this admission are:  Transportation difficulties    The plan to address these concerns is:  Case management to provide support    Social Determinants of Health with Concerns     Transportation Needs: Unmet Transportation Needs (8/29/2024)        Consults:   Consults (From admission, onward)          Status Ordering Provider     Inpatient consult to Social Work  Once        Provider:  (Not yet assigned)    Completed GRISELDA VERNON            No new Assessment & Plan notes have been filed under this hospital service since the last note was generated.  Service: Hospital Medicine    Final Active Diagnoses:    Diagnosis Date Noted POA    PRINCIPAL PROBLEM:  Status post lumbar spinal fusion [Z98.1] 04/23/2024 Not Applicable    Diabetes [E11.9] 08/29/2024 Yes    Morbid obesity [E66.01] 08/18/2021 Yes    Cigarette smoker [F17.210] 04/09/2021 Yes    Bipolar disorder, in partial remission, most recent episode mixed [F31.77] 10/20/2020 Yes      Problems Resolved During this Admission:       Discharged Condition: good    Disposition: Home or Self Care    Follow Up:   Follow-up Information       Griselda Vernon MD. Go on 9/11/2024.     Specialties: Orthopedic Surgery, Surgery  Why: at 10 AM for post op visit  Contact information:  [address] [facility]  [address]  [place] [address]  [phone]               Richard Ruiz FNP-C Follow up.    Specialty: Family Medicine  Contact information:  [address] [facility]  [address]  [place] [address]  [phone]                           Patient Instructions:      Ambulatory referral/consult to Physical/Occupational Therapy   Standing Status: Future   Referral Priority: Routine Referral Type: Physical Medicine   Referral Reason: Specialty Services Required   Number of Visits Requested: 1     Notify your health care provider if you experience any of the following:  temperature >100.4     Notify your health care provider if you experience any of the following:  persistent nausea and vomiting or diarrhea     Notify your health care provider if you experience any of the following:  severe uncontrolled pain     Notify your health care provider if you experience any of the following:  redness, tenderness, or signs of infection (pain, swelling, redness, odor or green/yellow discharge around incision site)     Notify your health care provider if you experience any of the following:  persistent dizziness, light-headedness, or visual disturbances     Notify your health care provider if you experience any of the following:  increased confusion or weakness     Reason for not Prescribing Nicotine Replacement     Order Specific Question Answer Comments   Reason for not Prescribing: Patient refused      Reason for not Ordering Smoking Cessation Referral     Order Specific Question Answer Comments   Reason for not ordering: Patient refused      Activity as tolerated       Significant Diagnostic Studies: Labs: BMP:   Recent Labs   Lab 09/02/24  0443 09/03/24  0438   * 159*    139   K 3.9 3.6   CL 97 100   CO2 30* 31*   BUN 6 6   CREATININE 0.7 0.6   CALCIUM 8.5* 8.5*    and CBC   Recent Labs   Lab  09/02/24  0443 09/03/24  0438   WBC 14.43* 10.62   HGB 9.8* 9.2*   HCT 32.0* 30.7*    269       Pending Diagnostic Studies:       None           Medications:  Reconciled Home Medications:      Medication List        START taking these medications      levoFLOXacin 750 MG tablet  Commonly known as: LEVAQUIN  Take 1 tablet (750 mg total) by mouth once daily.     oxyCODONE-acetaminophen  mg per tablet  Commonly known as: PERCOCET  Take 1 tablet by mouth every 4 (four) hours as needed for Pain.            CONTINUE taking these medications      ACCU-CHEK GUIDE ME GLUCOSE MTR Misc  Generic drug: blood-glucose meter  Use to test blood sugar     blood sugar diagnostic Strp  Test blood sugar 4 (four) times daily before meals and nightly.     cetirizine 10 MG tablet  Commonly known as: ZYRTEC  Take 1 tablet (10 mg total) by mouth daily as needed for Allergies.     gabapentin 300 MG capsule  Commonly known as: NEURONTIN  Take 1 capsule (300 mg total) by mouth 3 (three) times daily.     lancets Misc  Commonly known as: ACCU-CHEK SOFTCLIX LANCETS  Test blood sugar 4 (four) times daily before meals and nightly.     losartan 25 MG tablet  Commonly known as: COZAAR  Take 1 tablet (25 mg total) by mouth once daily.     metFORMIN 500 MG ER 24hr tablet  Commonly known as: GLUCOPHAGE-XR  Take 2 tablets (1,000 mg total) by mouth 2 (two) times daily with meals.     ondansetron 4 MG tablet  Commonly known as: ZOFRAN  Take 1 tablet (4 mg total) by mouth every 8 (eight) hours as needed for Nausea.     ondansetron 4 MG Tbdl  Commonly known as: ZOFRAN-ODT  Take 4 mg by mouth every 6 (six) hours as needed.     OZEMPIC 2 mg/dose (8 mg/3 mL) Pnij  Generic drug: semaglutide  Inject 2 mg into the skin every 7 days.     pyridoxine (vitamin B6) 25 MG Tab  Commonly known as: B-6  Take 25 mg by mouth once daily.     risperiDONE 1 MG tablet  Commonly known as: RISPERDAL  Take 1 tablet (1 mg total) by mouth 2 (two) times daily.      sertraline 100 MG tablet  Commonly known as: ZOLOFT  Take 1.5 tablets (150 mg total) by mouth once daily.     sumatriptan 100 MG tablet  Commonly known as: IMITREX  Take 1 tab PO at first sign of migraine. If not effective, repeat dose in 2 hours. Do not take more than 200mg in 24 hours.     traZODone 100 MG tablet  Commonly known as: DESYREL  TAKE 1 TO 2 TABLETS BY MOUTH EVERY NIGHT AT BEDTIME AS NEEDED FOR INSOMNIA              Indwelling Lines/Drains at time of discharge:   Lines/Drains/Airways       None                   Time spent on the discharge of patient: 35 minutes         Briana Kessler MD  Department of Hospital Medicine  [facility]/Surg

## 2024-09-03 NOTE — PLAN OF CARE
Post op on AVS  Pt referred to OP therapy  Did not qualify for home o2    Pt is clear for dc from [facility]       09/03/24 1057   Final Note   Assessment Type Final Discharge Note   Anticipated Discharge Disposition Home   Hospital Resources/Appts/Education Provided Appointments scheduled and added to AVS

## 2024-09-03 NOTE — PLAN OF CARE
Problem: Adult Inpatient Plan of Care  Goal: Optimal Comfort and Wellbeing  Outcome: Met     Problem: Diabetes Comorbidity  Goal: Blood Glucose Level Within Targeted Range  Outcome: Met     Problem: Infection  Goal: Absence of Infection Signs and Symptoms  Outcome: Met     Problem: Skin Injury Risk Increased  Goal: Skin Health and Integrity  Outcome: Met

## 2024-09-03 NOTE — PROGRESS NOTES
[facility]/Surg  Orthopedics  Progress Note    Patient Name: Loretta Lea  MRN: [id number]  Admission Date: 8/29/2024  Hospital Length of Stay: 5 days  Attending Provider: Briana Kessler MD  Primary Care Provider: Richard Ruiz FNP-C  Follow-up For: Procedure(s) (LRB):  LAMINECTOMY, SPINE, LUMBAR, WITH FUSION (Bilateral)    Post-Operative Day: 5 Days Post-Op  Subjective:     Principal Problem:<principal problem not specified>    Principal Orthopedic Problem:  Lumbar fusion hardware failure    Interval History:  Status post lumbar fusion revision    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       Current Facility-Administered Medications   Medication    acetaminophen tablet 650 mg    aluminum-magnesium hydroxide-simethicone 200-200-20 mg/5 mL suspension 30 mL    bisacodyL suppository 10 mg    dextrose 10% bolus 125 mL 125 mL    dextrose 10% bolus 250 mL 250 mL    diphenhydrAMINE capsule 50 mg    docusate sodium capsule 100 mg    gabapentin capsule 300 mg    glucagon (human recombinant) injection 1 mg    glucose chewable tablet 16 g    glucose chewable tablet 24 g    HYDROmorphone (PF) injection 2 mg    ibuprofen tablet 600 mg    insulin aspart U-100 pen 0-10 Units    levoFLOXacin 750 mg/150 mL IVPB 750 mg    losartan tablet 25 mg    melatonin tablet 9 mg    nicotine 21 mg/24 hr 1 patch    ondansetron disintegrating tablet 8 mg    oxyCODONE immediate release tablet 10 mg    oxyCODONE immediate release tablet 15 mg    oxyCODONE immediate release tablet 5 mg    prochlorperazine injection Soln 5 mg    pyridoxine (vitamin B6) tablet 25 mg    risperiDONE tablet 1 mg    senna-docusate 8.6-50 mg per tablet 2 tablet    sertraline tablet 150 mg    trazodone split tablet 100 mg     Objective:     Vital Signs (Most Recent):  Temp: 98.6 °F (37 °C) (09/03/24 0256)  Pulse: 81 (09/03/24 0256)  Resp: 16 (09/03/24  "0535)  BP: 121/60 (09/03/24 0256)  SpO2: 95 % (09/03/24 0256) Vital Signs (24h Range):  Temp:  [97.6 °F (36.4 °C)-99.7 °F (37.6 °C)] 98.6 °F (37 °C)  Pulse:  [] 81  Resp:  [16-21] 16  SpO2:  [93 %-98 %] 95 %  BP: ()/(50-66) 121/60     Weight: (!) 139.2 kg (306 lb 14.1 oz)  Height: 5' 7" (170.2 cm)  Body mass index is 48.06 kg/m².      Intake/Output Summary (Last 24 hours) at 9/3/2024 0621  Last data filed at 9/3/2024 0353  Gross per 24 hour   Intake --   Output 1200 ml   Net -1200 ml        General    Constitutional: She is oriented to person, place, and time. She appears well-developed and well-nourished.   Pulmonary/Chest: Effort normal.   Neurological: She is alert and oriented to person, place, and time.   Psychiatric: She has a normal mood and affect. Her behavior is normal. Judgment and thought content normal.         Back (L-Spine & T-Spine) / Neck (C-Spine) Exam     Tenderness Right paramedian tenderness of the Lower L-Spine. Left paramedian tenderness of the Lower L-Spine.     Spinal Sensation   Right Side Sensation  L-Spine Level: normal  Left Side Sensation  L-Spine Level: normal    Comments:  Sitting up in bedside chair.  Able to arise from seated position independently without assistance.  Posterior lumbar incision healing well without wound dehiscence or active drainage.  Some surrounding skin irritation/dermatitis likely from tape adhesive.  Neurovascularly intact throughout bilateral lower extremities.  Negative Homans bilaterally.  Can dorsiflex and plantar flex bilateral ankles without difficulty.  Bilateral EHLs intact.      Muscle Strength   Right Lower Extremity   Anterior tibial:  5/5   Gastrocsoleus:  5/5   EHL:  5/5  Left Lower Extremity   Anterior tibial:  5/5   Gastrocsoleus:  5/5   EHL:  5/5       Significant Labs: None    Significant Imaging: None  Assessment/Plan:     Status post lumbar spinal fusion  1. Status post transforaminal lumbar interbody fusion L5-S1 and " posterolateral fusion, insertion of interbody device L5-S1, removal of broken hardware S1 pedicle screws, revision of posterior segmental instrumentation L4-S1    Patient doing well orthopedically at this stage postoperative day 5.  Continue with daily dressing changes.  May need to increase frequency if she has any drainage to not allow any moisture to sit at the operative site.  She is tolerating a p.o. diet.  She has been working with physical therapy.  Out of bed to bedside chair twice daily.  She can weightbear as tolerated on bilateral lower extremities.  Patient is stable from an orthopedic perspective.  Discharge planning per attending.  Did discuss with her and instruct her on incentive spirometry use and being up out of bed and ambulating to help reduce her pneumonia risk.    She needs aggressive respiratory therapy.    Agree with prophylactic Levaquin for pneumonia risk. She is at increased risk due to her postoperative state, smoking history, and her obesity.  Patient reports she has been afebrile overnight.  Hopefully can discharge home today.  Follow up in the outpatient clinic in 1 week with Dr. Cassidy for wound check and staple removal.          Aidan Alvares PA-C  Orthopedics  [facility]/Surg

## 2024-09-04 ENCOUNTER — TELEPHONE (OUTPATIENT)
Dept: FAMILY MEDICINE | Facility: CLINIC | Age: 34
End: 2024-09-04
Payer: MEDICAID

## 2024-09-05 LAB
BACTERIA BLD CULT: NORMAL

## 2024-09-16 ENCOUNTER — HOSPITAL ENCOUNTER (OUTPATIENT)
Dept: RADIOLOGY | Facility: HOSPITAL | Age: 34
Discharge: HOME | End: 2024-09-16
Attending: ORTHOPAEDIC SURGERY
Payer: MEDICAID

## 2024-09-16 ENCOUNTER — OFFICE VISIT (OUTPATIENT)
Dept: ORTHOPEDICS | Facility: CLINIC | Age: 34
End: 2024-09-16
Payer: MEDICAID

## 2024-09-16 VITALS — WEIGHT: 293 LBS | BODY MASS INDEX: 45.99 KG/M2 | HEIGHT: 67 IN

## 2024-09-16 DIAGNOSIS — Z98.1 S/P LUMBAR SPINAL FUSION: Primary | ICD-10-CM

## 2024-09-16 PROCEDURE — 99213 OFFICE O/P EST LOW 20 MIN: CPT | Mod: PBBFAC,25,PN | Performed by: ORTHOPAEDIC SURGERY

## 2024-09-16 PROCEDURE — 1159F MED LIST DOCD IN RCRD: CPT | Mod: CPTII,,, | Performed by: ORTHOPAEDIC SURGERY

## 2024-09-16 PROCEDURE — 72100 X-RAY EXAM L-S SPINE 2/3 VWS: CPT | Mod: 26,,, | Performed by: RADIOLOGY

## 2024-09-16 PROCEDURE — 99999 PR PBB SHADOW E&M-EST. PATIENT-LVL III: CPT | Mod: PBBFAC,,, | Performed by: ORTHOPAEDIC SURGERY

## 2024-09-16 PROCEDURE — 3044F HG A1C LEVEL LT 7.0%: CPT | Mod: CPTII,,, | Performed by: ORTHOPAEDIC SURGERY

## 2024-09-16 PROCEDURE — 4010F ACE/ARB THERAPY RXD/TAKEN: CPT | Mod: CPTII,,, | Performed by: ORTHOPAEDIC SURGERY

## 2024-09-16 PROCEDURE — 99024 POSTOP FOLLOW-UP VISIT: CPT | Mod: ,,, | Performed by: ORTHOPAEDIC SURGERY

## 2024-09-16 PROCEDURE — 1160F RVW MEDS BY RX/DR IN RCRD: CPT | Mod: CPTII,,, | Performed by: ORTHOPAEDIC SURGERY

## 2024-09-16 PROCEDURE — 72100 X-RAY EXAM L-S SPINE 2/3 VWS: CPT | Mod: TC,PN

## 2024-09-16 RX ORDER — OXYCODONE AND ACETAMINOPHEN 7.5; 325 MG/1; MG/1
1 TABLET ORAL EVERY 6 HOURS PRN
Qty: 28 TABLET | Refills: 0 | Status: SHIPPED | OUTPATIENT
Start: 2024-09-16 | End: 2024-09-23

## 2024-09-16 NOTE — PROGRESS NOTES
Subjective:    Patient ID: Loretta Lea is a 34 y.o. female.    Chief Complaint: Post-op Evaluation of the Lumbar Spine (Patient is here for a PO f/u on L4-S1 reinsertion of hardware, L5-S1 TLIF/PLF 8.29.24. States pain has stayed about the same since last visit. Has a occasional burning and shooting pain )      History of Present Illness    Prior to meeting with the patient I reviewed the medical chart in [facility]. This included reviewing the previous progress notes from our office, review of the patient's last appointment with their primary care provider, review of any visits to the emergency room, and review of any pain management appointments or procedures.  Postop visit for revision lumbar surgery transforaminal lumbar interbody fusion L5-S1 patient doing very well pain is significantly improved the numbness in her left leg noted prior to surgery has resolved    Current Medications  Current Outpatient Medications   Medication Sig Dispense Refill    blood sugar diagnostic Strp Test blood sugar 4 (four) times daily before meals and nightly. 200 each 5    blood-glucose meter (ACCU-CHEK GUIDE GLUCOSE METER) Misc Use to test blood sugar 1 each 0    cetirizine (ZYRTEC) 10 MG tablet Take 1 tablet (10 mg total) by mouth daily as needed for Allergies. 30 tablet 0    gabapentin (NEURONTIN) 300 MG capsule Take 1 capsule (300 mg total) by mouth 3 (three) times daily. 90 capsule 0    lancets (ACCU-CHEK SOFTCLIX LANCETS) Misc Test blood sugar 4 (four) times daily before meals and nightly. 200 each 5    levoFLOXacin (LEVAQUIN) 750 MG tablet Take 1 tablet (750 mg total) by mouth once daily. 2 tablet 0    losartan (COZAAR) 25 MG tablet Take 1 tablet (25 mg total) by mouth once daily. 30 tablet 5    metFORMIN (GLUCOPHAGE-XR) 500 MG ER 24hr tablet Take 2 tablets (1,000 mg total) by mouth 2 (two) times daily with meals. 120 tablet 2    ondansetron (ZOFRAN) 4 MG tablet Take 1 tablet (4 mg total) by mouth every 8 (eight) hours as  needed for Nausea. 21 tablet 0    ondansetron (ZOFRAN-ODT) 4 MG TbDL Take 4 mg by mouth every 6 (six) hours as needed.      OZEMPIC 2 mg/dose (8 mg/3 mL) PnIj Inject 2 mg into the skin every 7 days.      pyridoxine, vitamin B6, (B-6) 25 MG Tab Take 25 mg by mouth once daily.      risperiDONE (RISPERDAL) 1 MG tablet Take 1 tablet (1 mg total) by mouth 2 (two) times daily. 60 tablet 5    sertraline (ZOLOFT) 100 MG tablet Take 1.5 tablets (150 mg total) by mouth once daily. 135 tablet 0    sumatriptan (IMITREX) 100 MG tablet Take 1 tab PO at first sign of migraine. If not effective, repeat dose in 2 hours. Do not take more than 200mg in 24 hours. 12 tablet 1    traZODone (DESYREL) 100 MG tablet TAKE 1 TO 2 TABLETS BY MOUTH EVERY NIGHT AT BEDTIME AS NEEDED FOR INSOMNIA 60 tablet 5     No current facility-administered medications for this visit.       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       Past Medical History  Past Medical History:   Diagnosis Date    Anxiety     Back pain     Bipolar disorder 2004    bipolar 2    Chronic bronchitis     Depression     Diabetes mellitus     GERD (gastroesophageal reflux disease)     HPV (human papilloma virus) infection     Insomnia     Migraines     Non-proliferative diabetic retinopathy, mild, both eyes 09/25/2023    Obese body habitus     Ovarian cyst     Pneumonia     PONV (postoperative nausea and vomiting)        Surgical History  Past Surgical History:   Procedure Laterality Date    ANTERIOR LUMBAR INTERBODY FUSION (ALIF) Bilateral 4/23/2024    Procedure: FUSION, SPINE, LUMBAR, ALIF;  Surgeon: Gurjit Cassidy MD;  Location: [facility] OR;  Service: Orthopedics;  Laterality: Bilateral;    APPENDECTOMY      ARTHROSCOPY OF ANKLE Right 05/04/2022    Procedure: ARTHROSCOPY, ANKLE;  Surgeon: Bimal Mccormick DPM;  Location: [facility] OR;  Service: Podiatry;  Laterality: Right;  CURT  EXTERNAL ANKLE DISTRACTOR    CHOLECYSTECTOMY      DILATION AND CURETTAGE OF UTERUS      HYSTERECTOMY  2017    total, ovarian cysts, torsion, [place]; hyst per Dr JESUS Manriquez    LAPAROSCOPIC APPENDECTOMY N/A 08/18/2021    Procedure: APPENDECTOMY, LAPAROSCOPIC;  Surgeon: Osiel Ramirez MD;  Location: [facility];  Service: General;  Laterality: N/A;    LUMBAR LAMINECTOMY WITH FUSION Bilateral 4/23/2024    Procedure: LAMINECTOMY, SPINE, LUMBAR, WITH FUSION;  Surgeon: Gurjit Cassidy MD;  Location: [facility] OR;  Service: Orthopedics;  Laterality: Bilateral;    LUMBAR LAMINECTOMY WITH FUSION Bilateral 8/29/2024    Procedure: LAMINECTOMY, SPINE, LUMBAR, WITH FUSION;  Surgeon: Gurjit Cassidy MD;  Location: [facility] OR;  Service: Orthopedics;  Laterality: Bilateral;  NTI, [employer]TR[employer]    OOPHORECTOMY      opherectom Left 2015    salpingo-opherectomy    REPAIR OF LIGAMENT OF ANKLE Right 06/23/2021    Procedure: REPAIR OF ANTERIOR TALOFIBULAR LIGAMENT CALCANEOFIBULAR LIGAMENT;  Surgeon: Bimal Mccormick DPM;  Location: [facility];  Service: Podiatry;  Laterality: Right;  WITH ARTHREX INTERNAL BRACE    REPAIR OF LIGAMENT OF ANKLE Right 4/27/2023    Procedure: REPAIR, LIGAMENT, ANKLE;  Surgeon: Aryan Poole DPM;  Location: [facility];  Service: Podiatry;  Laterality: Right;    REPAIR OF TENDON OF LOWER EXTREMITY Right 4/27/2023    Procedure: REPAIR, TENDON, LOWER EXTREMITY;  Surgeon: Aryan Poole DPM;  Location: [facility];  Service: Podiatry;  Laterality: Right;  arthrex    SURGICAL REMOVAL OF BONE SPUR Right 06/23/2021    Procedure: EXCISION OS TRIGONUM;  Surgeon: Bimal Mccormick DPM;  Location: [facility];  Service: Podiatry;  Laterality: Right;    WISDOM TOOTH EXTRACTION         Family History:   Family History   Adopted: Yes   Problem Relation Name Age of Onset    No Known Problems Sister      No Known Problems Sister      No Known Problems Sister         Social History:   Social History     Socioeconomic History    Marital status:      Number of children: 0   Tobacco Use    Smoking status: Every Day     Current packs/day: 1.50     Average packs/day: 1.5 packs/day for 18.0 years (27.0 ttl pk-yrs)     Types: Cigarettes     Passive exposure: Current    Smokeless tobacco: Never    Tobacco comments:     4/25/23--pt instructed no smoking 24hours before sx, pt voiced understanding.   Substance and Sexual Activity    Alcohol use: Not Currently     Comment: rare    Drug use: Not Currently     Comment: twice    Sexual activity: Yes     Partners: Male     Birth control/protection: OCP, None     Social Determinants of Health     Financial Resource Strain: Medium Risk (8/29/2024)    Overall Financial Resource Strain (CARDIA)     Difficulty of Paying Living Expenses: Somewhat hard   Food Insecurity: No Food Insecurity (8/29/2024)    Hunger Vital Sign     Worried About Running Out of Food in the Last Year: Never true     Ran Out of Food in the Last Year: Never true   Transportation Needs: Unmet Transportation Needs (8/29/2024)    TRANSPORTATION NEEDS     Transportation : Yes, it has kept me from non-medical meetings, appointments, work or from getting things that I need.   Physical Activity: Inactive (8/29/2024)    Exercise Vital Sign     Days of Exercise per Week: 0 days     Minutes of Exercise per Session: 0 min   Stress: Patient Declined (8/29/2024)    Citizen of [place] [employer] - Occupational Stress Questionnaire     Feeling of Stress : Patient declined   Housing Stability: Low Risk  (8/29/2024)    Housing Stability Vital Sign     Unable to Pay for Housing in the Last Year: No     Homeless in the Last Year: No       Date of surgery:     Review of Systems     General/Constitutional: Chills denies. Fatigue denies. Fever denies. Weight gain denies. Weight loss denies.    Musculoskeletal: Comments: See HPI for details    Skin: Rash denies.    Objective:   Vital Signs: There were no vitals filed for this visit.     Physical Exam    This a  well-developed, well nourished patient in no acute distress.  They are alert and oriented and cooperative to examination.  Pt. walks without an antalgic gait.      General Examination:     Constitutional: The patient is alert and oriented to lace person and time. Mood is pleasant.     Head/Face: Normal facial features normal eyebrows    Eyes: Normal extraocular motion bilaterally    Lungs: Respirations are equal and unlabored    Gait is coordinated.    Cardiovascular: There are no swelling or varicosities present.    Lymphatic: Negative for adenopathy    Skin: Normal    Neurological: Level of consciousness normal. Oriented to place person and time and situation    Psychiatric: Oriented to time place person and situation    Incisions well healed no tenderness no erythema motor exam grossly intact both lower extremities  XRAY Report/ Interpretation:  AP and lateral x-rays of the lumbar spine will performed today. Indications postop lumbar spine surgery. Findings:  Instrumented lumbar fusion L4-S1 retained broken screw left S1 pedicle noted.  Interbody device L5-S1 which is new      Assessment:       1. S/P lumbar spinal fusion        Plan:       Loretta was seen today for post-op evaluation.    Diagnoses and all orders for this visit:    S/P lumbar spinal fusion  -     X-Ray Lumbar Spine Ap And Lateral         No follow-ups on file.    Patient doing very well following revision surgery she is quite pleased we will begin tapering her pain medication after present prescription she is scheduled to start physical therapy to the lumbar spine soon return 3-4 weeks x-rays lumbar spine  Treatment options were discussed with regards to the nature of the medical condition. Conservative pain intervention and surgical options were discussed in detail. The probability of success of each separate treatment option was discussed. The patient expressed a clear understanding of the treatment options. With regards to surgery, the  procedure risk, benefits, complications, and outcomes were discussed. No guarantees were given with regards to surgical outcome.   The risk of complications, morbidity, and mortality of patient management decisions have been made at the time of this visit. These are associated with the patient's problems, diagnostic procedures and treatment options. This includes the possible management options selected and those considered but not selected by the patient after shared medical decision making we discussed with the patient.     This note was created using Dragon voice recognition software that occasionally misinterpreted phrases or words.

## 2024-09-18 ENCOUNTER — CLINICAL SUPPORT (OUTPATIENT)
Dept: REHABILITATION | Facility: HOSPITAL | Age: 34
End: 2024-09-18
Attending: HOSPITALIST
Payer: MEDICAID

## 2024-09-18 DIAGNOSIS — M43.16 SPONDYLOLISTHESIS, LUMBAR REGION: ICD-10-CM

## 2024-09-18 DIAGNOSIS — R26.9 GAIT ABNORMALITY: Primary | ICD-10-CM

## 2024-09-18 DIAGNOSIS — R29.898 DECREASED STRENGTH OF LOWER EXTREMITY: ICD-10-CM

## 2024-09-18 DIAGNOSIS — R29.898 IMPAIRED FLEXIBILITY OF LOWER EXTREMITY: ICD-10-CM

## 2024-09-18 DIAGNOSIS — Z98.1 STATUS POST LUMBAR SPINAL FUSION: ICD-10-CM

## 2024-09-18 PROCEDURE — 97110 THERAPEUTIC EXERCISES: CPT | Mod: PN

## 2024-09-18 PROCEDURE — 97161 PT EVAL LOW COMPLEX 20 MIN: CPT | Mod: PN

## 2024-09-18 NOTE — PLAN OF CARE
[facility]  Physical Therapy Initial Evaluation    Name: Loretta Lea  Clinic Number: [id number]    Therapy Diagnosis:   Encounter Diagnoses   Name Primary?    Spondylolisthesis, lumbar region     Gait abnormality Yes    Impaired flexibility of lower extremity     Status post lumbar spinal fusion     Decreased strength of lower extremity      Physician: Briana Kessler MD   Physician Orders: PT Eval and Treat  Medical Diagnosis from Referral: M43.16 (ICD-10-CM) - Spondylolisthesis, lumbar region  Evaluation Date: 9/18/2024  Authorization Period Expiration: 09/03/2025  Plan of Care Expiration: 11/30/2024    Progress Update: 10/18/2024  FOTO: 1 / 3   Visit # / Visits authorized: 1 / 1       PRECAUTIONS: Standard Precautions and Orthopedic: s/p L4-S1 Lumbar fusion revision DOS:  8/29/2024     Time In: 0750  (Pnt arrived early)  Time Out: 0830  Total Appointment Time (timed & untimed codes): 40 minutes    SUBJECTIVE     Chief complaint:  s/p L4-S1 fusion revision   DOS:  8/29/2024    History of current condition:   s/p L4-S1 fusion revision   DOS:  8/29/2024:  States that she started having radicular pain down her L leg again which caused her to go back to her surgeon.  States that she had a broken screw that had to be removed and replaced.   States that radicular pain has resolved since the last surgery.    States that she was instructed to wear her lumbar brace for comfort with no restrictions.    Previous episode:  s/p L4-S1 fusion   DOS:  4/23/2024    Aggravating Factors:  prologned walking > 15 mins  Easing Factors:  oxycodone    Imaging:      Impression:     1. Interval surgical changes as discussed above without evidence of an acute process.        Electronically signed by:Hamzah Ayala  Date:                                            09/16/2024  Time:                                           10:46    Prior Therapy: Yes  Social History: Pt lives with their spouse  Occupation: Pt is  unemployed.  Prior Level of Function: Independent with all ADLs  Current Level of Function: 50% of PLOF    Pain:  Current 5 /10, worst 7 /10, best 5 /10   Description: Throbbing and Sharp    Pts goals: Pt reported goal is to be able to walk without pain.    _______________________________________________________  Medical History:   Past Medical History:   Diagnosis Date    Anxiety     Back pain     Bipolar disorder 2004    bipolar 2    Chronic bronchitis     Depression     Diabetes mellitus     GERD (gastroesophageal reflux disease)     HPV (human papilloma virus) infection     Insomnia     Migraines     Non-proliferative diabetic retinopathy, mild, both eyes 09/25/2023    Obese body habitus     Ovarian cyst     Pneumonia     PONV (postoperative nausea and vomiting)        Surgical History:   Loretta Lea  has a past surgical history that includes Cholecystectomy; Dilation and curettage of uterus; Oophorectomy; opherectom (Left, 2015); Hysterectomy (2017); Repair of ligament of ankle (Right, 06/23/2021); Surgical removal of bone spur (Right, 06/23/2021); Laparoscopic appendectomy (N/A, 08/18/2021); [place] tooth extraction; Arthroscopy of ankle (Right, 05/04/2022); Appendectomy; Repair of tendon of lower extremity (Right, 4/27/2023); Repair of ligament of ankle (Right, 4/27/2023); Anterior lumbar interbody fusion (ALIF) (Bilateral, 4/23/2024); Lumbar laminectomy with fusion (Bilateral, 4/23/2024); and Lumbar laminectomy with fusion (Bilateral, 8/29/2024).    Medications:   Loretta has a current medication list which includes the following prescription(s): blood sugar diagnostic, blood-glucose meter, cetirizine, gabapentin, lancets, levofloxacin, losartan, metformin, ondansetron, ondansetron, oxycodone-acetaminophen, ozempic, pyridoxine (vitamin b6), risperidone, sertraline, sumatriptan, and trazodone.    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        OBJECTIVE   (x = not tested due to pain and/or inability to obtain test position)    RANGE OF MOTION:    Lumbar AROM/PROM Right  (spine)  9/18/2024 Left    9/18/2024 Goal   Lumbar Flexion (60) NT --- 55     Lumbar Extension (30) NT --- 30     Lumbar Side Bending (25) NT NT 20     Lumbar Rotation NT NT 45         Hip AROM/PROM Right  9/18/2024 Left  9/18/2024 Goal   Hip Flexion (120) wfl wfl 115     Hip IR (45) 38 36 c pain 40     Hip ER (45) wfl W 40         Knee AROM/PROM Right  9/18/2024 Left  9/18/2024 Goal   Hyper - Zero - Flexion wfl wfl 0-0-135         STRENGTH:    L/E MMT Right  9/18/2024 Goal   Hip Flexion  4-/5 5/5 B    Hip Extension  4-/5 5/5 B   Hip Abduction  4-/5 5/5 B   Hip IR 4-/5 5/5 B   Hip ER 4-/5 5/5 B   Knee Flexion 4/5 5/5 B   Knee Extension 4/5 5/5 B   Ankle DF 4/5 5/5 B   Ankle PF 4/5 5/5 B   Ankle Inversion 4/5 5/5 B   Ankle Eversion 4/5 5/5 B   Big Toe Extension 4/5 5/5 B     L/E MMT Left  9/18/2024 Goal   Hip Flexion  3+/5 5/5 B    Hip Extension  3+/5 5/5 B   Hip Abduction  3+/5 5/5 B   Hip IR 3+/5 5/5 B   Hip ER 3+/5 5/5 B   Knee Flexion 4-/5 5/5 B   Knee Extension 4-/5 5/5 B   Ankle DF 4-/5 5/5 B   Ankle PF 4-/5 5/5 B   Ankle Inversion 4-/5 5/5 B   Ankle Eversion 4-/5 5/5 B   Big Toe Extension 4-/5 5/5 B       MUSCLE LENGTH:     Muscle Tested  Right  9/18/2024 Left   9/18/2024 Goal   Hip Flexors  decreased decreased Normal B   Quadriceps normal normal Normal B   Hamstrings  decreased decreased Normal B   Piriformis  decreased decreased Normal B   Gastrocnemius  decreased decreased Normal B   Soleus  decreased decreased Normal B     Sensation:  Sensation is intact to light touch    Palpation:  No TTP.    Posture:  Pt presents with postural abnormalities which include: forward head and rounded shoulders    Gait Analysis: The patient ambulated with the following assistive device: none; the pt presents with the following gait abnormalities: decreased  step length, ishmael, and arm swing; increased forward flexed posture, trunk sway     Movement Analysis:   Functional Test  Outcome   Double Leg Squat 1/4 squat with pain   Single Leg Step Down NT           TREATMENT   Total Treatment time separate from Evaluation: (10) minutes    Loretta received therapeutic exercises to develop strength, endurance, ROM, flexibility, and posture for (10) minutes including: x = exercises performed     TherEx 9/18/2024    Glute squeezes x 3x10   Seated marches x 3x10   LAQs x 3x10   SLRs x 3x10     Plan for Next Visit:     Nustep x 10 mins   SLRs  2x10 Bilateral   TrA sets with PB 2x10     Supine heel slides  x 20 B  BKFOs green theraband 2x10 Bilateral      Hip adduction with ball  3x10  Bridges 2x10  LAQs  2x10 B   SAQs  2#  2x10 B     Standing gastroc stretch 3x30 secs Bilateral   Seated marches  x 20  1#  Bilateral         Seated HR/TR   3x10  Scapular squeezes  3x10  Standing hip abduction with support x 20 Bilateral  UBE 2/2       Home Exercises and Patient Education Provided    Education/Self-Care provided:  Patient educated on the impairments noted above and the effects of physical therapy intervention to improve overall condition and quality of life.   Patient was educated on all the above exercise prior/during/after for proper posture, positioning, and execution for safe performance with home exercise program.     Written Home Exercises Provided: yes. Prefers: Electronic Copies  Exercises were reviewed and Loretta was able to demonstrate them prior to the end of the session.  Loretta demonstrated good understanding of the education provided.     See EMR under Patient Instructions for exercises provided during therapy sessions.    ASSESSMENT   Loretta is a 34 y.o. female referred to outpatient Physical Therapy with a medical diagnosis of M43.16 (ICD-10-CM) - Spondylolisthesis, lumbar region. Loretta presents with clinical signs and symptoms that support this diagnosis with  "decreased Lumbar ROM, decreased lower extremity strength, Lumbar joint(s) hypomobility, lower extremity neural tension, and impaired functional mobility. Radicular symptoms are not present.    The above impairments will be addressed through manual therapy techniques, therapeutic exercises, functional training, and modalities as necessary. Patient was treated and educated on exercises for home program, progression of therapy, and benefits of therapy to achieve full functional mobility. Patient will benefit from skilled physical therapy to meet short and long term goals and return to prior level of function.    Pt prognosis is Good.   Pt will benefit from skilled outpatient Physical Therapy to address the deficits stated above and in the chart below, provide pt/family education, and to maximize pt's level of independence.     Plan of care discussed with patient: Yes  Pt's spiritual, cultural and educational needs considered and patient is agreeable to the plan of care and goals as stated below:     Anticipated Barriers for therapy: none    Medical Necessity is demonstrated by the following:   History  Co-morbidities and personal factors that may impact the plan of care Co-morbidities:   high BMI    Personal Factors:   no deficits     low   Examination  Body Structures and Functions, activity limitations and participation restrictions that may impact the plan of care Body Regions:   back    Body Systems:    gross symmetry  ROM  strength  gross coordinated movement  balance  gait  motor control  motor learning    Participation Restrictions:   See above in "Current Level of Function"     Activity limitations:   Learning and applying knowledge  no deficits    General Tasks and Commands  no deficits    Communication  no deficits    Mobility  no deficits    Self care  no deficits    Domestic Life  no deficits    Interactions/Relationships  no deficits    Life Areas  no deficits    Community and Social Life  community " life  recreation and leisure  no deficits         low   Clinical Presentation stable and uncomplicated low   Decision Making/ Complexity Score: low       GOALS:  SHORT TERM GOALS: 4 weeks 9/18/2024   Recent signs and systems trend is improving in order to progress towards LTG's.    Patient will be independent with HEP in order to further progress and return to maximal function.    Pain rating at Worst: 5/10 in order to progress towards increased independence with activity.    Patient will be able to correct postural deviations in sitting and standing, to decrease pain and promote postural awareness for injury prevention.       LONG TERM GOALS: 8 weeks 9/18/2024   Patient will return to normal ADL, recreational, and work related activities with less pain and limitation.     Patient will improve AROM to stated goals in order to return to maximal functional potential.     Patient will improve Strength to stated goals of appropriate musculature in order to improve functional independence.     Pain Rating at Best: 1/10 to improve Quality of Life.     Patient will meet predicted functional outcome (FOTO) score: 80% to increase self-worth & perceived functional ability.    Patient will have met/partially met personal goal of being able to walk without pain          PLAN   Plan of care Certification: 9/18/2024 to 11/30/2024    Outpatient Physical Therapy 2 times weekly for 6 weeks to include any combination of the following interventions: virtual visits, dry needling, modalities, electrical stimulation (IFC, Pre-Mod, Attended with Functional Dry Needling), Manual Therapy, Moist Heat/ Ice, Neuromuscular Re-ed, Patient Education, Self Care, Therapeutic Exercise, Functional Training, and Therapeutic Activites     Thank you for this referral.    These services are reasonable and necessary for the conditions set forth above while under my care.    Shimon Byrd, PT, DPT

## 2024-09-23 ENCOUNTER — CLINICAL SUPPORT (OUTPATIENT)
Dept: REHABILITATION | Facility: HOSPITAL | Age: 34
End: 2024-09-23
Payer: MEDICAID

## 2024-09-23 DIAGNOSIS — R26.9 GAIT ABNORMALITY: Primary | ICD-10-CM

## 2024-09-23 DIAGNOSIS — R29.898 DECREASED STRENGTH OF LOWER EXTREMITY: ICD-10-CM

## 2024-09-23 DIAGNOSIS — R29.898 IMPAIRED FLEXIBILITY OF LOWER EXTREMITY: ICD-10-CM

## 2024-09-23 DIAGNOSIS — Z98.1 STATUS POST LUMBAR SPINAL FUSION: ICD-10-CM

## 2024-09-23 PROCEDURE — 97110 THERAPEUTIC EXERCISES: CPT | Mod: PN

## 2024-09-23 NOTE — PROGRESS NOTES
"[facility] / [facility]  Physical Therapy Daily Treatment Note     Name: Loretta Lea  Clinic Number: [id number]    Therapy Diagnosis:   Encounter Diagnoses   Name Primary?    Gait abnormality Yes    Impaired flexibility of lower extremity     Status post lumbar spinal fusion     Decreased strength of lower extremity      Physician: Briana Kessler MD    Visit Date: 9/23/2024         Physician Orders: PT Eval and Treat  Medical Diagnosis from Referral: M43.16 (ICD-10-CM) - Spondylolisthesis, lumbar region  Evaluation Date: 9/18/2024  Authorization Period Expiration: 09/03/2025  Plan of Care Expiration: 11/30/2024                          Progress Update: 10/18/2024                      FOTO: 1 / 3   Visit # / Visits authorized: 1 / 12      Time In: 0715  Time Out: 0810  Total Billable Time: 55 minutes    Precautions: Standard and s/p Lumbar fusion revision   DOS:  8/29/2024  Insurance: Payor: MEDICAID / Plan: [facility] ([employer]) / Product Type: Managed Medicaid /     Subjective     Pt reports: that she has some R hip pain today.  She was not compliant with home exercise program.  Response to previous treatment: n/a  Functional change: n/a    Pain: 5/10  Location: right back      Objective     Loretta received therapeutic exercises to develop strength, endurance, ROM, flexibility, posture, and core stabilization for 53 minutes including:    Nustep x 10 mins   SLRs  2x10 Bilateral   TrA sets with PB 2x10     Supine heel slides  x 20 B  BKFOs red theraband 2x10 Bilateral      Hip adduction with ball  3x10  Bridges 2x10  LAQs  2x10 B   SAQs  2#  2x10 B - np     Standing gastroc stretch 3x30 secs Bilateral   Seated marches  x 20  1#  Bilateral - np     Seated HR/TR   3x10  Scapular squeezes  3x10  Standing hip abduction with support x 20 Bilateral  UBE 2/2  Step ups on 6" step   x10 B    Home Exercises Provided and Patient Education Provided     Education provided:   - surgical " precautions.    Written Home Exercises Provided: yes.  Exercises were reviewed and Loretta was able to demonstrate them prior to the end of the session.  Loretta demonstrated good  understanding of the education provided.     See EMR under Patient Instructions for exercises provided prior visit.    Assessment     Loretta presents with decreased B LOWER EXTREMITY strength.  Able to tolerate all therex.  Will continue to progress with therapy.    Loretta is progressing well towards her goals.   Pt prognosis is Good.     Pt will continue to benefit from skilled outpatient physical therapy to address the deficits listed in the problem list box on initial evaluation, provide pt/family education and to maximize pt's level of independence in the home and community environment.     Pt's spiritual, cultural and educational needs considered and pt agreeable to plan of care and goals.    Anticipated barriers to physical therapy: None    Goals:     SHORT TERM GOALS: 4 weeks 9/23/2024     Recent signs and systems trend is improving in order to progress towards LTG's. ongoing   Patient will be independent with HEP in order to further progress and return to maximal function. ongoing   Pain rating at Worst: 5/10 in order to progress towards increased independence with activity. ongoing   Patient will be able to correct postural deviations in sitting and standing, to decrease pain and promote postural awareness for injury prevention.  ongoing      LONG TERM GOALS: 8 weeks ongoing   Patient will return to normal ADL, recreational, and work related activities with less pain and limitation.  ongoing   Patient will improve AROM to stated goals in order to return to maximal functional potential.  ongoing   Patient will improve Strength to stated goals of appropriate musculature in order to improve functional independence.  ongoing   Pain Rating at Best: 1/10 to improve Quality of Life.  ongoing   Patient will meet predicted functional  outcome (FOTO) score: 80% to increase self-worth & perceived functional ability. ongoing   Patient will have met/partially met personal goal of being able to walk without pain ongoing       Plan     Continue POC as previously stated.    Shimon Byrd, PT

## 2024-09-25 ENCOUNTER — CLINICAL SUPPORT (OUTPATIENT)
Dept: REHABILITATION | Facility: HOSPITAL | Age: 34
End: 2024-09-25
Payer: MEDICAID

## 2024-09-25 DIAGNOSIS — R29.898 DECREASED STRENGTH OF LOWER EXTREMITY: ICD-10-CM

## 2024-09-25 DIAGNOSIS — M62.89 MUSCLE TIGHTNESS: ICD-10-CM

## 2024-09-25 DIAGNOSIS — Z98.1 STATUS POST LUMBAR SPINAL FUSION: ICD-10-CM

## 2024-09-25 DIAGNOSIS — R53.1 DECREASED STRENGTH: Primary | ICD-10-CM

## 2024-09-25 DIAGNOSIS — R29.898 IMPAIRED FLEXIBILITY OF LOWER EXTREMITY: ICD-10-CM

## 2024-09-25 DIAGNOSIS — R26.9 GAIT ABNORMALITY: ICD-10-CM

## 2024-09-25 DIAGNOSIS — R26.89 DECREASED FUNCTIONAL MOBILITY: ICD-10-CM

## 2024-09-25 DIAGNOSIS — M25.60 DECREASED RANGE OF MOTION: ICD-10-CM

## 2024-09-25 PROCEDURE — 97110 THERAPEUTIC EXERCISES: CPT | Mod: PN

## 2024-09-25 NOTE — PROGRESS NOTES
[facility] / [facility]  Physical Therapy Daily Treatment Note     Name: Loretta Lea  Clinic Number: [id number]    Therapy Diagnosis:   Encounter Diagnoses   Name Primary?    Decreased strength Yes    Muscle tightness     Decreased functional mobility     Decreased range of motion     Gait abnormality     Impaired flexibility of lower extremity     Status post lumbar spinal fusion     Decreased strength of lower extremity      Physician: Briana Kessler MD    Visit Date: 9/25/2024         Physician Orders: PT Eval and Treat  Medical Diagnosis from Referral: M43.16 (ICD-10-CM) - Spondylolisthesis, lumbar region  Evaluation Date: 9/18/2024  Authorization Period Expiration: 09/03/2025  Plan of Care Expiration: 11/30/2024                          Progress Update: 10/18/2024                      FOTO: 1 / 3   Visit # / Visits authorized: 2 / 12      Time In: 0935  (Pnt arrived early)  Time Out: 1030  Total Billable Time: 55 minutes    Precautions: Standard and s/p Lumbar fusion revision   DOS:  8/29/2024  Insurance: Payor: MEDICAID / Plan: [facility] ([employer]) / Product Type: Managed Medicaid /     Subjective     Pt reports: that she is in some pain today.  She was not compliant with home exercise program.  Response to previous treatment: n/a  Functional change: n/a    Pain: 6/10  Location: right back      Objective     Loretta received therapeutic exercises to develop strength, endurance, ROM, flexibility, posture, and core stabilization for 53 minutes including:    Nustep x 10 mins   SLRs  2x10 Bilateral   TrA sets with PB 2x10     Supine heel slides  x 20 B  BKFOs red theraband 2x10 Bilateral      Hip adduction with ball  3x10  Bridges 2x10  LAQs  2x10 B   SAQs  2#  2x10 B - np     Standing gastroc stretch 3x30 secs Bilateral   Seated marches  x 20  1#  Bilateral - np     Seated HR/TR   3x10  Scapular squeezes  3x10  Standing hip abduction with support x 20 Bilateral  UBE  "2/2  Step ups on 6" step   x10 B    Home Exercises Provided and Patient Education Provided     Education provided:   - surgical precautions.    Written Home Exercises Provided: yes.  Exercises were reviewed and Loretta was able to demonstrate them prior to the end of the session.  Loretta demonstrated good  understanding of the education provided.     See EMR under Patient Instructions for exercises provided prior visit.    Assessment     Loretta presents with decreased B LOWER EXTREMITY strength.  Able to tolerate all therex.  Will continue to progress with therapy.    Loretta is progressing well towards her goals.   Pt prognosis is Good.     Pt will continue to benefit from skilled outpatient physical therapy to address the deficits listed in the problem list box on initial evaluation, provide pt/family education and to maximize pt's level of independence in the home and community environment.     Pt's spiritual, cultural and educational needs considered and pt agreeable to plan of care and goals.    Anticipated barriers to physical therapy: None    Goals:     SHORT TERM GOALS: 4 weeks 9/25/2024     Recent signs and systems trend is improving in order to progress towards LTG's. ongoing   Patient will be independent with HEP in order to further progress and return to maximal function. ongoing   Pain rating at Worst: 5/10 in order to progress towards increased independence with activity. ongoing   Patient will be able to correct postural deviations in sitting and standing, to decrease pain and promote postural awareness for injury prevention.  ongoing      LONG TERM GOALS: 8 weeks ongoing   Patient will return to normal ADL, recreational, and work related activities with less pain and limitation.  ongoing   Patient will improve AROM to stated goals in order to return to maximal functional potential.  ongoing   Patient will improve Strength to stated goals of appropriate musculature in order to improve functional " independence.  ongoing   Pain Rating at Best: 1/10 to improve Quality of Life.  ongoing   Patient will meet predicted functional outcome (FOTO) score: 80% to increase self-worth & perceived functional ability. ongoing   Patient will have met/partially met personal goal of being able to walk without pain ongoing       Plan     Continue POC as previously stated.    Shimon Byrd, PT

## 2024-10-01 ENCOUNTER — PATIENT MESSAGE (OUTPATIENT)
Dept: ADMINISTRATIVE | Facility: HOSPITAL | Age: 34
End: 2024-10-01
Payer: MEDICAID

## 2024-10-02 ENCOUNTER — CLINICAL SUPPORT (OUTPATIENT)
Dept: REHABILITATION | Facility: HOSPITAL | Age: 34
End: 2024-10-02
Payer: MEDICAID

## 2024-10-02 DIAGNOSIS — R29.898 DECREASED STRENGTH OF LOWER EXTREMITY: ICD-10-CM

## 2024-10-02 DIAGNOSIS — R26.9 GAIT ABNORMALITY: Primary | ICD-10-CM

## 2024-10-02 DIAGNOSIS — R29.898 IMPAIRED FLEXIBILITY OF LOWER EXTREMITY: ICD-10-CM

## 2024-10-02 DIAGNOSIS — Z98.1 STATUS POST LUMBAR SPINAL FUSION: ICD-10-CM

## 2024-10-02 PROCEDURE — 97110 THERAPEUTIC EXERCISES: CPT | Mod: PN

## 2024-10-02 RX ORDER — OXYCODONE AND ACETAMINOPHEN 7.5; 325 MG/1; MG/1
1 TABLET ORAL EVERY 8 HOURS PRN
Qty: 21 TABLET | Refills: 0 | Status: SHIPPED | OUTPATIENT
Start: 2024-10-02 | End: 2024-10-09

## 2024-10-02 NOTE — PROGRESS NOTES
"[facility] / [facility]  Physical Therapy Daily Treatment Note     Name: Loretta Lea  Clinic Number: [id number]    Therapy Diagnosis:   Encounter Diagnoses   Name Primary?    Gait abnormality Yes    Impaired flexibility of lower extremity     Status post lumbar spinal fusion     Decreased strength of lower extremity      Physician: Briana Kessler MD    Visit Date: 10/2/2024         Physician Orders: PT Eval and Treat  Medical Diagnosis from Referral: M43.16 (ICD-10-CM) - Spondylolisthesis, lumbar region  Evaluation Date: 9/18/2024  Authorization Period Expiration: 09/03/2025  Plan of Care Expiration: 11/30/2024                          Progress Update: 10/18/2024                      FOTO: 1 / 3   Visit # / Visits authorized: 3 / 12      Time In: 0800  Time Out: 0830  Total Billable Time: 40 minutes    Precautions: Standard and s/p Lumbar fusion revision   DOS:  8/29/2024  Insurance: Payor: MEDICAID / Plan: [employer] [facility] ([facility]) / Product Type: Managed Medicaid /     Subjective     Pt reports: that she is hurting today.  She was not compliant with home exercise program.  Response to previous treatment: n/a  Functional change: n/a    Pain: 7/10  Location: right back      Objective     Loretta received therapeutic exercises to develop strength, endurance, ROM, flexibility, posture, and core stabilization for 40 minutes including:    Nustep x 10 mins   SLRs  2x10 Bilateral   TrA sets with PB 2x10     Supine heel slides  x 20 B  BKFOs red theraband 2x10 Bilateral      Hip adduction with ball  3x10  Bridges 2x10  LAQs  2x10 B   SAQs  2#  2x10 B - np     Standing gastroc stretch 3x30 secs Bilateral   Seated marches  x 20  1#  Bilateral - np     Seated HR/TR   3x10  Scapular squeezes  3x10  Standing hip abduction with support x 20 Bilateral  UBE 2/2  Step ups on 6" step   x10 B    Home Exercises Provided and Patient Education Provided     Education provided:   - surgical " precautions.    Written Home Exercises Provided: yes.  Exercises were reviewed and Loretta was able to demonstrate them prior to the end of the session.  Loretta demonstrated good  understanding of the education provided.     See EMR under Patient Instructions for exercises provided prior visit.    Assessment     Loretta presents with decreased B LOWER EXTREMITY strength.  Able to tolerate all therex.  Will continue to progress with therapy.    Loretta is progressing well towards her goals.   Pt prognosis is Good.     Pt will continue to benefit from skilled outpatient physical therapy to address the deficits listed in the problem list box on initial evaluation, provide pt/family education and to maximize pt's level of independence in the home and community environment.     Pt's spiritual, cultural and educational needs considered and pt agreeable to plan of care and goals.    Anticipated barriers to physical therapy: None    Goals:     SHORT TERM GOALS: 4 weeks 10/2/2024     Recent signs and systems trend is improving in order to progress towards LTG's. ongoing   Patient will be independent with HEP in order to further progress and return to maximal function. ongoing   Pain rating at Worst: 5/10 in order to progress towards increased independence with activity. ongoing   Patient will be able to correct postural deviations in sitting and standing, to decrease pain and promote postural awareness for injury prevention.  ongoing      LONG TERM GOALS: 8 weeks ongoing   Patient will return to normal ADL, recreational, and work related activities with less pain and limitation.  ongoing   Patient will improve AROM to stated goals in order to return to maximal functional potential.  ongoing   Patient will improve Strength to stated goals of appropriate musculature in order to improve functional independence.  ongoing   Pain Rating at Best: 1/10 to improve Quality of Life.  ongoing   Patient will meet predicted functional  outcome (FOTO) score: 80% to increase self-worth & perceived functional ability. ongoing   Patient will have met/partially met personal goal of being able to walk without pain ongoing       Plan     Continue POC as previously stated.    Shimon Byrd, PT

## 2024-10-22 ENCOUNTER — PATIENT MESSAGE (OUTPATIENT)
Dept: ORTHOPEDICS | Facility: CLINIC | Age: 34
End: 2024-10-22
Payer: MEDICAID

## 2024-10-22 DIAGNOSIS — Z98.1 S/P LUMBAR SPINAL FUSION: Primary | ICD-10-CM

## 2024-10-22 RX ORDER — OXYCODONE AND ACETAMINOPHEN 5; 325 MG/1; MG/1
1 TABLET ORAL EVERY 8 HOURS PRN
Qty: 21 TABLET | Refills: 0 | Status: SHIPPED | OUTPATIENT
Start: 2024-10-22

## 2024-10-22 RX ORDER — OXYCODONE AND ACETAMINOPHEN 7.5; 325 MG/1; MG/1
1 TABLET ORAL EVERY 8 HOURS PRN
Qty: 21 TABLET | Refills: 0 | Status: CANCELLED | OUTPATIENT
Start: 2024-10-22 | End: 2024-10-29

## 2024-12-03 ENCOUNTER — HOSPITAL ENCOUNTER (EMERGENCY)
Facility: HOSPITAL | Age: 34
Discharge: HOME | End: 2024-12-03
Attending: STUDENT IN AN ORGANIZED HEALTH CARE EDUCATION/TRAINING PROGRAM
Payer: MEDICAID

## 2024-12-03 VITALS
HEIGHT: 67 IN | SYSTOLIC BLOOD PRESSURE: 128 MMHG | HEART RATE: 89 BPM | RESPIRATION RATE: 16 BRPM | DIASTOLIC BLOOD PRESSURE: 78 MMHG | WEIGHT: 293 LBS | TEMPERATURE: 98 F | OXYGEN SATURATION: 100 % | BODY MASS INDEX: 45.99 KG/M2

## 2024-12-03 DIAGNOSIS — K08.89 PAIN, DENTAL: ICD-10-CM

## 2024-12-03 DIAGNOSIS — K04.7 DENTAL ABSCESS: Primary | ICD-10-CM

## 2024-12-03 PROCEDURE — 25000003 PHARM REV CODE 250: Performed by: NURSE PRACTITIONER

## 2024-12-03 PROCEDURE — 99284 EMERGENCY DEPT VISIT MOD MDM: CPT

## 2024-12-03 RX ORDER — DICLOFENAC SODIUM 50 MG/1
50 TABLET, DELAYED RELEASE ORAL 3 TIMES DAILY PRN
Qty: 20 TABLET | Refills: 2 | Status: SHIPPED | OUTPATIENT
Start: 2024-12-03

## 2024-12-03 RX ORDER — PENICILLIN V POTASSIUM 500 MG/1
500 TABLET, FILM COATED ORAL EVERY 6 HOURS
Qty: 40 TABLET | Refills: 0 | Status: SHIPPED | OUTPATIENT
Start: 2024-12-03

## 2024-12-03 RX ORDER — OXYCODONE AND ACETAMINOPHEN 5; 325 MG/1; MG/1
1 TABLET ORAL
Status: COMPLETED | OUTPATIENT
Start: 2024-12-03 | End: 2024-12-03

## 2024-12-03 RX ADMIN — OXYCODONE HYDROCHLORIDE AND ACETAMINOPHEN 1 TABLET: 5; 325 TABLET ORAL at 06:12

## 2024-12-04 NOTE — ED PROVIDER NOTES
Encounter Date: 12/3/2024       History     Chief Complaint   Patient presents with    Dental Pain     Has a cracked tooth on the right lower, states that the pain has increased over the last couple of days.       Presents with complaint dental pain right lower gum region.  Onset yesterday.  Patient reports she has an appointment tomorrow with a dentist.  She denies fever nausea vomiting or diarrhea she denies airway compromise.  There is no facial swelling.      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     Past Medical History:   Diagnosis Date    Anxiety     Back pain     Bipolar disorder 2004    bipolar 2    Chronic bronchitis     Depression     Diabetes mellitus     GERD (gastroesophageal reflux disease)     HPV (human papilloma virus) infection     Insomnia     Migraines     Non-proliferative diabetic retinopathy, mild, both eyes 09/25/2023    Obese body habitus     Ovarian cyst     Pneumonia     PONV (postoperative nausea and vomiting)      Past Surgical History:   Procedure Laterality Date    ANTERIOR LUMBAR INTERBODY FUSION (ALIF) Bilateral 4/23/2024    Procedure: FUSION, SPINE, LUMBAR, ALIF;  Surgeon: Gurjit Cassidy MD;  Location: [facility];  Service: Orthopedics;  Laterality: Bilateral;    APPENDECTOMY      ARTHROSCOPY OF ANKLE Right 05/04/2022    Procedure: ARTHROSCOPY, ANKLE;  Surgeon: Bimal Mccormick DPM;  Location: [facility] OR;  Service: Podiatry;  Laterality: Right;  CURT EXTERNAL ANKLE DISTRACTOR    CHOLECYSTECTOMY      DILATION AND CURETTAGE OF UTERUS      HYSTERECTOMY  2017    total, ovarian cysts, torsion, [place]; hyst per Dr JESUS Manriquez    LAPAROSCOPIC APPENDECTOMY N/A 08/18/2021    Procedure: APPENDECTOMY, LAPAROSCOPIC;  Surgeon: Osiel Ramirez MD;  Location: [facility] OR;  Service: General;  Laterality: N/A;    LUMBAR LAMINECTOMY WITH FUSION Bilateral 4/23/2024    Procedure: LAMINECTOMY, SPINE, LUMBAR, WITH  FUSION;  Surgeon: Gurjit Cassidy MD;  Location: [facility];  Service: Orthopedics;  Laterality: Bilateral;    LUMBAR LAMINECTOMY WITH FUSION Bilateral 8/29/2024    Procedure: LAMINECTOMY, SPINE, LUMBAR, WITH FUSION;  Surgeon: Gurjit Cassidy MD;  Location: [facility] OR;  Service: Orthopedics;  Laterality: Bilateral;  NTI, [employer]    OOPHORECTOMY      opherectom Left 2015    salpingo-opherectomy    REPAIR OF LIGAMENT OF ANKLE Right 06/23/2021    Procedure: REPAIR OF ANTERIOR TALOFIBULAR LIGAMENT CALCANEOFIBULAR LIGAMENT;  Surgeon: Bimal Mccormick DPM;  Location: [facility];  Service: Podiatry;  Laterality: Right;  WITH ARTHREX INTERNAL BRACE    REPAIR OF LIGAMENT OF ANKLE Right 4/27/2023    Procedure: REPAIR, LIGAMENT, ANKLE;  Surgeon: Aryan Poole DPM;  Location: [facility];  Service: Podiatry;  Laterality: Right;    REPAIR OF TENDON OF LOWER EXTREMITY Right 4/27/2023    Procedure: REPAIR, TENDON, LOWER EXTREMITY;  Surgeon: Aryan Poole DPM;  Location: [facility] OR;  Service: Podiatry;  Laterality: Right;  arthrex    SURGICAL REMOVAL OF BONE SPUR Right 06/23/2021    Procedure: EXCISION OS TRIGONUM;  Surgeon: Bimal Mccormick DPM;  Location: [facility];  Service: Podiatry;  Laterality: Right;    WISDOM TOOTH EXTRACTION       Family History   Adopted: Yes   Problem Relation Name Age of Onset    No Known Problems Sister      No Known Problems Sister      No Known Problems Sister       Social History     Tobacco Use    Smoking status: Every Day     Current packs/day: 1.50     Average packs/day: 1.5 packs/day for 18.0 years (27.0 ttl pk-yrs)     Types: Cigarettes     Passive exposure: Current    Smokeless tobacco: Never    Tobacco comments:     4/25/23--pt instructed no smoking 24hours before sx, pt voiced understanding.   Substance Use Topics    Alcohol use: Not Currently     Comment: rare    Drug use: Not Currently     Comment: twice     Review of Systems   Constitutional:  Negative for fever.   HENT:  Positive for dental  problem. Negative for drooling, ear pain, facial swelling and trouble swallowing.    Respiratory:  Negative for cough, shortness of breath and wheezing.    Cardiovascular:  Negative for chest pain, palpitations and leg swelling.   Gastrointestinal:  Negative for abdominal pain, diarrhea, nausea and vomiting.   Musculoskeletal:  Negative for back pain.   Skin:  Negative for rash.   Neurological:  Negative for weakness.       Physical Exam     Initial Vitals [12/03/24 1816]   BP Pulse Resp Temp SpO2   138/89 99 18 98.1 °F (36.7 °C) 96 %      MAP       --         Physical Exam    Constitutional: She appears well-developed and well-nourished.   HENT:   Head: Normocephalic. Mouth/Throat: Oropharynx is clear and moist.   There is a broken tooth on the right lower maxilla.  There is no facial swelling.  There is a dental abscess noted.  It was not large enough to drain.  Her airway is patent.  There is no facial swelling.  Patient was able to fully open and close her mouth.   Eyes: Conjunctivae are normal.   Neck: Neck supple.   Normal range of motion.  Cardiovascular:  Normal rate, regular rhythm and normal heart sounds.           Pulmonary/Chest: Breath sounds normal. No respiratory distress.   Musculoskeletal:         General: Normal range of motion.      Cervical back: Normal range of motion and neck supple.     Neurological: She is alert and oriented to person, place, and time.   Skin: Skin is warm and dry.   Psychiatric: She has a normal mood and affect. Thought content normal.         ED Course   Procedures  Labs Reviewed - No data to display       Imaging Results    None          Medications   oxyCODONE-acetaminophen 5-325 mg per tablet 1 tablet (1 tablet Oral Given 12/3/24 1834)     Medical Decision Making  Presents with complaint of dental pain.  Onset yesterday.  Patient reports she has been appoint with her dentist tomorrow.  She denies fever nausea vomiting or diarrhea.    Amount and/or Complexity of Data  Reviewed  Discussion of management or test interpretation with external provider(s): Patient has been given pen VK 4 times a day for treatment.  She was also been given diclofenac for home use.  Here in the ED she received 5 mg of oxycodone for her pain.  I have instructed her to keep her appoint with her dentist tomorrow.  At no time while in the ED did she ever appear to be in any acute distress.  She was given strict return precautions.    Risk  Prescription drug management.                                      Clinical Impression:  Final diagnoses:  [K04.7] Dental abscess (Primary)  [K08.89] Pain, dental          ED Disposition Condition    Discharge Stable          ED Prescriptions       Medication Sig Dispense Start Date End Date Auth. Provider    penicillin v potassium (VEETID) 500 MG tablet Take 1 tablet (500 mg total) by mouth every 6 (six) hours. 40 tablet 12/3/2024 -- Whitney Paulino NP    diclofenac (VOLTAREN) 50 MG EC tablet Take 1 tablet (50 mg total) by mouth 3 (three) times daily as needed. 20 tablet 12/3/2024 -- Whitney Paulino NP          Follow-up Information       Follow up With Specialties Details Why Contact Info    Richard Ruiz, KORYP-C Family Medicine In 3 days  [address]  [address]  [place] [address]  [phone]               Whitney Paulino NP  12/04/24 0007

## 2024-12-04 NOTE — DISCHARGE INSTRUCTIONS
Taking medication as prescribed keep your appoint with your dentist tomorrow return to the ED for any worsening symptoms or any other concerns.

## 2025-01-08 ENCOUNTER — OFFICE VISIT (OUTPATIENT)
Dept: FAMILY MEDICINE | Facility: CLINIC | Age: 35
End: 2025-01-08
Payer: MEDICAID

## 2025-01-08 ENCOUNTER — LAB VISIT (OUTPATIENT)
Dept: LAB | Facility: HOSPITAL | Age: 35
End: 2025-01-08
Attending: NURSE PRACTITIONER
Payer: MEDICAID

## 2025-01-08 VITALS
HEART RATE: 83 BPM | HEIGHT: 67 IN | SYSTOLIC BLOOD PRESSURE: 122 MMHG | TEMPERATURE: 98 F | WEIGHT: 293 LBS | BODY MASS INDEX: 45.99 KG/M2 | DIASTOLIC BLOOD PRESSURE: 98 MMHG | OXYGEN SATURATION: 98 %

## 2025-01-08 DIAGNOSIS — G89.29 CHRONIC BILATERAL LOW BACK PAIN, UNSPECIFIED WHETHER SCIATICA PRESENT: ICD-10-CM

## 2025-01-08 DIAGNOSIS — G47.00 INSOMNIA, UNSPECIFIED TYPE: ICD-10-CM

## 2025-01-08 DIAGNOSIS — F31.77 BIPOLAR DISORDER, IN PARTIAL REMISSION, MOST RECENT EPISODE MIXED: ICD-10-CM

## 2025-01-08 DIAGNOSIS — E78.2 MIXED HYPERLIPIDEMIA: ICD-10-CM

## 2025-01-08 DIAGNOSIS — E66.01 CLASS 3 SEVERE OBESITY DUE TO EXCESS CALORIES WITH SERIOUS COMORBIDITY AND BODY MASS INDEX (BMI) OF 45.0 TO 49.9 IN ADULT: ICD-10-CM

## 2025-01-08 DIAGNOSIS — M54.50 CHRONIC BILATERAL LOW BACK PAIN, UNSPECIFIED WHETHER SCIATICA PRESENT: ICD-10-CM

## 2025-01-08 DIAGNOSIS — E66.813 CLASS 3 SEVERE OBESITY DUE TO EXCESS CALORIES WITH SERIOUS COMORBIDITY AND BODY MASS INDEX (BMI) OF 45.0 TO 49.9 IN ADULT: ICD-10-CM

## 2025-01-08 DIAGNOSIS — E11.65 TYPE 2 DIABETES MELLITUS WITH HYPERGLYCEMIA, WITHOUT LONG-TERM CURRENT USE OF INSULIN: Primary | ICD-10-CM

## 2025-01-08 DIAGNOSIS — M75.51 BURSITIS OF RIGHT SHOULDER: ICD-10-CM

## 2025-01-08 DIAGNOSIS — F41.9 ANXIETY: ICD-10-CM

## 2025-01-08 DIAGNOSIS — E11.65 TYPE 2 DIABETES MELLITUS WITH HYPERGLYCEMIA, WITHOUT LONG-TERM CURRENT USE OF INSULIN: ICD-10-CM

## 2025-01-08 LAB
ALBUMIN SERPL BCP-MCNC: 3.6 G/DL (ref 3.5–5.2)
ALBUMIN/CREAT UR: 5.1 UG/MG (ref 0–30)
ALP SERPL-CCNC: 132 U/L (ref 40–150)
ALT SERPL W/O P-5'-P-CCNC: 60 U/L (ref 10–44)
ANION GAP SERPL CALC-SCNC: 10 MMOL/L (ref 8–16)
AST SERPL-CCNC: 47 U/L (ref 10–40)
BILIRUB SERPL-MCNC: 0.4 MG/DL (ref 0.1–1)
BUN SERPL-MCNC: 10 MG/DL (ref 6–20)
CALCIUM SERPL-MCNC: 8.9 MG/DL (ref 8.7–10.5)
CHLORIDE SERPL-SCNC: 102 MMOL/L (ref 95–110)
CHOLEST SERPL-MCNC: 207 MG/DL (ref 120–199)
CHOLEST/HDLC SERPL: 10.4 {RATIO} (ref 2–5)
CO2 SERPL-SCNC: 23 MMOL/L (ref 23–29)
CREAT SERPL-MCNC: 0.7 MG/DL (ref 0.5–1.4)
CREAT UR-MCNC: 136 MG/DL (ref 15–325)
EST. GFR  (NO RACE VARIABLE): >60 ML/MIN/1.73 M^2
ESTIMATED AVG GLUCOSE: 160 MG/DL (ref 68–131)
GLUCOSE SERPL-MCNC: 149 MG/DL (ref 70–110)
HBA1C MFR BLD: 7.2 % (ref 4–5.6)
HDLC SERPL-MCNC: 20 MG/DL (ref 40–75)
HDLC SERPL: 9.7 % (ref 20–50)
LDLC SERPL CALC-MCNC: 133.8 MG/DL (ref 63–159)
MICROALBUMIN UR DL<=1MG/L-MCNC: 7 UG/ML
NONHDLC SERPL-MCNC: 187 MG/DL
POTASSIUM SERPL-SCNC: 4.4 MMOL/L (ref 3.5–5.1)
PROT SERPL-MCNC: 8.3 G/DL (ref 6–8.4)
SODIUM SERPL-SCNC: 135 MMOL/L (ref 136–145)
TRIGL SERPL-MCNC: 266 MG/DL (ref 30–150)
TSH SERPL DL<=0.005 MIU/L-ACNC: 1.37 UIU/ML (ref 0.4–4)

## 2025-01-08 PROCEDURE — 36415 COLL VENOUS BLD VENIPUNCTURE: CPT | Performed by: NURSE PRACTITIONER

## 2025-01-08 PROCEDURE — 80053 COMPREHEN METABOLIC PANEL: CPT | Performed by: NURSE PRACTITIONER

## 2025-01-08 PROCEDURE — 80061 LIPID PANEL: CPT | Performed by: NURSE PRACTITIONER

## 2025-01-08 PROCEDURE — 1159F MED LIST DOCD IN RCRD: CPT | Mod: CPTII,,, | Performed by: NURSE PRACTITIONER

## 2025-01-08 PROCEDURE — 83036 HEMOGLOBIN GLYCOSYLATED A1C: CPT | Performed by: NURSE PRACTITIONER

## 2025-01-08 PROCEDURE — 3008F BODY MASS INDEX DOCD: CPT | Mod: CPTII,,, | Performed by: NURSE PRACTITIONER

## 2025-01-08 PROCEDURE — 1160F RVW MEDS BY RX/DR IN RCRD: CPT | Mod: CPTII,,, | Performed by: NURSE PRACTITIONER

## 2025-01-08 PROCEDURE — 84443 ASSAY THYROID STIM HORMONE: CPT | Performed by: NURSE PRACTITIONER

## 2025-01-08 PROCEDURE — 82570 ASSAY OF URINE CREATININE: CPT | Performed by: NURSE PRACTITIONER

## 2025-01-08 PROCEDURE — 99214 OFFICE O/P EST MOD 30 MIN: CPT | Mod: S$PBB,,, | Performed by: NURSE PRACTITIONER

## 2025-01-08 PROCEDURE — 99214 OFFICE O/P EST MOD 30 MIN: CPT | Mod: PBBFAC,PN | Performed by: NURSE PRACTITIONER

## 2025-01-08 PROCEDURE — 3080F DIAST BP >= 90 MM HG: CPT | Mod: CPTII,,, | Performed by: NURSE PRACTITIONER

## 2025-01-08 PROCEDURE — 3074F SYST BP LT 130 MM HG: CPT | Mod: CPTII,,, | Performed by: NURSE PRACTITIONER

## 2025-01-08 PROCEDURE — G2211 COMPLEX E/M VISIT ADD ON: HCPCS | Mod: 95,S$PBB,, | Performed by: NURSE PRACTITIONER

## 2025-01-08 PROCEDURE — 99999 PR PBB SHADOW E&M-EST. PATIENT-LVL IV: CPT | Mod: PBBFAC,,, | Performed by: NURSE PRACTITIONER

## 2025-01-08 RX ORDER — TIRZEPATIDE 5 MG/.5ML
5 INJECTION, SOLUTION SUBCUTANEOUS
Qty: 4 ML | Refills: 0 | Status: SHIPPED | OUTPATIENT
Start: 2025-02-07 | End: 2025-04-08

## 2025-01-08 RX ORDER — METFORMIN HYDROCHLORIDE 500 MG/1
1000 TABLET, EXTENDED RELEASE ORAL 2 TIMES DAILY WITH MEALS
Qty: 120 TABLET | Refills: 2 | Status: SHIPPED | OUTPATIENT
Start: 2025-01-08

## 2025-01-08 RX ORDER — TIRZEPATIDE 2.5 MG/.5ML
2.5 INJECTION, SOLUTION SUBCUTANEOUS
Qty: 2 ML | Refills: 0 | Status: SHIPPED | OUTPATIENT
Start: 2025-01-08 | End: 2025-02-07

## 2025-01-08 RX ORDER — ATORVASTATIN CALCIUM 20 MG/1
20 TABLET, FILM COATED ORAL NIGHTLY
Qty: 90 TABLET | Refills: 3 | Status: SHIPPED | OUTPATIENT
Start: 2025-01-08

## 2025-01-08 RX ORDER — BUSPIRONE HYDROCHLORIDE 7.5 MG/1
7.5 TABLET ORAL 2 TIMES DAILY
Qty: 60 TABLET | Refills: 1 | Status: SHIPPED | OUTPATIENT
Start: 2025-01-08

## 2025-01-08 RX ORDER — GABAPENTIN 300 MG/1
300 CAPSULE ORAL 3 TIMES DAILY
Qty: 90 CAPSULE | Refills: 2 | Status: SHIPPED | OUTPATIENT
Start: 2025-01-08

## 2025-01-08 NOTE — PROGRESS NOTES
SUBJECTIVE:      Patient ID: Loretta Lea is a 34 y.o. female.    Chief Complaint: Follow-up, Medication Refill, and Shoulder Pain (Right. Onset couple months)    History of Present Illness    CHIEF COMPLAINT:  Ms. Lea presents with concerns about her current medications' efficacy, particularly for anxiety, and to discuss alternative medication options.    HPI:  Ms. Lea reports decreased effectiveness of her current medications: Zoloft, Risperidone, and Trazodone. She has a history of bipolar disorder, manic depression, and anxiety since childhood, with inadequate control of her anxiety symptoms. While she has occasional depression, particularly during challenging periods or in relation to her 's health issues, she has not had a significant depressive episode recently, suggesting partial effectiveness of her current regimen for mood stabilization.    Ms. Lea mentions two recent back surgeries and recurrent right shoulder pain, which she attributes to leaning on it when rising and during video game play. She believes she may require a cortisone injection for her shoulder, as she has received in the past.    Regarding diabetes management, the patient was previously prescribed Ozempic but had severe side effects, including persistent vomiting for 4 consecutive days and inability to retain food. She reports a 20-pound weight loss. She is no longer checking her blood glucose levles. And stopped the Metformin as well. She does not currently take anything for diabetes. Last A1c 6.4 four months ago.    Ms. Lea denies increased hunger, thirst, urination, vision changes, or burning sensations in her feet related to diabetes. She reports shooting pain down her leg.    MEDICATIONS:  Ms. Lea is on Zoloft for depression and anxiety, Risperidone for bipolar disorder, Trazodone for sleep, and Ozempic for diabetes management.    MEDICAL HISTORY:  Ms. Lea has a history of bipolar disorder, manic depression,  anxiety, hypercholesterolemia, and diabetes.    FAMILY HISTORY:  Family history is significant for the patient's  with a history of stroke and severe blood clots (sinus thrombus).    SURGICAL HISTORY:  Ms. Lea has undergone two back surgeries.    TEST RESULTS:  Ms. Lea has a history of elevated cholesterol levels in past labs.    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 She is currently .        Review of Systems   Constitutional:  Negative for activity change, appetite change, chills, diaphoresis, fatigue, fever and unexpected weight change.   HENT:  Negative for congestion, ear pain, sinus pressure, sore throat, trouble swallowing and voice change.    Eyes:  Negative for pain, discharge and visual disturbance.   Respiratory:  Negative for cough, chest tightness, shortness of breath and wheezing.    Cardiovascular:  Negative for chest pain and palpitations.   Gastrointestinal:  Negative for abdominal pain, constipation, diarrhea, nausea and vomiting.   Genitourinary:  Negative for difficulty urinating, flank pain, frequency and urgency.   Musculoskeletal:  Positive for arthralgias, back pain and neck pain. Negative for joint swelling.   Skin:  Negative for color change and rash.   Neurological:  Negative for dizziness, seizures, syncope, weakness, numbness and headaches.   Hematological:  Negative for adenopathy.   Psychiatric/Behavioral:  Positive for dysphoric mood and sleep disturbance. The patient is nervous/anxious.        Family History   Adopted: Yes   Problem Relation Name Age of Onset    No Known Problems Sister      No Known Problems Sister      No Known Problems Sister        Social History     Socioeconomic History    Marital status:     Number of children: 0   Tobacco Use    Smoking status: Every Day     Current packs/day: 1.50     Average packs/day: 1.5 packs/day for 18.0 years (27.0 ttl pk-yrs)     Types: Cigarettes     Passive exposure:  Current    Smokeless tobacco: Never    Tobacco comments:     4/25/23--pt instructed no smoking 24hours before sx, pt voiced understanding.   Substance and Sexual Activity    Alcohol use: Not Currently     Comment: rare    Drug use: Not Currently     Comment: twice    Sexual activity: Yes     Partners: Male     Birth control/protection: OCP, None     Social Drivers of Health     Financial Resource Strain: Medium Risk (8/29/2024)    Overall Financial Resource Strain (CARDIA)     Difficulty of Paying Living Expenses: Somewhat hard   Food Insecurity: No Food Insecurity (8/29/2024)    Hunger Vital Sign     Worried About Running Out of Food in the Last Year: Never true     Ran Out of Food in the Last Year: Never true   Transportation Needs: Unmet Transportation Needs (8/29/2024)    TRANSPORTATION NEEDS     Transportation : Yes, it has kept me from non-medical meetings, appointments, work or from getting things that I need.   Physical Activity: Inactive (8/29/2024)    Exercise Vital Sign     Days of Exercise per Week: 0 days     Minutes of Exercise per Session: 0 min   Stress: Patient Declined (8/29/2024)    [employer] [place] [employer] - Occupational Stress Questionnaire     Feeling of Stress : Patient declined   Housing Stability: Low Risk  (8/29/2024)    Housing Stability Vital Sign     Unable to Pay for Housing in the Last Year: No     Homeless in the Last Year: No     Current Outpatient Medications   Medication Sig Dispense Refill    losartan (COZAAR) 25 MG tablet Take 1 tablet (25 mg total) by mouth once daily. 30 tablet 5    ondansetron (ZOFRAN) 4 MG tablet Take 1 tablet (4 mg total) by mouth every 8 (eight) hours as needed for Nausea. 21 tablet 0    risperiDONE (RISPERDAL) 1 MG tablet Take 1 tablet (1 mg total) by mouth 2 (two) times daily. 60 tablet 5    sertraline (ZOLOFT) 100 MG tablet Take 1.5 tablets (150 mg total) by mouth once daily. 135 tablet 0    traZODone (DESYREL) 100 MG tablet TAKE 1 TO 2  TABLETS BY MOUTH EVERY NIGHT AT BEDTIME AS NEEDED FOR INSOMNIA 60 tablet 5    atorvastatin (LIPITOR) 20 MG tablet Take 1 tablet (20 mg total) by mouth every evening. 90 tablet 3    blood sugar diagnostic Strp Test blood sugar 4 (four) times daily before meals and nightly. (Patient not taking: Reported on 1/8/2025) 200 each 5    blood-glucose meter (ACCU-CHEK GUIDE GLUCOSE METER) [facility] [place] blood [place] (Patient not taking: Reported on 1/8/2025) 1 each 0    busPIRone (BUSPAR) 7.5 MG tablet Take 1 tablet (7.5 mg total) by mouth 2 (two) times a day. 60 tablet 1    gabapentin (NEURONTIN) 300 MG capsule Take 1 capsule (300 mg total) by mouth 3 (three) times daily. 90 capsule 2    metFORMIN (GLUCOPHAGE-XR) 500 MG ER 24hr tablet Take 2 tablets (1,000 mg total) by mouth 2 (two) times daily with meals. 120 tablet 2    tirzepatide (MOUNJARO) 2.5 mg/0.5 mL PnIj Inject 2.5 mg into the skin every 7 days. 2 mL 0    [START ON 2/7/2025] tirzepatide (MOUNJARO) 5 mg/0.5 mL PnIj Inject 5 mg into the skin every 7 days. 4 mL 0     No current facility-administered medications for this visit.     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      Past Medical History:   Diagnosis Date    Anxiety     Back pain     Bipolar disorder 2004    bipolar 2    Chronic bronchitis     Depression     Diabetes mellitus     GERD (gastroesophageal reflux disease)     HPV (human papilloma virus) infection     Insomnia     Migraines     Non-proliferative diabetic retinopathy, mild, both eyes 09/25/2023    Obese body habitus     Ovarian cyst     Pneumonia     PONV (postoperative nausea and vomiting)      Past Surgical History:   Procedure Laterality Date    ANTERIOR LUMBAR INTERBODY FUSION (ALIF) Bilateral 4/23/2024    Procedure: FUSION, SPINE, LUMBAR, ALIF;  Surgeon: Gurjit Cassidy MD;  Location: [facility];  Service: Orthopedics;  Laterality: Bilateral;     APPENDECTOMY      ARTHROSCOPY OF ANKLE Right 05/04/2022    Procedure: ARTHROSCOPY, ANKLE;  Surgeon: Bimal Mccormick DPM;  Location: [facility];  Service: Podiatry;  Laterality: Right;  CURT EXTERNAL ANKLE DISTRACTOR    CHOLECYSTECTOMY      DILATION AND CURETTAGE OF UTERUS      HYSTERECTOMY  2017    total, ovarian cysts, torsion, [place]; hyst per Dr JESUS Manriquez    LAPAROSCOPIC APPENDECTOMY N/A 08/18/2021    Procedure: APPENDECTOMY, LAPAROSCOPIC;  Surgeon: Osiel Ramirez MD;  Location: [facility];  Service: General;  Laterality: N/A;    LUMBAR LAMINECTOMY WITH FUSION Bilateral 4/23/2024    Procedure: LAMINECTOMY, SPINE, LUMBAR, WITH FUSION;  Surgeon: Gurjit Cassidy MD;  Location: [facility] OR;  Service: Orthopedics;  Laterality: Bilateral;    LUMBAR LAMINECTOMY WITH FUSION Bilateral 8/29/2024    Procedure: LAMINECTOMY, SPINE, LUMBAR, WITH FUSION;  Surgeon: Gurjti Cassidy MD;  Location: [facility] OR;  Service: Orthopedics;  Laterality: Bilateral;  NTI, MEDTRONIC    OOPHORECTOMY      opherectom Left 2015    salpingo-opherectomy    REPAIR OF LIGAMENT OF ANKLE Right 06/23/2021    Procedure: REPAIR OF ANTERIOR TALOFIBULAR LIGAMENT CALCANEOFIBULAR LIGAMENT;  Surgeon: Bimal Mccormick DPM;  Location: [facility];  Service: Podiatry;  Laterality: Right;  WITH ARTHREX INTERNAL BRACE    REPAIR OF LIGAMENT OF ANKLE Right 4/27/2023    Procedure: REPAIR, LIGAMENT, ANKLE;  Surgeon: Aryan Poole DPM;  Location: [facility];  Service: Podiatry;  Laterality: Right;    REPAIR OF TENDON OF LOWER EXTREMITY Right 4/27/2023    Procedure: REPAIR, TENDON, LOWER EXTREMITY;  Surgeon: Aryan Poole DPM;  Location: [facility] OR;  Service: Podiatry;  Laterality: Right;  arthrex    SURGICAL REMOVAL OF BONE SPUR Right 06/23/2021    Procedure: EXCISION OS TRIGONUM;  Surgeon: Bimal Mccormick DPM;  Location: [facility] OR;  Service: Podiatry;  Laterality: Right;    WISDOM TOOTH EXTRACTION            OBJECTIVE:      Vitals:    01/08/25 1001   BP: (!) 122/98   BP  "Location: Left arm   Patient Position: Sitting   Pulse: 83   Temp: 98.4 °F (36.9 °C)   TempSrc: Oral   SpO2: 98%   Weight: (!) 136.2 kg (300 lb 3.2 oz)   Height: 5' 7" (1.702 m)     Physical Exam  Vitals and nursing note reviewed.   Constitutional:       General: She is awake. She is not in acute distress.     Appearance: She is well-developed and well-groomed. She is morbidly obese. She is not ill-appearing, toxic-appearing or diaphoretic.   HENT:      Head: Normocephalic and atraumatic.      Nose: Nose normal.   Eyes:      General: Lids are normal. Gaze aligned appropriately.      Conjunctiva/sclera: Conjunctivae normal.      Right eye: Right conjunctiva is not injected.      Left eye: Left conjunctiva is not injected.      Pupils: Pupils are equal, round, and reactive to light.   Cardiovascular:      Rate and Rhythm: Normal rate and regular rhythm.      Pulses: Normal pulses.      Heart sounds: Normal heart sounds, S1 normal and S2 normal. No murmur heard.     No friction rub. No gallop.   Pulmonary:      Effort: Pulmonary effort is normal. No respiratory distress.      Breath sounds: Normal breath sounds. No stridor. No decreased breath sounds, wheezing, rhonchi or rales.   Chest:      Chest wall: No tenderness.   Musculoskeletal:      Right shoulder: Tenderness present. Decreased range of motion.      Cervical back: Neck supple.      Right lower leg: No edema.      Left lower leg: No edema.   Lymphadenopathy:      Cervical: No cervical adenopathy.   Skin:     General: Skin is warm and dry.      Capillary Refill: Capillary refill takes less than 2 seconds.      Findings: No erythema or rash.   Neurological:      Mental Status: She is alert and oriented to person, place, and time. Mental status is at baseline.   Psychiatric:         Attention and Perception: Attention normal.         Mood and Affect: Mood normal.         Speech: Speech normal.         Behavior: Behavior normal. Behavior is cooperative.         " Thought Content: Thought content normal.         Judgment: Judgment normal.       No results displayed because visit has over 200 results.      Hospital Outpatient Visit on 08/22/2024   Component Date Value Ref Range Status    Specimen UA 08/22/2024 Urine, Clean Catch   Final    Color, UA 08/22/2024 Yellow  Yellow, Straw, Raven Final    Appearance, UA 08/22/2024 Clear  Clear Final    pH, UA 08/22/2024 5.0  5.0 - 8.0 Final    Specific Gravity, UA 08/22/2024 1.020  1.005 - 1.030 Final    Protein, UA 08/22/2024 Negative  Negative Final    Comment: Recommend a 24 hour urine protein or a urine   protein/creatinine ratio if globulin induced proteinuria is  clinically suspected.      Glucose, UA 08/22/2024 Negative  Negative Final    Ketones, UA 08/22/2024 Negative  Negative Final    Bilirubin (UA) 08/22/2024 Negative  Negative Final    Occult Blood UA 08/22/2024 Negative  Negative Final    Nitrite, UA 08/22/2024 Negative  Negative Final    Urobilinogen, UA 08/22/2024 Negative  <2.0 EU/dL Final    Leukocytes, UA 08/22/2024 Negative  Negative Final    Group & Rh 08/22/2024 A POS   Final    Indirect Eva 08/22/2024 NEG   Final    Specimen Outdate 08/22/2024 09/05/2024 23:59   Final   Admission on 07/15/2024, Discharged on 07/15/2024   Component Date Value Ref Range Status    WBC 07/15/2024 15.72 (H)  3.90 - 12.70 K/uL Final    RBC 07/15/2024 5.01  4.00 - 5.40 M/uL Final    Hemoglobin 07/15/2024 13.0  12.0 - 16.0 g/dL Final    Hematocrit 07/15/2024 42.1  37.0 - 48.5 % Final    MCV 07/15/2024 84  82 - 98 fL Final    MCH 07/15/2024 25.9 (L)  27.0 - 31.0 pg Final    MCHC 07/15/2024 30.9 (L)  32.0 - 36.0 g/dL Final    RDW 07/15/2024 14.6 (H)  11.5 - 14.5 % Final    Platelets 07/15/2024 372  150 - 450 K/uL Final    MPV 07/15/2024 9.3  9.2 - 12.9 fL Final    Immature Granulocytes 07/15/2024 0.4  0.0 - 0.5 % Final    Gran # (ANC) 07/15/2024 9.9 (H)  1.8 - 7.7 K/uL Final    Immature Grans (Abs) 07/15/2024 0.07 (H)  0.00 - 0.04 K/uL  Final    Comment: Mild elevation in immature granulocytes is non specific and   can be seen in a variety of conditions including stress response,   acute inflammation, trauma and pregnancy. Correlation with other   laboratory and clinical findings is essential.      Lymph # 07/15/2024 4.8  1.0 - 4.8 K/uL Final    Mono # 07/15/2024 0.8  0.3 - 1.0 K/uL Final    Eos # 07/15/2024 0.1  0.0 - 0.5 K/uL Final    Baso # 07/15/2024 0.07  0.00 - 0.20 K/uL Final    nRBC 07/15/2024 0  0 /100 WBC Final    Gran % 07/15/2024 63.2  38.0 - 73.0 % Final    Lymph % 07/15/2024 30.3  18.0 - 48.0 % Final    Mono % 07/15/2024 5.3  4.0 - 15.0 % Final    Eosinophil % 07/15/2024 0.4  0.0 - 8.0 % Final    Basophil % 07/15/2024 0.4  0.0 - 1.9 % Final    Differential Method 07/15/2024 Automated   Final    Sed Rate 07/15/2024 50 (H)  0 - 20 mm/Hr Final    CRP 07/15/2024 0.60  <1.00 mg/dL Final    Comment: CRP-Normal Application expected values:   <1.0        mg/dL   Normal Range  1.0 - 5.0  mg/dL   Indicates mild inflammation  5.0 - 10.0 mg/dL   Indicates severe inflammation  >10.0        mg/dL   Represents serious processes and   frequently         indicates the presence of a bacterial   infection.      Admission on 07/11/2024, Discharged on 07/12/2024   Component Date Value Ref Range Status    Specimen UA 07/11/2024 Urine, Clean Catch   Final    Color, UA 07/11/2024 Yellow  Yellow, Straw, Raven Final    Appearance, UA 07/11/2024 Hazy (A)  Clear Final    pH, UA 07/11/2024 6.0  5.0 - 8.0 Final    Specific Gravity, UA 07/11/2024 1.030  1.005 - 1.030 Final    Protein, UA 07/11/2024 Trace (A)  Negative Final    Comment: Recommend a 24 hour urine protein or a urine   protein/creatinine ratio if globulin induced proteinuria is  clinically suspected.      Glucose, UA 07/11/2024 Negative  Negative Final    Ketones, UA 07/11/2024 Negative  Negative Final    Bilirubin (UA) 07/11/2024 Negative  Negative Final    Occult Blood UA 07/11/2024 Negative   Negative Final    Nitrite, UA 07/11/2024 Negative  Negative Final    Urobilinogen, UA 07/11/2024 2.0-3.0 (A)  Negative EU/dL Final    Leukocytes, UA 07/11/2024 Negative  Negative Final    WBC 07/11/2024 12.94 (H)  3.90 - 12.70 K/uL Final    RBC 07/11/2024 5.31  4.00 - 5.40 M/uL Final    Hemoglobin 07/11/2024 13.9  12.0 - 16.0 g/dL Final    Hematocrit 07/11/2024 44.5  37.0 - 48.5 % Final    MCV 07/11/2024 84  82 - 98 fL Final    MCH 07/11/2024 26.2 (L)  27.0 - 31.0 pg Final    MCHC 07/11/2024 31.2 (L)  32.0 - 36.0 g/dL Final    RDW 07/11/2024 14.5  11.5 - 14.5 % Final    Platelets 07/11/2024 365  150 - 450 K/uL Final    MPV 07/11/2024 8.8 (L)  9.2 - 12.9 fL Final    Immature Granulocytes 07/11/2024 0.3  0.0 - 0.5 % Final    Gran # (ANC) 07/11/2024 8.2 (H)  1.8 - 7.7 K/uL Final    Immature Grans (Abs) 07/11/2024 0.04  0.00 - 0.04 K/uL Final    Comment: Mild elevation in immature granulocytes is non specific and   can be seen in a variety of conditions including stress response,   acute inflammation, trauma and pregnancy. Correlation with other   laboratory and clinical findings is essential.      Lymph # 07/11/2024 3.8  1.0 - 4.8 K/uL Final    Mono # 07/11/2024 0.7  0.3 - 1.0 K/uL Final    Eos # 07/11/2024 0.1  0.0 - 0.5 K/uL Final    Baso # 07/11/2024 0.05  0.00 - 0.20 K/uL Final    nRBC 07/11/2024 0  0 /100 WBC Final    Gran % 07/11/2024 63.2  38.0 - 73.0 % Final    Lymph % 07/11/2024 29.6  18.0 - 48.0 % Final    Mono % 07/11/2024 5.7  4.0 - 15.0 % Final    Eosinophil % 07/11/2024 0.8  0.0 - 8.0 % Final    Basophil % 07/11/2024 0.4  0.0 - 1.9 % Final    Differential Method 07/11/2024 Automated   Final    Sodium 07/11/2024 137  136 - 145 mmol/L Final    Potassium 07/11/2024 4.1  3.5 - 5.1 mmol/L Final    Chloride 07/11/2024 102  95 - 110 mmol/L Final    CO2 07/11/2024 26  23 - 29 mmol/L Final    Glucose 07/11/2024 106  70 - 110 mg/dL Final    BUN 07/11/2024 8  6 - 20 mg/dL Final    Creatinine 07/11/2024 0.6  0.5 -  1.4 mg/dL Final    Calcium 07/11/2024 9.0  8.7 - 10.5 mg/dL Final    Total Protein 07/11/2024 8.0  6.0 - 8.4 g/dL Final    Albumin 07/11/2024 4.0  3.5 - 5.2 g/dL Final    Total Bilirubin 07/11/2024 0.4  0.1 - 1.0 mg/dL Final    Comment: For infants and newborns, interpretation of results should be based  on gestational age, weight and in agreement with clinical  observations.    Premature Infant recommended reference ranges:  Up to 24 hours.............<8.0 mg/dL  Up to 48 hours............<12.0 mg/dL  3-5 days..................<15.0 mg/dL  6-29 days.................<15.0 mg/dL      Alkaline Phosphatase 07/11/2024 108  55 - 135 U/L Final    AST 07/11/2024 48 (H)  10 - 40 U/L Final    ALT 07/11/2024 53 (H)  10 - 44 U/L Final    eGFR 07/11/2024 >60.0  >60 mL/min/1.73 m^2 Final    Anion Gap 07/11/2024 9  8 - 16 mmol/L Final    CRP 07/11/2024 1.60 (H)  <1.00 mg/dL Final    Comment: CRP-Normal Application expected values:   <1.0        mg/dL   Normal Range  1.0 - 5.0  mg/dL   Indicates mild inflammation  5.0 - 10.0 mg/dL   Indicates severe inflammation  >10.0        mg/dL   Represents serious processes and   frequently         indicates the presence of a bacterial   infection.       Prothrombin Time 07/11/2024 10.8  9.0 - 12.5 sec Final    INR 07/11/2024 1.0  0.8 - 1.2 Final    Comment: Coumadin Therapy:  2.0 - 3.0 for INR for all indicators except mechanical heart valves  and antiphospholipid syndromes which should use 2.5 - 3.5.      POC Creatinine 07/11/2024 0.7  0.5 - 1.4 mg/dL Final    Sample 07/11/2024 VENOUS   Final    Blood Culture, Routine 07/12/2024 No growth after 5 days.   Final    Blood Culture, Routine 07/12/2024 No growth after 5 days.   Final    Lactate (Lactic Acid) 07/12/2024 1.6  0.5 - 1.9 mmol/L Final    Comment: Falsely low lactic acid results can be found in samples   containing >=13.0 mg/dL total bilirubin and/or >=3.5 mg/dL   direct bilirubin.      Lactate (Lactic Acid) 07/12/2024 1.3  0.5 - 1.9  mmol/L Final    Comment: Falsely low lactic acid results can be found in samples   containing >=13.0 mg/dL total bilirubin and/or >=3.5 mg/dL   direct bilirubin.      Sodium 07/12/2024 138  136 - 145 mmol/L Final    Potassium 07/12/2024 3.9  3.5 - 5.1 mmol/L Final    Chloride 07/12/2024 103  95 - 110 mmol/L Final    CO2 07/12/2024 27  23 - 29 mmol/L Final    Glucose 07/12/2024 94  70 - 110 mg/dL Final    BUN 07/12/2024 9  6 - 20 mg/dL Final    Creatinine 07/12/2024 0.6  0.5 - 1.4 mg/dL Final    Calcium 07/12/2024 8.3 (L)  8.7 - 10.5 mg/dL Final    Total Protein 07/12/2024 7.4  6.0 - 8.4 g/dL Final    Albumin 07/12/2024 3.7  3.5 - 5.2 g/dL Final    Total Bilirubin 07/12/2024 0.4  0.1 - 1.0 mg/dL Final    Comment: For infants and newborns, interpretation of results should be based  on gestational age, weight and in agreement with clinical  observations.    Premature Infant recommended reference ranges:  Up to 24 hours.............<8.0 mg/dL  Up to 48 hours............<12.0 mg/dL  3-5 days..................<15.0 mg/dL  6-29 days.................<15.0 mg/dL      Alkaline Phosphatase 07/12/2024 99  55 - 135 U/L Final    AST 07/12/2024 50 (H)  10 - 40 U/L Final    ALT 07/12/2024 51 (H)  10 - 44 U/L Final    eGFR 07/12/2024 >60.0  >60 mL/min/1.73 m^2 Final    Anion Gap 07/12/2024 8  8 - 16 mmol/L Final    Magnesium 07/12/2024 2.1  1.6 - 2.6 mg/dL Final    WBC 07/12/2024 11.84  3.90 - 12.70 K/uL Final    RBC 07/12/2024 5.07  4.00 - 5.40 M/uL Final    Hemoglobin 07/12/2024 13.0  12.0 - 16.0 g/dL Final    Hematocrit 07/12/2024 42.7  37.0 - 48.5 % Final    MCV 07/12/2024 84  82 - 98 fL Final    MCH 07/12/2024 25.6 (L)  27.0 - 31.0 pg Final    MCHC 07/12/2024 30.4 (L)  32.0 - 36.0 g/dL Final    RDW 07/12/2024 14.6 (H)  11.5 - 14.5 % Final    Platelets 07/12/2024 341  150 - 450 K/uL Final    MPV 07/12/2024 8.7 (L)  9.2 - 12.9 fL Final    Immature Granulocytes 07/12/2024 0.4  0.0 - 0.5 % Final    Gran # (ANC) 07/12/2024 7.0  1.8  - 7.7 K/uL Final    Immature Grans (Abs) 07/12/2024 0.05 (H)  0.00 - 0.04 K/uL Final    Comment: Mild elevation in immature granulocytes is non specific and   can be seen in a variety of conditions including stress response,   acute inflammation, trauma and pregnancy. Correlation with other   laboratory and clinical findings is essential.      Lymph # 07/12/2024 4.0  1.0 - 4.8 K/uL Final    Mono # 07/12/2024 0.7  0.3 - 1.0 K/uL Final    Eos # 07/12/2024 0.1  0.0 - 0.5 K/uL Final    Baso # 07/12/2024 0.07  0.00 - 0.20 K/uL Final    nRBC 07/12/2024 0  0 /100 WBC Final    Gran % 07/12/2024 58.9  38.0 - 73.0 % Final    Lymph % 07/12/2024 33.4  18.0 - 48.0 % Final    Mono % 07/12/2024 5.7  4.0 - 15.0 % Final    Eosinophil % 07/12/2024 1.0  0.0 - 8.0 % Final    Basophil % 07/12/2024 0.6  0.0 - 1.9 % Final    Differential Method 07/12/2024 Automated   Final    Fibrinogen 07/12/2024 415 (H)  182 - 400 mg/dL Final    aPTT 07/12/2024 27.8  21.0 - 32.0 sec Final    Comment: Refer to local heparin nomogram for intensity/dose specific   therapeutic   range.      D-Dimer 07/12/2024 0.28  0.00 - 0.49 mg/L FEU Final    Comment: The quantitative D-dimer assay should be used as an aid in   the diagnosis of deep vein thrombosis and pulmonary embolism  in patients with the appropriate presentation and clinical  history. The upper limit of the reference interval and the clinical   cut off   point are identical. Causes of a positive (>0.50 mg/L FEU) D-Dimer   test  include, but are not limited to: DVT, PE, DIC, thrombolytic   therapy, anticoagulant therapy, recent surgery, trauma, or   pregnancy, disseminated malignancy, aortic aneurysm, cirrhosis,  and severe infection. False negative results may occur in   patients with distal DVT.      POC Glucose 07/12/2024 103  70 - 110 Final          Assessment:       1. Type 2 diabetes mellitus with hyperglycemia, without long-term current use of insulin    2. Mixed hyperlipidemia    3. Bipolar  disorder, in partial remission, most recent episode mixed    4. Insomnia, unspecified type    5. Anxiety    6. Chronic bilateral low back pain, unspecified whether sciatica present    7. Bursitis of right shoulder    8. Class 3 severe obesity due to excess calories with serious comorbidity and body mass index (BMI) of 45.0 to 49.9 in adult        Plan:       Assessment & Plan    IMPRESSION:  - Evaluated effectiveness of current medications for bipolar disorder, depression, and anxiety  - Considered Buspar for anxiety management, compatible with current medications  - Assessed GI intolerance to Ozempic and decided to try Mounjaro as an alternative for diabetes management  - Reviewed cholesterol management and determined need to initiate treatment  - Noted patient's recent weight loss of 20 lbs  - Considered referral for shoulder pain, potentially requiring cortisone injection    BIPOLAR MANIC DEPRESSION AND ANXIETY:  - Evaluated the patient's current medication regimen and response, noting that the patient feels the medications are not working as effectively as before.  - Assessed the patient's history of bipolar manic depression and anxiety since childhood.  - Noted that the patient reports increased anxiety but depression is currently under control, with normal depression occurring occasionally.  - Explained Buspar's primary effect on anxiety with some benefit for depression.  - Prescribed Buspar 7.5mg twice daily for anxiety.  - Continued Risperidone and Zoloft at current doses.  - Continued Trazodone.  - Scheduled follow up in 1 month to assess response to new medications and consider dosage adjustments.    RIGHT SHOULDER PAIN:  - Evaluated the patient's ongoing right shoulder pain, which is exacerbated by leaning on it when getting up and playing video games.  - Noted the patient's history of cortisone injections for shoulder bursitis.  - Referred the patient to Dr. Farias for shoulder evaluation and potential  cortisone injection.    HYPERLIPIDEMIA:  - Noted that the patient's last cholesterol test was elevated.  - Ordered lab work including lipid panel to check current cholesterol levels.  - Initiated Lipitor 20 mg.  - Limit red meat, butter, fried foods, cheese, and other foods that have a lot of saturated fat.   - Consume more: lean meats, fish, fruits, vegetables, whole grains, beans, lentils, and nuts.    - Recommend weight loss, and 30-45 min of cardiovascular exercise daily.    DIABETES:  - Evaluated the patient's diabetes management, noting that the patient admits to not checking blood sugar regularly.  - Inquired about diabetes-related symptoms such as increased hunger, thirst, urination, vision changes, or burning feet.  - Ordered lab work including A1C test to assess current diabetes control.  - Discontinued Ozempic due to severe GI side effects.  - Discussed Mounjaro's mechanism of action, explaining it is similar to Ozempic but with a different chemical compound.  - Informed the patient about Mounjaro's dosing schedule, starting with 2.5mg weekly for the first month as an introductory dose, then increasing to 5mg weekly as the maintenance dose.  - Prescribed Mounjaro 2.5mg weekly for the first month, to be increased to 5mg weekly after 30 days.  - Refilled metformin.  - Recommend home glucose monitoring.  - Limit carb and sugar intake.     HYPERTENSION:  - Initial blood pressure was elevated at 122/98.  - Continue Losartan 25 mg.  - Reduce the amount of salt in your diet; Lose weight; Avoid drinking too much alcohol; Exercise at least 30 minutes per day most days of the week.    - Continue current medications and home BP monitoring.         Type 2 diabetes mellitus with hyperglycemia, without long-term current use of insulin  -     Comprehensive Metabolic Panel; Future; Expected date: 01/08/2025  -     Lipid Panel; Future; Expected date: 01/08/2025  -     TSH; Future; Expected date: 01/08/2025  -      Microalbumin/Creatinine Ratio, Urine; Future; Expected date: 01/08/2025  -     Hemoglobin A1C; Future; Expected date: 01/08/2025  -     tirzepatide (MOUNJARO) 2.5 mg/0.5 mL PnIj; Inject 2.5 mg into the skin every 7 days.  Dispense: 2 mL; Refill: 0  -     tirzepatide (MOUNJARO) 5 mg/0.5 mL PnIj; Inject 5 mg into the skin every 7 days.  Dispense: 4 mL; Refill: 0  -     metFORMIN (GLUCOPHAGE-XR) 500 MG ER 24hr tablet; Take 2 tablets (1,000 mg total) by mouth 2 (two) times daily with meals.  Dispense: 120 tablet; Refill: 2    Mixed hyperlipidemia  -     Comprehensive Metabolic Panel; Future; Expected date: 01/08/2025  -     Lipid Panel; Future; Expected date: 01/08/2025  -     TSH; Future; Expected date: 01/08/2025  -     atorvastatin (LIPITOR) 20 MG tablet; Take 1 tablet (20 mg total) by mouth every evening.  Dispense: 90 tablet; Refill: 3    Bipolar disorder, in partial remission, most recent episode mixed    Insomnia, unspecified type    Anxiety  -     busPIRone (BUSPAR) 7.5 MG tablet; Take 1 tablet (7.5 mg total) by mouth 2 (two) times a day.  Dispense: 60 tablet; Refill: 1    Chronic bilateral low back pain, unspecified whether sciatica present  -     gabapentin (NEURONTIN) 300 MG capsule; Take 1 capsule (300 mg total) by mouth 3 (three) times daily.  Dispense: 90 capsule; Refill: 2    Bursitis of right shoulder  -     Ambulatory referral/consult to Orthopedics; Future; Expected date: 01/15/2025    Class 3 severe obesity due to excess calories with serious comorbidity and body mass index (BMI) of 45.0 to 49.9 in adult  -     Comprehensive Metabolic Panel; Future; Expected date: 01/08/2025  -     Lipid Panel; Future; Expected date: 01/08/2025  -     Hemoglobin A1C; Future; Expected date: 01/08/2025    I spent a total of 22 minutes on the day of the visit.This includes face to face time and non-face to face time preparing to see the patient (eg, review of tests), obtaining and/or reviewing separately obtained  history, documenting clinical information in the electronic or other health record, independently interpreting results and communicating results to the patient/family/caregiver, or care coordinator.    Follow up in about 3 months (around 4/8/2025) for DM and HLD.          1/8/2025 JULIO CESAR Mullins, ARACELI    This note was generated with the assistance of ambient listening technology. Verbal consent was obtained by the patient and accompanying visitor(s) for the recording of patient appointment to facilitate this note. I attest to having reviewed and edited the generated note for accuracy, though some syntax or spelling errors may persist. Please contact the author of this note for any clarification.

## 2025-01-09 ENCOUNTER — TELEPHONE (OUTPATIENT)
Dept: SPORTS MEDICINE | Facility: CLINIC | Age: 35
End: 2025-01-09
Payer: MEDICAID

## 2025-01-09 ENCOUNTER — HOSPITAL ENCOUNTER (OUTPATIENT)
Dept: RADIOLOGY | Facility: HOSPITAL | Age: 35
Discharge: HOME | End: 2025-01-09
Attending: ORTHOPAEDIC SURGERY
Payer: MEDICAID

## 2025-01-09 ENCOUNTER — PATIENT MESSAGE (OUTPATIENT)
Dept: FAMILY MEDICINE | Facility: CLINIC | Age: 35
End: 2025-01-09
Payer: MEDICAID

## 2025-01-09 ENCOUNTER — OFFICE VISIT (OUTPATIENT)
Dept: ORTHOPEDICS | Facility: CLINIC | Age: 35
End: 2025-01-09
Payer: MEDICAID

## 2025-01-09 VITALS — BODY MASS INDEX: 45.99 KG/M2 | HEIGHT: 67 IN | WEIGHT: 293 LBS

## 2025-01-09 DIAGNOSIS — M75.51 SUBACROMIAL BURSITIS OF RIGHT SHOULDER JOINT: Primary | ICD-10-CM

## 2025-01-09 DIAGNOSIS — M75.31 CALCIFIC TENDINITIS OF RIGHT SHOULDER: ICD-10-CM

## 2025-01-09 PROCEDURE — 3061F NEG MICROALBUMINURIA REV: CPT | Mod: CPTII,,, | Performed by: ORTHOPAEDIC SURGERY

## 2025-01-09 PROCEDURE — 3066F NEPHROPATHY DOC TX: CPT | Mod: CPTII,,, | Performed by: ORTHOPAEDIC SURGERY

## 2025-01-09 PROCEDURE — 99213 OFFICE O/P EST LOW 20 MIN: CPT | Mod: S$PBB,,, | Performed by: ORTHOPAEDIC SURGERY

## 2025-01-09 PROCEDURE — 73030 X-RAY EXAM OF SHOULDER: CPT | Mod: 26,RT,, | Performed by: RADIOLOGY

## 2025-01-09 PROCEDURE — 1159F MED LIST DOCD IN RCRD: CPT | Mod: CPTII,,, | Performed by: ORTHOPAEDIC SURGERY

## 2025-01-09 PROCEDURE — 99999 PR PBB SHADOW E&M-EST. PATIENT-LVL IV: CPT | Mod: PBBFAC,,, | Performed by: ORTHOPAEDIC SURGERY

## 2025-01-09 PROCEDURE — 3008F BODY MASS INDEX DOCD: CPT | Mod: CPTII,,, | Performed by: ORTHOPAEDIC SURGERY

## 2025-01-09 PROCEDURE — 3051F HG A1C>EQUAL 7.0%<8.0%: CPT | Mod: CPTII,,, | Performed by: ORTHOPAEDIC SURGERY

## 2025-01-09 PROCEDURE — 73030 X-RAY EXAM OF SHOULDER: CPT | Mod: TC,PN,RT

## 2025-01-09 PROCEDURE — 1160F RVW MEDS BY RX/DR IN RCRD: CPT | Mod: CPTII,,, | Performed by: ORTHOPAEDIC SURGERY

## 2025-01-09 PROCEDURE — 99214 OFFICE O/P EST MOD 30 MIN: CPT | Mod: PBBFAC,25,PN | Performed by: ORTHOPAEDIC SURGERY

## 2025-01-09 RX ORDER — MELOXICAM 15 MG/1
15 TABLET ORAL DAILY
Qty: 30 TABLET | Refills: 2 | Status: SHIPPED | OUTPATIENT
Start: 2025-01-09

## 2025-01-09 NOTE — PROGRESS NOTES
[facility]    Subjective:     Chief Complaint:   Chief Complaint   Patient presents with    Right Shoulder - Pain     Patient is here with complaints of right shoulder pain, had injection 02.17.2022. States injection did offer relief for about 2 years and the pain has started to return about a month ago. Painful and limited ROM        Past Medical History:   Diagnosis Date    Anxiety     Back pain     Bipolar disorder 2004    bipolar 2    Chronic bronchitis     Depression     Diabetes mellitus     GERD (gastroesophageal reflux disease)     HPV (human papilloma virus) infection     Insomnia     Migraines     Non-proliferative diabetic retinopathy, mild, both eyes 09/25/2023    Obese body habitus     Ovarian cyst     Pneumonia     PONV (postoperative nausea and vomiting)        Past Surgical History:   Procedure Laterality Date    ANTERIOR LUMBAR INTERBODY FUSION (ALIF) Bilateral 4/23/2024    Procedure: FUSION, SPINE, LUMBAR, ALIF;  Surgeon: Gurjit Cassidy MD;  Location: [facility];  Service: Orthopedics;  Laterality: Bilateral;    APPENDECTOMY      ARTHROSCOPY OF ANKLE Right 05/04/2022    Procedure: ARTHROSCOPY, ANKLE;  Surgeon: Bimal Mccormick DPM;  Location: [facility];  Service: Podiatry;  Laterality: Right;  CURT EXTERNAL ANKLE DISTRACTOR    CHOLECYSTECTOMY      DILATION AND CURETTAGE OF UTERUS      HYSTERECTOMY  2017    total, ovarian cysts, torsion, [place]; hyst per Dr JESUS Manriquez    LAPAROSCOPIC APPENDECTOMY N/A 08/18/2021    Procedure: APPENDECTOMY, LAPAROSCOPIC;  Surgeon: Osiel Ramirez MD;  Location: [facility];  Service: General;  Laterality: N/A;    LUMBAR LAMINECTOMY WITH FUSION Bilateral 4/23/2024    Procedure: LAMINECTOMY, SPINE, LUMBAR, WITH FUSION;  Surgeon: Gurjit Cassidy MD;  Location: [facility];  Service: Orthopedics;  Laterality: Bilateral;    LUMBAR LAMINECTOMY WITH FUSION Bilateral 8/29/2024    Procedure: LAMINECTOMY, SPINE, LUMBAR, WITH FUSION;  Surgeon: Gurjit Cassidy MD;   Location: [facility] OR;  Service: Orthopedics;  Laterality: Bilateral;  NTI, MEDTRONIC    OOPHORECTOMY      opherectom Left 2015    salpingo-opherectomy    REPAIR OF LIGAMENT OF ANKLE Right 06/23/2021    Procedure: REPAIR OF ANTERIOR TALOFIBULAR LIGAMENT CALCANEOFIBULAR LIGAMENT;  Surgeon: Bimal Mccormick DPM;  Location: [facility] OR;  Service: Podiatry;  Laterality: Right;  WITH ARTHREX INTERNAL BRACE    REPAIR OF LIGAMENT OF ANKLE Right 4/27/2023    Procedure: REPAIR, LIGAMENT, ANKLE;  Surgeon: Aryan Poole DPM;  Location: [facility] OR;  Service: Podiatry;  Laterality: Right;    REPAIR OF TENDON OF LOWER EXTREMITY Right 4/27/2023    Procedure: REPAIR, TENDON, LOWER EXTREMITY;  Surgeon: Aryan Poole DPM;  Location: [facility] OR;  Service: Podiatry;  Laterality: Right;  arthrex    SURGICAL REMOVAL OF BONE SPUR Right 06/23/2021    Procedure: EXCISION OS TRIGONUM;  Surgeon: Bimal Mccormick DPM;  Location: [facility] OR;  Service: Podiatry;  Laterality: Right;    WISDOM TOOTH EXTRACTION         Current Outpatient Medications   Medication Sig    atorvastatin (LIPITOR) 20 MG tablet Take 1 tablet (20 mg total) by mouth every evening.    busPIRone (BUSPAR) 7.5 MG tablet Take 1 tablet (7.5 mg total) by mouth 2 (two) times a day.    gabapentin (NEURONTIN) 300 MG capsule Take 1 capsule (300 mg total) by mouth 3 (three) times daily.    losartan (COZAAR) 25 MG tablet Take 1 tablet (25 mg total) by mouth once daily.    metFORMIN (GLUCOPHAGE-XR) 500 MG ER 24hr tablet Take 2 tablets (1,000 mg total) by mouth 2 (two) times daily with meals.    ondansetron (ZOFRAN) 4 MG tablet Take 1 tablet (4 mg total) by mouth every 8 (eight) hours as needed for Nausea.    risperiDONE (RISPERDAL) 1 MG tablet Take 1 tablet (1 mg total) by mouth 2 (two) times daily.    sertraline (ZOLOFT) 100 MG tablet Take 1.5 tablets (150 mg total) by mouth once daily.    tirzepatide (MOUNJARO) 2.5 mg/0.5 mL PnIj Inject 2.5 mg into the skin every 7 days.    [START ON 2/7/2025]  tirzepatide (MOUNJARO) 5 mg/0.5 mL PnIj Inject 5 mg into the skin every 7 days.    traZODone (DESYREL) 100 MG tablet TAKE 1 TO 2 TABLETS BY MOUTH EVERY NIGHT AT BEDTIME AS NEEDED FOR INSOMNIA    blood sugar diagnostic Strp Test blood sugar 4 (four) times daily before meals and nightly. (Patient not taking: Reported on 1/9/2025)    blood-glucose meter (ACCU-CHEK GUIDE GLUCOSE METER) Misc Use to test blood sugar (Patient not taking: Reported on 1/9/2025)    meloxicam (MOBIC) 15 MG tablet Take 1 tablet (15 mg total) by mouth once daily.     No current facility-administered medications for this visit.       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       Family History   Adopted: Yes   Problem Relation Name Age of Onset    No Known Problems Sister      No Known Problems Sister      No Known Problems Sister         Social History     Socioeconomic History    Marital status:     Number of children: 0   Tobacco Use    Smoking status: Every Day     Current packs/day: 1.50     Average packs/day: 1.5 packs/day for 18.0 years (27.0 ttl pk-yrs)     Types: Cigarettes     Passive exposure: Current    Smokeless tobacco: Never    Tobacco comments:     4/25/23--pt instructed no smoking 24hours before sx, pt voiced understanding.   Substance and Sexual Activity    Alcohol use: Not Currently     Comment: rare    Drug use: Not Currently     Comment: twice    Sexual activity: Yes     Partners: Male     Birth control/protection: OCP, None     Social Drivers of Health     Financial Resource Strain: Medium Risk (8/29/2024)    Overall Financial Resource Strain (CARDIA)     Difficulty of Paying Living Expenses: Somewhat hard   Food Insecurity: No Food Insecurity (8/29/2024)    Hunger Vital Sign     Worried About Running Out of Food in the Last Year: Never true     Ran Out of Food in the Last Year: Never true   Transportation Needs: Unmet  Transportation Needs (8/29/2024)    TRANSPORTATION NEEDS     Transportation : Yes, it has kept me from non-medical meetings, appointments, work or from getting things that I need.   Physical Activity: Inactive (8/29/2024)    Exercise Vital Sign     Days of Exercise per Week: 0 days     Minutes of Exercise per Session: 0 min   Stress: Patient Declined (8/29/2024)    [employer] [place] [employer] - Occupational Stress Questionnaire     Feeling of Stress : Patient declined   Housing Stability: Low Risk  (8/29/2024)    Housing Stability Vital Sign     Unable to Pay for Housing in the Last Year: No     Homeless in the Last Year: No       History of present illness:  Loretta comes in today with a chief complaint of right shoulder pain for the past month or so.  Denies any specific injury or trauma.  She is right-hand dominant.  She had prior history of the same about 40 years ago where she received a subacromial corticosteroid injection with good relief of her symptoms.  Comes in today requesting a repeat injection.    Review of Systems:    Constitution: Negative for chills, fever, and sweats.  Negative for unexplained weight loss.    HENT:  Negative for headaches and blurry vision.    Cardiovascular:Negative for chest pain or irregular heart beat. Negative for hypertension.    Respiratory:  Negative for cough and shortness of breath.    Gastrointestinal: Negative for abdominal pain, heartburn, melena, nausea, and vomitting.    Genitourinary:  Negative bladder incontinence and dysuria.    Musculoskeletal:  See HPI for details.     Neurological: Negative for numbness.    Psychiatric/Behavioral: Negative for depression.  The patient is not nervous/anxious.      Endocrine: Negative for polyuria    Hematologic/Lymphatic: Negative for bleeding problem.  Does not bruise/bleed easily.    Skin: Negative for poor would healing and rash    Objective:      Physical Examination:    Vital Signs:  There were no vitals filed  "for this visit.    Body mass index is 47.03 kg/m².    This a well-developed, well nourished patient in no acute distress.  They are alert and oriented and cooperative to examination.        Right shoulder exam: Skin to right shoulder is clean dry and intact.  No erythema or ecchymosis.  No signs or symptoms of infection.  She is neurovascularly intact throughout the right upper extremity.  She has decreased range of motion of the right shoulder with forward flexion to about 100° and external rotation about 60°.  Very positive Neer impingement sign and Saravia test.  Her right rotator cuff tendon is intact to resisted muscle testing but is painful.      Pertinent New Results:    XRAY Report / Interpretation:   Two views taken of the right shoulder today: AP and Y-view.  No acute fractures or dislocations seen.  She does have evidence of calcific tendinitis.    Assessment/Plan:      1. Acute right shoulder subacromial bursitis.    2. Right shoulder calcific tendinitis.      I injected the right shoulder today via a posterolateral approach in the subacromial space with 40 mg of Kenalog and lidocaine.  She tolerated this well.  Start her on Mobic 15 mg by mouth once a day with food.  We will get her into PT to help with treating this.  We will see her back in 6 weeks to see how she responds.  If she is still symptomatic, consideration of advanced imaging with an MRI at that time may be warranted.    Aidan Alvares, Physician Assistant, served in the capacity as a "scribe" for this patient encounter.  A "face-to-face" encounter occurred with Dr. Karthik Farias on this date.  The treatment plan and medical decision-making is outlined above. Patient was seen and examined with a chaperone.         This note was created using Dragon voice recognition software that occasionally misinterpreted phrases or words.          "

## 2025-01-09 NOTE — TELEPHONE ENCOUNTER
Spoke to the patient in regard to scheduling an appointment with Dr. Marrero for back concerns. While on the call I informed the patient that Dr. Marrero is a shoulder, hip, and knee specialist. She was also informed that we do not accept adult medicaid. She stated well ya'll came up with my insurance and hung up.

## 2025-01-30 ENCOUNTER — HOSPITAL ENCOUNTER (EMERGENCY)
Facility: HOSPITAL | Age: 35
Discharge: HOME | End: 2025-01-30
Attending: EMERGENCY MEDICINE
Payer: MEDICAID

## 2025-01-30 ENCOUNTER — PATIENT MESSAGE (OUTPATIENT)
Dept: FAMILY MEDICINE | Facility: CLINIC | Age: 35
End: 2025-01-30
Payer: MEDICAID

## 2025-01-30 ENCOUNTER — ON-DEMAND VIRTUAL (OUTPATIENT)
Dept: URGENT CARE | Facility: CLINIC | Age: 35
End: 2025-01-30
Payer: MEDICAID

## 2025-01-30 VITALS
HEIGHT: 67 IN | RESPIRATION RATE: 18 BRPM | HEART RATE: 79 BPM | SYSTOLIC BLOOD PRESSURE: 96 MMHG | OXYGEN SATURATION: 99 % | WEIGHT: 293 LBS | BODY MASS INDEX: 45.99 KG/M2 | DIASTOLIC BLOOD PRESSURE: 50 MMHG | TEMPERATURE: 98 F

## 2025-01-30 DIAGNOSIS — M54.50 ACUTE BILATERAL LOW BACK PAIN WITHOUT SCIATICA: Primary | ICD-10-CM

## 2025-01-30 DIAGNOSIS — K04.7 DENTAL INFECTION: Primary | ICD-10-CM

## 2025-01-30 DIAGNOSIS — S49.92XA INJURY OF LEFT SHOULDER: ICD-10-CM

## 2025-01-30 DIAGNOSIS — S89.92XA LEFT KNEE INJURY, INITIAL ENCOUNTER: ICD-10-CM

## 2025-01-30 PROCEDURE — 25000003 PHARM REV CODE 250: Performed by: EMERGENCY MEDICINE

## 2025-01-30 PROCEDURE — 99284 EMERGENCY DEPT VISIT MOD MDM: CPT | Mod: 25

## 2025-01-30 RX ORDER — HYDROCODONE BITARTRATE AND ACETAMINOPHEN 5; 325 MG/1; MG/1
1 TABLET ORAL EVERY 6 HOURS PRN
Qty: 12 TABLET | Refills: 0 | Status: SHIPPED | OUTPATIENT
Start: 2025-01-30 | End: 2025-02-05

## 2025-01-30 RX ORDER — HYDROCODONE BITARTRATE AND ACETAMINOPHEN 5; 325 MG/1; MG/1
1 TABLET ORAL
Status: COMPLETED | OUTPATIENT
Start: 2025-01-30 | End: 2025-01-30

## 2025-01-30 RX ORDER — AMOXICILLIN 500 MG/1
500 CAPSULE ORAL EVERY 12 HOURS
Qty: 20 CAPSULE | Refills: 0 | Status: SHIPPED | OUTPATIENT
Start: 2025-01-30 | End: 2025-02-10

## 2025-01-30 RX ADMIN — HYDROCODONE BITARTRATE AND ACETAMINOPHEN 1 TABLET: 5; 325 TABLET ORAL at 02:01

## 2025-01-30 NOTE — ED PROVIDER NOTES
Chief complaint:  Fall (Trip and fall in parking lot, left shoulder and left knee pain, lower back pain. Denies loc)      HPI:  Loretta Lea is a 34 y.o. female with hx DM, obesity, chronic low back pain with prior lumbar fusion presenting with ground level fall.  Patient only was reportedly walking over uneven ground and was approaching the load and tripped over the edge of the concrete, falling to her hands and knees and rolling onto her left side.  She complains of left shoulder as well as knee pain and pain in the lower back.  She denies head injury or headache.  She denies neck pain.  Pain is in the lower back diffusely worse since the fall.  She denies new numbness or weakness.  She denies chest or abdominal pain.    ROS: As per HPI and below:  No loss of consciousness, dysuria, elbow or wrist pain, lower extremity injury apart from left knee.    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       Discharge Medication List as of 1/30/2025  3:31 PM        START taking these medications    Details   HYDROcodone-acetaminophen (NORCO) 5-325 mg per tablet Take 1 tablet by mouth every 6 (six) hours as needed for Pain., Starting Thu 1/30/2025, Until Wed 2/5/2025 at 2359, Normal           CONTINUE these medications which have NOT CHANGED    Details   amoxicillin (AMOXIL) 500 MG capsule Take 1 capsule (500 mg total) by mouth every 12 (twelve) hours. for 10 days, Starting Thu 1/30/2025, Until Sun 2/9/2025, Normal      atorvastatin (LIPITOR) 20 MG tablet Take 1 tablet (20 mg total) by mouth every evening., Starting Wed 1/8/2025, Normal      blood sugar diagnostic Strp Test blood sugar 4 (four) times daily before meals and nightly., Starting Thu 4/13/2023, Normal      blood-glucose meter (ACCU-CHEK GUIDE GLUCOSE METER[place] [facility] [place], Normal      busPIRone (BUSPAR) 7.5 MG tablet Take 1 tablet (7.5 mg total) by mouth 2  (two) times a day., Starting Wed 1/8/2025, Normal      gabapentin (NEURONTIN) 300 MG capsule Take 1 capsule (300 mg total) by mouth 3 (three) times daily., Starting Wed 1/8/2025, Normal      losartan (COZAAR) 25 MG tablet Take 1 tablet (25 mg total) by mouth once daily., Starting Wed 6/5/2024, Normal      meloxicam (MOBIC) 15 MG tablet Take 1 tablet (15 mg total) by mouth once daily., Starting Thu 1/9/2025, Normal      metFORMIN (GLUCOPHAGE-XR) 500 MG ER 24hr tablet Take 2 tablets (1,000 mg total) by mouth 2 (two) times daily with meals., Starting Wed 1/8/2025, Normal      ondansetron (ZOFRAN) 4 MG tablet Take 1 tablet (4 mg total) by mouth every 8 (eight) hours as needed for Nausea., Starting Fri 8/9/2024, Normal      risperiDONE (RISPERDAL) 1 MG tablet Take 1 tablet (1 mg total) by mouth 2 (two) times daily., Starting Wed 6/5/2024, Normal      sertraline (ZOLOFT) 100 MG tablet Take 1.5 tablets (150 mg total) by mouth once daily., Starting Wed 6/5/2024, Normal      tirzepatide (MOUNJARO) 2.5 mg/0.5 mL PnIj Inject 2.5 mg into the skin every 7 days., Starting Wed 1/8/2025, Until Thu 2/13/2025, Normal      tirzepatide (MOUNJARO) 5 mg/0.5 mL PnIj Inject 5 mg into the skin every 7 days., Starting Fri 2/7/2025, Until Tue 4/8/2025, Normal      traZODone (DESYREL) 100 MG tablet TAKE 1 TO 2 TABLETS BY MOUTH EVERY NIGHT AT BEDTIME AS NEEDED FOR INSOMNIA, Normal             PMH:  As per HPI and below:  Past Medical History:   Diagnosis Date    Anxiety     Back pain     Bipolar disorder 2004    bipolar 2    Chronic bronchitis     Depression     Diabetes mellitus     GERD (gastroesophageal reflux disease)     HPV (human papilloma virus) infection     Insomnia     Migraines     Non-proliferative diabetic retinopathy, mild, both eyes 09/25/2023    Obese body habitus     Ovarian cyst     Pneumonia     PONV (postoperative nausea and vomiting)      Past Surgical History:   Procedure Laterality Date    ANTERIOR LUMBAR INTERBODY  FUSION (ALIF) Bilateral 4/23/2024    Procedure: FUSION, SPINE, LUMBAR, ALIF;  Surgeon: Gurjit Cassidy MD;  Location: [facility] OR;  Service: Orthopedics;  Laterality: Bilateral;    APPENDECTOMY      ARTHROSCOPY OF ANKLE Right 05/04/2022    Procedure: ARTHROSCOPY, ANKLE;  Surgeon: Bimal Mccormick DPM;  Location: [facility] OR;  Service: Podiatry;  Laterality: Right;  CURT EXTERNAL ANKLE DISTRACTOR    CHOLECYSTECTOMY      DILATION AND CURETTAGE OF UTERUS      HYSTERECTOMY  2017    total, ovarian cysts, torsion, [place]; hyst per Dr JESUS Manriquez    LAPAROSCOPIC APPENDECTOMY N/A 08/18/2021    Procedure: APPENDECTOMY, LAPAROSCOPIC;  Surgeon: Osiel Ramirez MD;  Location: [facility];  Service: General;  Laterality: N/A;    LUMBAR LAMINECTOMY WITH FUSION Bilateral 4/23/2024    Procedure: LAMINECTOMY, SPINE, LUMBAR, WITH FUSION;  Surgeon: Gurjit Cassidy MD;  Location: [facility];  Service: Orthopedics;  Laterality: Bilateral;    LUMBAR LAMINECTOMY WITH FUSION Bilateral 8/29/2024    Procedure: LAMINECTOMY, SPINE, LUMBAR, WITH FUSION;  Surgeon: Gurjit Cassidy MD;  Location: [facility] OR;  Service: Orthopedics;  Laterality: Bilateral;  NTI, MEDTRONIC    OOPHORECTOMY      opherectom Left 2015    salpingo-opherectomy    REPAIR OF LIGAMENT OF ANKLE Right 06/23/2021    Procedure: REPAIR OF ANTERIOR TALOFIBULAR LIGAMENT CALCANEOFIBULAR LIGAMENT;  Surgeon: Bimal Mccormick DPM;  Location: [facility];  Service: Podiatry;  Laterality: Right;  WITH ARTHREX INTERNAL BRACE    REPAIR OF LIGAMENT OF ANKLE Right 4/27/2023    Procedure: REPAIR, LIGAMENT, ANKLE;  Surgeon: Aryan Poole DPM;  Location: [facility] OR;  Service: Podiatry;  Laterality: Right;    REPAIR OF TENDON OF LOWER EXTREMITY Right 4/27/2023    Procedure: REPAIR, TENDON, LOWER EXTREMITY;  Surgeon: Aryan Poole DPM;  Location: [facility] OR;  Service: Podiatry;  Laterality: Right;  arthrex    SURGICAL REMOVAL OF BONE SPUR Right 06/23/2021    Procedure: EXCISION OS TRIGONUM;  Surgeon: Bimal MERCADO  RUSTY Mccormick;  Location: [facility];  Service: Podiatry;  Laterality: Right;    WISDOM TOOTH EXTRACTION         Social History     Socioeconomic History    Marital status:     Number of children: 0   Tobacco Use    Smoking status: Every Day     Current packs/day: 1.50     Average packs/day: 1.5 packs/day for 18.0 years (27.0 ttl pk-yrs)     Types: Cigarettes     Passive exposure: Current    Smokeless tobacco: Never    Tobacco comments:     4/25/23--pt instructed no smoking 24hours before sx, pt voiced understanding.   Substance and Sexual Activity    Alcohol use: Not Currently     Comment: rare    Drug use: Not Currently     Comment: twice    Sexual activity: Yes     Partners: Male     Birth control/protection: OCP, None     Social Drivers of Health     Financial Resource Strain: Medium Risk (8/29/2024)    Overall Financial Resource Strain (CARDIA)     Difficulty of Paying Living Expenses: Somewhat hard   Food Insecurity: No Food Insecurity (8/29/2024)    Hunger Vital Sign     Worried About Running Out of Food in the Last Year: Never true     Ran Out of Food in the Last Year: Never true   Transportation Needs: Unmet Transportation Needs (8/29/2024)    TRANSPORTATION NEEDS     Transportation : Yes, it has kept me from non-medical meetings, appointments, work or from getting things that I need.   Physical Activity: Inactive (8/29/2024)    Exercise Vital Sign     Days of Exercise per Week: 0 days     Minutes of Exercise per Session: 0 min   Stress: Patient Declined (8/29/2024)    [employer] [place] [employer] - Occupational Stress Questionnaire     Feeling of Stress : Patient declined   Housing Stability: Low Risk  (8/29/2024)    Housing Stability Vital Sign     Unable to Pay for Housing in the Last Year: No     Homeless in the Last Year: No       Family History   Adopted: Yes   Problem Relation Name Age of Onset    No Known Problems Sister      No Known Problems Sister      No Known Problems Sister          Physical Exam:    Vitals:    01/30/25 1545   BP: (!) 96/50   Pulse: 79   Resp: 18   Temp: 98.3 °F (36.8 °C)     GENERAL:  No apparent distress.  Alert.  Large body habitus.  HEENT:  Moist mucous membranes.  Normocephalic and atraumatic.    NECK:  No swelling.  Midline trachea. No posterior midline tenderness to palpation.    CARDIOVASCULAR:  Regular rate and rhythm.  2+ radial pulses.    PULMONARY:  Lungs clear to auscultation bilaterally.  No wheezes, rales, or rhonci.    ABDOMEN:  Non-tender and non-distended.    EXTREMITIES:  Warm and well perfused.  Brisk capillary refill.  Full active range of motion in left shoulder with abduction.  Mild anterior tenderness to palpation without deformity.  Extremities are otherwise atraumatic apart from minimal palmar abrasion.  No other skeletal tenderness to palpation or limitation to active range of motion.  Lower extremities with infrapatellar abrasion with a associated tenderness.  No joint effusion.  Full active range of motion.  2+ DP and PT pulses.  NEUROLOGICAL:  Normal mental status.  Appropriate and conversant.  5/5 strength with equal sensation to light touch in the upper and lower extremities bilaterally.  Cranial nerves 3-12 intact.  SKIN:  No rashes or ecchymoses.    BACK:  No thoracic vertebral tenderness.  There is diffuse upper lumbar tenderness including over the midline.  No palpable deformity.    Labs Reviewed - No data to display    Discharge Medication List as of 1/30/2025  3:31 PM        START taking these medications    Details   HYDROcodone-acetaminophen (NORCO) 5-325 mg per tablet Take 1 tablet by mouth every 6 (six) hours as needed for Pain., Starting u 1/30/2025, Until Wed 2/5/2025 at 2359, Normal           CONTINUE these medications which have NOT CHANGED    Details   amoxicillin (AMOXIL) 500 MG capsule Take 1 capsule (500 mg total) by mouth every 12 (twelve) hours. for 10 days, Starting u 1/30/2025, Until Sun 2/9/2025, Normal       atorvastatin (LIPITOR) 20 MG tablet Take 1 tablet (20 mg total) by mouth every evening., Starting Wed 1/8/2025, Normal      blood sugar diagnostic Strp Test blood sugar 4 (four) times daily before meals and nightly., Starting Thu 4/13/2023, Normal      blood-glucose meter (ACCU-CHEK GUIDE GLUCOSE METER[place] [facility] [place], Normal      busPIRone (BUSPAR) 7.5 MG tablet Take 1 tablet (7.5 mg total) by mouth 2 (two) times a day., Starting Wed 1/8/2025, Normal      gabapentin (NEURONTIN) 300 MG capsule Take 1 capsule (300 mg total) by mouth 3 (three) times daily., Starting Wed 1/8/2025, Normal      losartan (COZAAR) 25 MG tablet Take 1 tablet (25 mg total) by mouth once daily., Starting Wed 6/5/2024, Normal      meloxicam (MOBIC) 15 MG tablet Take 1 tablet (15 mg total) by mouth once daily., Starting Thu 1/9/2025, Normal      metFORMIN (GLUCOPHAGE-XR) 500 MG ER 24hr tablet Take 2 tablets (1,000 mg total) by mouth 2 (two) times daily with meals., Starting Wed 1/8/2025, Normal      ondansetron (ZOFRAN) 4 MG tablet Take 1 tablet (4 mg total) by mouth every 8 (eight) hours as needed for Nausea., Starting Fri 8/9/2024, Normal      risperiDONE (RISPERDAL) 1 MG tablet Take 1 tablet (1 mg total) by mouth 2 (two) times daily., Starting Wed 6/5/2024, Normal      sertraline (ZOLOFT) 100 MG tablet Take 1.5 tablets (150 mg total) by mouth once daily., Starting Wed 6/5/2024, Normal      tirzepatide (MOUNJARO) 2.5 mg/0.5 mL PnIj Inject 2.5 mg into the skin every 7 days., Starting Wed 1/8/2025, Until Thu 2/13/2025, Normal      tirzepatide (MOUNJARO) 5 mg/0.5 mL PnIj Inject 5 mg into the skin every 7 days., Starting Fri 2/7/2025, Until Tue 4/8/2025, Normal      traZODone (DESYREL) 100 MG tablet TAKE 1 TO 2 TABLETS BY MOUTH EVERY NIGHT AT BEDTIME AS NEEDED FOR INSOMNIA, Normal             Orders Placed This Encounter   Procedures    X-Ray Shoulder Trauma Left    X-Ray Knee Complete 4 or more Views Left    X-Ray Lumbar Spine 5  View       Imaging Results              X-Ray Lumbar Spine 5 View (Final result)  Result time 01/30/25 14:51:07   Procedure changed from X-Ray Lumbar Spine Ap And Lateral     Final result by Shamir Mcgrath DO (01/30/25 14:51:07)                   Impression:      1. No acute osseous abnormality.  2. Fractured inferior right S1 transpedicular screw.  Retained fractured left S1 transpedicular screw again noted.      Electronically signed by: Shamir Mcgrath  Date:    01/30/2025  Time:    14:51               Narrative:    EXAMINATION:  XR LUMBAR SPINE COMPLETE 5 VIEW    CLINICAL HISTORY:  Back pain s/p fall;    COMPARISON:  Lumbar spine radiograph 09/16/2024.    FINDINGS:  Five views of lumbar spine show L4-S1 spinal fixation hardware.  Fractured inferior right S1 transpedicular screw observed.  Retained fractured left S1 transpedicular screw again observed.  There is no periprosthetic lucency observed.  L4-5 and L5-S1 intervertebral disc spacers again noted.  Vertebral body heights are maintained.  Nonsurgical intervertebral disc heights are maintained.    Sacroiliac joints are within normal limits.  Paraspinal soft tissues are negative.                                       X-Ray Knee Complete 4 or more Views Left (Final result)  Result time 01/30/25 14:49:01   Procedure changed from X-Ray Knee 3 View Left     Final result by Tutu Cote MD (01/30/25 14:49:01)                   Impression:      Normal left knee.      Electronically signed by: Tutu Cote  Date:    01/30/2025  Time:    14:49               Narrative:    EXAMINATION:  XR KNEE COMP 4 OR MORE VIEWS LEFT    CLINICAL HISTORY:  fall; Unspecified injury of left lower leg, initial encounter    FINDINGS:  Four views of left knee show no fracture, dislocation, or destructive osseous lesion. Soft tissues are unremarkable.                                       X-Ray Shoulder Trauma Left (Final result)  Result time 01/30/25 14:47:58      Final result by  Tutu Cote MD (01/30/25 14:47:58)                   Impression:      Normal left shoulder.      Electronically signed by: Tutu Cote  Date:    01/30/2025  Time:    14:47               Narrative:    EXAMINATION:  XR SHOULDER TRAUMA 3 VIEW LEFT    CLINICAL HISTORY:  Unspecified injury of left shoulder and upper arm, initial encounter    FINDINGS:  Three views of left shoulder show no fracture, dislocation, or destructive osseous lesion. Soft tissues are unremarkable.                                  (Rad read)    The patient was informed of the incidental finding(s) as well as the need for PCP or specialist follow-up for reevaluation and possible further investigation or monitoring.      ED Course as of 01/30/25 1738   Thu Jan 30, 2025   8702 I discussed presentation with exam and imaging results with Dr. Harp on-call from neurosurgery who recommends referral to see him in the office without other emergent intervention indicated today.  No activity limitations for now. [MR]      ED Course User Index  [MR] Heraclio Mcdowell MD       MDM:    34 y.o. female with ground level fall with multiple complaints including left shoulder and knee pain as well as back pain.  Low suspicion for fracture or dislocation in the extremities with x-ray done.  Potential for soft tissue injury discussed with need for further orthopedic follow-up for reassessment.  There is no distal end-organ dysfunction.  There is no closed head injury and I do not think brain imaging is indicated.  There is no other medical symptoms preceding fall and I do not think workup for separate acute medical process causing fall is indicated.  She does have back pain with history of fusion and x-ray done to rule out fracture or subluxation.  We did extensively discuss need for further back specialist follow-up for reassessment and possible further imaging pending clinical course and back specialist assessment.  She does plan on driving home with  opioids declined to allow her to drive.  I doubt other life-threatening injury.  Back x-ray does show minor S1 level evidence of possible pedicle fracture or hardware mass function, some of which may be chronic compared to prior imaging.  I did review this with Neurosurgery.  She is appropriate for outpatient clinic follow up without emergent intervention indicated.  Short course of analgesia prescribed as well pending close follow-up.  Return precautions reviewed.    Diagnoses:    1. Fall  2. L shoulder pain  3. L knee pain  4. Lumbar back pain       Heraclio Mcdowell MD  01/30/25 1508

## 2025-01-30 NOTE — DISCHARGE INSTRUCTIONS
"Findings on x-ray to be reviewed with Neurosurgery (or back specialist of your choice) on follow-up include:    "Fractured inferior right S1 transpedicular screw. Retained fractured left S1 transpedicular screw again noted."   "

## 2025-01-30 NOTE — PROGRESS NOTES
Subjective:      Patient ID: Loretta Lea is a 34 y.o. female.    Vitals:  vitals were not taken for this visit.     Chief Complaint: Dental Problem      Visit Type: TELE AUDIOVISUAL - This visit was conducted virtually based on  subjective information and limited objective exam    Present with the patient at the time of consultation: TELEMED PRESENT WITH PATIENT: None  LOCATION OF PATIENT [facility]  Two patient identifiers used to verify patient- saying out date of birth and full name.       Past Medical History:   Diagnosis Date    Anxiety     Back pain     Bipolar disorder 2004    bipolar 2    Chronic bronchitis     Depression     Diabetes mellitus     GERD (gastroesophageal reflux disease)     HPV (human papilloma virus) infection     Insomnia     Migraines     Non-proliferative diabetic retinopathy, mild, both eyes 09/25/2023    Obese body habitus     Ovarian cyst     Pneumonia     PONV (postoperative nausea and vomiting)      Past Surgical History:   Procedure Laterality Date    ANTERIOR LUMBAR INTERBODY FUSION (ALIF) Bilateral 4/23/2024    Procedure: FUSION, SPINE, LUMBAR, ALIF;  Surgeon: Gurjit Cassidy MD;  Location: [facility] OR;  Service: Orthopedics;  Laterality: Bilateral;    APPENDECTOMY      ARTHROSCOPY OF ANKLE Right 05/04/2022    Procedure: ARTHROSCOPY, ANKLE;  Surgeon: Bimal Mccormick DPM;  Location: [facility] OR;  Service: Podiatry;  Laterality: Right;  CURT EXTERNAL ANKLE DISTRACTOR    CHOLECYSTECTOMY      DILATION AND CURETTAGE OF UTERUS      HYSTERECTOMY  2017    total, ovarian cysts, torsion, [place]; hyst per Dr JESUS Manriquez    LAPAROSCOPIC APPENDECTOMY N/A 08/18/2021    Procedure: APPENDECTOMY, LAPAROSCOPIC;  Surgeon: Osiel Ramirez MD;  Location: [facility] OR;  Service: General;  Laterality: N/A;    LUMBAR LAMINECTOMY WITH FUSION Bilateral 4/23/2024    Procedure: LAMINECTOMY, SPINE, LUMBAR, WITH FUSION;  Surgeon: Gurjit Cassidy MD;  Location: [facility] OR;  Service: Orthopedics;  Laterality:  Bilateral;    LUMBAR LAMINECTOMY WITH FUSION Bilateral 8/29/2024    Procedure: LAMINECTOMY, SPINE, LUMBAR, WITH FUSION;  Surgeon: Gurjit Cassidy MD;  Location: [facility];  Service: Orthopedics;  Laterality: Bilateral;  NTI, MEDTRONIC    OOPHORECTOMY      opherectom Left 2015    salpingo-opherectomy    REPAIR OF LIGAMENT OF ANKLE Right 06/23/2021    Procedure: REPAIR OF ANTERIOR TALOFIBULAR LIGAMENT CALCANEOFIBULAR LIGAMENT;  Surgeon: Bimal Mccormick DPM;  Location: [facility] OR;  Service: Podiatry;  Laterality: Right;  WITH ARTHREX INTERNAL BRACE    REPAIR OF LIGAMENT OF ANKLE Right 4/27/2023    Procedure: REPAIR, LIGAMENT, ANKLE;  Surgeon: Aryan Poole DPM;  Location: [facility] OR;  Service: Podiatry;  Laterality: Right;    REPAIR OF TENDON OF LOWER EXTREMITY Right 4/27/2023    Procedure: REPAIR, TENDON, LOWER EXTREMITY;  Surgeon: Aryan Poole DPM;  Location: [facility] OR;  Service: Podiatry;  Laterality: Right;  arthrex    SURGICAL REMOVAL OF BONE SPUR Right 06/23/2021    Procedure: EXCISION OS TRIGONUM;  Surgeon: Bimal Mccormick DPM;  Location: [facility] OR;  Service: Podiatry;  Laterality: Right;    WISDOM TOOTH EXTRACTION       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     Current Outpatient Medications on File Prior to Visit   Medication Sig Dispense Refill    atorvastatin (LIPITOR) 20 MG tablet Take 1 tablet (20 mg total) by mouth every evening. 90 tablet 3    blood sugar diagnostic Strp Test blood sugar 4 (four) times daily before meals and nightly. (Patient not taking: Reported on 1/9/2025) 200 each 5    blood-glucose meter (ACCU-CHEK GUIDE GLUCOSE METER) [facility] Use to test blood sugar (Patient not taking: Reported on 1/9/2025) 1 each 0    busPIRone (BUSPAR) 7.5 MG tablet Take 1 tablet (7.5 mg total) by mouth 2 (two) times a day. 60 tablet 1    gabapentin (NEURONTIN) 300 MG capsule Take 1 capsule (300 mg total) by mouth 3 (three)  times daily. 90 capsule 2    losartan (COZAAR) 25 MG tablet Take 1 tablet (25 mg total) by mouth once daily. 30 tablet 5    meloxicam (MOBIC) 15 MG tablet Take 1 tablet (15 mg total) by mouth once daily. 30 tablet 2    metFORMIN (GLUCOPHAGE-XR) 500 MG ER 24hr tablet Take 2 tablets (1,000 mg total) by mouth 2 (two) times daily with meals. 120 tablet 2    ondansetron (ZOFRAN) 4 MG tablet Take 1 tablet (4 mg total) by mouth every 8 (eight) hours as needed for Nausea. 21 tablet 0    risperiDONE (RISPERDAL) 1 MG tablet Take 1 tablet (1 mg total) by mouth 2 (two) times daily. 60 tablet 5    sertraline (ZOLOFT) 100 MG tablet Take 1.5 tablets (150 mg total) by mouth once daily. 135 tablet 0    tirzepatide (MOUNJARO) 2.5 mg/0.5 mL PnIj Inject 2.5 mg into the skin every 7 days. 2 mL 0    [START ON 2/7/2025] tirzepatide (MOUNJARO) 5 mg/0.5 mL PnIj Inject 5 mg into the skin every 7 days. 4 mL 0    traZODone (DESYREL) 100 MG tablet TAKE 1 TO 2 TABLETS BY MOUTH EVERY NIGHT AT BEDTIME AS NEEDED FOR INSOMNIA 60 tablet 5     No current facility-administered medications on file prior to visit.     Family History   Adopted: Yes   Problem Relation Name Age of Onset    No Known Problems Sister      No Known Problems Sister      No Known Problems Sister         Medications Ordered                [facility] [address], [place] [address]    Telephone: [phone]   Fax: [phone]   Hours: Mon-Fri, 8a-5:30p                         Internal Pharmacy (1 of 1)              amoxicillin (AMOXIL) 500 MG Tab    Sig: Take 1 tablet (500 mg total) by mouth every 12 (twelve) hours. for 10 days       Start: 1/30/25     Quantity: 20 tablet Refills: 0                           [facility] Peq Odvv Intake    1/30/2025  9:14 AM CST - Filed by Patient   What is your current physical address in the event of a medical emergency? [address]   Are you able to take your vital signs? No   Please attach any relevant  images or files    Is your employer contracted with [facility]? No         33yo F patient presenting with c/o right lower face swelling that started in the last few days. Endorses + chipped tooth to right lower jaw. Denies pain, unless she is chewing. Plans to follow-up with dentist soon.        HENT:  Positive for dental problem and facial swelling.         Objective:   The physical exam was conducted virtually.    AAO x 3 ; no acute distress noted; appearance normal; mood and behavior normal; thought process normal  Head- normocephalic. Do not appreciate facial swelling.  Nose- appears normal, no discharge or erythema  Eyes- pupils appear normal in size, no drainage, no erythema  Ears- normal appearing; no discharge, no erythema  Mouth- poor visualization but appears to be some swelling to right lower gum line.  Oropharynx- no erythema, lesions  Lungs- breathing at a normal rate, no acute distress noted  Heart- no reports of tachycardia, palpitations, chest pain  Abdomen- non distended, non tender reported by patient  Skin- warm and dry, no erythema or edema noted by patient or visualized  Psych- as above; no si/hi      Assessment:     1. Dental infection        Plan:     Patient well-appearing. Will treat empirically for suspected dental infection with amoxicillin. May apply cool compresses. Tylenol prn pain. Advised to schedule fu with dentist asap. ER precautions if any shortness of breath, lip swelling, tongue swelling, throat swelling.    Thank you for choosing [facility]!    Our goal in the [facility] is to always provide outstanding medical care. You may receive a survey by mail or e-mail in the next week regarding your experience today. We would greatly appreciate you completing and returning the survey. Your feedback provides us with a way to recognize our staff who provide very good care, and it helps us learn how to improve when your experience was below  our aspiration of excellence.         We appreciate you trusting us with your medical care. We hope you feel better soon. We will be happy to take care of you for all of your future medical needs.    You must understand that you've received an Urgent Care treatment only and that you may be released before all your medical problems are known or treated. You, the patient, will arrange for follow up care as instructed.    Follow up with your PCP or specialty clinic as directed in the next 1-2 weeks if not improved or as needed.  You can call [phone] to schedule an appointment with the appropriate provider.    If your condition worsens we recommend that you receive another evaluation in person, with your primary care provider, urgent care or at the emergency room immediately or contact your primary medical clinics after hours call service to discuss your concerns.         Dental infection  -     amoxicillin (AMOXIL) 500 MG Tab; Take 1 tablet (500 mg total) by mouth every 12 (twelve) hours. for 10 days  Dispense: 20 tablet; Refill: 0

## 2025-02-11 DIAGNOSIS — E11.65 UNCONTROLLED TYPE 2 DIABETES MELLITUS WITH HYPERGLYCEMIA: ICD-10-CM

## 2025-02-11 DIAGNOSIS — F51.05 INSOMNIA DUE TO OTHER MENTAL DISORDER: ICD-10-CM

## 2025-02-11 DIAGNOSIS — F31.77 BIPOLAR DISORDER, IN PARTIAL REMISSION, MOST RECENT EPISODE MIXED: ICD-10-CM

## 2025-02-11 DIAGNOSIS — F99 INSOMNIA DUE TO OTHER MENTAL DISORDER: ICD-10-CM

## 2025-02-12 RX ORDER — RISPERIDONE 1 MG/1
1 TABLET ORAL 2 TIMES DAILY
Qty: 60 TABLET | Refills: 5 | Status: SHIPPED | OUTPATIENT
Start: 2025-02-12

## 2025-02-12 RX ORDER — SERTRALINE HYDROCHLORIDE 100 MG/1
150 TABLET, FILM COATED ORAL DAILY
Qty: 135 TABLET | Refills: 0 | Status: SHIPPED | OUTPATIENT
Start: 2025-02-12

## 2025-02-12 RX ORDER — TRAZODONE HYDROCHLORIDE 100 MG/1
TABLET ORAL
Qty: 60 TABLET | Refills: 5 | Status: SHIPPED | OUTPATIENT
Start: 2025-02-12

## 2025-02-12 RX ORDER — LOSARTAN POTASSIUM 25 MG/1
25 TABLET ORAL DAILY
Qty: 30 TABLET | Refills: 5 | Status: SHIPPED | OUTPATIENT
Start: 2025-02-12

## 2025-04-15 DIAGNOSIS — G89.29 CHRONIC BILATERAL LOW BACK PAIN, UNSPECIFIED WHETHER SCIATICA PRESENT: ICD-10-CM

## 2025-04-15 DIAGNOSIS — M54.50 CHRONIC BILATERAL LOW BACK PAIN, UNSPECIFIED WHETHER SCIATICA PRESENT: ICD-10-CM

## 2025-04-15 DIAGNOSIS — E11.65 TYPE 2 DIABETES MELLITUS WITH HYPERGLYCEMIA, WITHOUT LONG-TERM CURRENT USE OF INSULIN: ICD-10-CM

## 2025-04-15 RX ORDER — METFORMIN HYDROCHLORIDE 500 MG/1
1000 TABLET, EXTENDED RELEASE ORAL 2 TIMES DAILY WITH MEALS
Qty: 120 TABLET | Refills: 2 | Status: SHIPPED | OUTPATIENT
Start: 2025-04-15

## 2025-04-15 RX ORDER — GABAPENTIN 300 MG/1
300 CAPSULE ORAL 3 TIMES DAILY
Qty: 90 CAPSULE | Refills: 2 | Status: SHIPPED | OUTPATIENT
Start: 2025-04-15

## 2025-05-12 DIAGNOSIS — E11.65 TYPE 2 DIABETES MELLITUS WITH HYPERGLYCEMIA, WITHOUT LONG-TERM CURRENT USE OF INSULIN: ICD-10-CM

## 2025-05-12 RX ORDER — TIRZEPATIDE 5 MG/.5ML
5 INJECTION, SOLUTION SUBCUTANEOUS
Qty: 4 ML | Refills: 0 | Status: SHIPPED | OUTPATIENT
Start: 2025-05-12 | End: 2025-07-11

## 2025-05-29 ENCOUNTER — PATIENT MESSAGE (OUTPATIENT)
Dept: ADMINISTRATIVE | Facility: HOSPITAL | Age: 35
End: 2025-05-29
Payer: MEDICAID

## 2025-05-29 DIAGNOSIS — F31.77 BIPOLAR DISORDER, IN PARTIAL REMISSION, MOST RECENT EPISODE MIXED: ICD-10-CM

## 2025-05-29 RX ORDER — SERTRALINE HYDROCHLORIDE 100 MG/1
150 TABLET, FILM COATED ORAL DAILY
Qty: 135 TABLET | Refills: 0 | Status: SHIPPED | OUTPATIENT
Start: 2025-05-29

## 2025-08-29 DIAGNOSIS — F41.9 ANXIETY: ICD-10-CM

## 2025-08-29 DIAGNOSIS — G89.29 CHRONIC BILATERAL LOW BACK PAIN, UNSPECIFIED WHETHER SCIATICA PRESENT: ICD-10-CM

## 2025-08-29 DIAGNOSIS — E11.65 TYPE 2 DIABETES MELLITUS WITH HYPERGLYCEMIA, WITHOUT LONG-TERM CURRENT USE OF INSULIN: ICD-10-CM

## 2025-08-29 DIAGNOSIS — M54.50 CHRONIC BILATERAL LOW BACK PAIN, UNSPECIFIED WHETHER SCIATICA PRESENT: ICD-10-CM

## 2025-08-29 RX ORDER — MELOXICAM 15 MG/1
15 TABLET ORAL DAILY
Qty: 30 TABLET | Refills: 2 | OUTPATIENT
Start: 2025-08-29

## 2025-09-02 DIAGNOSIS — E78.2 MIXED HYPERLIPIDEMIA: ICD-10-CM

## 2025-09-02 DIAGNOSIS — E11.65 TYPE 2 DIABETES MELLITUS WITH HYPERGLYCEMIA, WITHOUT LONG-TERM CURRENT USE OF INSULIN: Primary | ICD-10-CM

## 2025-09-02 RX ORDER — GABAPENTIN 300 MG/1
300 CAPSULE ORAL 3 TIMES DAILY
Qty: 90 CAPSULE | Refills: 0 | Status: SHIPPED | OUTPATIENT
Start: 2025-09-02

## 2025-09-02 RX ORDER — BUSPIRONE HYDROCHLORIDE 7.5 MG/1
7.5 TABLET ORAL 2 TIMES DAILY
Qty: 60 TABLET | Refills: 0 | Status: SHIPPED | OUTPATIENT
Start: 2025-09-02

## 2025-09-02 RX ORDER — METFORMIN HYDROCHLORIDE 500 MG/1
1000 TABLET, EXTENDED RELEASE ORAL 2 TIMES DAILY WITH MEALS
Qty: 120 TABLET | Refills: 0 | Status: SHIPPED | OUTPATIENT
Start: 2025-09-02 | End: 2025-10-04

## (undated) DEVICE — IMPLANTABLE DEVICE
Type: IMPLANTABLE DEVICE | Site: SPINE LUMBAR | Status: NON-FUNCTIONAL
Removed: 2024-08-29

## (undated) DEVICE — SUTURE FIBERWIRE 3-0 TAPER

## (undated) DEVICE — TAPE SILK 3IN

## (undated) DEVICE — KIT C.A.T.S. FAST START

## (undated) DEVICE — WALKER ANKLE MEDIUM

## (undated) DEVICE — CLIPPER BLADE MOD 4406 (CAREF)

## (undated) DEVICE — SPONGE LAP 18X18 PREWASHED

## (undated) DEVICE — SUTURE VICRYL 2-0 RB-1 27 J306H

## (undated) DEVICE — SOLUTION PREP IODINE 4OZ

## (undated) DEVICE — SYRINGE 0.9% NACL 10MIL PREFIL

## (undated) DEVICE — TRAY LOWER EXTREMITY  SMHS029-05

## (undated) DEVICE — DRESSING GAUZE OIL EMUL 3X8

## (undated) DEVICE — PAD BOVIE ADULT

## (undated) DEVICE — SUT BONE WAX 2.5 GRMS 12/BX

## (undated) DEVICE — SUTURE VICRYL #0 27 UR-6 VCP603H

## (undated) DEVICE — RELOAD LINEAR 6R45B

## (undated) DEVICE — PENCIL SMOKE EVAC ROCKER 70MM

## (undated) DEVICE — NEEDLE INSUFFLATION 150MM PN150

## (undated) DEVICE — STERISTRIP 1/2 R1547

## (undated) DEVICE — GLOVE SENSICARE PI GRN 8.5

## (undated) DEVICE — SOLUTION IRRI NS BOTTLE 1000ML R5200-01

## (undated) DEVICE — DRAPE LAP T SHT W/ INSTR PAD

## (undated) DEVICE — NDL SPINAL 18GX3.5 SPINOCAN

## (undated) DEVICE — SEALER BIPOLAR TISSUE 6.0

## (undated) DEVICE — SUTURE VICRYL 4-0 RB-1 27 J214H

## (undated) DEVICE — GLOVE SENSICARE PI ALOE 8

## (undated) DEVICE — ELECTRODE BLADE TEFLON 6

## (undated) DEVICE — PAD ABDOMINAL STERILE 8X10IN

## (undated) DEVICE — SOL .9NACL PF 100 ML

## (undated) DEVICE — PAD ABD 5X9   7196D

## (undated) DEVICE — BLADE SCALPEL #15 371115

## (undated) DEVICE — CUFF TOURNIQUET 34DUAL PRT 5921-034-235

## (undated) DEVICE — SUTURE VICRYL 3-0 RB-1 27 J215H

## (undated) DEVICE — APPLIER LIGACLIP MED 11IN

## (undated) DEVICE — TOWEL OR DISP STRL BLUE 4/PK

## (undated) DEVICE — DRAPE STERI INSTRUMENT 1018

## (undated) DEVICE — SYR 50CC LL

## (undated) DEVICE — STAPLER SKIN NON ROTATE PXW35

## (undated) DEVICE — DRAPE INCISE IOBAN 2 23X17IN

## (undated) DEVICE — DRAPE THREE-QTR REINF 53X77IN

## (undated) DEVICE — PACK CUSTOM LUMBAR LAM SLI

## (undated) DEVICE — SUT CTD VICRYL 1 UND OS-8

## (undated) DEVICE — GLOVE BIOGEL PI   GOLD SIZE 8

## (undated) DEVICE — TRAY CATH FOL SIL URIMTR 16FR

## (undated) DEVICE — TRAY SKIN PREP DRY

## (undated) DEVICE — RETRIEVER ENDOPOUCH SPEC BAG

## (undated) DEVICE — SUT CTD VICRYL 2-0 UND BR O

## (undated) DEVICE — SPONGE LAP 4X18 PREWASHED

## (undated) DEVICE — BANDAGE SOFTBAND 4 441506

## (undated) DEVICE — SOL NACL IRR 1000ML BTL

## (undated) DEVICE — TROCAR OPTICAL ZTHREAD 12MMX100MM CTF73

## (undated) DEVICE — PROBE 100MM PEDICLE SCREW

## (undated) DEVICE — GLOVE SENSICARE PI GRN 8

## (undated) DEVICE — BLADE SURG CARBON STEEL #10

## (undated) DEVICE — ADHESIVE MASTISOL VIAL 0523-48

## (undated) DEVICE — SYRINGE 3ML

## (undated) DEVICE — SUT 2-0 VICRYL / CT-1

## (undated) DEVICE — SOLUTION SCRUB IODINE 4OZ

## (undated) DEVICE — SPHERE NDI PASSIVE

## (undated) DEVICE — KIT EVACUATOR 3-SPRING 1/8 DRN

## (undated) DEVICE — Device

## (undated) DEVICE — DRAPE C ARM 42 X 120 10/BX

## (undated) DEVICE — SCISSORS 5MM APPLIED MEDICAL   CB030

## (undated) DEVICE — DRAPE INVISISHIELD TOWEL SMALL

## (undated) DEVICE — STAPLER SKIN ROTATING HEAD

## (undated) DEVICE — RELOAD LINEAR TR45W

## (undated) DEVICE — TUBING CYSTO DOUBLE 654301

## (undated) DEVICE — LINER SUCTION 3000CC

## (undated) DEVICE — BLADE COARSE MILL PREP

## (undated) DEVICE — BANDAGE KERLIX   441106

## (undated) DEVICE — PACK SIRUS BASIC V SURG STRL

## (undated) DEVICE — SOLUTION IRRI H2O BOTTLE 1000ML

## (undated) DEVICE — COVER TABLE 44X90 STERILE

## (undated) DEVICE — DRAIN SINGLE ROUND 3/16 15F

## (undated) DEVICE — TAPE ADH MEDIPORE 4 X 10YDS

## (undated) DEVICE — FORCEP BAYO INSU SGL USE STRGT

## (undated) DEVICE — BANDAGE ESMARK 4X9 55514

## (undated) DEVICE — GLOVE BIOGELPI GOLD SIZE 7.5

## (undated) DEVICE — SLEEVE TROCAR ENDOPATH XCEL

## (undated) DEVICE — DRESSING MEPORE 2.5 X 3   670800

## (undated) DEVICE — WALKER ANKLE LARGE

## (undated) DEVICE — DISSECTOR KITTNER 13300

## (undated) DEVICE — STRAP OR TABLE 5IN X 72IN

## (undated) DEVICE — DRAPE O-ARM STERILE

## (undated) DEVICE — SYRINGE 10ML 302995

## (undated) DEVICE — TAPE MEDIPORE 3 X 10YD

## (undated) DEVICE — TROCAR ENDOPATH XCEL BLADELESS B5LT

## (undated) DEVICE — SLEEVE SCD EXPRESS KNEE MEDIUM

## (undated) DEVICE — ELECTRODE BLADE E-Z CLEAN 4IN

## (undated) DEVICE — DRAPE C-ARMOR EQUIPMENT COVER

## (undated) DEVICE — DRAPE C-ARM (FITS NEW C-ARM) 07-CA108

## (undated) DEVICE — SKINMARKER W/RULER DEVON

## (undated) DEVICE — YANKAUER OPEN TIP W/O VENT

## (undated) DEVICE — ALCOHOL 70% ANTISEPTIC ISO 4OZ

## (undated) DEVICE — PACK CUSTOM UNIV BASIN SLI

## (undated) DEVICE — CUFF TOURNIQUET 18DUAL PRT 5921-218-235

## (undated) DEVICE — SYS LABEL CORRECT MED

## (undated) DEVICE — ELECTRODE REM PLYHSV RETURN 9

## (undated) DEVICE — BANDAGE ACE STERILE 4 REB3114

## (undated) DEVICE — APPLICATOR CHLORAPREP ORN 26ML

## (undated) DEVICE — CONTAINER SPECIMEN 4 OZ STERILE 1053

## (undated) DEVICE — DISSECTOR CURVED WITH MONOPOLAR 5MM

## (undated) DEVICE — TRAY GENERAL LAPAROSCOPY

## (undated) DEVICE — CORD BIPOLAR 12 FOOT

## (undated) DEVICE — TRAY CATH 1-LYR URIMTR 16FR

## (undated) DEVICE — GOWN POLY REINF BRTH SLV 2XL

## (undated) DEVICE — SPONGE GAUZE 10S 4X4  442214

## (undated) DEVICE — GOWN POLY REINF X-LONG XL

## (undated) DEVICE — GOWN POLY REINF BRTH SLV XL

## (undated) DEVICE — GLOVE BIOGEL PI ULTRA TOUCH GRAY SZ7.5

## (undated) DEVICE — CATH THORACIC 24FR ST

## (undated) DEVICE — SUT SILK BLK. BR. 3-0 10

## (undated) DEVICE — POUCH RETRIEVAL 10MM APP. MED.     CD001

## (undated) DEVICE — TAPE MEDIPORE 4IN X 2YDS

## (undated) DEVICE — CUTTER LINEAR FLEX ARTICNG 45MM ATS45

## (undated) DEVICE — NEEDLE INSUFFLATION 120MM 172015

## (undated) DEVICE — SUT VICRYL ANTI 2-0 27IN

## (undated) DEVICE — SUT CTD VICRYL CT-1 27

## (undated) DEVICE — SPONGE BULKEE II ABSRB 6X6.75

## (undated) DEVICE — SPONGE KITTNER DISECT 1/4X9/16

## (undated) DEVICE — AGENT HEMOSTATIC ARISTA 3GR

## (undated) DEVICE — GLOVE SENSICARE PI ALOE 7.5

## (undated) DEVICE — SUTURE MONOCRYL 4-0 PS-2 Y496G

## (undated) DEVICE — APPLIER LIGACLIP SM 9.38IN

## (undated) DEVICE — SUT CTD VICRYL 1 VIL BR CR/

## (undated) DEVICE — SOL NORMAL USPCA 0.9%